# Patient Record
Sex: FEMALE | Race: WHITE | Employment: FULL TIME | URBAN - METROPOLITAN AREA
[De-identification: names, ages, dates, MRNs, and addresses within clinical notes are randomized per-mention and may not be internally consistent; named-entity substitution may affect disease eponyms.]

---

## 2017-01-26 ENCOUNTER — APPOINTMENT (OUTPATIENT)
Dept: CT IMAGING | Facility: CLINIC | Age: 37
End: 2017-01-26
Attending: FAMILY MEDICINE
Payer: COMMERCIAL

## 2017-01-26 ENCOUNTER — HOSPITAL ENCOUNTER (EMERGENCY)
Facility: CLINIC | Age: 37
Discharge: HOME | End: 2017-01-26
Attending: FAMILY MEDICINE | Admitting: FAMILY MEDICINE
Payer: COMMERCIAL

## 2017-01-26 VITALS
WEIGHT: 160 LBS | DIASTOLIC BLOOD PRESSURE: 70 MMHG | BODY MASS INDEX: 29.44 KG/M2 | SYSTOLIC BLOOD PRESSURE: 119 MMHG | HEIGHT: 62 IN | RESPIRATION RATE: 16 BRPM | TEMPERATURE: 99.6 F | OXYGEN SATURATION: 100 %

## 2017-01-26 DIAGNOSIS — J45.30 MILD PERSISTENT ASTHMA, UNCOMPLICATED: Primary | ICD-10-CM

## 2017-01-26 DIAGNOSIS — R10.32 ABDOMINAL PAIN, LEFT LOWER QUADRANT: ICD-10-CM

## 2017-01-26 LAB
ALBUMIN SERPL-MCNC: 4.2 G/DL (ref 3.4–5)
ALBUMIN UR-MCNC: 100 MG/DL
ALP SERPL-CCNC: 58 U/L (ref 40–150)
ALT SERPL W P-5'-P-CCNC: 16 U/L (ref 0–50)
ANION GAP SERPL CALCULATED.3IONS-SCNC: 9 MMOL/L (ref 3–14)
APPEARANCE UR: ABNORMAL
AST SERPL W P-5'-P-CCNC: 13 U/L (ref 0–45)
BACTERIA #/AREA URNS HPF: ABNORMAL /HPF
BASOPHILS # BLD AUTO: 0 10E9/L (ref 0–0.2)
BASOPHILS NFR BLD AUTO: 0.3 %
BILIRUB SERPL-MCNC: 1 MG/DL (ref 0.2–1.3)
BILIRUB UR QL STRIP: NEGATIVE
BUN SERPL-MCNC: 14 MG/DL (ref 7–30)
CALCIUM SERPL-MCNC: 9.4 MG/DL (ref 8.5–10.1)
CHLORIDE SERPL-SCNC: 103 MMOL/L (ref 94–109)
CO2 SERPL-SCNC: 26 MMOL/L (ref 20–32)
COLOR UR AUTO: ABNORMAL
CREAT SERPL-MCNC: 0.73 MG/DL (ref 0.52–1.04)
DIFFERENTIAL METHOD BLD: ABNORMAL
EOSINOPHIL # BLD AUTO: 0 10E9/L (ref 0–0.7)
EOSINOPHIL NFR BLD AUTO: 0.3 %
ERYTHROCYTE [DISTWIDTH] IN BLOOD BY AUTOMATED COUNT: 12.8 % (ref 10–15)
GFR SERPL CREATININE-BSD FRML MDRD: 89 ML/MIN/1.7M2
GLUCOSE SERPL-MCNC: 121 MG/DL (ref 70–99)
GLUCOSE UR STRIP-MCNC: NEGATIVE MG/DL
HCT VFR BLD AUTO: 45.1 % (ref 35–47)
HGB BLD-MCNC: 15.9 G/DL (ref 11.7–15.7)
HGB UR QL STRIP: ABNORMAL
IMM GRANULOCYTES # BLD: 0 10E9/L (ref 0–0.4)
IMM GRANULOCYTES NFR BLD: 0.2 %
KETONES UR STRIP-MCNC: 10 MG/DL
LACTATE BLD-SCNC: 0.8 MMOL/L (ref 0.7–2.1)
LEUKOCYTE ESTERASE UR QL STRIP: NEGATIVE
LIPASE SERPL-CCNC: 139 U/L (ref 73–393)
LYMPHOCYTES # BLD AUTO: 2 10E9/L (ref 0.8–5.3)
LYMPHOCYTES NFR BLD AUTO: 17.4 %
MCH RBC QN AUTO: 34.2 PG (ref 26.5–33)
MCHC RBC AUTO-ENTMCNC: 35.3 G/DL (ref 31.5–36.5)
MCV RBC AUTO: 97 FL (ref 78–100)
MONOCYTES # BLD AUTO: 0.6 10E9/L (ref 0–1.3)
MONOCYTES NFR BLD AUTO: 5.7 %
NEUTROPHILS # BLD AUTO: 8.6 10E9/L (ref 1.6–8.3)
NEUTROPHILS NFR BLD AUTO: 76.1 %
NITRATE UR QL: NEGATIVE
PH UR STRIP: 6 PH (ref 5–7)
PLATELET # BLD AUTO: 277 10E9/L (ref 150–450)
POTASSIUM SERPL-SCNC: 3.7 MMOL/L (ref 3.4–5.3)
PROT SERPL-MCNC: 9.1 G/DL (ref 6.8–8.8)
RBC # BLD AUTO: 4.65 10E12/L (ref 3.8–5.2)
RBC #/AREA URNS AUTO: 1 /HPF (ref 0–2)
SODIUM SERPL-SCNC: 138 MMOL/L (ref 133–144)
SP GR UR STRIP: 1.03 (ref 1–1.03)
SQUAMOUS #/AREA URNS AUTO: 10 /HPF (ref 0–1)
URN SPEC COLLECT METH UR: ABNORMAL
UROBILINOGEN UR STRIP-MCNC: NORMAL MG/DL (ref 0–2)
WBC # BLD AUTO: 11.2 10E9/L (ref 4–11)
WBC #/AREA URNS AUTO: 1 /HPF (ref 0–2)

## 2017-01-26 PROCEDURE — 80053 COMPREHEN METABOLIC PANEL: CPT | Performed by: FAMILY MEDICINE

## 2017-01-26 PROCEDURE — 96361 HYDRATE IV INFUSION ADD-ON: CPT

## 2017-01-26 PROCEDURE — 25000128 H RX IP 250 OP 636: Performed by: FAMILY MEDICINE

## 2017-01-26 PROCEDURE — 81003 URINALYSIS AUTO W/O SCOPE: CPT | Performed by: FAMILY MEDICINE

## 2017-01-26 PROCEDURE — 99285 EMERGENCY DEPT VISIT HI MDM: CPT | Mod: 25

## 2017-01-26 PROCEDURE — 99285 EMERGENCY DEPT VISIT HI MDM: CPT | Performed by: FAMILY MEDICINE

## 2017-01-26 PROCEDURE — 96375 TX/PRO/DX INJ NEW DRUG ADDON: CPT

## 2017-01-26 PROCEDURE — 74177 CT ABD & PELVIS W/CONTRAST: CPT

## 2017-01-26 PROCEDURE — 96374 THER/PROPH/DIAG INJ IV PUSH: CPT

## 2017-01-26 PROCEDURE — 85025 COMPLETE CBC W/AUTO DIFF WBC: CPT | Performed by: FAMILY MEDICINE

## 2017-01-26 PROCEDURE — 25500064 ZZH RX 255 OP 636: Performed by: RADIOLOGY

## 2017-01-26 PROCEDURE — 25000125 ZZHC RX 250: Performed by: RADIOLOGY

## 2017-01-26 PROCEDURE — 25000125 ZZHC RX 250: Performed by: FAMILY MEDICINE

## 2017-01-26 PROCEDURE — 83605 ASSAY OF LACTIC ACID: CPT | Performed by: FAMILY MEDICINE

## 2017-01-26 PROCEDURE — 83690 ASSAY OF LIPASE: CPT | Performed by: FAMILY MEDICINE

## 2017-01-26 RX ORDER — SULINDAC 200 MG/1
200 TABLET ORAL 2 TIMES DAILY WITH MEALS
Qty: 20 TABLET | Refills: 0 | Status: SHIPPED | OUTPATIENT
Start: 2017-01-26 | End: 2018-03-07

## 2017-01-26 RX ORDER — IOPAMIDOL 755 MG/ML
78 INJECTION, SOLUTION INTRAVASCULAR ONCE
Status: COMPLETED | OUTPATIENT
Start: 2017-01-26 | End: 2017-01-26

## 2017-01-26 RX ORDER — HYDROMORPHONE HYDROCHLORIDE 1 MG/ML
0.5 INJECTION, SOLUTION INTRAMUSCULAR; INTRAVENOUS; SUBCUTANEOUS
Status: DISCONTINUED | OUTPATIENT
Start: 2017-01-26 | End: 2017-01-26 | Stop reason: HOSPADM

## 2017-01-26 RX ORDER — MONTELUKAST SODIUM 10 MG/1
10 TABLET ORAL DAILY
Qty: 30 TABLET | Refills: 0 | Status: SHIPPED | OUTPATIENT
Start: 2017-01-26 | End: 2017-03-06

## 2017-01-26 RX ORDER — SODIUM CHLORIDE 9 MG/ML
1000 INJECTION, SOLUTION INTRAVENOUS CONTINUOUS
Status: DISCONTINUED | OUTPATIENT
Start: 2017-01-26 | End: 2017-01-26 | Stop reason: HOSPADM

## 2017-01-26 RX ORDER — ONDANSETRON 2 MG/ML
4 INJECTION INTRAMUSCULAR; INTRAVENOUS ONCE
Status: COMPLETED | OUTPATIENT
Start: 2017-01-26 | End: 2017-01-26

## 2017-01-26 RX ADMIN — IOPAMIDOL 78 ML: 755 INJECTION, SOLUTION INTRAVENOUS at 08:33

## 2017-01-26 RX ADMIN — HYDROMORPHONE HYDROCHLORIDE 0.5 MG: 1 INJECTION, SOLUTION INTRAMUSCULAR; INTRAVENOUS; SUBCUTANEOUS at 08:10

## 2017-01-26 RX ADMIN — SODIUM CHLORIDE 1000 ML: 9 INJECTION, SOLUTION INTRAVENOUS at 08:03

## 2017-01-26 RX ADMIN — ONDANSETRON 4 MG: 2 INJECTION INTRAMUSCULAR; INTRAVENOUS at 08:08

## 2017-01-26 RX ADMIN — SODIUM CHLORIDE 59 ML: 9 INJECTION, SOLUTION INTRAVENOUS at 08:33

## 2017-01-26 NOTE — ED AVS SNAPSHOT
[facility]    [address] [address]    Phone:  [phone]    Fax:  [phone]                                       Ana Felix   MRN: [id number]    Department:  [facility]   Date of Visit:  1/26/2017           After Visit Summary Signature Page     I have received my discharge instructions, and my questions have been answered. I have discussed any challenges I see with this plan with the nurse or doctor.    ..........................................................................................................................................  Patient/Patient Representative Signature      ..........................................................................................................................................  Patient Representative Print Name and Relationship to Patient    ..................................................               ................................................  Date                                            Time    ..........................................................................................................................................  Reviewed by Signature/Title    ...................................................              ..............................................  Date                                                            Time

## 2017-01-26 NOTE — ED NOTES
traveling to [place] on Sunday, felt off. Monday woke with nausea and abd pain and fever. Continues to have abd pain diarrhea and fever. Small frequent mucous stools. Nauseated ,no vomiting. No urinary sx. No URI sx. Hx of Chron's. Had gallbladder, uterus,right tubes and ovary,  appendix and part of colon removed  About 14 years ago. No flares since then . Managed on meds. Other family members had similar sx for 24 hours or less.

## 2017-01-26 NOTE — TELEPHONE ENCOUNTER
Pending Prescriptions:                       Disp   Refills    montelukast (SINGULAIR) 10 MG tablet      60 tab*1            Sig: Take 1 tablet (10 mg) by mouth daily      Aura Mesa MA

## 2017-01-26 NOTE — LETTER
[facility] EMERGENCY DEPARTMENT  [address] [address]  [phone]    2017    Ana Felix  [address]  [place] [address]  [phone] (home) [phone] (work)    : 1980      To Whom it may concern:    Ana Felix was seen in our Emergency Department today, 2017.  I expect her condition to improve over the next 2-3 days.  She may return to work/school when improved.    Sincerely,        Justus Rich

## 2017-01-26 NOTE — ED PROVIDER NOTES
History     Chief Complaint   Patient presents with     Abdominal Pain     HPI  Ana Felix is a 36 year old female who presents with a history of Crohn's disease on Imuran and in remission for the last 14 years with a prior history of distal  ilial resection with abdominal pain left lower quadrant associated with fever and small amounts of mucousy diarrhea multiple stools per day for the last 4 days.  Onset of symptoms while she was in [place] visiting with her family.  Some family members had similar symptoms but their symptoms resolved after less than 24 hours.  She has had persistent nausea and a fever to T-max of 101.  She denies recent antibiotics.  She has no known bad food intakes.  She drank primarily bottled water while she was traveling.  She notes the pain is been moderate in the left lower quadrant and does not radiate.  There are no modifying factors although she gets brief relief with Percocet that she uses for her back chronically.  The pain is a constant ache sensation with periodic sharp paroxysms.  She has been able to tolerate some liquids but hasn't had not much in the way of food for the last 4 days.  Continues to urinate.    She has multiple prior abdominal surgeries including hysterectomy, right oophorectomy due to ovarian torsion.  Prior tubal ligation.  Appendectomy.  Cholecystectomy.  And as noted above prior partial ileectomy.    Past Surgical History   Procedure Laterality Date     Appendectomy       Cholecystectomy, laporoscopic  2005     Cholecystectomy, Laparoscopic     Colonoscopy       Orthopedic surgery       bluged disk     Leep tx, cervical       LEEP TX Cervical     C/section, low transverse       , Low Transverse     Salpingo oophorectomy,r/l/luciano       Right ovary     Hc hysteroscopy, surgical; w/ endometrial ablation, any method  2010     Tubal ligation       Hysterectomy, supracervical laparoscopic       Esophagoscopy, gastroscopy, duodenoscopy (egd),  combined  3/6/2014     Procedure: COMBINED ESOPHAGOSCOPY, GASTROSCOPY, DUODENOSCOPY (EGD), BIOPSY SINGLE OR MULTIPLE;  COLONOSCOPY/ UPPER GI ENDOSCOPY/ COLON/ EGD/ Abdominal pain, epigastric/ [facility];  Surgeon: West Lomas MD;  Location: [facility] OR     Partial small bowel resection  2002     Ileo-colonic resection. Crohn's disease surgery for bowel obstruction. this was a section of small bowel and large bowel and the cecum., all removed and a reanastamosis done successfully       Current Outpatient Prescriptions   Medication Sig Dispense Refill     oxyCODONE-acetaminophen (PERCOCET) 7.5-325 MG per tablet Take 1 tablet by mouth 3 times daily       cyanocobalamin (VITAMIN B12) 1000 MCG/ML injection Inject 1 mL into the muscle every 30 days       montelukast (SINGULAIR) 10 MG tablet Take 1 tablet (10 mg) by mouth daily (Patient taking differently: Take 10 mg by mouth At Bedtime ) 60 tablet 1     fentaNYL (DURAGESIC) 12 mcg/hr patch 72 hr Place 1 patch onto the skin every 72 hours       albuterol (ALBUTEROL) 108 (90 BASE) MCG/ACT inhaler Inhale 2 puffs into the lungs every 4 hours as needed for shortness of breath / dyspnea 1 Inhaler 1     fentaNYL (DURAGESIC) 25 mcg/hr patch 72 hr Place 1 patch onto the skin every 72 hours       IMURAN 50 MG OR TABS 2 TABLETS by mouth DAILY         Patient Active Problem List   Diagnosis     Crohns disease (H)     Female pelvic pain     CARDIOVASCULAR SCREENING; LDL GOAL LESS THAN 160     Migraine headache     Back pain     Obesity     Disorder of sacrum     Displacement of lumbar intervertebral disc without myelopathy     Tobacco abuse     S/P oophorectomy     History of cholecystectomy     History of resection of small bowel     Abdominal pain     Discogenic pain     Chronic low back pain     Mild persistent asthma     Nausea with vomiting     Abdominal pain, right upper quadrant     Irritable bowel syndrome (IBS)     Lactose intolerance     Hearing difficulty     S/P LEEP of  "cervix         I have reviewed the Medications, Allergies, Past Medical and Surgical History, and Social History in the Epic system.    Review of Systems   No fever chills or sweats  No cough, sore throat or ear pain  No chest pain, palpitations or shortness of breath  No  vomiting,  constipation or blood in the stool or black tarry stools  No dysuria, urgency,  frequency or hematuria  No new rashes  No headaches or vision change  No enlarged lymph nodes  Review of systems otherwise negative       Physical Exam   BP: 122/81 mmHg  Heart Rate: 101  Temp: 99.6  F (37.6  C)  Resp: 16  Height: 157.5 cm (5' 2\")  Weight: 72.576 kg (160 lb)  SpO2: 99 %  Physical Exam   Constitutional: She appears distressed.   HENT:   Mouth/Throat: Oropharynx is clear and moist.   Eyes: Conjunctivae are normal.   Neck: Neck supple.   Cardiovascular: Normal rate.  Exam reveals no gallop and no friction rub.    No murmur heard.  Pulmonary/Chest: Effort normal and breath sounds normal. No respiratory distress. She has no wheezes. She has no rales.   Abdominal: She exhibits no distension. There is tenderness in the suprapubic area and left lower quadrant. There is no rebound and no guarding.   Musculoskeletal: She exhibits no edema.   Neurological: She is alert.   Skin: No rash noted. She is not diaphoretic.       ED Course   Procedures             Critical Care time:  none               Results for orders placed or performed during the hospital encounter of 01/26/17   CT Abdomen Pelvis w Contrast    Narrative    CT ABDOMEN AND PELVIS WITH CONTRAST January 26, 2017 8:42 AM    HISTORY: Left lower quadrant abdominal pain. Crohn's disease.    COMPARISON: A CT on 3/10/2014.    TECHNIQUE: Routine transverse CT imaging of the abdomen and pelvis was  performed following the uneventful administration of 78 mL Isovue 370  intravenous contrast. Radiation dose for this scan was reduced using  automated exposure control, adjustment of the mA and/or kV " according  to patient size, or iterative reconstruction technique.    FINDINGS: The visualized lung bases are clear. Again seen are surgical  changes of a cholecystectomy. There is mild diffuse decreased density  of the liver suggesting mild fatty infiltration. No focal hepatic  abnormality is seen. The spleen, pancreas, adrenal glands, kidneys,  and bladder remain normal. Again seen is a surgical clip adjacent to  the periphery of the left ovary. There has been increase in size of a  previously seen left ovarian cyst, now measuring 2.1 x 1.4 cm compared  to the previous 1.6 x 0.9 cm. The right ovary is not identified. No  free fluid is seen. No free intraperitoneal gas is identified. Again  seen are surgical changes of a hysterectomy and resection of the  proximal ascending colon. The appendix is also absent. No other  gastrointestinal tract abnormality is seen. No vascular abnormality is  seen. The osseous structures are unremarkable. No abdominal or pelvic  wall pathology is demonstrated.       Impression    IMPRESSION:   1. Increase in size of what is now a 2.1 cm cyst of the left ovary.  2. No other change is demonstrated. This includes surgical changes  including a cholecystectomy, hysterectomy, and partial right colon  resection as well as mild fatty infiltration of the liver. There is no  additional evidence of active Crohn's disease.     LETICIA TEE MD   CBC with platelets, differential   Result Value Ref Range    WBC 11.2 (H) 4.0 - 11.0 10e9/L    RBC Count 4.65 3.8 - 5.2 10e12/L    Hemoglobin 15.9 (H) 11.7 - 15.7 g/dL    Hematocrit 45.1 35.0 - 47.0 %    MCV 97 78 - 100 fl    MCH 34.2 (H) 26.5 - 33.0 pg    MCHC 35.3 31.5 - 36.5 g/dL    RDW 12.8 10.0 - 15.0 %    Platelet Count 277 150 - 450 10e9/L    Diff Method Automated Method     % Neutrophils 76.1 %    % Lymphocytes 17.4 %    % Monocytes 5.7 %    % Eosinophils 0.3 %    % Basophils 0.3 %    % Immature Granulocytes 0.2 %    Absolute Neutrophil 8.6  (H) 1.6 - 8.3 10e9/L    Absolute Lymphocytes 2.0 0.8 - 5.3 10e9/L    Absolute Monocytes 0.6 0.0 - 1.3 10e9/L    Absolute Eosinophils 0.0 0.0 - 0.7 10e9/L    Absolute Basophils 0.0 0.0 - 0.2 10e9/L    Abs Immature Granulocytes 0.0 0 - 0.4 10e9/L   Comprehensive metabolic panel   Result Value Ref Range    Sodium 138 133 - 144 mmol/L    Potassium 3.7 3.4 - 5.3 mmol/L    Chloride 103 94 - 109 mmol/L    Carbon Dioxide 26 20 - 32 mmol/L    Anion Gap 9 3 - 14 mmol/L    Glucose 121 (H) 70 - 99 mg/dL    Urea Nitrogen 14 7 - 30 mg/dL    Creatinine 0.73 0.52 - 1.04 mg/dL    GFR Estimate 89 >60 mL/min/1.7m2    GFR Estimate If Black >90   GFR Calc   >60 mL/min/1.7m2    Calcium 9.4 8.5 - 10.1 mg/dL    Bilirubin Total 1.0 0.2 - 1.3 mg/dL    Albumin 4.2 3.4 - 5.0 g/dL    Protein Total 9.1 (H) 6.8 - 8.8 g/dL    Alkaline Phosphatase 58 40 - 150 U/L    ALT 16 0 - 50 U/L    AST 13 0 - 45 U/L   Lipase   Result Value Ref Range    Lipase 139 73 - 393 U/L   Lactic acid whole blood   Result Value Ref Range    Lactic Acid 0.8 0.7 - 2.1 mmol/L   UA reflex to Microscopic   Result Value Ref Range    Color Urine Dark Yellow     Appearance Urine Slightly Cloudy     Glucose Urine Negative NEG mg/dL    Bilirubin Urine Negative NEG    Ketones Urine 10 (A) NEG mg/dL    Specific Gravity Urine 1.030 1.003 - 1.035    Blood Urine Trace (A) NEG    pH Urine 6.0 5.0 - 7.0 pH    Protein Albumin Urine 100 (A) NEG mg/dL    Urobilinogen mg/dL Normal 0.0 - 2.0 mg/dL    Nitrite Urine Negative NEG    Leukocyte Esterase Urine Negative NEG    Source Midstream Urine     WBC Urine 1 0 - 2 /HPF    RBC Urine 1 0 - 2 /HPF    Bacteria Urine Few (A) NEG /HPF    Squamous Epithelial /HPF Urine 10 0 - 1 /HPF         Assessments & Plan (with Medical Decision Making)       MDM: Ana Felix is a 36 year old female who presented with abdominal pain left lower quadrant and a history of Crohn's disease and on chronic Imuran.  She did have generalized tenderness on  exam the reassuring lab findings and CT abdomen. We discussed pain management as below, also to follow up with primary provider or her G.I. specialist.. precautions are given for return.  I have reviewed the nursing notes.    I have reviewed the findings, diagnosis, plan and need for follow up with the patient.    New Prescriptions    No medications on file       Final diagnoses:   Abdominal pain, left lower quadrant - Unclear cause - could be the left ovarian cyst.  No serioous findings on CT or lab work. Continue home pain medications.  recheck in clinic friday or monday.  Return for worsening. consider early crohn's flare.  May use sulndac twice daily with food or milk. percocet for breakthrough pain.  contact your GI doctor to let them know.       1/26/2017   [facility] EMERGENCY DEPARTMENT      Justus Rich MD  01/26/17 [id number]

## 2017-01-26 NOTE — Clinical Note
[facility]                                             [address] [place]  [address]    January 26, 2017    Ana Felix  [address] [place] [address]    Dear Ana,       We recently received a refill request for montelukast (SINGULAIR) 10 MG tablet.  We have refilled this for a one time 30 day supply only because you are due for a:    Asthma/allergies office visit      Please call at your earliest convenience so that there will not be a delay with your future refills.          Thank you,   Your [facility] Team/  [phone]

## 2017-01-26 NOTE — LETTER
[facility]  [address] [place] [address]  [address]  [phone]      January 26, 2017      Ana Felix  [address] [address]              Dear Ms. Felix,          Sincerely,

## 2017-01-26 NOTE — ED AVS SNAPSHOT
[facility] Emergency Department    [address] [facility] [address]    Phone:  [phone]    Fax:  [phone]                                       Ana Felix   MRN: [id number]    Department:  [facility] Emergency Department   Date of Visit:  1/26/2017           Patient Information     Date Of Birth          1980        Your diagnoses for this visit were:     Abdominal pain, left lower quadrant Unclear cause - could be the left ovarian cyst.  No serioous findings on CT or lab work. Continue home pain medications.  recheck in clinic friday or monday.  Return for worsening. consider early crohn's flare.  May use sulndac twice daily with food or milk. percocet for breakthrough pain.  contact your GI doctor to let them know.       You were seen by Justus Rich MD.      Follow-up Information     Follow up with [facility] Emergency Department.    Specialty:  EMERGENCY MEDICINE    Why:  As needed, If symptoms worsen    Contact information:    [address] [place] [id number] [phone]    Additional information:    The medical center is located at   [address] [place] [address]. (between [address] and   [facility] in [place], [place] miles north   of [place]).        Follow up with Kendrick Lance MD In 1 week.    Specialties:  Family Practice, Obstetrics    Contact information:    [facility]  [id number] [place] [address]  [phone]          Discharge Instructions         ICD-10-CM    1. Abdominal pain, left lower quadrant R10.32     Unclear cause - could be the left ovarian cyst.  No serioous findings on CT or lab work. Continue home pain medications.  recheck in clinic friday or monday.  Return for worsening. consider early crohn's flare.  May use sulndac twice daily with food or milk. percocet for breakthrough pain.  contact your GI doctor to let them know.         *Abdominal Pain, Unknown Cause (Female)    The exact cause of your abdominal (stomach)  pain is not certain. This does not mean that this is something to worry about, or the right tests were not done. Everyone likes to know the exact cause of the problem, but sometimes with abdominal pain, there is no clear-cut cause, and this could be a good thing. The good news is that your symptoms can be treated, and you will feel better.   Your condition does not seem serious now; however, sometimes the signs of a serious problem may take more time to appear. For this reason, it is important for you to watch for any new symptoms, problems, or worsening of your condition.  Over the next few days, the abdominal pain may come and go, or be continuous. Other common symptoms can include nausea and vomiting. Sometimes it can be difficult to tell if you feel nauseous, you may just feel bad and not associate that feeling with nausea. Constipation, diarrhea, and a fever may go along with the pain.  The pain may continue even if treated correctly over the following days. Depending on how things go, sometimes the cause can become clear and may require further or different treatment. Additional evaluations, medications, or tests may be needed.  Home care  Your health care provider may prescribe medications for pain, symptoms, or an infection.  Follow the health care provider's instructions for taking these medications.  General care    Rest until your next exam. No strenuous activities.    Try to find positions that ease discomfort. A small pillow placed on the abdomen may help relieve pain.    Something warm on your abdomen (such as a heating pad) may help, but be careful not to burn yourself.  Diet    Do not force yourself to eat, especially if having cramps, vomiting, or diarrhea.    Water is important so you do not get dehydrated. Soup may also be good. Sports drinks may also help, especially if they are not too acidic. Make sure you don't drink sugary drinks as this can make things worse. Take liquids in small amounts.  Do not guzzle them.    Caffeine sometimes makes the pain and cramping worse.    Avoid dairy products if you have vomiting or diarrhea.    Don't eat large amounts at a time. Wait a few minutes between bites.    Eat a diet low in fiber (called a low-residue diet). Foods allowed include refined breads, white rice, fruit and vegetable juices without pulp, tender meats. These foods will pass more easily through the intestine.    Avoid fried or fatty foods, dairy, alcohol and spicy foods until your symptoms go away.  Follow-up care  Follow up with your health care provider as instructed, or if your pain does not begin to improve in the next 24 hours.  When to seek medical care  Seek prompt medical care if any of the following occur:    Pain gets worse or moves to the right lower abdomen    New or worsening vomiting or diarrhea    Swelling of the abdomen    Unable to pass stool for more than three days    New fever over 101  F (38.3 C), or rising fever    Blood in vomit or bowel movements (dark red or black color)    Jaundice (yellow color of eyes and skin)    Weakness, dizziness    Chest, arm, back, neck or jaw pain    Unexpected vaginal bleeding or missed period  Call 911  Call emergency services if any of the following occur:    Trouble breathing    Confusion    Fainting or loss of consciousness    Rapid heart rate    Seizure    [id number] [address], [place] [address]. All rights reserved. This information is not intended as a substitute for professional medical care. Always follow your healthcare professional's instructions.          24 Hour Appointment Hotline       To make an appointment at any [facility], call [phone] ([phone]). If you don't have a family doctor or clinic, we will help you find one. [facility] are conveniently located to serve the needs of you and your family.             Review of your medicines      START taking        Dose / Directions Last  dose taken    sulindac 200 MG tablet   Commonly known as:  CLINORIL   Dose:  200 mg   Quantity:  20 tablet        Take 1 tablet (200 mg) by mouth 2 times daily (with meals)   Refills:  0          Our records show that you are taking the medicines listed below. If these are incorrect, please call your family doctor or clinic.        Dose / Directions Last dose taken    albuterol 108 (90 BASE) MCG/ACT Inhaler   Commonly known as:  albuterol   Dose:  2 puff   Quantity:  1 Inhaler        Inhale 2 puffs into the lungs every 4 hours as needed for shortness of breath / dyspnea   Refills:  1        cyanocobalamin 1000 MCG/ML injection   Commonly known as:  VITAMIN B12   Dose:  1 mL        Inject 1 mL into the muscle every 30 days   Refills:  0        * fentaNYL 25 mcg/hr 72 hr patch   Commonly known as:  DURAGESIC   Dose:  1 patch        Place 1 patch onto the skin every 72 hours   Refills:  0        * fentaNYL 12 mcg/hr 72 hr patch   Commonly known as:  DURAGESIC   Dose:  1 patch        Place 1 patch onto the skin every 72 hours   Refills:  0        IMURAN 50 MG tablet   Generic drug:  azaTHIOprine        2 TABLETS by mouth DAILY   Refills:  0        montelukast 10 MG tablet   Commonly known as:  SINGULAIR   Dose:  10 mg   Quantity:  60 tablet        Take 1 tablet (10 mg) by mouth daily   Refills:  1        PERCOCET 7.5-325 MG per tablet   Dose:  1 tablet   Generic drug:  oxyCODONE-acetaminophen        Take 1 tablet by mouth 3 times daily   Refills:  0        * Notice:  This list has 2 medication(s) that are the same as other medications prescribed for you. Read the directions carefully, and ask your doctor or other care provider to review them with you.            Prescriptions were sent or printed at these locations (1 Prescription)                   [facility] [address] - [facility] [address] [facility] AT [facility] OF [address] [place] [address]   [address] [facility] [address]    Telephone:  [phone]    Fax:  [phone]   Hours:                  E-Prescribed (1 of 1)         sulindac (CLINORIL) 200 MG tablet                Procedures and tests performed during your visit     CBC with platelets, differential    CT Abdomen Pelvis w Contrast    Comprehensive metabolic panel    Lactic acid whole blood    Lipase    UA reflex to Microscopic      Orders Needing Specimen Collection     None      Pending Results     No orders found from 1/25/2017 to 1/27/2017.            Pending Culture Results     No orders found from 1/25/2017 to 1/27/2017.       Test Results from your hospital stay           1/26/2017  8:16 AM - Interface, Flexilab Results      Component Results     Component Value Ref Range & Units Status    WBC 11.2 (H) 4.0 - 11.0 10e9/L Final    RBC Count 4.65 3.8 - 5.2 10e12/L Final    Hemoglobin 15.9 (H) 11.7 - 15.7 g/dL Final    Hematocrit 45.1 35.0 - 47.0 % Final    MCV 97 78 - 100 fl Final    MCH 34.2 (H) 26.5 - 33.0 pg Final    MCHC 35.3 31.5 - 36.5 g/dL Final    RDW 12.8 10.0 - 15.0 % Final    Platelet Count 277 150 - 450 10e9/L Final    Diff Method Automated Method  Final    % Neutrophils 76.1 % Final    % Lymphocytes 17.4 % Final    % Monocytes 5.7 % Final    % Eosinophils 0.3 % Final    % Basophils 0.3 % Final    % Immature Granulocytes 0.2 % Final    Absolute Neutrophil 8.6 (H) 1.6 - 8.3 10e9/L Final    Absolute Lymphocytes 2.0 0.8 - 5.3 10e9/L Final    Absolute Monocytes 0.6 0.0 - 1.3 10e9/L Final    Absolute Eosinophils 0.0 0.0 - 0.7 10e9/L Final    Absolute Basophils 0.0 0.0 - 0.2 10e9/L Final    Abs Immature Granulocytes 0.0 0 - 0.4 10e9/L Final         1/26/2017  8:35 AM - Interface, Flexilab Results      Component Results     Component Value Ref Range & Units Status    Sodium 138 133 - 144 mmol/L Final    Potassium 3.7 3.4 - 5.3 mmol/L Final    Chloride 103 94 - 109 mmol/L Final    Carbon Dioxide 26 20 - 32 mmol/L Final    Anion Gap 9 3 - 14 mmol/L Final    Glucose 121 (H) 70 - 99 mg/dL Final     Urea Nitrogen 14 7 - 30 mg/dL Final    Creatinine 0.73 0.52 - 1.04 mg/dL Final    GFR Estimate 89 >60 mL/min/1.7m2 Final    Non  GFR Calc    GFR Estimate If Black >90   GFR Calc   >60 mL/min/1.7m2 Final    Calcium 9.4 8.5 - 10.1 mg/dL Final    Bilirubin Total 1.0 0.2 - 1.3 mg/dL Final    Albumin 4.2 3.4 - 5.0 g/dL Final    Protein Total 9.1 (H) 6.8 - 8.8 g/dL Final    Alkaline Phosphatase 58 40 - 150 U/L Final    ALT 16 0 - 50 U/L Final    AST 13 0 - 45 U/L Final         1/26/2017  8:34 AM - Interface, Flexilab Results      Component Results     Component Value Ref Range & Units Status    Lipase 139 73 - 393 U/L Final         1/26/2017  8:15 AM - Interface, Flexilab Results      Component Results     Component Value Ref Range & Units Status    Lactic Acid 0.8 0.7 - 2.1 mmol/L Final         1/26/2017  9:10 AM - Interface, Radiant Ib      Narrative     CT ABDOMEN AND PELVIS WITH CONTRAST January 26, 2017 8:42 AM    HISTORY: Left lower quadrant abdominal pain. Crohn's disease.    COMPARISON: A CT on 3/10/2014.    TECHNIQUE: Routine transverse CT imaging of the abdomen and pelvis was  performed following the uneventful administration of 78 mL Isovue 370  intravenous contrast. Radiation dose for this scan was reduced using  automated exposure control, adjustment of the mA and/or kV according  to patient size, or iterative reconstruction technique.    FINDINGS: The visualized lung bases are clear. Again seen are surgical  changes of a cholecystectomy. There is mild diffuse decreased density  of the liver suggesting mild fatty infiltration. No focal hepatic  abnormality is seen. The spleen, pancreas, adrenal glands, kidneys,  and bladder remain normal. Again seen is a surgical clip adjacent to  the periphery of the left ovary. There has been increase in size of a  previously seen left ovarian cyst, now measuring 2.1 x 1.4 cm compared  to the previous 1.6 x 0.9 cm. The right ovary is not  identified. No  free fluid is seen. No free intraperitoneal gas is identified. Again  seen are surgical changes of a hysterectomy and resection of the  proximal ascending colon. The appendix is also absent. No other  gastrointestinal tract abnormality is seen. No vascular abnormality is  seen. The osseous structures are unremarkable. No abdominal or pelvic  wall pathology is demonstrated.         Impression     IMPRESSION:   1. Increase in size of what is now a 2.1 cm cyst of the left ovary.  2. No other change is demonstrated. This includes surgical changes  including a cholecystectomy, hysterectomy, and partial right colon  resection as well as mild fatty infiltration of the liver. There is no  additional evidence of active Crohn's disease.     LETICIA TEE MD         1/26/2017  8:42 AM - Interface, Flexilab Results      Component Results     Component Value Ref Range & Units Status    Color Urine Dark Yellow  Final    Appearance Urine Slightly Cloudy  Final    Glucose Urine Negative NEG mg/dL Final    Bilirubin Urine Negative NEG Final    Ketones Urine 10 (A) NEG mg/dL Final    Specific Gravity Urine 1.030 1.003 - 1.035 Final    Blood Urine Trace (A) NEG Final    pH Urine 6.0 5.0 - 7.0 pH Final    Protein Albumin Urine 100 (A) NEG mg/dL Final    Urobilinogen mg/dL Normal 0.0 - 2.0 mg/dL Final    Nitrite Urine Negative NEG Final    Leukocyte Esterase Urine Negative NEG Final    Source Midstream Urine  Final    WBC Urine 1 0 - 2 /HPF Final    RBC Urine 1 0 - 2 /HPF Final    Bacteria Urine Few (A) NEG /HPF Final    Squamous Epithelial /HPF Urine 10 0 - 1 /HPF Final                Thank you for choosing [facility]       Thank you for choosing [facility] for your care. Our goal is always to provide you with excellent care. Hearing back from our patients is one way we can continue to improve our services. Please take a few minutes to complete the written survey that you may receive in the mail after you visit with  us. Thank you!        [facility] Information     [facility] gives you secure access to your electronic health record. If you see a primary care provider, you can also send messages to your care team and make appointments. If you have questions, please call your primary care clinic.  If you do not have a primary care provider, please call [phone] and they will assist you.        Care EveryWhere ID     This is your Care EveryWhere ID. This could be used by other organizations to access your [facility] medical records  [id number]        After Visit Summary       This is your record. Keep this with you and show to your community pharmacist(s) and doctor(s) at your next visit.

## 2017-01-26 NOTE — DISCHARGE INSTRUCTIONS
ICD-10-CM    1. Abdominal pain, left lower quadrant R10.32     Unclear cause - could be the left ovarian cyst.  No serioous findings on CT or lab work. Continue home pain medications.  recheck in clinic friday or monday.  Return for worsening. consider early crohn's flare.  May use sulndac twice daily with food or milk. percocet for breakthrough pain.  contact your GI doctor to let them know.         *Abdominal Pain, Unknown Cause (Female)    The exact cause of your abdominal (stomach) pain is not certain. This does not mean that this is something to worry about, or the right tests were not done. Everyone likes to know the exact cause of the problem, but sometimes with abdominal pain, there is no clear-cut cause, and this could be a good thing. The good news is that your symptoms can be treated, and you will feel better.   Your condition does not seem serious now; however, sometimes the signs of a serious problem may take more time to appear. For this reason, it is important for you to watch for any new symptoms, problems, or worsening of your condition.  Over the next few days, the abdominal pain may come and go, or be continuous. Other common symptoms can include nausea and vomiting. Sometimes it can be difficult to tell if you feel nauseous, you may just feel bad and not associate that feeling with nausea. Constipation, diarrhea, and a fever may go along with the pain.  The pain may continue even if treated correctly over the following days. Depending on how things go, sometimes the cause can become clear and may require further or different treatment. Additional evaluations, medications, or tests may be needed.  Home care  Your health care provider may prescribe medications for pain, symptoms, or an infection.  Follow the health care provider's instructions for taking these medications.  General care    Rest until your next exam. No strenuous activities.    Try to find positions that ease discomfort. A small  pillow placed on the abdomen may help relieve pain.    Something warm on your abdomen (such as a heating pad) may help, but be careful not to burn yourself.  Diet    Do not force yourself to eat, especially if having cramps, vomiting, or diarrhea.    Water is important so you do not get dehydrated. Soup may also be good. Sports drinks may also help, especially if they are not too acidic. Make sure you don't drink sugary drinks as this can make things worse. Take liquids in small amounts. Do not guzzle them.    Caffeine sometimes makes the pain and cramping worse.    Avoid dairy products if you have vomiting or diarrhea.    Don't eat large amounts at a time. Wait a few minutes between bites.    Eat a diet low in fiber (called a low-residue diet). Foods allowed include refined breads, white rice, fruit and vegetable juices without pulp, tender meats. These foods will pass more easily through the intestine.    Avoid fried or fatty foods, dairy, alcohol and spicy foods until your symptoms go away.  Follow-up care  Follow up with your health care provider as instructed, or if your pain does not begin to improve in the next 24 hours.  When to seek medical care  Seek prompt medical care if any of the following occur:    Pain gets worse or moves to the right lower abdomen    New or worsening vomiting or diarrhea    Swelling of the abdomen    Unable to pass stool for more than three days    New fever over 101  F (38.3 C), or rising fever    Blood in vomit or bowel movements (dark red or black color)    Jaundice (yellow color of eyes and skin)    Weakness, dizziness    Chest, arm, back, neck or jaw pain    Unexpected vaginal bleeding or missed period  Call 911  Call emergency services if any of the following occur:    Trouble breathing    Confusion    Fainting or loss of consciousness    Rapid heart rate    Seizure    [id number] Dimitry Navarro, [address] [facility], [place] [address]. All rights reserved. This information  is not intended as a substitute for professional medical care. Always follow your healthcare professional's instructions.

## 2017-01-27 ENCOUNTER — OFFICE VISIT (OUTPATIENT)
Dept: FAMILY MEDICINE | Facility: CLINIC | Age: 37
End: 2017-01-27
Payer: COMMERCIAL

## 2017-01-27 VITALS
BODY MASS INDEX: 27.98 KG/M2 | SYSTOLIC BLOOD PRESSURE: 128 MMHG | WEIGHT: 153 LBS | DIASTOLIC BLOOD PRESSURE: 84 MMHG | TEMPERATURE: 99.2 F | OXYGEN SATURATION: 98 % | HEART RATE: 90 BPM

## 2017-01-27 DIAGNOSIS — R10.84 ABDOMINAL PAIN, GENERALIZED: Primary | ICD-10-CM

## 2017-01-27 PROCEDURE — 99214 OFFICE O/P EST MOD 30 MIN: CPT | Performed by: PHYSICIAN ASSISTANT

## 2017-01-27 RX ORDER — CIPROFLOXACIN 500 MG/1
500 TABLET, FILM COATED ORAL 2 TIMES DAILY
Qty: 10 TABLET | Refills: 0 | Status: SHIPPED | OUTPATIENT
Start: 2017-01-27 | End: 2017-02-01

## 2017-01-27 RX ORDER — DICYCLOMINE HCL 20 MG
20 TABLET ORAL 4 TIMES DAILY PRN
Qty: 40 TABLET | Refills: 1 | Status: SHIPPED | OUTPATIENT
Start: 2017-01-27 | End: 2018-07-31

## 2017-01-27 NOTE — PROGRESS NOTES
"  SUBJECTIVE:                                                    Ana Felix is a 36 year old female who presents to clinic today for the following health issues:        ED/UC Followup:    Facility:  [place]  Date of visit: 01/26/2017  Reason for visit: abdomen pain- nausea - diarrhea -fever off and on  Current Status: same          Copied from emergency room note:  \"HPI  Ana Felix is a 36 year old female who presents with a history of Crohn's disease on Imuran and in remission for the last 14 years with a prior history of distal  ilial resection with abdominal pain left lower quadrant associated with fever and small amounts of mucousy diarrhea multiple stools per day for the last 4 days.  Onset of symptoms while she was in [place] visiting with her family.  Some family members had similar symptoms but their symptoms resolved after less than 24 hours.  She has had persistent nausea and a fever to T-max of 101.  She denies recent antibiotics.  She has no known bad food intakes.  She drank primarily bottled water while she was traveling.  She notes the pain is been moderate in the left lower quadrant and does not radiate.  There are no modifying factors although she gets brief relief with Percocet that she uses for her back chronically.  The pain is a constant ache sensation with periodic sharp paroxysms.  She has been able to tolerate some liquids but hasn't had not much in the way of food for the last 4 days.  Continues to urinate.    She has multiple prior abdominal surgeries including hysterectomy, right oophorectomy due to ovarian torsion.  Prior tubal ligation.  Appendectomy.  Cholecystectomy.  And as noted above prior partial ileectomy.\"---copied from emergency room MD note.       Liver, kidney, lipase labs are normal.     CT scan in emergency room showed 2.1 cm cyst left ovary, slightly enlarged from previous. Given suldinac for pain but can't take as she is not really eating much. On takes percocet (90 " filled on the ) and fentyl patches (10 filled on 1-130) for back pain.  Contract with pain clinic[facility].   Follows with Dr Truong [facility]. Next appt with them not until July. Goes once yearly with labs Q 3 months.   WBC was just slightly elevated with increased neutrophils.   Diarrhea- initially was multiple times a day,  Not liquid.  No blood in it.  Had mucous in it.   Ate toast yesterday, before that had not eating since . Drinking fluids/water.    No vomiting.  Some nausea not too bad.   Entire left side of abdomen is painful.   Daily smoker.   Fevers ranging .    had diarrhea for 24 hours recently.     No cough or cold symptoms.   No GERD.   Steady discomfort in her stomach.  Off and on sharp pains.     Pain is stable. Not better or worse.   Percocet only helps for about an hour.   No constipation.           Problem list and histories reviewed & adjusted, as indicated.  Additional history: as documented    Patient Active Problem List   Diagnosis     Crohns disease (H)     Female pelvic pain     CARDIOVASCULAR SCREENING; LDL GOAL LESS THAN 160     Migraine headache     Back pain     Obesity     Disorder of sacrum     Displacement of lumbar intervertebral disc without myelopathy     Tobacco abuse     S/P oophorectomy     History of cholecystectomy     History of resection of small bowel     Abdominal pain     Discogenic pain     Chronic low back pain     Mild persistent asthma     Nausea with vomiting     Abdominal pain, right upper quadrant     Irritable bowel syndrome (IBS)     Lactose intolerance     Hearing difficulty     S/P LEEP of cervix     Past Surgical History   Procedure Laterality Date     Appendectomy       Cholecystectomy, laporoscopic  2005     Cholecystectomy, Laparoscopic     Colonoscopy       Orthopedic surgery       bluged disk     Leep tx, cervical       LEEP TX Cervical     C/section, low transverse       , Low Transverse      Salpingo oophorectomy,r/l/luciano       Right ovary     Hc hysteroscopy, surgical; w/ endometrial ablation, any method  7/2010     Tubal ligation       Hysterectomy, supracervical laparoscopic  2010     Esophagoscopy, gastroscopy, duodenoscopy (egd), combined  3/6/2014     Procedure: COMBINED ESOPHAGOSCOPY, GASTROSCOPY, DUODENOSCOPY (EGD), BIOPSY SINGLE OR MULTIPLE;  COLONOSCOPY/ UPPER GI ENDOSCOPY/ COLON/ EGD/ Abdominal pain, epigastric/ PJH;  Surgeon: West Lomas MD;  Location: [facility] OR     Partial small bowel resection  2002     Ileo-colonic resection. Crohn's disease surgery for bowel obstruction. this was a section of small bowel and large bowel and the cecum., all removed and a reanastamosis done successfully       Social History   Substance Use Topics     Smoking status: Current Every Day Smoker -- 0.50 packs/day     Types: Cigarettes     Smokeless tobacco: Never Used     Alcohol Use: 0.0 oz/week     0 Standard drinks or equivalent per week      Comment: rare     Family History   Problem Relation Age of Onset     CANCER Mother      cervical     Lipids Mother      Eye Disorder Father      Lipids Father      Alcohol/Drug Maternal Grandmother      DIABETES Paternal Grandmother      Cancer - colorectal Paternal Grandmother      Eye Disorder Paternal Grandmother      DIABETES Paternal Grandfather      Eye Disorder Paternal Grandfather      Inflammatory Bowel Disease Paternal Aunt      and two paternal uncles         Current Outpatient Prescriptions   Medication Sig Dispense Refill     ciprofloxacin (CIPRO) 500 MG tablet Take 1 tablet (500 mg) by mouth 2 times daily for 5 days 10 tablet 0     dicyclomine (BENTYL) 20 MG tablet Take 1 tablet (20 mg) by mouth 4 times daily as needed 40 tablet 1     montelukast (SINGULAIR) 10 MG tablet Take 1 tablet (10 mg) by mouth daily 30 tablet 0     sulindac (CLINORIL) 200 MG tablet Take 1 tablet (200 mg) by mouth 2 times daily (with meals) 20 tablet 0      oxyCODONE-acetaminophen (PERCOCET) 7.5-325 MG per tablet Take 1 tablet by mouth 3 times daily       cyanocobalamin (VITAMIN B12) 1000 MCG/ML injection Inject 1 mL into the muscle every 30 days       fentaNYL (DURAGESIC) 12 mcg/hr patch 72 hr Place 1 patch onto the skin every 72 hours       albuterol (ALBUTEROL) 108 (90 BASE) MCG/ACT inhaler Inhale 2 puffs into the lungs every 4 hours as needed for shortness of breath / dyspnea 1 Inhaler 1     fentaNYL (DURAGESIC) 25 mcg/hr patch 72 hr Place 1 patch onto the skin every 72 hours       IMURAN 50 MG OR TABS 2 TABLETS by mouth DAILY       Allergies   Allergen Reactions     Infliximab Hives     Sulfa Drugs [Sulfa Drugs] Nausea     Augmentin Other (See Comments)     Crohn's     Bupropion Hives and Nausea     Droperidol Other (See Comments)     Dystonic reaction     Ibuprofen Other (See Comments)     Crohn's       Lyrica [Pregabalin] Nausea and Vomiting     Grogginess, severe back pain     Vicodin [Hydrocodone-Acetaminophen] Nausea and Vomiting       ROS:  Constitutional, HEENT, cardiovascular, pulmonary, gi and gu systems are negative, except as otherwise noted.    OBJECTIVE:                                                    /84 mmHg  Pulse 90  Temp(Src) 99.2  F (37.3  C) (Oral)  Wt 153 lb (69.4 kg)  SpO2 98%  LMP 07/23/2010  Body mass index is 27.98 kg/(m^2).  GENERAL: healthy, alert and no distress  NECK: no adenopathy, no asymmetry, masses, or scars and thyroid normal to palpation  RESP: lungs clear to auscultation - no rales, rhonchi or wheezes  CV: regular rate and rhythm, normal S1 S2, no S3 or S4, no murmur, click or rub, no peripheral edema and peripheral pulses strong  ABDOMEN: soft, tender left upper, middle and lower quadrants, no rigidity or guarding,  no hepatosplenomegaly, no masses and bowel sounds normal  MS: no gross musculoskeletal defects noted, no edema  SKIN: no suspicious lesions or rashes  NEURO: Normal strength and tone, mentation intact  and speech normal  PSYCH: mentation appears normal, affect normal/bright    Diagnostic Test Results:  none      ASSESSMENT/PLAN:                                                    ASSESSMENT / PLAN:  (R10.84) Abdominal pain, generalized  (primary encounter diagnosis)  Comment:  Most suspicious for viral or bacterial gastroenteritis ( was ill also) , possibly early recurrence of chron's, early diverticulitis (although not seen on ct), or other infection.  Pain not likely from cyst as it is all over the left side and with other symptoms more consistent with gastroenterology issue. Consider pelvic US in future if ongoing symptoms  Plan: ciprofloxacin (CIPRO) 500 MG tablet,         dicyclomine (BENTYL) 20 MG tablet          Patient declines stool testing at this time  Schedule with gastroenterology if symptoms not improving by Monday  To emergency room with worsening symptoms  Consider repeating labs and CT if not improving some by Monday  Gatorade/pediatlye, bland diet until improving      Kaitlin Regalado PA-C  [facility]

## 2017-01-27 NOTE — NURSING NOTE
"Chief Complaint   Patient presents with     RECHECK     abdominal pain - left ovarian cyst seen        Initial /84 mmHg  Pulse 90  Temp(Src) 99.2  F (37.3  C) (Oral)  Wt 153 lb (69.4 kg)  SpO2 98%  LMP 07/23/2010 Estimated body mass index is 27.98 kg/(m^2) as calculated from the following:    Height as of 1/26/17: 5' 2\" (1.575 m).    Weight as of this encounter: 153 lb (69.4 kg)..  BP completed using cuff size: aviva Padilla M.A.      "

## 2017-01-27 NOTE — MR AVS SNAPSHOT
After Visit Summary   1/27/2017    Ana Felix    MRN: [id number]           Patient Information     Date Of Birth          1980        Visit Information        Provider Department      1/27/2017 8:40 AM Kaitlin Regalado PA-C [facility]        Today's Diagnoses     Abdominal pain, generalized    -  1        Follow-ups after your visit        Who to contact     If you have questions or need follow up information about today's clinic visit or your schedule please contact [facility] directly at [phone].  Normal or non-critical lab and imaging results will be communicated to you by [employer], letter or phone within 4 business days after the clinic has received the results. If you do not hear from us within 7 days, please contact the clinic through [employer] or phone. If you have a critical or abnormal lab result, we will notify you by phone as soon as possible.  Submit refill requests through [employer] or call your pharmacy and they will forward the refill request to us. Please allow 3 business days for your refill to be completed.          Additional Information About Your Visit        MyChart Information     [employer] gives you secure access to your electronic health record. If you see a primary care provider, you can also send messages to your care team and make appointments. If you have questions, please call your primary care clinic.  If you do not have a primary care provider, please call [phone] and they will assist you.        Care EveryWhere ID     This is your Care EveryWhere ID. This could be used by other organizations to access your [facility] medical records  [id number]        Your Vitals Were     Pulse Temperature Pulse Oximetry Last Period          90 99.2  F (37.3  C) (Oral) 98% 07/23/2010         Blood Pressure from Last 3 Encounters:   01/27/17 128/84   01/26/17 119/70   12/13/16 154/102    Weight from Last 3 Encounters:   01/27/17 153 lb (69.4  kg)   01/26/17 160 lb (72.576 kg)   01/28/16 189 lb (85.73 kg)              Today, you had the following     No orders found for display         Today's Medication Changes          These changes are accurate as of: 1/27/17  9:16 AM.  If you have any questions, ask your nurse or doctor.               Start taking these medicines.        Dose/Directions    ciprofloxacin 500 MG tablet   Commonly known as:  CIPRO   Used for:  Abdominal pain, generalized        Dose:  500 mg   Take 1 tablet (500 mg) by mouth 2 times daily for 5 days   Quantity:  10 tablet   Refills:  0       dicyclomine 20 MG tablet   Commonly known as:  BENTYL   Used for:  Abdominal pain, generalized        Dose:  20 mg   Take 1 tablet (20 mg) by mouth 4 times daily as needed   Quantity:  40 tablet   Refills:  1            Where to get your medicines      These medications were sent to [facility] [id number] - [address] AT [facility] OF [address] [place] [address]  [address] [facility] [address]     Phone:  [phone]    - ciprofloxacin 500 MG tablet  - dicyclomine 20 MG tablet             Primary Care Provider Office Phone # Fax #    [facility] Demar Lance [phone]       [facility] [address] [facility] [address]        Thank you!     Thank you for choosing [facility]  for your care. Our goal is always to provide you with excellent care. Hearing back from our patients is one way we can continue to improve our services. Please take a few minutes to complete the written survey that you may receive in the mail after your visit with us. Thank you!             Your Updated Medication List - Protect others around you: Learn how to safely use, store and throw away your medicines at www.disposemymeds.org.          This list is accurate as of: 1/27/17  9:16 AM.  Always use your most recent med list.                   Brand Name Dispense Instructions for use    albuterol 108 (90  BASE) MCG/ACT Inhaler    albuterol    1 Inhaler    Inhale 2 puffs into the lungs every 4 hours as needed for shortness of breath / dyspnea       ciprofloxacin 500 MG tablet    CIPRO    10 tablet    Take 1 tablet (500 mg) by mouth 2 times daily for 5 days       cyanocobalamin 1000 MCG/ML injection    VITAMIN B12     Inject 1 mL into the muscle every 30 days       dicyclomine 20 MG tablet    BENTYL    40 tablet    Take 1 tablet (20 mg) by mouth 4 times daily as needed       * fentaNYL 25 mcg/hr 72 hr patch    DURAGESIC     Place 1 patch onto the skin every 72 hours       * fentaNYL 12 mcg/hr 72 hr patch    DURAGESIC     Place 1 patch onto the skin every 72 hours       IMURAN 50 MG tablet   Generic drug:  azaTHIOprine      2 TABLETS by mouth DAILY       montelukast 10 MG tablet    SINGULAIR    30 tablet    Take 1 tablet (10 mg) by mouth daily       PERCOCET 7.5-325 MG per tablet   Generic drug:  oxyCODONE-acetaminophen      Take 1 tablet by mouth 3 times daily       sulindac 200 MG tablet    CLINORIL    20 tablet    Take 1 tablet (200 mg) by mouth 2 times daily (with meals)       * Notice:  This list has 2 medication(s) that are the same as other medications prescribed for you. Read the directions carefully, and ask your doctor or other care provider to review them with you.

## 2017-02-15 DIAGNOSIS — K50.80 CROHN'S DISEASE, SMALL AND LARGE INTESTINE (H): ICD-10-CM

## 2017-02-15 LAB
ALBUMIN SERPL-MCNC: 4 G/DL (ref 3.4–5)
ALP SERPL-CCNC: 58 U/L (ref 40–150)
ALT SERPL W P-5'-P-CCNC: 13 U/L (ref 0–50)
AST SERPL W P-5'-P-CCNC: 6 U/L (ref 0–45)
BASOPHILS # BLD AUTO: 0 10E9/L (ref 0–0.2)
BASOPHILS NFR BLD AUTO: 0.3 %
BILIRUB DIRECT SERPL-MCNC: <0.1 MG/DL (ref 0–0.2)
BILIRUB SERPL-MCNC: 0.4 MG/DL (ref 0.2–1.3)
DIFFERENTIAL METHOD BLD: ABNORMAL
EOSINOPHIL # BLD AUTO: 0.1 10E9/L (ref 0–0.7)
EOSINOPHIL NFR BLD AUTO: 0.9 %
ERYTHROCYTE [DISTWIDTH] IN BLOOD BY AUTOMATED COUNT: 12.8 % (ref 10–15)
HCT VFR BLD AUTO: 44.4 % (ref 35–47)
HGB BLD-MCNC: 15.2 G/DL (ref 11.7–15.7)
LYMPHOCYTES # BLD AUTO: 2.1 10E9/L (ref 0.8–5.3)
LYMPHOCYTES NFR BLD AUTO: 20.6 %
MCH RBC QN AUTO: 34.2 PG (ref 26.5–33)
MCHC RBC AUTO-ENTMCNC: 34.2 G/DL (ref 31.5–36.5)
MCV RBC AUTO: 100 FL (ref 78–100)
MONOCYTES # BLD AUTO: 0.6 10E9/L (ref 0–1.3)
MONOCYTES NFR BLD AUTO: 6.2 %
NEUTROPHILS # BLD AUTO: 7.2 10E9/L (ref 1.6–8.3)
NEUTROPHILS NFR BLD AUTO: 72 %
PLATELET # BLD AUTO: 231 10E9/L (ref 150–450)
PROT SERPL-MCNC: 8.5 G/DL (ref 6.8–8.8)
RBC # BLD AUTO: 4.45 10E12/L (ref 3.8–5.2)
WBC # BLD AUTO: 10 10E9/L (ref 4–11)

## 2017-02-15 PROCEDURE — 80076 HEPATIC FUNCTION PANEL: CPT | Performed by: FAMILY MEDICINE

## 2017-02-15 PROCEDURE — 36415 COLL VENOUS BLD VENIPUNCTURE: CPT | Performed by: FAMILY MEDICINE

## 2017-02-15 PROCEDURE — 85025 COMPLETE CBC W/AUTO DIFF WBC: CPT | Performed by: FAMILY MEDICINE

## 2017-03-06 DIAGNOSIS — J45.30 MILD PERSISTENT ASTHMA, UNCOMPLICATED: ICD-10-CM

## 2017-03-07 RX ORDER — MONTELUKAST SODIUM 10 MG/1
10 TABLET ORAL DAILY
Qty: 90 TABLET | Refills: 2 | Status: SHIPPED | OUTPATIENT
Start: 2017-03-07 | End: 2017-11-05

## 2017-05-04 DIAGNOSIS — K50.80 CROHN'S DISEASE, SMALL AND LARGE INTESTINE (H): ICD-10-CM

## 2017-05-04 LAB
ALBUMIN SERPL-MCNC: 3.8 G/DL (ref 3.4–5)
ALP SERPL-CCNC: 52 U/L (ref 40–150)
ALT SERPL W P-5'-P-CCNC: 10 U/L (ref 0–50)
AST SERPL W P-5'-P-CCNC: 8 U/L (ref 0–45)
BASOPHILS # BLD AUTO: 0.1 10E9/L (ref 0–0.2)
BASOPHILS NFR BLD AUTO: 0.8 %
BILIRUB DIRECT SERPL-MCNC: 0.1 MG/DL (ref 0–0.2)
BILIRUB SERPL-MCNC: 0.6 MG/DL (ref 0.2–1.3)
DIFFERENTIAL METHOD BLD: ABNORMAL
EOSINOPHIL # BLD AUTO: 0.1 10E9/L (ref 0–0.7)
EOSINOPHIL NFR BLD AUTO: 1.2 %
ERYTHROCYTE [DISTWIDTH] IN BLOOD BY AUTOMATED COUNT: 12.4 % (ref 10–15)
HCT VFR BLD AUTO: 39.7 % (ref 35–47)
HGB BLD-MCNC: 14 G/DL (ref 11.7–15.7)
LYMPHOCYTES # BLD AUTO: 2.6 10E9/L (ref 0.8–5.3)
LYMPHOCYTES NFR BLD AUTO: 25.8 %
MCH RBC QN AUTO: 36 PG (ref 26.5–33)
MCHC RBC AUTO-ENTMCNC: 35.3 G/DL (ref 31.5–36.5)
MCV RBC AUTO: 102 FL (ref 78–100)
MONOCYTES # BLD AUTO: 0.7 10E9/L (ref 0–1.3)
MONOCYTES NFR BLD AUTO: 6.8 %
NEUTROPHILS # BLD AUTO: 6.6 10E9/L (ref 1.6–8.3)
NEUTROPHILS NFR BLD AUTO: 65.4 %
PLATELET # BLD AUTO: 230 10E9/L (ref 150–450)
PROT SERPL-MCNC: 7.4 G/DL (ref 6.8–8.8)
RBC # BLD AUTO: 3.89 10E12/L (ref 3.8–5.2)
WBC # BLD AUTO: 10 10E9/L (ref 4–11)

## 2017-05-04 PROCEDURE — 80076 HEPATIC FUNCTION PANEL: CPT | Performed by: FAMILY MEDICINE

## 2017-05-04 PROCEDURE — 85025 COMPLETE CBC W/AUTO DIFF WBC: CPT | Performed by: FAMILY MEDICINE

## 2017-05-04 PROCEDURE — 36415 COLL VENOUS BLD VENIPUNCTURE: CPT | Performed by: FAMILY MEDICINE

## 2017-07-19 ENCOUNTER — TRANSFERRED RECORDS (OUTPATIENT)
Dept: HEALTH INFORMATION MANAGEMENT | Facility: CLINIC | Age: 37
End: 2017-07-19

## 2017-08-03 ENCOUNTER — TRANSFERRED RECORDS (OUTPATIENT)
Dept: HEALTH INFORMATION MANAGEMENT | Facility: CLINIC | Age: 37
End: 2017-08-03

## 2017-08-17 ENCOUNTER — RADIANT APPOINTMENT (OUTPATIENT)
Dept: GENERAL RADIOLOGY | Facility: CLINIC | Age: 37
End: 2017-08-17
Attending: FAMILY MEDICINE
Payer: COMMERCIAL

## 2017-08-17 ENCOUNTER — OFFICE VISIT (OUTPATIENT)
Dept: FAMILY MEDICINE | Facility: CLINIC | Age: 37
End: 2017-08-17
Payer: COMMERCIAL

## 2017-08-17 VITALS
BODY MASS INDEX: 25.41 KG/M2 | WEIGHT: 143.4 LBS | SYSTOLIC BLOOD PRESSURE: 119 MMHG | HEART RATE: 83 BPM | TEMPERATURE: 99.6 F | DIASTOLIC BLOOD PRESSURE: 78 MMHG | HEIGHT: 63 IN

## 2017-08-17 DIAGNOSIS — K50.80 CROHN'S DISEASE OF BOTH SMALL AND LARGE INTESTINE WITHOUT COMPLICATION (H): ICD-10-CM

## 2017-08-17 DIAGNOSIS — R63.4 LOSS OF WEIGHT: Primary | ICD-10-CM

## 2017-08-17 DIAGNOSIS — Z72.0 TOBACCO ABUSE: ICD-10-CM

## 2017-08-17 DIAGNOSIS — K58.0 IRRITABLE BOWEL SYNDROME WITH DIARRHEA: ICD-10-CM

## 2017-08-17 DIAGNOSIS — R63.4 LOSS OF WEIGHT: ICD-10-CM

## 2017-08-17 LAB
ALBUMIN SERPL-MCNC: 4.1 G/DL (ref 3.4–5)
ALP SERPL-CCNC: 53 U/L (ref 40–150)
ALT SERPL W P-5'-P-CCNC: 11 U/L (ref 0–50)
ANION GAP SERPL CALCULATED.3IONS-SCNC: 6 MMOL/L (ref 3–14)
AST SERPL W P-5'-P-CCNC: 8 U/L (ref 0–45)
BASOPHILS # BLD AUTO: 0 10E9/L (ref 0–0.2)
BASOPHILS NFR BLD AUTO: 0.4 %
BILIRUB SERPL-MCNC: 0.4 MG/DL (ref 0.2–1.3)
BUN SERPL-MCNC: 13 MG/DL (ref 7–30)
CALCIUM SERPL-MCNC: 9.3 MG/DL (ref 8.5–10.1)
CHLORIDE SERPL-SCNC: 108 MMOL/L (ref 94–109)
CO2 SERPL-SCNC: 26 MMOL/L (ref 20–32)
CREAT SERPL-MCNC: 0.76 MG/DL (ref 0.52–1.04)
DIFFERENTIAL METHOD BLD: ABNORMAL
EOSINOPHIL # BLD AUTO: 0.1 10E9/L (ref 0–0.7)
EOSINOPHIL NFR BLD AUTO: 0.7 %
ERYTHROCYTE [DISTWIDTH] IN BLOOD BY AUTOMATED COUNT: 12.3 % (ref 10–15)
GFR SERPL CREATININE-BSD FRML MDRD: 84 ML/MIN/1.7M2
GLUCOSE SERPL-MCNC: 89 MG/DL (ref 70–99)
HCT VFR BLD AUTO: 36.3 % (ref 35–47)
HGB BLD-MCNC: 13.1 G/DL (ref 11.7–15.7)
LYMPHOCYTES # BLD AUTO: 2.7 10E9/L (ref 0.8–5.3)
LYMPHOCYTES NFR BLD AUTO: 26.4 %
MCH RBC QN AUTO: 36.2 PG (ref 26.5–33)
MCHC RBC AUTO-ENTMCNC: 36.1 G/DL (ref 31.5–36.5)
MCV RBC AUTO: 100 FL (ref 78–100)
MONOCYTES # BLD AUTO: 0.7 10E9/L (ref 0–1.3)
MONOCYTES NFR BLD AUTO: 6.5 %
NEUTROPHILS # BLD AUTO: 6.8 10E9/L (ref 1.6–8.3)
NEUTROPHILS NFR BLD AUTO: 66 %
PLATELET # BLD AUTO: 225 10E9/L (ref 150–450)
POTASSIUM SERPL-SCNC: 4 MMOL/L (ref 3.4–5.3)
PROT SERPL-MCNC: 7.7 G/DL (ref 6.8–8.8)
RBC # BLD AUTO: 3.62 10E12/L (ref 3.8–5.2)
SODIUM SERPL-SCNC: 140 MMOL/L (ref 133–144)
T4 FREE SERPL-MCNC: 1 NG/DL (ref 0.76–1.46)
TSH SERPL DL<=0.005 MIU/L-ACNC: 2.55 MU/L (ref 0.4–4)
WBC # BLD AUTO: 10.2 10E9/L (ref 4–11)

## 2017-08-17 PROCEDURE — 36415 COLL VENOUS BLD VENIPUNCTURE: CPT | Performed by: FAMILY MEDICINE

## 2017-08-17 PROCEDURE — 80050 GENERAL HEALTH PANEL: CPT | Performed by: FAMILY MEDICINE

## 2017-08-17 PROCEDURE — 84439 ASSAY OF FREE THYROXINE: CPT | Performed by: FAMILY MEDICINE

## 2017-08-17 PROCEDURE — 99214 OFFICE O/P EST MOD 30 MIN: CPT | Performed by: FAMILY MEDICINE

## 2017-08-17 PROCEDURE — 71020 XR CHEST 2 VW: CPT

## 2017-08-17 ASSESSMENT — ANXIETY QUESTIONNAIRES
5. BEING SO RESTLESS THAT IT IS HARD TO SIT STILL: NOT AT ALL
1. FEELING NERVOUS, ANXIOUS, OR ON EDGE: NOT AT ALL
2. NOT BEING ABLE TO STOP OR CONTROL WORRYING: NOT AT ALL
GAD7 TOTAL SCORE: 0
6. BECOMING EASILY ANNOYED OR IRRITABLE: NOT AT ALL
7. FEELING AFRAID AS IF SOMETHING AWFUL MIGHT HAPPEN: NOT AT ALL
IF YOU CHECKED OFF ANY PROBLEMS ON THIS QUESTIONNAIRE, HOW DIFFICULT HAVE THESE PROBLEMS MADE IT FOR YOU TO DO YOUR WORK, TAKE CARE OF THINGS AT HOME, OR GET ALONG WITH OTHER PEOPLE: NOT DIFFICULT AT ALL
3. WORRYING TOO MUCH ABOUT DIFFERENT THINGS: NOT AT ALL

## 2017-08-17 ASSESSMENT — PATIENT HEALTH QUESTIONNAIRE - PHQ9
SUM OF ALL RESPONSES TO PHQ QUESTIONS 1-9: 4
5. POOR APPETITE OR OVEREATING: NOT AT ALL

## 2017-08-17 NOTE — PROGRESS NOTES
SUBJECTIVE:                                                    Ana Felix is 36 year old female   Chief Complaint   Patient presents with     Weight Loss     Unexplained Weight Loss in last 6 months  Wt Readings from Last 4 Encounters:   17 143 lb 6.4 oz (65 kg)   17 153 lb (69.4 kg)   17 160 lb (72.6 kg)   16 189 lb (85.7 kg)       Has not been doing anything to try to lose weight, has not increased activity.  No loss of appetite.   Increased fatigue.   Feels chilled all of the time  Does not feel sick or under the weather  In creased ingrown hairs in groin area  Thought it was liked to chrons disease. Followed up with specialist, labs came back normal.  No thyroid issues in the past    Problem list and histories reviewed & adjusted, as indicated.  Additional history: has crohn's disease and has ilio-colonic resection  has frequent soft stool most of the time.  Lost weight like this when having miscarriages several years ago.    Patient Active Problem List   Diagnosis     Crohns disease (H)     Female pelvic pain     CARDIOVASCULAR SCREENING; LDL GOAL LESS THAN 160     Migraine headache     Back pain     Obesity     Disorder of sacrum     Displacement of lumbar intervertebral disc without myelopathy     Tobacco abuse     S/P oophorectomy     History of cholecystectomy     History of resection of small bowel     Abdominal pain     Discogenic pain     Chronic low back pain     Mild persistent asthma     Nausea with vomiting     Abdominal pain, right upper quadrant     Irritable bowel syndrome (IBS)     Lactose intolerance     Hearing difficulty     S/P LEEP of cervix     Past Surgical History:   Procedure Laterality Date     APPENDECTOMY       C/SECTION, LOW TRANSVERSE      , Low Transverse     CHOLECYSTECTOMY, LAPOROSCOPIC  2005    Cholecystectomy, Laparoscopic     COLONOSCOPY       ESOPHAGOSCOPY, GASTROSCOPY, DUODENOSCOPY (EGD), COMBINED  3/6/2014    Procedure: COMBINED  ESOPHAGOSCOPY, GASTROSCOPY, DUODENOSCOPY (EGD), BIOPSY SINGLE OR MULTIPLE;  COLONOSCOPY/ UPPER GI ENDOSCOPY/ COLON/ EGD/ Abdominal pain, epigastric/ PJH;  Surgeon: West Lomas MD;  Location: [facility] OR     HC HYSTEROSCOPY, SURGICAL; W/ ENDOMETRIAL ABLATION, ANY METHOD  7/2010     HYSTERECTOMY, SUPRACERVICAL LAPAROSCOPIC  2010     LEEP TX, CERVICAL      LEEP TX Cervical     ORTHOPEDIC SURGERY      bluged disk     partial small bowel resection  2002    Ileo-colonic resection. Crohn's disease surgery for bowel obstruction. this was a section of small bowel and large bowel and the cecum., all removed and a reanastamosis done successfully     SALPINGO OOPHORECTOMY,R/L/TORI      Right ovary     TUBAL LIGATION         Social History   Substance Use Topics     Smoking status: Current Every Day Smoker     Packs/day: 0.50     Types: Cigarettes     Smokeless tobacco: Never Used     Alcohol use 0.0 oz/week     0 Standard drinks or equivalent per week      Comment: rare     Family History   Problem Relation Age of Onset     CANCER Mother      cervical     Lipids Mother      Eye Disorder Father      Lipids Father      Alcohol/Drug Maternal Grandmother      DIABETES Paternal Grandmother      Cancer - colorectal Paternal Grandmother      Eye Disorder Paternal Grandmother      DIABETES Paternal Grandfather      Eye Disorder Paternal Grandfather      Inflammatory Bowel Disease Paternal Aunt      and two paternal uncles         Current Outpatient Prescriptions   Medication Sig Dispense Refill     montelukast (SINGULAIR) 10 MG tablet Take 1 tablet (10 mg) by mouth daily 90 tablet 2     dicyclomine (BENTYL) 20 MG tablet Take 1 tablet (20 mg) by mouth 4 times daily as needed 40 tablet 1     oxyCODONE-acetaminophen (PERCOCET) 7.5-325 MG per tablet Take 1 tablet by mouth 3 times daily       cyanocobalamin (VITAMIN B12) 1000 MCG/ML injection Inject 1 mL into the muscle every 30 days       fentaNYL (DURAGESIC) 12 mcg/hr patch 72 hr  Place 1 patch onto the skin every 72 hours       albuterol (ALBUTEROL) 108 (90 BASE) MCG/ACT inhaler Inhale 2 puffs into the lungs every 4 hours as needed for shortness of breath / dyspnea 1 Inhaler 1     fentaNYL (DURAGESIC) 25 mcg/hr patch 72 hr Place 1 patch onto the skin every 72 hours       IMURAN 50 MG OR TABS 2 TABLETS by mouth DAILY       sulindac (CLINORIL) 200 MG tablet Take 1 tablet (200 mg) by mouth 2 times daily (with meals) (Patient not taking: Reported on 8/17/2017) 20 tablet 0     Allergies   Allergen Reactions     Infliximab Hives     Sulfa Drugs [Sulfa Drugs] Nausea     Augmentin Other (See Comments)     Crohn's     Bupropion Hives and Nausea     Droperidol Other (See Comments)     Dystonic reaction     Ibuprofen Other (See Comments)     Crohn's       Lyrica [Pregabalin] Nausea and Vomiting     Grogginess, severe back pain     Vicodin [Hydrocodone-Acetaminophen] Nausea and Vomiting     Recent Labs   Lab Test  08/17/17   1455  05/04/17   1631  02/15/17   0708  01/26/17   0755   12/21/15   0856   10/26/12   0812   A1C   --    --    --    --    --    --    --   5.1   LDL   --    --    --    --    --   50   --   115   HDL   --    --    --    --    --   30*   --   38*   TRIG   --    --    --    --    --   336*   --   107   ALT  11  10  13  16   < >   --    < >   --    CR  0.76   --    --   0.73   --    --    < >  0.65   GFRESTIMATED  84   --    --   89   --    --    < >  >90   GFRESTBLACK  >90   --    --   >90   GFR Calc     --    --    < >  >90   POTASSIUM  4.0   --    --   3.7   --    --    < >  4.6   TSH  2.55   --    --    --    --    --    --    --     < > = values in this interval not displayed.      BP Readings from Last 3 Encounters:   08/17/17 119/78   01/27/17 128/84   01/26/17 119/70    Wt Readings from Last 3 Encounters:   08/17/17 143 lb 6.4 oz (65 kg)   01/27/17 153 lb (69.4 kg)   01/26/17 160 lb (72.6 kg)         ROS:  Constitutional, HEENT, cardiovascular, pulmonary, gi  "and gu systems are negative, except as otherwise noted.    OBJECTIVE:                                                    /78  Pulse 83  Temp 99.6  F (37.6  C) (Tympanic)  Ht 5' 3\" (1.6 m)  Wt 143 lb 6.4 oz (65 kg)  LMP 07/23/2010  BMI 25.4 kg/m2  GENERAL APPEARANCE ADULT: Alert, no acute distress  HENT: Ears and TMs normal, oral mucosa and posterior oropharynx normal  RESP: lungs clear to auscultation   CV: normal rate, regular rhythm, no murmur or gallop  ABDOMEN: soft, no organomegaly, masses or tenderness  PSYCH: mentation appears normal., affect and mood normal  Diagnostic Test Results:  Results for orders placed or performed in visit on 08/17/17   TSH   Result Value Ref Range    TSH 2.55 0.40 - 4.00 mU/L   T4 FREE   Result Value Ref Range    T4 Free 1.00 0.76 - 1.46 ng/dL   Comprehensive metabolic panel   Result Value Ref Range    Sodium 140 133 - 144 mmol/L    Potassium 4.0 3.4 - 5.3 mmol/L    Chloride 108 94 - 109 mmol/L    Carbon Dioxide 26 20 - 32 mmol/L    Anion Gap 6 3 - 14 mmol/L    Glucose 89 70 - 99 mg/dL    Urea Nitrogen 13 7 - 30 mg/dL    Creatinine 0.76 0.52 - 1.04 mg/dL    GFR Estimate 84 >60 mL/min/1.7m2    GFR Estimate If Black >90 >60 mL/min/1.7m2    Calcium 9.3 8.5 - 10.1 mg/dL    Bilirubin Total 0.4 0.2 - 1.3 mg/dL    Albumin 4.1 3.4 - 5.0 g/dL    Protein Total 7.7 6.8 - 8.8 g/dL    Alkaline Phosphatase 53 40 - 150 U/L    ALT 11 0 - 50 U/L    AST 8 0 - 45 U/L   CBC with platelets differential   Result Value Ref Range    WBC 10.2 4.0 - 11.0 10e9/L    RBC Count 3.62 (L) 3.8 - 5.2 10e12/L    Hemoglobin 13.1 11.7 - 15.7 g/dL    Hematocrit 36.3 35.0 - 47.0 %     78 - 100 fl    MCH 36.2 (H) 26.5 - 33.0 pg    MCHC 36.1 31.5 - 36.5 g/dL    RDW 12.3 10.0 - 15.0 %    Platelet Count 225 150 - 450 10e9/L    Diff Method Automated Method     % Neutrophils 66.0 %    % Lymphocytes 26.4 %    % Monocytes 6.5 %    % Eosinophils 0.7 %    % Basophils 0.4 %    Absolute Neutrophil 6.8 1.6 - 8.3 " 10e9/L    Absolute Lymphocytes 2.7 0.8 - 5.3 10e9/L    Absolute Monocytes 0.7 0.0 - 1.3 10e9/L    Absolute Eosinophils 0.1 0.0 - 0.7 10e9/L    Absolute Basophils 0.0 0.0 - 0.2 10e9/L     Chest xray normal, surgical clips in RUQ, viewed with  Ana       ASSESSMENT/PLAN:                                                    1. Loss of weight  2. Crohn's disease of both small and large intestine without complication (H)  4. Irritable bowel syndrome with diarrhea, history  Labs are normal, suspect related to GI issues but need to consider malignancy.  Had colonoscopy scheduled with gastro in a month.  - XR Chest 2 Views; Future  - TSH  - T4 FREE  - Comprehensive metabolic panel  - CBC with platelets differential    3. Tobacco abuse  No respiratory complaints.  Need to consider factor in weight loss, consider CT chest if weight continues to fall and GI problems cleared.          Ashly Cadena MD  [facility]

## 2017-08-17 NOTE — MR AVS SNAPSHOT
"              After Visit Summary   8/17/2017    Ana Felix    MRN: [id number]           Patient Information     Date Of Birth          1980        Visit Information        Provider Department      8/17/2017 2:00 PM Ashly Cadena MD [facility]        Today's Diagnoses     Loss of weight    -  1    Crohn's disease of both small and large intestine without complication (H)        Tobacco abuse        Irritable bowel syndrome with diarrhea           Follow-ups after your visit        Who to contact     If you have questions or need follow up information about today's clinic visit or your schedule please contact [facility] directly at [phone].  Normal or non-critical lab and imaging results will be communicated to you by MyChart, letter or phone within 4 business days after the clinic has received the results. If you do not hear from us within 7 days, please contact the clinic through [employer] or phone. If you have a critical or abnormal lab result, we will notify you by phone as soon as possible.  Submit refill requests through [employer] or call your pharmacy and they will forward the refill request to us. Please allow 3 business days for your refill to be completed.          Additional Information About Your Visit        MyChart Information     [employer] gives you secure access to your electronic health record. If you see a primary care provider, you can also send messages to your care team and make appointments. If you have questions, please call your primary care clinic.  If you do not have a primary care provider, please call [phone] and they will assist you.        Care EveryWhere ID     This is your Care EveryWhere ID. This could be used by other organizations to access your [facility] medical records  [id number]        Your Vitals Were     Pulse Temperature Height Last Period BMI (Body Mass Index)       83 99.6  F (37.6  C) (Tympanic) 5' 3\" (1.6 m) 07/23/2010 25.4 " kg/m2        Blood Pressure from Last 3 Encounters:   08/17/17 119/78   01/27/17 128/84   01/26/17 119/70    Weight from Last 3 Encounters:   08/17/17 143 lb 6.4 oz (65 kg)   01/27/17 153 lb (69.4 kg)   01/26/17 160 lb (72.6 kg)              We Performed the Following     CBC with platelets differential     Comprehensive metabolic panel     T4 FREE     TSH        Primary Care Provider Office Phone # Fax #    Kendrick Demar Lance [phone] [address] [facility] [address]        Equal Access to Services     [facility]: Hadii jelani summers Sorosalina, waaxda brennen, qaybta kaalmarosey rankin, fernanda duckworth . So [facility] [place] [facility] [phone].    ATENCIÓN: Si habla español, tiene a zapata disposición servicios gratuitos de asistencia lingüística. [facility] [phone].    We comply with applicable federal civil rights laws and [place] laws. We do not discriminate on the basis of race, color, national origin, age, disability sex, sexual orientation or gender identity.            Thank you!     Thank you for choosing [facility]  for your care. Our goal is always to provide you with excellent care. Hearing back from our patients is one way we can continue to improve our services. Please take a few minutes to complete the written survey that you may receive in the mail after your visit with us. Thank you!             Your Updated Medication List - Protect others around you: Learn how to safely use, store and throw away your medicines at www.disposemymeds.org.          This list is accurate as of: 8/17/17  2:48 PM.  Always use your most recent med list.                   Brand Name Dispense Instructions for use Diagnosis    albuterol 108 (90 BASE) MCG/ACT Inhaler    albuterol    1 Inhaler    Inhale 2 puffs into the lungs every 4 hours as needed for shortness of breath / dyspnea    Mild persistent asthma, uncomplicated       cyanocobalamin 1000 MCG/ML injection     VITAMIN B12     Inject 1 mL into the muscle every 30 days        dicyclomine 20 MG tablet    BENTYL    40 tablet    Take 1 tablet (20 mg) by mouth 4 times daily as needed    Abdominal pain, generalized       * fentaNYL 25 mcg/hr 72 hr patch    DURAGESIC     Place 1 patch onto the skin every 72 hours        * fentaNYL 12 mcg/hr 72 hr patch    DURAGESIC     Place 1 patch onto the skin every 72 hours        IMURAN 50 MG tablet   Generic drug:  azaTHIOprine      2 TABLETS by mouth DAILY        montelukast 10 MG tablet    SINGULAIR    90 tablet    Take 1 tablet (10 mg) by mouth daily    Mild persistent asthma, uncomplicated       PERCOCET 7.5-325 MG per tablet   Generic drug:  oxyCODONE-acetaminophen      Take 1 tablet by mouth 3 times daily        sulindac 200 MG tablet    CLINORIL    20 tablet    Take 1 tablet (200 mg) by mouth 2 times daily (with meals)        * Notice:  This list has 2 medication(s) that are the same as other medications prescribed for you. Read the directions carefully, and ask your doctor or other care provider to review them with you.

## 2017-08-17 NOTE — NURSING NOTE
"Initial /78  Pulse 83  Temp 99.6  F (37.6  C) (Tympanic)  Ht 5' 3\" (1.6 m)  Wt 143 lb 6.4 oz (65 kg)  LMP 07/23/2010  BMI 25.4 kg/m2 Estimated body mass index is 25.4 kg/(m^2) as calculated from the following:    Height as of this encounter: 5' 3\" (1.6 m).    Weight as of this encounter: 143 lb 6.4 oz (65 kg). .      "

## 2017-08-18 PROBLEM — M24.159 LABRAL TEAR OF HIP, DEGENERATIVE: Status: ACTIVE | Noted: 2017-03-07

## 2017-08-18 PROBLEM — M25.551 PAIN OF RIGHT HIP JOINT: Status: ACTIVE | Noted: 2017-03-07

## 2017-08-18 PROBLEM — Z79.899 CONTROLLED SUBSTANCE AGREEMENT SIGNED: Status: ACTIVE | Noted: 2017-07-19

## 2017-08-18 PROBLEM — Z02.89 PAIN MEDICATION AGREEMENT: Status: ACTIVE | Noted: 2017-08-18

## 2017-08-18 PROBLEM — M51.369 DEGENERATIVE LUMBAR DISC: Status: ACTIVE | Noted: 2017-03-07

## 2017-08-18 RX ORDER — FENTANYL 12.5 UG/1
1 PATCH TRANSDERMAL
COMMUNITY
Start: 2017-07-25 | End: 2018-03-07

## 2017-08-18 RX ORDER — MONTELUKAST SODIUM 10 MG/1
10 TABLET ORAL
COMMUNITY
Start: 2016-09-27 | End: 2018-03-07

## 2017-08-18 RX ORDER — FENTANYL 25 UG/1
PATCH TRANSDERMAL
COMMUNITY
Start: 2017-08-14 | End: 2018-03-07

## 2017-08-18 RX ORDER — AZATHIOPRINE 50 MG/1
50 TABLET ORAL DAILY
COMMUNITY

## 2017-08-18 RX ORDER — MIDAZOLAM HYDROCHLORIDE 1 MG/ML
.5-6 INJECTION INTRAMUSCULAR; INTRAVENOUS
COMMUNITY
Start: 2015-09-30 | End: 2018-03-07

## 2017-08-18 RX ORDER — ALBUTEROL SULFATE 90 UG/1
2 AEROSOL, METERED RESPIRATORY (INHALATION)
COMMUNITY
Start: 2014-07-18 | End: 2018-03-07

## 2017-08-18 RX ORDER — FENTANYL CITRATE 0.05 MG/ML
25-400 INJECTION, SOLUTION INTRAMUSCULAR; INTRAVENOUS
COMMUNITY
Start: 2015-09-30 | End: 2018-03-07

## 2017-08-18 RX ORDER — MIDAZOLAM HYDROCHLORIDE 1 MG/ML
.5-6 INJECTION INTRAMUSCULAR; INTRAVENOUS
COMMUNITY
Start: 2015-09-16 | End: 2018-03-07

## 2017-08-18 RX ORDER — OXYCODONE AND ACETAMINOPHEN 7.5; 325 MG/1; MG/1
TABLET ORAL
COMMUNITY
Start: 2017-07-19 | End: 2022-11-29

## 2017-08-18 RX ORDER — CYANOCOBALAMIN 1000 UG/ML
1 INJECTION, SOLUTION INTRAMUSCULAR; SUBCUTANEOUS
COMMUNITY
End: 2018-03-07

## 2017-08-18 ASSESSMENT — ASTHMA QUESTIONNAIRES: ACT_TOTALSCORE: 25

## 2017-08-18 ASSESSMENT — ANXIETY QUESTIONNAIRES: GAD7 TOTAL SCORE: 0

## 2017-08-19 ENCOUNTER — HEALTH MAINTENANCE LETTER (OUTPATIENT)
Age: 37
End: 2017-08-19

## 2017-09-25 ENCOUNTER — HOSPITAL ENCOUNTER (EMERGENCY)
Facility: CLINIC | Age: 37
Discharge: HOME | End: 2017-09-25
Attending: NURSE PRACTITIONER | Admitting: NURSE PRACTITIONER
Payer: COMMERCIAL

## 2017-09-25 VITALS
HEART RATE: 70 BPM | DIASTOLIC BLOOD PRESSURE: 86 MMHG | SYSTOLIC BLOOD PRESSURE: 136 MMHG | OXYGEN SATURATION: 98 % | TEMPERATURE: 99 F

## 2017-09-25 DIAGNOSIS — J01.90 ACUTE SINUSITIS WITH SYMPTOMS > 10 DAYS: Primary | ICD-10-CM

## 2017-09-25 PROCEDURE — 99212 OFFICE O/P EST SF 10 MIN: CPT

## 2017-09-25 PROCEDURE — 99214 OFFICE O/P EST MOD 30 MIN: CPT | Performed by: NURSE PRACTITIONER

## 2017-09-25 RX ORDER — FLUTICASONE PROPIONATE 50 MCG
1-2 SPRAY, SUSPENSION (ML) NASAL DAILY
Qty: 1 BOTTLE | Refills: 11 | Status: SHIPPED | OUTPATIENT
Start: 2017-09-25 | End: 2018-03-07

## 2017-09-25 RX ORDER — CEFDINIR 300 MG/1
300 CAPSULE ORAL 2 TIMES DAILY
Qty: 20 CAPSULE | Refills: 0 | Status: SHIPPED | OUTPATIENT
Start: 2017-09-25 | End: 2018-03-07

## 2017-09-25 NOTE — LETTER
[facility] EMERGENCY DEPARTMENT  [address] [address]  Phone: [phone]  Fax: [phone]    September 25, 2017        Ana Felix  [address]  [place] [address]          To whom it may concern:    RE: Ana Felix    Patient was seen and treated today at our clinic and missed work.  Patient may return to work 09/27/2017 with the following:  No restrictions    Please contact me for questions or concerns.      Sincerely,        Radha DIAL, CNM, FNP, DNP

## 2017-09-25 NOTE — ED AVS SNAPSHOT
[facility] Emergency Department    [address]    [facility] [address]    Phone:  [phone]    Fax:  [phone]                                       Ana Fleix   MRN: [id number]    Department:  [facility] Emergency Department   Date of Visit:  9/25/2017           Patient Information     Date Of Birth          1980        Your diagnoses for this visit were:     Acute sinusitis with symptoms > 10 days        You were seen by Radha Covarrubias APRN CNP.      Follow-up Information     Follow up with Kendrick Lance MD In 1 week.    Specialties:  Family Practice, Obstetrics    Why:  If symptoms worsen, As needed    Contact information:    [address] [facility] [address] [phone]          Discharge Instructions         Acute Bacterial Rhinosinusitis (ABRS)    Acute bacterial rhinosinusitis (ABRS) is an infection of your nasal cavity and sinuses. It s caused by bacteria. Acute means that you ve had symptoms for less than 12 weeks.  Understanding your sinuses  The nasal cavity is the large air-filled space behind your nose. The sinuses are a group of spaces formed by the bones of your face. They connect with your nasal cavity. ABRS causes the tissue lining these spaces to become inflamed. Mucus may not drain normally. This leads to facial pain and other symptoms.  What causes ABRS?  ABRS most often follows an upper respiratory infection caused by a virus. Bacteria then infect the lining of your nasal cavity and sinuses. But you can also get ABRS if you have:    Nasal allergies    Long-term nasal swelling and congestion not caused by allergies    Blockage in the nose  Symptoms of ABRS  The symptoms of ABRS may be different for each person, and can include:    Nasal congestion    Runny nose    Fluid draining from the nose down the throat (postnasal drip)    Headache    Cough    Pain in the sinuses    Thick, colored fluid from the nose (mucus)    Fever  Diagnosing ABRS  ABRS may  be diagnosed if you ve had an upper respiratory infection like a cold and cough for longer than 10 to 14 days. Your health care provider will ask about your symptoms and your medical history. The provider will check your vital signs, including your temperature. You ll have a physical exam. The health care provider will check your ears, nose, and throat. You likely won t need any tests. If ABRS comes back, you may have a culture or other tests.  Treatment for ABRS  Treatment may include:    Antibiotic medicine. This is for symptoms that last for at least 10 to 14 days.    Nasal corticosteroid medicine. Drops or spray used in the nose can lessen swelling and congestion.    Over-the-counter pain medicine. This is to lessen sinus pain and pressure.    Nasal decongestant medicine. Spray or drops may help to lessen congestion. Do not use them for more than a few days.    Salt wash (saline irrigation). This can help to loosen mucus.  Possible complications of ABRS  ABRS may come back or become long-term (chronic).  In rare cases, ABRS may cause complications such as:     Inflamed tissue around the brain and spinal cord (meningitis)    Inflamed tissue around the eyes (orbital cellulitis)    Inflamed bones around the sinuses (osteitis)  These problems may need to be treated in a hospital with intravenous (IV) antibiotic medicine or surgery.  When to call the health care provider  Call your health care provider if you have any of the following:    Symptoms that don t get better, or get worse    Symptoms that don t get better after 3 to 5 days on antibiotics    Trouble seeing    Swelling around your eyes    Confusion or trouble staying awake   Date Last Reviewed: 3/3/2015    0570-6587 [employer]. [address] [place] [address], [place] [address]. All rights reserved. This information is not intended as a substitute for professional medical care. Always follow your healthcare professional's instructions.          When to  Use Antibiotics   Antibiotics are medicines used to treat infections caused by bacteria. They don t work for illnesses caused by viruses or an allergic reaction. In fact, taking antibiotics for reasons other than a bacterial infection can cause problems. For example, you may have side effects from the medicine. And if you really need an antibiotic, it may not work well.                                                                                                                                              When antibiotics won t help  Your healthcare provider won t usually prescribe antibiotics for the following conditions. You can help by not asking for them if you have:     A cold. This type of illness is caused by a virus. It can cause a runny nose, stuffed-up nose, sneezing, coughing, headache, mild body aches, and low fever. A cold gets better on its own in a few days to a week.    The flu (influenza). This is a respiratory illness caused by a virus. The flu usually goes away on its own in a week or so. It can cause fever, body aches, sore throat, and fatigue.    Bronchitis. This is an infection in the lungs most often caused by a virus. You may have coughing, phlegm, body aches, and a low fever. A common type of bronchitis is known as a chest cold (acute bronchitis). This often happens after you have a respiratory infection like a common cold. Bronchitis can take weeks to go away, but antibiotics usually don t help.    Most sore throats. Sore throats are most often caused by viruses. Your throat may feel scratchy or achy, and it may hurt to swallow. You may also have a low fever and body aches. A sore throat usually gets better in a few days.    Most ear infections. An ear infection may be caused by a virus or bacteria. It causes pain in the ear. Antibiotics usually don t help, and the infection goes away on its own.    Most sinus infections (sinusitis). This kind of infection causes sinus pain and swelling,  and a runny nose. In most cases, sinusitis goes away on its own, and antibiotics don t make recovery quicker.    Allergic rhinitis. This is a set of symptoms caused by an allergic reaction. You may have sneezing, a runny nose, itchy or watery eyes, or a sore throat. Allergies are not treated with antibiotics.    Low fever. A mild fever that s less than 100.4 F (38 C) most likely doesn t need treatment with antibiotics.   When antibiotics can help   Antibiotics can be used to treat:                                                       Strep throat. This is a throat infectioncaused by a certain type of bacteria. Symptoms of strep throat include a sore throat, white patches on the tonsils, red spots on the roof of the mouth, fever, body aches, and nausea and vomiting.    Urinary tract infection (UTI). This is a bacterial infection of the bladder and the tube that takes urine out of the body. It can cause burning pain and urine that s cloudy or tinted with blood. UTIs are very common. Antibiotics usually help treat these infections.    Some ear infections. In some cases, a healthcare provider may prescribe antibiotics for an ear infection. You may need a test to show what s causing the ear infection.    Some sinus infections. In some cases, yourhealthcare provider may give you antibiotics. He or she may first need to make sure your symptoms aren t caused by a virus, fungus, allergies, or air pollutants such as smoke.   Your doctor may also recommend antibiotics if you have a condition that can affect your immune system, such as diabetes or cancer.   Self-care at home   If your infection can t be treated with antibiotics, you can take other steps to feel better. Try the remedies below. In general:     Rest and sleep as much as needed.    Drink water and other clear fluids.    Don t smoke, and avoid smoke from other people.    Use over-the-counter medicine such as acetaminophen to ease pain or fever, as directed by your  healthcare provider.   To treat sinus pain or nasal congestion:     Put a warm, moist washcloth on your face where you feel sinus pain or pressure.    Use a nasal spray with medicine or saline, as directed by your healthcare provider.    Breathe in steam from a hot shower.    Use a humidifier or cool mist vaporizer.   To quiet a cough:     Use a humidifier or cool mist vaporizer.    Breathe in steam from a hot shower.    Use cough lozenges.   To sooth a sore throat:     Suck on ice chips, popsicles, or lozenges.    Use a sore throat spray.    Use a humidifier or cool mist vaporizer.    Gargle with saltwater.    Drink warm liquids.   To ease ear pain:     Hold a warm, moist washcloth on the ear for 10 minutes at a time.  Date Last Reviewed: 9/1/2016 2000-2017 [employer]. [address] [place] [address]. All rights reserved. This information is not intended as a substitute for professional medical care. Always follow your healthcare professional's instructions.          Discharge References/Attachments     FLUTICASONE NASAL SPRAY (ENGLISH)      24 Hour Appointment Hotline       To make an appointment at any [facility], call [phone] ([phone]). If you don't have a family doctor or clinic, we will help you find one. [place] clinics are conveniently located to serve the needs of you and your family.             Review of your medicines      START taking        Dose / Directions Last dose taken    cefdinir 300 MG capsule   Commonly known as:  OMNICEF   Dose:  300 mg   Quantity:  20 capsule        Take 1 capsule (300 mg) by mouth 2 times daily   Refills:  0        fluticasone 50 MCG/ACT spray   Commonly known as:  FLONASE   Dose:  1-2 spray   Quantity:  1 Bottle        Spray 1-2 sprays into both nostrils daily   Refills:  11          Our records show that you are taking the medicines listed below. If these are incorrect, please call your family doctor or clinic.        Dose /  Directions Last dose taken    * albuterol 108 (90 BASE) MCG/ACT Inhaler   Commonly known as:  PROAIR HFA   Dose:  2 puff   Quantity:  1 Inhaler        Inhale 2 puffs into the lungs every 4 hours as needed for shortness of breath / dyspnea   Refills:  1        * albuterol 108 (90 BASE) MCG/ACT Inhaler   Commonly known as:  PROAIR HFA/PROVENTIL HFA/VENTOLIN HFA   Dose:  2 puff        Inhale 2 puffs into the lungs   Refills:  0        * cyanocobalamin 1000 MCG/ML injection   Commonly known as:  VITAMIN B12   Dose:  1 mL        Inject 1 mL into the muscle every 30 days   Refills:  0        * cyanocobalamin 1000 MCG/ML injection   Commonly known as:  VITAMIN B12   Dose:  1 mL        Inject 1 mL into the muscle   Refills:  0        dicyclomine 20 MG tablet   Commonly known as:  BENTYL   Dose:  20 mg   Quantity:  40 tablet        Take 1 tablet (20 mg) by mouth 4 times daily as needed   Refills:  1        * fentaNYL 25 mcg/hr 72 hr patch   Commonly known as:  DURAGESIC   Dose:  1 patch        Place 1 patch onto the skin every 72 hours   Refills:  0        * fentaNYL 12 mcg/hr 72 hr patch   Commonly known as:  DURAGESIC   Dose:  1 patch        Place 1 patch onto the skin every 72 hours   Refills:  0        * fentaNYL 12 mcg/hr 72 hr patch   Commonly known as:  DURAGESIC   Dose:  1 patch        1 patch   Refills:  0        * fentaNYL 25 mcg/hr 72 hr patch   Commonly known as:  DURAGESIC        Earliest Fill Date: 8/14/17 One to CW q 72 hours. MYLAN brand USE DATES: 08/16/17-09/14/17 may fill 8/14/17   Refills:  0        FentaNYL Citrate (PF) 2500 MCG/50ML injection   Commonly known as:  SUBLIMAZE   Dose:   mcg        Inject  mcg into the vein   Refills:  0        * azaTHIOprine 50 MG tablet   Commonly known as:  IMURAN        Refills:  0        * IMURAN 50 MG tablet   Generic drug:  azaTHIOprine        2 TABLETS by mouth DAILY   Refills:  0        * midazolam 5 MG/5ML injection   Commonly known as:  VERSED   Dose:   0.5-6 mg        Inject 0.5-6 mg into the vein   Refills:  0        * midazolam 5 MG/5ML injection   Commonly known as:  VERSED   Dose:  0.5-6 mg        Inject 0.5-6 mg into the vein   Refills:  0        * montelukast 10 MG tablet   Commonly known as:  SINGULAIR   Dose:  10 mg        Take 10 mg by mouth   Refills:  0        * montelukast 10 MG tablet   Commonly known as:  SINGULAIR   Dose:  10 mg   Quantity:  90 tablet        Take 1 tablet (10 mg) by mouth daily   Refills:  2        * PERCOCET 7.5-325 MG per tablet   Dose:  1 tablet   Generic drug:  oxyCODONE-acetaminophen        Take 1 tablet by mouth 3 times daily   Refills:  0        * oxyCODONE-acetaminophen 7.5-325 MG per tablet   Commonly known as:  PERCOCET        One po t.i.d./ P.R.N  USE DATES: 08/05/17-09/03/17 may fill on 8/2/17   Refills:  0        sulindac 200 MG tablet   Commonly known as:  CLINORIL   Dose:  200 mg   Quantity:  20 tablet        Take 1 tablet (200 mg) by mouth 2 times daily (with meals)   Refills:  0        * Notice:  This list has 16 medication(s) that are the same as other medications prescribed for you. Read the directions carefully, and ask your doctor or other care provider to review them with you.            Prescriptions were sent or printed at these locations (2 Prescriptions)                   [facility] [address] [place] [address] AT [address]   [address] [facility] [address]    Telephone:  [phone]   Fax:  [phone]   Hours:                  E-Prescribed (2 of 2)         fluticasone (FLONASE) 50 MCG/ACT spray               cefdinir (OMNICEF) 300 MG capsule                Orders Needing Specimen Collection     None      Pending Results     No orders found from 9/23/2017 to 9/26/2017.            Pending Culture Results     No orders found from 9/23/2017 to 9/26/2017.            Pending Results Instructions     If you had any lab results that were not finalized at  the time of your Discharge, you can call the ED Lab Result RN at [phone]. You will be contacted by this team for any positive Lab results or changes in treatment. The nurses are available 7 days a week from 10A to 6:30P.  You can leave a message 24 hours per day and they will return your call.        Test Results From Your Hospital Stay               Thank you for choosing [facility]       Thank you for choosing [facility] for your care. Our goal is always to provide you with excellent care. Hearing back from our patients is one way we can continue to improve our services. Please take a few minutes to complete the written survey that you may receive in the mail after you visit with us. Thank you!        [employer] Information     [employer] gives you secure access to your electronic health record. If you see a primary care provider, you can also send messages to your care team and make appointments. If you have questions, please call your primary care clinic.  If you do not have a primary care provider, please call [phone] and they will assist you.        Care EveryWhere ID     This is your Care EveryWhere ID. This could be used by other organizations to access your [facility] medical records  [id number]        Equal Access to Services     HANNAH LAUREANO : Sal Stapleton, kalin hutton, fernanda de oliveira. So [facility] [phone].    ATENCIÓN: Si habla español, tiene a zapata disposición servicios gratuitos de asistencia lingüística. Llame al [phone].    We comply with applicable federal civil rights laws and [place] laws. We do not discriminate on the basis of race, color, national origin, age, disability sex, sexual orientation or gender identity.            After Visit Summary       This is your record. Keep this with you and show to your community pharmacist(s) and doctor(s) at your next visit.

## 2017-09-25 NOTE — ED PROVIDER NOTES
History     Chief Complaint   Patient presents with     Cold Symptoms     HPI  Ana Felix is a 36 year old female who   Chief Complaint:   Chief Complaint   Patient presents with     Cold Symptoms         HPI:   Ana Felix is a 36 year old female who presents to the ED with a 21 day history of sore throat, congestion, ear pressure, ear pain, sinus pressure and non productive cough.  She has tried Nyquil, mucinex with improvement of symptoms for a few hours.  She denies decreased appetite, decreased urine output, dental pain, diarrhea, drooling, dysphagia, dysphonia, fever, nausea, shortness of breath, vomiting and wheezing.  She has been exposed to ill contacts at home. Predisposing factors include tobacco use, HX of asthma and History of Crohn's.      Meds:   Current Outpatient Prescriptions   Medication Sig Dispense Refill     fluticasone (FLONASE) 50 MCG/ACT spray Spray 1-2 sprays into both nostrils daily 1 Bottle 11     cefdinir (OMNICEF) 300 MG capsule Take 1 capsule (300 mg) by mouth 2 times daily 20 capsule 0     FentaNYL Citrate, PF, (SUBLIMAZE) 2500 MCG/50ML injection Inject  mcg into the vein       midazolam (VERSED) 5 MG/5ML injection Inject 0.5-6 mg into the vein       midazolam (VERSED) 5 MG/5ML injection Inject 0.5-6 mg into the vein       albuterol (PROAIR HFA/PROVENTIL HFA/VENTOLIN HFA) 108 (90 BASE) MCG/ACT Inhaler Inhale 2 puffs into the lungs       azaTHIOprine (IMURAN) 50 MG tablet        cyanocobalamin (VITAMIN B12) 1000 MCG/ML injection Inject 1 mL into the muscle       fentaNYL (DURAGESIC) 12 mcg/hr 72 hr patch 1 patch       fentaNYL (DURAGESIC) 25 mcg/hr 72 hr patch Earliest Fill Date: 8/14/17 One to CW q 72 hours. MYLAN brand USE DATES: 08/16/17-09/14/17 may fill 8/14/17       montelukast (SINGULAIR) 10 MG tablet Take 10 mg by mouth       oxyCODONE-acetaminophen (PERCOCET) 7.5-325 MG per tablet One po t.i.d./ P.R.N  USE DATES: 08/05/17-09/03/17 may fill on 8/2/17       montelukast  "(SINGULAIR) 10 MG tablet Take 1 tablet (10 mg) by mouth daily 90 tablet 2     dicyclomine (BENTYL) 20 MG tablet Take 1 tablet (20 mg) by mouth 4 times daily as needed 40 tablet 1     sulindac (CLINORIL) 200 MG tablet Take 1 tablet (200 mg) by mouth 2 times daily (with meals) (Patient not taking: Reported on 8/17/2017) 20 tablet 0     oxyCODONE-acetaminophen (PERCOCET) 7.5-325 MG per tablet Take 1 tablet by mouth 3 times daily       cyanocobalamin (VITAMIN B12) 1000 MCG/ML injection Inject 1 mL into the muscle every 30 days       fentaNYL (DURAGESIC) 12 mcg/hr patch 72 hr Place 1 patch onto the skin every 72 hours       albuterol (ALBUTEROL) 108 (90 BASE) MCG/ACT inhaler Inhale 2 puffs into the lungs every 4 hours as needed for shortness of breath / dyspnea 1 Inhaler 1     fentaNYL (DURAGESIC) 25 mcg/hr patch 72 hr Place 1 patch onto the skin every 72 hours       IMURAN 50 MG OR TABS 2 TABLETS by mouth DAILY         Allergies:   Allergies   Allergen Reactions     Infliximab Hives     Sulfa Drugs [Sulfa Drugs] Nausea     Pt states \"it doesn't work for me\"     Sulfacetamide Nausea     Amoxicillin-Pot Clavulanate Nausea and Vomiting     Augmentin Other (See Comments)     Crohn's     Bupropion Hives and Nausea     Droperidol Other (See Comments)     Dystonic reaction     Ibuprofen Other (See Comments)     Crohn's       Lyrica [Pregabalin] Nausea and Vomiting     Grogginess, severe back pain  Grogginess, severe back pain     Vicodin [Hydrocodone-Acetaminophen] Nausea and Vomiting       Medications updated and reviewed.  Past, family and surgical history is updated and reviewed in the record.     Review of Systems  10 point ROS of systems including Constitutional, Eyes, Respiratory, Cardiovascular, Gastroenterology, Genitourinary, Integumentary, Muscularskeletal, Psychiatric were all negative except for pertinent positives noted in my HPI.    Physical Exam      Physical Exam  General: alert and no acute distress  Eyes: " negative, conjunctivae not injected /corneas clear. PERRL, EOM's intact.  Ears: negative, External ears normal. Canals clear. TM's reveals bubbles on RT TM and bilateral scarring on both TM  Nose: mucoid rhinorrhea and moderate mucosal edema  Mouth/Throat: NORMAL - no erythema, no adenopathy, no exudates.  Neck: Neck supple. No adenopathy. Thyroid symmetric, normal size,, Carotids without bruits.  Chest/Pulmonary: clear to auscultation  Cardiovascular: RRR normal S1S2 without  murmurs, rubs or gallops. PMI normal  Skin:  Skin color, texture, turgor normal. No rashes or lesions.  ED Course     ED Course     Procedures    Labs Ordered and Resulted from Time of ED Arrival Up to the Time of Departure from the ED - No data to display    Assessments & Plan (with Medical Decision Making)     I have reviewed the nursing notes.    I have reviewed the findings, diagnosis, plan and need for follow up with the patient.  Medical Decision Making:  Upper respiratory infection symptoms with Normal vitals.  CXR is not indicated.  Rapid Strep test is not indicated.     Assessment:  Sinusitis and Tobacco dependency    Plan:   RX sinusitis  cefdinir bid for 10 days and allergic to augmentin and reports able to take cephalasporins; also rx for fluticasone nasal spray and explained usage    Reassurance given regarding lack of signs of serious infection.  Recommend follow up in primary care as needed, or sooner if symptoms persist. Return to the ED with fever, trouble swallowing or breathing, or any other concerns.     Condition on disposition: Stable    Discharge Medication List as of 9/25/2017  2:40 PM      START taking these medications    Details   fluticasone (FLONASE) 50 MCG/ACT spray Spray 1-2 sprays into both nostrils daily, Disp-1 Bottle, R-11, E-Prescribe      cefdinir (OMNICEF) 300 MG capsule Take 1 capsule (300 mg) by mouth 2 times daily, Disp-20 capsule, R-0, E-Prescribe             Final diagnoses:   Acute sinusitis with  symptoms > 10 days       9/25/2017   [facility] EMERGENCY DEPARTMENT     Radha Covarrubias APRN CNP  09/25/17 1445       Radha Covarrubias APRN CNP  09/25/17 [id number]

## 2017-09-25 NOTE — ED AVS SNAPSHOT
[facility] Emergency Department    [address] [address]    Phone:  [phone]    Fax:  [phone]                                       Ana Felix   MRN: [id number]    Department:  [facility] Emergency Department   Date of Visit:  9/25/2017           After Visit Summary Signature Page     I have received my discharge instructions, and my questions have been answered. I have discussed any challenges I see with this plan with the nurse or doctor.    ..........................................................................................................................................  Patient/Patient Representative Signature      ..........................................................................................................................................  Patient Representative Print Name and Relationship to Patient    ..................................................               ................................................  Date                                            Time    ..........................................................................................................................................  Reviewed by Signature/Title    ...................................................              ..............................................  Date                                                            Time

## 2017-09-25 NOTE — DISCHARGE INSTRUCTIONS
Acute Bacterial Rhinosinusitis (ABRS)    Acute bacterial rhinosinusitis (ABRS) is an infection of your nasal cavity and sinuses. It s caused by bacteria. Acute means that you ve had symptoms for less than 12 weeks.  Understanding your sinuses  The nasal cavity is the large air-filled space behind your nose. The sinuses are a group of spaces formed by the bones of your face. They connect with your nasal cavity. ABRS causes the tissue lining these spaces to become inflamed. Mucus may not drain normally. This leads to facial pain and other symptoms.  What causes ABRS?  ABRS most often follows an upper respiratory infection caused by a virus. Bacteria then infect the lining of your nasal cavity and sinuses. But you can also get ABRS if you have:    Nasal allergies    Long-term nasal swelling and congestion not caused by allergies    Blockage in the nose  Symptoms of ABRS  The symptoms of ABRS may be different for each person, and can include:    Nasal congestion    Runny nose    Fluid draining from the nose down the throat (postnasal drip)    Headache    Cough    Pain in the sinuses    Thick, colored fluid from the nose (mucus)    Fever  Diagnosing ABRS  ABRS may be diagnosed if you ve had an upper respiratory infection like a cold and cough for longer than 10 to 14 days. Your health care provider will ask about your symptoms and your medical history. The provider will check your vital signs, including your temperature. You ll have a physical exam. The health care provider will check your ears, nose, and throat. You likely won t need any tests. If ABRS comes back, you may have a culture or other tests.  Treatment for ABRS  Treatment may include:    Antibiotic medicine. This is for symptoms that last for at least 10 to 14 days.    Nasal corticosteroid medicine. Drops or spray used in the nose can lessen swelling and congestion.    Over-the-counter pain medicine. This is to lessen sinus pain and pressure.    Nasal  decongestant medicine. Spray or drops may help to lessen congestion. Do not use them for more than a few days.    Salt wash (saline irrigation). This can help to loosen mucus.  Possible complications of ABRS  ABRS may come back or become long-term (chronic).  In rare cases, ABRS may cause complications such as:     Inflamed tissue around the brain and spinal cord (meningitis)    Inflamed tissue around the eyes (orbital cellulitis)    Inflamed bones around the sinuses (osteitis)  These problems may need to be treated in a hospital with intravenous (IV) antibiotic medicine or surgery.  When to call the health care provider  Call your health care provider if you have any of the following:    Symptoms that don t get better, or get worse    Symptoms that don t get better after 3 to 5 days on antibiotics    Trouble seeing    Swelling around your eyes    Confusion or trouble staying awake   Date Last Reviewed: 3/3/2015    4479-6219 [employer]. [address] [place] [address]. All rights reserved. This information is not intended as a substitute for professional medical care. Always follow your healthcare professional's instructions.          When to Use Antibiotics   Antibiotics are medicines used to treat infections caused by bacteria. They don t work for illnesses caused by viruses or an allergic reaction. In fact, taking antibiotics for reasons other than a bacterial infection can cause problems. For example, you may have side effects from the medicine. And if you really need an antibiotic, it may not work well.                                                                                                                                              When antibiotics won t help  Your healthcare provider won t usually prescribe antibiotics for the following conditions. You can help by not asking for them if you have:     A cold. This type of illness is caused by a virus. It can cause a runny  nose, stuffed-up nose, sneezing, coughing, headache, mild body aches, and low fever. A cold gets better on its own in a few days to a week.    The flu (influenza). This is a respiratory illness caused by a virus. The flu usually goes away on its own in a week or so. It can cause fever, body aches, sore throat, and fatigue.    Bronchitis. This is an infection in the lungs most often caused by a virus. You may have coughing, phlegm, body aches, and a low fever. A common type of bronchitis is known as a chest cold (acute bronchitis). This often happens after you have a respiratory infection like a common cold. Bronchitis can take weeks to go away, but antibiotics usually don t help.    Most sore throats. Sore throats are most often caused by viruses. Your throat may feel scratchy or achy, and it may hurt to swallow. You may also have a low fever and body aches. A sore throat usually gets better in a few days.    Most ear infections. An ear infection may be caused by a virus or bacteria. It causes pain in the ear. Antibiotics usually don t help, and the infection goes away on its own.    Most sinus infections (sinusitis). This kind of infection causes sinus pain and swelling, and a runny nose. In most cases, sinusitis goes away on its own, and antibiotics don t make recovery quicker.    Allergic rhinitis. This is a set of symptoms caused by an allergic reaction. You may have sneezing, a runny nose, itchy or watery eyes, or a sore throat. Allergies are not treated with antibiotics.    Low fever. A mild fever that s less than 100.4 F (38 C) most likely doesn t need treatment with antibiotics.   When antibiotics can help   Antibiotics can be used to treat:                                                       Strep throat. This is a throat infectioncaused by a certain type of bacteria. Symptoms of strep throat include a sore throat, white patches on the tonsils, red spots on the roof of the mouth, fever, body aches, and  nausea and vomiting.    Urinary tract infection (UTI). This is a bacterial infection of the bladder and the tube that takes urine out of the body. It can cause burning pain and urine that s cloudy or tinted with blood. UTIs are very common. Antibiotics usually help treat these infections.    Some ear infections. In some cases, a healthcare provider may prescribe antibiotics for an ear infection. You may need a test to show what s causing the ear infection.    Some sinus infections. In some cases, yourhealthcare provider may give you antibiotics. He or she may first need to make sure your symptoms aren t caused by a virus, fungus, allergies, or air pollutants such as smoke.   Your doctor may also recommend antibiotics if you have a condition that can affect your immune system, such as diabetes or cancer.   Self-care at home   If your infection can t be treated with antibiotics, you can take other steps to feel better. Try the remedies below. In general:     Rest and sleep as much as needed.    Drink water and other clear fluids.    Don t smoke, and avoid smoke from other people.    Use over-the-counter medicine such as acetaminophen to ease pain or fever, as directed by your healthcare provider.   To treat sinus pain or nasal congestion:     Put a warm, moist washcloth on your face where you feel sinus pain or pressure.    Use a nasal spray with medicine or saline, as directed by your healthcare provider.    Breathe in steam from a hot shower.    Use a humidifier or cool mist vaporizer.   To quiet a cough:     Use a humidifier or cool mist vaporizer.    Breathe in steam from a hot shower.    Use cough lozenges.   To sooth a sore throat:     Suck on ice chips, popsicles, or lozenges.    Use a sore throat spray.    Use a humidifier or cool mist vaporizer.    Gargle with saltwater.    Drink warm liquids.   To ease ear pain:     Hold a warm, moist washcloth on the ear for 10 minutes at a time.  Date Last Reviewed:  9/1/2016 2000-2017 [employer]. [address] [place] [address], [place] [address]. All rights reserved. This information is not intended as a substitute for professional medical care. Always follow your healthcare professional's instructions.

## 2017-09-28 ENCOUNTER — TRANSFERRED RECORDS (OUTPATIENT)
Dept: HEALTH INFORMATION MANAGEMENT | Facility: CLINIC | Age: 37
End: 2017-09-28

## 2017-10-04 ENCOUNTER — TELEPHONE (OUTPATIENT)
Dept: FAMILY MEDICINE | Facility: CLINIC | Age: 37
End: 2017-10-04

## 2017-10-04 DIAGNOSIS — B37.31 YEAST INFECTION OF THE VAGINA: Primary | ICD-10-CM

## 2017-10-04 RX ORDER — FLUCONAZOLE 150 MG/1
150 TABLET ORAL ONCE
Qty: 1 TABLET | Refills: 0 | Status: SHIPPED | OUTPATIENT
Start: 2017-10-04 | End: 2017-10-04

## 2017-10-04 NOTE — TELEPHONE ENCOUNTER
S-(situation): Patient requesting diflucan for yeast infection    B-(background): Patient was seen in  on 9/25/17 and treated for sinusitis.  Patient now having yeast infection symptoms after taking the antibiotic.  Patient reports that she can not use the yeast infection creams due to irritation.    A-(assessment): Patient requesting a prescription for diflucan.    R-(recommendations): Order pended for diflucan.  Prefer for patient to come in for appointment?    Liz Huff RN

## 2017-10-04 NOTE — TELEPHONE ENCOUNTER
Reason for Call:  Other call back   Detailed comments: patient is calling stating she was seen in Urgent Care recently and is now finished with her Antibiotic, and she has a yeast infection. She forgot to tell the provider in  that. She is wondering if she can get an Rx of oral Diflucan, as the creams irritate her. She uses [facility] in [place]. Wondering if she needs to be seen or can just get a script.    Phone Number Patient can be reached at: Home number on file [phone] (home)    Best Time: any    Can we leave a detailed message on this number? YES   Cammy Godoy  [facility]      Call taken on 10/4/2017 at 12:15 PM by Cammy Godoy

## 2017-11-05 DIAGNOSIS — J45.30 MILD PERSISTENT ASTHMA, UNCOMPLICATED: ICD-10-CM

## 2017-11-07 RX ORDER — MONTELUKAST SODIUM 10 MG/1
TABLET ORAL
Qty: 90 TABLET | Refills: 1 | Status: SHIPPED | OUTPATIENT
Start: 2017-11-07 | End: 2018-06-03

## 2017-11-24 ENCOUNTER — TRANSFERRED RECORDS (OUTPATIENT)
Dept: HEALTH INFORMATION MANAGEMENT | Facility: CLINIC | Age: 37
End: 2017-11-24

## 2017-12-06 ENCOUNTER — NURSE TRIAGE (OUTPATIENT)
Dept: NURSING | Facility: CLINIC | Age: 37
End: 2017-12-06

## 2017-12-06 ENCOUNTER — HOSPITAL ENCOUNTER (EMERGENCY)
Facility: CLINIC | Age: 37
Discharge: HOME | End: 2017-12-06
Attending: PHYSICIAN ASSISTANT | Admitting: PHYSICIAN ASSISTANT
Payer: COMMERCIAL

## 2017-12-06 VITALS
HEIGHT: 62 IN | DIASTOLIC BLOOD PRESSURE: 86 MMHG | SYSTOLIC BLOOD PRESSURE: 129 MMHG | HEART RATE: 90 BPM | TEMPERATURE: 98 F | BODY MASS INDEX: 26.68 KG/M2 | WEIGHT: 145 LBS | OXYGEN SATURATION: 100 % | RESPIRATION RATE: 18 BRPM

## 2017-12-06 DIAGNOSIS — L08.9 LOCALIZED BACTERIAL SKIN INFECTION: Primary | ICD-10-CM

## 2017-12-06 DIAGNOSIS — B96.89 LOCALIZED BACTERIAL SKIN INFECTION: Primary | ICD-10-CM

## 2017-12-06 PROCEDURE — 99213 OFFICE O/P EST LOW 20 MIN: CPT

## 2017-12-06 PROCEDURE — 99213 OFFICE O/P EST LOW 20 MIN: CPT | Performed by: PHYSICIAN ASSISTANT

## 2017-12-06 RX ORDER — FLUCONAZOLE 150 MG/1
TABLET ORAL
Qty: 2 TABLET | Refills: 0 | Status: SHIPPED | OUTPATIENT
Start: 2017-12-06 | End: 2017-12-09

## 2017-12-06 RX ORDER — CEPHALEXIN 500 MG/1
500 CAPSULE ORAL 4 TIMES DAILY
Qty: 28 CAPSULE | Refills: 0 | Status: SHIPPED | OUTPATIENT
Start: 2017-12-06 | End: 2018-03-07

## 2017-12-06 ASSESSMENT — ENCOUNTER SYMPTOMS
MUSCULOSKELETAL NEGATIVE: 1
FEVER: 0
NEUROLOGICAL NEGATIVE: 1
GASTROINTESTINAL NEGATIVE: 1
CONSTITUTIONAL NEGATIVE: 1
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
## 2017-12-06 NOTE — ED PROVIDER NOTES
"  History     Chief Complaint   Patient presents with     Mass     lump in groin area.  thought it was ingrown hair but she's not sure.  can't see OBGYN for another mo     HPI  Ana Felix is a 37 year old female who presents with complaints of lump in her groin area over the past month.  Patient reports that she has a painful lump that has been fluctuating in size over this time.  She has been applying warm compresses to the area without resolution.  She states she is susceptible to skin infections as she is on immunocompromising medications for her Crohn's disease and has had similar lumps in the past that usually \"come to a head\" or resolve/drain on their own.  She states this one has not come to a head nor started draining.  She states the size of the lump today is actually smaller than it has been.  Pt denies fevers or chills.  No reported urinary symptoms.    Problem List:    Patient Active Problem List    Diagnosis Date Noted     Pain medication agreement 08/18/2017     Priority: Medium     Overview:   [facility]       Controlled substance agreement signed 07/19/2017     Priority: Medium     Degenerative lumbar disc 03/07/2017     Priority: Medium     Labral tear of hip, degenerative 03/07/2017     Priority: Medium     Pain of right hip joint 03/07/2017     Priority: Medium     S/P LEEP of cervix 12/15/2015     Priority: Medium     S/p LEEP of cervix - date unknown  12/9/15: Pap - NIL, Neg HPV. Plan cotest in 1 year. If JAYME 2/3 on biopsy - PAP/HPV co-testing at 12, 24 months. If two negative results repeat co-testing in 3 years, if negative then routine screening.             Hearing difficulty 10/09/2014     Priority: Medium     Irritable bowel syndrome (IBS) 04/15/2014     Priority: Medium     Tiffany raw vegetables       Lactose intolerance 04/15/2014     Priority: Medium     Abdominal pain, right upper quadrant 03/06/2014     Priority: Medium     Nausea with vomiting 01/31/2014     Priority: Medium "     Chronic low back pain 2012     Priority: Medium     Follows with pain clinic.       Mild persistent asthma 2012     Priority: Medium     Discogenic pain 10/08/2012     Priority: Medium     Abdominal pain 2012     Priority: Medium     S/P oophorectomy 2012     Priority: Medium     History of cholecystectomy 2012     Priority: Medium     History of resection of small bowel 2012     Priority: Medium     Tobacco abuse 2012     Priority: Medium     Displacement of lumbar intervertebral disc without myelopathy 2012     Priority: Medium     Disorder of sacrum 2011     Priority: Medium     Problem list name updated by automated process. Provider to review       Back pain 2011     Priority: Medium     Obesity 2011     Priority: Medium     Migraine headache 2011     Priority: Medium     Patient given Migraine Education folder and Migraine Action Plan on 2011. Cammy Anderson RN    (Problem list name updated by automated process. Provider to review and confirm.)       CARDIOVASCULAR SCREENING; LDL GOAL LESS THAN 160 10/31/2010     Priority: Medium     Dysmenorrhea 10/05/2010     Priority: Medium     Female pelvic pain 2010     Priority: Medium     (Problem list name updated by automated process. Provider to review and confirm.)       Crohns disease (H) 2009     Priority: Medium        Past Medical History:    Past Medical History:   Diagnosis Date     Crohn's disease (H)      Exercise-induced asthma      Gestational diabetes      Infertility      Smoker      Status post cholecystectomy 2005       Past Surgical History:    Past Surgical History:   Procedure Laterality Date     APPENDECTOMY       C/SECTION, LOW TRANSVERSE      , Low Transverse     CHOLECYSTECTOMY, LAPOROSCOPIC  2005    Cholecystectomy, Laparoscopic     COLONOSCOPY       ESOPHAGOSCOPY, GASTROSCOPY, DUODENOSCOPY (EGD), COMBINED  3/6/2014     Procedure: COMBINED ESOPHAGOSCOPY, GASTROSCOPY, DUODENOSCOPY (EGD), BIOPSY SINGLE OR MULTIPLE;  COLONOSCOPY/ UPPER GI ENDOSCOPY/ COLON/ EGD/ Abdominal pain, epigastric/ [facility];  Surgeon: West Lomas MD;  Location:  OR      HYSTEROSCOPY, SURGICAL; W/ ENDOMETRIAL ABLATION, ANY METHOD  7/2010     HYSTERECTOMY, SUPRACERVICAL LAPAROSCOPIC  2010     LEEP TX, CERVICAL      LEEP TX Cervical     ORTHOPEDIC SURGERY      bluged disk     partial small bowel resection  2002    Ileo-colonic resection. Crohn's disease surgery for bowel obstruction. this was a section of small bowel and large bowel and the cecum., all removed and a reanastamosis done successfully     SALPINGO OOPHORECTOMY,R/L/TORI      Right ovary     TUBAL LIGATION         Family History:    Family History   Problem Relation Age of Onset     CANCER Mother      cervical     Lipids Mother      Eye Disorder Father      Lipids Father      Alcohol/Drug Maternal Grandmother      DIABETES Paternal Grandmother      Cancer - colorectal Paternal Grandmother      Eye Disorder Paternal Grandmother      DIABETES Paternal Grandfather      Eye Disorder Paternal Grandfather      Inflammatory Bowel Disease Paternal Aunt      and two paternal uncles       Social History:  Marital Status:   [2]  Social History   Substance Use Topics     Smoking status: Current Every Day Smoker     Packs/day: 0.50     Types: Cigarettes     Smokeless tobacco: Never Used     Alcohol use 0.0 oz/week     0 Standard drinks or equivalent per week      Comment: rare        Medications:      cephALEXin (KEFLEX) 500 MG capsule   fluconazole (DIFLUCAN) 150 MG tablet   montelukast (SINGULAIR) 10 MG tablet   fluticasone (FLONASE) 50 MCG/ACT spray   cefdinir (OMNICEF) 300 MG capsule   FentaNYL Citrate, PF, (SUBLIMAZE) 2500 MCG/50ML injection   midazolam (VERSED) 5 MG/5ML injection   midazolam (VERSED) 5 MG/5ML injection   albuterol (PROAIR HFA/PROVENTIL HFA/VENTOLIN HFA) 108 (90 BASE) MCG/ACT  "Inhaler   azaTHIOprine (IMURAN) 50 MG tablet   cyanocobalamin (VITAMIN B12) 1000 MCG/ML injection   fentaNYL (DURAGESIC) 12 mcg/hr 72 hr patch   fentaNYL (DURAGESIC) 25 mcg/hr 72 hr patch   montelukast (SINGULAIR) 10 MG tablet   oxyCODONE-acetaminophen (PERCOCET) 7.5-325 MG per tablet   dicyclomine (BENTYL) 20 MG tablet   sulindac (CLINORIL) 200 MG tablet   oxyCODONE-acetaminophen (PERCOCET) 7.5-325 MG per tablet   cyanocobalamin (VITAMIN B12) 1000 MCG/ML injection   fentaNYL (DURAGESIC) 12 mcg/hr patch 72 hr   albuterol (ALBUTEROL) 108 (90 BASE) MCG/ACT inhaler   fentaNYL (DURAGESIC) 25 mcg/hr patch 72 hr   IMURAN 50 MG OR TABS         Review of Systems   Constitutional: Negative.  Negative for fever.   Gastrointestinal: Negative.    Genitourinary: Negative.    Musculoskeletal: Negative.    Skin:        Lump in groin region   Neurological: Negative.    All other systems reviewed and are negative.      Physical Exam   BP: 129/86  Pulse: 90  Temp: 98  F (36.7  C)  Resp: 18  Height: 157.5 cm (5' 2\")  Weight: 65.8 kg (145 lb)  SpO2: 100 %      Physical Exam   Constitutional: She appears well-developed and well-nourished. No distress.   HENT:   Head: Normocephalic and atraumatic.   Genitourinary: There is no lesion on the right labia. There is no lesion on the left labia.   Genitourinary Comments: There is erythema, swelling, induration, and warmth to right mons pubis region.  No palpable fluctuance.  No drainage.   Neurological: She is alert.   Skin: Skin is warm and dry.       ED Course     ED Course     Procedures      Assessments & Plan (with Medical Decision Making)     Pt is a 37 year old female who presents with complaints of lump in her groin area over the past month.  Patient reports that she has a painful lump that has been fluctuating in size over this time.  She has been applying warm compresses to the area without resolution.  She states she is susceptible to skin infections as she is on immunocompromising " "medications for her Crohn's disease and has had similar lumps in the past that usually \"come to a head\" or resolve/drain on their own.  She states this one has not come to a head nor started draining.  She states the size of the lump today is actually smaller than it has been.  Pt denies fevers or chills.  No reported urinary symptoms.  Pt is afebrile on arrival.  Exam as above.  Pt appears to have a localized skin infection/cellulitis.  No apparent abscess to I&D at this time as there is no localized fluctuance.  Encouraged continued warm compresses.  Return precautions were reviewed.  Hand-outs provided.    Patient was sent with Keflex and was instructed to follow-up with PCP if no improvement in 5-7 days for continued care and management or sooner if new or worsening symptoms.  She is to return to the ED for persistent and/or worsening symptoms.  Patient expressed understanding of the diagnosis and plan and was discharged home in good condition.    I have reviewed the nursing notes.    I have reviewed the findings, diagnosis, plan and need for follow up with the patient.    Discharge Medication List as of 12/6/2017  4:32 PM      START taking these medications    Details   cephALEXin (KEFLEX) 500 MG capsule Take 1 capsule (500 mg) by mouth 4 times daily for 7 days, Disp-28 capsule, R-0, E-Prescribe             Final diagnoses:   Localized bacterial skin infection       12/6/2017   [facility] EMERGENCY DEPARTMENT     Autumn Su PA-C  12/06/17 1836       Autumn uS PA-C  12/06/17 1837    "

## 2017-12-06 NOTE — ED AVS SNAPSHOT
[facility]    [address] [address]    Phone:  [phone]    Fax:  [phone]                                       Ana Felix   MRN: [id number]    Department:  [facility]   Date of Visit:  12/6/2017           After Visit Summary Signature Page     I have received my discharge instructions, and my questions have been answered. I have discussed any challenges I see with this plan with the nurse or doctor.    ..........................................................................................................................................  Patient/Patient Representative Signature      ..........................................................................................................................................  Patient Representative Print Name and Relationship to Patient    ..................................................               ................................................  Date                                            Time    ..........................................................................................................................................  Reviewed by Signature/Title    ...................................................              ..............................................  Date                                                            Time

## 2017-12-06 NOTE — TELEPHONE ENCOUNTER
"Patient calling reporting a \"bigger then pea size lump\" in groin area started 2 weeks ago. Area is tender. Non draining.  Afebrile.    Luba Stafford RN  [facility]      "

## 2017-12-06 NOTE — ED AVS SNAPSHOT
[facility]    [address] [address]    Phone:  [phone]    Fax:  [phone]                                       Ana Felix   MRN: [id number]    Department:  [facility]   Date of Visit:  12/6/2017           Patient Information     Date Of Birth          1980        Your diagnoses for this visit were:     Localized bacterial skin infection        You were seen by Autumn Su PA-C.      Follow-up Information     Call Kendrick Lance MD.    Specialties:  Family Practice, Obstetrics    Why:  As needed, For persistent symptoms    Contact information:    [address] [facility] [facility] [address]  [phone]          Follow up with [facility].    Specialty:  EMERGENCY MEDICINE    Why:  As needed, If symptoms worsen    Contact information:    [address] [place] [address] [phone]    Additional information:    The medical center is located at   [address] (between [facility] and   [address] in [place], [place] miles north   of [place]).      Discharge References/Attachments     ABSCESS, ANTIOBIOTIC TREATMENT ONLY (ENGLISH)      Future Appointments        Provider Department Dept Phone Center    12/28/2017 1:00 PM Reuben Mckinney MD [facility] [phone] [facility]      24 Hour Appointment Hotline       To make an appointment at any [facility], call [phone]). If you don't have a family doctor or clinic, we will help you find one. [facility] are conveniently located to serve the needs of you and your family.             Review of your medicines      START taking        Dose / Directions Last dose taken    cephALEXin 500 MG capsule   Commonly known as:  KEFLEX   Dose:  500 mg   Quantity:  28 capsule        Take 1 capsule (500 mg) by mouth 4 times daily for 7 days   Refills:  0          Our records show that you are taking the  medicines listed below. If these are incorrect, please call your family doctor or clinic.        Dose / Directions Last dose taken    * albuterol 108 (90 BASE) MCG/ACT Inhaler   Commonly known as:  PROAIR HFA   Dose:  2 puff   Quantity:  1 Inhaler        Inhale 2 puffs into the lungs every 4 hours as needed for shortness of breath / dyspnea   Refills:  1        * albuterol 108 (90 BASE) MCG/ACT Inhaler   Commonly known as:  PROAIR HFA/PROVENTIL HFA/VENTOLIN HFA   Dose:  2 puff        Inhale 2 puffs into the lungs   Refills:  0        cefdinir 300 MG capsule   Commonly known as:  OMNICEF   Dose:  300 mg   Quantity:  20 capsule        Take 1 capsule (300 mg) by mouth 2 times daily   Refills:  0        * cyanocobalamin 1000 MCG/ML injection   Commonly known as:  VITAMIN B12   Dose:  1 mL        Inject 1 mL into the muscle every 30 days   Refills:  0        * cyanocobalamin 1000 MCG/ML injection   Commonly known as:  VITAMIN B12   Dose:  1 mL        Inject 1 mL into the muscle   Refills:  0        dicyclomine 20 MG tablet   Commonly known as:  BENTYL   Dose:  20 mg   Quantity:  40 tablet        Take 1 tablet (20 mg) by mouth 4 times daily as needed   Refills:  1        * fentaNYL 25 mcg/hr 72 hr patch   Commonly known as:  DURAGESIC   Dose:  1 patch        Place 1 patch onto the skin every 72 hours   Refills:  0        * fentaNYL 12 mcg/hr 72 hr patch   Commonly known as:  DURAGESIC   Dose:  1 patch        Place 1 patch onto the skin every 72 hours   Refills:  0        * fentaNYL 12 mcg/hr 72 hr patch   Commonly known as:  DURAGESIC   Dose:  1 patch        1 patch   Refills:  0        * fentaNYL 25 mcg/hr 72 hr patch   Commonly known as:  DURAGESIC        Earliest Fill Date: 8/14/17 One to CW q 72 hours. MYLAN brand USE DATES: 08/16/17-09/14/17 may fill 8/14/17   Refills:  0        FentaNYL Citrate (PF) 2500 MCG/50ML injection   Commonly known as:  SUBLIMAZE   Dose:   mcg        Inject  mcg into the vein    Refills:  0        fluticasone 50 MCG/ACT spray   Commonly known as:  FLONASE   Dose:  1-2 spray   Quantity:  1 Bottle        Spray 1-2 sprays into both nostrils daily   Refills:  11        * azaTHIOprine 50 MG tablet   Commonly known as:  IMURAN        Refills:  0        * IMURAN 50 MG tablet   Generic drug:  azaTHIOprine        2 TABLETS by mouth DAILY   Refills:  0        * midazolam 5 MG/5ML injection   Commonly known as:  VERSED   Dose:  0.5-6 mg        Inject 0.5-6 mg into the vein   Refills:  0        * midazolam 5 MG/5ML injection   Commonly known as:  VERSED   Dose:  0.5-6 mg        Inject 0.5-6 mg into the vein   Refills:  0        * montelukast 10 MG tablet   Commonly known as:  SINGULAIR   Dose:  10 mg        Take 10 mg by mouth   Refills:  0        * montelukast 10 MG tablet   Commonly known as:  SINGULAIR   Quantity:  90 tablet        TAKE 1 TABLET(10 MG) BY MOUTH DAILY   Refills:  1        * PERCOCET 7.5-325 MG per tablet   Dose:  1 tablet   Generic drug:  oxyCODONE-acetaminophen        Take 1 tablet by mouth 3 times daily   Refills:  0        * oxyCODONE-acetaminophen 7.5-325 MG per tablet   Commonly known as:  PERCOCET        One po t.i.d./ P.R.N  USE DATES: 08/05/17-09/03/17 may fill on 8/2/17   Refills:  0        sulindac 200 MG tablet   Commonly known as:  CLINORIL   Dose:  200 mg   Quantity:  20 tablet        Take 1 tablet (200 mg) by mouth 2 times daily (with meals)   Refills:  0        * Notice:  This list has 16 medication(s) that are the same as other medications prescribed for you. Read the directions carefully, and ask your doctor or other care provider to review them with you.            Prescriptions were sent or printed at these locations (1 Prescription)                   [facility] [address] [place], [place] [address] [facility] AT [address]   [address] [facility] [address]    Telephone:  [phone]   Fax:  [phone]   Hours:                   E-Prescribed (1 of 1)         cephALEXin (KEFLEX) 500 MG capsule                Orders Needing Specimen Collection     None      Pending Results     No orders found from 12/4/2017 to 12/7/2017.            Pending Culture Results     No orders found from 12/4/2017 to 12/7/2017.            Pending Results Instructions     If you had any lab results that were not finalized at the time of your Discharge, you can call the ED Lab Result RN at [phone]. You will be contacted by this team for any positive Lab results or changes in treatment. The nurses are available 7 days a week from 10A to 6:30P.  You can leave a message 24 hours per day and they will return your call.        Test Results From Your Hospital Stay               Thank you for choosing [facility]       Thank you for choosing [facility] for your care. Our goal is always to provide you with excellent care. Hearing back from our patients is one way we can continue to improve our services. Please take a few minutes to complete the written survey that you may receive in the mail after you visit with us. Thank you!        [employer] [employer] Information     [facility] gives you secure access to your electronic health record. If you see a primary care provider, you can also send messages to your care team and make appointments. If you have questions, please call your primary care clinic.  If you do not have a primary care provider, please call [phone] and they will assist you.        Care EveryWhere ID     This is your Care EveryWhere ID. This could be used by other organizations to access your [facility] medical records  [id number]        Equal Access to Services     [facility] GEORGI AH: Hadii jelani Stapleton, waaxda lusuadadaha, qaybta kaalmada chucho, fernanda waldron. So [facility] [phone].    ATENCIÓN: Si habla español, tiene a zapata disposición servicios gratuitos de asistencia lingüística. Llame al [phone].    We comply with applicable  federal civil rights laws and [place] laws. We do not discriminate on the basis of race, color, national origin, age, disability, sex, sexual orientation, or gender identity.            After Visit Summary       This is your record. Keep this with you and show to your community pharmacist(s) and doctor(s) at your next visit.

## 2018-02-21 DIAGNOSIS — K50.90 CROHN'S DISEASE (H): Primary | ICD-10-CM

## 2018-03-02 NOTE — PROGRESS NOTES
SUBJECTIVE:   Ana Felix is a 37 year old female who presents to clinic today for the following health issues:    Weight loss, hair loss, fatigue, and lump in groin area    Ana presents today with multiple concerns. First, has been experiencing cysts on the right groin area intermittently for months. Always on the right side. Can be one at a time or multiple. Seen in the ED in December for this and as it was not an abscess, was treated with Keflex and it resolved. Usually will try heat to the area as soon as she notices symptoms. Currently, has one on the upper right groin near the crease. It has not changed in size, no drainage from it. Mildly tender with palpation only. Previous ones have made it hard to walk or sit comfortably. She is on immunocompromising medications due to her Crohn's. Denies current fever, nausea, pelvic pain.  Next concern is related to a myriad of symptoms that concern her for early menopause or possibly a thyroid disorder. Over the last 6 months, constant feelings of being cold. Wears multiple layers of clothing and is still cold. Progressively worsening fatigue, feels she has had significant weight loss-went from a size 16 to 8. Per chart, weight is actually up from last January. Has not made any diet or exercise changes. She saw a FP provider last summer and work up was normal. She was instructed to see her GI provider, which she did. Had a full work up there including colonoscopy and all testing normal. Has not had Crohn's issues for years and provider told her the Crohn's was under excellent control. Denies bowel changes, palpitations, appetite changes, hot flashes. Patient has had a hysterectomy and unilateral SO. Per patient,  has mentioned she is more turner as well. No family history of thyroid disorders, early menopause.     Problem list and histories reviewed & adjusted, as indicated.  Additional history: as documented    Patient Active Problem List   Diagnosis      Crohns disease (H)     Female pelvic pain     CARDIOVASCULAR SCREENING; LDL GOAL LESS THAN 160     Migraine headache     Back pain     Obesity     Disorder of sacrum     Displacement of lumbar intervertebral disc without myelopathy     Tobacco abuse     S/P oophorectomy     History of cholecystectomy     History of resection of small bowel     Abdominal pain     Discogenic pain     Chronic low back pain     Mild persistent asthma     Nausea with vomiting     Abdominal pain, right upper quadrant     Irritable bowel syndrome (IBS)     Lactose intolerance     Hearing difficulty     S/P LEEP of cervix     Controlled substance agreement signed     Degenerative lumbar disc     Dysmenorrhea     Labral tear of hip, degenerative     Pain medication agreement     Pain of right hip joint     Past Surgical History:   Procedure Laterality Date     APPENDECTOMY       C/SECTION, LOW TRANSVERSE      , Low Transverse     CHOLECYSTECTOMY, LAPOROSCOPIC  2005    Cholecystectomy, Laparoscopic     COLONOSCOPY       ESOPHAGOSCOPY, GASTROSCOPY, DUODENOSCOPY (EGD), COMBINED  3/6/2014    Procedure: COMBINED ESOPHAGOSCOPY, GASTROSCOPY, DUODENOSCOPY (EGD), BIOPSY SINGLE OR MULTIPLE;  COLONOSCOPY/ UPPER GI ENDOSCOPY/ COLON/ EGD/ Abdominal pain, epigastric/ [facility];  Surgeon: West Lomas MD;  Location: [facility] OR     HC HYSTEROSCOPY, SURGICAL; W/ ENDOMETRIAL ABLATION, ANY METHOD  2010     HYSTERECTOMY, SUPRACERVICAL LAPAROSCOPIC       LEEP TX, CERVICAL      LEEP TX Cervical     ORTHOPEDIC SURGERY      bluged disk     partial small bowel resection      Ileo-colonic resection. Crohn's disease surgery for bowel obstruction. this was a section of small bowel and large bowel and the cecum., all removed and a reanastamosis done successfully     SALPINGO OOPHORECTOMY,R/L/TORI      Right ovary     TUBAL LIGATION         Social History   Substance Use Topics     Smoking status: Current Every Day Smoker     Packs/day: 0.50      "Types: Cigarettes     Smokeless tobacco: Never Used     Alcohol use 0.0 oz/week     0 Standard drinks or equivalent per week      Comment: rare     Family History   Problem Relation Age of Onset     CANCER Mother      cervical     Lipids Mother      Eye Disorder Father      Lipids Father      Alcohol/Drug Maternal Grandmother      DIABETES Paternal Grandmother      Cancer - colorectal Paternal Grandmother      Eye Disorder Paternal Grandmother      DIABETES Paternal Grandfather      Eye Disorder Paternal Grandfather      Inflammatory Bowel Disease Paternal Aunt      and two paternal uncles           Reviewed and updated as needed this visit by clinical staff       Reviewed and updated as needed this visit by Provider         ROS:  Constitutional, HEENT, cardiovascular, pulmonary, gi and gu systems are negative, except as otherwise noted.    OBJECTIVE:     /75  Pulse 80  Temp 98.1  F (36.7  C) (Oral)  Ht 5' 2\" (1.575 m)  Wt 157 lb (71.2 kg)  LMP 07/23/2010  SpO2 100%  BMI 28.72 kg/m2  Body mass index is 28.72 kg/(m^2).  GENERAL: healthy, alert and no distress  NECK: no adenopathy, no asymmetry, masses, or scars and thyroid normal to palpation  RESP: lungs clear to auscultation - no rales, rhonchi or wheezes  CV: regular rate and rhythm, normal S1 S2, no S3 or S4, no murmur, click or rub, no peripheral edema and peripheral pulses strong  ABDOMEN: soft, nontender, no hepatosplenomegaly, no masses and bowel sounds normal  Vulva: Normal hair distribution. There is an erythematous area on the right mons pubis. Mild swelling, induration. No warmth to touch, no fluctuance, no drainage  BUS:  Normal, no masses noted  Vagina: Moist, pink, no abnormal discharge, well rugated, no lesions  Cervix: Pink, parous, midline.   MS: no gross musculoskeletal defects noted, no edema  SKIN: no suspicious lesions or rashes  NEURO: Normal strength and tone, mentation intact and speech normal    ASSESSMENT/PLAN:   1. Screening " for malignant neoplasm of cervix  - Pap imaged thin layer screen with HPV - recommended age 30 - 65 years (select HPV order below)  - HPV High Risk Types DNA Cervical    2. Weight loss  We discussed her current symptoms at length. Discussed possible etiologies and questions all answered. Plan below labs. Discussed that if labs are normal, would consider Endocrine referral for further discussion of her concerns and she is amenable to this plan. Patient is given an opportunity to ask questions and have them answered.  - Follicle stimulating hormone  - TSH with free T4 reflex  - Lutropin  - Estradiol  - Comprehensive metabolic panel (BMP + Alb, Alk Phos, ALT, AST, Total. Bili, TP)    3. Fatigue, unspecified type  See above  - Follicle stimulating hormone  - TSH with free T4 reflex  - Lutropin  - Estradiol  - CBC with platelets  - Vitamin D Deficiency  - Comprehensive metabolic panel (BMP + Alb, Alk Phos, ALT, AST, Total. Bili, TP)    4. Carbuncle of groin  Discussed findings and treatment. No sign of abscess and therefore I&D not recommended at this time. Continue heat. Will send prescription for Keflex as that has worked well in the past. Aware of symptoms of abscess formation and when I&D may be indicated.  - cephALEXin (KEFLEX) 500 MG capsule; Take 1 capsule (500 mg) by mouth 4 times daily  Dispense: 28 capsule; Refill: 3    5. Preventive measure  Per request-prescription sent to use if needed after Keflex.  - fluconazole (DIFLUCAN) 150 MG tablet; Take 1 tablet (150 mg) by mouth once for 1 dose  Dispense: 1 tablet; Refill: 3  Approximately 30 minutes face to face time was spent with the patient today with greater than 50% spent in education and counseling regarding weight loss, fatigue and cold concerns.    [facility]

## 2018-03-07 ENCOUNTER — OFFICE VISIT (OUTPATIENT)
Dept: OBGYN | Facility: CLINIC | Age: 38
End: 2018-03-07
Payer: COMMERCIAL

## 2018-03-07 VITALS
WEIGHT: 157 LBS | HEIGHT: 62 IN | TEMPERATURE: 98.1 F | OXYGEN SATURATION: 100 % | BODY MASS INDEX: 28.89 KG/M2 | DIASTOLIC BLOOD PRESSURE: 75 MMHG | SYSTOLIC BLOOD PRESSURE: 128 MMHG | HEART RATE: 80 BPM

## 2018-03-07 DIAGNOSIS — Z29.9 PREVENTIVE MEASURE: ICD-10-CM

## 2018-03-07 DIAGNOSIS — Z12.4 SCREENING FOR MALIGNANT NEOPLASM OF CERVIX: ICD-10-CM

## 2018-03-07 DIAGNOSIS — R53.83 FATIGUE, UNSPECIFIED TYPE: ICD-10-CM

## 2018-03-07 DIAGNOSIS — R63.4 WEIGHT LOSS: Primary | ICD-10-CM

## 2018-03-07 DIAGNOSIS — L02.234 CARBUNCLE OF GROIN: ICD-10-CM

## 2018-03-07 LAB
ALBUMIN SERPL-MCNC: 3.7 G/DL (ref 3.4–5)
ALP SERPL-CCNC: 43 U/L (ref 40–150)
ALT SERPL W P-5'-P-CCNC: 17 U/L (ref 0–50)
ANION GAP SERPL CALCULATED.3IONS-SCNC: 10 MMOL/L (ref 3–14)
AST SERPL W P-5'-P-CCNC: 12 U/L (ref 0–45)
BILIRUB SERPL-MCNC: 0.6 MG/DL (ref 0.2–1.3)
BUN SERPL-MCNC: 9 MG/DL (ref 7–30)
CALCIUM SERPL-MCNC: 8.8 MG/DL (ref 8.5–10.1)
CHLORIDE SERPL-SCNC: 105 MMOL/L (ref 94–109)
CO2 SERPL-SCNC: 23 MMOL/L (ref 20–32)
CREAT SERPL-MCNC: 0.59 MG/DL (ref 0.52–1.04)
ERYTHROCYTE [DISTWIDTH] IN BLOOD BY AUTOMATED COUNT: 12.1 % (ref 10–15)
ESTRADIOL SERPL-MCNC: 161 PG/ML
FSH SERPL-ACNC: 6.8 IU/L
GFR SERPL CREATININE-BSD FRML MDRD: >90 ML/MIN/1.7M2
GLUCOSE SERPL-MCNC: 108 MG/DL (ref 70–99)
HCT VFR BLD AUTO: 36.8 % (ref 35–47)
HGB BLD-MCNC: 12.8 G/DL (ref 11.7–15.7)
LH SERPL-ACNC: 5.2 IU/L
MCH RBC QN AUTO: 35.4 PG (ref 26.5–33)
MCHC RBC AUTO-ENTMCNC: 34.8 G/DL (ref 31.5–36.5)
MCV RBC AUTO: 102 FL (ref 78–100)
PLATELET # BLD AUTO: 234 10E9/L (ref 150–450)
POTASSIUM SERPL-SCNC: 3.6 MMOL/L (ref 3.4–5.3)
PROT SERPL-MCNC: 7.5 G/DL (ref 6.8–8.8)
RBC # BLD AUTO: 3.62 10E12/L (ref 3.8–5.2)
SODIUM SERPL-SCNC: 138 MMOL/L (ref 133–144)
TSH SERPL DL<=0.005 MIU/L-ACNC: 2.64 MU/L (ref 0.4–4)
WBC # BLD AUTO: 7.3 10E9/L (ref 4–11)

## 2018-03-07 PROCEDURE — 83002 ASSAY OF GONADOTROPIN (LH): CPT | Performed by: NURSE PRACTITIONER

## 2018-03-07 PROCEDURE — 80053 COMPREHEN METABOLIC PANEL: CPT | Performed by: NURSE PRACTITIONER

## 2018-03-07 PROCEDURE — 82306 VITAMIN D 25 HYDROXY: CPT | Performed by: NURSE PRACTITIONER

## 2018-03-07 PROCEDURE — 83001 ASSAY OF GONADOTROPIN (FSH): CPT | Performed by: NURSE PRACTITIONER

## 2018-03-07 PROCEDURE — 82670 ASSAY OF TOTAL ESTRADIOL: CPT | Performed by: NURSE PRACTITIONER

## 2018-03-07 PROCEDURE — G0145 SCR C/V CYTO,THINLAYER,RESCR: HCPCS | Performed by: NURSE PRACTITIONER

## 2018-03-07 PROCEDURE — 84443 ASSAY THYROID STIM HORMONE: CPT | Performed by: NURSE PRACTITIONER

## 2018-03-07 PROCEDURE — 36415 COLL VENOUS BLD VENIPUNCTURE: CPT | Performed by: NURSE PRACTITIONER

## 2018-03-07 PROCEDURE — 85027 COMPLETE CBC AUTOMATED: CPT | Performed by: NURSE PRACTITIONER

## 2018-03-07 PROCEDURE — 87624 HPV HI-RISK TYP POOLED RSLT: CPT | Performed by: NURSE PRACTITIONER

## 2018-03-07 PROCEDURE — 99214 OFFICE O/P EST MOD 30 MIN: CPT | Performed by: NURSE PRACTITIONER

## 2018-03-07 RX ORDER — CEPHALEXIN 500 MG/1
500 CAPSULE ORAL 4 TIMES DAILY
Qty: 28 CAPSULE | Refills: 3 | Status: SHIPPED | OUTPATIENT
Start: 2018-03-07 | End: 2018-07-31

## 2018-03-07 RX ORDER — FLUCONAZOLE 150 MG/1
150 TABLET ORAL ONCE
Qty: 1 TABLET | Refills: 3 | Status: SHIPPED | OUTPATIENT
Start: 2018-03-07 | End: 2018-03-07

## 2018-03-07 ASSESSMENT — PAIN SCALES - GENERAL: PAINLEVEL: SEVERE PAIN (6)

## 2018-03-07 NOTE — NURSING NOTE
"Chief Complaint   Patient presents with     Weight Problem     Hair Loss     Fatigue     Mass     In groin area       Initial /75  Pulse 80  Temp 98.1  F (36.7  C) (Oral)  Ht 5' 2\" (1.575 m)  Wt 157 lb (71.2 kg)  LMP 07/23/2010  SpO2 100%  BMI 28.72 kg/m2 Estimated body mass index is 28.72 kg/(m^2) as calculated from the following:    Height as of this encounter: 5' 2\" (1.575 m).    Weight as of this encounter: 157 lb (71.2 kg)..  BP completed using cuff size: aviva Fregoso CMA    "

## 2018-03-07 NOTE — MR AVS SNAPSHOT
"              After Visit Summary   3/7/2018    Ana Felix    MRN: [id number]           Patient Information     Date Of Birth          1980        Visit Information        Provider Department      3/7/2018 3:50 PM Rebecca Reveles APRN CNP [facility]        Today's Diagnoses     Weight loss    -  1    Screening for malignant neoplasm of cervix        Fatigue, unspecified type        Carbuncle of groin        Preventive measure           Follow-ups after your visit        Who to contact     If you have questions or need follow up information about today's clinic visit or your schedule please contact [facility] [place] directly at [phone].  Normal or non-critical lab and imaging results will be communicated to you by Sponsifyhart, letter or phone within 4 business days after the clinic has received the results. If you do not hear from us within 7 days, please contact the clinic through Sponsifyhart or phone. If you have a critical or abnormal lab result, we will notify you by phone as soon as possible.  Submit refill requests through LogRhythm or call your pharmacy and they will forward the refill request to us. Please allow 3 business days for your refill to be completed.          Additional Information About Your Visit        MyChart Information     LogR[employer] gives you secure access to your electronic health record. If you see a primary care provider, you can also send messages to your care team and make appointments. If you have questions, please call your primary care clinic.  If you do not have a primary care provider, please call [phone] and they will assist you.        Care EveryWhere ID     This is your Care EveryWhere ID. This could be used by other organizations to access your [facility] medical records  [id number]        Your Vitals Were     Pulse Temperature Height Last Period Pulse Oximetry BMI (Body Mass Index)    80 98.1  F (36.7  C) (Oral) 5' 2\" (1.575 m) 07/23/2010 100% " 28.72 kg/m2       Blood Pressure from Last 3 Encounters:   03/07/18 128/75   12/06/17 129/86   09/25/17 136/86    Weight from Last 3 Encounters:   03/07/18 157 lb (71.2 kg)   12/06/17 145 lb (65.8 kg)   08/17/17 143 lb 6.4 oz (65 kg)              We Performed the Following     CBC with platelets     Comprehensive metabolic panel (BMP + Alb, Alk Phos, ALT, AST, Total. Bili, TP)     Estradiol     Follicle stimulating hormone     HPV High Risk Types DNA Cervical     Lutropin     Pap imaged thin layer screen with HPV - recommended age 30 - 65 years (select HPV order below)     TSH with free T4 reflex     Vitamin D Deficiency          Today's Medication Changes          These changes are accurate as of 3/7/18 11:59 PM.  If you have any questions, ask your nurse or doctor.               Start taking these medicines.        Dose/Directions    cephALEXin 500 MG capsule   Commonly known as:  KEFLEX   Used for:  Carbuncle of groin   Started by:  Rebecca Reveles APRN CNP        Dose:  500 mg   Take 1 capsule (500 mg) by mouth 4 times daily   Quantity:  28 capsule   Refills:  3       fluconazole 150 MG tablet   Commonly known as:  DIFLUCAN   Used for:  Preventive measure   Started by:  Rebecca Reveles APRN CNP        Dose:  150 mg   Take 1 tablet (150 mg) by mouth once for 1 dose   Quantity:  1 tablet   Refills:  3         These medicines have changed or have updated prescriptions.        Dose/Directions    albuterol 108 (90 BASE) MCG/ACT Inhaler   Commonly known as:  PROAIR HFA   This may have changed:  Another medication with the same name was removed. Continue taking this medication, and follow the directions you see here.   Used for:  Mild persistent asthma, uncomplicated   Changed by:  Rebecca Reveles APRN CNP        Dose:  2 puff   Inhale 2 puffs into the lungs every 4 hours as needed for shortness of breath / dyspnea   Quantity:  1 Inhaler   Refills:  1       azaTHIOprine 50 MG tablet   Commonly  known as:  IMURAN   This may have changed:  Another medication with the same name was removed. Continue taking this medication, and follow the directions you see here.   Changed by:  Rebecca Reveles APRN CNP        Refills:  0       cyanocobalamin 1000 MCG/ML injection   Commonly known as:  VITAMIN B12   This may have changed:  Another medication with the same name was removed. Continue taking this medication, and follow the directions you see here.   Changed by:  Rebecca Reveles APRN CNP        Dose:  1 mL   Inject 1 mL into the muscle every 30 days   Refills:  0       * fentaNYL 25 mcg/hr 72 hr patch   Commonly known as:  DURAGESIC   This may have changed:  Another medication with the same name was removed. Continue taking this medication, and follow the directions you see here.   Changed by:  Rebecca Reveles APRN CNP        Dose:  1 patch   Place 1 patch onto the skin every 72 hours   Refills:  0       * fentaNYL 12 mcg/hr 72 hr patch   Commonly known as:  DURAGESIC   This may have changed:  Another medication with the same name was removed. Continue taking this medication, and follow the directions you see here.   Changed by:  Rebecca Reveles APRN CNP        Dose:  1 patch   Place 1 patch onto the skin every 72 hours   Refills:  0       montelukast 10 MG tablet   Commonly known as:  SINGULAIR   This may have changed:  Another medication with the same name was removed. Continue taking this medication, and follow the directions you see here.   Used for:  Mild persistent asthma, uncomplicated   Changed by:  Rebecca Reveles APRN CNP        TAKE 1 TABLET(10 MG) BY MOUTH DAILY   Quantity:  90 tablet   Refills:  1       oxyCODONE-acetaminophen 7.5-325 MG per tablet   Commonly known as:  PERCOCET   This may have changed:  Another medication with the same name was removed. Continue taking this medication, and follow the directions you see here.   Changed by:  Rebecca Reveles APRN  CNP        One po t.i.d./ P.R.N  USE DATES: 08/05/17-09/03/17 may fill on 8/2/17   Refills:  0       * Notice:  This list has 2 medication(s) that are the same as other medications prescribed for you. Read the directions carefully, and ask your doctor or other care provider to review them with you.      Stop taking these medicines if you haven't already. Please contact your care team if you have questions.     cefdinir 300 MG capsule   Commonly known as:  OMNICEF   Stopped by:  Rebecca Reveles APRN CNP           FentaNYL Citrate (PF) 2500 MCG/50ML injection   Commonly known as:  SUBLIMAZE   Stopped by:  Rebecca Reveles APRN CNP           fluticasone 50 MCG/ACT spray   Commonly known as:  FLONASE   Stopped by:  Rebecca Reveles APRN CNP           midazolam 5 MG/5ML injection   Commonly known as:  VERSED   Stopped by:  Rebecca Reveles APRN CNP           sulindac 200 MG tablet   Commonly known as:  CLINORIL   Stopped by:  Rebecca Reveles APRN CNP                Where to get your medicines      These medications were sent to [email or web address][note 3] - [address] AT [facility] OF [address] [place] [address]  [address] [facility] [address]     Phone:  [phone]     cephALEXin 500 MG capsule    fluconazole 150 MG tablet                Primary Care Provider Office Phone # Fax #    Kendrick Demar Lance [phone] [address] [facility] [address]        Equal Access to Services     [facility] : Hadii jelani horta hadasho Soomaali, waaxda luqadaha, qaybta kaalmada adefranko, fernanda waldron. So [facility] [phone].    ATENCIÓN: Si habla español, tiene a zapata disposición servicios gratuitos de asistencia lingüística. Llame al [phone].    We comply with applicable federal civil rights laws and [place] laws. We do not discriminate on the basis of race, color, national origin, age, disability, sex, sexual orientation,  or gender identity.            Thank you!     Thank you for choosing [facility]  for your care. Our goal is always to provide you with excellent care. Hearing back from our patients is one way we can continue to improve our services. Please take a few minutes to complete the written survey that you may receive in the mail after your visit with us. Thank you!             Your Updated Medication List - Protect others around you: Learn how to safely use, store and throw away your medicines at www.disposemymeds.org.          This list is accurate as of 3/7/18 11:59 PM.  Always use your most recent med list.                   Brand Name Dispense Instructions for use Diagnosis    albuterol 108 (90 BASE) MCG/ACT Inhaler    PROAIR HFA    1 Inhaler    Inhale 2 puffs into the lungs every 4 hours as needed for shortness of breath / dyspnea    Mild persistent asthma, uncomplicated       azaTHIOprine 50 MG tablet    IMURAN          cephALEXin 500 MG capsule    KEFLEX    28 capsule    Take 1 capsule (500 mg) by mouth 4 times daily    Carbuncle of groin       cyanocobalamin 1000 MCG/ML injection    VITAMIN B12     Inject 1 mL into the muscle every 30 days        dicyclomine 20 MG tablet    BENTYL    40 tablet    Take 1 tablet (20 mg) by mouth 4 times daily as needed    Abdominal pain, generalized       * fentaNYL 25 mcg/hr 72 hr patch    DURAGESIC     Place 1 patch onto the skin every 72 hours        * fentaNYL 12 mcg/hr 72 hr patch    DURAGESIC     Place 1 patch onto the skin every 72 hours        fluconazole 150 MG tablet    DIFLUCAN    1 tablet    Take 1 tablet (150 mg) by mouth once for 1 dose    Preventive measure       montelukast 10 MG tablet    SINGULAIR    90 tablet    TAKE 1 TABLET(10 MG) BY MOUTH DAILY    Mild persistent asthma, uncomplicated       oxyCODONE-acetaminophen 7.5-325 MG per tablet    PERCOCET     One po t.i.d./ P.R.N  USE DATES: 08/05/17-09/03/17 may fill on 8/2/17        * Notice:  This list  has 2 medication(s) that are the same as other medications prescribed for you. Read the directions carefully, and ask your doctor or other care provider to review them with you.

## 2018-03-08 LAB — DEPRECATED CALCIDIOL+CALCIFEROL SERPL-MC: 28 UG/L (ref 20–75)

## 2018-03-09 ENCOUNTER — TELEPHONE (OUTPATIENT)
Dept: OBGYN | Facility: CLINIC | Age: 38
End: 2018-03-09

## 2018-03-09 DIAGNOSIS — R53.83 FATIGUE, UNSPECIFIED TYPE: ICD-10-CM

## 2018-03-09 DIAGNOSIS — R68.89 COLD INTOLERANCE: ICD-10-CM

## 2018-03-09 DIAGNOSIS — R63.4 WEIGHT LOSS: Primary | ICD-10-CM

## 2018-03-09 LAB
COPATH REPORT: NORMAL
PAP: NORMAL

## 2018-03-09 NOTE — TELEPHONE ENCOUNTER
Telephone call to patient and discussed results, management. Will enter Endocrine referral per request. Questions answered. Rebecca ALCALA CNP

## 2018-03-09 NOTE — TELEPHONE ENCOUNTER
Attempted to call patient to discuss results, voicemail pickup but was unable to leave message. Will send GapJumpershart message for patient to call to discuss results. Rebecca ALCALA CNP

## 2018-03-12 LAB
FINAL DIAGNOSIS: NORMAL
HPV HR 12 DNA CVX QL NAA+PROBE: NEGATIVE
HPV16 DNA SPEC QL NAA+PROBE: NEGATIVE
HPV18 DNA SPEC QL NAA+PROBE: NEGATIVE
SPECIMEN DESCRIPTION: NORMAL
SPECIMEN SOURCE CVX/VAG CYTO: NORMAL

## 2018-03-13 ENCOUNTER — TRANSFERRED RECORDS (OUTPATIENT)
Dept: HEALTH INFORMATION MANAGEMENT | Facility: CLINIC | Age: 38
End: 2018-03-13

## 2018-05-23 ENCOUNTER — HOSPITAL ENCOUNTER (EMERGENCY)
Facility: CLINIC | Age: 38
Discharge: HOME | End: 2018-05-23
Attending: PHYSICIAN ASSISTANT | Admitting: PHYSICIAN ASSISTANT
Payer: COMMERCIAL

## 2018-05-23 ENCOUNTER — APPOINTMENT (OUTPATIENT)
Dept: GENERAL RADIOLOGY | Facility: CLINIC | Age: 38
End: 2018-05-23
Attending: PHYSICIAN ASSISTANT
Payer: COMMERCIAL

## 2018-05-23 VITALS
HEIGHT: 62 IN | OXYGEN SATURATION: 100 % | BODY MASS INDEX: 27.6 KG/M2 | RESPIRATION RATE: 18 BRPM | SYSTOLIC BLOOD PRESSURE: 137 MMHG | TEMPERATURE: 100.6 F | HEART RATE: 96 BPM | WEIGHT: 150 LBS | DIASTOLIC BLOOD PRESSURE: 88 MMHG

## 2018-05-23 DIAGNOSIS — J18.9 COMMUNITY ACQUIRED PNEUMONIA OF LEFT LOWER LOBE OF LUNG: ICD-10-CM

## 2018-05-23 DIAGNOSIS — J02.0 STREP THROAT: ICD-10-CM

## 2018-05-23 LAB
INTERNAL QC OK POCT: YES
S PYO AG THROAT QL IA.RAPID: POSITIVE

## 2018-05-23 PROCEDURE — 71046 X-RAY EXAM CHEST 2 VIEWS: CPT

## 2018-05-23 PROCEDURE — 87880 STREP A ASSAY W/OPTIC: CPT | Performed by: PHYSICIAN ASSISTANT

## 2018-05-23 PROCEDURE — G0463 HOSPITAL OUTPT CLINIC VISIT: HCPCS | Performed by: PHYSICIAN ASSISTANT

## 2018-05-23 PROCEDURE — 99213 OFFICE O/P EST LOW 20 MIN: CPT | Mod: Z6 | Performed by: PHYSICIAN ASSISTANT

## 2018-05-23 RX ORDER — FLUCONAZOLE 150 MG/1
TABLET ORAL
Qty: 2 TABLET | Refills: 0 | Status: SHIPPED | OUTPATIENT
Start: 2018-05-23 | End: 2018-05-26

## 2018-05-23 RX ORDER — AMOXICILLIN 500 MG/1
1000 CAPSULE ORAL 3 TIMES DAILY
Qty: 60 CAPSULE | Refills: 0 | Status: SHIPPED | OUTPATIENT
Start: 2018-05-23 | End: 2018-06-02

## 2018-05-23 NOTE — ED AVS SNAPSHOT
[facility] Emergency Department    [address] [facility] [address]    Phone:  [phone]    Fax:  [phone]                                       Ana Felix   MRN: [id number]    Department:  [facility] Emergency Department   Date of Visit:  5/23/2018           After Visit Summary Signature Page     I have received my discharge instructions, and my questions have been answered. I have discussed any challenges I see with this plan with the nurse or doctor.    ..........................................................................................................................................  Patient/Patient Representative Signature      ..........................................................................................................................................  Patient Representative Print Name and Relationship to Patient    ..................................................               ................................................  Date                                            Time    ..........................................................................................................................................  Reviewed by Signature/Title    ...................................................              ..............................................  Date                                                            Time

## 2018-05-23 NOTE — ED AVS SNAPSHOT
[facility] Emergency Department    [address]    [facility] [address]    Phone:  [phone]    Fax:  [phone]                                       Ana Felix   MRN: [id number]    Department:  [facility] Emergency Department   Date of Visit:  5/23/2018           Patient Information     Date Of Birth          1980        Your diagnoses for this visit were:     Strep throat     Community acquired pneumonia of left lower lobe of lung (H)        You were seen by Denise Juarez PA-C.      Follow-up Information     Follow up with Kendrick Lance MD In 3 days.    Specialties:  Family Practice, Obstetrics    Why:  if no resolution of fever or sooner if new or worsening symptoms     Contact information:    [address] [facility] [address]  [phone]          Discharge Instructions         Pneumonia (Adult)  Pneumonia is an infection deep within the lungs. It is in the small air sacs (alveoli). Pneumonia may be caused by a virus or bacteria. Pneumonia caused by bacteria is usually treated with an antibiotic. Severe cases may need to be treated in the hospital. Milder cases can be treated at home. Symptoms usually start to get better during the first 2 days of treatment.    Home care  Follow these guidelines when caring for yourself at home:    Rest at home for the first 2 to 3 days, or until you feel stronger. Don t let yourself get overly tired when you go back to your activities.    Stay away from cigarette smoke - yours or other people s.    You may use acetaminophen or ibuprofen to control fever or pain, unless another medicine was prescribed. If you have chronic liver or kidney disease, talk with your healthcare provider before using these medicines. Also talk with your provider if you ve had a stomach ulcer or gastrointestinal bleeding. Don t give aspirin to anyone younger than 18 years of age who is ill with a fever. It may cause severe liver damage.    Your appetite may be  poor, so a light diet is fine.    Drink 6 to 8 glasses of fluids every day to make sure you are getting enough fluids. Beverages can include water, sport drinks, sodas without caffeine, juices, tea, or soup. Fluids will help loosen secretions in the lung. This will make it easier for you to cough up the phlegm (sputum). If you also have heart or kidney disease, check with your healthcare provider before you drink extra fluids.    Take antibiotic medicine prescribed until it is all gone, even if you are feeling better after a few days.  Follow-up care  Follow up with your healthcare provider in the next 2 to 3 days, or as advised. This is to be sure the medicine is helping you get better.  If you are 65 or older, you should get a pneumococcal vaccine and a yearly flu (influenza) shot. You should also get these vaccines if you have chronic lung disease like asthma, emphysema, or COPD. Recently, a second type of pneumonia vaccine has become available for everyone over 65 years old. This is in addition to the previous vaccine. Ask your provider about this.  When to seek medical advice  Call your healthcare provider right away if any of these occur:    You don t get better within the first 48 hours of treatment    Shortness of breath gets worse    Rapid breathing (more than 25 breaths per minute)    Coughing up blood    Chest pain gets worse with breathing    Fever of 100.4 F (38 C) or higher that doesn t get better with fever medicine    Weakness, dizziness, or fainting that gets worse    Thirst or dry mouth that gets worse    Sinus pain, headache, or a stiff neck    Chest pain not caused by coughing  Date Last Reviewed: 1/1/2017 2000-2017 [employer]. [address] [place] [address], [place] [address]. All rights reserved. This information is not intended as a substitute for professional medical care. Always follow your healthcare professional's instructions.          Your next 10 appointments already scheduled      Jun 06, 2018 11:00 AM CDT   New Visit with Myron Barboza MD   [facility] [place] ([facility])    [address]  [place] [address] [phone]              24 Hour Appointment Hotline       To make an appointment at any [facility], call [phone] ([phone]). If you don't have a family doctor or clinic, we will help you find one. [facility] are conveniently located to serve the needs of you and your family.             Review of your medicines      START taking        Dose / Directions Last dose taken    amoxicillin 500 MG capsule   Commonly known as:  AMOXIL   Dose:  1000 mg   Quantity:  60 capsule        Take 2 capsules (1,000 mg) by mouth 3 times daily for 10 days   Refills:  0        fluconazole 150 MG tablet   Commonly known as:  DIFLUCAN   Quantity:  2 tablet        Take one tablet now, and one tablet in three days   Refills:  0          Our records show that you are taking the medicines listed below. If these are incorrect, please call your family doctor or clinic.        Dose / Directions Last dose taken    albuterol 108 (90 Base) MCG/ACT Inhaler   Commonly known as:  PROAIR HFA   Dose:  2 puff   Quantity:  1 Inhaler        Inhale 2 puffs into the lungs every 4 hours as needed for shortness of breath / dyspnea   Refills:  1        azaTHIOprine 50 MG tablet   Commonly known as:  IMURAN        Refills:  0        cephALEXin 500 MG capsule   Commonly known as:  KEFLEX   Dose:  500 mg   Quantity:  28 capsule        Take 1 capsule (500 mg) by mouth 4 times daily   Refills:  3        cyanocobalamin 1000 MCG/ML injection   Commonly known as:  VITAMIN B12   Dose:  1 mL        Inject 1 mL into the muscle every 30 days   Refills:  0        dicyclomine 20 MG tablet   Commonly known as:  BENTYL   Dose:  20 mg   Quantity:  40 tablet        Take 1 tablet (20 mg) by mouth 4 times daily as needed   Refills:  1        * fentaNYL 25 mcg/hr 72 hr patch   Commonly  known as:  DURAGESIC   Dose:  1 patch        Place 1 patch onto the skin every 72 hours   Refills:  0        * fentaNYL 12 mcg/hr 72 hr patch   Commonly known as:  DURAGESIC   Dose:  1 patch        Place 1 patch onto the skin every 72 hours   Refills:  0        montelukast 10 MG tablet   Commonly known as:  SINGULAIR   Quantity:  90 tablet        TAKE 1 TABLET(10 MG) BY MOUTH DAILY   Refills:  1        oxyCODONE-acetaminophen 7.5-325 MG per tablet   Commonly known as:  PERCOCET        One po t.i.d./ P.R.N  USE DATES: 08/05/17-09/03/17 may fill on 8/2/17   Refills:  0        * Notice:  This list has 2 medication(s) that are the same as other medications prescribed for you. Read the directions carefully, and ask your doctor or other care provider to review them with you.            Prescriptions were sent or printed at these locations (2 Prescriptions)                   [facility] [id number] [place] [address] AT [facility] OF [address] [place], [place] [address] [facility] [address]    Telephone:  [phone]   Fax:  [phone]   Hours:                  E-Prescribed (2 of 2)         amoxicillin (AMOXIL) 500 MG capsule               fluconazole (DIFLUCAN) 150 MG tablet                Procedures and tests performed during your visit     Chest XR,  PA & LAT      Orders Needing Specimen Collection     None      Pending Results     Date and Time Order Name Status Description    5/23/2018 1822 Chest XR,  PA & LAT Preliminary             Pending Culture Results     No orders found from 5/21/2018 to 5/24/2018.            Pending Results Instructions     If you had any lab results that were not finalized at the time of your Discharge, you can call the ED Lab Result RN at [phone]. You will be contacted by this team for any positive Lab results or changes in treatment. The nurses are available 7 days a week from 10A to 6:30P.  You can leave a message 24 hours per day and they will return your  call.        Test Results From Your Hospital Stay        5/23/2018  6:36 PM      Narrative     XR CHEST 2 VW   5/23/2018 6:33 PM     HISTORY: cough, fever;     COMPARISON: Films going back to 2013    FINDINGS: The heart is negative.  There is a patchy opacity in the  left retrocardiac region.. The pulmonary vasculature is normal.  The  bones and soft tissues are unremarkable.        Impression     IMPRESSION: Retrocardiac opacity, suspect small area of pneumonitis.                     Thank you for choosing [facility]       Thank you for choosing [facility] for your care. Our goal is always to provide you with excellent care. Hearing back from our patients is one way we can continue to improve our services. Please take a few minutes to complete the written survey that you may receive in the mail after you visit with us. Thank you!        [facility] Information     [facility] gives you secure access to your electronic health record. If you see a primary care provider, you can also send messages to your care team and make appointments. If you have questions, please call your primary care clinic.  If you do not have a primary care provider, please call [phone] and they will assist you.        Care EveryWhere ID     This is your Care EveryWhere ID. This could be used by other organizations to access your [facility] medical records  [id number]        Equal Access to Services     HANNAH LAUREANO : Sal Stapleton, kalin hutton, gordon rankin, fernanda waldron. So [facility] [phone].    ATENCIÓN: Si habla español, tiene a zapata disposición servicios gratuitos de asistencia lingüística. Llame al [phone].    We comply with applicable federal civil rights laws and [place] laws. We do not discriminate on the basis of race, color, national origin, age, disability, sex, sexual orientation, or gender identity.            After Visit Summary       This is your record. Keep this with  you and show to your community pharmacist(s) and doctor(s) at your next visit.

## 2018-05-23 NOTE — ED PROVIDER NOTES
History     Chief Complaint   Patient presents with     Cough     upper respiratory symptoms      Nasal Congestion     HPI  Ana Felix is a 37 year old female who is in urgent care with concern over cough and chest congestion which been ongoing for the last 4 weeks.  She initially complains of mild nasal congestion, postnasal drainage, sore throat and swollen glands.  She is unaware of any fever, chills, myalgias, shortness of breath, dyspnea, wheezing, nausea, vomiting, new onset abdominal pain.  She has attempted to treat with [place] without significant improvement.  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  Past medical history significant for asthma.  She has not increased use of albuterol inhaler since onset of symptoms    Problem List:    Patient Active Problem List    Diagnosis Date Noted     Pain medication agreement 08/18/2017     Priority: Medium     Overview:   [facility]       Controlled substance agreement signed 07/19/2017     Priority: Medium     Degenerative lumbar disc 03/07/2017     Priority: Medium     Labral tear of hip, degenerative 03/07/2017     Priority: Medium     Pain of right hip joint 03/07/2017     Priority: Medium     S/P LEEP of cervix 12/15/2015     Priority: Medium     S/p LEEP of cervix - date unknown  12/9/15: Pap - NIL, Neg HPV. Plan cotest in 1 year. If JAYME 2/3 on biopsy - PAP/HPV co-testing at 12, 24 months. If two negative results repeat co-testing in 3 years, if negative then routine screening.  3/7/18 Pap: NIL/neg HPV.             Hearing difficulty 10/09/2014     Priority: Medium     Irritable bowel syndrome (IBS) 04/15/2014     Priority: Medium     Tiffany raw vegetables       Lactose intolerance 04/15/2014     Priority: Medium     Abdominal pain, right upper quadrant 03/06/2014     Priority: Medium     Nausea with vomiting 01/31/2014     Priority: Medium     Chronic low back pain 11/05/2012      Priority: Medium     Follows with pain clinic.       Mild persistent asthma 2012     Priority: Medium     Discogenic pain 10/08/2012     Priority: Medium     Abdominal pain 2012     Priority: Medium     S/P oophorectomy 2012     Priority: Medium     History of cholecystectomy 2012     Priority: Medium     History of resection of small bowel 2012     Priority: Medium     Tobacco abuse 2012     Priority: Medium     Displacement of lumbar intervertebral disc without myelopathy 2012     Priority: Medium     Disorder of sacrum 2011     Priority: Medium     Problem list name updated by automated process. Provider to review       Back pain 2011     Priority: Medium     Obesity 2011     Priority: Medium     Migraine headache 2011     Priority: Medium     Patient given Migraine Education folder and Migraine Action Plan on 2011. Cammy Anderson RN    (Problem list name updated by automated process. Provider to review and confirm.)       CARDIOVASCULAR SCREENING; LDL GOAL LESS THAN 160 10/31/2010     Priority: Medium     Dysmenorrhea 10/05/2010     Priority: Medium     Female pelvic pain 2010     Priority: Medium     (Problem list name updated by automated process. Provider to review and confirm.)       Crohns disease (H) 2009     Priority: Medium        Past Medical History:    Past Medical History:   Diagnosis Date     Crohn's disease (H)      Exercise-induced asthma      Gestational diabetes      Infertility      Smoker      Status post cholecystectomy 2005     Past Surgical History:    Past Surgical History:   Procedure Laterality Date     APPENDECTOMY       C/SECTION, LOW TRANSVERSE      , Low Transverse     CHOLECYSTECTOMY, LAPOROSCOPIC  2005    Cholecystectomy, Laparoscopic     COLONOSCOPY       ESOPHAGOSCOPY, GASTROSCOPY, DUODENOSCOPY (EGD), COMBINED  3/6/2014    Procedure: COMBINED ESOPHAGOSCOPY, GASTROSCOPY,  DUODENOSCOPY (EGD), BIOPSY SINGLE OR MULTIPLE;  COLONOSCOPY/ UPPER GI ENDOSCOPY/ COLON/ EGD/ Abdominal pain, epigastric/ PJH;  Surgeon: West Lomas MD;  Location: [facility] OR     HC HYSTEROSCOPY, SURGICAL; W/ ENDOMETRIAL ABLATION, ANY METHOD  7/2010     HYSTERECTOMY, SUPRACERVICAL LAPAROSCOPIC  2010     LEEP TX, CERVICAL      LEEP TX Cervical     ORTHOPEDIC SURGERY      bluged disk     partial small bowel resection  2002    Ileo-colonic resection. Crohn's disease surgery for bowel obstruction. this was a section of small bowel and large bowel and the cecum., all removed and a reanastamosis done successfully     SALPINGO OOPHORECTOMY,R/L/TORI      Right ovary     TUBAL LIGATION         Family History:    Family History   Problem Relation Age of Onset     CANCER Mother      cervical     Lipids Mother      Eye Disorder Father      Lipids Father      Alcohol/Drug Maternal Grandmother      DIABETES Paternal Grandmother      Cancer - colorectal Paternal Grandmother      Eye Disorder Paternal Grandmother      DIABETES Paternal Grandfather      Eye Disorder Paternal Grandfather      Inflammatory Bowel Disease Paternal Aunt      and two paternal uncles       Social History:  Marital Status:   [2]  Social History   Substance Use Topics     Smoking status: Current Every Day Smoker     Packs/day: 0.50     Types: Cigarettes     Smokeless tobacco: Never Used     Alcohol use 0.0 oz/week     0 Standard drinks or equivalent per week      Comment: rare        Medications:      albuterol (ALBUTEROL) 108 (90 BASE) MCG/ACT inhaler   azaTHIOprine (IMURAN) 50 MG tablet   cephALEXin (KEFLEX) 500 MG capsule   cyanocobalamin (VITAMIN B12) 1000 MCG/ML injection   dicyclomine (BENTYL) 20 MG tablet   fentaNYL (DURAGESIC) 12 mcg/hr patch 72 hr   fentaNYL (DURAGESIC) 25 mcg/hr patch 72 hr   montelukast (SINGULAIR) 10 MG tablet   oxyCODONE-acetaminophen (PERCOCET) 7.5-325 MG per tablet     Review of Systems  CONSTITUTIONAL:NEGATIVE for  "fever, chills, change in weight  INTEGUMENTARY/SKIN: NEGATIVE for worrisome rashes, moles or lesions  EYES: NEGATIVE for vision changes or irritation  ENT/MOUTH: POSITIVE for sore throat, post-nasal drainage and NEGATIVE for nasal congestion, cough   RESP:POSITIVE for cough and NEGATIVE for SOB/dyspnea and wheezing  GI: NEGATIVE for new onset vomiting, diarrhea, abdominal pain   Physical Exam   BP: 137/88  Pulse: 96  Temp: 100.6  F (38.1  C)  Resp: 18  Height: 157.5 cm (5' 2\")  Weight: 68 kg (150 lb)  SpO2: 100 %    Physical Exam  GENERAL APPEARANCE: healthy, alert and no distress  EYES: EOMI,  PERRL, conjunctiva clear  HENT: ear canals and TM's normal.  Nasal mucosa moist.  Posterior pharynx is nonerythematous, no exudate present  NECK: supple, nontender, no lymphadenopathy  RESP: Patient has some crackles present in the left lower lung.  Infrequent expiratory wheezing  CV: regular rates and rhythm, normal S1 S2, no murmur noted  SKIN: no suspicious lesions or rashes  ED Course     ED Course     Procedures        Critical Care time:  none        Repeat temp at time of examination, 101.1 orally    Results for orders placed or performed during the hospital encounter of 05/23/18 (from the past 24 hour(s))   Rapid strep group A screen POCT   Result Value Ref Range    Rapid Strep A Screen POSITIVE neg    Internal QC OK Yes    Chest XR,  PA & LAT    Narrative    XR CHEST 2 VW   5/23/2018 6:33 PM     HISTORY: cough, fever;     COMPARISON: Films going back to 2013    FINDINGS: The heart is negative.  There is a patchy opacity in the  left retrocardiac region.. The pulmonary vasculature is normal.  The  bones and soft tissues are unremarkable.      Impression    IMPRESSION: Retrocardiac opacity, suspect small area of pneumonitis.          Medications - No data to display    Assessments & Plan (with Medical Decision Making)     I have reviewed the nursing notes.    I have reviewed the findings, diagnosis, plan and need for " follow up with the patient.       Discharge Medication List as of 5/23/2018  6:54 PM      START taking these medications    Details   amoxicillin (AMOXIL) 500 MG capsule Take 2 capsules (1,000 mg) by mouth 3 times daily for 10 days, Disp-60 capsule, R-0, E-Prescribe      fluconazole (DIFLUCAN) 150 MG tablet Take one tablet now, and one tablet in three days, Disp-2 tablet, R-0, E-Prescribe           Final diagnoses:   Strep throat   Community acquired pneumonia of left lower lobe of lung (H)     37-year-old female presents to the urgent care with concern over 4 week history of cough with new onset sore throat, swollen glands.  She was noted to be febrile upon arrival, remainder vital signs within normal limits.  Physical exam findings as described above.  As part of evaluation patient did have rapid strep test which was positive.  Given past medical history and current symptoms, I did also obtain a chest x-ray which did show a patchy opacity in the left retrocardiac region.  Given symptoms we will treat patient for suspected community-acquired pneumonia with high-dose amoxicillin as she cannot tolerate Augmentin.  This will cover for strep as well.  She was instructed to monitor her temp at home.  Follow-up if failure to improve within the next 48-72 hours.  Worrisome reasons to return to the ER/UC sooner discussed.    Disclaimer: This note consists of symbols derived from keyboarding, dictation, and/or voice recognition software. As a result, there may be errors in the script that have gone undetected.  Please consider this when interpreting information found in the chart.    5/23/2018   [facility] EMERGENCY DEPARTMENT     Denise Juarez PA-C  05/23/18 1916

## 2018-05-23 NOTE — DISCHARGE INSTRUCTIONS

## 2018-06-03 DIAGNOSIS — J45.30 MILD PERSISTENT ASTHMA, UNCOMPLICATED: ICD-10-CM

## 2018-06-03 NOTE — LETTER
June 6, 2018    Ana Felix  [address]  [place] [address]    Dear Ana,       We recently received a refill request for montelukast (SINGULAIR) 10 MG tablet.  We have refilled this for a one time 30 day supply only because you are due for a:    Asthma office visit      Please call at your earliest convenience so that there will not be a delay with your future refills.          Thank you,   Your [facility] Team/  [phone]

## 2018-06-06 RX ORDER — MONTELUKAST SODIUM 10 MG/1
TABLET ORAL
Qty: 30 TABLET | Refills: 0 | Status: SHIPPED | OUTPATIENT
Start: 2018-06-06 | End: 2018-07-12

## 2018-07-06 DIAGNOSIS — J45.30 MILD PERSISTENT ASTHMA, UNCOMPLICATED: ICD-10-CM

## 2018-07-06 RX ORDER — MONTELUKAST SODIUM 10 MG/1
TABLET ORAL
Qty: 30 TABLET | Refills: 0 | OUTPATIENT
Start: 2018-07-06

## 2018-07-11 DIAGNOSIS — K50.90 CROHN'S DISEASE (H): ICD-10-CM

## 2018-07-11 LAB
ALBUMIN SERPL-MCNC: 3.9 G/DL (ref 3.4–5)
ALP SERPL-CCNC: 49 U/L (ref 40–150)
ALT SERPL W P-5'-P-CCNC: 14 U/L (ref 0–50)
AST SERPL W P-5'-P-CCNC: 17 U/L (ref 0–45)
BASOPHILS # BLD AUTO: 0 10E9/L (ref 0–0.2)
BASOPHILS NFR BLD AUTO: 0.2 %
BILIRUB DIRECT SERPL-MCNC: 0.1 MG/DL (ref 0–0.2)
BILIRUB SERPL-MCNC: 0.6 MG/DL (ref 0.2–1.3)
DIFFERENTIAL METHOD BLD: ABNORMAL
EOSINOPHIL # BLD AUTO: 0.1 10E9/L (ref 0–0.7)
EOSINOPHIL NFR BLD AUTO: 1.3 %
ERYTHROCYTE [DISTWIDTH] IN BLOOD BY AUTOMATED COUNT: 12.6 % (ref 10–15)
HCT VFR BLD AUTO: 37.1 % (ref 35–47)
HGB BLD-MCNC: 13.3 G/DL (ref 11.7–15.7)
LYMPHOCYTES # BLD AUTO: 3 10E9/L (ref 0.8–5.3)
LYMPHOCYTES NFR BLD AUTO: 33.7 %
MCH RBC QN AUTO: 36.5 PG (ref 26.5–33)
MCHC RBC AUTO-ENTMCNC: 35.8 G/DL (ref 31.5–36.5)
MCV RBC AUTO: 102 FL (ref 78–100)
MONOCYTES # BLD AUTO: 0.6 10E9/L (ref 0–1.3)
MONOCYTES NFR BLD AUTO: 6.9 %
NEUTROPHILS # BLD AUTO: 5.1 10E9/L (ref 1.6–8.3)
NEUTROPHILS NFR BLD AUTO: 57.9 %
PLATELET # BLD AUTO: 232 10E9/L (ref 150–450)
PROT SERPL-MCNC: 8 G/DL (ref 6.8–8.8)
RBC # BLD AUTO: 3.64 10E12/L (ref 3.8–5.2)
WBC # BLD AUTO: 8.8 10E9/L (ref 4–11)

## 2018-07-11 PROCEDURE — 80076 HEPATIC FUNCTION PANEL: CPT | Performed by: INTERNAL MEDICINE

## 2018-07-11 PROCEDURE — 36415 COLL VENOUS BLD VENIPUNCTURE: CPT | Performed by: INTERNAL MEDICINE

## 2018-07-11 PROCEDURE — 85025 COMPLETE CBC W/AUTO DIFF WBC: CPT | Performed by: INTERNAL MEDICINE

## 2018-07-12 DIAGNOSIS — J45.30 MILD PERSISTENT ASTHMA, UNCOMPLICATED: ICD-10-CM

## 2018-07-12 RX ORDER — MONTELUKAST SODIUM 10 MG/1
TABLET ORAL
Qty: 30 TABLET | Refills: 0 | Status: SHIPPED | OUTPATIENT
Start: 2018-07-12 | End: 2018-07-31

## 2018-07-18 ENCOUNTER — MYC MEDICAL ADVICE (OUTPATIENT)
Dept: OBGYN | Facility: CLINIC | Age: 38
End: 2018-07-18

## 2018-07-31 ENCOUNTER — OFFICE VISIT (OUTPATIENT)
Dept: FAMILY MEDICINE | Facility: CLINIC | Age: 38
End: 2018-07-31
Payer: COMMERCIAL

## 2018-07-31 VITALS
SYSTOLIC BLOOD PRESSURE: 122 MMHG | OXYGEN SATURATION: 100 % | WEIGHT: 154 LBS | HEART RATE: 81 BPM | HEIGHT: 62 IN | RESPIRATION RATE: 20 BRPM | BODY MASS INDEX: 28.34 KG/M2 | TEMPERATURE: 97.7 F | DIASTOLIC BLOOD PRESSURE: 78 MMHG

## 2018-07-31 DIAGNOSIS — J45.30 MILD PERSISTENT ASTHMA, UNCOMPLICATED: Primary | ICD-10-CM

## 2018-07-31 DIAGNOSIS — K50.80 CROHN'S DISEASE OF BOTH SMALL AND LARGE INTESTINE WITHOUT COMPLICATION (H): ICD-10-CM

## 2018-07-31 PROCEDURE — 99214 OFFICE O/P EST MOD 30 MIN: CPT | Performed by: FAMILY MEDICINE

## 2018-07-31 RX ORDER — ALBUTEROL SULFATE 90 UG/1
2 AEROSOL, METERED RESPIRATORY (INHALATION) EVERY 4 HOURS PRN
Qty: 2 INHALER | Refills: 1 | Status: SHIPPED | OUTPATIENT
Start: 2018-07-31 | End: 2019-11-13

## 2018-07-31 RX ORDER — MONTELUKAST SODIUM 10 MG/1
TABLET ORAL
Qty: 90 TABLET | Refills: 3 | Status: SHIPPED | OUTPATIENT
Start: 2018-07-31 | End: 2019-07-30

## 2018-07-31 NOTE — LETTER
My Asthma Action Plan  Name: Ana Felix   YOB: 1980  Date: 7/26/2018   My doctor: Kendrick Lance MD   My clinic: [facility]        My Control Medicine: None  My Rescue Medicine: Albuterol (Proair/Ventolin/Proventil) inhaler 1   My Asthma Severity: intermittent  Avoid your asthma triggers: upper respiratory infections               GREEN ZONE   Good Control    I feel good    No cough or wheeze    Can work, sleep and play without asthma symptoms       Take your asthma control medicine every day.     1. If exercise triggers your asthma, take your rescue medication    15 minutes before exercise or sports, and    During exercise if you have asthma symptoms  2. Spacer to use with inhaler: If you have a spacer, make sure to use it with your inhaler             YELLOW ZONE Getting Worse  I have ANY of these:    I do not feel good    Cough or wheeze    Chest feels tight    Wake up at night   1. Keep taking your Green Zone medications  2. Start taking your rescue medicine:    every 20 minutes for up to 1 hour. Then every 4 hours for 24-48 hours.  3. If you stay in the Yellow Zone for more than 12-24 hours, contact your doctor.  4. If you do not return to the Green Zone in 12-24 hours or you get worse, start taking your oral steroid medicine if prescribed by your provider.           RED ZONE Medical Alert - Get Help  I have ANY of these:    I feel awful    Medicine is not helping    Breathing getting harder    Trouble walking or talking    Nose opens wide to breathe       1. Take your rescue medicine NOW  2. If your provider has prescribed an oral steroid medicine, start taking it NOW  3. Call your doctor NOW  4. If you are still in the Red Zone after 20 minutes and you have not reached your doctor:    Take your rescue medicine again and    Call 911 or go to the emergency room right away    See your regular doctor within 2 weeks of an Emergency Room or Urgent Care visit for follow-up  treatment.          Annual Reminders:  Meet with Asthma Educator,  Flu Shot in the Fall, consider Pneumonia Vaccination for patients with asthma (aged 19 and older).    Pharmacy: [facility] [address] [place] [address] AVE AT [address]                      Asthma Triggers  How To Control Things That Make Your Asthma Worse    Triggers are things that make your asthma worse.  Look at the list below to help you find your triggers and what you can do about them.  You can help prevent asthma flare-ups by staying away from your triggers.      Trigger                                                          What you can do   Cigarette Smoke  Tobacco smoke can make asthma worse. Do not allow smoking in your home, car or around you.  Be sure no one smokes at a child s day care or school.  If you smoke, ask your health care provider for ways to help you quit.  Ask family members to quit too.  Ask your health care provider for a referral to Quit Plan to help you quit smoking, or call 9-924-324-PLAN.     Colds, Flu, Bronchitis  These are common triggers of asthma. Wash your hands often.  Don t touch your eyes, nose or mouth.  Get a flu shot every year.     Dust Mites  These are tiny bugs that live in cloth or carpet. They are too small to see. Wash sheets and blankets in hot water every week.   Encase pillows and mattress in dust mite proof covers.  Avoid having carpet if you can. If you have carpet, vacuum weekly.   Use a dust mask and HEPA vacuum.   Pollen and Outdoor Mold  Some people are allergic to trees, grass, or weed pollen, or molds. Try to keep your windows closed.  Limit time out doors when pollen count is high.   Ask you health care provider about taking medicine during allergy season.     Animal Dander  Some people are allergic to skin flakes, urine or saliva from pets with fur or feathers. Keep pets with fur or feathers out of your home.    If you can t keep the pet outdoors,  then keep the pet out of your bedroom.  Keep the bedroom door closed.  Keep pets off cloth furniture and away from stuffed toys.     Mice, Rats, and Cockroaches  Some people are allergic to the waste from these pests.   Cover food and garbage.  Clean up spills and food crumbs.  Store grease in the refrigerator.   Keep food out of the bedroom.   Indoor Mold  This can be a trigger if your home has high moisture. Fix leaking faucets, pipes, or other sources of water.   Clean moldy surfaces.  Dehumidify basement if it is damp and smelly.   Smoke, Strong Odors, and Sprays  These can reduce air quality. Stay away from strong odors and sprays, such as perfume, powder, hair spray, paints, smoke incense, paint, cleaning products, candles and new carpet.   Exercise or Sports  Some people with asthma have this trigger. Be active!  Ask your doctor about taking medicine before sports or exercise to prevent symptoms.    Warm up for 5-10 minutes before and after sports or exercise.     Other Triggers of Asthma  Cold air:  Cover your nose and mouth with a scarf.  Sometimes laughing or crying can be a trigger.  Some medicines and food can trigger asthma.

## 2018-07-31 NOTE — NURSING NOTE
"Chief Complaint   Patient presents with     Asthma     Health Maintenance     act/jeana/phq       Initial /78  Pulse 81  Temp 97.7  F (36.5  C) (Oral)  Resp 20  Ht 5' 2\" (1.575 m)  Wt 154 lb (69.9 kg)  LMP 07/23/2010  SpO2 100%  BMI 28.17 kg/m2 Estimated body mass index is 28.17 kg/(m^2) as calculated from the following:    Height as of this encounter: 5' 2\" (1.575 m).    Weight as of this encounter: 154 lb (69.9 kg).    Rachell Oliver CMA    "

## 2018-07-31 NOTE — MR AVS SNAPSHOT
After Visit Summary   7/31/2018    Ana Felix    MRN: [id number]           Patient Information     Date Of Birth          1980        Visit Information        Provider Department      7/31/2018 10:10 AM Kendrick Lance MD [facility]        Today's Diagnoses     Mild persistent asthma, uncomplicated    -  1    Crohn's disease of both small and large intestine without complication (H)           Follow-ups after your visit        Your next 10 appointments already scheduled     Aug 15, 2018  8:30 AM CDT   [facility]ron Bar[facility]za [facility]   [facility]    [address] [facility] [address] [phone]              Who to contact     If you have questions or need follow up information about today's clinic visit or your schedule please contact [facility] directly at [phone].  Normal or non-critical lab and imaging results will be communicated to you by MyChart, letter or phone within 4 business days after the clinic has received the results. If you do not hear from us within 7 days, please contact the clinic through MyChart or phone. If you have a critical or abnormal lab result, we will notify you by phone as soon as possible.  Submit refill requests through [employer] or call your pharmacy and they will forward the refill request to us. Please allow 3 business days for your refill to be completed.          Additional Information About Your Visit        MyChart Information     [employer] gives you secure access to your electronic health record. If you see a primary care provider, you can also send messages to your care team and make appointments. If you have questions, please call your primary care clinic.  If you do not have a primary care provider, please call [phone] and they will assist you.        Care EveryWhere ID     This is your Care EveryWhere ID. This could be used by other organizations to access  "your [facility] medical records  [id number]        Your Vitals Were     Pulse Temperature Respirations Height Last Period Pulse Oximetry    81 97.7  F (36.5  C) (Oral) 20 5' 2\" (1.575 m) 07/23/2010 100%    BMI (Body Mass Index)                   28.17 kg/m2            Blood Pressure from Last 3 Encounters:   07/31/18 122/78   05/23/18 137/88   03/07/18 128/75    Weight from Last 3 Encounters:   07/31/18 154 lb (69.9 kg)   05/23/18 150 lb (68 kg)   03/07/18 157 lb (71.2 kg)              Today, you had the following     No orders found for display         Where to get your medicines      These medications were sent to [email or web address] - [address] AT [address]  [address] [facility] [address]     Phone:  [phone]     albuterol 108 (90 Base) MCG/ACT Inhaler    montelukast 10 MG tablet          Primary Care Provider Office Phone # Fax #    Kendrick Demar Lance [phone] [address] [facility] [address]        Equal Access to Services     HANNAH LAUREANO AH: Hadii jelani horta hadasho Soomaali, waaxda luqadaha, qaybta kaalmada adeegyada, fernanda waldron. So [facility] [phone].    ATENCIÓN: Si habla español, tiene a zapata disposición servicios gratuitos de asistencia lingüística. Llame al [phone].    We comply with applicable federal civil rights laws and [place] laws. We do not discriminate on the basis of race, color, national origin, age, disability, sex, sexual orientation, or gender identity.            Thank you!     Thank you for choosing [facility]  for your care. Our goal is always to provide you with excellent care. Hearing back from our patients is one way we can continue to improve our services. Please take a few minutes to complete the written survey that you may receive in the mail after your visit with us. Thank you!             Your Updated Medication List - Protect others around " you: Learn how to safely use, store and throw away your medicines at www.disposemymeds.org.          This list is accurate as of 7/31/18 10:43 AM.  Always use your most recent med list.                   Brand Name Dispense Instructions for use Diagnosis    albuterol 108 (90 Base) MCG/ACT Inhaler    PROAIR HFA    2 Inhaler    Inhale 2 puffs into the lungs every 4 hours as needed for shortness of breath / dyspnea    Mild persistent asthma, uncomplicated       azaTHIOprine 50 MG tablet    IMURAN          cyanocobalamin 1000 MCG/ML injection    VITAMIN B12     Inject 1 mL into the muscle every 30 days        * fentaNYL 25 mcg/hr 72 hr patch    DURAGESIC     Place 1 patch onto the skin every 72 hours        * fentaNYL 12 mcg/hr 72 hr patch    DURAGESIC     Place 1 patch onto the skin every 72 hours        montelukast 10 MG tablet    SINGULAIR    90 tablet    TAKE 1 TABLET(10 MG) BY MOUTH DAILY    Mild persistent asthma, uncomplicated       oxyCODONE-acetaminophen 7.5-325 MG per tablet    PERCOCET     One po t.i.d./ P.R.N  USE DATES: 08/05/17-09/03/17 may fill on 8/2/17        * Notice:  This list has 2 medication(s) that are the same as other medications prescribed for you. Read the directions carefully, and ask your doctor or other care provider to review them with you.

## 2018-07-31 NOTE — LETTER
My Depression Action Plan  Name: Ana Felix   Date of Birth 1980  Date: 7/26/2018    My doctor: Kendrick Lance   My clinic: [address] [address] [phone]          GREEN    ZONE   Good Control    What it looks like:     Things are going generally well. You have normal up s and down s. You may even feel depressed from time to time, but bad moods usually last less than a day.   What you need to do:  1. Continue to care for yourself (see self care plan)  2. Check your depression survival kit and update it as needed  3. Follow your physician s recommendations including any medication.  4. Do not stop taking medication unless you consult with your physician first.           YELLOW         ZONE Getting Worse    What it looks like:     Depression is starting to interfere with your life.     It may be hard to get out of bed; you may be starting to isolate yourself from others.    Symptoms of depression are starting to last most all day and this has happened for several days.     You may have suicidal thoughts but they are not constant.   What you need to do:     1. Call your care team, your response to treatment will improve if you keep your care team informed of your progress. Yellow periods are signs an adjustment may need to be made.     2. Continue your self-care, even if you have to fake it!    3. Talk to someone in your support network    4. Open up your depression survival kit           RED    ZONE Medical Alert - Get Help    What it looks like:     Depression is seriously interfering with your life.     You may experience these or other symptoms: You can t get out of bed most days, can t work or engage in other necessary activities, you have trouble taking care of basic hygiene, or basic responsibilities, thoughts of suicide or death that will not go away, self-injurious behavior.     What you need to do:  1. Call your care team and request a  same-day appointment. If they are not available (weekends or after hours) call your local crisis line, emergency room or 911.            Depression Self Care Plan / Survival Kit    Self-Care for Depression  Here s the deal. Your body and mind are really not as separate as most people think.  What you do and think affects how you feel and how you feel influences what you do and think. This means if you do things that people who feel good do, it will help you feel better.  Sometimes this is all it takes.  There is also a place for medication and therapy depending on how severe your depression is, so be sure to consult with your medical provider and/ or Behavioral Health Consultant if your symptoms are worsening or not improving.     In order to better manage my stress, I will:    Exercise  Get some form of exercise, every day. This will help reduce pain and release endorphins, the  feel good  chemicals in your brain. This is almost as good as taking antidepressants!  This is not the same as joining a gym and then never going! (they count on that by the way ) It can be as simple as just going for a walk or doing some gardening, anything that will get you moving.      Hygiene   Maintain good hygiene (Get out of bed in the morning, Make your bed, Brush your teeth, Take a shower, and Get dressed like you were going to work, even if you are unemployed).  If your clothes don't fit try to get ones that do.    Diet  I will strive to eat foods that are good for me, drink plenty of water, and avoid excessive sugar, caffeine, alcohol, and other mood-altering substances.  Some foods that are helpful in depression are: complex carbohydrates, B vitamins, flaxseed, fish or fish oil, fresh fruits and vegetables.    Psychotherapy  I agree to participate in Individual Therapy (if recommended).    Medication  If prescribed medications, I agree to take them.  Missing doses can result in serious side effects.  I understand that drinking  alcohol, or other illicit drug use, may cause potential side effects.  I will not stop my medication abruptly without first discussing it with my provider.    Staying Connected With Others  I will stay in touch with my friends, family members, and my primary care provider/team.    Use your imagination  Be creative.  We all have a creative side; it doesn t matter if it s oil painting, sand castles, or mud pies! This will also kick up the endorphins.    Witness Beauty  (AKA stop and smell the roses) Take a look outside, even in mid-winter. Notice colors, textures. Watch the squirrels and birds.     Service to others  Be of service to others.  There is always someone else in need.  By helping others we can  get out of ourselves  and remember the really important things.  This also provides opportunities for practicing all the other parts of the program.    Humor  Laugh and be silly!  Adjust your TV habits for less news and crime-drama and more comedy.    Control your stress  Try breathing deep, massage therapy, biofeedback, and meditation. Find time to relax each day.     My support system    Clinic Contact:  Phone number:    Contact 1:  Phone number:    Contact 2:  Phone number:    [facility]/:  Phone number:    Therapist:  Phone number:    Local crisis center:    Phone number:    Other community support:  Phone number:

## 2018-07-31 NOTE — PROGRESS NOTES
SUBJECTIVE:  Ana Felix, a 37 year old female scheduled an appointment to discuss the following issues:  Follow-up asthma/ ALLERGIC RHINITIS worse with mowing.   No prednisone in many years. Smoking 1/2-1ppd. Tried chantix/zyban - working on quitting with .  No cough currently. No fevers or chills. No chest pain. Pneumonia resolved.   No interested in repeat xray. crohns stable.   Emotionally doing ok.     Past Medical History:   Diagnosis Date     Crohn's disease (H)      Exercise-induced asthma      Gestational diabetes      Infertility      Smoker      Status post cholecystectomy 2005       Past Surgical History:   Procedure Laterality Date     APPENDECTOMY       C/SECTION, LOW TRANSVERSE      , Low Transverse     CHOLECYSTECTOMY, LAPOROSCOPIC  2005    Cholecystectomy, Laparoscopic     COLONOSCOPY       ESOPHAGOSCOPY, GASTROSCOPY, DUODENOSCOPY (EGD), COMBINED  3/6/2014    Procedure: COMBINED ESOPHAGOSCOPY, GASTROSCOPY, DUODENOSCOPY (EGD), BIOPSY SINGLE OR MULTIPLE;  COLONOSCOPY/ UPPER GI ENDOSCOPY/ COLON/ EGD/ Abdominal pain, epigastric/ PJH;  Surgeon: West Lomas MD;  Location:  OR      HYSTEROSCOPY, SURGICAL; W/ ENDOMETRIAL ABLATION, ANY METHOD  2010     HYSTERECTOMY, SUPRACERVICAL LAPAROSCOPIC       LEEP TX, CERVICAL      LEEP TX Cervical     ORTHOPEDIC SURGERY      bluged disk     partial small bowel resection      Ileo-colonic resection. Crohn's disease surgery for bowel obstruction. this was a section of small bowel and large bowel and the cecum., all removed and a reanastamosis done successfully     SALPINGO OOPHORECTOMY,R/L/TORI      Right ovary     TUBAL LIGATION         Family History   Problem Relation Age of Onset     Cancer Mother      cervical     Lipids Mother      Eye Disorder Father      Lipids Father      Alcohol/Drug Maternal Grandmother      Diabetes Paternal Grandmother      Cancer - colorectal Paternal Grandmother      Eye Disorder  "Paternal Grandmother      Diabetes Paternal Grandfather      Eye Disorder Paternal Grandfather      Inflammatory Bowel Disease Paternal Aunt      and two paternal uncles       Social History   Substance Use Topics     Smoking status: Current Every Day Smoker     Packs/day: 0.50     Types: Cigarettes     Smokeless tobacco: Never Used     Alcohol use 0.0 oz/week     0 Standard drinks or equivalent per week      Comment: rare       ROS:  All other ROS negative.  OBJECTIVE:  /78  Pulse 81  Temp 97.7  F (36.5  C) (Oral)  Resp 20  Ht 5' 2\" (1.575 m)  Wt 154 lb (69.9 kg)  LMP 07/23/2010  SpO2 100%  BMI 28.17 kg/m2  EXAM:  GENERAL APPEARANCE: healthy, alert and no distress  EYES: EOMI,  PERRL  HENT: ear canals and TM's normal and nose and mouth without ulcers or lesions  NECK: no adenopathy, no asymmetry, masses, or scars and thyroid normal to palpation  RESP: lungs clear to auscultation - no rales, rhonchi or wheezes  CV: regular rates and rhythm, normal S1 S2, no S3 or S4 and no murmur, click or rub -  ABDOMEN:  soft, nontender, no HSM or masses and bowel sounds normal  MS: extremities normal- no gross deformities noted, no evidence of inflammation in joints, FROM in all extremities.  PSYCH: mentation appears normal and affect normal/bright    ASSESSMENT / PLAN:  (J45.30) Mild persistent asthma, uncomplicated  Comment: stable  Plan: montelukast (SINGULAIR) 10 MG tablet, albuterol        (PROAIR HFA) 108 (90 Base) MCG/ACT Inhaler        Advised smoking cessation. Patient NOT interested in repeat xray from pneumonia - symptoms resolved. Return to clinic if more coughing/ fevers or chills/etc. Expected course and warning signs reviewed. Call/email with questions/concerns.   Allergist if needed.     (K50.80) Crohn's disease of both small and large intestine without complication (H)  Comment: stable  Plan: per GI. Continue jaxson Lance    "

## 2018-08-01 ASSESSMENT — PATIENT HEALTH QUESTIONNAIRE - PHQ9: SUM OF ALL RESPONSES TO PHQ QUESTIONS 1-9: 2

## 2018-08-01 ASSESSMENT — ASTHMA QUESTIONNAIRES: ACT_TOTALSCORE: 24

## 2018-08-08 ENCOUNTER — TRANSFERRED RECORDS (OUTPATIENT)
Dept: HEALTH INFORMATION MANAGEMENT | Facility: CLINIC | Age: 38
End: 2018-08-08

## 2018-08-15 ENCOUNTER — OFFICE VISIT (OUTPATIENT)
Dept: ENDOCRINOLOGY | Facility: CLINIC | Age: 38
End: 2018-08-15
Payer: COMMERCIAL

## 2018-08-15 VITALS
BODY MASS INDEX: 28.34 KG/M2 | DIASTOLIC BLOOD PRESSURE: 79 MMHG | WEIGHT: 154 LBS | SYSTOLIC BLOOD PRESSURE: 124 MMHG | HEART RATE: 83 BPM | HEIGHT: 62 IN

## 2018-08-15 DIAGNOSIS — R53.83 FATIGUE, UNSPECIFIED TYPE: Primary | ICD-10-CM

## 2018-08-15 DIAGNOSIS — N91.2 AMENORRHEA: ICD-10-CM

## 2018-08-15 DIAGNOSIS — R68.89 COLD INTOLERANCE: ICD-10-CM

## 2018-08-15 DIAGNOSIS — R63.4 WEIGHT LOSS: ICD-10-CM

## 2018-08-15 DIAGNOSIS — Z86.32 HISTORY OF GESTATIONAL DIABETES MELLITUS (GDM): ICD-10-CM

## 2018-08-15 LAB
ANION GAP SERPL CALCULATED.3IONS-SCNC: 6 MMOL/L (ref 3–14)
BUN SERPL-MCNC: 12 MG/DL (ref 7–30)
CALCIUM SERPL-MCNC: 8.6 MG/DL (ref 8.5–10.1)
CHLORIDE SERPL-SCNC: 107 MMOL/L (ref 94–109)
CO2 SERPL-SCNC: 25 MMOL/L (ref 20–32)
CORTIS SERPL-MCNC: 5.8 UG/DL (ref 4–22)
CREAT SERPL-MCNC: 0.79 MG/DL (ref 0.52–1.04)
ESTRADIOL SERPL-MCNC: 105 PG/ML
FSH SERPL-ACNC: 5 IU/L
GFR SERPL CREATININE-BSD FRML MDRD: 81 ML/MIN/1.7M2
GLUCOSE SERPL-MCNC: 89 MG/DL (ref 70–99)
POTASSIUM SERPL-SCNC: 4.2 MMOL/L (ref 3.4–5.3)
PROLACTIN SERPL-MCNC: 11 UG/L (ref 3–27)
SODIUM SERPL-SCNC: 138 MMOL/L (ref 133–144)
T4 FREE SERPL-MCNC: 1.02 NG/DL (ref 0.76–1.46)
TSH SERPL DL<=0.005 MIU/L-ACNC: 2.87 MU/L (ref 0.4–4)

## 2018-08-15 PROCEDURE — 82024 ASSAY OF ACTH: CPT | Performed by: INTERNAL MEDICINE

## 2018-08-15 PROCEDURE — 80048 BASIC METABOLIC PNL TOTAL CA: CPT | Performed by: INTERNAL MEDICINE

## 2018-08-15 PROCEDURE — 99204 OFFICE O/P NEW MOD 45 MIN: CPT | Performed by: INTERNAL MEDICINE

## 2018-08-15 PROCEDURE — 83001 ASSAY OF GONADOTROPIN (FSH): CPT | Performed by: INTERNAL MEDICINE

## 2018-08-15 PROCEDURE — 84146 ASSAY OF PROLACTIN: CPT | Performed by: INTERNAL MEDICINE

## 2018-08-15 PROCEDURE — 84443 ASSAY THYROID STIM HORMONE: CPT | Performed by: INTERNAL MEDICINE

## 2018-08-15 PROCEDURE — 82670 ASSAY OF TOTAL ESTRADIOL: CPT | Performed by: INTERNAL MEDICINE

## 2018-08-15 PROCEDURE — 82533 TOTAL CORTISOL: CPT | Performed by: INTERNAL MEDICINE

## 2018-08-15 PROCEDURE — 84439 ASSAY OF FREE THYROXINE: CPT | Performed by: INTERNAL MEDICINE

## 2018-08-15 PROCEDURE — 36415 COLL VENOUS BLD VENIPUNCTURE: CPT | Performed by: INTERNAL MEDICINE

## 2018-08-15 NOTE — MR AVS SNAPSHOT
"              After Visit Summary   8/15/2018    Ana Felix    MRN: [id number]           Patient Information     Date Of Birth          1980        Visit Information        Provider Department      8/15/2018 8:30 AM Myron Barboza MD [facility]        Today's Diagnoses     Fatigue, unspecified type    -  1    Weight loss        History of gestational diabetes mellitus (GDM)        Amenorrhea        Cold intolerance           Follow-ups after your visit        Who to contact     If you have questions or need follow up information about today's clinic visit or your schedule please contact [facility] directly at [phone].  Normal or non-critical lab and imaging results will be communicated to you by MyChart, letter or phone within 4 business days after the clinic has received the results. If you do not hear from us within 7 days, please contact the clinic through [employer] or phone. If you have a critical or abnormal lab result, we will notify you by phone as soon as possible.  Submit refill requests through [employer] or call your pharmacy and they will forward the refill request to us. Please allow 3 business days for your refill to be completed.          Additional Information About Your Visit        MyChart Information     [employer] gives you secure access to your electronic health record. If you see a primary care provider, you can also send messages to your care team and make appointments. If you have questions, please call your primary care clinic.  If you do not have a primary care provider, please call [phone] and they will assist you.        Care EveryWhere ID     This is your Care EveryWhere ID. This could be used by other organizations to access your [facility] medical records  [id number]        Your Vitals Were     Pulse Height Last Period BMI (Body Mass Index)          83 1.575 m (5' 2\") 07/23/2010 28.17 kg/m2         Blood Pressure from Last 3 Encounters: "   08/15/18 124/79   07/31/18 122/78   05/23/18 137/88    Weight from Last 3 Encounters:   08/15/18 69.9 kg (154 lb)   07/31/18 69.9 kg (154 lb)   05/23/18 68 kg (150 lb)              We Performed the Following     Adrenal corticotropin     Basic metabolic panel     Cortisol     Estradiol     Follicle stimulating hormone     Prolactin     T4 free     TSH        Primary Care Provider Office Phone # Fax #    Kendrick Lance [phone] [id number] [facility] [id number]        Equal Access to Services     [facility]: Hadii jelani horta hadasho Sorosalina, waaxda luqadaha, qaybta kaalmada chucho, fernanda duckworth . So [facility] [phone].    ATENCIÓN: Si habla español, tiene a zapata disposición servicios gratuitos de asistencia lingüística. [facility] [phone].    We comply with applicable federal civil rights laws and [place] laws. We do not discriminate on the basis of race, color, national origin, age, disability, sex, sexual orientation, or gender identity.            Thank you!     Thank you for choosing [facility]  for your care. Our goal is always to provide you with excellent care. Hearing back from our patients is one way we can continue to improve our services. Please take a few minutes to complete the written survey that you may receive in the mail after your visit with us. Thank you!             Your Updated Medication List - Protect others around you: Learn how to safely use, store and throw away your medicines at www.disposemymeds.org.          This list is accurate as of 8/15/18  9:31 AM.  Always use your most recent med list.                   Brand Name Dispense Instructions for use Diagnosis    albuterol 108 (90 Base) MCG/ACT inhaler    PROAIR HFA    2 Inhaler    Inhale 2 puffs into the lungs every 4 hours as needed for shortness of breath / dyspnea    Mild persistent asthma, uncomplicated       azaTHIOprine 50 MG tablet    IMURAN           cyanocobalamin 1000 MCG/ML injection    VITAMIN B12     Inject 1 mL into the muscle every 30 days        * fentaNYL 25 mcg/hr 72 hr patch    DURAGESIC     Place 1 patch onto the skin every 72 hours        * fentaNYL 12 mcg/hr 72 hr patch    DURAGESIC     Place 1 patch onto the skin every 72 hours        montelukast 10 MG tablet    SINGULAIR    90 tablet    TAKE 1 TABLET(10 MG) BY MOUTH DAILY    Mild persistent asthma, uncomplicated       oxyCODONE-acetaminophen 7.5-325 MG per tablet    PERCOCET     One po t.i.d./ P.R.N  USE DATES: 08/05/17-09/03/17 may fill on 8/2/17        * Notice:  This list has 2 medication(s) that are the same as other medications prescribed for you. Read the directions carefully, and ask your doctor or other care provider to review them with you.

## 2018-08-15 NOTE — PROGRESS NOTES
"Name: Ana Felix is a 37 year old woman, seen at the request of Rebecca Reveles CNP for evaluation of possible endocrine disorder      Chief Complaint   Patient presents with     Consult     Fatigue, unspecified type,Cold intolerance,Weight loss.     HPI:  Recent issues:  Multiple symptoms of concern, noted during 3/2018 evaluation with Ms Reveles.  Had recent respiratory (asthma) evaluation with PCP, though no focus on these symptoms          3/7/18 Medical evaluation with Rebecca ALCALA, CNP, [facility]  Patient concerns for weight loss, hair loss, fatigue, and groin area lump  She had noted tender right groin area \"cysts\" intermittently for several months, previous Abx treatment  Other symptoms with cold intolerance, fatigue, mood lability, weight loss 50-60#/1.5 yrs with clothing size change from 16 to an 8.  Endocrinology consultation requested.    Recent [facility] labs include:  Lab Results   Component Value Date    FSH 6.8 2018    LH 5.2 2018    ESTROGEN 161 2018    TSH 2.64 2018    T4 1.00 2017    SG 1.030 2017      Previous menstrual cycling history:   Previous ovarian cysts, then right oophorectomy surgery while in high school   Menstrual cycles usually frequent with prolonged flow   Took OCP medication from approx ages 10 to 22, then depo Provera injections x 2 yrs   Switched to Prometrium medication use     Pregnancy age 26, then successful C/S delivery of daughter 3/8/2006    GDM during pregnancy, took insulin medication during pregnancy (BID?)   Had 2 unsuccessful pregnancies    Placental abruption at 8 mo gestation    Stillbirth at 5 mo gestation, Umbilical cord wrapped around fetus neck  Subsequent uterine ablation procedure  ~. Hysterectomy surgery    History of thyroid disease or thyroid medication use:  none  Previous steroid med use   Crohns' disease diagnosis age 19   Took Prednisone almost daily for approx 4 yrs   Age 23, surgery for " partial intestinal resection of part of ileum and prox colon   No significant use of Prednisone or other steroid medication since that time, no Crohn's flareups on Azathioprine medication  Fam Hx:   Diabetes:  PGM and PGF   Thyroid:  None   Adrenal:  None  Recent symptoms:  Still has groin cystic lesions R>L, also coldness, fatigue.  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 Kendrick Lance/[facility] [place] for general medicine evaluations.    PMH/PSH:  Past Medical History:   Diagnosis Date     Back pain      Crohn's disease (H)      Exercise-induced asthma      Gestational diabetes      Herniation of intervertebral disc of lumbar region      Infertility      Ovarian cyst      Smoker      Status post cholecystectomy 2005     Past Surgical History:   Procedure Laterality Date     APPENDECTOMY       C/SECTION, LOW TRANSVERSE      , Low Transverse     CHOLECYSTECTOMY, LAPOROSCOPIC  2005    Cholecystectomy, Laparoscopic     COLONOSCOPY       ESOPHAGOSCOPY, GASTROSCOPY, DUODENOSCOPY (EGD), COMBINED  3/6/2014    Procedure: COMBINED ESOPHAGOSCOPY, GASTROSCOPY, DUODENOSCOPY (EGD), BIOPSY SINGLE OR MULTIPLE;  COLONOSCOPY/ UPPER GI ENDOSCOPY/ COLON/ EGD/ Abdominal pain, epigastric/ [facility];  Surgeon: West Lomas MD;  Location:  OR      HYSTEROSCOPY, SURGICAL; W/ ENDOMETRIAL ABLATION, ANY METHOD  2010     HYSTERECTOMY, SUPRACERVICAL LAPAROSCOPIC       LEEP TX, CERVICAL      LEEP TX Cervical     ORTHOPEDIC SURGERY      bluged disk     partial small bowel resection      Ileo-colonic resection. Crohn's disease surgery for bowel obstruction. this was a section of small bowel and large bowel and the cecum., all removed and a reanastamosis done successfully     SALPINGO OOPHORECTOMY,R/L/TORI      Right ovary     TUBAL LIGATION         Family Hx:  Family  History   Problem Relation Age of Onset     Cancer Mother      cervical     Lipids Mother      Eye Disorder Father      Lipids Father      Alcohol/Drug Maternal Grandmother      Diabetes Paternal Grandmother      Cancer - colorectal Paternal Grandmother      Eye Disorder Paternal Grandmother      Diabetes Paternal Grandfather      Eye Disorder Paternal Grandfather      Inflammatory Bowel Disease Paternal Aunt      and two paternal uncles         Social Hx:  Social History     Social History     Marital status:      Spouse name: N/A     Number of children: N/A     Years of education: N/A     Occupational History     Not on file.     Social History Main Topics     Smoking status: Current Every Day Smoker     Packs/day: 0.50     Types: Cigarettes     Smokeless tobacco: Never Used     Alcohol use 0.0 oz/week     0 Standard drinks or equivalent per week      Comment: rare     Drug use: No     Sexual activity: Yes     Partners: Male     Birth control/ protection: Female Surgical      Comment: Hyst     Other Topics Concern     Not on file     Social History Narrative          MEDICATIONS:  has a current medication list which includes the following prescription(s): albuterol, azathioprine, cyanocobalamin, fentanyl, fentanyl, montelukast, and oxycodone-acetaminophen.    ROS:     ROS: 10 point ROS neg other than the symptoms noted above in the HPI.    GENERAL:  fatigue, significant wt loss ~50#/1.5 yrs; denies fevers, chills, night sweats.    HEENT: no dysphagia, odonophagia, diplopia, neck pain  THYROID:  no apparent hyper or hypothyroid symptoms  CV: no chest pain, pressure, palpitations  LUNGS: no SOB, JOHNSON, cough, wheezing   ABDOMEN: no diarrhea, constipation, abdominal pain  EXTREMITIES: no rashes, ulcers, edema  NEUROLOGY: no headaches, denies changes in vision, tingling, extremitiy numbness   MSK: low back pain; no muscle aches or pains, weakness  SKIN: groin sores, scalp hair thinning; no rashes or lesions  :  "no menses since hysterectomy, no apparent hot flashes  PSYCH:  stable mood, no significant anxiety or depression  ENDOCRINE: cold intolerance    Physical Exam   VS: /79 (BP Location: Right arm, Cuff Size: Adult Regular)  Pulse 83  Ht 1.575 m (5' 2\")  Wt 69.9 kg (154 lb)  LMP 07/23/2010  BMI 28.17 kg/m2  GENERAL: AXOX3, NAD, well dressed, answering questions appropriately, appears stated age.  THYROID:  normal gland, no apparent nodules or goiter  HEENT: neck non-tender, no exopthalmous, no proptosis, EOMI  CV: RRR, no rubs, gallops, no murmurs  LUNGS: CTAB, no wheezes, rales, or ronchi  ABDOMEN: normal size, soft, nontender, nondistended  EXTREMITIES: no edema, +pedal pulses, no lesions  NEUROLOGY: CN grossly intact, no tremors  MSK: grossly intact  SKIN: darkened skin complexion with tan lines, abdomen with midline vertical and low horiz scars; no rashes, groin not examined    LABS:    All pertinent notes, labs, and images personally reviewed by me.     A/P:  Encounter Diagnoses   Name Primary?     Weight loss      Fatigue, unspecified type Yes     History of gestational diabetes mellitus (GDM)      Amenorrhea      Cold intolerance      Comments:  Reviewed complicated health history and general endocrinology issues.  Many symptoms of concern, but no obvious endocrine cause evident.  Previous normal TSH, estrogen and FSH levels make thyroid disease, hypoestrogenemia very unlikely now    Plan:  We discussed the patient's recent symptoms and possible endocrine illnesses that may correlate with symptoms.  Reviewed endocrine organs including the thyroid, adrenal, pituitary, pancreas, and ovaries.   Discussed lab tests used to screen for possible underlying hormone diseases that may relate to her condition.    Recommend:  I am not certain of the endocrine disorder for concern by Ms Reveles.  Will investigate possible causes.  Previous unintentional weight loss of concern, yet weight stable recent months   In " addition to the endocrinology evaluation and lab testing, needs focused visit with PCP   Consider CT scan of chest/abdomen to screen for malignancy, will defer to PCP   Reminded patient of the importance to quit cigarette smoking due to potential health risks, offered assistance with smoking cessation counseling and/or medication treatment.  Check several lab tests today   Labs to expend upon testing done 3/2018   Screen for hyperthyroidism, adrenal insufficiency, menopause, hyperprolactinemia, diabetes mellitus  She has persistent issues with inflamed groin skin lesions.  Encouraged her to request/schedule dermatology evaluation  Continue vit D and vit B12 supplement use  Keep regular f/u GI, GYN, and PCP medical evaluations    Addressed patient questions today    Labs ordered today:   Orders Placed This Encounter   Procedures     TSH     T4 free     Adrenal corticotropin     Cortisol     Prolactin     Basic metabolic panel     Follicle stimulating hormone     Estradiol     Radiology/Consults ordered today: None    More than 50% of the time spent with Ms. Felix on counseling / coordinating her care.  Total appointment time was 60 minutes.    Follow-up:  None, unless confirmed endocrine disease    Myron Barboza MD  Endocrinology  [place] [facility]  CC: Rebecca ALCALA, ASHISH and Kendrick Lance

## 2018-08-16 LAB — ACTH PLAS-MCNC: 20 PG/ML

## 2018-09-14 ENCOUNTER — TRANSFERRED RECORDS (OUTPATIENT)
Dept: HEALTH INFORMATION MANAGEMENT | Facility: CLINIC | Age: 38
End: 2018-09-14

## 2018-11-17 ENCOUNTER — HOSPITAL ENCOUNTER (EMERGENCY)
Facility: CLINIC | Age: 38
Discharge: HOME | End: 2018-11-17
Attending: NURSE PRACTITIONER | Admitting: NURSE PRACTITIONER
Payer: COMMERCIAL

## 2018-11-17 VITALS
DIASTOLIC BLOOD PRESSURE: 80 MMHG | OXYGEN SATURATION: 100 % | RESPIRATION RATE: 18 BRPM | SYSTOLIC BLOOD PRESSURE: 131 MMHG | TEMPERATURE: 97.9 F

## 2018-11-17 DIAGNOSIS — L01.00 IMPETIGO: ICD-10-CM

## 2018-11-17 PROCEDURE — 99214 OFFICE O/P EST MOD 30 MIN: CPT | Mod: Z6 | Performed by: NURSE PRACTITIONER

## 2018-11-17 PROCEDURE — G0463 HOSPITAL OUTPT CLINIC VISIT: HCPCS | Performed by: NURSE PRACTITIONER

## 2018-11-17 RX ORDER — MUPIROCIN 20 MG/G
OINTMENT TOPICAL 3 TIMES DAILY
Qty: 15 G | Refills: 0 | Status: SHIPPED | OUTPATIENT
Start: 2018-11-17 | End: 2018-11-27

## 2018-11-17 ASSESSMENT — ENCOUNTER SYMPTOMS
CHILLS: 0
FATIGUE: 0
COUGH: 0
FEVER: 0

## 2018-11-17 NOTE — ED AVS SNAPSHOT
[facility]    [address] [facility] [address]    Phone:  [phone]    Fax:  [phone]                                       Ana Felix   MRN: [id number]    Department:  [facility]   Date of Visit:  11/17/2018           After Visit Summary Signature Page     I have received my discharge instructions, and my questions have been answered. I have discussed any challenges I see with this plan with the nurse or doctor.    ..........................................................................................................................................  Patient/Patient Representative Signature      ..........................................................................................................................................  Patient Representative Print Name and Relationship to Patient    ..................................................               ................................................  Date                                   Time    ..........................................................................................................................................  Reviewed by Signature/Title    ...................................................              ..............................................  Date                                               Time          22EPIC Rev 08/18

## 2018-11-17 NOTE — DISCHARGE INSTRUCTIONS
Bactroban ointment to each nostril 3 time a day.        Understanding Impetigo  Impetigo is a common bacterial infection of the skin. It most often affects the face, arms, and legs. But it can appear on any part of the body. Anyone can have it, regardless of age. But it is most common in children. Impetigo is very contagious. This means it spreads easily to other people.  How to say it  [id number]   What causes impetigo?  Many types of bacteria live on normal, healthy skin. The bacteria usually don t cause problems. Impetigo happens when bacteria enter the skin through a scratch, break, sore, bite, or irritated spot. They then begin to grow out of control, leading to infection. There are two types of staphylococcus bacteria that cause impetigo. In certain cases, impetigo appears on skin that has no visible break. It may be more likely to occur on skin that has another skin problem, such as eczema. It may also be more common after a cold or other virus.  Symptoms of impetigo  Symptoms of this problem include:    Small, fluid-filled blisters on the skin that may itch, ooze, or crust    A yellow, honey-colored crust on the infected skin    Skin sores that spread with scratching    An itchy rash that spreads with scratching    Swollen lymph nodes  Treatment for impetigo  The goal is to treat the infection and prevent it from spreading to others.    You will likely be given an antibiotic to treat the infection. This may be a cream or ointment called muporicin to put on your skin. If the infection is severe or spreading, you may be given antibiotic medicine to take by mouth. Be sure to use this medicine as directed. Do not stop using it until you are told to stop, even if your skin gets better. If you stop too soon, the infection may come back and be harder to treat.    Avoid scratching or picking at your sores. It may help to cover affected areas with a bandage.    To prevent spreading the infection, wash your hands  often. Avoid sharing personal items, towels, clothes, pillows, and sheets with others. After each use, wash these items in hot water.    Clean the affected skin several times a day. Don t scrub. Instead, soak the area in warm, soapy water. This will help remove the crust that forms. For places that you can't soak, such as the face, place a clean, warm (not hot) washcloth on the affected area. Use a new washcloth and towel each time.  When to call your healthcare provider  Call your healthcare provider right away if you have any of these:    Fever of 100.4 F (38 C) or higher, or as directed    Increasing number of sores or spreading areas of redness after 2 days of treatment with antibiotics    Increasing swelling or pain    Increased amounts of fluid or pus coming from the sores    Unusual drowsiness, weakness, or change in behavior    Loss of appetite or vomiting   Date Last Reviewed: 5/1/2016 2000-2018 [employer]. [address] [place] [address], [place] [address]. All rights reserved. This information is not intended as a substitute for professional medical care. Always follow your healthcare professional's instructions.

## 2018-11-17 NOTE — ED AVS SNAPSHOT
[facility] Emergency Department    [address]    [facility] [place] [address]    Phone:  [phone]    Fax:  [phone]                                       Ana Felix   MRN: [id number]    Department:  [facility] Emergency Department   Date of Visit:  11/17/2018           Patient Information     Date Of Birth          1980        Your diagnoses for this visit were:     Impetigo        You were seen by Crissy Grier APRN CNP.      Follow-up Information     Follow up with Kendrick Lance MD.    Specialties:  Family Practice, Obstetrics    Why:  As needed    Contact information:    [address] [facility] [address]  [phone]          Discharge Instructions       Bactroban ointment to each nostril 3 time a day.        Understanding Impetigo  Impetigo is a common bacterial infection of the skin. It most often affects the face, arms, and legs. But it can appear on any part of the body. Anyone can have it, regardless of age. But it is most common in children. Impetigo is very contagious. This means it spreads easily to other people.  How to say it  [id number]   What causes impetigo?  Many types of bacteria live on normal, healthy skin. The bacteria usually don t cause problems. Impetigo happens when bacteria enter the skin through a scratch, break, sore, bite, or irritated spot. They then begin to grow out of control, leading to infection. There are two types of staphylococcus bacteria that cause impetigo. In certain cases, impetigo appears on skin that has no visible break. It may be more likely to occur on skin that has another skin problem, such as eczema. It may also be more common after a cold or other virus.  Symptoms of impetigo  Symptoms of this problem include:    Small, fluid-filled blisters on the skin that may itch, ooze, or crust    A yellow, honey-colored crust on the infected skin    Skin sores that spread with scratching    An itchy rash that spreads with  scratching    Swollen lymph nodes  Treatment for impetigo  The goal is to treat the infection and prevent it from spreading to others.    You will likely be given an antibiotic to treat the infection. This may be a cream or ointment called muporicin to put on your skin. If the infection is severe or spreading, you may be given antibiotic medicine to take by mouth. Be sure to use this medicine as directed. Do not stop using it until you are told to stop, even if your skin gets better. If you stop too soon, the infection may come back and be harder to treat.    Avoid scratching or picking at your sores. It may help to cover affected areas with a bandage.    To prevent spreading the infection, wash your hands often. Avoid sharing personal items, towels, clothes, pillows, and sheets with others. After each use, wash these items in hot water.    Clean the affected skin several times a day. Don t scrub. Instead, soak the area in warm, soapy water. This will help remove the crust that forms. For places that you can't soak, such as the face, place a clean, warm (not hot) washcloth on the affected area. Use a new washcloth and towel each time.  When to call your healthcare provider  Call your healthcare provider right away if you have any of these:    Fever of 100.4 F (38 C) or higher, or as directed    Increasing number of sores or spreading areas of redness after 2 days of treatment with antibiotics    Increasing swelling or pain    Increased amounts of fluid or pus coming from the sores    Unusual drowsiness, weakness, or change in behavior    Loss of appetite or vomiting   Date Last Reviewed: 5/1/2016 2000-2018 [facility]. [address] [place] [address]. All rights reserved. This information is not intended as a substitute for professional medical care. Always follow your healthcare professional's instructions.          24 Hour Appointment Hotline       To make an appointment at any [facility]  clinic, call [phone] ([phone]). If you don't have a family doctor or clinic, we will help you find one. [facility] clinics are conveniently located to serve the needs of you and your family.             Review of your medicines      START taking        Dose / Directions Last dose taken    mupirocin 2 % ointment   Commonly known as:  BACTROBAN   Quantity:  15 g        Apply topically 3 times daily for 14 days   Refills:  0          Our records show that you are taking the medicines listed below. If these are incorrect, please call your family doctor or clinic.        Dose / Directions Last dose taken    albuterol 108 (90 Base) MCG/ACT inhaler   Commonly known as:  PROAIR HFA   Dose:  2 puff   Quantity:  2 Inhaler        Inhale 2 puffs into the lungs every 4 hours as needed for shortness of breath / dyspnea   Refills:  1        azaTHIOprine 50 MG tablet   Commonly known as:  IMURAN        Refills:  0        cyanocobalamin 1000 MCG/ML injection   Commonly known as:  VITAMIN B12   Dose:  1 mL        Inject 1 mL into the muscle every 30 days   Refills:  0        * fentaNYL 25 mcg/hr 72 hr patch   Commonly known as:  DURAGESIC   Dose:  1 patch        Place 1 patch onto the skin every 72 hours   Refills:  0        * fentaNYL 12 mcg/hr 72 hr patch   Commonly known as:  DURAGESIC   Dose:  1 patch        Place 1 patch onto the skin every 72 hours   Refills:  0        montelukast 10 MG tablet   Commonly known as:  SINGULAIR   Quantity:  90 tablet        TAKE 1 TABLET(10 MG) BY MOUTH DAILY   Refills:  3        oxyCODONE-acetaminophen 7.5-325 MG per tablet   Commonly known as:  PERCOCET        One po t.i.d./ P.R.N  USE DATES: 08/05/17-09/03/17 may fill on 8/2/17   Refills:  0        * Notice:  This list has 2 medication(s) that are the same as other medications prescribed for you. Read the directions carefully, and ask your doctor or other care provider to review them with you.            Prescriptions were sent or  printed at these locations (1 Prescription)                   [facility] - [place], [place] - [address]   [address] [facility] [address]    Telephone:  [phone]   Fax:  [phone]   Hours:                  E-Prescribed (1 of 1)         mupirocin (BACTROBAN) 2 % ointment                Orders Needing Specimen Collection     None      Pending Results     No orders found from 11/15/2018 to 11/18/2018.            Pending Culture Results     No orders found from 11/15/2018 to 11/18/2018.            Pending Results Instructions     If you had any lab results that were not finalized at the time of your Discharge, you can call the ED Lab Result RN at [phone]. You will be contacted by this team for any positive Lab results or changes in treatment. The nurses are available 7 days a week from 10A to 6:30P.  You can leave a message 24 hours per day and they will return your call.        Test Results From Your Hospital Stay               Thank you for choosing [facility]       Thank you for choosing [facility] for your care. Our goal is always to provide you with excellent care. Hearing back from our patients is one way we can continue to improve our services. Please take a few minutes to complete the written survey that you may receive in the mail after you visit with us. Thank you!        MyChart Information     backstitch gives you secure access to your electronic health record. If you see a primary care provider, you can also send messages to your care team and make appointments. If you have questions, please call your primary care clinic.  If you do not have a primary care provider, please call [phone] and they will assist you.        Care EveryWhere ID     This is your Care EveryWhere ID. This could be used by other organizations to access your [facility] medical records  [id number]        Equal Access to Services     HANNAH LAUREANO AH: Sal horta  melina Stapleton, kalin hutton, gordon rankin, fernanda waldron. So [facility] [phone].    ATENCIÓN: Si habla español, tiene a zapata disposición servicios gratuitos de asistencia lingüística. Llame al [phone].    We comply with applicable federal civil rights laws and [place] laws. We do not discriminate on the basis of race, color, national origin, age, disability, sex, sexual orientation, or gender identity.            After Visit Summary       This is your record. Keep this with you and show to your community pharmacist(s) and doctor(s) at your next visit.

## 2018-11-17 NOTE — ED PROVIDER NOTES
History     Chief Complaint   Patient presents with     Sore     inside of left nare, has had some pus drainage and scabbing     HPI  Ana Felix is a 37 year old female who presents for evaluation of sore in her left nostril.  Started after she had cold symptoms 2 weeks ago. Feels sore and crusts over with yellow crusting. Increasingly more painful and noted some purulent drainage today.    Problem List:    Patient Active Problem List    Diagnosis Date Noted     History of gestational diabetes mellitus (GDM) 08/15/2018     Priority: Medium     Amenorrhea 08/15/2018     Priority: Medium     Crohn's disease of both small and large intestine without complication (H) 07/31/2018     Priority: Medium     Pain medication agreement 08/18/2017     Priority: Medium     Overview:   [facility]       Controlled substance agreement signed 07/19/2017     Priority: Medium     Degenerative lumbar disc 03/07/2017     Priority: Medium     Labral tear of hip, degenerative 03/07/2017     Priority: Medium     Pain of right hip joint 03/07/2017     Priority: Medium     S/P LEEP of cervix 12/15/2015     Priority: Medium     S/p LEEP of cervix - date unknown  12/9/15: Pap - NIL, Neg HPV. Plan cotest in 1 year. If JAYME 2/3 on biopsy - PAP/HPV co-testing at 12, 24 months. If two negative results repeat co-testing in 3 years, if negative then routine screening.  3/7/18 Pap: NIL/neg HPV.             Hearing difficulty 10/09/2014     Priority: Medium     Irritable bowel syndrome (IBS) 04/15/2014     Priority: Medium     Tiffany raw vegetables       Lactose intolerance 04/15/2014     Priority: Medium     Abdominal pain, right upper quadrant 03/06/2014     Priority: Medium     Nausea with vomiting 01/31/2014     Priority: Medium     Chronic low back pain 11/05/2012     Priority: Medium     Follows with pain clinic.       Mild persistent asthma 11/05/2012     Priority: Medium     Discogenic pain 10/08/2012     Priority: Medium     Abdominal  pain 2012     Priority: Medium     S/P oophorectomy 2012     Priority: Medium     History of cholecystectomy 2012     Priority: Medium     History of resection of small bowel 2012     Priority: Medium     Tobacco abuse 2012     Priority: Medium     Displacement of lumbar intervertebral disc without myelopathy 2012     Priority: Medium     Disorder of sacrum 2011     Priority: Medium     Problem list name updated by automated process. Provider to review       Back pain 2011     Priority: Medium     Obesity 2011     Priority: Medium     Migraine headache 2011     Priority: Medium     Patient given Migraine Education folder and Migraine Action Plan on 2011. Cammy Anderson RN    (Problem list name updated by automated process. Provider to review and confirm.)       CARDIOVASCULAR SCREENING; LDL GOAL LESS THAN 160 10/31/2010     Priority: Medium     Dysmenorrhea 10/05/2010     Priority: Medium     Female pelvic pain 2010     Priority: Medium     (Problem list name updated by automated process. Provider to review and confirm.)       Crohns disease (H) 2009     Priority: Medium        Past Medical History:    Past Medical History:   Diagnosis Date     Back pain      Crohn's disease (H)      Exercise-induced asthma      Gestational diabetes      Herniation of intervertebral disc of lumbar region      Infertility      Ovarian cyst      Smoker      Status post cholecystectomy 2005       Past Surgical History:    Past Surgical History:   Procedure Laterality Date     APPENDECTOMY       C/SECTION, LOW TRANSVERSE      , Low Transverse     CHOLECYSTECTOMY, LAPOROSCOPIC  2005    Cholecystectomy, Laparoscopic     COLONOSCOPY       ESOPHAGOSCOPY, GASTROSCOPY, DUODENOSCOPY (EGD), COMBINED  3/6/2014    Procedure: COMBINED ESOPHAGOSCOPY, GASTROSCOPY, DUODENOSCOPY (EGD), BIOPSY SINGLE OR MULTIPLE;  COLONOSCOPY/ UPPER GI ENDOSCOPY/  COLON/ EGD/ Abdominal pain, epigastric/ PJ[facility];  Surgeon: West Lomas MD;  Location: [facility] OR     HC HYSTEROSCOPY, SURGICAL; W/ ENDOMETRIAL ABLATION, ANY METHOD  7/2010     HYSTERECTOMY, SUPRACERVICAL LAPAROSCOPIC  2010     LEEP TX, CERVICAL      LEEP TX Cervical     ORTHOPEDIC SURGERY      bluged disk     partial small bowel resection  2002    Ileo-colonic resection. Crohn's disease surgery for bowel obstruction. this was a section of small bowel and large bowel and the cecum., all removed and a reanastamosis done successfully     SALPINGO OOPHORECTOMY,R/L/TORI      Right ovary     TUBAL LIGATION         Family History:    Family History   Problem Relation Age of Onset     Cancer Mother      cervical     Lipids Mother      Eye Disorder Father      Lipids Father      Alcohol/Drug Maternal Grandmother      Diabetes Paternal Grandmother      Cancer - colorectal Paternal Grandmother      Eye Disorder Paternal Grandmother      Diabetes Paternal Grandfather      Eye Disorder Paternal Grandfather      Inflammatory Bowel Disease Paternal Aunt      and two paternal uncles       Social History:  Marital Status:   [2]  Social History   Substance Use Topics     Smoking status: Current Every Day Smoker     Packs/day: 0.50     Types: Cigarettes     Smokeless tobacco: Never Used     Alcohol use 0.0 oz/week     0 Standard drinks or equivalent per week      Comment: rare        Medications:      mupirocin (BACTROBAN) 2 % ointment   albuterol (PROAIR HFA) 108 (90 Base) MCG/ACT Inhaler   azaTHIOprine (IMURAN) 50 MG tablet   cyanocobalamin (VITAMIN B12) 1000 MCG/ML injection   fentaNYL (DURAGESIC) 12 mcg/hr patch 72 hr   fentaNYL (DURAGESIC) 25 mcg/hr patch 72 hr   montelukast (SINGULAIR) 10 MG tablet   oxyCODONE-acetaminophen (PERCOCET) 7.5-325 MG per tablet         Review of Systems   Constitutional: Negative for chills, fatigue and fever.   HENT: Negative for congestion.         Sore left nostril   Respiratory: Negative  for cough.        Physical Exam   BP: 131/80  Heart Rate: 78  Temp: 97.9  F (36.6  C)  Resp: 18  SpO2: 100 %      Physical Exam    GENERAL APPEARANCE: healthy, alert and no distress  EYES: EOMI, conjunctiva clear  HENT: bilateral ear canals clear, intact, and without inflammation. Right TM normal. Left TM normal. Right nostril normal. Left nostril lesion on the medial aspect, yellow crusted over..  Oropharynx without ulcers, erythema or lesions  NECK: supple, nontender, no lymphadenopathy  RESP: lungs clear to auscultation - no rales, rhonchi or wheezes  CV: regular rates and rhythm, normal S1 S2, no murmur noted      ED Course     ED Course     Procedures             No results found for this or any previous visit (from the past 24 hour(s)).    Medications - No data to display    Assessments & Plan (with Medical Decision Making)   I did consider a cold sore, but the wound has been persistent. Will cover for an impetigo infection with Bactroban.  I have reviewed the nursing notes.    I have reviewed the findings, diagnosis, plan and need for follow up with the patient.      Discharge Medication List as of 11/17/2018 12:58 PM      START taking these medications    Details   mupirocin (BACTROBAN) 2 % ointment Apply topically 3 times daily for 14 daysDisp-15 g, [id number]             Final diagnoses:   Impetigo       11/17/2018   [facility] EMERGENCY DEPARTMENT     Crissy Grier APRN CNP  11/17/18 [id number]

## 2018-11-27 ENCOUNTER — OFFICE VISIT (OUTPATIENT)
Dept: FAMILY MEDICINE | Facility: CLINIC | Age: 38
End: 2018-11-27
Payer: COMMERCIAL

## 2018-11-27 VITALS
HEART RATE: 78 BPM | BODY MASS INDEX: 27.98 KG/M2 | RESPIRATION RATE: 16 BRPM | OXYGEN SATURATION: 100 % | TEMPERATURE: 98.2 F | DIASTOLIC BLOOD PRESSURE: 80 MMHG | WEIGHT: 153 LBS | SYSTOLIC BLOOD PRESSURE: 120 MMHG

## 2018-11-27 DIAGNOSIS — K42.9 UMBILICAL HERNIA WITHOUT OBSTRUCTION AND WITHOUT GANGRENE: ICD-10-CM

## 2018-11-27 DIAGNOSIS — Z23 NEED FOR PROPHYLACTIC VACCINATION AND INOCULATION AGAINST INFLUENZA: Primary | ICD-10-CM

## 2018-11-27 PROCEDURE — 90686 IIV4 VACC NO PRSV 0.5 ML IM: CPT | Performed by: NURSE PRACTITIONER

## 2018-11-27 PROCEDURE — 90471 IMMUNIZATION ADMIN: CPT | Performed by: NURSE PRACTITIONER

## 2018-11-27 PROCEDURE — 99213 OFFICE O/P EST LOW 20 MIN: CPT | Mod: 25 | Performed by: NURSE PRACTITIONER

## 2018-11-27 NOTE — MR AVS SNAPSHOT
After Visit Summary   11/27/2018    Ana Felix    MRN: [id number]           Patient Information     Date Of Birth          1980        Visit Information        Provider Department      11/27/2018 7:00 AM Radha Brown APRN [facility]        Today's Diagnoses     Need for prophylactic vaccination and inoculation against influenza    -  1    Umbilical hernia without obstruction and without gangrene          Care Instructions          Thank you for choosing [facility].  You may be receiving a survey in the mail from Giovanna Connell regarding your visit today.  Please take a few minutes to complete and return the survey to let us know how we are doing.      If you have questions or concerns, please contact us via Traxer or you can contact your care team at [phone].    Our Clinic hours are:  Monday 6:40 am  to 7:00 pm  Tuesday -Friday 6:40 am to 5:00 pm    The [place] outpatient lab hours are:  Monday - Friday 6:10 am to 4:45 pm  Saturdays 7:00 am to 11:00 am  Appointments are required, call [phone]    If you have clinical questions after hours or would like to schedule an appointment,  call the clinic at [phone].          Follow-ups after your visit        Additional Services     GENERAL SURG ADULT REFERRAL       Your provider has referred you to: [facility]: [facility] [phone]   [email or web address]    Please be aware that coverage of these services is subject to the terms and limitations of your health insurance plan.  Call member services at your health plan with any benefit or coverage questions.      Please bring the following with you to your appointment:    (1) Any X-Rays, CTs or MRIs which have been performed.  Contact the facility where they were done to arrange for  prior to your scheduled appointment.   (2) List of current medications   (3) This referral request   (4) Any documents/labs given  to you for this referral                  Your next 10 appointments already scheduled     Jan 03, 2019  3:45 PM CST   New Visit with Marry Boykin PA-C   [facility]    [address] [address] [phone]              Who to contact     If you have questions or need follow up information about today's clinic visit or your schedule please contact [facility] directly at [phone].  Normal or non-critical lab and imaging results will be communicated to you by Droplethart, letter or phone within 4 business days after the clinic has received the results. If you do not hear from us within 7 days, please contact the clinic through RiffRafft or phone. If you have a critical or abnormal lab result, we will notify you by phone as soon as possible.  Submit refill requests through Twigmore or call your pharmacy and they will forward the refill request to us. Please allow 3 business days for your refill to be completed.          Additional Information About Your Visit        DropletharScriptick Information     Twigmore gives you secure access to your electronic health record. If you see a primary care provider, you can also send messages to your care team and make appointments. If you have questions, please call your primary care clinic.  If you do not have a primary care provider, please call [phone] and they will assist you.        Care EveryWhere ID     This is your Care EveryWhere ID. This could be used by other organizations to access your [facility] medical records  [id number]        Your Vitals Were     Pulse Temperature Respirations Last Period Pulse Oximetry BMI (Body Mass Index)    78 98.2  F (36.8  C) (Tympanic) 16 07/23/2010 100% 27.98 kg/m2       Blood Pressure from Last 3 Encounters:   11/27/18 120/80   11/17/18 131/80   08/15/18 124/79    Weight from Last 3 Encounters:   11/27/18 153 lb (69.4 kg)   08/15/18 154 lb (69.9 kg)   07/31/18 154 lb  (69.9 kg)              We Performed the Following     FLU VACCINE, SPLIT VIRUS, IM (QUADRIVALENT) [98780]- >3 YRS     GENERAL SURG ADULT REFERRAL     Vaccine Administration, Initial [17308]          Today's Medication Changes          These changes are accurate as of 11/27/18  7:20 AM.  If you have any questions, ask your nurse or doctor.               Stop taking these medicines if you haven't already. Please contact your care team if you have questions.     mupirocin 2 % ointment   Commonly known as:  BACTROBAN   Stopped by:  Radha Brown APRN CNP                    Primary Care Provider Office Phone # Fax #    Kendrick Demar Lance [phone] [address] [facility] [address]        Equal Access to Services     [facility] GEORGI : Hadii jelani horta hadasho Soomaali, waaxda luqadaha, qaybta kaalmada adeegyada, waxmaria antonia duckworth . So [facility] [phone].    ATENCIÓN: Si habla español, tiene a zapata disposición servicios gratuitos de asistencia lingüística. [facility] [phone].    We comply with applicable federal civil rights laws and [place] laws. We do not discriminate on the basis of race, color, national origin, age, disability, sex, sexual orientation, or gender identity.            Thank you!     Thank you for choosing [facility]  for your care. Our goal is always to provide you with excellent care. Hearing back from our patients is one way we can continue to improve our services. Please take a few minutes to complete the written survey that you may receive in the mail after your visit with us. Thank you!             Your Updated Medication List - Protect others around you: Learn how to safely use, store and throw away your medicines at www.disposemymeds.org.          This list is accurate as of 11/27/18  7:20 AM.  Always use your most recent med list.                   Brand Name Dispense Instructions for use Diagnosis    albuterol 108 (90 Base)  MCG/ACT inhaler    PROAIR HFA    2 Inhaler    Inhale 2 puffs into the lungs every 4 hours as needed for shortness of breath / dyspnea    Mild persistent asthma, uncomplicated       azaTHIOprine 50 MG tablet    IMURAN          cyanocobalamin 1000 MCG/ML injection    VITAMIN B12     Inject 1 mL into the muscle every 30 days        * fentaNYL 25 mcg/hr 72 hr patch    DURAGESIC     Place 1 patch onto the skin every 72 hours        * fentaNYL 12 mcg/hr 72 hr patch    DURAGESIC     Place 1 patch onto the skin every 72 hours        montelukast 10 MG tablet    SINGULAIR    90 tablet    TAKE 1 TABLET(10 MG) BY MOUTH DAILY    Mild persistent asthma, uncomplicated       oxyCODONE-acetaminophen 7.5-325 MG per tablet    PERCOCET     One po t.i.d./ P.R.N  USE DATES: 08/05/17-09/03/17 may fill on 8/2/17        * Notice:  This list has 2 medication(s) that are the same as other medications prescribed for you. Read the directions carefully, and ask your doctor or other care provider to review them with you.

## 2018-11-27 NOTE — PATIENT INSTRUCTIONS
Thank you for choosing [facility].  You may be receiving a survey in the mail from Giovanna Connell regarding your visit today.  Please take a few minutes to complete and return the survey to let us know how we are doing.      If you have questions or concerns, please contact us via Specific Media or you can contact your care team at [phone].    Our Clinic hours are:  Monday 6:40 am  to 7:00 pm  Tuesday -Friday 6:40 am to 5:00 pm    The [place] outpatient lab hours are:  Monday - Friday 6:10 am to 4:45 pm  Saturdays 7:00 am to 11:00 am  Appointments are required, call [phone]    If you have clinical questions after hours or would like to schedule an appointment,  call the clinic at [phone].

## 2018-11-27 NOTE — PROGRESS NOTES
SUBJECTIVE:   Ana Felix is a 37 year old female who presents to clinic today for the following health issues:    History of chrons disease- had several abdominal surgeries - see surgical history list. Was told by GI doctor that she has an umbilical hernia now it is starting to cause pain with physical activity as well as protrude.     Chief Complaint   Patient presents with     Hernia     belly button area, raised area, painful when it's popped out, has been pushing it back into place, becoming more bothersome over the last 2 weeks       -------------------------------------    Problem list and histories reviewed & adjusted, as indicated.  Additional history: as documented    Patient Active Problem List   Diagnosis     Crohns disease (H)     Female pelvic pain     CARDIOVASCULAR SCREENING; LDL GOAL LESS THAN 160     Migraine headache     Back pain     Obesity     Disorder of sacrum     Displacement of lumbar intervertebral disc without myelopathy     Tobacco abuse     S/P oophorectomy     History of cholecystectomy     History of resection of small bowel     Abdominal pain     Discogenic pain     Chronic low back pain     Mild persistent asthma     Nausea with vomiting     Abdominal pain, right upper quadrant     Irritable bowel syndrome (IBS)     Lactose intolerance     Hearing difficulty     S/P LEEP of cervix     Controlled substance agreement signed     Degenerative lumbar disc     Dysmenorrhea     Labral tear of hip, degenerative     Pain medication agreement     Pain of right hip joint     Crohn's disease of both small and large intestine without complication (H)     History of gestational diabetes mellitus (GDM)     Amenorrhea     Past Surgical History:   Procedure Laterality Date     APPENDECTOMY       C/SECTION, LOW TRANSVERSE      , Low Transverse     CHOLECYSTECTOMY, LAPOROSCOPIC  2005    Cholecystectomy, Laparoscopic     COLONOSCOPY       ESOPHAGOSCOPY, GASTROSCOPY, DUODENOSCOPY (EGD),  COMBINED  3/6/2014    Procedure: COMBINED ESOPHAGOSCOPY, GASTROSCOPY, DUODENOSCOPY (EGD), BIOPSY SINGLE OR MULTIPLE;  COLONOSCOPY/ UPPER GI ENDOSCOPY/ COLON/ EGD/ Abdominal pain, epigastric/ PJH;  Surgeon: West Lomas MD;  Location: [facility] OR     HC HYSTEROSCOPY, SURGICAL; W/ ENDOMETRIAL ABLATION, ANY METHOD  7/2010     HYSTERECTOMY, SUPRACERVICAL LAPAROSCOPIC  2010     LEEP TX, CERVICAL      LEEP TX Cervical     ORTHOPEDIC SURGERY      bluged disk     partial small bowel resection  2002    Ileo-colonic resection. Crohn's disease surgery for bowel obstruction. this was a section of small bowel and large bowel and the cecum., all removed and a reanastamosis done successfully     SALPINGO OOPHORECTOMY,R/L/TORI      Right ovary     TUBAL LIGATION         Social History   Substance Use Topics     Smoking status: Current Every Day Smoker     Packs/day: 0.50     Types: Cigarettes     Smokeless tobacco: Never Used     Alcohol use 0.0 oz/week     0 Standard drinks or equivalent per week      Comment: rare     Family History   Problem Relation Age of Onset     Cancer Mother      cervical     Lipids Mother      Eye Disorder Father      Lipids Father      Alcohol/Drug Maternal Grandmother      Diabetes Paternal Grandmother      Cancer - colorectal Paternal Grandmother      Eye Disorder Paternal Grandmother      Diabetes Paternal Grandfather      Eye Disorder Paternal Grandfather      Inflammatory Bowel Disease Paternal Aunt      and two paternal uncles           Reviewed and updated as needed this visit by clinical staff  Tobacco  Allergies  Meds  Med Hx  Surg Hx  Fam Hx  Soc Hx      Reviewed and updated as needed this visit by Provider         ROS:  Constitutional, HEENT, cardiovascular, pulmonary, GI, , musculoskeletal, neuro, skin, endocrine and psych systems are negative, except as otherwise noted.    OBJECTIVE:     /80 (BP Location: Left arm, Patient Position: Sitting, Cuff Size: Adult Regular)   Pulse 78  Temp 98.2  F (36.8  C) (Tympanic)  Resp 16  Wt 153 lb (69.4 kg)  LMP 07/23/2010  SpO2 100%  BMI 27.98 kg/m2  Body mass index is 27.98 kg/(m^2).  GENERAL: healthy, alert and no distress  RESP: lungs clear to auscultation - no rales, rhonchi or wheezes  CV: regular rate and rhythm, normal S1 S2, no S3 or S4, no murmur, click or rub, no peripheral edema and peripheral pulses strong  ABDOMEN: soft, nontender, no hepatosplenomegaly, no masses and bowel sounds normal  MS: no gross musculoskeletal defects noted, no edema    Diagnostic Test Results:  none     ASSESSMENT/PLAN:       1. Umbilical hernia without obstruction and without gangrene    - GENERAL SURG ADULT REFERRAL  - avoid heavy lifting/strenuous activity     2. Need for prophylactic vaccination and inoculation against influenza    - FLU VACCINE, SPLIT VIRUS, IM (QUADRIVALENT) [56903]- >3 YRS  - Vaccine Administration, Initial [59454]        CARI Rodrigues [facility]    Injectable Influenza Immunization Documentation    1.  Is the person to be vaccinated sick today?   No    2. Does the person to be vaccinated have an allergy to a component   of the vaccine?   No  Egg Allergy Algorithm Link    3. Has the person to be vaccinated ever had a serious reaction   to influenza vaccine in the past?   No    4. Has the person to be vaccinated ever had Guillain-Barré syndrome?   No    Form completed by Hilaria Hill CMA..........11/27/2018 7:11 AM

## 2018-11-28 ENCOUNTER — OFFICE VISIT (OUTPATIENT)
Dept: SURGERY | Facility: CLINIC | Age: 38
End: 2018-11-28
Payer: COMMERCIAL

## 2018-11-28 VITALS
TEMPERATURE: 98.6 F | SYSTOLIC BLOOD PRESSURE: 132 MMHG | DIASTOLIC BLOOD PRESSURE: 75 MMHG | HEIGHT: 62 IN | BODY MASS INDEX: 28.16 KG/M2 | HEART RATE: 78 BPM | WEIGHT: 153 LBS

## 2018-11-28 DIAGNOSIS — K43.9 VENTRAL HERNIA WITHOUT OBSTRUCTION OR GANGRENE: Primary | ICD-10-CM

## 2018-11-28 PROCEDURE — 99204 OFFICE O/P NEW MOD 45 MIN: CPT | Performed by: SURGERY

## 2018-11-28 NOTE — LETTER
11/28/2018         RE: Ana Felix  [address]  [place] [address]        Dear Colleague,    Thank you for referring your patient, Ana Felix, to the [facility]. Please see a copy of my visit note below.    37-year-old female complaining of periumbilical mass for the past 4-5 months.  Patient has had multiple abdominal operations including bowel resections for Crohn's disease.  Patient reports the onset of this palpable mass near her umbilicus.  It is reducible but causes localized discomfort 2 out of 10 without radiation.  Pain is described as dull and achy.  Patient has had changes in her bowel habits and is concerned that this hernia is causing those.    Patient Active Problem List   Diagnosis     Crohns disease (H)     Female pelvic pain     CARDIOVASCULAR SCREENING; LDL GOAL LESS THAN 160     Migraine headache     Back pain     Obesity     Disorder of sacrum     Displacement of lumbar intervertebral disc without myelopathy     Tobacco abuse     S/P oophorectomy     History of cholecystectomy     History of resection of small bowel     Abdominal pain     Discogenic pain     Chronic low back pain     Mild persistent asthma     Nausea with vomiting     Abdominal pain, right upper quadrant     Irritable bowel syndrome (IBS)     Lactose intolerance     Hearing difficulty     S/P LEEP of cervix     Controlled substance agreement signed     Degenerative lumbar disc     Dysmenorrhea     Labral tear of hip, degenerative     Pain medication agreement     Pain of right hip joint     Crohn's disease of both small and large intestine without complication (H)     History of gestational diabetes mellitus (GDM)     Amenorrhea       Past Medical History:   Diagnosis Date     Back pain      Crohn's disease (H)      Exercise-induced asthma      Gestational diabetes      Herniation of intervertebral disc of lumbar region      Infertility      Ovarian cyst      Smoker      Status post cholecystectomy  2005       Past Surgical History:   Procedure Laterality Date     APPENDECTOMY       C/SECTION, LOW TRANSVERSE      , Low Transverse     CHOLECYSTECTOMY, LAPOROSCOPIC  2005    Cholecystectomy, Laparoscopic     COLONOSCOPY       ESOPHAGOSCOPY, GASTROSCOPY, DUODENOSCOPY (EGD), COMBINED  3/6/2014    Procedure: COMBINED ESOPHAGOSCOPY, GASTROSCOPY, DUODENOSCOPY (EGD), BIOPSY SINGLE OR MULTIPLE;  COLONOSCOPY/ UPPER GI ENDOSCOPY/ COLON/ EGD/ Abdominal pain, epigastric/ PJH;  Surgeon: West Lomas MD;  Location: [facility] OR     HC HYSTEROSCOPY, SURGICAL; W/ ENDOMETRIAL ABLATION, ANY METHOD  2010     HYSTERECTOMY, SUPRACERVICAL LAPAROSCOPIC       LEEP TX, CERVICAL      LEEP TX Cervical     ORTHOPEDIC SURGERY      bluged disk     partial small bowel resection      Ileo-colonic resection. Crohn's disease surgery for bowel obstruction. this was a section of small bowel and large bowel and the cecum., all removed and a reanastamosis done successfully     SALPINGO OOPHORECTOMY,R/L/TORI      Right ovary     TUBAL LIGATION         Family History   Problem Relation Age of Onset     Cancer Mother      cervical     Lipids Mother      Eye Disorder Father      Lipids Father      Alcohol/Drug Maternal Grandmother      Diabetes Paternal Grandmother      Cancer - colorectal Paternal Grandmother      Eye Disorder Paternal Grandmother      Diabetes Paternal Grandfather      Eye Disorder Paternal Grandfather      Inflammatory Bowel Disease Paternal Aunt      and two paternal uncles       Social History   Substance Use Topics     Smoking status: Current Every Day Smoker     Packs/day: 0.50     Types: Cigarettes     Smokeless tobacco: Never Used     Alcohol use 0.0 oz/week     0 Standard drinks or equivalent per week      Comment: rare        History   Drug Use No       Current Outpatient Prescriptions   Medication Sig Dispense Refill     albuterol (PROAIR HFA) 108 (90 Base) MCG/ACT Inhaler Inhale 2 puffs into  "the lungs every 4 hours as needed for shortness of breath / dyspnea 2 Inhaler 1     azaTHIOprine (IMURAN) 50 MG tablet        cyanocobalamin (VITAMIN B12) 1000 MCG/ML injection Inject 1 mL into the muscle every 30 days       fentaNYL (DURAGESIC) 12 mcg/hr patch 72 hr Place 1 patch onto the skin every 72 hours       fentaNYL (DURAGESIC) 25 mcg/hr patch 72 hr Place 1 patch onto the skin every 72 hours       montelukast (SINGULAIR) 10 MG tablet TAKE 1 TABLET(10 MG) BY MOUTH DAILY 90 tablet 3     oxyCODONE-acetaminophen (PERCOCET) 7.5-325 MG per tablet One po t.i.d./ P.R.N  USE DATES: 08/05/17-09/03/17 may fill on 8/2/17         Allergies   Allergen Reactions     Infliximab Hives     Sulfa Drugs [Sulfa Drugs] Nausea     Pt states \"it doesn't work for me\"     Sulfacetamide Nausea     Amoxicillin-Pot Clavulanate Nausea and Vomiting     Augmentin Other (See Comments)     Crohn's     Bupropion Hives and Nausea     Droperidol Other (See Comments)     Dystonic reaction     Ibuprofen Other (See Comments)     Crohn's       Lyrica [Pregabalin] Nausea and Vomiting     Grogginess, severe back pain  Grogginess, severe back pain     Vicodin [Hydrocodone-Acetaminophen] Nausea and Vomiting      CBC  Recent Labs   Lab Test  07/11/18   1613   WBC  8.8   RBC  3.64*   HGB  13.3   HCT  37.1   MCV  102*   MCH  36.5*   MCHC  35.8   RDW  12.6   PLT  232       BMP  Recent Labs   Lab Test  08/15/18   0909   NA  138   POTASSIUM  4.2   MAYCOL  8.6   CHLORIDE  107   CO2  25   BUN  12   CR  0.79   GLC  89       LFTs  Recent Labs   Lab Test  07/11/18   1613  10/19/11   PROTTOTAL  8.0   < >  6.9   ALBUMIN  3.9   < >  4.1   BILITOTAL  0.6   < >  0.5   ALKPHOS  49   < >  55   AST  17   < >  23   ALT  14   < >  23   BILIDIRECT   --    --   0.17    < > = values in this interval not displayed.     ROS  Constitutional - Denies fevers, weight loss, malaise, lethargy  Neuro - Denies tremors or seizures  Pulmon - Denies SOB, dyspnea, hemoptysis, chronic cough or " "use of an inhaler  CV - Denies CP, SOB, lower extremity edema, difficulty w/ stairs, has never used NTG  GI - Denies hematemesis, BRBPR, melena, chronic diarrhea or epigastric pain   - Denies hematuria, difficulty voiding, h/o STDs  Hematology - Denies blood clotting disorders, chronic anemias  Dermatology - No melanomas or skin cancers  Rheumatology - No h/o RA  Pysch - Denies depression, bipolar d/o or schizophrenia    Exam:/75 (BP Location: Right arm, Patient Position: Sitting, Cuff Size: Adult Regular)  Pulse 78  Temp 98.6  F (37  C) (Oral)  Ht 1.575 m (5' 2\")  Wt 69.4 kg (153 lb)  LMP 07/23/2010  BMI 27.98 kg/m2    General - Alert and Oriented X4, NAD, well nourished  HEENT - Normocephalic, atraumatic  Neck - supple, no LAD, Thyroid normal, Carotids without bruits  Lungs - Clear to auscultation bilaterally with good inspiratory effort, no tactile fremitus  CV - Heart RRR, no lift's, thrills, murmurs, rubs, or gallops. Carotid, radial, and femoral pulses 2+ bilaterally  Abdomen - Soft, non-tender, +BS, no hepatosplenomegaly, midline scar noted, palpable reducible mass near umbilicus, fascial defect less than 1 cm  Neuro - Full ROM, Strength 5/5 and major muscle groups, sensation intact  Extremities - No cyanosis, clubbing or edema    Assessment and plan: 37-year-old female with reducible ventral hernia.  Given the small nature of this hernia recommend open primary repair.  Risks benefits alternatives and complications were discussed with the patient including the possibility of infection bleeding or hernia recurrence.  Patient understood and wished to proceed. PATIENT IS CLEARED FOR SURGERY.    Alonso Bills MD     Again, thank you for allowing me to participate in the care of your patient.        Sincerely,        Alonso Bills MD    "

## 2018-11-28 NOTE — MR AVS SNAPSHOT
After Visit Summary   11/28/2018    Ana Felix    MRN: [id number]           Patient Information     Date Of Birth          1980        Visit Information        Provider Department      11/28/2018 8:30 AM Alonso Bills MD [facility]        Today's Diagnoses     Ventral hernia without obstruction or gangrene    -  1       Follow-ups after your visit        Your next 10 appointments already scheduled     Jan 03, 2019  3:45 PM CST   [facility] Visit with Marry Boykin PA-C   [facility]    [address] [address]   [phone]              Who to contact     If you have questions or need follow up information about today's clinic visit or your schedule please contact [facility] directly at [phone].  Normal or non-critical lab and imaging results will be communicated to you by MyChart, letter or phone within 4 business days after the clinic has received the results. If you do not hear from us within 7 days, please contact the clinic through MyChart or phone. If you have a critical or abnormal lab result, we will notify you by phone as soon as possible.  Submit refill requests through [employer] or call your pharmacy and they will forward the refill request to us. Please allow 3 business days for your refill to be completed.          Additional Information About Your Visit        MyChart Information     [employer] gives you secure access to your electronic health record. If you see a primary care provider, you can also send messages to your care team and make appointments. If you have questions, please call your primary care clinic.  If you do not have a primary care provider, please call [phone] and they will assist you.        Care EveryWhere ID     This is your Care EveryWhere ID. This could be used by other organizations to access your [facility] medical records  [id number]        Your Vitals Were      "Pulse Temperature Height Last Period BMI (Body Mass Index)       78 98.6  F (37  C) (Oral) 1.575 m (5' 2\") 07/23/2010 27.98 kg/m2        Blood Pressure from Last 3 Encounters:   11/28/18 132/75   11/27/18 120/80   11/17/18 131/80    Weight from Last 3 Encounters:   11/28/18 69.4 kg (153 lb)   11/27/18 69.4 kg (153 lb)   08/15/18 69.9 kg (154 lb)              We Performed the Following     Surgical Case Request General Surgery: HERNIORRHAPHY VENTRAL        Primary Care Provider Office Phone # Fax #    Kendrick Lance [phone] [address] [address]        Equal Access to Services     HANNAH LAUREANO : Hadii aad ku hadasho Sorosalina, waaxda luqadaha, qaybta kaalmada adeegyada, fernanda duckworth . So [facility] [phone].    ATENCIÓN: Si habla español, tiene a zapata disposición servicios gratuitos de asistencia lingüística. Llame al [phone].    We comply with applicable federal civil rights laws and [place] laws. We do not discriminate on the basis of race, color, national origin, age, disability, sex, sexual orientation, or gender identity.            Thank you!     Thank you for choosing [facility]  for your care. Our goal is always to provide you with excellent care. Hearing back from our patients is one way we can continue to improve our services. Please take a few minutes to complete the written survey that you may receive in the mail after your visit with us. Thank you!             Your Updated Medication List - Protect others around you: Learn how to safely use, store and throw away your medicines at www.disposemymeds.org.          This list is accurate as of 11/28/18  8:34 AM.  Always use your most recent med list.                   Brand Name Dispense Instructions for use Diagnosis    albuterol 108 (90 Base) MCG/ACT inhaler    PROAIR HFA    2 Inhaler    Inhale 2 puffs into the lungs every 4 hours as needed for shortness of breath / " dyspnea    Mild persistent asthma, uncomplicated       azaTHIOprine 50 MG tablet    IMURAN          * fentaNYL 25 mcg/hr 72 hr patch    DURAGESIC     Place 1 patch onto the skin every 72 hours        * fentaNYL 12 mcg/hr 72 hr patch    DURAGESIC     Place 1 patch onto the skin every 72 hours        montelukast 10 MG tablet    SINGULAIR    90 tablet    TAKE 1 TABLET(10 MG) BY MOUTH DAILY    Mild persistent asthma, uncomplicated       oxyCODONE-acetaminophen 7.5-325 MG per tablet    PERCOCET     One po t.i.d./ P.R.N  USE DATES: 08/05/17-09/03/17 may fill on 8/2/17        vitamin B-12 1000 MCG/ML injection    CYANOCOBALAMIN     Inject 1 mL into the muscle every 30 days        * Notice:  This list has 2 medication(s) that are the same as other medications prescribed for you. Read the directions carefully, and ask your doctor or other care provider to review them with you.

## 2018-11-28 NOTE — PROGRESS NOTES
37-year-old female complaining of periumbilical mass for the past 4-5 months.  Patient has had multiple abdominal operations including bowel resections for Crohn's disease.  Patient reports the onset of this palpable mass near her umbilicus.  It is reducible but causes localized discomfort 2 out of 10 without radiation.  Pain is described as dull and achy.  Patient has had changes in her bowel habits and is concerned that this hernia is causing those.    Patient Active Problem List   Diagnosis     Crohns disease (H)     Female pelvic pain     CARDIOVASCULAR SCREENING; LDL GOAL LESS THAN 160     Migraine headache     Back pain     Obesity     Disorder of sacrum     Displacement of lumbar intervertebral disc without myelopathy     Tobacco abuse     S/P oophorectomy     History of cholecystectomy     History of resection of small bowel     Abdominal pain     Discogenic pain     Chronic low back pain     Mild persistent asthma     Nausea with vomiting     Abdominal pain, right upper quadrant     Irritable bowel syndrome (IBS)     Lactose intolerance     Hearing difficulty     S/P LEEP of cervix     Controlled substance agreement signed     Degenerative lumbar disc     Dysmenorrhea     Labral tear of hip, degenerative     Pain medication agreement     Pain of right hip joint     Crohn's disease of both small and large intestine without complication (H)     History of gestational diabetes mellitus (GDM)     Amenorrhea       Past Medical History:   Diagnosis Date     Back pain      Crohn's disease (H)      Exercise-induced asthma      Gestational diabetes      Herniation of intervertebral disc of lumbar region      Infertility      Ovarian cyst      Smoker      Status post cholecystectomy 2005       Past Surgical History:   Procedure Laterality Date     APPENDECTOMY       C/SECTION, LOW TRANSVERSE      , Low Transverse     CHOLECYSTECTOMY, LAPOROSCOPIC  2005    Cholecystectomy, Laparoscopic      COLONOSCOPY       ESOPHAGOSCOPY, GASTROSCOPY, DUODENOSCOPY (EGD), COMBINED  3/6/2014    Procedure: COMBINED ESOPHAGOSCOPY, GASTROSCOPY, DUODENOSCOPY (EGD), BIOPSY SINGLE OR MULTIPLE;  COLONOSCOPY/ UPPER GI ENDOSCOPY/ COLON/ EGD/ Abdominal pain, epigastric/ [facility];  Surgeon: West Lomas MD;  Location: [facility] OR     HC HYSTEROSCOPY, SURGICAL; W/ ENDOMETRIAL ABLATION, ANY METHOD  7/2010     HYSTERECTOMY, SUPRACERVICAL LAPAROSCOPIC  2010     LEEP TX, CERVICAL      LEEP TX Cervical     ORTHOPEDIC SURGERY      bluged disk     partial small bowel resection  2002    Ileo-colonic resection. Crohn's disease surgery for bowel obstruction. this was a section of small bowel and large bowel and the cecum., all removed and a reanastamosis done successfully     SALPINGO OOPHORECTOMY,R/L/TORI      Right ovary     TUBAL LIGATION         Family History   Problem Relation Age of Onset     Cancer Mother      cervical     Lipids Mother      Eye Disorder Father      Lipids Father      Alcohol/Drug Maternal Grandmother      Diabetes Paternal Grandmother      Cancer - colorectal Paternal Grandmother      Eye Disorder Paternal Grandmother      Diabetes Paternal Grandfather      Eye Disorder Paternal Grandfather      Inflammatory Bowel Disease Paternal Aunt      and two paternal uncles       Social History   Substance Use Topics     Smoking status: Current Every Day Smoker     Packs/day: 0.50     Types: Cigarettes     Smokeless tobacco: Never Used     Alcohol use 0.0 oz/week     0 Standard drinks or equivalent per week      Comment: rare        History   Drug Use No       Current Outpatient Prescriptions   Medication Sig Dispense Refill     albuterol (PROAIR HFA) 108 (90 Base) MCG/ACT Inhaler Inhale 2 puffs into the lungs every 4 hours as needed for shortness of breath / dyspnea 2 Inhaler 1     azaTHIOprine (IMURAN) 50 MG tablet        cyanocobalamin (VITAMIN B12) 1000 MCG/ML injection Inject 1 mL into the muscle every 30 days        "fentaNYL (DURAGESIC) 12 mcg/hr patch 72 hr Place 1 patch onto the skin every 72 hours       fentaNYL (DURAGESIC) 25 mcg/hr patch 72 hr Place 1 patch onto the skin every 72 hours       montelukast (SINGULAIR) 10 MG tablet TAKE 1 TABLET(10 MG) BY MOUTH DAILY 90 tablet 3     oxyCODONE-acetaminophen (PERCOCET) 7.5-325 MG per tablet One po t.i.d./ P.R.N  USE DATES: 08/05/17-09/03/17 may fill on 8/2/17         Allergies   Allergen Reactions     Infliximab Hives     Sulfa Drugs [Sulfa Drugs] Nausea     Pt states \"it doesn't work for me\"     Sulfacetamide Nausea     Amoxicillin-Pot Clavulanate Nausea and Vomiting     Augmentin Other (See Comments)     Crohn's     Bupropion Hives and Nausea     Droperidol Other (See Comments)     Dystonic reaction     Ibuprofen Other (See Comments)     Crohn's       Lyrica [Pregabalin] Nausea and Vomiting     Grogginess, severe back pain  Grogginess, severe back pain     Vicodin [Hydrocodone-Acetaminophen] Nausea and Vomiting      CBC  Recent Labs   Lab Test  07/11/18   1613   WBC  8.8   RBC  3.64*   HGB  13.3   HCT  37.1   MCV  102*   MCH  36.5*   MCHC  35.8   RDW  12.6   PLT  232       BMP  Recent Labs   Lab Test  08/15/18   0909   NA  138   POTASSIUM  4.2   MAYCOL  8.6   CHLORIDE  107   CO2  25   BUN  12   CR  0.79   GLC  89       LFTs  Recent Labs   Lab Test  07/11/18   1613  10/19/11   PROTTOTAL  8.0   < >  6.9   ALBUMIN  3.9   < >  4.1   BILITOTAL  0.6   < >  0.5   ALKPHOS  49   < >  55   AST  17   < >  23   ALT  14   < >  23   BILIDIRECT   --    --   0.17    < > = values in this interval not displayed.     ROS  Constitutional - Denies fevers, weight loss, malaise, lethargy  Neuro - Denies tremors or seizures  Pulmon - Denies SOB, dyspnea, hemoptysis, chronic cough or use of an inhaler  CV - Denies CP, SOB, lower extremity edema, difficulty w/ stairs, has never used NTG  GI - Denies hematemesis, BRBPR, melena, chronic diarrhea or epigastric pain   - Denies hematuria, difficulty voiding, " "h/o STDs  Hematology - Denies blood clotting disorders, chronic anemias  Dermatology - No melanomas or skin cancers  Rheumatology - No h/o RA  Pysch - Denies depression, bipolar d/o or schizophrenia    Exam:/75 (BP Location: Right arm, Patient Position: Sitting, Cuff Size: Adult Regular)  Pulse 78  Temp 98.6  F (37  C) (Oral)  Ht 1.575 m (5' 2\")  Wt 69.4 kg (153 lb)  LMP 07/23/2010  BMI 27.98 kg/m2    General - Alert and Oriented X4, NAD, well nourished  HEENT - Normocephalic, atraumatic  Neck - supple, no LAD, Thyroid normal, Carotids without bruits  Lungs - Clear to auscultation bilaterally with good inspiratory effort, no tactile fremitus  CV - Heart RRR, no lift's, thrills, murmurs, rubs, or gallops. Carotid, radial, and femoral pulses 2+ bilaterally  Abdomen - Soft, non-tender, +BS, no hepatosplenomegaly, midline scar noted, palpable reducible mass near umbilicus, fascial defect less than 1 cm  Neuro - Full ROM, Strength 5/5 and major muscle groups, sensation intact  Extremities - No cyanosis, clubbing or edema    Assessment and plan: 37-year-old female with reducible ventral hernia.  Given the small nature of this hernia recommend open primary repair.  Risks benefits alternatives and complications were discussed with the patient including the possibility of infection bleeding or hernia recurrence.  Patient understood and wished to proceed. PATIENT IS CLEARED FOR SURGERY.    Alonso Bills MD   "

## 2018-11-28 NOTE — NURSING NOTE
"Initial /75 (BP Location: Right arm, Patient Position: Sitting, Cuff Size: Adult Regular)  Pulse 78  Temp 98.6  F (37  C) (Oral)  Ht 1.575 m (5' 2\")  Wt 69.4 kg (153 lb)  LMP 07/23/2010  BMI 27.98 kg/m2 Estimated body mass index is 27.98 kg/(m^2) as calculated from the following:    Height as of this encounter: 1.575 m (5' 2\").    Weight as of this encounter: 69.4 kg (153 lb). .    Kaitlin Reeys MA    "

## 2018-11-30 ENCOUNTER — TELEPHONE (OUTPATIENT)
Dept: SURGERY | Facility: CLINIC | Age: 38
End: 2018-11-30

## 2018-11-30 NOTE — TELEPHONE ENCOUNTER
Reason for Call:  Form, our goal is to have forms completed with 72 hours, however, some forms may require a visit or additional information.    Type of letter, form or note:  FMLA    Who is the form from?: Patient    Where did the form come from: form was faxed in    What clinic location was the form placed at?: [facility] (ENT, Neurology, Rheumatology, Surgery, Urology, Vascular Surgery)    Where the form was placed: Given to MA/RN    What number is listed as a contact on the form?:        Additional comments: please complete the form and fax back to [phone]    Call taken on 11/30/2018 at 9:08 AM by Harper Fong

## 2018-12-10 ENCOUNTER — ANESTHESIA EVENT (OUTPATIENT)
Dept: SURGERY | Facility: CLINIC | Age: 38
End: 2018-12-10
Payer: COMMERCIAL

## 2018-12-10 ASSESSMENT — LIFESTYLE VARIABLES: TOBACCO_USE: 1

## 2018-12-10 NOTE — ANESTHESIA PREPROCEDURE EVALUATION
Anesthesia Pre-Procedure Evaluation    Patient: Ana Felix   MRN: [id number] : 1980          Preoperative Diagnosis: ventral hernia    Procedure(s):  HERNIORRHAPHY VENTRAL    Past Medical History:   Diagnosis Date     Back pain      Crohn's disease (H)      Exercise-induced asthma      Gestational diabetes      Herniation of intervertebral disc of lumbar region      Infertility      Ovarian cyst      Smoker      Status post cholecystectomy 2005     Past Surgical History:   Procedure Laterality Date     APPENDECTOMY       C/SECTION, LOW TRANSVERSE      , Low Transverse     CHOLECYSTECTOMY, LAPOROSCOPIC  2005    Cholecystectomy, Laparoscopic     COLONOSCOPY       ESOPHAGOSCOPY, GASTROSCOPY, DUODENOSCOPY (EGD), COMBINED  3/6/2014    Procedure: COMBINED ESOPHAGOSCOPY, GASTROSCOPY, DUODENOSCOPY (EGD), BIOPSY SINGLE OR MULTIPLE;  COLONOSCOPY/ UPPER GI ENDOSCOPY/ COLON/ EGD/ Abdominal pain, epigastric/ [facility];  Surgeon: West Lomas MD;  Location:  OR      HYSTEROSCOPY, SURGICAL; W/ ENDOMETRIAL ABLATION, ANY METHOD  2010     HYSTERECTOMY, SUPRACERVICAL LAPAROSCOPIC       LEEP TX, CERVICAL      LEEP TX Cervical     ORTHOPEDIC SURGERY      bluged disk     partial small bowel resection      Ileo-colonic resection. Crohn's disease surgery for bowel obstruction. this was a section of small bowel and large bowel and the cecum., all removed and a reanastamosis done successfully     SALPINGO OOPHORECTOMY,R/L/TORI      Right ovary     TUBAL LIGATION         Anesthesia Evaluation     . Pt has had prior anesthetic. Type: General and MAC    No history of anesthetic complications          ROS/MED HX    ENT/Pulmonary:     (+)tobacco use, Current use Intermittent asthma Treatment: Inhaler prn,  , . .    Neurologic:     (+)migraines, other neuro back pain    Cardiovascular:  - neg cardiovascular ROS       METS/Exercise Tolerance:  >4 METS   Hematologic:  - neg hematologic  ROS        Musculoskeletal:   (+) , , other musculoskeletal- lumbar DDD      GI/Hepatic:     (+) Other GI/Hepatic IBS, crohn's s/p small bowel resection      Renal/Genitourinary:  - ROS Renal section negative       Endo:     (+) Obesity, .      Psychiatric:  - neg psychiatric ROS       Infectious Disease:  - neg infectious disease ROS       Malignancy:      - no malignancy   Other:    (+) No chance of pregnancy C-spine cleared: N/A, H/O Chronic Pain,H/O chronic opiod use , no other significant disability   - neg other ROS                      Physical Exam  Normal systems: cardiovascular, pulmonary and dental    Airway   Mallampati: III  TM distance: <3 FB  Neck ROM: full    Dental     Cardiovascular       Pulmonary             Lab Results   Component Value Date    WBC 8.8 07/11/2018    HGB 13.3 07/11/2018    HCT 37.1 07/11/2018     07/11/2018    CRP 13.2 (H) 01/31/2014    SED 10 01/31/2014     08/15/2018    POTASSIUM 4.2 08/15/2018    CHLORIDE 107 08/15/2018    CO2 25 08/15/2018    BUN 12 08/15/2018    CR 0.79 08/15/2018    GLC 89 08/15/2018    MAYCOL 8.6 08/15/2018    ALBUMIN 3.9 07/11/2018    PROTTOTAL 8.0 07/11/2018    ALT 14 07/11/2018    AST 17 07/11/2018    GGT 59 (H) 03/07/2014    ALKPHOS 49 07/11/2018    BILITOTAL 0.6 07/11/2018    BILIDIRECT 0.17 10/19/2011    LIPASE 139 01/26/2017    AMYLASE 58 03/17/2010    PTT 31 03/17/2010    INR 0.99 03/17/2010    TSH 2.87 08/15/2018    T4 1.02 08/15/2018    HCG Negative 01/29/2014       Preop Vitals  BP Readings from Last 3 Encounters:   11/28/18 132/75   11/27/18 120/80   11/17/18 131/80    Pulse Readings from Last 3 Encounters:   11/28/18 78   11/27/18 78   08/15/18 83      Resp Readings from Last 3 Encounters:   11/27/18 16   11/17/18 18   07/31/18 20    SpO2 Readings from Last 3 Encounters:   11/27/18 100%   11/17/18 100%   07/31/18 100%      Temp Readings from Last 1 Encounters:   11/28/18 37  C (98.6  F) (Oral)    Ht Readings from Last 1 Encounters:   11/28/18  "1.575 m (5' 2\")      Wt Readings from Last 1 Encounters:   11/28/18 69.4 kg (153 lb)    Estimated body mass index is 27.98 kg/m  as calculated from the following:    Height as of 11/28/18: 1.575 m (5' 2\").    Weight as of 11/28/18: 69.4 kg (153 lb).       Anesthesia Plan      History & Physical Review  History and physical reviewed and following examination; no interval change.    ASA Status:  2 .    NPO Status:  > 8 hours    Plan for General and ETT with Intravenous and Propofol induction. Maintenance will be Inhalation and Balanced.    PONV prophylaxis:  Ondansetron (or other 5HT-3) and Dexamethasone or Solumedrol       Postoperative Care  Postoperative pain management:  IV analgesics and Oral pain medications.      Consents  Anesthetic plan, risks, benefits and alternatives discussed with:  Patient..                 CARI Unger CRNA  "

## 2018-12-11 ENCOUNTER — ANESTHESIA (OUTPATIENT)
Dept: SURGERY | Facility: CLINIC | Age: 38
End: 2018-12-11
Payer: COMMERCIAL

## 2018-12-11 ENCOUNTER — HOSPITAL ENCOUNTER (OUTPATIENT)
Facility: CLINIC | Age: 38
Discharge: HOME | End: 2018-12-11
Attending: SURGERY | Admitting: SURGERY
Payer: COMMERCIAL

## 2018-12-11 VITALS
BODY MASS INDEX: 28.16 KG/M2 | HEART RATE: 72 BPM | RESPIRATION RATE: 16 BRPM | DIASTOLIC BLOOD PRESSURE: 65 MMHG | OXYGEN SATURATION: 99 % | WEIGHT: 153 LBS | SYSTOLIC BLOOD PRESSURE: 105 MMHG | TEMPERATURE: 98.6 F | HEIGHT: 62 IN

## 2018-12-11 DIAGNOSIS — G89.18 POST-OP PAIN: Primary | ICD-10-CM

## 2018-12-11 PROCEDURE — 25000128 H RX IP 250 OP 636: Performed by: NURSE ANESTHETIST, CERTIFIED REGISTERED

## 2018-12-11 PROCEDURE — 37000008 ZZH ANESTHESIA TECHNICAL FEE, 1ST 30 MIN: Performed by: SURGERY

## 2018-12-11 PROCEDURE — 25000128 H RX IP 250 OP 636: Performed by: SURGERY

## 2018-12-11 PROCEDURE — 36000056 ZZH SURGERY LEVEL 3 1ST 30 MIN: Performed by: SURGERY

## 2018-12-11 PROCEDURE — 37000009 ZZH ANESTHESIA TECHNICAL FEE, EACH ADDTL 15 MIN: Performed by: SURGERY

## 2018-12-11 PROCEDURE — 40000306 ZZH STATISTIC PRE PROC ASSESS II: Performed by: SURGERY

## 2018-12-11 PROCEDURE — 71000027 ZZH RECOVERY PHASE 2 EACH 15 MINS: Performed by: SURGERY

## 2018-12-11 PROCEDURE — 25000132 ZZH RX MED GY IP 250 OP 250 PS 637: Performed by: NURSE ANESTHETIST, CERTIFIED REGISTERED

## 2018-12-11 PROCEDURE — 25000125 ZZHC RX 250: Performed by: SURGERY

## 2018-12-11 PROCEDURE — 25000125 ZZHC RX 250: Performed by: NURSE ANESTHETIST, CERTIFIED REGISTERED

## 2018-12-11 PROCEDURE — 25000132 ZZH RX MED GY IP 250 OP 250 PS 637: Performed by: SURGERY

## 2018-12-11 PROCEDURE — 36000058 ZZH SURGERY LEVEL 3 EA 15 ADDTL MIN: Performed by: SURGERY

## 2018-12-11 PROCEDURE — 27110028 ZZH OR GENERAL SUPPLY NON-STERILE: Performed by: SURGERY

## 2018-12-11 PROCEDURE — 27210794 ZZH OR GENERAL SUPPLY STERILE: Performed by: SURGERY

## 2018-12-11 PROCEDURE — 49560 ZZHC REPAIR INCISIONAL HERNIA,REDUCIBLE: CPT | Mod: AS | Performed by: PHYSICIAN ASSISTANT

## 2018-12-11 PROCEDURE — 49560 ZZHC REPAIR INCISIONAL HERNIA,REDUCIBLE: CPT | Performed by: SURGERY

## 2018-12-11 RX ORDER — FENTANYL CITRATE 50 UG/ML
INJECTION, SOLUTION INTRAMUSCULAR; INTRAVENOUS PRN
Status: DISCONTINUED | OUTPATIENT
Start: 2018-12-11 | End: 2018-12-11

## 2018-12-11 RX ORDER — PROPOFOL 10 MG/ML
INJECTION, EMULSION INTRAVENOUS CONTINUOUS PRN
Status: DISCONTINUED | OUTPATIENT
Start: 2018-12-11 | End: 2018-12-11

## 2018-12-11 RX ORDER — BUPIVACAINE HYDROCHLORIDE AND EPINEPHRINE 5; 5 MG/ML; UG/ML
INJECTION, SOLUTION PERINEURAL PRN
Status: DISCONTINUED | OUTPATIENT
Start: 2018-12-11 | End: 2018-12-11 | Stop reason: HOSPADM

## 2018-12-11 RX ORDER — HYDROMORPHONE HYDROCHLORIDE 1 MG/ML
.3-.5 INJECTION, SOLUTION INTRAMUSCULAR; INTRAVENOUS; SUBCUTANEOUS EVERY 10 MIN PRN
Status: CANCELLED | OUTPATIENT
Start: 2018-12-11

## 2018-12-11 RX ORDER — MEPERIDINE HYDROCHLORIDE 25 MG/ML
12.5 INJECTION INTRAMUSCULAR; INTRAVENOUS; SUBCUTANEOUS
Status: CANCELLED | OUTPATIENT
Start: 2018-12-11

## 2018-12-11 RX ORDER — CEFAZOLIN SODIUM 1 G/50ML
1 INJECTION, SOLUTION INTRAVENOUS SEE ADMIN INSTRUCTIONS
Status: DISCONTINUED | OUTPATIENT
Start: 2018-12-11 | End: 2018-12-11 | Stop reason: HOSPADM

## 2018-12-11 RX ORDER — GLYCOPYRROLATE 0.2 MG/ML
INJECTION, SOLUTION INTRAMUSCULAR; INTRAVENOUS PRN
Status: DISCONTINUED | OUTPATIENT
Start: 2018-12-11 | End: 2018-12-11

## 2018-12-11 RX ORDER — DIMENHYDRINATE 50 MG/ML
25 INJECTION, SOLUTION INTRAMUSCULAR; INTRAVENOUS
Status: CANCELLED | OUTPATIENT
Start: 2018-12-11

## 2018-12-11 RX ORDER — ALBUTEROL SULFATE 0.83 MG/ML
2.5 SOLUTION RESPIRATORY (INHALATION) EVERY 4 HOURS PRN
Status: CANCELLED | OUTPATIENT
Start: 2018-12-11

## 2018-12-11 RX ORDER — METOCLOPRAMIDE HYDROCHLORIDE 5 MG/ML
10 INJECTION INTRAMUSCULAR; INTRAVENOUS EVERY 6 HOURS PRN
Status: CANCELLED | OUTPATIENT
Start: 2018-12-11

## 2018-12-11 RX ORDER — SODIUM CHLORIDE, SODIUM LACTATE, POTASSIUM CHLORIDE, CALCIUM CHLORIDE 600; 310; 30; 20 MG/100ML; MG/100ML; MG/100ML; MG/100ML
INJECTION, SOLUTION INTRAVENOUS CONTINUOUS
Status: DISCONTINUED | OUTPATIENT
Start: 2018-12-11 | End: 2018-12-11 | Stop reason: HOSPADM

## 2018-12-11 RX ORDER — ONDANSETRON 2 MG/ML
4 INJECTION INTRAMUSCULAR; INTRAVENOUS EVERY 30 MIN PRN
Status: CANCELLED | OUTPATIENT
Start: 2018-12-11

## 2018-12-11 RX ORDER — DEXAMETHASONE SODIUM PHOSPHATE 4 MG/ML
INJECTION, SOLUTION INTRA-ARTICULAR; INTRALESIONAL; INTRAMUSCULAR; INTRAVENOUS; SOFT TISSUE PRN
Status: DISCONTINUED | OUTPATIENT
Start: 2018-12-11 | End: 2018-12-11

## 2018-12-11 RX ORDER — CEFAZOLIN SODIUM 2 G/100ML
2 INJECTION, SOLUTION INTRAVENOUS
Status: COMPLETED | OUTPATIENT
Start: 2018-12-11 | End: 2018-12-11

## 2018-12-11 RX ORDER — SODIUM CHLORIDE, SODIUM LACTATE, POTASSIUM CHLORIDE, CALCIUM CHLORIDE 600; 310; 30; 20 MG/100ML; MG/100ML; MG/100ML; MG/100ML
INJECTION, SOLUTION INTRAVENOUS CONTINUOUS
Status: CANCELLED | OUTPATIENT
Start: 2018-12-11

## 2018-12-11 RX ORDER — LIDOCAINE 40 MG/G
CREAM TOPICAL
Status: DISCONTINUED | OUTPATIENT
Start: 2018-12-11 | End: 2018-12-11 | Stop reason: HOSPADM

## 2018-12-11 RX ORDER — ONDANSETRON 4 MG/1
4 TABLET, ORALLY DISINTEGRATING ORAL EVERY 30 MIN PRN
Status: CANCELLED | OUTPATIENT
Start: 2018-12-11

## 2018-12-11 RX ORDER — METOCLOPRAMIDE 10 MG/1
10 TABLET ORAL EVERY 6 HOURS PRN
Status: CANCELLED | OUTPATIENT
Start: 2018-12-11

## 2018-12-11 RX ORDER — ACETAMINOPHEN 325 MG/1
975 TABLET ORAL ONCE
Status: COMPLETED | OUTPATIENT
Start: 2018-12-11 | End: 2018-12-11

## 2018-12-11 RX ORDER — OXYCODONE AND ACETAMINOPHEN 5; 325 MG/1; MG/1
1 TABLET ORAL EVERY 6 HOURS PRN
Qty: 12 TABLET | Refills: 0 | Status: SHIPPED | OUTPATIENT
Start: 2018-12-11 | End: 2018-12-14

## 2018-12-11 RX ORDER — NALOXONE HYDROCHLORIDE 0.4 MG/ML
.1-.4 INJECTION, SOLUTION INTRAMUSCULAR; INTRAVENOUS; SUBCUTANEOUS
Status: CANCELLED | OUTPATIENT
Start: 2018-12-11 | End: 2018-12-12

## 2018-12-11 RX ORDER — ONDANSETRON 2 MG/ML
INJECTION INTRAMUSCULAR; INTRAVENOUS PRN
Status: DISCONTINUED | OUTPATIENT
Start: 2018-12-11 | End: 2018-12-11

## 2018-12-11 RX ORDER — OXYCODONE AND ACETAMINOPHEN 5; 325 MG/1; MG/1
1 TABLET ORAL EVERY 4 HOURS PRN
Status: DISCONTINUED | OUTPATIENT
Start: 2018-12-11 | End: 2018-12-11 | Stop reason: HOSPADM

## 2018-12-11 RX ORDER — FENTANYL CITRATE 50 UG/ML
25-50 INJECTION, SOLUTION INTRAMUSCULAR; INTRAVENOUS
Status: CANCELLED | OUTPATIENT
Start: 2018-12-11

## 2018-12-11 RX ORDER — NALOXONE HYDROCHLORIDE 0.4 MG/ML
.1-.4 INJECTION, SOLUTION INTRAMUSCULAR; INTRAVENOUS; SUBCUTANEOUS
Status: DISCONTINUED | OUTPATIENT
Start: 2018-12-11 | End: 2018-12-11 | Stop reason: HOSPADM

## 2018-12-11 RX ORDER — LIDOCAINE HYDROCHLORIDE 10 MG/ML
INJECTION, SOLUTION INFILTRATION; PERINEURAL PRN
Status: DISCONTINUED | OUTPATIENT
Start: 2018-12-11 | End: 2018-12-11

## 2018-12-11 RX ORDER — ACETAMINOPHEN 325 MG/1
975 TABLET ORAL ONCE
Status: DISCONTINUED | OUTPATIENT
Start: 2018-12-11 | End: 2018-12-11

## 2018-12-11 RX ADMIN — DEXAMETHASONE SODIUM PHOSPHATE 8 MG: 4 INJECTION, SOLUTION INTRA-ARTICULAR; INTRALESIONAL; INTRAMUSCULAR; INTRAVENOUS; SOFT TISSUE at 11:13

## 2018-12-11 RX ADMIN — PROPOFOL 75 MCG/KG/MIN: 10 INJECTION, EMULSION INTRAVENOUS at 11:10

## 2018-12-11 RX ADMIN — FENTANYL CITRATE 50 MCG: 50 INJECTION, SOLUTION INTRAMUSCULAR; INTRAVENOUS at 11:19

## 2018-12-11 RX ADMIN — LIDOCAINE HYDROCHLORIDE 100 MG: 10 INJECTION, SOLUTION INFILTRATION; PERINEURAL at 11:10

## 2018-12-11 RX ADMIN — OXYCODONE HYDROCHLORIDE AND ACETAMINOPHEN 1 TABLET: 5; 325 TABLET ORAL at 12:23

## 2018-12-11 RX ADMIN — FENTANYL CITRATE 50 MCG: 50 INJECTION, SOLUTION INTRAMUSCULAR; INTRAVENOUS at 11:17

## 2018-12-11 RX ADMIN — ONDANSETRON 4 MG: 2 INJECTION INTRAMUSCULAR; INTRAVENOUS at 11:13

## 2018-12-11 RX ADMIN — FENTANYL CITRATE 100 MCG: 50 INJECTION, SOLUTION INTRAMUSCULAR; INTRAVENOUS at 11:11

## 2018-12-11 RX ADMIN — SODIUM CHLORIDE, POTASSIUM CHLORIDE, SODIUM LACTATE AND CALCIUM CHLORIDE 1000 ML: 600; 310; 30; 20 INJECTION, SOLUTION INTRAVENOUS at 10:17

## 2018-12-11 RX ADMIN — MIDAZOLAM HYDROCHLORIDE 2 MG: 1 INJECTION, SOLUTION INTRAMUSCULAR; INTRAVENOUS at 11:13

## 2018-12-11 RX ADMIN — ACETAMINOPHEN 975 MG: 325 TABLET, FILM COATED ORAL at 10:17

## 2018-12-11 RX ADMIN — CEFAZOLIN SODIUM 2 G: 2 INJECTION, SOLUTION INTRAVENOUS at 11:08

## 2018-12-11 RX ADMIN — MIDAZOLAM HYDROCHLORIDE 1 MG: 1 INJECTION, SOLUTION INTRAMUSCULAR; INTRAVENOUS at 11:10

## 2018-12-11 RX ADMIN — MIDAZOLAM HYDROCHLORIDE 2 MG: 1 INJECTION, SOLUTION INTRAMUSCULAR; INTRAVENOUS at 11:08

## 2018-12-11 RX ADMIN — LIDOCAINE HYDROCHLORIDE 1 ML: 10 INJECTION, SOLUTION EPIDURAL; INFILTRATION; INTRACAUDAL; PERINEURAL at 10:17

## 2018-12-11 RX ADMIN — GLYCOPYRROLATE 0.2 MG: 0.2 INJECTION, SOLUTION INTRAMUSCULAR; INTRAVENOUS at 11:08

## 2018-12-11 ASSESSMENT — MIFFLIN-ST. JEOR: SCORE: 1317.25

## 2018-12-11 NOTE — ANESTHESIA CARE TRANSFER NOTE
Patient: Ana Felix    Procedure(s):  Open ventral hernia repair    Diagnosis: ventral hernia  Diagnosis Additional Information: No value filed.    Anesthesia Type:   General, ETT     Note:  Airway :Room Air  Patient transferred to:Phase II  Handoff Report: Identifed the Patient, Identified the Reponsible Provider, Reviewed the pertinent medical history, Discussed the surgical course, Reviewed Intra-OP anesthesia mangement and issues during anesthesia, Set expectations for post-procedure period and Allowed opportunity for questions and acknowledgement of understanding      Vitals: (Last set prior to Anesthesia Care Transfer)    CRNA VITALS  12/11/2018 1116 - 12/11/2018 1146      12/11/2018             NIBP:  105/60    Pulse:  87    NIBP Mean:  72    SpO2:  99 %    EKG:  NSR                Electronically Signed By: CARI Escalera CRNA  December 11, 2018  11:46 AM

## 2018-12-11 NOTE — DISCHARGE INSTRUCTIONS
DISCHARGE INSTRUCTIONS     1. You may resume your regular diet when you feel you are ready    2. No heavy lifting (>30lbs)  for 1 month.    3. You will have some discomfort at the incision sites. This is expected. This should improve over the next 2-3 days. Ice and pain medication will help with this pain. Use prescribed pain medication as instructed.     4. Some bruising and mild swelling is normal after surgery. The area below and around the incision(s) may be hard and elevated. This is part of the normal healing process. This will resolve slowly over the next several months.     5. Your wounds are covered with glue. The glue is water tight and so you can shower or bathe immediately following surgery.     6. Use the following medications (in addition to your normal meds) as shown: Percocet and Ibuprofen as needed.                        Same Day Surgery Discharge Instructions  Special Precautions After Surgery - Adult    1. It is not unusual to feel lightheaded or faint, up to 24 hours after surgery or while taking pain medication.  If you have these symptoms; sit for a few minutes before standing and have someone assist you when getting up.  2. You should rest and relax for the next 24 hours and must have someone stay with you for at least 24 hours after your discharge.  3. DO NOT DRIVE any vehicle or operate mechanical equipment for 24 hours following the end of your surgery.  DO NOT DRIVE while taking narcotic pain medications that have been prescribed by your physician.  If you had a limb operated on, you must be able to use it fully to drive.  4. DO NOT drink alcoholic beverages for 24 hours following surgery or while taking prescription pain medication.  5. Drink clear liquids (apple juice, ginger ale, broth, 7-Up, etc.).  Progress to your regular diet as you feel able.  6. Any questions call your physician and do not make important decisions for 24 hours.          [facility]:  [phone]      Same Day Surgery [phone], Monday thru Friday 6am-9pm.    Break through Bleeding  As instructed per Surgeon or Nurse.    Post Op Infection  Be alert for signs of infection: redness, swelling, heat, drainage of pus, and/or elevated temperature.  Contact your surgeon if these occur.    Nausea   If post op nausea occurs, at first rest your stomach for a few hours by eating nothing solid and sipping only clear liquids.  Call your Surgeon if nausea does not resolve in 24 hours.                                     Tylenol (acetaminophen) 500 mg every 6 hours as needed for pain.    Do not take more than 1000 mg of Tylenol every 6 hours -OR- 4g in a day    Motrin (ibuprophen) 600 mg every 6 hours as needed for pain. Take with food.     8. Notify Clinic at [phone] if:     Your discomfort is not relieved by your pain medication     You have signs of infection such as temperature above 100.4 degrees orally,  chills, or increasing daily discomfort.     Incision site is becoming more red and/or there is purulent drainage.      9. Follow up with Dr Bills in 1-2 weeks.

## 2018-12-11 NOTE — OP NOTE
PreOp Dx: Incisional hernia    PostOp Dx: Same    Procedure: open Incisional hernia repair    Surgeon: GIUSEPPE Blils    Anesthesia: EVONNE Wills CRNA    Findings: 1cm defect    IVF: 800ml    UOP: n/a    EBL: 1ml      Alonso Bills MD

## 2018-12-11 NOTE — OP NOTE
Procedure Date: 12/11/2018      PREOPERATIVE DIAGNOSIS:  Incisional hernia.      POSTOPERATIVE DIAGNOSIS:  Incisional hernia.      PROCEDURE:  Open incisional hernia repair.      SURGEON:  Alonso Bills MD      ASSISTANT:  SATYA Rendon (needed for expertise in camera operation, retraction, hemostasis and suctioning).      ANESTHESIA:  MAC.      ANESTHESIOLOGIST:  KOBY Wills      FINDINGS:  A 1 cm defect centered around the periumbilical area, successfully repaired primarily.      INDICATIONS:  The patient is a 38-year-old female seen in my clinic complaining of a ventral mass.  She had multiple previous abdominal operations for Crohn's disease and has developed a hernia along that incision line.      PROCEDURE:  The patient was taken to the operating room and placed in the supine position.  A monitored anesthesia care was initiated, and her abdomen was cleaned and draped in a sterile manner.  Marcaine 0.25% with epinephrine was used to anesthetize the skin below the umbilicus, and a small curvilinear incision was made.  The umbilicus was detached slowly as it was scarred in quite densely to the abdominal wall.  Once this was cleared, a defect measuring approximately 1 cm was encountered.  There was a protruding omentum that was reduced into the abdominal cavity.  The surrounding scar tissue was taken down to make room for sutures.  Four interrupted 0 Prolene sutures were used to close the defect finally an 0 Prolene suture was run on top of this.  The entire fascial area was injected with Marcaine.  The umbilicus was reattached using 3-0 Vicryl suture, and the skin closed using a 4-0 Vicryl running subcuticular stitch.  The wound was then dressed with skin glue.  The patient tolerated the procedure well, was transferred back to same day surgery for recovery.      PLAN:  Following a stable recovery, she will be discharged home with a regular diet.      ACTIVITY:  No heavy lifting for 1 month.      DISABILITY:   None.      MEDICATIONS:  Prescription written for Percocet.      INSTRUCTIONS:  The patient was advised to wash her wound daily with soap and water, and to follow up with me in 1-2 weeks.      ESTIMATED BLOOD LOSS:  1 mL.      IV FLUIDS:  800 mL.         DEMARCO WILLIAM             D: 2018   T: 2018   MT: LUIS      Name:     ISABELLE SALAS   MRN:      [id number]        Account:        [id number]   :      1980           Procedure Date: 2018      Document: [id number]

## 2018-12-11 NOTE — ANESTHESIA POSTPROCEDURE EVALUATION
Patient: Ana Felix    Procedure(s):  Open ventral hernia repair    Diagnosis:ventral hernia  Diagnosis Additional Information: No value filed.    Anesthesia Type:  General, ETT    Note:  Anesthesia Post Evaluation    Patient location during evaluation: Phase 2  Patient participation: Able to fully participate in evaluation  Level of consciousness: awake  Pain management: adequate  Airway patency: patent  Cardiovascular status: acceptable and hemodynamically stable  Respiratory status: room air, spontaneous ventilation and acceptable  Hydration status: acceptable  PONV: none     Anesthetic complications: None          Last vitals:  Vitals:    12/11/18 0941   BP: 128/80   Pulse: 72   Temp: 36.8  C (98.2  F)   SpO2: 100%         Electronically Signed By: CARI Escalera CRNA  December 11, 2018  11:47 AM

## 2018-12-19 ENCOUNTER — OFFICE VISIT (OUTPATIENT)
Dept: SURGERY | Facility: CLINIC | Age: 38
End: 2018-12-19
Payer: COMMERCIAL

## 2018-12-19 VITALS
WEIGHT: 153 LBS | TEMPERATURE: 98.5 F | DIASTOLIC BLOOD PRESSURE: 68 MMHG | SYSTOLIC BLOOD PRESSURE: 117 MMHG | HEIGHT: 62 IN | BODY MASS INDEX: 28.16 KG/M2 | HEART RATE: 85 BPM

## 2018-12-19 DIAGNOSIS — Z09 POSTOP CHECK: Primary | ICD-10-CM

## 2018-12-19 PROCEDURE — 99024 POSTOP FOLLOW-UP VISIT: CPT | Performed by: SURGERY

## 2018-12-19 ASSESSMENT — MIFFLIN-ST. JEOR: SCORE: 1317.25

## 2018-12-19 NOTE — PROGRESS NOTES
"No complaints.  Pain controlled with oral pain meds.    /68 (BP Location: Right arm, Patient Position: Sitting, Cuff Size: Adult Regular)   Pulse 85   Temp 98.5  F (36.9  C) (Oral)   Ht 1.575 m (5' 2\")   Wt 69.4 kg (153 lb)   LMP 07/23/2010   BMI 27.98 kg/m      Exam  [id number]  CTAB  RRR  S&NTND+BS, wound - cdi s erythema  No CCE    A/P s/p ventral hernia repair healing well.  No heavy lifting for 3 weeks.  RTC prn.    Alosno Bills MD   "

## 2018-12-19 NOTE — LETTER
"    12/19/2018         RE: Ana Felix  [address] [place] [address]        Dear Colleague,    Thank you for referring your patient, Ana Felix, to the [facility]. Please see a copy of my visit note below.    No complaints.  Pain controlled with oral pain meds.    /68 (BP Location: Right arm, Patient Position: Sitting, Cuff Size: Adult Regular)   Pulse 85   Temp 98.5  F (36.9  C) (Oral)   Ht 1.575 m (5' 2\")   Wt 69.4 kg (153 lb)   LMP 07/23/2010   BMI 27.98 kg/m       Exam  [id number]  CTAB  RRR  S&NTND+BS, wound - cdi s erythema  No CCE    A/P s/p ventral hernia repair healing well.  No heavy lifting for 3 weeks.  RTC prn.    Alonso Bills MD     Again, thank you for allowing me to participate in the care of your patient.        Sincerely,        Alonso Bills MD    "

## 2018-12-19 NOTE — NURSING NOTE
"Initial /68 (BP Location: Right arm, Patient Position: Sitting, Cuff Size: Adult Regular)   Pulse 85   Temp 98.5  F (36.9  C) (Oral)   Ht 1.575 m (5' 2\")   Wt 69.4 kg (153 lb)   LMP 07/23/2010   BMI 27.98 kg/m   Estimated body mass index is 27.98 kg/m  as calculated from the following:    Height as of this encounter: 1.575 m (5' 2\").    Weight as of this encounter: 69.4 kg (153 lb). .    Kaitlin Reyes MA    "

## 2019-01-03 ENCOUNTER — OFFICE VISIT (OUTPATIENT)
Dept: DERMATOLOGY | Facility: CLINIC | Age: 39
End: 2019-01-03
Payer: COMMERCIAL

## 2019-01-03 VITALS — HEART RATE: 100 BPM | OXYGEN SATURATION: 100 % | DIASTOLIC BLOOD PRESSURE: 87 MMHG | SYSTOLIC BLOOD PRESSURE: 132 MMHG

## 2019-01-03 DIAGNOSIS — D48.5 NEOPLASM OF UNCERTAIN BEHAVIOR OF SKIN: ICD-10-CM

## 2019-01-03 DIAGNOSIS — L73.9 FOLLICULITIS: ICD-10-CM

## 2019-01-03 DIAGNOSIS — B37.31 VAGINAL CANDIDIASIS: ICD-10-CM

## 2019-01-03 DIAGNOSIS — L82.1 SEBORRHEIC KERATOSIS: ICD-10-CM

## 2019-01-03 DIAGNOSIS — D22.9 NEVUS: Primary | ICD-10-CM

## 2019-01-03 DIAGNOSIS — D18.00 ANGIOMA: ICD-10-CM

## 2019-01-03 DIAGNOSIS — L81.4 LENTIGO: ICD-10-CM

## 2019-01-03 PROCEDURE — 11102 TANGNTL BX SKIN SINGLE LES: CPT | Performed by: PHYSICIAN ASSISTANT

## 2019-01-03 PROCEDURE — 99214 OFFICE O/P EST MOD 30 MIN: CPT | Mod: 25 | Performed by: PHYSICIAN ASSISTANT

## 2019-01-03 PROCEDURE — 88305 TISSUE EXAM BY PATHOLOGIST: CPT | Mod: TC | Performed by: PHYSICIAN ASSISTANT

## 2019-01-03 RX ORDER — FLUCONAZOLE 150 MG/1
TABLET ORAL
Qty: 2 TABLET | Refills: 5 | Status: SHIPPED | OUTPATIENT
Start: 2019-01-03 | End: 2019-10-28

## 2019-01-03 RX ORDER — DOXYCYCLINE 100 MG/1
CAPSULE ORAL
Qty: 60 CAPSULE | Refills: 0 | Status: SHIPPED | OUTPATIENT
Start: 2019-01-03 | End: 2019-01-21

## 2019-01-03 RX ORDER — CLINDAMYCIN PHOSPHATE 10 MG/G
GEL TOPICAL
Qty: 60 G | Refills: 11 | Status: SHIPPED | OUTPATIENT
Start: 2019-01-03 | End: 2019-10-28

## 2019-01-03 NOTE — LETTER
1/3/2019         RE: Ana Felix  [address] [place] [address]        Dear Colleague,    Thank you for referring your patient, Ana Felix, to the [facility]. Please see a copy of my visit note below.    HPI:   Ana Felix is a 38 year old female who presents for Full skin cancer screening.    Additional concern:  Has painful large, hard bumps in groin, more on the right than the left, that has waxes and wanes for the last 6-7 months.  Patient was seen by OB and given antibiotic for 10-14 days, which helped for a couple weeks.  She has occasional drainage.  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  See patient intake sheet.  ROS of the following were done and are negative: Constitutional, Eyes, Ears, Nose,   Mouth, Throat, Cardiovascular, Respiratory, GI, Genitourinary, Musculoskeletal,   Psychiatric, Endocrine, Allergic/Immunologic.    This document serves as a record of the services and decisions personally performed and made by Marry Boykin PA-C. It was created on her behalf by Lata Navarro, a trained medical scribe. The creation of this document is based the provider's statements to the medical scribe.  Lata Navarro January 3, 2019 3:51 PM    PHYSICAL EXAM:   /87 (BP Location: Left arm)   Pulse 100   LMP 07/23/2010   SpO2 100%   Skin exam performed as follows: Type 2 skin. Mood appropriate  Alert and Oriented X 3.  Well developed, well nourished in no distress.  General appearance: Normal  Head including face: Normal  Eyes: conjunctiva and lids: Normal  Mouth: Lips, teeth, gums: Normal  Neck: Normal  Chest-breast/axillae: Normal  Back: Normal  Spleen and liver: Normal  Cardiovascular: Exam of peripheral vascular system by observation for swelling, varicosities, edema: Normal  Genitalia: groin, buttocks: Normal  Extremities: digits/nails (clubbing): Normal  Eccrine and Apocrine glands: Normal  Right upper extremity: Normal  Left upper extremity: Normal  Right lower extremity: Normal  Left lower extremity: Normal  Skin: Scalp and body hair: See below    Pt deferred exam of breasts, groin, buttocks: No    Other physical findings:  1. Multiple pigmented macules on extremities and trunk  2. Multiple pigmented macules on face, trunk and extremities  3. Multiple vascular papules on trunk, arms and legs  4. Multiple scattered keratotic plaques  5. 5 mm brown black macule on left deltoid     Except as noted above, no other signs of skin cancer or melanoma.     ASSESSMENT/PLAN:   Benign Full skin cancer screening today. . Patient with no personal history of skin cancer.  Mother with localized treatment on skin unknown to patient on type.  Advised on monthly self exams and 1 year  Patient Education: Appropriate brochures given.    Multiple benign appearing nevi on arms, legs and trunk. Discussed ABCDEs of melanoma and sunscreen.   Multiple lentigos on arms, legs and trunk. Advised benign, no treatment needed.  Multiple scattered angiomas. Advised benign, no treatment needed.   Seborrheic keratosis on arms, legs and trunk. Advised benign, no treatment needed.  Atypical nevus on the left deltoid - advised A photo was taken and placed in the chart. Shave bx in typical fashion .  Area cleaned with betadyne and anesthetized with 1% lidocaine with epi .  Dermablade used to remove the lesion and sent to pathology. Bleeding was cauterized. Pt  tolerated procedure well.   Folliculitis; less likely hidradenitis suppurativa in the groin - advised  --Start doxycycline 100 mg BID x 1  months. Advised to take with food. Discussed risk of GI upset, esophagitis and photosensitivity.   --Start Hibiclens every day  --Start clindamycin gel QD      Follow-up: 6 weeks for recheck of groin/pending path/ yearly FSE/PRN sooner    1.) Patient was asked about new and changing moles. YES  2.) Patient received a complete physical skin examination: YES  3.) Patient was counseled to perform a monthly self skin examination: YES  Scribed By: Lata Navarro, Medical Scribe    The information in this document, created by the medical scribe for me, accurately reflects the services I personally performed and the decisions made by me. I have reviewed and approved this document for accuracy prior to leaving the patient care area.  Marry Boykin PA-C January 3, 2019 4:03 PM       Again, thank you for allowing me to participate in the care of your patient.        Sincerely,        Marry Boykin PA-C

## 2019-01-03 NOTE — NURSING NOTE
"Initial /87 (BP Location: Left arm)   Pulse 100   LMP 07/23/2010   SpO2 100%  Estimated body mass index is 27.98 kg/m  as calculated from the following:    Height as of 12/19/18: 1.575 m (5' 2\").    Weight as of 12/19/18: 69.4 kg (153 lb). .      "

## 2019-01-03 NOTE — PATIENT INSTRUCTIONS
Wash with Hibiclens daily in the shower - this is OTC  Apply clindamycin gel to the area once daily  Start doxycycline (antibiotic) twice daily with food and full glass of water for 1 month then stop    Follow up in 6-8 weeks.          Wound Care Instructions     FOR SUPERFICIAL WOUNDS     [facility] [phone]    [facility] [phone]  Left upper arm                       AFTER 24 HOURS YOU SHOULD REMOVE THE BANDAGE AND BEGIN DAILY DRESSING CHANGES AS FOLLOWS:     1) Remove Dressing.     2) Clean and dry the area with tap water using a Q-tip or sterile gauze pad.     3) Apply Vaseline, Aquaphor, Polysporin ointment or Bacitracin ointment over entire wound.  Do NOT use Neosporin ointment.     4) Cover the wound with a band-aid, or a sterile non-stick gauze pad and micropore paper tape      REPEAT THESE INSTRUCTIONS AT LEAST ONCE A DAY UNTIL THE WOUND HAS COMPLETELY HEALED.    It is an old wives tale that a wound heals better when it is exposed to air and allowed to dry out. The wound will heal faster with a better cosmetic result if it is kept moist with ointment and covered with a bandage.    **Do not let the wound dry out.**      Supplies Needed:      *Cotton tipped applicators (Q-tips)    *Polysporin Ointment or Bacitracin Ointment (NOT NEOSPORIN)    *Band-aids or non-stick gauze pads and micropore paper tape.      PATIENT INFORMATION:    During the healing process you will notice a number of changes. All wounds develop a small halo of redness surrounding the wound.  This means healing is occurring. Severe itching with extensive redness usually indicates sensitivity to the ointment or bandage tape used to dress the wound.  You should call our office if this develops.      Swelling  and/or discoloration around your surgical site is common, particularly when performed around the eye.    All wounds normally drain.  The larger the wound the more drainage there will be.  After 7-10 days,  you will notice the wound beginning to shrink and new skin will begin to grow.  The wound is healed when you can see skin has formed over the entire area.  A healed wound has a healthy, shiny look to the surface and is red to dark pink in color to normalize.  Wounds may take approximately 4-6 weeks to heal.  Larger wounds may take 6-8 weeks.  After the wound is healed you may discontinue dressing changes.    You may experience a sensation of tightness as your wound heals. This is normal and will gradually subside.    Your healed wound may be sensitive to temperature changes. This sensitivity improves with time, but if you re having a lot of discomfort, try to avoid temperature extremes.    Patients frequently experience itching after their wound appears to have healed because of the continue healing under the skin.  Plain Vaseline will help relieve the itching.        POSSIBLE COMPLICATIONS    BLEEDIN. Leave the bandage in place.  2. Use tightly rolled up gauze or a cloth to apply direct pressure over the bandage for 30  minutes.  3. Reapply pressure for an additional 30 minutes if necessary  4. Use additional gauze and tape to maintain pressure once the bleeding has stopped.

## 2019-01-03 NOTE — PROGRESS NOTES
HPI:   Ana Felix is a 38 year old female who presents for Full skin cancer screening.    Additional concern:  Has painful large, hard bumps in groin, more on the right than the left, that has waxes and wanes for the last 6-7 months.  Patient was seen by OB and given antibiotic for 10-14 days, which helped for a couple weeks.  She has occasional drainage.  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  See patient intake sheet.  ROS of the following were done and are negative: Constitutional, Eyes, Ears, Nose,   Mouth, Throat, Cardiovascular, Respiratory, GI, Genitourinary, Musculoskeletal,   Psychiatric, Endocrine, Allergic/Immunologic.    This document serves as a record of the services and decisions personally performed and made by Marry Boykin PA-C. It was created on her behalf by Lata Navarro, a trained medical scribe. The creation of this document is based the provider's statements to the medical scribe.  Lata Navarro January 3, 2019 3:51 PM    PHYSICAL EXAM:   /87 (BP Location: Left arm)   Pulse 100   LMP 07/23/2010   SpO2 100%   Skin exam performed as follows: Type 2 skin. Mood appropriate  Alert and Oriented X 3. Well developed, well nourished in no distress.  General appearance: Normal  Head including face: Normal  Eyes: conjunctiva and lids: Normal  Mouth: Lips, teeth, gums: Normal  Neck: Normal  Chest-breast/axillae: Normal  Back:  Normal  Spleen and liver: Normal  Cardiovascular: Exam of peripheral vascular system by observation for swelling, varicosities, edema: Normal  Genitalia: groin, buttocks: Normal  Extremities: digits/nails (clubbing): Normal  Eccrine and Apocrine glands: Normal  Right upper extremity: Normal  Left upper extremity: Normal  Right lower extremity: Normal  Left lower extremity: Normal  Skin: Scalp and body hair: See below    Pt deferred exam of breasts, groin, buttocks: No    Other physical findings:  1. Multiple pigmented macules on extremities and trunk  2. Multiple pigmented macules on face, trunk and extremities  3. Multiple vascular papules on trunk, arms and legs  4. Multiple scattered keratotic plaques  5. 5 mm brown black macule on left deltoid     Except as noted above, no other signs of skin cancer or melanoma.     ASSESSMENT/PLAN:   Benign Full skin cancer screening today. . Patient with no personal history of skin cancer.  Mother with localized treatment on skin unknown to patient on type.  Advised on monthly self exams and 1 year  Patient Education: Appropriate brochures given.    Multiple benign appearing nevi on arms, legs and trunk. Discussed ABCDEs of melanoma and sunscreen.   Multiple lentigos on arms, legs and trunk. Advised benign, no treatment needed.  Multiple scattered angiomas. Advised benign, no treatment needed.   Seborrheic keratosis on arms, legs and trunk. Advised benign, no treatment needed.  Atypical nevus on the left deltoid - advised A photo was taken and placed in the chart. Shave bx in typical fashion .  Area cleaned with betadyne and anesthetized with 1% lidocaine with epi .  Dermablade used to remove the lesion and sent to pathology. Bleeding was cauterized. Pt tolerated procedure well.   Folliculitis; less likely hidradenitis suppurativa in the groin - advised  --Start doxycycline 100 mg BID x 1  months. Advised to take with food. Discussed risk of GI upset, esophagitis and  photosensitivity.   --Start Hibiclens every day  --Start clindamycin gel QD      Follow-up: 6 weeks for recheck of groin/pending path/ yearly FSE/PRN sooner    1.) Patient was asked about new and changing moles. YES  2.) Patient received a complete physical skin examination: YES  3.) Patient was counseled to perform a monthly self skin examination: YES  Scribed By: Lata Navarro, Medical Scribe    The information in this document, created by the medical scribe for me, accurately reflects the services I personally performed and the decisions made by me. I have reviewed and approved this document for accuracy prior to leaving the patient care area.  Marry Boykin PA-C January 3, 2019 4:03 PM

## 2019-01-07 LAB — COPATH REPORT: NORMAL

## 2019-01-21 ENCOUNTER — HOSPITAL ENCOUNTER (EMERGENCY)
Facility: CLINIC | Age: 39
Discharge: HOME | End: 2019-01-21
Attending: FAMILY MEDICINE | Admitting: FAMILY MEDICINE
Payer: COMMERCIAL

## 2019-01-21 VITALS
SYSTOLIC BLOOD PRESSURE: 137 MMHG | OXYGEN SATURATION: 97 % | DIASTOLIC BLOOD PRESSURE: 86 MMHG | BODY MASS INDEX: 27.98 KG/M2 | RESPIRATION RATE: 18 BRPM | TEMPERATURE: 99 F | HEIGHT: 62 IN

## 2019-01-21 DIAGNOSIS — F17.200 TOBACCO USE DISORDER: ICD-10-CM

## 2019-01-21 DIAGNOSIS — J40 BRONCHITIS: ICD-10-CM

## 2019-01-21 DIAGNOSIS — J45.901 MODERATE ASTHMA WITH EXACERBATION, UNSPECIFIED WHETHER PERSISTENT: ICD-10-CM

## 2019-01-21 PROCEDURE — 99214 OFFICE O/P EST MOD 30 MIN: CPT | Mod: Z6 | Performed by: FAMILY MEDICINE

## 2019-01-21 PROCEDURE — G0463 HOSPITAL OUTPT CLINIC VISIT: HCPCS | Performed by: FAMILY MEDICINE

## 2019-01-21 RX ORDER — PREDNISONE 20 MG/1
TABLET ORAL
Qty: 12 TABLET | Refills: 0 | Status: SHIPPED | OUTPATIENT
Start: 2019-01-21 | End: 2019-10-28

## 2019-01-21 RX ORDER — CEFUROXIME AXETIL 500 MG/1
500 TABLET ORAL 2 TIMES DAILY
Qty: 14 TABLET | Refills: 0 | Status: SHIPPED | OUTPATIENT
Start: 2019-01-21 | End: 2019-01-28

## 2019-01-21 NOTE — ED AVS SNAPSHOT
[facility] Emergency Department  [address] [address]  Phone:  [phone]  Fax:  [phone]                                    Ana Felix   MRN: [id number]    Department:  [facility] Emergency Department   Date of Visit:  1/21/2019           After Visit Summary Signature Page    I have received my discharge instructions, and my questions have been answered. I have discussed any challenges I see with this plan with the nurse or doctor.    ..........................................................................................................................................  Patient/Patient Representative Signature      ..........................................................................................................................................  Patient Representative Print Name and Relationship to Patient    ..................................................               ................................................  Date                                   Time    ..........................................................................................................................................  Reviewed by Signature/Title    ...................................................              ..............................................  Date                                               Time          22EPIC Rev 08/18

## 2019-01-21 NOTE — ED PROVIDER NOTES
"  History     Chief Complaint   Patient presents with     Cough     an cold for 2 weeks     HPI  Ana Felix is a 38 year old female who has cough for 1-2 weeks.  She initially had some throat and head congestion.  In the last week she has more cough, wheezing.  It is difficult for her to bring up phlegm.  She has not had fever.    Patient does have a history of asthma.  She is also a smoker although states that she is cut back on her smoking.    Note that patient is on immunosuppressive medication for Crohn's disease.  She states that she does not typically have problems with sinusitis or bronchitis or pneumonia.      Allergies:  Allergies   Allergen Reactions     Infliximab Hives     Sulfa Drugs [Sulfa Drugs] Nausea     Pt states \"it doesn't work for me\"     Sulfacetamide Nausea     Amoxicillin-Pot Clavulanate Nausea and Vomiting     Augmentin Other (See Comments)     Crohn's     Bupropion Hives and Nausea     Droperidol Other (See Comments)     Dystonic reaction     Ibuprofen Other (See Comments)     Crohn's       Lyrica [Pregabalin] Nausea and Vomiting     Grogginess, severe back pain  Grogginess, severe back pain     Vicodin [Hydrocodone-Acetaminophen] Nausea and Vomiting       Problem List:    Patient Active Problem List    Diagnosis Date Noted     History of gestational diabetes mellitus (GDM) 08/15/2018     Priority: Medium     Amenorrhea 08/15/2018     Priority: Medium     Crohn's disease of both small and large intestine without complication (H) 07/31/2018     Priority: Medium     Pain medication agreement 08/18/2017     Priority: Medium     Overview:   [facility]       Controlled substance agreement signed 07/19/2017     Priority: Medium     Degenerative lumbar disc 03/07/2017     Priority: Medium     Labral tear of hip, degenerative 03/07/2017     Priority: Medium     Pain of right hip joint 03/07/2017     Priority: Medium     S/P LEEP of cervix 12/15/2015     Priority: Medium     S/p LEEP of " cervix - date unknown  12/9/15: Pap - NIL, Neg HPV. Plan cotest in 1 year. If JAYME 2/3 on biopsy - PAP/HPV co-testing at 12, 24 months. If two negative results repeat co-testing in 3 years, if negative then routine screening.  3/7/18 Pap: NIL/neg HPV.             Hearing difficulty 10/09/2014     Priority: Medium     Irritable bowel syndrome (IBS) 04/15/2014     Priority: Medium     Tiffany raw vegetables       Lactose intolerance 04/15/2014     Priority: Medium     Abdominal pain, right upper quadrant 03/06/2014     Priority: Medium     Nausea with vomiting 01/31/2014     Priority: Medium     Chronic low back pain 11/05/2012     Priority: Medium     Follows with pain clinic.       Mild persistent asthma 11/05/2012     Priority: Medium     Discogenic pain 10/08/2012     Priority: Medium     Abdominal pain 08/30/2012     Priority: Medium     S/P oophorectomy 07/20/2012     Priority: Medium     History of cholecystectomy 07/20/2012     Priority: Medium     History of resection of small bowel 07/20/2012     Priority: Medium     Tobacco abuse 05/25/2012     Priority: Medium     Displacement of lumbar intervertebral disc without myelopathy 01/06/2012     Priority: Medium     Disorder of sacrum 12/13/2011     Priority: Medium     Problem list name updated by automated process. Provider to review       Back pain 11/09/2011     Priority: Medium     Obesity 11/09/2011     Priority: Medium     Migraine headache 09/14/2011     Priority: Medium     Patient given Migraine Education folder and Migraine Action Plan on September 14, 2011. Cammy Anderson RN    (Problem list name updated by automated process. Provider to review and confirm.)       CARDIOVASCULAR SCREENING; LDL GOAL LESS THAN 160 10/31/2010     Priority: Medium     Dysmenorrhea 10/05/2010     Priority: Medium     Female pelvic pain 09/27/2010     Priority: Medium     (Problem list name updated by automated process. Provider to review and confirm.)       Crohns disease (H)  2009     Priority: Medium        Past Medical History:    Past Medical History:   Diagnosis Date     Back pain      Crohn's disease (H)      Exercise-induced asthma      Gestational diabetes      Herniation of intervertebral disc of lumbar region      Infertility      Ovarian cyst      Smoker      Status post cholecystectomy 2005       Past Surgical History:    Past Surgical History:   Procedure Laterality Date     APPENDECTOMY       C/SECTION, LOW TRANSVERSE      , Low Transverse     CHOLECYSTECTOMY, LAPOROSCOPIC  2005    Cholecystectomy, Laparoscopic     COLONOSCOPY       ESOPHAGOSCOPY, GASTROSCOPY, DUODENOSCOPY (EGD), COMBINED  3/6/2014    Procedure: COMBINED ESOPHAGOSCOPY, GASTROSCOPY, DUODENOSCOPY (EGD), BIOPSY SINGLE OR MULTIPLE;  COLONOSCOPY/ UPPER GI ENDOSCOPY/ COLON/ EGD/ Abdominal pain, epigastric/ [facility];  Surgeon: West Lomas MD;  Location: [facility] OR     HC HYSTEROSCOPY, SURGICAL; W/ ENDOMETRIAL ABLATION, ANY METHOD  2010     HERNIORRHAPHY VENTRAL N/A 2018    Procedure: Open ventral hernia repair;  Surgeon: Alonso Bills MD;  Location: [facility] OR     HYSTERECTOMY, SUPRACERVICAL LAPAROSCOPIC       LEEP TX, CERVICAL      LEEP TX Cervical     ORTHOPEDIC SURGERY      bluged disk     partial small bowel resection      Ileo-colonic resection. Crohn's disease surgery for bowel obstruction. this was a section of small bowel and large bowel and the cecum., all removed and a reanastamosis done successfully     SALPINGO OOPHORECTOMY,R/L/TORI      Right ovary     TUBAL LIGATION         Family History:    Family History   Problem Relation Age of Onset     Cancer Mother         cervical     Lipids Mother      Eye Disorder Father      Lipids Father      Alcohol/Drug Maternal Grandmother      Diabetes Paternal Grandmother      Cancer - colorectal Paternal Grandmother      Eye Disorder Paternal Grandmother      Diabetes Paternal Grandfather      Eye Disorder Paternal Grandfather  "     Inflammatory Bowel Disease Paternal Aunt         and two paternal uncles       Social History:  Marital Status:   [2]  Social History     Tobacco Use     Smoking status: Current Every Day Smoker     Packs/day: 0.50     Types: Cigarettes     Smokeless tobacco: Never Used   Substance Use Topics     Alcohol use: Yes     Alcohol/week: 0.0 oz     Comment: rare     Drug use: No        Medications:      cefuroxime (CEFTIN) 500 MG tablet   predniSONE (DELTASONE) 20 MG tablet   albuterol (PROAIR HFA) 108 (90 Base) MCG/ACT Inhaler   azaTHIOprine (IMURAN) 50 MG tablet   clindamycin (CLINDAMAX) 1 % external gel   cyanocobalamin (VITAMIN B12) 1000 MCG/ML injection   fentaNYL (DURAGESIC) 12 mcg/hr patch 72 hr   fentaNYL (DURAGESIC) 25 mcg/hr patch 72 hr   fluconazole (DIFLUCAN) 150 MG tablet   montelukast (SINGULAIR) 10 MG tablet   oxyCODONE-acetaminophen (PERCOCET) 7.5-325 MG per tablet         Review of Systems    No fever or chills.  No chest pain.  No vomiting or diarrhea.  No swelling.  No abdominal pain.      Physical Exam   BP: 137/86  Heart Rate: 96  Temp: 99  F (37.2  C)  Resp: 18  Height: 157.5 cm (5' 2\")  SpO2: 97 %      Physical Exam    NAD.  Normal O2 saturation.  Pharynx unremarkable.  Neck mildly enlarged anterior cervical nodes.  No thyromegaly.  Chest.  Mild inspiratory and expiratory wheezes bilaterally.  Some rubs are audible in left chest.  No rales or consolidation.  Extremities without edema.      ED Course               Procedures               Critical Care time:  none               No results found for this or any previous visit (from the past 24 hour(s)).    Medications - No data to display    Assessments & Plan (with Medical Decision Making)     Patient has developed respiratory infection and asthma exacerbation which are treated.  Patient was advised.  See instructions.        I have reviewed the nursing notes.    I have reviewed the findings, diagnosis, plan and need for follow up with the " patient.          Medication List      Started    cefuroxime 500 MG tablet  Commonly known as:  CEFTIN  500 mg, Oral, 2 TIMES DAILY     predniSONE 20 MG tablet  Commonly known as:  DELTASONE  Take 2 daily for 4 days then 1 daily for 4 days.            Final diagnoses:   Bronchitis   Moderate asthma with exacerbation, unspecified whether persistent   Tobacco use disorder       1/21/2019   [facility] EMERGENCY DEPARTMENT     Refugio Sheffield MD  01/21/19 [id number]

## 2019-01-24 DIAGNOSIS — K50.90 CROHN'S DISEASE (H): ICD-10-CM

## 2019-01-24 LAB
ALBUMIN SERPL-MCNC: 4.2 G/DL (ref 3.4–5)
ALP SERPL-CCNC: 51 U/L (ref 40–150)
ALT SERPL W P-5'-P-CCNC: 13 U/L (ref 0–50)
AST SERPL W P-5'-P-CCNC: 11 U/L (ref 0–45)
BASOPHILS # BLD AUTO: 0 10E9/L (ref 0–0.2)
BASOPHILS NFR BLD AUTO: 0.1 %
BILIRUB DIRECT SERPL-MCNC: <0.1 MG/DL (ref 0–0.2)
BILIRUB SERPL-MCNC: 0.3 MG/DL (ref 0.2–1.3)
DIFFERENTIAL METHOD BLD: ABNORMAL
EOSINOPHIL # BLD AUTO: 0 10E9/L (ref 0–0.7)
EOSINOPHIL NFR BLD AUTO: 0 %
ERYTHROCYTE [DISTWIDTH] IN BLOOD BY AUTOMATED COUNT: 12.1 % (ref 10–15)
HCT VFR BLD AUTO: 40.1 % (ref 35–47)
HGB BLD-MCNC: 13.5 G/DL (ref 11.7–15.7)
LYMPHOCYTES # BLD AUTO: 1.8 10E9/L (ref 0.8–5.3)
LYMPHOCYTES NFR BLD AUTO: 18.8 %
MCH RBC QN AUTO: 33.9 PG (ref 26.5–33)
MCHC RBC AUTO-ENTMCNC: 33.7 G/DL (ref 31.5–36.5)
MCV RBC AUTO: 101 FL (ref 78–100)
MONOCYTES # BLD AUTO: 0.4 10E9/L (ref 0–1.3)
MONOCYTES NFR BLD AUTO: 4.5 %
NEUTROPHILS # BLD AUTO: 7.3 10E9/L (ref 1.6–8.3)
NEUTROPHILS NFR BLD AUTO: 76.6 %
PLATELET # BLD AUTO: 339 10E9/L (ref 150–450)
PROT SERPL-MCNC: 8.6 G/DL (ref 6.8–8.8)
RBC # BLD AUTO: 3.98 10E12/L (ref 3.8–5.2)
WBC # BLD AUTO: 9.6 10E9/L (ref 4–11)

## 2019-01-24 PROCEDURE — 85025 COMPLETE CBC W/AUTO DIFF WBC: CPT | Performed by: INTERNAL MEDICINE

## 2019-01-24 PROCEDURE — 36415 COLL VENOUS BLD VENIPUNCTURE: CPT | Performed by: INTERNAL MEDICINE

## 2019-01-24 PROCEDURE — 80076 HEPATIC FUNCTION PANEL: CPT | Performed by: INTERNAL MEDICINE

## 2019-02-27 ENCOUNTER — HOSPITAL ENCOUNTER (EMERGENCY)
Facility: CLINIC | Age: 39
Discharge: HOME | End: 2019-02-27
Attending: PHYSICIAN ASSISTANT | Admitting: PHYSICIAN ASSISTANT
Payer: COMMERCIAL

## 2019-02-27 VITALS
RESPIRATION RATE: 16 BRPM | BODY MASS INDEX: 27.44 KG/M2 | OXYGEN SATURATION: 98 % | HEART RATE: 106 BPM | DIASTOLIC BLOOD PRESSURE: 94 MMHG | SYSTOLIC BLOOD PRESSURE: 140 MMHG | WEIGHT: 150 LBS | TEMPERATURE: 99.8 F

## 2019-02-27 DIAGNOSIS — K52.9 GASTROENTERITIS: ICD-10-CM

## 2019-02-27 PROCEDURE — 99214 OFFICE O/P EST MOD 30 MIN: CPT | Mod: Z6 | Performed by: PHYSICIAN ASSISTANT

## 2019-02-27 PROCEDURE — G0463 HOSPITAL OUTPT CLINIC VISIT: HCPCS | Performed by: PHYSICIAN ASSISTANT

## 2019-02-27 NOTE — ED AVS SNAPSHOT
[facility] Emergency Department  [address] [address]  Phone:  [phone]  Fax:  [phone]                                    Ana Felix   MRN: [id number]    Department:  [facility] Emergency Department   Date of Visit:  2/27/2019           After Visit Summary Signature Page    I have received my discharge instructions, and my questions have been answered. I have discussed any challenges I see with this plan with the nurse or doctor.    ..........................................................................................................................................  Patient/Patient Representative Signature      ..........................................................................................................................................  Patient Representative Print Name and Relationship to Patient    ..................................................               ................................................  Date                                   Time    ..........................................................................................................................................  Reviewed by Signature/Title    ...................................................              ..............................................  Date                                               Time          22EPIC Rev 08/18

## 2019-02-27 NOTE — ED PROVIDER NOTES
"  History     Chief Complaint   Patient presents with     Letter for School/Work     NVD for the last 3 days. needs work note     HPI  Ana Felix is a 38 year old female who presents to the urgent care requesting a note stating she can return to work.  Patient claims that three days ago on 12/24/19 she developed nausea, vomiting, diarrhea.  She had 6-10 episodes of emesis and diarrhea daily.  She states that vomiting has resolved today, however she continues to have approximately 10 episodes of loose stool which she states is not entirely unusual for her due to history of crohn's disease.  She denied any fever, chills, myalgias, abdominal pain, melena, hematochezia.  She did have multiple close contacts who shared a meal the day prior to onset of symptoms who also developed similar GI symptoms.      Allergies:  Allergies   Allergen Reactions     Infliximab Hives     Sulfa Drugs [Sulfa Drugs] Nausea     Pt states \"it doesn't work for me\"     Sulfacetamide Nausea     Amoxicillin-Pot Clavulanate Nausea and Vomiting     Augmentin Other (See Comments)     Crohn's     Bupropion Hives and Nausea     Droperidol Other (See Comments)     Dystonic reaction     Ibuprofen Other (See Comments)     Crohn's       Lyrica [Pregabalin] Nausea and Vomiting     Grogginess, severe back pain  Grogginess, severe back pain     Vicodin [Hydrocodone-Acetaminophen] Nausea and Vomiting       Problem List:    Patient Active Problem List    Diagnosis Date Noted     History of gestational diabetes mellitus (GDM) 08/15/2018     Priority: Medium     Amenorrhea 08/15/2018     Priority: Medium     Crohn's disease of both small and large intestine without complication (H) 07/31/2018     Priority: Medium     Pain medication agreement 08/18/2017     Priority: Medium     Overview:   [facility]       Controlled substance agreement signed 07/19/2017     Priority: Medium     Degenerative lumbar disc 03/07/2017     Priority: Medium     Labral tear of " hip, degenerative 03/07/2017     Priority: Medium     Pain of right hip joint 03/07/2017     Priority: Medium     S/P LEEP of cervix 12/15/2015     Priority: Medium     S/p LEEP of cervix - date unknown  12/9/15: Pap - NIL, Neg HPV. Plan cotest in 1 year. If JAYME 2/3 on biopsy - PAP/HPV co-testing at 12, 24 months. If two negative results repeat co-testing in 3 years, if negative then routine screening.  3/7/18 Pap: NIL/neg HPV.             Hearing difficulty 10/09/2014     Priority: Medium     Irritable bowel syndrome (IBS) 04/15/2014     Priority: Medium     Tiffany raw vegetables       Lactose intolerance 04/15/2014     Priority: Medium     Abdominal pain, right upper quadrant 03/06/2014     Priority: Medium     Nausea with vomiting 01/31/2014     Priority: Medium     Chronic low back pain 11/05/2012     Priority: Medium     Follows with pain clinic.       Mild persistent asthma 11/05/2012     Priority: Medium     Discogenic pain 10/08/2012     Priority: Medium     Abdominal pain 08/30/2012     Priority: Medium     S/P oophorectomy 07/20/2012     Priority: Medium     History of cholecystectomy 07/20/2012     Priority: Medium     History of resection of small bowel 07/20/2012     Priority: Medium     Tobacco abuse 05/25/2012     Priority: Medium     Displacement of lumbar intervertebral disc without myelopathy 01/06/2012     Priority: Medium     Disorder of sacrum 12/13/2011     Priority: Medium     Problem list name updated by automated process. Provider to review       Back pain 11/09/2011     Priority: Medium     Obesity 11/09/2011     Priority: Medium     Migraine headache 09/14/2011     Priority: Medium     Patient given Migraine Education folder and Migraine Action Plan on September 14, 2011. Cammy Anderson RN    (Problem list name updated by automated process. Provider to review and confirm.)       CARDIOVASCULAR SCREENING; LDL GOAL LESS THAN 160 10/31/2010     Priority: Medium     Dysmenorrhea 10/05/2010      Priority: Medium     Female pelvic pain 2010     Priority: Medium     (Problem list name updated by automated process. Provider to review and confirm.)       Crohns disease (H) 2009     Priority: Medium        Past Medical History:    Past Medical History:   Diagnosis Date     Back pain      Crohn's disease (H)      Exercise-induced asthma      Gestational diabetes      Herniation of intervertebral disc of lumbar region      Infertility      Ovarian cyst      Smoker      Status post cholecystectomy 2005       Past Surgical History:    Past Surgical History:   Procedure Laterality Date     APPENDECTOMY       C/SECTION, LOW TRANSVERSE      , Low Transverse     CHOLECYSTECTOMY, LAPOROSCOPIC  2005    Cholecystectomy, Laparoscopic     COLONOSCOPY       ESOPHAGOSCOPY, GASTROSCOPY, DUODENOSCOPY (EGD), COMBINED  3/6/2014    Procedure: COMBINED ESOPHAGOSCOPY, GASTROSCOPY, DUODENOSCOPY (EGD), BIOPSY SINGLE OR MULTIPLE;  COLONOSCOPY/ UPPER GI ENDOSCOPY/ COLON/ EGD/ Abdominal pain, epigastric/ [facility];  Surgeon: West Lomas MD;  Location:  OR      HYSTEROSCOPY, SURGICAL; W/ ENDOMETRIAL ABLATION, ANY METHOD  2010     HERNIORRHAPHY VENTRAL N/A 2018    Procedure: Open ventral hernia repair;  Surgeon: Alonso Bills MD;  Location: [facility]     HYSTERECTOMY, SUPRACERVICAL LAPAROSCOPIC       LEEP TX, CERVICAL      LEEP TX Cervical     ORTHOPEDIC SURGERY      bluged disk     partial small bowel resection      Ileo-colonic resection. Crohn's disease surgery for bowel obstruction. this was a section of small bowel and large bowel and the cecum., all removed and a reanastamosis done successfully     SALPINGO OOPHORECTOMY,R/L/TORI      Right ovary     TUBAL LIGATION         Family History:    Family History   Problem Relation Age of Onset     Cancer Mother         cervical     Lipids Mother      Eye Disorder Father      Lipids Father      Alcohol/Drug Maternal Grandmother       Diabetes Paternal Grandmother      Cancer - colorectal Paternal Grandmother      Eye Disorder Paternal Grandmother      Diabetes Paternal Grandfather      Eye Disorder Paternal Grandfather      Inflammatory Bowel Disease Paternal Aunt         and two paternal uncles       Social History:  Marital Status:   [2]  Social History     Tobacco Use     Smoking status: Current Every Day Smoker     Packs/day: 0.50     Types: Cigarettes     Smokeless tobacco: Never Used   Substance Use Topics     Alcohol use: Yes     Alcohol/week: 0.0 oz     Comment: rare     Drug use: No        Medications:      albuterol (PROAIR HFA) 108 (90 Base) MCG/ACT Inhaler   azaTHIOprine (IMURAN) 50 MG tablet   clindamycin (CLINDAMAX) 1 % external gel   cyanocobalamin (VITAMIN B12) 1000 MCG/ML injection   fentaNYL (DURAGESIC) 12 mcg/hr patch 72 hr   fentaNYL (DURAGESIC) 25 mcg/hr patch 72 hr   fluconazole (DIFLUCAN) 150 MG tablet   minocycline (DYNACIN) 100 MG tablet   montelukast (SINGULAIR) 10 MG tablet   oxyCODONE-acetaminophen (PERCOCET) 7.5-325 MG per tablet   predniSONE (DELTASONE) 20 MG tablet     Review of Systems  CONSTITUTIONAL:NEGATIVE for fever, chills, change in weight  INTEGUMENTARY/SKIN: NEGATIVE for worrisome rashes, moles or lesions  EYES: NEGATIVE for vision changes or irritation  ENT/MOUTH: NEGATIVE for ear, mouth and throat problems  RESP:NEGATIVE for significant cough or SOB  GI: POSITIVE for diarrhea, resolved nausea, and vomiting and NEGATIVE for abdominal pain, melena, hematochezia   Physical Exam   BP: (!) 140/94  Pulse: 106  Temp: 99.8  F (37.7  C)  Resp: 16  Weight: 68 kg (150 lb)  SpO2: 98 %  Physical Exam  GENERAL APPEARANCE: healthy, alert and no distress  RESP: lungs clear to auscultation - no rales, rhonchi or wheezes  CV: regular rates and rhythm, normal S1 S2, no murmur noted  ABDOMEN:  soft, nontender, no HSM or masses and bowel sounds normal  SKIN: no suspicious lesions or rashes  ED Course         Procedures        Critical Care time:  none        No results found for this or any previous visit (from the past 24 hour(s)).    Medications - No data to display    Assessments & Plan (with Medical Decision Making)     I have reviewed the nursing notes.  I have reviewed the findings, diagnosis, plan and need for follow up with the patient.          Medication List      ASK your doctor about these medications    cefuroxime 500 MG tablet  Commonly known as:  CEFTIN  500 mg, Oral, 2 TIMES DAILY  Ask about: Should I take this medication?     oxyCODONE-acetaminophen 5-325 MG tablet  Commonly known as:  PERCOCET  1 tablet, Oral, EVERY 6 HOURS PRN  Ask about: Should I take this medication?          Final diagnoses:   Gastroenteritis     38-year-old female presents to urgent care requesting a note to return to work after having GI symptoms for the last 3 days.  She had elevated heart rate upon arrival, remainder of physical exam findings as described above were benign.  History of physical exam is consistent with gastroenteritis, which strongly also consider possibility of food poisoning.  She was discharged home stable with instructions for good hygiene, may return to work tomorrow as long as she continues to improve, note provided stating this.  Follow up as needed if new or worsening symptoms develop.      Disclaimer: This note consists of symbols derived from keyboarding, dictation, and/or voice recognition software. As a result, there may be errors in the script that have gone undetected.  Please consider this when interpreting information found in the chart.    2/27/2019   [facility] EMERGENCY DEPARTMENT     Denise Juarez PA-C  02/27/19 3228

## 2019-04-24 ENCOUNTER — NURSE TRIAGE (OUTPATIENT)
Dept: NURSING | Facility: CLINIC | Age: 39
End: 2019-04-24

## 2019-04-24 NOTE — TELEPHONE ENCOUNTER
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 She found a patch on her neck, front left side.  She had the school RN look at it to confirm it. It's a round raised area that it like a patch of dry skin.  She will obtain the over the counter treatment.  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

## 2019-05-02 DIAGNOSIS — K50.80 CROHN'S DISEASE OF BOTH SMALL AND LARGE INTESTINE (H): ICD-10-CM

## 2019-05-02 DIAGNOSIS — K50.80 CROHN'S DISEASE OF BOTH SMALL AND LARGE INTESTINE (H): Primary | ICD-10-CM

## 2019-05-02 DIAGNOSIS — K50.90 CROHN'S DISEASE (H): Primary | ICD-10-CM

## 2019-05-02 LAB
ALBUMIN SERPL-MCNC: 4 G/DL (ref 3.4–5)
ALP SERPL-CCNC: 48 U/L (ref 40–150)
ALT SERPL W P-5'-P-CCNC: 14 U/L (ref 0–50)
AST SERPL W P-5'-P-CCNC: 9 U/L (ref 0–45)
BASOPHILS # BLD AUTO: 0 10E9/L (ref 0–0.2)
BASOPHILS NFR BLD AUTO: 0.5 %
BILIRUB DIRECT SERPL-MCNC: 0.2 MG/DL (ref 0–0.2)
BILIRUB SERPL-MCNC: 0.6 MG/DL (ref 0.2–1.3)
DIFFERENTIAL METHOD BLD: ABNORMAL
EOSINOPHIL # BLD AUTO: 0.1 10E9/L (ref 0–0.7)
EOSINOPHIL NFR BLD AUTO: 1 %
ERYTHROCYTE [DISTWIDTH] IN BLOOD BY AUTOMATED COUNT: 12.6 % (ref 10–15)
HCT VFR BLD AUTO: 38.5 % (ref 35–47)
HGB BLD-MCNC: 13.5 G/DL (ref 11.7–15.7)
LYMPHOCYTES # BLD AUTO: 3 10E9/L (ref 0.8–5.3)
LYMPHOCYTES NFR BLD AUTO: 36.9 %
MCH RBC QN AUTO: 34.8 PG (ref 26.5–33)
MCHC RBC AUTO-ENTMCNC: 35.1 G/DL (ref 31.5–36.5)
MCV RBC AUTO: 99 FL (ref 78–100)
MONOCYTES # BLD AUTO: 0.5 10E9/L (ref 0–1.3)
MONOCYTES NFR BLD AUTO: 5.5 %
NEUTROPHILS # BLD AUTO: 4.6 10E9/L (ref 1.6–8.3)
NEUTROPHILS NFR BLD AUTO: 56.1 %
PLATELET # BLD AUTO: 250 10E9/L (ref 150–450)
PROT SERPL-MCNC: 7.7 G/DL (ref 6.8–8.8)
RBC # BLD AUTO: 3.88 10E12/L (ref 3.8–5.2)
WBC # BLD AUTO: 8.2 10E9/L (ref 4–11)

## 2019-05-02 PROCEDURE — 36415 COLL VENOUS BLD VENIPUNCTURE: CPT | Performed by: FAMILY MEDICINE

## 2019-05-02 PROCEDURE — 85025 COMPLETE CBC W/AUTO DIFF WBC: CPT | Performed by: FAMILY MEDICINE

## 2019-05-02 PROCEDURE — 80076 HEPATIC FUNCTION PANEL: CPT | Performed by: FAMILY MEDICINE

## 2019-07-12 ENCOUNTER — HOSPITAL ENCOUNTER (EMERGENCY)
Facility: CLINIC | Age: 39
Discharge: HOME | End: 2019-07-12
Attending: PHYSICIAN ASSISTANT | Admitting: PHYSICIAN ASSISTANT
Payer: COMMERCIAL

## 2019-07-12 VITALS
TEMPERATURE: 97.4 F | WEIGHT: 145 LBS | SYSTOLIC BLOOD PRESSURE: 137 MMHG | RESPIRATION RATE: 16 BRPM | HEIGHT: 62 IN | OXYGEN SATURATION: 100 % | BODY MASS INDEX: 26.68 KG/M2 | DIASTOLIC BLOOD PRESSURE: 86 MMHG

## 2019-07-12 DIAGNOSIS — K13.0 ANGULAR CHEILITIS: ICD-10-CM

## 2019-07-12 PROCEDURE — 99214 OFFICE O/P EST MOD 30 MIN: CPT | Mod: Z6 | Performed by: PHYSICIAN ASSISTANT

## 2019-07-12 PROCEDURE — G0463 HOSPITAL OUTPT CLINIC VISIT: HCPCS | Performed by: PHYSICIAN ASSISTANT

## 2019-07-12 RX ORDER — MUPIROCIN 20 MG/G
OINTMENT TOPICAL 3 TIMES DAILY
Qty: 22 G | Refills: 0 | Status: SHIPPED | OUTPATIENT
Start: 2019-07-12 | End: 2019-10-28

## 2019-07-12 ASSESSMENT — MIFFLIN-ST. JEOR: SCORE: 1280.97

## 2019-07-12 NOTE — ED PROVIDER NOTES
"  History     Chief Complaint   Patient presents with     Mouth Problem     HPI  Ana Felix is a 38 year old female who presents to the urgent care with concern over rash present at the corners of her mouths bilaterally.  She reports that she first developed symptoms on the right side approximately two weeks ago.  Since then is also developed lesions on the left side of the mouth as well.  She states that it feels painful, burning to come to contact with food.  The one in the right side has starts to spread gradually toward the edge of her right lower lip.  She denies any other associated symptoms.  She has not had any recent fever, chills ,myalgias, nasal congestion, cough,  Dyspnea, wheezing or new onset abdominal complaints.  She reports she initially thought that she might be getting a cold sore which she has had previously however she has never had it in this location and never lasted this long.  She attempted to treat with over-the-counter moisturizer/Vaseline without relief.      Allergies:  Allergies   Allergen Reactions     Infliximab Hives     Sulfa Drugs [Sulfa Drugs] Nausea     Pt states \"it doesn't work for me\"     Sulfacetamide Nausea     Amoxicillin-Pot Clavulanate Nausea and Vomiting     Augmentin Other (See Comments)     Crohn's     Bupropion Hives and Nausea     Droperidol Other (See Comments)     Dystonic reaction     Ibuprofen Other (See Comments)     Crohn's       Lyrica [Pregabalin] Nausea and Vomiting     Grogginess, severe back pain  Grogginess, severe back pain     Vicodin [Hydrocodone-Acetaminophen] Nausea and Vomiting       Problem List:    Patient Active Problem List    Diagnosis Date Noted     History of gestational diabetes mellitus (GDM) 08/15/2018     Priority: Medium     Amenorrhea 08/15/2018     Priority: Medium     Crohn's disease of both small and large intestine without complication (H) 07/31/2018     Priority: Medium     Pain medication agreement 08/18/2017     Priority: Medium "     Overview:   [facility]       Controlled substance agreement signed 07/19/2017     Priority: Medium     Degenerative lumbar disc 03/07/2017     Priority: Medium     Labral tear of hip, degenerative 03/07/2017     Priority: Medium     Pain of right hip joint 03/07/2017     Priority: Medium     S/P LEEP of cervix 12/15/2015     Priority: Medium     S/p LEEP of cervix - date unknown  12/9/15: Pap - NIL, Neg HPV. Plan cotest in 1 year. If JAYME 2/3 on biopsy - PAP/HPV co-testing at 12, 24 months. If two negative results repeat co-testing in 3 years, if negative then routine screening.  3/7/18 Pap: NIL/neg HPV.             Hearing difficulty 10/09/2014     Priority: Medium     Irritable bowel syndrome (IBS) 04/15/2014     Priority: Medium     Tiffany raw vegetables       Lactose intolerance 04/15/2014     Priority: Medium     Abdominal pain, right upper quadrant 03/06/2014     Priority: Medium     Nausea with vomiting 01/31/2014     Priority: Medium     Chronic low back pain 11/05/2012     Priority: Medium     Follows with pain clinic.       Mild persistent asthma 11/05/2012     Priority: Medium     Discogenic pain 10/08/2012     Priority: Medium     Abdominal pain 08/30/2012     Priority: Medium     S/P oophorectomy 07/20/2012     Priority: Medium     History of cholecystectomy 07/20/2012     Priority: Medium     History of resection of small bowel 07/20/2012     Priority: Medium     Tobacco abuse 05/25/2012     Priority: Medium     Displacement of lumbar intervertebral disc without myelopathy 01/06/2012     Priority: Medium     Disorder of sacrum 12/13/2011     Priority: Medium     Problem list name updated by automated process. Provider to review       Back pain 11/09/2011     Priority: Medium     Obesity 11/09/2011     Priority: Medium     Migraine headache 09/14/2011     Priority: Medium     Patient given Migraine Education folder and Migraine Action Plan on September 14, 2011. Cammy Anderson RN    (Problem list  name updated by automated process. Provider to review and confirm.)       CARDIOVASCULAR SCREENING; LDL GOAL LESS THAN 160 10/31/2010     Priority: Medium     Dysmenorrhea 10/05/2010     Priority: Medium     Female pelvic pain 2010     Priority: Medium     (Problem list name updated by automated process. Provider to review and confirm.)       Crohns disease (H) 2009     Priority: Medium        Past Medical History:    Past Medical History:   Diagnosis Date     Back pain      Crohn's disease (H)      Exercise-induced asthma      Gestational diabetes      Herniation of intervertebral disc of lumbar region      Infertility      Ovarian cyst      Smoker      Status post cholecystectomy 2005       Past Surgical History:    Past Surgical History:   Procedure Laterality Date     APPENDECTOMY       C/SECTION, LOW TRANSVERSE      , Low Transverse     CHOLECYSTECTOMY, LAPOROSCOPIC  2005    Cholecystectomy, Laparoscopic     COLONOSCOPY       ESOPHAGOSCOPY, GASTROSCOPY, DUODENOSCOPY (EGD), COMBINED  3/6/2014    Procedure: COMBINED ESOPHAGOSCOPY, GASTROSCOPY, DUODENOSCOPY (EGD), BIOPSY SINGLE OR MULTIPLE;  COLONOSCOPY/ UPPER GI ENDOSCOPY/ COLON/ EGD/ Abdominal pain, epigastric/ [facility];  Surgeon: West Lomas MD;  Location:  OR      HYSTEROSCOPY, SURGICAL; W/ ENDOMETRIAL ABLATION, ANY METHOD  2010     HERNIORRHAPHY VENTRAL N/A 2018    Procedure: Open ventral hernia repair;  Surgeon: Alonso Bills MD;  Location: [facility] OR     HYSTERECTOMY, SUPRACERVICAL LAPAROSCOPIC       LEEP TX, CERVICAL      LEEP TX Cervical     ORTHOPEDIC SURGERY      bluged disk     partial small bowel resection      Ileo-colonic resection. Crohn's disease surgery for bowel obstruction. this was a section of small bowel and large bowel and the cecum., all removed and a reanastamosis done successfully     SALPINGO OOPHORECTOMY,R/L/TORI      Right ovary     TUBAL LIGATION         Family History:    Family  "History   Problem Relation Age of Onset     Cancer Mother         cervical     Lipids Mother      Eye Disorder Father      Lipids Father      Alcohol/Drug Maternal Grandmother      Diabetes Paternal Grandmother      Cancer - colorectal Paternal Grandmother      Eye Disorder Paternal Grandmother      Diabetes Paternal Grandfather      Eye Disorder Paternal Grandfather      Inflammatory Bowel Disease Paternal Aunt         and two paternal uncles       Social History:  Marital Status:   [2]  Social History     Tobacco Use     Smoking status: Current Every Day Smoker     Packs/day: 0.50     Types: Cigarettes     Smokeless tobacco: Never Used   Substance Use Topics     Alcohol use: Yes     Alcohol/week: 0.0 oz     Comment: rare     Drug use: No        Medications:      mupirocin (BACTROBAN) 2 % external ointment   albuterol (PROAIR HFA) 108 (90 Base) MCG/ACT Inhaler   azaTHIOprine (IMURAN) 50 MG tablet   clindamycin (CLINDAMAX) 1 % external gel   cyanocobalamin (VITAMIN B12) 1000 MCG/ML injection   fentaNYL (DURAGESIC) 12 mcg/hr patch 72 hr   fentaNYL (DURAGESIC) 25 mcg/hr patch 72 hr   fluconazole (DIFLUCAN) 150 MG tablet   montelukast (SINGULAIR) 10 MG tablet   oxyCODONE-acetaminophen (PERCOCET) 7.5-325 MG per tablet   predniSONE (DELTASONE) 20 MG tablet     Review of Systems  CONSTITUTIONAL:NEGATIVE for fever, chills, change in weight  INTEGUMENTARY/SKIN: POSITIVE for rash on face NEGATIVE for erythema, worrisome rashes   EYES: NEGATIVE for vision changes or irritation  ENT/MOUTH: POSITIVE for rash on lips NEGATIVE for sore throat, nasal congestion, ear pain  RESP:NEGATIVE for significant cough or SOB  GI: NEGATIVE for nausea, abdominal pain, heartburn, or change in bowel habits  Physical Exam   BP: 137/86  Heart Rate: 95  Temp: 97.4  F (36.3  C)  Resp: 16  Height: 157.5 cm (5' 2\")  Weight: 65.8 kg (145 lb)  SpO2: 100 %  Physical Exam  GENERAL APPEARANCE: healthy, alert and no distress  EYES: EOMI,  PERRL, " conjunctiva clear  HENT: ear canals and TM's normal.  Nasal was moist.  Posterior pharynx is nonerythematous without exudate.  There is some cracking at the angle of the lips bilaterally with several small papular erythematous lesions on the lateral right lower lip margin  NECK: supple, nontender, no lymphadenopathy  RESP: lungs clear to auscultation - no rales, rhonchi or wheezes  CV: regular rates and rhythm, normal S1 S2, no murmur noted  SKIN: no suspicious lesions or rashes  ED Course        Procedures        Critical Care time:  none           No results found for this or any previous visit (from the past 24 hour(s)).    Medications - No data to display    Assessments & Plan (with Medical Decision Making)     I have reviewed the nursing notes.    I have reviewed the findings, diagnosis, plan and need for follow up with the patient.          Medication List      Started    mupirocin 2 % external ointment  Commonly known as:  BACTROBAN  Topical, 3 TIMES DAILY          Final diagnoses:   Angular cheilitis     38-year-old female presents to the urgent care with concern over rash that developed at the corners of mouth bilaterally approximately 2 weeks ago.  She had stable vital signs upon arrival.  Physical exam findings as stated above appeared most consistent with angular cheilitis.  I do not suspect HSV, perioral dermatitis at this time.  Patient was discharged home stable with prescription for Bactroban ointment.  Follow-up with primary care provider if no improvement within the next 5 to 7 days.  Worrisome reasons to return to the ER/UC sooner discussed.    Disclaimer: This note consists of symbols derived from keyboarding, dictation, and/or voice recognition software. As a result, there may be errors in the script that have gone undetected.  Please consider this when interpreting information found in the chart.      7/12/2019   [facility] EMERGENCY DEPARTMENT     Denise Juarez PA-C  07/17/19 1138

## 2019-07-12 NOTE — ED AVS SNAPSHOT
[facility] Emergency Department  [address] [address]  Phone:  [phone]  Fax:  [phone]                                    Ana Felix   MRN: [id number]    Department:  [facility] Emergency Department   Date of Visit:  7/12/2019           After Visit Summary Signature Page    I have received my discharge instructions, and my questions have been answered. I have discussed any challenges I see with this plan with the nurse or doctor.    ..........................................................................................................................................  Patient/Patient Representative Signature      ..........................................................................................................................................  Patient Representative Print Name and Relationship to Patient    ..................................................               ................................................  Date                                   Time    ..........................................................................................................................................  Reviewed by Signature/Title    ...................................................              ..............................................  Date                                               Time          22EPIC Rev 08/18

## 2019-07-30 DIAGNOSIS — J45.30 MILD PERSISTENT ASTHMA, UNCOMPLICATED: ICD-10-CM

## 2019-07-30 NOTE — LETTER
August 1, 2019    Ana Felix  [address]  [place] [address]    Dear Ana,       We recently received a refill request for montelukast (SINGULAIR) 10 MG tablet.  We have refilled this for a one time 30 day supply only because you are due for a:    Asthma office visit      Please call at your earliest convenience so that there will not be a delay with your future refills.          Thank you,   Your [facility] Team/  [phone]

## 2019-07-31 RX ORDER — MONTELUKAST SODIUM 10 MG/1
TABLET ORAL
Qty: 30 TABLET | Refills: 0 | Status: SHIPPED | OUTPATIENT
Start: 2019-07-31 | End: 2019-11-13

## 2019-07-31 NOTE — TELEPHONE ENCOUNTER
Medication is being filled for 1 time refill only due to:  Patient needs to be seen because due for asthma ov. .   Rachell Lancaster BSN, RN

## 2019-09-03 DIAGNOSIS — J45.30 MILD PERSISTENT ASTHMA, UNCOMPLICATED: ICD-10-CM

## 2019-09-09 RX ORDER — MONTELUKAST SODIUM 10 MG/1
TABLET ORAL
Qty: 30 TABLET | Refills: 0 | OUTPATIENT
Start: 2019-09-09

## 2019-09-09 NOTE — TELEPHONE ENCOUNTER
"Routing refill request to provider for review/approval because:  Jazmín given x1 and patient did not follow up, please advise      Requested Prescriptions   Pending Prescriptions Disp Refills     montelukast (SINGULAIR) 10 MG tablet [Pharmacy Med Name: MONTELUKAST 10MG TABLETS] 30 tablet 0     Sig: TAKE 1 TABLET(10 MG) BY MOUTH DAILY       Leukotriene Inhibitors Protocol Failed - 9/3/2019  3:11 PM        Failed - Asthma control assessment score within normal limits in last 6 months     Please review ACT score.           Failed - Recent (6 mo) or future (30 days) visit within the authorizing provider's specialty     Patient had office visit in the last 6 months or has a visit in the next 30 days with authorizing provider or within the authorizing provider's specialty.  See \"Patient Info\" tab in inbasket, or \"Choose Columns\" in Meds & Orders section of the refill encounter.            Passed - Patient is age 12 or older     If patient is under 16, ok to refill using age based dosing.           Passed - Medication is active on med list        JUSTUS Patton, RN    "

## 2019-09-10 NOTE — TELEPHONE ENCOUNTER
Patient called and left a message on my voicemail after I was gone yesterday. Called and left patient a message that an appointment is needed to refill her medication.Liz Linares MA/TC

## 2019-10-21 ENCOUNTER — HOSPITAL ENCOUNTER (EMERGENCY)
Facility: CLINIC | Age: 39
Discharge: HOME | End: 2019-10-21
Attending: PHYSICIAN ASSISTANT | Admitting: PHYSICIAN ASSISTANT
Payer: OTHER MISCELLANEOUS

## 2019-10-21 ENCOUNTER — APPOINTMENT (OUTPATIENT)
Dept: GENERAL RADIOLOGY | Facility: CLINIC | Age: 39
End: 2019-10-21
Attending: PHYSICIAN ASSISTANT
Payer: OTHER MISCELLANEOUS

## 2019-10-21 VITALS
DIASTOLIC BLOOD PRESSURE: 106 MMHG | TEMPERATURE: 97.9 F | BODY MASS INDEX: 27.23 KG/M2 | HEIGHT: 62 IN | SYSTOLIC BLOOD PRESSURE: 147 MMHG | OXYGEN SATURATION: 99 % | WEIGHT: 148 LBS | HEART RATE: 94 BPM | RESPIRATION RATE: 16 BRPM

## 2019-10-21 DIAGNOSIS — M77.11 RIGHT LATERAL EPICONDYLITIS: ICD-10-CM

## 2019-10-21 PROCEDURE — 73070 X-RAY EXAM OF ELBOW: CPT | Mod: RT

## 2019-10-21 PROCEDURE — G0463 HOSPITAL OUTPT CLINIC VISIT: HCPCS | Performed by: PHYSICIAN ASSISTANT

## 2019-10-21 PROCEDURE — 99213 OFFICE O/P EST LOW 20 MIN: CPT | Mod: Z6 | Performed by: PHYSICIAN ASSISTANT

## 2019-10-21 ASSESSMENT — MIFFLIN-ST. JEOR: SCORE: 1294.57

## 2019-10-21 NOTE — ED AVS SNAPSHOT
[facility] Emergency Department  [address] [address]  Phone:  [phone]  Fax:  [phone]                                    Ana Felix   MRN: [id number]    Department:  [facility] Emergency Department   Date of Visit:  10/21/2019           After Visit Summary Signature Page    I have received my discharge instructions, and my questions have been answered. I have discussed any challenges I see with this plan with the nurse or doctor.    ..........................................................................................................................................  Patient/Patient Representative Signature      ..........................................................................................................................................  Patient Representative Print Name and Relationship to Patient    ..................................................               ................................................  Date                                   Time    ..........................................................................................................................................  Reviewed by Signature/Title    ...................................................              ..............................................  Date                                               Time          22EPIC Rev 08/18

## 2019-10-21 NOTE — ED PROVIDER NOTES
"  History     Chief Complaint   Patient presents with     Wrist Pain     right wrist and elbow pain for last couple weeks from a work injury     HPI  Ana Felix is a 38 year old female who presents with complaints of right elbow pain for the past 1 to 2 weeks.  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  Patient states the pain is localized to her lateral right elbow and radiates into her right forearm and wrist area.  The pain is worse with movements of this arm.  She has tried over-the-counter anti-inflammatories without improvement.  She has tried resting and icing the area as well.  Patient is left-hand dominant.      Allergies:  Allergies   Allergen Reactions     Infliximab Hives     Sulfa Drugs [Sulfa Drugs] Nausea     Pt states \"it doesn't work for me\"     Sulfacetamide Nausea     Amoxicillin-Pot Clavulanate Nausea and Vomiting     Augmentin Other (See Comments)     Crohn's     Bupropion Hives and Nausea     Droperidol Other (See Comments)     Dystonic reaction     Ibuprofen Other (See Comments)     Crohn's       Lyrica [Pregabalin] Nausea and Vomiting     Grogginess, severe back pain  Grogginess, severe back pain     Vicodin [Hydrocodone-Acetaminophen] Nausea and Vomiting       Problem List:    Patient Active Problem List    Diagnosis Date Noted     History of gestational diabetes mellitus (GDM) 08/15/2018     Priority: Medium     Amenorrhea 08/15/2018     Priority: Medium     Crohn's disease of both small and large intestine without complication (H) 07/31/2018     Priority: Medium     Pain medication agreement 08/18/2017     Priority: Medium     Overview:   [facility]       Controlled substance agreement signed 07/19/2017     Priority: Medium     Degenerative lumbar disc " 03/07/2017     Priority: Medium     Labral tear of hip, degenerative 03/07/2017     Priority: Medium     Pain of right hip joint 03/07/2017     Priority: Medium     S/P LEEP of cervix 12/15/2015     Priority: Medium     S/p LEEP of cervix - date unknown  12/9/15: Pap - NIL, Neg HPV. Plan cotest in 1 year. If JAYME 2/3 on biopsy - PAP/HPV co-testing at 12, 24 months. If two negative results repeat co-testing in 3 years, if negative then routine screening.  3/7/18 Pap: NIL/neg HPV.             Hearing difficulty 10/09/2014     Priority: Medium     Irritable bowel syndrome (IBS) 04/15/2014     Priority: Medium     Tiffany raw vegetables       Lactose intolerance 04/15/2014     Priority: Medium     Abdominal pain, right upper quadrant 03/06/2014     Priority: Medium     Nausea with vomiting 01/31/2014     Priority: Medium     Chronic low back pain 11/05/2012     Priority: Medium     Follows with pain clinic.       Mild persistent asthma 11/05/2012     Priority: Medium     Discogenic pain 10/08/2012     Priority: Medium     Abdominal pain 08/30/2012     Priority: Medium     S/P oophorectomy 07/20/2012     Priority: Medium     History of cholecystectomy 07/20/2012     Priority: Medium     History of resection of small bowel 07/20/2012     Priority: Medium     Tobacco abuse 05/25/2012     Priority: Medium     Displacement of lumbar intervertebral disc without myelopathy 01/06/2012     Priority: Medium     Disorder of sacrum 12/13/2011     Priority: Medium     Problem list name updated by automated process. Provider to review       Back pain 11/09/2011     Priority: Medium     Obesity 11/09/2011     Priority: Medium     Migraine headache 09/14/2011     Priority: Medium     Patient given Migraine Education folder and Migraine Action Plan on September 14, 2011. Cammy Anderson RN    (Problem list name updated by automated process. Provider to review and confirm.)       CARDIOVASCULAR SCREENING; LDL GOAL LESS THAN 160 10/31/2010      Priority: Medium     Dysmenorrhea 10/05/2010     Priority: Medium     Female pelvic pain 2010     Priority: Medium     (Problem list name updated by automated process. Provider to review and confirm.)       Crohns disease 2009     Priority: Medium        Past Medical History:    Past Medical History:   Diagnosis Date     Back pain      Crohn's disease (H)      Exercise-induced asthma      Gestational diabetes      Herniation of intervertebral disc of lumbar region      Infertility      Ovarian cyst      Smoker      Status post cholecystectomy 2005       Past Surgical History:    Past Surgical History:   Procedure Laterality Date     APPENDECTOMY       C/SECTION, LOW TRANSVERSE      , Low Transverse     CHOLECYSTECTOMY, LAPOROSCOPIC  2005    Cholecystectomy, Laparoscopic     COLONOSCOPY       ESOPHAGOSCOPY, GASTROSCOPY, DUODENOSCOPY (EGD), COMBINED  3/6/2014    Procedure: COMBINED ESOPHAGOSCOPY, GASTROSCOPY, DUODENOSCOPY (EGD), BIOPSY SINGLE OR MULTIPLE;  COLONOSCOPY/ UPPER GI ENDOSCOPY/ COLON/ EGD/ Abdominal pain, epigastric/ [facility];  Surgeon: West Lomas MD;  Location: [facility] OR      HYSTEROSCOPY, SURGICAL; W/ ENDOMETRIAL ABLATION, ANY METHOD  2010     HERNIORRHAPHY VENTRAL N/A 2018    Procedure: Open ventral hernia repair;  Surgeon: Alonso Bills MD;  Location: [facility] OR     HYSTERECTOMY, SUPRACERVICAL LAPAROSCOPIC       LEEP TX, CERVICAL      LEEP TX Cervical     ORTHOPEDIC SURGERY      bluged disk     partial small bowel resection      Ileo-colonic resection. Crohn's disease surgery for bowel obstruction. this was a section of small bowel and large bowel and the cecum., all removed and a reanastamosis done successfully     SALPINGO OOPHORECTOMY,R/L/TORI      Right ovary     TUBAL LIGATION         Family History:    Family History   Problem Relation Age of Onset     Cancer Mother         cervical     Lipids Mother      Eye Disorder Father      Lipids Father       "Alcohol/Drug Maternal Grandmother      Diabetes Paternal Grandmother      Cancer - colorectal Paternal Grandmother      Eye Disorder Paternal Grandmother      Diabetes Paternal Grandfather      Eye Disorder Paternal Grandfather      Inflammatory Bowel Disease Paternal Aunt         and two paternal uncles       Social History:  Marital Status:   [2]  Social History     Tobacco Use     Smoking status: Current Every Day Smoker     Packs/day: 0.50     Types: Cigarettes     Smokeless tobacco: Never Used   Substance Use Topics     Alcohol use: Yes     Alcohol/week: 0.0 standard drinks     Comment: rare     Drug use: No        Medications:    albuterol (PROAIR HFA) 108 (90 Base) MCG/ACT Inhaler  azaTHIOprine (IMURAN) 50 MG tablet  clindamycin (CLINDAMAX) 1 % external gel  cyanocobalamin (VITAMIN B12) 1000 MCG/ML injection  fentaNYL (DURAGESIC) 12 mcg/hr patch 72 hr  fentaNYL (DURAGESIC) 25 mcg/hr patch 72 hr  fluconazole (DIFLUCAN) 150 MG tablet  montelukast (SINGULAIR) 10 MG tablet  mupirocin (BACTROBAN) 2 % external ointment  oxyCODONE-acetaminophen (PERCOCET) 7.5-325 MG per tablet  predniSONE (DELTASONE) 20 MG tablet          Review of Systems   Constitutional: Negative.    Musculoskeletal:        Right elbow pain   Skin: Negative.    Neurological: Negative.    All other systems reviewed and are negative.      Physical Exam   BP: (!) 147/106  Pulse: 94  Temp: 97.9  F (36.6  C)  Resp: 16  Height: 157.5 cm (5' 2\")  Weight: 67.1 kg (148 lb)(stated)  SpO2: 99 %      Physical Exam  Constitutional:       General: She is not in acute distress.     Appearance: She is well-developed. She is not ill-appearing, toxic-appearing or diaphoretic.   HENT:      Head: Normocephalic and atraumatic.   Eyes:      Conjunctiva/sclera: Conjunctivae normal.   Neck:      Musculoskeletal: Neck supple.   Cardiovascular:      Pulses: Normal pulses.   Pulmonary:      Effort: Pulmonary effort is normal.   Musculoskeletal: Normal range of " motion.      Right elbow: She exhibits normal range of motion, no swelling, no effusion, no deformity and no laceration. Tenderness found. Lateral epicondyle tenderness noted. No radial head, no medial epicondyle and no olecranon process tenderness noted.      Right wrist: Normal. She exhibits normal range of motion, no tenderness, no bony tenderness and no swelling.      Right forearm: Normal. She exhibits no tenderness, no bony tenderness and no swelling.      Right hand: Normal. She exhibits normal range of motion, no tenderness, no bony tenderness, normal capillary refill and no swelling. Normal sensation noted. Normal strength noted.   Skin:     General: Skin is warm and dry.      Capillary Refill: Capillary refill takes less than 2 seconds.      Findings: No rash.   Neurological:      Mental Status: She is alert.      Sensory: Sensation is intact.      Motor: Motor function is intact.         ED Course        Procedures    Results for orders placed or performed during the hospital encounter of 10/21/19 (from the past 24 hour(s))   Elbow XR, 2 views, right    Narrative    XR ELBOW RT 2 VW 10/21/2019 4:50 PM     HISTORY: lateral tenderness    COMPARISON: None.    FINDINGS: There is no significant degenerative change. No posterior  fat-pad is seen. No convincing anterior sail sign is seen. There is no  acute fracture or dislocation. There are no worrisome bony lesions.       Impression    IMPRESSION: No acute osseous abnormality demonstrated.     RICHARD MENON MD       Medications - No data to display    Assessments & Plan (with Medical Decision Making)     Pt is a 38 year old female who presents with complaints of right elbow pain for the past 1 to 2 weeks.  Patient cannot recall any specific preceding injury or trauma but feels that she may have developed pain because of excessive use of this right arm at work doing an increase in restraints and techniques to calm the children down in her higher acuity  "special ed class.  Patient states the pain is localized to her lateral right elbow and radiates into her right forearm and wrist area.  The pain is worse with movements of this arm.  Pt is afebrile on arrival.  Exam as above.  X-rays of right elbow were negative for fracture or acute bony abnormality.  Discussed results with patient.  Encouraged symptomatic treatments at home of suspected lateral epicondylitis.  \"Tennis elbow\" armband was provided.  Work restrictions were initiated.  Return precautions were reviewed.  Hand-outs were provided.    Patient was instructed to follow-up with PCP if no improvement in 5-7 days for continued care and management or sooner if new or worsening symptoms.  She is to return to the ED for persistent and/or worsening symptoms.  Patient expressed understanding of the diagnosis and plan and was discharged home in good condition.    I have reviewed the nursing notes.    I have reviewed the findings, diagnosis, plan and need for follow up with the patient.    Discharge Medication List as of 10/21/2019  6:14 PM          Final diagnoses:   Right lateral epicondylitis       10/21/2019   [facility] EMERGENCY DEPARTMENT      Disclaimer:  This note consists of symbols derived from keyboarding, dictation and/or voice recognition software.  As a result, there may be errors in the script that have gone undetected.  Please consider this when interpreting information found in this chart.     Autumn Su PA-C  10/22/19 [id number]    "

## 2019-10-22 ASSESSMENT — ENCOUNTER SYMPTOMS
NEUROLOGICAL NEGATIVE: 1
CONSTITUTIONAL NEGATIVE: 1

## 2019-10-25 ENCOUNTER — OFFICE VISIT (OUTPATIENT)
Dept: FAMILY MEDICINE | Facility: CLINIC | Age: 39
End: 2019-10-25
Payer: OTHER MISCELLANEOUS

## 2019-10-25 VITALS
WEIGHT: 154.8 LBS | HEIGHT: 62 IN | DIASTOLIC BLOOD PRESSURE: 81 MMHG | TEMPERATURE: 98.5 F | BODY MASS INDEX: 28.49 KG/M2 | OXYGEN SATURATION: 100 % | HEART RATE: 97 BPM | SYSTOLIC BLOOD PRESSURE: 132 MMHG | RESPIRATION RATE: 20 BRPM

## 2019-10-25 DIAGNOSIS — M77.11 LATERAL EPICONDYLITIS OF RIGHT ELBOW: Primary | ICD-10-CM

## 2019-10-25 PROCEDURE — 99213 OFFICE O/P EST LOW 20 MIN: CPT | Performed by: PHYSICIAN ASSISTANT

## 2019-10-25 ASSESSMENT — MIFFLIN-ST. JEOR: SCORE: 1330.55

## 2019-10-25 NOTE — PROGRESS NOTES
Subjective     Ana Felix is a 38 year old female who presents to clinic today for the following health issues:    HPI   ED/UC Followup:    Facility:  [facility] ED  Date of visit: 10/21/2019  Reason for visit: Wrist pain, elbow pain  Current Status: current rest pain 0/10     Has been on light duty   Pain has improved  Wearing the brace during the day which has helped quite a bit    HPI  Ana Felix is a 38 year old female who presents with complaints of right elbow pain for the past 1 to 2 weeks.  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  Patient states the pain is localized to her lateral right elbow and radiates into her right forearm and wrist area.  The pain is worse with movements of this arm.  She has tried over-the-counter anti-inflammatories without improvement.  She has tried resting and icing the area as well.  Patient is left-hand dominant.    Diagnosed with Tennis Elbow  X-rays neg         PROBLEMS TO ADD ON...  Work forms   -------------------------------------    Patient Active Problem List   Diagnosis     Crohns disease     Female pelvic pain     CARDIOVASCULAR SCREENING; LDL GOAL LESS THAN 160     Migraine headache     Back pain     Obesity     Disorder of sacrum     Displacement of lumbar intervertebral disc without myelopathy     Tobacco abuse     S/P oophorectomy     History of cholecystectomy     History of resection of small bowel     Abdominal pain     Discogenic pain     Chronic low back pain     Mild persistent asthma     Nausea with vomiting     Abdominal pain, right upper quadrant     Irritable bowel syndrome (IBS)     Lactose intolerance     Hearing difficulty     S/P LEEP of cervix     Controlled substance agreement signed     Degenerative lumbar disc      Dysmenorrhea     Labral tear of hip, degenerative     Pain medication agreement     Pain of right hip joint     Crohn's disease of both small and large intestine without complication (H)     History of gestational diabetes mellitus (GDM)     Amenorrhea     Past Surgical History:   Procedure Laterality Date     APPENDECTOMY       C/SECTION, LOW TRANSVERSE      , Low Transverse     CHOLECYSTECTOMY, LAPOROSCOPIC  2005    Cholecystectomy, Laparoscopic     COLONOSCOPY       ESOPHAGOSCOPY, GASTROSCOPY, DUODENOSCOPY (EGD), COMBINED  3/6/2014    Procedure: COMBINED ESOPHAGOSCOPY, GASTROSCOPY, DUODENOSCOPY (EGD), BIOPSY SINGLE OR MULTIPLE;  COLONOSCOPY/ UPPER GI ENDOSCOPY/ COLON/ EGD/ Abdominal pain, epigastric/ [facility];  Surgeon: West Lomas MD;  Location: [facility] OR     HC HYSTEROSCOPY, SURGICAL; W/ ENDOMETRIAL ABLATION, ANY METHOD  2010     HERNIORRHAPHY VENTRAL N/A 2018    Procedure: Open ventral hernia repair;  Surgeon: Alonso Bills MD;  Location: [facility] OR     HYSTERECTOMY, SUPRACERVICAL LAPAROSCOPIC       LEEP TX, CERVICAL      LEEP TX Cervical     ORTHOPEDIC SURGERY      bluged disk     partial small bowel resection      Ileo-colonic resection. Crohn's disease surgery for bowel obstruction. this was a section of small bowel and large bowel and the cecum., all removed and a reanastamosis done successfully     SALPINGO OOPHORECTOMY,R/L/TORI      Right ovary     TUBAL LIGATION         Social History     Tobacco Use     Smoking status: Current Every Day Smoker     Packs/day: 0.50     Types: Cigarettes     Smokeless tobacco: Never Used   Substance Use Topics     Alcohol use: Yes     Alcohol/week: 0.0 standard drinks     Comment: rare     Family History   Problem Relation Age of Onset     Cancer Mother         cervical     Lipids Mother      Eye Disorder Father      Lipids Father      Alcohol/Drug Maternal Grandmother      Diabetes Paternal Grandmother      Cancer - colorectal Paternal  "Grandmother      Eye Disorder Paternal Grandmother      Diabetes Paternal Grandfather      Eye Disorder Paternal Grandfather      Inflammatory Bowel Disease Paternal Aunt         and two paternal uncles           Reviewed and updated as needed this visit by Provider         Review of Systems   ROS COMP: Constitutional, musculoskeletal, neuro systems are negative, except as otherwise noted.      Objective    /81   Pulse 97   Temp 98.5  F (36.9  C) (Tympanic)   Resp 20   Ht 1.583 m (5' 2.32\")   Wt 70.2 kg (154 lb 12.8 oz)   LMP 07/23/2010   SpO2 100%   BMI 28.02 kg/m    Body mass index is 28.02 kg/m .  Physical Exam   GENERAL: healthy, alert and no distress  MS: no gross musculoskeletal defects noted, no edema  MS: pain with wrist extension, moderate TTP of the extensor muscle and extensor tendon   SKIN: no suspicious lesions or rashes  NEURO: Normal strength and tone, mentation intact and speech normal  PSYCH: mentation appears normal, affect normal/bright          Assessment & Plan   Assessment  1. Lateral epicondylitis of right elbow         Plan  Will add back lift/carry up to 10 pounds. Otherwise, continue light duty another 2 weeks. I recommended she continue using the elbow brace. Follow up OV in 2 weeks to reassess.       Tobacco Cessation:   reports that she has been smoking cigarettes. She has been smoking about 0.50 packs per day. She has never used smokeless tobacco.    BMI:   Estimated body mass index is 28.02 kg/m  as calculated from the following:    Height as of this encounter: 1.583 m (5' 2.32\").    Weight as of this encounter: 70.2 kg (154 lb 12.8 oz).     Return in about 2 weeks (around 11/8/2019) for elbow follow up ([facility]).    Radha Bermudez PA-C  [facility]          "

## 2019-10-25 NOTE — PROGRESS NOTES
Subjective     Ana Felix is a 38 year old female who presents to clinic today for the following health issues:    HPI   Lumps in groin area    Patient was seen 3/2018 for a few concerns, including carbuncles/folliculitis in her groin area. Treated with Keflex and they resolved, however, have been recurring intermittently since. Saw Dermatology earlier this year, given Doxycycline which worked longer, but was very expensive for her. Also did Hibiclens.  They have continued occurring intermittently in the going, R>L. In the last 2 months, has 2 that have not resolved at all. They enlarge, drain and shrink, but not fully resolving. When they drain, it can be clear to think pus to blood.   Questions solution to this so she does not have to be on antibiotics so often-can cause problems with her Crohn's. Currently, the 2 on her right groin have drained in the last few days and are small. No recent shaving, denies fever, nausea or other trauma to the area.     Patient Active Problem List   Diagnosis     Crohns disease     Female pelvic pain     CARDIOVASCULAR SCREENING; LDL GOAL LESS THAN 160     Migraine headache     Back pain     Obesity     Disorder of sacrum     Displacement of lumbar intervertebral disc without myelopathy     Tobacco abuse     S/P oophorectomy     History of cholecystectomy     History of resection of small bowel     Abdominal pain     Discogenic pain     Chronic low back pain     Mild persistent asthma     Nausea with vomiting     Abdominal pain, right upper quadrant     Irritable bowel syndrome (IBS)     Lactose intolerance     Hearing difficulty     S/P LEEP of cervix     Controlled substance agreement signed     Degenerative lumbar disc     Dysmenorrhea     Labral tear of hip, degenerative     Pain medication agreement     Pain of right hip joint     Crohn's disease of both small and large intestine without complication (H)     History of gestational diabetes mellitus (GDM)     Amenorrhea      Past Surgical History:   Procedure Laterality Date     APPENDECTOMY       C/SECTION, LOW TRANSVERSE      , Low Transverse     CHOLECYSTECTOMY, LAPOROSCOPIC  2005    Cholecystectomy, Laparoscopic     COLONOSCOPY       ESOPHAGOSCOPY, GASTROSCOPY, DUODENOSCOPY (EGD), COMBINED  3/6/2014    Procedure: COMBINED ESOPHAGOSCOPY, GASTROSCOPY, DUODENOSCOPY (EGD), BIOPSY SINGLE OR MULTIPLE;  COLONOSCOPY/ UPPER GI ENDOSCOPY/ COLON/ EGD/ Abdominal pain, epigastric/ PJH;  Surgeon: West Lomas MD;  Location: [facility]     HC HYSTEROSCOPY, SURGICAL; W/ ENDOMETRIAL ABLATION, ANY METHOD  2010     HERNIORRHAPHY VENTRAL N/A 2018    Procedure: Open ventral hernia repair;  Surgeon: Alonso Bills MD;  Location: [facility]     HYSTERECTOMY, SUPRACERVICAL LAPAROSCOPIC       LEEP TX, CERVICAL      LEEP TX Cervical     ORTHOPEDIC SURGERY      bluged disk     partial small bowel resection      Ileo-colonic resection. Crohn's disease surgery for bowel obstruction. this was a section of small bowel and large bowel and the cecum., all removed and a reanastamosis done successfully     SALPINGO OOPHORECTOMY,R/L/TORI      Right ovary     TUBAL LIGATION         Social History     Tobacco Use     Smoking status: Current Every Day Smoker     Packs/day: 0.50     Types: Cigarettes     Smokeless tobacco: Never Used   Substance Use Topics     Alcohol use: Yes     Alcohol/week: 0.0 standard drinks     Comment: rare     Family History   Problem Relation Age of Onset     Cancer Mother         cervical     Lipids Mother      Eye Disorder Father      Lipids Father      Alcohol/Drug Maternal Grandmother      Diabetes Paternal Grandmother      Cancer - colorectal Paternal Grandmother      Eye Disorder Paternal Grandmother      Diabetes Paternal Grandfather      Eye Disorder Paternal Grandfather      Inflammatory Bowel Disease Paternal Aunt         and two paternal uncles         Reviewed and updated as needed this visit by  "Provider         Review of Systems   ROS COMP: Constitutional, HEENT, cardiovascular, pulmonary, gi and gu systems are negative, except as otherwise noted.      Objective    BP (!) 142/99 (BP Location: Right arm, Patient Position: Sitting, Cuff Size: Adult Regular)   Pulse 84   Temp 97.9  F (36.6  C) (Oral)   Ht 1.6 m (5' 3\")   Wt 71.6 kg (157 lb 12.8 oz)   LMP 07/23/2010   SpO2 99%   BMI 27.95 kg/m    Body mass index is 27.95 kg/m .  Physical Exam   GENERAL: healthy, alert and no distress   (female): female external genitalia: on the right side groin area are 2 erythematous cystic areas-without current signs of infection, normal urethral meatus, vaginal mucosa, normal cervix/adnexa/uterus without masses or discharge  MS: no gross musculoskeletal defects noted, no edema  SKIN: See  above, no other suspicious lesions or rashes  PSYCH: mentation appears normal, affect normal/bright    Assessment & Plan     1. Carbuncle of groin  Prescription given to start if needed if symptoms worsen and show signs of infection. Given the recurrent symptoms she has had over the last 1+ years, recommend surgery consult and discussed rationale. Patient is given an opportunity to ask questions and have them answered. She is aware of symptoms for which I&D would be recommended.  - cephALEXin (KEFLEX) 500 MG capsule; Take 1 capsule (500 mg) by mouth 2 times daily  Dispense: 40 capsule; Refill: 1  - GENERAL SURG ADULT REFERRAL    2. Preventive measure  Prescription given preventatively per patient request  - fluconazole (DIFLUCAN) 150 MG tablet; Take 1 tablet (150 mg) by mouth once for 1 dose Repeat after 72 hours  Dispense: 1 tablet; Refill: 1     CARI [facility]      "

## 2019-10-28 ENCOUNTER — OFFICE VISIT (OUTPATIENT)
Dept: OBGYN | Facility: CLINIC | Age: 39
End: 2019-10-28
Payer: COMMERCIAL

## 2019-10-28 VITALS
DIASTOLIC BLOOD PRESSURE: 99 MMHG | SYSTOLIC BLOOD PRESSURE: 142 MMHG | TEMPERATURE: 97.9 F | BODY MASS INDEX: 27.96 KG/M2 | WEIGHT: 157.8 LBS | HEART RATE: 84 BPM | OXYGEN SATURATION: 99 % | HEIGHT: 63 IN

## 2019-10-28 DIAGNOSIS — Z29.9 PREVENTIVE MEASURE: ICD-10-CM

## 2019-10-28 DIAGNOSIS — L02.234 CARBUNCLE OF GROIN: Primary | ICD-10-CM

## 2019-10-28 PROCEDURE — 99213 OFFICE O/P EST LOW 20 MIN: CPT | Performed by: NURSE PRACTITIONER

## 2019-10-28 RX ORDER — FLUCONAZOLE 150 MG/1
150 TABLET ORAL ONCE
Qty: 1 TABLET | Refills: 1 | Status: SHIPPED | OUTPATIENT
Start: 2019-10-28 | End: 2020-02-21

## 2019-10-28 RX ORDER — CEPHALEXIN 500 MG/1
500 CAPSULE ORAL 2 TIMES DAILY
Qty: 40 CAPSULE | Refills: 1 | Status: SHIPPED | OUTPATIENT
Start: 2019-10-28 | End: 2020-02-21

## 2019-10-28 ASSESSMENT — MIFFLIN-ST. JEOR: SCORE: 1354.91

## 2019-10-28 ASSESSMENT — PAIN SCALES - GENERAL: PAINLEVEL: NO PAIN (0)

## 2019-11-07 DIAGNOSIS — K50.10 CROHN'S DISEASE OF LARGE INTESTINE (H): Primary | ICD-10-CM

## 2019-11-07 DIAGNOSIS — K50.10 CROHN'S DISEASE OF LARGE INTESTINE (H): ICD-10-CM

## 2019-11-07 LAB
ALBUMIN SERPL-MCNC: 3.8 G/DL (ref 3.4–5)
ALP SERPL-CCNC: 54 U/L (ref 40–150)
ALT SERPL W P-5'-P-CCNC: 21 U/L (ref 0–50)
AST SERPL W P-5'-P-CCNC: 23 U/L (ref 0–45)
BASOPHILS # BLD AUTO: 0 10E9/L (ref 0–0.2)
BASOPHILS NFR BLD AUTO: 0.5 %
BILIRUB DIRECT SERPL-MCNC: <0.1 MG/DL (ref 0–0.2)
BILIRUB SERPL-MCNC: 0.4 MG/DL (ref 0.2–1.3)
DIFFERENTIAL METHOD BLD: ABNORMAL
EOSINOPHIL # BLD AUTO: 0.1 10E9/L (ref 0–0.7)
EOSINOPHIL NFR BLD AUTO: 1.6 %
ERYTHROCYTE [DISTWIDTH] IN BLOOD BY AUTOMATED COUNT: 12.6 % (ref 10–15)
HCT VFR BLD AUTO: 38 % (ref 35–47)
HGB BLD-MCNC: 13.2 G/DL (ref 11.7–15.7)
LYMPHOCYTES # BLD AUTO: 3.2 10E9/L (ref 0.8–5.3)
LYMPHOCYTES NFR BLD AUTO: 40.7 %
MCH RBC QN AUTO: 36.7 PG (ref 26.5–33)
MCHC RBC AUTO-ENTMCNC: 34.7 G/DL (ref 31.5–36.5)
MCV RBC AUTO: 106 FL (ref 78–100)
MONOCYTES # BLD AUTO: 0.6 10E9/L (ref 0–1.3)
MONOCYTES NFR BLD AUTO: 7.1 %
NEUTROPHILS # BLD AUTO: 3.9 10E9/L (ref 1.6–8.3)
NEUTROPHILS NFR BLD AUTO: 50.1 %
PLATELET # BLD AUTO: 263 10E9/L (ref 150–450)
PROT SERPL-MCNC: 7.8 G/DL (ref 6.8–8.8)
RBC # BLD AUTO: 3.6 10E12/L (ref 3.8–5.2)
WBC # BLD AUTO: 7.7 10E9/L (ref 4–11)

## 2019-11-07 PROCEDURE — 36415 COLL VENOUS BLD VENIPUNCTURE: CPT | Performed by: INTERNAL MEDICINE

## 2019-11-07 PROCEDURE — 80076 HEPATIC FUNCTION PANEL: CPT | Performed by: INTERNAL MEDICINE

## 2019-11-07 PROCEDURE — 85025 COMPLETE CBC W/AUTO DIFF WBC: CPT | Performed by: INTERNAL MEDICINE

## 2019-11-08 ENCOUNTER — OFFICE VISIT (OUTPATIENT)
Dept: FAMILY MEDICINE | Facility: CLINIC | Age: 39
End: 2019-11-08
Payer: OTHER MISCELLANEOUS

## 2019-11-08 VITALS
BODY MASS INDEX: 27.78 KG/M2 | TEMPERATURE: 97.6 F | WEIGHT: 156.8 LBS | RESPIRATION RATE: 18 BRPM | HEART RATE: 93 BPM | OXYGEN SATURATION: 99 % | SYSTOLIC BLOOD PRESSURE: 154 MMHG | DIASTOLIC BLOOD PRESSURE: 95 MMHG

## 2019-11-08 DIAGNOSIS — M77.11 LATERAL EPICONDYLITIS OF RIGHT ELBOW: Primary | ICD-10-CM

## 2019-11-08 PROCEDURE — 99213 OFFICE O/P EST LOW 20 MIN: CPT | Performed by: PHYSICIAN ASSISTANT

## 2019-11-08 NOTE — PROGRESS NOTES
Subjective     Ana Felix is a 38 year old female who presents to clinic today for the following health issues:    HPI   Work Comp follow up    Musculoskeletal problem/pain      Duration: [id number]    Description  Location: right elbow    Intensity:  mild    Accompanying signs and symptoms: none    History  Previous similar problem: YES- 10/25/2019  Previous evaluation:  x-ray    Precipitating or alleviating factors:  Trauma or overuse: YES- overuse  Aggravating factors include: overuse    Therapies tried and outcome: rest/inactivity, heat and ice    80-90% improvement  Minimal pain at times  Ready to go back - would like to try part time initially      Visit from 10/25:  Plan  Will add back lift/carry up to 10 pounds. Otherwise, continue light duty another 2 weeks. I recommended she continue using the elbow brace. Follow up OV in 2 weeks to reassess.        PROBLEMS TO ADD ON...  none  -------------------------------------    Patient Active Problem List   Diagnosis     Crohns disease     Female pelvic pain     CARDIOVASCULAR SCREENING; LDL GOAL LESS THAN 160     Migraine headache     Back pain     Obesity     Disorder of sacrum     Displacement of lumbar intervertebral disc without myelopathy     Tobacco abuse     S/P oophorectomy     History of cholecystectomy     History of resection of small bowel     Abdominal pain     Discogenic pain     Chronic low back pain     Mild persistent asthma     Nausea with vomiting     Abdominal pain, right upper quadrant     Irritable bowel syndrome (IBS)     Lactose intolerance     Hearing difficulty     S/P LEEP of cervix     Controlled substance agreement signed     Degenerative lumbar disc     Dysmenorrhea     Labral tear of hip, degenerative     Pain medication agreement     Pain of right hip joint     Crohn's disease of both small and large intestine without complication (H)     History of gestational diabetes mellitus (GDM)     Amenorrhea     Past Surgical History:    Procedure Laterality Date     APPENDECTOMY       C/SECTION, LOW TRANSVERSE      , Low Transverse     CHOLECYSTECTOMY, LAPOROSCOPIC  2005    Cholecystectomy, Laparoscopic     COLONOSCOPY       ESOPHAGOSCOPY, GASTROSCOPY, DUODENOSCOPY (EGD), COMBINED  3/6/2014    Procedure: COMBINED ESOPHAGOSCOPY, GASTROSCOPY, DUODENOSCOPY (EGD), BIOPSY SINGLE OR MULTIPLE;  COLONOSCOPY/ UPPER GI ENDOSCOPY/ COLON/ EGD/ Abdominal pain, epigastric/ PJH;  Surgeon: West Lomas MD;  Location: [facility]     HC HYSTEROSCOPY, SURGICAL; W/ ENDOMETRIAL ABLATION, ANY METHOD  2010     HERNIORRHAPHY VENTRAL N/A 2018    Procedure: Open ventral hernia repair;  Surgeon: Alonso Bills MD;  Location: [facility]     HYSTERECTOMY, SUPRACERVICAL LAPAROSCOPIC       LEEP TX, CERVICAL      LEEP TX Cervical     ORTHOPEDIC SURGERY      bluged disk     partial small bowel resection      Ileo-colonic resection. Crohn's disease surgery for bowel obstruction. this was a section of small bowel and large bowel and the cecum., all removed and a reanastamosis done successfully     SALPINGO OOPHORECTOMY,R/L/TORI      Right ovary     TUBAL LIGATION         Social History     Tobacco Use     Smoking status: Current Every Day Smoker     Packs/day: 0.50     Types: Cigarettes     Smokeless tobacco: Never Used   Substance Use Topics     Alcohol use: Yes     Alcohol/week: 0.0 standard drinks     Comment: rare     Family History   Problem Relation Age of Onset     Cancer Mother         cervical     Lipids Mother      Eye Disorder Father      Lipids Father      Alcohol/Drug Maternal Grandmother      Diabetes Paternal Grandmother      Cancer - colorectal Paternal Grandmother      Eye Disorder Paternal Grandmother      Diabetes Paternal Grandfather      Eye Disorder Paternal Grandfather      Inflammatory Bowel Disease Paternal Aunt         and two paternal uncles           Reviewed and updated as needed this visit by Provider         Review of  "Systems   ROS COMP: Constitutional, musculoskeletal, neuro, skin systems are negative, except as otherwise noted.      Objective    BP (!) 154/95   Pulse 93   Temp 97.6  F (36.4  C) (Tympanic)   Resp 18   Wt 71.1 kg (156 lb 12.8 oz)   LMP 07/23/2010   SpO2 99%   BMI 27.78 kg/m    Body mass index is 27.78 kg/m .  Physical Exam   GENERAL: healthy, alert and no distress  MS: no gross musculoskeletal defects noted, no edema  MS: mild pain with resisted wrist extension, non tender elbow  SKIN: no suspicious lesions or rashes  NEURO: Normal strength and tone, mentation intact and speech normal  PSYCH: mentation appears normal, affect normal/bright        Assessment & Plan   Assessment  1. Lateral epicondylitis of right elbow         Plan  Workability revised today - patient will go back on a reduced schedule, half days. Will trial this x2 weeks then reassess.     Tobacco Cessation:   reports that she has been smoking cigarettes. She has been smoking about 0.50 packs per day. She has never used smokeless tobacco.    BMI:   Estimated body mass index is 27.78 kg/m  as calculated from the following:    Height as of 10/28/19: 1.6 m (5' 3\").    Weight as of this encounter: 71.1 kg (156 lb 12.8 oz).       Return in about 2 weeks (around 11/22/2019) for WC follow up.    Radha Bermudez PA-C  [facility]          "

## 2019-11-13 ENCOUNTER — OFFICE VISIT (OUTPATIENT)
Dept: FAMILY MEDICINE | Facility: CLINIC | Age: 39
End: 2019-11-13
Payer: COMMERCIAL

## 2019-11-13 VITALS
DIASTOLIC BLOOD PRESSURE: 85 MMHG | OXYGEN SATURATION: 100 % | SYSTOLIC BLOOD PRESSURE: 137 MMHG | WEIGHT: 159.2 LBS | BODY MASS INDEX: 29.3 KG/M2 | HEART RATE: 84 BPM | HEIGHT: 62 IN | RESPIRATION RATE: 18 BRPM | TEMPERATURE: 98.4 F

## 2019-11-13 DIAGNOSIS — Z13.220 LIPID SCREENING: ICD-10-CM

## 2019-11-13 DIAGNOSIS — J45.30 MILD PERSISTENT ASTHMA, UNCOMPLICATED: Primary | ICD-10-CM

## 2019-11-13 DIAGNOSIS — Z13.1 SCREENING FOR DIABETES MELLITUS: ICD-10-CM

## 2019-11-13 PROCEDURE — 90471 IMMUNIZATION ADMIN: CPT | Performed by: PHYSICIAN ASSISTANT

## 2019-11-13 PROCEDURE — 90686 IIV4 VACC NO PRSV 0.5 ML IM: CPT | Performed by: PHYSICIAN ASSISTANT

## 2019-11-13 PROCEDURE — 99213 OFFICE O/P EST LOW 20 MIN: CPT | Mod: 25 | Performed by: PHYSICIAN ASSISTANT

## 2019-11-13 RX ORDER — ALBUTEROL SULFATE 90 UG/1
2 AEROSOL, METERED RESPIRATORY (INHALATION) EVERY 4 HOURS PRN
Qty: 1 INHALER | Refills: 5 | Status: SHIPPED | OUTPATIENT
Start: 2019-11-13 | End: 2021-03-22

## 2019-11-13 RX ORDER — MONTELUKAST SODIUM 10 MG/1
10 TABLET ORAL DAILY
Qty: 90 TABLET | Refills: 3 | Status: SHIPPED | OUTPATIENT
Start: 2019-11-13 | End: 2020-11-03

## 2019-11-13 ASSESSMENT — ASTHMA QUESTIONNAIRES
QUESTION_5 LAST FOUR WEEKS HOW WOULD YOU RATE YOUR ASTHMA CONTROL: SOMEWHAT CONTROLLED
QUESTION_3 LAST FOUR WEEKS HOW OFTEN DID YOUR ASTHMA SYMPTOMS (WHEEZING, COUGHING, SHORTNESS OF BREATH, CHEST TIGHTNESS OR PAIN) WAKE YOU UP AT NIGHT OR EARLIER THAN USUAL IN THE MORNING: TWO OR THREE NIGHTS A WEEK
QUESTION_2 LAST FOUR WEEKS HOW OFTEN HAVE YOU HAD SHORTNESS OF BREATH: ONCE OR TWICE A WEEK
QUESTION_1 LAST FOUR WEEKS HOW MUCH OF THE TIME DID YOUR ASTHMA KEEP YOU FROM GETTING AS MUCH DONE AT WORK, SCHOOL OR AT HOME: NONE OF THE TIME
ACT_TOTALSCORE: 17
QUESTION_4 LAST FOUR WEEKS HOW OFTEN HAVE YOU USED YOUR RESCUE INHALER OR NEBULIZER MEDICATION (SUCH AS ALBUTEROL): TWO OR THREE TIMES PER WEEK
ACUTE_EXACERBATION_TODAY: NO

## 2019-11-13 ASSESSMENT — ANXIETY QUESTIONNAIRES
GAD7 TOTAL SCORE: 0
2. NOT BEING ABLE TO STOP OR CONTROL WORRYING: NOT AT ALL
6. BECOMING EASILY ANNOYED OR IRRITABLE: NOT AT ALL
IF YOU CHECKED OFF ANY PROBLEMS ON THIS QUESTIONNAIRE, HOW DIFFICULT HAVE THESE PROBLEMS MADE IT FOR YOU TO DO YOUR WORK, TAKE CARE OF THINGS AT HOME, OR GET ALONG WITH OTHER PEOPLE: NOT DIFFICULT AT ALL
1. FEELING NERVOUS, ANXIOUS, OR ON EDGE: NOT AT ALL
3. WORRYING TOO MUCH ABOUT DIFFERENT THINGS: NOT AT ALL
7. FEELING AFRAID AS IF SOMETHING AWFUL MIGHT HAPPEN: NOT AT ALL
5. BEING SO RESTLESS THAT IT IS HARD TO SIT STILL: NOT AT ALL

## 2019-11-13 ASSESSMENT — PAIN SCALES - GENERAL: PAINLEVEL: NO PAIN (0)

## 2019-11-13 ASSESSMENT — PATIENT HEALTH QUESTIONNAIRE - PHQ9
SUM OF ALL RESPONSES TO PHQ QUESTIONS 1-9: 1
5. POOR APPETITE OR OVEREATING: NOT AT ALL

## 2019-11-13 ASSESSMENT — MIFFLIN-ST. JEOR: SCORE: 1350.14

## 2019-11-13 NOTE — PROGRESS NOTES
Subjective     Ana Felix is a 38 year old female who presents to clinic today for the following health issues:    HPI   Asthma Follow-Up    Was ACT completed today?    Yes    ACT Total Scores 11/13/2019   ACT TOTAL SCORE -   ASTHMA ER VISITS -   ASTHMA HOSPITALIZATIONS -   ACT TOTAL SCORE (Goal Greater than or Equal to 20) 17   In the past 12 months, how many times did you visit the emergency room for your asthma without being admitted to the hospital? 0   In the past 12 months, how many times were you hospitalized overnight because of your asthma? 0       How many days per week do you miss taking your asthma controller medication?  Patient has been out of singulair since September 2019    Please describe any recent triggers for your asthma: pollens    Have you had any Emergency Room Visits, Urgent Care Visits, or Hospital Admissions since your last office visit?  No      How many servings of fruits and vegetables do you eat daily?  2-3    On average, how many sweetened beverages do you drink each day (soda, juice, sweet tea, etc)?   2  How many days per week do you miss taking your medication? Patient is out of medication at this time    What makes it hard for you to take your medications?  refills        Patient Active Problem List   Diagnosis     Crohns disease     Female pelvic pain     CARDIOVASCULAR SCREENING; LDL GOAL LESS THAN 160     Migraine headache     Back pain     Obesity     Disorder of sacrum     Displacement of lumbar intervertebral disc without myelopathy     Tobacco abuse     S/P oophorectomy     History of cholecystectomy     History of resection of small bowel     Abdominal pain     Discogenic pain     Chronic low back pain     Mild persistent asthma     Nausea with vomiting     Abdominal pain, right upper quadrant     Irritable bowel syndrome (IBS)     Lactose intolerance     Hearing difficulty     S/P LEEP of cervix     Controlled substance agreement signed     Degenerative lumbar disc      Dysmenorrhea     Labral tear of hip, degenerative     Pain medication agreement     Pain of right hip joint     Crohn's disease of both small and large intestine without complication (H)     History of gestational diabetes mellitus (GDM)     Amenorrhea     Past Surgical History:   Procedure Laterality Date     APPENDECTOMY       C/SECTION, LOW TRANSVERSE      , Low Transverse     CHOLECYSTECTOMY, LAPOROSCOPIC  2005    Cholecystectomy, Laparoscopic     COLONOSCOPY       ESOPHAGOSCOPY, GASTROSCOPY, DUODENOSCOPY (EGD), COMBINED  3/6/2014    Procedure: COMBINED ESOPHAGOSCOPY, GASTROSCOPY, DUODENOSCOPY (EGD), BIOPSY SINGLE OR MULTIPLE;  COLONOSCOPY/ UPPER GI ENDOSCOPY/ COLON/ EGD/ Abdominal pain, epigastric/ PJH;  Surgeon: West Lomas MD;  Location: [facility]     HC HYSTEROSCOPY, SURGICAL; W/ ENDOMETRIAL ABLATION, ANY METHOD  2010     HERNIORRHAPHY VENTRAL N/A 2018    Procedure: Open ventral hernia repair;  Surgeon: Alonso Bills MD;  Location: [facility]     HYSTERECTOMY, SUPRACERVICAL LAPAROSCOPIC       LEEP TX, CERVICAL      LEEP TX Cervical     ORTHOPEDIC SURGERY      bluged disk     partial small bowel resection      Ileo-colonic resection. Crohn's disease surgery for bowel obstruction. this was a section of small bowel and large bowel and the cecum., all removed and a reanastamosis done successfully     SALPINGO OOPHORECTOMY,R/L/TORI      Right ovary     TUBAL LIGATION         Social History     Tobacco Use     Smoking status: Current Every Day Smoker     Packs/day: 0.50     Types: Cigarettes     Smokeless tobacco: Never Used   Substance Use Topics     Alcohol use: Yes     Alcohol/week: 0.0 standard drinks     Comment: rare     Family History   Problem Relation Age of Onset     Cancer Mother         cervical     Lipids Mother      Eye Disorder Father      Lipids Father      Alcohol/Drug Maternal Grandmother      Diabetes Paternal Grandmother      Cancer - colorectal Paternal  "Grandmother      Eye Disorder Paternal Grandmother      Diabetes Paternal Grandfather      Eye Disorder Paternal Grandfather      Inflammatory Bowel Disease Paternal Aunt         and two paternal uncles           Reviewed and updated as needed this visit by Provider         Review of Systems   ROS COMP: Constitutional, pulmonary systems are negative, except as otherwise noted.      Objective    /85   Pulse 84   Temp 98.4  F (36.9  C) (Tympanic)   Resp 18   Ht 1.582 m (5' 2.3\")   Wt 72.2 kg (159 lb 3.2 oz)   LMP 07/23/2010   SpO2 100%   BMI 28.84 kg/m    Body mass index is 28.84 kg/m .  Physical Exam   GENERAL: healthy, alert and no distress  MS: no gross musculoskeletal defects noted, no edema  NEURO: Normal strength and tone, mentation intact and speech normal  PSYCH: mentation appears normal, affect normal/bright        Assessment & Plan   Assessment  1. Mild persistent asthma, uncomplicated    2. Lipid screening    3. Screening for diabetes mellitus         Plan  1) Singulair and albuterol renewed, no changes. Repeat ACT in 1 month over the phone. Follow up annually.    2,3) Screening labs in the next month.      Tobacco Cessation:   reports that she has been smoking cigarettes. She has been smoking about 0.50 packs per day. She has never used smokeless tobacco.      BMI:   Estimated body mass index is 28.84 kg/m  as calculated from the following:    Height as of this encounter: 1.582 m (5' 2.3\").    Weight as of this encounter: 72.2 kg (159 lb 3.2 oz).       Return in about 1 month (around 12/13/2019) for REPEAT ACT OVER THE PHONE.    Radha Bermudez PA-C  [facility]            "

## 2019-11-14 ASSESSMENT — ASTHMA QUESTIONNAIRES: ACT_TOTALSCORE: 17

## 2019-11-14 ASSESSMENT — ANXIETY QUESTIONNAIRES: GAD7 TOTAL SCORE: 0

## 2019-11-20 ENCOUNTER — OFFICE VISIT (OUTPATIENT)
Dept: FAMILY MEDICINE | Facility: CLINIC | Age: 39
End: 2019-11-20
Payer: OTHER MISCELLANEOUS

## 2019-11-20 VITALS
RESPIRATION RATE: 16 BRPM | SYSTOLIC BLOOD PRESSURE: 133 MMHG | TEMPERATURE: 98.5 F | DIASTOLIC BLOOD PRESSURE: 85 MMHG | HEIGHT: 62 IN | OXYGEN SATURATION: 100 % | HEART RATE: 78 BPM | BODY MASS INDEX: 29.15 KG/M2 | WEIGHT: 158.4 LBS

## 2019-11-20 DIAGNOSIS — M77.11 LATERAL EPICONDYLITIS OF RIGHT ELBOW: Primary | ICD-10-CM

## 2019-11-20 PROCEDURE — 99213 OFFICE O/P EST LOW 20 MIN: CPT | Performed by: PHYSICIAN ASSISTANT

## 2019-11-20 ASSESSMENT — PAIN SCALES - GENERAL: PAINLEVEL: NO PAIN (0)

## 2019-11-20 ASSESSMENT — MIFFLIN-ST. JEOR: SCORE: 1341.75

## 2019-11-20 NOTE — PROGRESS NOTES
Subjective     Ana Felix is a 38 year old female who presents to clinic today for the following health issues:    HPI   Work Comp    Musculoskeletal problem/pain      Duration: Has been ongoing since , was seen for the first time     Description  Location: right elbow    Intensity:  mild    Accompanying signs and symptoms: none    History  Previous similar problem: no   Previous evaluation:  x-ray    Precipitating or alleviating factors:  Trauma or overuse: YES- with work  Aggravating factors include: with picking stuff up at times    Therapies tried and outcome: heat and ice    Ready to go back to work without restrictions     19 visit:  Plan  Workability revised today - patient will go back on a reduced schedule, half days. Will trial this x2 weeks then reassess.         Patient Active Problem List   Diagnosis     Crohns disease     Female pelvic pain     CARDIOVASCULAR SCREENING; LDL GOAL LESS THAN 160     Migraine headache     Back pain     Obesity     Disorder of sacrum     Displacement of lumbar intervertebral disc without myelopathy     Tobacco abuse     S/P oophorectomy     History of cholecystectomy     History of resection of small bowel     Abdominal pain     Discogenic pain     Chronic low back pain     Mild persistent asthma     Nausea with vomiting     Abdominal pain, right upper quadrant     Irritable bowel syndrome (IBS)     Lactose intolerance     Hearing difficulty     S/P LEEP of cervix     Controlled substance agreement signed     Degenerative lumbar disc     Dysmenorrhea     Labral tear of hip, degenerative     Pain medication agreement     Pain of right hip joint     Crohn's disease of both small and large intestine without complication (H)     History of gestational diabetes mellitus (GDM)     Amenorrhea     Past Surgical History:   Procedure Laterality Date     APPENDECTOMY       C/SECTION, LOW TRANSVERSE      , Low Transverse     CHOLECYSTECTOMY,  LAPOROSCOPIC  04/27/2005    Cholecystectomy, Laparoscopic     COLONOSCOPY       ESOPHAGOSCOPY, GASTROSCOPY, DUODENOSCOPY (EGD), COMBINED  3/6/2014    Procedure: COMBINED ESOPHAGOSCOPY, GASTROSCOPY, DUODENOSCOPY (EGD), BIOPSY SINGLE OR MULTIPLE;  COLONOSCOPY/ UPPER GI ENDOSCOPY/ COLON/ EGD/ Abdominal pain, epigastric/ PJH;  Surgeon: West Lomas MD;  Location: [facility] OR     HC HYSTEROSCOPY, SURGICAL; W/ ENDOMETRIAL ABLATION, ANY METHOD  7/2010     HERNIORRHAPHY VENTRAL N/A 12/11/2018    Procedure: Open ventral hernia repair;  Surgeon: Alonso Bills MD;  Location: [facility]     HYSTERECTOMY, SUPRACERVICAL LAPAROSCOPIC  2010     LEEP TX, CERVICAL      LEEP TX Cervical     ORTHOPEDIC SURGERY      bluged disk     partial small bowel resection  2002    Ileo-colonic resection. Crohn's disease surgery for bowel obstruction. this was a section of small bowel and large bowel and the cecum., all removed and a reanastamosis done successfully     SALPINGO OOPHORECTOMY,R/L/TORI      Right ovary     TUBAL LIGATION         Social History     Tobacco Use     Smoking status: Current Every Day Smoker     Packs/day: 0.50     Types: Cigarettes     Smokeless tobacco: Never Used   Substance Use Topics     Alcohol use: Yes     Alcohol/week: 0.0 standard drinks     Comment: rare     Family History   Problem Relation Age of Onset     Cancer Mother         cervical     Lipids Mother      Eye Disorder Father      Lipids Father      Alcohol/Drug Maternal Grandmother      Diabetes Paternal Grandmother      Cancer - colorectal Paternal Grandmother      Eye Disorder Paternal Grandmother      Diabetes Paternal Grandfather      Eye Disorder Paternal Grandfather      Inflammatory Bowel Disease Paternal Aunt         and two paternal uncles           Reviewed and updated as needed this visit by Provider         Review of Systems   ROS COMP: Constitutional, musculoskeletal, neuro systems are negative, except as otherwise noted.      Objective    BP  "133/85   Pulse 78   Temp 98.5  F (36.9  C) (Tympanic)   Resp 16   Ht 1.575 m (5' 2\")   Wt 71.8 kg (158 lb 6.4 oz)   LMP 07/23/2010   SpO2 100%   BMI 28.97 kg/m    Body mass index is 28.97 kg/m .  Physical Exam   GENERAL: healthy, alert and no distress  MS: no gross musculoskeletal defects noted, no edema  SKIN: no suspicious lesions or rashes  NEURO: Normal strength and tone, mentation intact and speech normal  PSYCH: mentation appears normal, affect normal/bright        Assessment & Plan   Assessment  1. Lateral epicondylitis of right elbow         Plan  Return to work without restrictions. Follow up if symptoms return.       Tobacco Cessation:   reports that she has been smoking cigarettes. She has been smoking about 0.50 packs per day. She has never used smokeless tobacco.    BMI:   Estimated body mass index is 28.97 kg/m  as calculated from the following:    Height as of this encounter: 1.575 m (5' 2\").    Weight as of this encounter: 71.8 kg (158 lb 6.4 oz).     Return for follow up if symptoms return.    Radha Bermudez PA-C  [facility]          "

## 2020-02-08 DIAGNOSIS — K50.10 CROHN'S DISEASE OF LARGE INTESTINE (H): ICD-10-CM

## 2020-02-08 DIAGNOSIS — Z13.1 SCREENING FOR DIABETES MELLITUS: ICD-10-CM

## 2020-02-08 DIAGNOSIS — Z13.220 LIPID SCREENING: ICD-10-CM

## 2020-02-08 LAB
ALBUMIN SERPL-MCNC: 3.8 G/DL (ref 3.4–5)
ALP SERPL-CCNC: 43 U/L (ref 40–150)
ALT SERPL W P-5'-P-CCNC: 22 U/L (ref 0–50)
AST SERPL W P-5'-P-CCNC: 12 U/L (ref 0–45)
BASOPHILS # BLD AUTO: 0 10E9/L (ref 0–0.2)
BASOPHILS NFR BLD AUTO: 0.3 %
BILIRUB DIRECT SERPL-MCNC: <0.1 MG/DL (ref 0–0.2)
BILIRUB SERPL-MCNC: 0.4 MG/DL (ref 0.2–1.3)
CHOLEST SERPL-MCNC: 158 MG/DL
DIFFERENTIAL METHOD BLD: ABNORMAL
EOSINOPHIL # BLD AUTO: 0.1 10E9/L (ref 0–0.7)
EOSINOPHIL NFR BLD AUTO: 1.5 %
ERYTHROCYTE [DISTWIDTH] IN BLOOD BY AUTOMATED COUNT: 13.1 % (ref 10–15)
GLUCOSE SERPL-MCNC: 98 MG/DL (ref 70–99)
HCT VFR BLD AUTO: 40.1 % (ref 35–47)
HDLC SERPL-MCNC: 37 MG/DL
HGB BLD-MCNC: 13.5 G/DL (ref 11.7–15.7)
IMM GRANULOCYTES # BLD: 0 10E9/L (ref 0–0.4)
IMM GRANULOCYTES NFR BLD: 0.3 %
LDLC SERPL CALC-MCNC: 93 MG/DL
LYMPHOCYTES # BLD AUTO: 2.7 10E9/L (ref 0.8–5.3)
LYMPHOCYTES NFR BLD AUTO: 37.7 %
MCH RBC QN AUTO: 35.9 PG (ref 26.5–33)
MCHC RBC AUTO-ENTMCNC: 33.7 G/DL (ref 31.5–36.5)
MCV RBC AUTO: 107 FL (ref 78–100)
MONOCYTES # BLD AUTO: 0.5 10E9/L (ref 0–1.3)
MONOCYTES NFR BLD AUTO: 6.2 %
NEUTROPHILS # BLD AUTO: 3.9 10E9/L (ref 1.6–8.3)
NEUTROPHILS NFR BLD AUTO: 54 %
NONHDLC SERPL-MCNC: 121 MG/DL
NRBC # BLD AUTO: 0 10*3/UL
NRBC BLD AUTO-RTO: 0 /100
PLATELET # BLD AUTO: 260 10E9/L (ref 150–450)
PROT SERPL-MCNC: 7.7 G/DL (ref 6.8–8.8)
RBC # BLD AUTO: 3.76 10E12/L (ref 3.8–5.2)
TRIGL SERPL-MCNC: 139 MG/DL
WBC # BLD AUTO: 7.3 10E9/L (ref 4–11)

## 2020-02-08 PROCEDURE — 80076 HEPATIC FUNCTION PANEL: CPT | Performed by: INTERNAL MEDICINE

## 2020-02-08 PROCEDURE — 82947 ASSAY GLUCOSE BLOOD QUANT: CPT | Performed by: PHYSICIAN ASSISTANT

## 2020-02-08 PROCEDURE — 85025 COMPLETE CBC W/AUTO DIFF WBC: CPT | Performed by: INTERNAL MEDICINE

## 2020-02-08 PROCEDURE — 80061 LIPID PANEL: CPT | Performed by: PHYSICIAN ASSISTANT

## 2020-02-08 PROCEDURE — 36415 COLL VENOUS BLD VENIPUNCTURE: CPT | Performed by: INTERNAL MEDICINE

## 2020-02-16 ENCOUNTER — HEALTH MAINTENANCE LETTER (OUTPATIENT)
Age: 40
End: 2020-02-16

## 2020-02-19 RX ORDER — PHENOL 1.4 %
10 AEROSOL, SPRAY (ML) MUCOUS MEMBRANE
COMMUNITY
End: 2021-09-02

## 2020-02-19 NOTE — PROGRESS NOTES
Subjective     Ana Felix is a 39 year old female who presents to clinic today for the following health issues:    HPI   Insomnia      Duration: x5-6 months    Description  Frequency of insomnia:  nightly  Time to fall asleep: 1 hour  Middle of night awakening:  nightly  Early morning awakening:  none    Accompanying signs and symptoms:  fatigue    History  Similar episodes in past:  no   Previous evaluation/sleep study:  no     Precipitating or alleviating factors:  New stressful situation: no   Caffeine intake after lunchtime: YES  OTC decongestants: YES- occasional with cold symptoms    Any new medications: no     Therapies tried and outcome: Tylenol pm -  not effective and melatonin -  not effective    Work has been more stressful in the last year  Always thinking about something  Keeps a notepad by her bedside for when she thinks of something         Patient Active Problem List   Diagnosis     Crohns disease     Female pelvic pain     CARDIOVASCULAR SCREENING; LDL GOAL LESS THAN 160     Migraine headache     Back pain     Obesity     Disorder of sacrum     Displacement of lumbar intervertebral disc without myelopathy     Tobacco abuse     S/P oophorectomy     History of cholecystectomy     History of resection of small bowel     Abdominal pain     Discogenic pain     Chronic low back pain     Mild persistent asthma     Nausea with vomiting     Abdominal pain, right upper quadrant     Irritable bowel syndrome (IBS)     Lactose intolerance     Hearing difficulty     S/P LEEP of cervix     Controlled substance agreement signed     Degenerative lumbar disc     Dysmenorrhea     Labral tear of hip, degenerative     Pain medication agreement     Pain of right hip joint     Crohn's disease of both small and large intestine without complication (H)     History of gestational diabetes mellitus (GDM)     Amenorrhea     Past Surgical History:   Procedure Laterality Date     APPENDECTOMY       C/SECTION, LOW TRANSVERSE       , Low Transverse     CHOLECYSTECTOMY, LAPOROSCOPIC  2005    Cholecystectomy, Laparoscopic     COLONOSCOPY       ESOPHAGOSCOPY, GASTROSCOPY, DUODENOSCOPY (EGD), COMBINED  3/6/2014    Procedure: COMBINED ESOPHAGOSCOPY, GASTROSCOPY, DUODENOSCOPY (EGD), BIOPSY SINGLE OR MULTIPLE;  COLONOSCOPY/ UPPER GI ENDOSCOPY/ COLON/ EGD/ Abdominal pain, epigastric/ [facility];  Surgeon: West Lomas MD;  Location: [facility] OR     HC HYSTEROSCOPY, SURGICAL; W/ ENDOMETRIAL ABLATION, ANY METHOD  2010     HERNIORRHAPHY VENTRAL N/A 2018    Procedure: Open ventral hernia repair;  Surgeon: Alonso Bills MD;  Location: [facility]     HYSTERECTOMY, SUPRACERVICAL LAPAROSCOPIC       LEEP TX, CERVICAL      LEEP TX Cervical     ORTHOPEDIC SURGERY      bluged disk     partial small bowel resection      Ileo-colonic resection. Crohn's disease surgery for bowel obstruction. this was a section of small bowel and large bowel and the cecum., all removed and a reanastamosis done successfully     SALPINGO OOPHORECTOMY,R/L/TORI      Right ovary     TUBAL LIGATION         Social History     Tobacco Use     Smoking status: Current Every Day Smoker     Packs/day: 0.50     Types: Cigarettes     Smokeless tobacco: Never Used   Substance Use Topics     Alcohol use: Yes     Alcohol/week: 0.0 standard drinks     Comment: rare     Family History   Problem Relation Age of Onset     Cancer Mother         cervical     Lipids Mother      Eye Disorder Father      Lipids Father      Alcohol/Drug Maternal Grandmother      Diabetes Paternal Grandmother      Cancer - colorectal Paternal Grandmother      Eye Disorder Paternal Grandmother      Diabetes Paternal Grandfather      Eye Disorder Paternal Grandfather      Inflammatory Bowel Disease Paternal Aunt         and two paternal uncles           Reviewed and updated as needed this visit by Provider         Review of Systems   ROS COMP: Constitutional, psych systems are negative, except as  "otherwise noted.      Objective    LMP 07/23/2010   There is no height or weight on file to calculate BMI.  Physical Exam   GENERAL: healthy, alert and no distress  MS: no gross musculoskeletal defects noted, no edema  SKIN: no suspicious lesions or rashes  NEURO: Normal strength and tone, mentation intact and speech normal  PSYCH: mentation appears normal, affect normal/bright        Assessment & Plan   Assessment  1. Insomnia, unspecified type         Plan  Will trial hydroxyzine at bedtime x2 weeks. This should help with some anxiety, but may get her sleep cycle back on track as well.       Tobacco Cessation:   reports that she has been smoking cigarettes. She has been smoking about 0.50 packs per day. She has never used smokeless tobacco.    BMI:   Estimated body mass index is 28.89 kg/m  as calculated from the following:    Height as of this encounter: 1.59 m (5' 2.6\").    Weight as of this encounter: 73 kg (161 lb).       Return in about 1 month (around 3/21/2020), or if symptoms worsen or fail to improve.    Radha Bermudez PA-C  [facility]          "

## 2020-02-21 ENCOUNTER — OFFICE VISIT (OUTPATIENT)
Dept: FAMILY MEDICINE | Facility: CLINIC | Age: 40
End: 2020-02-21
Payer: COMMERCIAL

## 2020-02-21 VITALS
WEIGHT: 161 LBS | SYSTOLIC BLOOD PRESSURE: 127 MMHG | TEMPERATURE: 98.5 F | BODY MASS INDEX: 28.53 KG/M2 | OXYGEN SATURATION: 99 % | DIASTOLIC BLOOD PRESSURE: 80 MMHG | RESPIRATION RATE: 16 BRPM | HEART RATE: 86 BPM | HEIGHT: 63 IN

## 2020-02-21 DIAGNOSIS — G47.00 INSOMNIA, UNSPECIFIED TYPE: Primary | ICD-10-CM

## 2020-02-21 PROCEDURE — 99213 OFFICE O/P EST LOW 20 MIN: CPT | Performed by: PHYSICIAN ASSISTANT

## 2020-02-21 RX ORDER — HYDROXYZINE HYDROCHLORIDE 50 MG/1
50-100 TABLET, FILM COATED ORAL AT BEDTIME
Qty: 30 TABLET | Refills: 3 | Status: SHIPPED | OUTPATIENT
Start: 2020-02-21 | End: 2020-04-20

## 2020-02-21 ASSESSMENT — ASTHMA QUESTIONNAIRES
QUESTION_3 LAST FOUR WEEKS HOW OFTEN DID YOUR ASTHMA SYMPTOMS (WHEEZING, COUGHING, SHORTNESS OF BREATH, CHEST TIGHTNESS OR PAIN) WAKE YOU UP AT NIGHT OR EARLIER THAN USUAL IN THE MORNING: NOT AT ALL
ACT_TOTALSCORE: 23
QUESTION_1 LAST FOUR WEEKS HOW MUCH OF THE TIME DID YOUR ASTHMA KEEP YOU FROM GETTING AS MUCH DONE AT WORK, SCHOOL OR AT HOME: NONE OF THE TIME
QUESTION_2 LAST FOUR WEEKS HOW OFTEN HAVE YOU HAD SHORTNESS OF BREATH: ONCE OR TWICE A WEEK
QUESTION_4 LAST FOUR WEEKS HOW OFTEN HAVE YOU USED YOUR RESCUE INHALER OR NEBULIZER MEDICATION (SUCH AS ALBUTEROL): ONCE A WEEK OR LESS
QUESTION_5 LAST FOUR WEEKS HOW WOULD YOU RATE YOUR ASTHMA CONTROL: COMPLETELY CONTROLLED

## 2020-02-21 ASSESSMENT — MIFFLIN-ST. JEOR: SCORE: 1358.03

## 2020-02-21 NOTE — PATIENT INSTRUCTIONS
Melatonin and Benadryl are both helpful OTC sleep aids. They can be used together as well.  Melatonin 5-10mg, take 3 hours before bed  Benadryl 25-100mg, take 30-60 mins before bed    Good Sleep Hygiene Habits:    Stick to a regular sleep schedule---even on weekends.    Get regular exercise---avoid exercise in the late evening.    Go to bed only when you are sleepy.    Do something relaxing and enjoyable before bedtime.    Make your bedroom quiet and comfortable.    Avoid large meals just before bedtime.    If you do not fall asleep within 15 to 20 minutes, get up and do a productive activity such as exercising or paperwork or reading for at least 30 minutes before trying to get back to sleep. Do not go watch TV or surf the web, these activities can contribute to insomnia.     Remove your clock from sight.    Do not nap during the day.     Avoid alcohol, nicotine, and caffeine. No caffeine past noon, you need 8 hours off of caffeine to get best sleep at night.    Spend time outdoors at the same time each day.    Avoid bright lights from the TV, computers, video games, etc. before bed.     Do not bring your TV, computer (smart phone), video games, books, etc. into the bedroom.

## 2020-02-22 ASSESSMENT — ASTHMA QUESTIONNAIRES: ACT_TOTALSCORE: 23

## 2020-03-09 ENCOUNTER — OFFICE VISIT (OUTPATIENT)
Dept: DERMATOLOGY | Facility: CLINIC | Age: 40
End: 2020-03-09
Payer: COMMERCIAL

## 2020-03-09 VITALS — HEART RATE: 71 BPM | SYSTOLIC BLOOD PRESSURE: 118 MMHG | OXYGEN SATURATION: 100 % | DIASTOLIC BLOOD PRESSURE: 71 MMHG

## 2020-03-09 DIAGNOSIS — D23.9 DERMATOFIBROMA: ICD-10-CM

## 2020-03-09 DIAGNOSIS — L82.1 SEBORRHEIC KERATOSIS: ICD-10-CM

## 2020-03-09 DIAGNOSIS — D22.9 NEVUS: ICD-10-CM

## 2020-03-09 DIAGNOSIS — L81.4 LENTIGO: ICD-10-CM

## 2020-03-09 DIAGNOSIS — D18.01 ANGIOMA OF SKIN: ICD-10-CM

## 2020-03-09 DIAGNOSIS — L82.0 INFLAMED SEBORRHEIC KERATOSIS: Primary | ICD-10-CM

## 2020-03-09 PROCEDURE — 17110 DESTRUCTION B9 LES UP TO 14: CPT | Performed by: PHYSICIAN ASSISTANT

## 2020-03-09 PROCEDURE — 99214 OFFICE O/P EST MOD 30 MIN: CPT | Mod: 25 | Performed by: PHYSICIAN ASSISTANT

## 2020-03-09 NOTE — LETTER
3/9/2020         RE: Ana Felix  [address]  [place] [address]        Dear Colleague,    Thank you for referring your patient, Ana Felix, to the [facility]. Please see a copy of my visit note below.    HPI:   CC: Ana Felix is a 38 year old female who presents for Full skin cancer screening to rule out skin cancer.  Last Skin Exam: 1 yr ago      1st Baseline: no  Personal HX of Skin Cancer: no   Personal HX of Malignant Melanoma: no   Family HX of Skin Cancer / Malignant Melanoma: mother history of localized skin treatment; unsure of treatment and of what  Personal HX of Atypical Moles:   no  Risk factors: history of frequent sunburns; blistering burns in the past. Currently on imuran for UC  New / Changing lesions: bump on each eyelid, brown spots. Lump on left ankle.  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 She recently lost 50 lbs due to her stressful job (was ) - switched jobs now.   On review of systems, there are no further skin complaints, patient is feeling otherwise well.  See patient intake sheet.  ROS of the following were done and are negative: Constitutional, Eyes, Ears, Nose,   Mouth, Throat, Cardiovascular, Respiratory, GI, Genitourinary, Musculoskeletal,   Psychiatric, Endocrine, Allergic/Immunologic.    HPI, past medical history, social history, allergies, medications reviewed as of March 9, 2020    This document serves as a record of the services and decisions personally performed and made by Marry Boykin, MS, PA-C. It was created on her behalf by Jade Oh, a trained medical scribe. The creation of this document is based on the provider's statements to the medical scribe.  Jade Oh 12:23 PM March 9, 2020    PHYSICAL EXAM:   /71   Pulse 71   LMP 07/23/2010   SpO2 100%   Skin exam performed as follows: Type 2 skin. Mood appropriate  Alert and Oriented X 3. Well developed, well nourished in no  distress.  General appearance: Normal  Head including face: Normal  Eyes: conjunctiva and lids: Normal  Mouth: Lips, teeth, gums: Normal  Neck: Normal  Chest-breast/axillae: Normal  Back: Normal  Spleen and liver: Normal  Cardiovascular: Exam of peripheral vascular system by observation for swelling, varicosities, edema: Normal  Genitalia: groin, buttocks: Normal  Extremities: digits/nails (clubbing): Normal  Eccrine and Apocrine glands: Normal  Right upper extremity: Normal  Left upper extremity: Normal  Right lower extremity: Normal  Left lower extremity: Normal  Skin: Scalp and body hair: See below    Pt deferred exam of breasts, groin, buttocks: No    Other physical findings:  1. Multiple pigmented macules on extremities and trunk  2. Multiple pigmented macules on face, trunk and extremities  3. Multiple vascular papules on trunk, arms and legs  4. Multiple scattered keratotic plaques  5. Inflamed Keratotic plaque on right lateral cheek x 1, right cheek x 1, right nasal sidewall x 1, left cheek x 5.     Except as noted above, no other signs of skin cancer or melanoma.     ASSESSMENT/PLAN:   Benign Full skin cancer screening today. . Patient with no personal history of skin cancer.  Immunosuppressed on Imuran  Advised on monthly self exams and 1 year  Patient Education: Appropriate brochures given.    Multiple benign appearing nevi on arms, legs and trunk. Discussed ABCDEs of melanoma and sunscreen.   Multiple lentigos on arms, legs and trunk. Advised benign, no treatment needed.  Multiple scattered angiomas. Advised benign, no treatment needed.   Seborrheic keratosis on arms, legs and trunk. Advised benign, no treatment needed.  Inflamed seborrheic keratosis on right lateral cheek x 1, right cheek x 1, right nasal sidewall x 1, left cheek x 5. As physically tender cryosurgery performed. Advised on post op care.   Dermatofibroma on left ankle. Advised benign - no treatment needed.   Milia cyst on eyelids. Advised  benign - no treatment needed.         Follow-up: yearly FSE / PRN sooner     1.) Patient was asked about new and changing moles. YES  2.) Patient received a complete physical skin examination: YES  3.) Patient was counseled to perform a monthly self skin examination: YES  Scribed By: Jade Oh Medical Scribe    The information in this document, created by the medical scribe for me, accurately reflects the services I personally performed and the decisions made by me. I have reviewed and approved this document for accuracy prior to leaving the patient care area.  March 9, 2020 12:35 PM      Marry Boykin MS, PA-C      Again, thank you for allowing me to participate in the care of your patient.        Sincerely,        Marry Boykin PA-C

## 2020-03-09 NOTE — PROGRESS NOTES
HPI:   CC: Ana Felix is a 38 year old female who presents for Full skin cancer screening to rule out skin cancer.  Last Skin Exam: 1 yr ago      1st Baseline: no  Personal HX of Skin Cancer: no   Personal HX of Malignant Melanoma: no   Family HX of Skin Cancer / Malignant Melanoma: mother history of localized skin treatment; unsure of treatment and of what  Personal HX of Atypical Moles:   no  Risk factors: history of frequent sunburns; blistering burns in the past. Currently on imuran for UC  New / Changing lesions: bump on each eyelid, brown spots. Lump on left ankle.  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 She recently lost 50 lbs due to her stressful job (was ) - switched jobs now.   On review of systems, there are no further skin complaints, patient is feeling otherwise well.  See patient intake sheet.  ROS of the following were done and are negative: Constitutional, Eyes, Ears, Nose,   Mouth, Throat, Cardiovascular, Respiratory, GI, Genitourinary, Musculoskeletal,   Psychiatric, Endocrine, Allergic/Immunologic.    HPI, past medical history, social history, allergies, medications reviewed as of March 9, 2020    This document serves as a record of the services and decisions personally performed and made by Marry Boykin, MS, PA-C. It was created on her behalf by Jade Oh, a trained medical scribe. The creation of this document is based on the provider's statements to the medical scribe.  Jade Oh 12:23 PM March 9, 2020    PHYSICAL EXAM:   /71   Pulse 71   LMP 07/23/2010   SpO2 100%   Skin exam performed as follows: Type 2 skin. Mood appropriate  Alert and Oriented X 3. Well developed, well nourished in no distress.  General appearance: Normal  Head including face: Normal  Eyes: conjunctiva and lids: Normal  Mouth: Lips, teeth, gums: Normal  Neck: Normal  Chest-breast/axillae: Normal  Back: Normal  Spleen and liver:  Normal  Cardiovascular: Exam of peripheral vascular system by observation for swelling, varicosities, edema: Normal  Genitalia: groin, buttocks: Normal  Extremities: digits/nails (clubbing): Normal  Eccrine and Apocrine glands: Normal  Right upper extremity: Normal  Left upper extremity: Normal  Right lower extremity: Normal  Left lower extremity: Normal  Skin: Scalp and body hair: See below    Pt deferred exam of breasts, groin, buttocks: No    Other physical findings:  1. Multiple pigmented macules on extremities and trunk  2. Multiple pigmented macules on face, trunk and extremities  3. Multiple vascular papules on trunk, arms and legs  4. Multiple scattered keratotic plaques  5. Inflamed Keratotic plaque on right lateral cheek x 1, right cheek x 1, right nasal sidewall x 1, left cheek x 5.     Except as noted above, no other signs of skin cancer or melanoma.     ASSESSMENT/PLAN:   Benign Full skin cancer screening today. . Patient with no personal history of skin cancer.  Immunosuppressed on Imuran  Advised on monthly self exams and 1 year  Patient Education: Appropriate brochures given.    Multiple benign appearing nevi on arms, legs and trunk. Discussed ABCDEs of melanoma and sunscreen.   Multiple lentigos on arms, legs and trunk. Advised benign, no treatment needed.  Multiple scattered angiomas. Advised benign, no treatment needed.   Seborrheic keratosis on arms, legs and trunk. Advised benign, no treatment needed.  Inflamed seborrheic keratosis on right lateral cheek x 1, right cheek x 1, right nasal sidewall x 1, left cheek x 5. As physically tender cryosurgery performed. Advised on post op care.   Dermatofibroma on left ankle. Advised benign - no treatment needed.   Milia cyst on eyelids. Advised benign - no treatment needed.         Follow-up: yearly FSE / PRN sooner     1.) Patient was asked about new and changing moles. YES  2.) Patient received a complete physical skin examination: YES  3.) Patient was  counseled to perform a monthly self skin examination: YES  Scribed By: Jade Oh Medical Scribe    The information in this document, created by the medical scribe for me, accurately reflects the services I personally performed and the decisions made by me. I have reviewed and approved this document for accuracy prior to leaving the patient care area.  March 9, 2020 12:35 PM      Marry Boykin MS, PA-C

## 2020-03-12 ENCOUNTER — TRANSFERRED RECORDS (OUTPATIENT)
Dept: HEALTH INFORMATION MANAGEMENT | Facility: CLINIC | Age: 40
End: 2020-03-12

## 2020-04-01 ENCOUNTER — E-VISIT (OUTPATIENT)
Dept: FAMILY MEDICINE | Facility: CLINIC | Age: 40
End: 2020-04-01
Payer: COMMERCIAL

## 2020-04-01 DIAGNOSIS — B37.31 CANDIDIASIS OF VAGINA: ICD-10-CM

## 2020-04-01 DIAGNOSIS — N30.01 ACUTE CYSTITIS WITH HEMATURIA: Primary | ICD-10-CM

## 2020-04-01 PROCEDURE — 99421 OL DIG E/M SVC 5-10 MIN: CPT | Performed by: PHYSICIAN ASSISTANT

## 2020-04-01 RX ORDER — CIPROFLOXACIN 500 MG/1
500 TABLET, FILM COATED ORAL 2 TIMES DAILY
Qty: 6 TABLET | Refills: 0 | Status: SHIPPED | OUTPATIENT
Start: 2020-04-01 | End: 2020-04-04

## 2020-04-01 RX ORDER — FLUCONAZOLE 150 MG/1
150 TABLET ORAL ONCE
Qty: 1 TABLET | Refills: 0 | Status: SHIPPED | OUTPATIENT
Start: 2020-04-01 | End: 2020-04-01

## 2020-04-13 ENCOUNTER — E-VISIT (OUTPATIENT)
Dept: FAMILY MEDICINE | Facility: CLINIC | Age: 40
End: 2020-04-13
Payer: COMMERCIAL

## 2020-04-13 DIAGNOSIS — B02.9 HERPES ZOSTER WITHOUT COMPLICATION: Primary | ICD-10-CM

## 2020-04-13 PROCEDURE — 99421 OL DIG E/M SVC 5-10 MIN: CPT | Performed by: PHYSICIAN ASSISTANT

## 2020-04-13 RX ORDER — VALACYCLOVIR HYDROCHLORIDE 1 G/1
1000 TABLET, FILM COATED ORAL 3 TIMES DAILY
Qty: 21 TABLET | Refills: 0 | Status: SHIPPED | OUTPATIENT
Start: 2020-04-13 | End: 2022-03-15

## 2020-04-13 RX ORDER — GABAPENTIN 300 MG/1
CAPSULE ORAL
Qty: 90 CAPSULE | Refills: 0 | Status: SHIPPED | OUTPATIENT
Start: 2020-04-13 | End: 2020-04-20

## 2020-04-13 NOTE — PATIENT INSTRUCTIONS
Patient Education     Shingles  Shingles is a viral infection caused by the same virus that causes chicken pox. Anyone who has had chicken pox may get shingles later in life. The virus stays in the body, but remains asleep (dormant). Shingles often occurs in older persons or persons with lowered immunity. But it can affect anyone at any age.  Shingles starts as a tingling patch of skin on one side of the body. Small, painful blisters may then appear. The rash rarely spreads to other parts of the body.  Exposure to shingles can't cause shingles. However, it can cause chicken pox in anyone who has not had chicken pox or has not been vaccinated. The contagious period ends when all blisters have crusted over, generally 1 to 2 weeks after the illness starts.  After the blisters heal, the affected skin may be sensitive or painful for weeks or months, gradually resolving over time. But, sometimes this can last longer and be permanent (called postherpetic neuralgia.)  Shingles vaccines are available. Vaccination can help prevent shingles or make it less painful. It is generally recommended for adults older than 50, even if you've had singles in the past. Talk with your healthcare provider about when to get vaccinated and which vaccine is best for you.  Home care    Medicines may be prescribed to help relieve pain. Take these medicines as directed. Ask your healthcare provider or pharmacist before using over-the-counter medicines for helping treat pain and itching.    In certain cases, antiviral medicines may be prescribed to reduce pain, shorten the illness, and prevent neuralgia. Take these medicines as directed.    Compresses made from a solution of cool water mixed with cornstarch or baking soda may help relieve pain and itching.     Gently wash skin daily with soap and water to help prevent infection. Be certain to rinse off all of the soap, which can be irritating.    Trim fingernails and try not to scratch.  Scratching the sores may leave scars.    Stay home from work or school until all blisters have formed a crust and you are no longer contagious.  Follow-up care  Follow up with your healthcare provider, or as directed.  When to seek medical advice    Fever of 100.4 F (38 C) or higher, or as directed by your healthcare provider    Affected skin is on the face or neck and any of the following occur:  ? Headache  ? Eye pain  ? Changes in vision  ? Sores near the eye  ? Weakness of facial muscles    Blisters occurring on new areas of the body    Pain, redness, or swelling of a joint    Signs of skin infection: colored drainage from the sores, warmth, increasing redness, fever, or increasing pain  Date Last Reviewed: 4/1/2018 2000-2019 [employer]. [address] [place] [address], [place] [address]. All rights reserved. This information is not intended as a substitute for professional medical care. Always follow your healthcare professional's instructions.

## 2020-04-20 DIAGNOSIS — G47.00 INSOMNIA, UNSPECIFIED TYPE: ICD-10-CM

## 2020-04-20 RX ORDER — HYDROXYZINE HYDROCHLORIDE 50 MG/1
TABLET, FILM COATED ORAL
Qty: 30 TABLET | Refills: 3 | Status: SHIPPED | OUTPATIENT
Start: 2020-04-20 | End: 2020-06-19

## 2020-04-20 NOTE — TELEPHONE ENCOUNTER
"Requested Prescriptions   Pending Prescriptions Disp Refills     hydrOXYzine (ATARAX) 50 MG tablet [Pharmacy Med Name: HYDROXYZINE HCL 50MG TABS (WHITE)] 30 tablet 3     Sig: TAKE 1 TO 2 TABLETS(50  MG) BY MOUTH AT BEDTIME   Last Written Prescription Date:  02/21/20  Last Fill Quantity: 30,  # refills: 3   Last office visit: 2/21/2020 with prescribing provider:  KARAN Bermudez   Future Office Visit:        Antihistamines Protocol Passed - 4/20/2020  9:44 AM        Passed - Recent (12 mo) or future (30 days) visit within the authorizing provider's specialty     Patient has had an office visit with the authorizing provider or a provider within the authorizing providers department within the previous 12 mos or has a future within next 30 days. See \"Patient Info\" tab in inbasket, or \"Choose Columns\" in Meds & Orders section of the refill encounter.              Passed - Patient is age 3 or older     Apply age and/or weight-based dosing for peds patients age 3 and older.    Forward request to provider for patients under the age of 3.          Passed - Medication is active on med list             "

## 2020-04-26 DIAGNOSIS — B02.9 HERPES ZOSTER WITHOUT COMPLICATION: ICD-10-CM

## 2020-04-27 RX ORDER — GABAPENTIN 300 MG/1
CAPSULE ORAL
Qty: 90 CAPSULE | OUTPATIENT
Start: 2020-04-27

## 2020-06-15 DIAGNOSIS — K50.10 CROHN'S DISEASE OF LARGE INTESTINE (H): ICD-10-CM

## 2020-06-15 LAB
ALBUMIN SERPL-MCNC: 3.8 G/DL (ref 3.4–5)
ALP SERPL-CCNC: 49 U/L (ref 40–150)
ALT SERPL W P-5'-P-CCNC: 29 U/L (ref 0–50)
AST SERPL W P-5'-P-CCNC: 28 U/L (ref 0–45)
BASOPHILS # BLD AUTO: 0 10E9/L (ref 0–0.2)
BASOPHILS NFR BLD AUTO: 0.1 %
BILIRUB DIRECT SERPL-MCNC: 0.1 MG/DL (ref 0–0.2)
BILIRUB SERPL-MCNC: 0.6 MG/DL (ref 0.2–1.3)
DIFFERENTIAL METHOD BLD: ABNORMAL
EOSINOPHIL # BLD AUTO: 0.2 10E9/L (ref 0–0.7)
EOSINOPHIL NFR BLD AUTO: 2.1 %
ERYTHROCYTE [DISTWIDTH] IN BLOOD BY AUTOMATED COUNT: 12.3 % (ref 10–15)
HCT VFR BLD AUTO: 40.6 % (ref 35–47)
HGB BLD-MCNC: 14.3 G/DL (ref 11.7–15.7)
LYMPHOCYTES # BLD AUTO: 2.6 10E9/L (ref 0.8–5.3)
LYMPHOCYTES NFR BLD AUTO: 37 %
MCH RBC QN AUTO: 35 PG (ref 26.5–33)
MCHC RBC AUTO-ENTMCNC: 35.2 G/DL (ref 31.5–36.5)
MCV RBC AUTO: 100 FL (ref 78–100)
MONOCYTES # BLD AUTO: 0.5 10E9/L (ref 0–1.3)
MONOCYTES NFR BLD AUTO: 7.1 %
NEUTROPHILS # BLD AUTO: 3.8 10E9/L (ref 1.6–8.3)
NEUTROPHILS NFR BLD AUTO: 53.7 %
PLATELET # BLD AUTO: 267 10E9/L (ref 150–450)
PROT SERPL-MCNC: 8.1 G/DL (ref 6.8–8.8)
RBC # BLD AUTO: 4.08 10E12/L (ref 3.8–5.2)
WBC # BLD AUTO: 7.1 10E9/L (ref 4–11)

## 2020-06-15 PROCEDURE — 36415 COLL VENOUS BLD VENIPUNCTURE: CPT | Performed by: INTERNAL MEDICINE

## 2020-06-15 PROCEDURE — 80076 HEPATIC FUNCTION PANEL: CPT | Performed by: INTERNAL MEDICINE

## 2020-06-15 PROCEDURE — 85025 COMPLETE CBC W/AUTO DIFF WBC: CPT | Performed by: INTERNAL MEDICINE

## 2020-06-18 DIAGNOSIS — G47.00 INSOMNIA, UNSPECIFIED TYPE: ICD-10-CM

## 2020-06-19 RX ORDER — HYDROXYZINE HYDROCHLORIDE 50 MG/1
TABLET, FILM COATED ORAL
Qty: 30 TABLET | Refills: 3 | Status: SHIPPED | OUTPATIENT
Start: 2020-06-19 | End: 2020-08-18

## 2020-06-19 NOTE — TELEPHONE ENCOUNTER
Last Written Prescription Date:  4/20/20  Last Fill Quantity: 30,  # refills: 3   Last office visit: 2/21/2020 with prescribing provider:  Radha CAMARENA on 4/13/20 for acute concern   Future Office Visit: None    Prescription approved per [facility] Refill Protocol.  Hyun Brock, RN, BSN

## 2020-08-17 DIAGNOSIS — G47.00 INSOMNIA, UNSPECIFIED TYPE: ICD-10-CM

## 2020-08-18 RX ORDER — HYDROXYZINE HYDROCHLORIDE 50 MG/1
TABLET, FILM COATED ORAL
Qty: 60 TABLET | Refills: 1 | Status: SHIPPED | OUTPATIENT
Start: 2020-08-18 | End: 2020-10-16

## 2020-08-19 NOTE — TELEPHONE ENCOUNTER
"Prescription approved per [facility] Refill Protocol.          Requested Prescriptions   Pending Prescriptions Disp Refills     hydrOXYzine (ATARAX) 50 MG tablet [Pharmacy Med Name: HYDROXYZINE HCL 50MG TABS (WHITE)] 60 tablet 1     Sig: TAKE 1 TO 2 TABLETS(50  MG) BY MOUTH AT BEDTIME       Antihistamines Protocol Passed - 8/17/2020  8:12 AM        Passed - Recent (12 mo) or future (30 days) visit within the authorizing provider's specialty     Patient has had an office visit with the authorizing provider or a provider within the authorizing providers department within the previous 12 mos or has a future within next 30 days. See \"Patient Info\" tab in inbasket, or \"Choose Columns\" in Meds & Orders section of the refill encounter.              Passed - Patient is age 3 or older     Apply age and/or weight-based dosing for peds patients age 3 and older.    Forward request to provider for patients under the age of 3.          Passed - Medication is active on med list             "

## 2020-08-31 DIAGNOSIS — B02.9 HERPES ZOSTER WITHOUT COMPLICATION: ICD-10-CM

## 2020-08-31 NOTE — TELEPHONE ENCOUNTER
Routing refill request to provider for review/approval because:  Drug not on the [facility] refill protocol     Imani GOODENN, RN

## 2020-09-01 RX ORDER — GABAPENTIN 300 MG/1
CAPSULE ORAL
Qty: 180 CAPSULE | Refills: 1 | Status: SHIPPED | OUTPATIENT
Start: 2020-09-01 | End: 2020-10-27

## 2020-09-16 ENCOUNTER — TRANSFERRED RECORDS (OUTPATIENT)
Dept: HEALTH INFORMATION MANAGEMENT | Facility: CLINIC | Age: 40
End: 2020-09-16

## 2020-10-14 ENCOUNTER — NURSE TRIAGE (OUTPATIENT)
Dept: NURSING | Facility: CLINIC | Age: 40
End: 2020-10-14

## 2020-10-15 ENCOUNTER — VIRTUAL VISIT (OUTPATIENT)
Dept: FAMILY MEDICINE | Facility: OTHER | Age: 40
End: 2020-10-15
Payer: COMMERCIAL

## 2020-10-15 ENCOUNTER — TELEPHONE (OUTPATIENT)
Dept: INTERNAL MEDICINE | Facility: CLINIC | Age: 40
End: 2020-10-15

## 2020-10-15 ENCOUNTER — NURSE TRIAGE (OUTPATIENT)
Dept: NURSING | Facility: CLINIC | Age: 40
End: 2020-10-15

## 2020-10-15 DIAGNOSIS — R05.9 COUGH: Primary | ICD-10-CM

## 2020-10-15 PROCEDURE — 99421 OL DIG E/M SVC 5-10 MIN: CPT | Performed by: PHYSICIAN ASSISTANT

## 2020-10-15 NOTE — TELEPHONE ENCOUNTER
Requesting covid 19 testing/ was triaged last night/ symptoms are unchanged. Levi Howell RN -[phone]

## 2020-10-15 NOTE — TELEPHONE ENCOUNTER
Patient was told about doing [email or web address]. After checking on cost (told $86) and family currently does not have insurance, she was hoping she could get order through PCP.

## 2020-10-15 NOTE — PROGRESS NOTES
"Date: 10/15/2020 13:18:25  Clinician: Mariella Salazar  Clinician NPI: [id number]  Patient: yassine shea  Patient : 1980  Patient Address: [address] [place] [address]  Patient Phone: [phone]  Visit Protocol: URI  Patient Summary:  yassine is a 39 year old ( : 1980 ) female who initiated a [facility] Visit for COVID-19 (Coronavirus) evaluation and screening. When asked the question \"Please sign me up to receive news, health information and promotions from [facility].\", yassine responded \"No\".    yassine states her symptoms started gradually 3-4 days ago.   Her symptoms consist of a headache, wheezing, enlarged lymph nodes, a cough, nasal congestion, rhinitis, facial pain or pressure, malaise, and a sore throat. She is experiencing difficulty breathing due to nasal congestion but she is not short of breath.   Symptom details     Nasal secretions: The color of her mucus is white, clear, and green.    Cough: yassine coughs every 5-10 minutes and her cough is more bothersome at night. Phlegm does not come into her throat when she coughs. She believes her cough is caused by post-nasal drip.     Sore throat: yassine reports having mild throat pain (1-3 on a 10 point pain scale), does not have exudate on her tonsils, and can swallow liquids. The lymph nodes in her neck are enlarged. A rash has not appeared on the skin since the sore throat started.     Wheezing: yassine has been diagnosed with asthma. Additional wheezing details as reported by the patient (free text): Off and on seems to improve after coughing       Facial pain or pressure: The facial pain or pressure feels worse when bending over or leaning forward.     Headache: She states the headache is moderate (4-6 on a 10 point pain scale).      yassine denies having ear pain, fever, anosmia, vomiting, nausea, myalgias, chills, teeth pain, ageusia, and diarrhea. She also denies double sickening (worsening symptoms after initial improvement), having a sinus " infection within the past year, taking antibiotic medication in the past month, and having recent facial or sinus surgery in the past 60 days.   Precipitating events  yassine is not sure if she has been exposed to someone with strep throat. She has not recently been exposed to someone with influenza. yassine has been in close contact with the following high risk individuals: people with asthma, heart disease or diabetes and immunocompromised people.   Pertinent COVID-19 (Coronavirus) information  In the past 14 days, yassine has not worked in a congregate living setting.   She does not work or volunteer as healthcare worker or a  and does not work or volunteer in a healthcare facility.   yassine also has not lived in a congregate living setting in the past 14 days. She does not live with a healthcare worker.   yassine has not had a close contact with a laboratory-confirmed COVID-19 patient within 14 days of symptom onset.   Since December 2019, yassine and has not had upper respiratory infection or influenza-like illness. Has not been diagnosed with lab-confirmed COVID-19 test   Pertinent medical history  yassine typically gets a yeast infection when she takes antibiotics. She has used fluconazole (Diflucan) to treat previous yeast infections. 2 doses of fluconazole (Diflucan) has typically been needed for symptoms to resolve in the past.  yassine needs a return to work/school note.   Weight: 140 lbs   yassine smokes or uses smokeless tobacco.   She denies pregnancy and denies breastfeeding. She does not menstruate.   Additional information as reported by the patient (free text): I have crohn's disease and am on an immune suppressant   Weight: 140 lbs    MEDICATIONS: hydroxyzine HCl oral, fentanyl transdermal, Percocet oral, montelukast oral, Imuran oral, ALLERGIES: Vicodin  Clinician Response:  Dear yassine,   Your symptoms show that you may have coronavirus (COVID-19). This illness can cause fever, cough and trouble breathing.  "Many people get a mild case and get better on their own. Some people can get very sick.  What should I do?  We would like to test you for this virus.   1. Please call [phone] to schedule your visit. Explain that you were referred by [facility] to have a COVID-19 test. Be ready to share your [facility] visit ID number.  The following will serve as your written order for this COVID Test, ordered by me, for the indication of suspected COVID [Z20.828]: The test will be ordered in [employer], our electronic health record, after you are scheduled. It will show as ordered and authorized by Kobe Castle MD.  Order: COVID-19 (Coronavirus) PCR for SYMPTOMATIC testing from [facility].      2. When it's time for your COVID test:  Stay at least 6 feet away from others. (If someone will drive you to your test, stay in the backseat, as far away from the  as you can.)   Cover your mouth and nose with a mask, tissue or washcloth.  Go straight to the testing site. Don't make any stops on the way there or back.      3.Starting now: Stay home and away from others (self-isolate) until:   You've had no fever---and no medicine that reduces fever---for one full day (24 hours). And...   Your other symptoms have gotten better. For example, your cough or breathing has improved. And...   At least 10 days have passed since your symptoms started.       During this time, don't leave the house except for testing or medical care.   Stay in your own room, even for meals. Use your own bathroom if you can.   Stay away from others in your home. No hugging, kissing or shaking hands. No visitors.  Don't go to work, school or anywhere else.    Clean \"high touch\" surfaces often (doorknobs, counters, handles, etc.). Use a household cleaning spray or wipes. You'll find a full list of  on the EPA website: www.epa.gov/pesticide-registration/list-n-disinfectants-use-against-sars-cov-2.   Cover your mouth and nose with a mask, tissue or washcloth to avoid " spreading germs.  Wash your hands and face often. Use soap and water.  Caregivers in these groups are at risk for severe illness due to COVID-19:  o People 65 years and older  o People who live in a nursing home or long-term care facility  o People with chronic disease (lung, heart, cancer, diabetes, kidney, liver, immunologic)  o People who have a weakened immune system, including those who:   Are in cancer treatment  Take medicine that weakens the immune system, such as corticosteroids  Had a bone marrow or organ transplant  Have an immune deficiency  Have poorly controlled HIV or AIDS  Are obese (body mass index of 40 or higher)  Smoke regularly   o Caregivers should wear gloves while washing dishes, handling laundry and cleaning bedrooms and bathrooms.  o Use caution when washing and drying laundry: Don't shake dirty laundry, and use the warmest water setting that you can.  o For more tips, go to www.cdc.gov/coronavirus/2019-ncov/downloads/10Things.pdf.    4.Sign up for [employer]. We know it's scary to hear that you might have COVID-19. We want to track your symptoms to make sure you're okay over the next 2 weeks. Please look for an email from [employer]---this is a free, online program that we'll use to keep in touch. To sign up, follow the link in the email. Learn more at [email or web address]  How can I take care of myself?   Get lots of rest. Drink extra fluids (unless a doctor has told you not to).   Take Tylenol (acetaminophen) for fever or pain. If you have liver or kidney problems, ask your family doctor if it's okay to take Tylenol.   Adults can take either:    650 mg (two 325 mg pills) every 4 to 6 hours, or...   1,000 mg (two 500 mg pills) every 8 hours as needed.    Note: Don't take more than 3,000 mg in one day. Acetaminophen is found in many medicines (both prescribed and over-the-counter medicines). Read all labels to be sure you don't take too much.   For children, check the  Tylenol bottle for the right dose. The dose is based on the child's age or weight.    If you have other health problems (like cancer, heart failure, an organ transplant or severe kidney disease): Call your specialty clinic if you don't feel better in the next 2 days.       Know when to call 911. Emergency warning signs include:    Trouble breathing or shortness of breath Pain or pressure in the chest that doesn't go away Feeling confused like you haven't felt before, or not being able to wake up Bluish-colored lips or face.  Where can I get more information?   [facility] -- About COVID-19: [email or web address]   CDC -- What to Do If You're Sick: www.cdc.gov/coronavirus/2019-ncov/about/steps-when-sick.html   [facility] -- Ending Home Isolation: www.cdc.gov/coronavirus/2019-ncov/hcp/disposition-in-home-patients.html   [facility] -- Caring for Someone: www.cdc.gov/coronavirus/2019-ncov/if-you-are-sick/care-for-someone.html   [facility] -- Interim Guidance for Hospital Discharge to Home: www.[facility].[place]/diseases/coronavirus/hcp/hospdischarge.pdf   [facility] clinical trials (COVID-19 research studies): clinicalaffairs.[facility]-clinical-trials    Below are the COVID-19 hotlines at the [place] Department of Health ([facility]). Interpreters are available.    For health questions: Call 143-580-8748 or 1-639.849.3964 (7 a.m. to 7 p.m.) For questions about schools and childcare: Call 387-588-1067 or 1-727.930.4143 (7 a.m. to 7 p.m.)    COVID-19 (Coronavirus) General Information  Because there is currently no vaccine to prevent infection, the best way to protect yourself is to avoid being exposed to this virus. Common symptoms of COVID-19 include but are not limited to fever, cough, and shortness of breath. These symptoms appear 2-14 days after you are exposed to the virus that causes COVID-19. Click here for more information from the CDC on how to protect yourself.  If you are sick with COVID-19 or suspect  you are infected with the virus that causes COVID-19, follow the steps here from the CDC to help prevent the disease from spreading to people in your home and community.  Click here for general information from the CDC on testing.  If you develop any of these emergency warning signs for COVID-19, get medical attention immediately:     Trouble breathing    Persistent pain or pressure in the chest    New confusion or inability to arouse    Bluish lips or face      Call your doctor or clinic before going in. Call 911 if you have a medical emergency and notify the  you have or think you may have COVID-19.  For more detailed and up to date information on COVID-19 (Coronavirus), please visit the CDC website.   Diagnosis: Contact with and (suspected) exposure to other viral communicable diseases  Diagnosis ICD: Z20.828

## 2020-10-15 NOTE — TELEPHONE ENCOUNTER
Clinic Action Needed: Patient requesting a Covid-19 test  [facility]NA Triage Call  Presenting Problem:  Patient calling in with several Covid-19 symptoms:Sore throat, cough, congestion, runny nose    Denies fever, chest pain, difficulty breathing.     Reports she is on immuno suppressants for Crohns disease and history of asthma.   She did the [facility] health assessment who advised she get tested for corona virus.     [email or web address]    She wants to know if her PCP will order a test for her.       Guideline Used: Coronavirus- Diagnosed or suspected- Adult- After Hours    Routed to: Radha Bermudez PA-C and care team      Sirena Alfred, RN/M [facility]            Reason for Disposition    HIGH RISK patient (e.g., age > 64 years, diabetes, heart or lung disease, weak immune system) (Exception: Has already been evaluated by healthcare provider and has no new or worsening symptoms)    Additional Information    Negative: SEVERE difficulty breathing (e.g., struggling for each breath, speaks in single words)    Negative: Difficult to awaken or acting confused (e.g., disoriented, slurred speech)    Negative: Bluish (or gray) lips or face now    Negative: Shock suspected (e.g., cold/pale/clammy skin, too weak to stand, low BP, rapid pulse)    Negative: Sounds like a life-threatening emergency to the triager    Negative: [1] COVID-19 exposure AND [2] no symptoms    Negative: COVID-19 and Breastfeeding, questions about    Negative: [1] Adult with possible COVID-19 symptoms AND [2] triager concerned about severity of symptoms or other causes    Negative: SEVERE or constant chest pain or pressure (Exception: mild central chest pain, present only when coughing)    Negative: MODERATE difficulty breathing (e.g., speaks in phrases, SOB even at rest, pulse 100-120)    Negative: Patient sounds very sick or weak to the triager    Negative: MILD difficulty breathing (e.g., minimal/no SOB at rest, SOB  with walking, pulse <100)    Negative: Chest pain or pressure    Negative: Fever > 103 F (39.4 C)    Negative: [1] Fever > 101 F (38.3 C) AND [2] age > 60    Negative: [1] Fever > 100.0 F (37.8 C) AND [2] bedridden (e.g., nursing home patient, CVA, chronic illness, recovering from surgery)    Protocols used: CORONAVIRUS (COVID-19) DIAGNOSED OR [id number] 8.4.20

## 2020-10-15 NOTE — TELEPHONE ENCOUNTER
Clinic Action Needed:  Reason for Call:requesting covid 19 testing/ was triaged last night/ symptoms are unchanged.  Levi Howell RN [phone]  Patient Recommendations/Teaching:  Routed to:Sohan

## 2020-10-16 ENCOUNTER — AMBULATORY - HEALTHEAST (OUTPATIENT)
Dept: FAMILY MEDICINE | Facility: CLINIC | Age: 40
End: 2020-10-16

## 2020-10-16 ENCOUNTER — AMBULATORY - HEALTHEAST (OUTPATIENT)
Dept: INTERNAL MEDICINE | Facility: CLINIC | Age: 40
End: 2020-10-16

## 2020-10-16 DIAGNOSIS — Z20.822 SUSPECTED COVID-19 VIRUS INFECTION: ICD-10-CM

## 2020-10-16 DIAGNOSIS — G47.00 INSOMNIA, UNSPECIFIED TYPE: ICD-10-CM

## 2020-10-16 RX ORDER — HYDROXYZINE HYDROCHLORIDE 50 MG/1
TABLET, FILM COATED ORAL
Qty: 60 TABLET | Refills: 5 | Status: SHIPPED | OUTPATIENT
Start: 2020-10-16 | End: 2021-04-09

## 2020-10-17 ENCOUNTER — AMBULATORY - HEALTHEAST (OUTPATIENT)
Dept: FAMILY MEDICINE | Facility: CLINIC | Age: 40
End: 2020-10-17

## 2020-10-17 DIAGNOSIS — Z20.822 SUSPECTED COVID-19 VIRUS INFECTION: ICD-10-CM

## 2020-10-19 ENCOUNTER — COMMUNICATION - HEALTHEAST (OUTPATIENT)
Dept: SCHEDULING | Facility: CLINIC | Age: 40
End: 2020-10-19

## 2020-10-20 ENCOUNTER — HOSPITAL ENCOUNTER (EMERGENCY)
Facility: CLINIC | Age: 40
Discharge: HOME | End: 2020-10-20
Attending: NURSE PRACTITIONER | Admitting: NURSE PRACTITIONER
Payer: COMMERCIAL

## 2020-10-20 ENCOUNTER — APPOINTMENT (OUTPATIENT)
Dept: CT IMAGING | Facility: CLINIC | Age: 40
End: 2020-10-20
Attending: NURSE PRACTITIONER
Payer: COMMERCIAL

## 2020-10-20 VITALS
TEMPERATURE: 99 F | RESPIRATION RATE: 20 BRPM | BODY MASS INDEX: 26.68 KG/M2 | SYSTOLIC BLOOD PRESSURE: 120 MMHG | HEART RATE: 75 BPM | HEIGHT: 62 IN | OXYGEN SATURATION: 95 % | DIASTOLIC BLOOD PRESSURE: 74 MMHG | WEIGHT: 145 LBS

## 2020-10-20 DIAGNOSIS — Z20.822 ENCOUNTER FOR LABORATORY TESTING FOR COVID-19 VIRUS: ICD-10-CM

## 2020-10-20 DIAGNOSIS — J02.0 STREPTOCOCCAL PHARYNGITIS: ICD-10-CM

## 2020-10-20 DIAGNOSIS — J18.9 PNEUMONIA: ICD-10-CM

## 2020-10-20 LAB
ANION GAP SERPL CALCULATED.3IONS-SCNC: 5 MMOL/L (ref 3–14)
BASOPHILS # BLD AUTO: 0 10E9/L (ref 0–0.2)
BASOPHILS NFR BLD AUTO: 0.3 %
BUN SERPL-MCNC: 11 MG/DL (ref 7–30)
CALCIUM SERPL-MCNC: 9.6 MG/DL (ref 8.5–10.1)
CHLORIDE SERPL-SCNC: 110 MMOL/L (ref 94–109)
CO2 SERPL-SCNC: 23 MMOL/L (ref 20–32)
CREAT SERPL-MCNC: 0.84 MG/DL (ref 0.52–1.04)
DEPRECATED S PYO AG THROAT QL EIA: POSITIVE
DIFFERENTIAL METHOD BLD: ABNORMAL
EOSINOPHIL # BLD AUTO: 0.1 10E9/L (ref 0–0.7)
EOSINOPHIL NFR BLD AUTO: 0.4 %
ERYTHROCYTE [DISTWIDTH] IN BLOOD BY AUTOMATED COUNT: 12.8 % (ref 10–15)
FLUAV+FLUBV AG SPEC QL: NEGATIVE
FLUAV+FLUBV AG SPEC QL: NEGATIVE
GFR SERPL CREATININE-BSD FRML MDRD: 85 ML/MIN/{1.73_M2}
GLUCOSE SERPL-MCNC: 124 MG/DL (ref 70–99)
HCT VFR BLD AUTO: 42.2 % (ref 35–47)
HGB BLD-MCNC: 14.7 G/DL (ref 11.7–15.7)
IMM GRANULOCYTES # BLD: 0.1 10E9/L (ref 0–0.4)
IMM GRANULOCYTES NFR BLD: 0.4 %
LYMPHOCYTES # BLD AUTO: 1.6 10E9/L (ref 0.8–5.3)
LYMPHOCYTES NFR BLD AUTO: 11.6 %
MCH RBC QN AUTO: 34.4 PG (ref 26.5–33)
MCHC RBC AUTO-ENTMCNC: 34.8 G/DL (ref 31.5–36.5)
MCV RBC AUTO: 99 FL (ref 78–100)
MONOCYTES # BLD AUTO: 0.5 10E9/L (ref 0–1.3)
MONOCYTES NFR BLD AUTO: 3.6 %
NEUTROPHILS # BLD AUTO: 11.3 10E9/L (ref 1.6–8.3)
NEUTROPHILS NFR BLD AUTO: 83.7 %
NRBC # BLD AUTO: 0 10*3/UL
NRBC BLD AUTO-RTO: 0 /100
PLATELET # BLD AUTO: 265 10E9/L (ref 150–450)
POTASSIUM SERPL-SCNC: 3.9 MMOL/L (ref 3.4–5.3)
RBC # BLD AUTO: 4.27 10E12/L (ref 3.8–5.2)
SARS-COV-2 RNA SPEC QL NAA+PROBE: NOT DETECTED
SODIUM SERPL-SCNC: 138 MMOL/L (ref 133–144)
SPECIMEN SOURCE: ABNORMAL
SPECIMEN SOURCE: NORMAL
SPECIMEN SOURCE: NORMAL
WBC # BLD AUTO: 13.5 10E9/L (ref 4–11)

## 2020-10-20 PROCEDURE — U0003 INFECTIOUS AGENT DETECTION BY NUCLEIC ACID (DNA OR RNA); SEVERE ACUTE RESPIRATORY SYNDROME CORONAVIRUS 2 (SARS-COV-2) (CORONAVIRUS DISEASE [COVID-19]), AMPLIFIED PROBE TECHNIQUE, MAKING USE OF HIGH THROUGHPUT TECHNOLOGIES AS DESCRIBED BY CMS-2020-01-R: HCPCS | Performed by: NURSE PRACTITIONER

## 2020-10-20 PROCEDURE — 85025 COMPLETE CBC W/AUTO DIFF WBC: CPT | Performed by: NURSE PRACTITIONER

## 2020-10-20 PROCEDURE — C9803 HOPD COVID-19 SPEC COLLECT: HCPCS | Performed by: NURSE PRACTITIONER

## 2020-10-20 PROCEDURE — 71260 CT THORAX DX C+: CPT

## 2020-10-20 PROCEDURE — 96361 HYDRATE IV INFUSION ADD-ON: CPT | Performed by: NURSE PRACTITIONER

## 2020-10-20 PROCEDURE — 250N000013 HC RX MED GY IP 250 OP 250 PS 637: Performed by: NURSE PRACTITIONER

## 2020-10-20 PROCEDURE — 250N000011 HC RX IP 250 OP 636: Performed by: NURSE PRACTITIONER

## 2020-10-20 PROCEDURE — 80048 BASIC METABOLIC PNL TOTAL CA: CPT | Performed by: NURSE PRACTITIONER

## 2020-10-20 PROCEDURE — 250N000009 HC RX 250: Performed by: NURSE PRACTITIONER

## 2020-10-20 PROCEDURE — 99285 EMERGENCY DEPT VISIT HI MDM: CPT | Mod: 25 | Performed by: NURSE PRACTITIONER

## 2020-10-20 PROCEDURE — 258N000003 HC RX IP 258 OP 636: Performed by: NURSE PRACTITIONER

## 2020-10-20 PROCEDURE — 87880 STREP A ASSAY W/OPTIC: CPT | Performed by: NURSE PRACTITIONER

## 2020-10-20 PROCEDURE — 99284 EMERGENCY DEPT VISIT MOD MDM: CPT | Performed by: NURSE PRACTITIONER

## 2020-10-20 PROCEDURE — 87804 INFLUENZA ASSAY W/OPTIC: CPT | Performed by: NURSE PRACTITIONER

## 2020-10-20 PROCEDURE — 96360 HYDRATION IV INFUSION INIT: CPT | Mod: 59 | Performed by: NURSE PRACTITIONER

## 2020-10-20 RX ORDER — SODIUM CHLORIDE 9 MG/ML
INJECTION, SOLUTION INTRAVENOUS CONTINUOUS
Status: DISCONTINUED | OUTPATIENT
Start: 2020-10-20 | End: 2020-10-20 | Stop reason: HOSPADM

## 2020-10-20 RX ORDER — LEVALBUTEROL TARTRATE 45 UG/1
4-6 AEROSOL, METERED ORAL EVERY 4 HOURS PRN
Qty: 1 INHALER | Refills: 0 | Status: SHIPPED | OUTPATIENT
Start: 2020-10-20 | End: 2021-03-22

## 2020-10-20 RX ORDER — ALBUTEROL SULFATE 90 UG/1
6 AEROSOL, METERED RESPIRATORY (INHALATION) EVERY 6 HOURS PRN
Status: DISCONTINUED | OUTPATIENT
Start: 2020-10-20 | End: 2020-10-20 | Stop reason: HOSPADM

## 2020-10-20 RX ORDER — IOPAMIDOL 755 MG/ML
80 INJECTION, SOLUTION INTRAVASCULAR ONCE
Status: COMPLETED | OUTPATIENT
Start: 2020-10-20 | End: 2020-10-20

## 2020-10-20 RX ORDER — AZITHROMYCIN 500 MG/1
500 TABLET, FILM COATED ORAL DAILY
Qty: 5 TABLET | Refills: 0 | Status: SHIPPED | OUTPATIENT
Start: 2020-10-20 | End: 2020-10-25

## 2020-10-20 RX ADMIN — SODIUM CHLORIDE 80 ML: 9 INJECTION, SOLUTION INTRAVENOUS at 10:24

## 2020-10-20 RX ADMIN — SODIUM CHLORIDE 1000 ML: 9 INJECTION, SOLUTION INTRAVENOUS at 10:02

## 2020-10-20 RX ADMIN — IOPAMIDOL 80 ML: 755 INJECTION, SOLUTION INTRAVENOUS at 10:23

## 2020-10-20 RX ADMIN — ALBUTEROL SULFATE 6 PUFF: 90 AEROSOL, METERED RESPIRATORY (INHALATION) at 10:01

## 2020-10-20 ASSESSMENT — MIFFLIN-ST. JEOR: SCORE: 1275.97

## 2020-10-20 NOTE — DISCHARGE INSTRUCTIONS
Use the albuterol inhaler 4 to 6 puffs every 4-6 hours as needed for shortness of breath.  Start the azithromycin today take 1 tablet daily for 5 days.  Follow-up with primary care provider in 2 days.  You need to quarantine as you may have Covid.  Note provided for work.  The azithromycin should cover the strep pharyngitis that was noted on testing today as well.  Return if you have sudden worsening of symptoms.   If Covid test is negative in 2 days and you follow-up with your primary care she may decide to start steroids at that time

## 2020-10-20 NOTE — ED AVS SNAPSHOT
[facility]  [address] [address]  Phone: [phone]  Fax: [phone]                                    Ana Felix   MRN: [id number]    Department: [facility]   Date of Visit: 10/20/2020           After Visit Summary Signature Page    I have received my discharge instructions, and my questions have been answered. I have discussed any challenges I see with this plan with the nurse or doctor.    ..........................................................................................................................................  Patient/Patient Representative Signature      ..........................................................................................................................................  Patient Representative Print Name and Relationship to Patient    ..................................................               ................................................  Date                                   Time    ..........................................................................................................................................  Reviewed by Signature/Title    ...................................................              ..............................................  Date                                               Time          22EPIC Rev 08/18

## 2020-10-20 NOTE — ED PROVIDER NOTES
"  History     Chief Complaint   Patient presents with     Shortness of Breath     cough started last wednesday, soa started on saturday     Cough     HPI      SUBJECTIVE: Ana Felix  is here today because of:Cough and shortness of breath  The patient has had symptoms of cough, sore throat, nasal congestion/runny nose, headache, facial pressure, sinus pain and shortness of breath.   Onset of symptoms was 9 days ago. Course of illness is worsening as shortness of breath started this past Saturday or Sunday.  Patient denies exposure to illness at home or work/school.   Patient denies earache, nausea, vomiting, diarrhea, myalgias and abdominal pain, dysuria, thoughts of harming self  Treatment measures tried include Nyquil, Dayquil, albuterol inhaler every 3 hours.  Patient is exposed to tobacco  Patient admits to history of asthma  Seen 10/15/2020 and tested for COVID with negative COVID result on 10/17/2020  Pt denies any recent medication changes    Allergies:  Allergies   Allergen Reactions     Infliximab Hives     Sulfa Drugs [Sulfa Drugs] Nausea     Pt states \"it doesn't work for me\"     Sulfacetamide Nausea     Augmentin Other (See Comments) and Nausea and Vomiting     Crohn's     Bupropion Hives and Nausea     Droperidol Other (See Comments)     Dystonic reaction     Ibuprofen Other (See Comments)     Crohn's       Lyrica [Pregabalin] Nausea and Vomiting     Grogginess, severe back pain  Grogginess, severe back pain     Vicodin [Hydrocodone-Acetaminophen] Nausea and Vomiting       Problem List:    Patient Active Problem List    Diagnosis Date Noted     History of gestational diabetes mellitus (GDM) 08/15/2018     Priority: Medium     Amenorrhea 08/15/2018     Priority: Medium     Crohn's disease of both small and large intestine without complication (H) 07/31/2018     Priority: Medium     Pain medication agreement 08/18/2017     Priority: Medium     Overview:   [facility]       Controlled substance " agreement signed 07/19/2017     Priority: Medium     Degenerative lumbar disc 03/07/2017     Priority: Medium     Labral tear of hip, degenerative 03/07/2017     Priority: Medium     Pain of right hip joint 03/07/2017     Priority: Medium     S/P LEEP of cervix 12/15/2015     Priority: Medium     S/p LEEP of cervix - date unknown  12/9/15: Pap - NIL, Neg HPV. Plan cotest in 1 year. If JAYME 2/3 on biopsy - PAP/HPV co-testing at 12, 24 months. If two negative results repeat co-testing in 3 years, if negative then routine screening.  3/7/18 Pap: NIL/neg HPV.             Hearing difficulty 10/09/2014     Priority: Medium     Irritable bowel syndrome (IBS) 04/15/2014     Priority: Medium     Tiffany raw vegetables       Lactose intolerance 04/15/2014     Priority: Medium     Abdominal pain, right upper quadrant 03/06/2014     Priority: Medium     Nausea with vomiting 01/31/2014     Priority: Medium     Chronic low back pain 11/05/2012     Priority: Medium     Follows with pain clinic.       Mild persistent asthma 11/05/2012     Priority: Medium     Discogenic pain 10/08/2012     Priority: Medium     Abdominal pain 08/30/2012     Priority: Medium     S/P oophorectomy 07/20/2012     Priority: Medium     History of cholecystectomy 07/20/2012     Priority: Medium     History of resection of small bowel 07/20/2012     Priority: Medium     Tobacco abuse 05/25/2012     Priority: Medium     Displacement of lumbar intervertebral disc without myelopathy 01/06/2012     Priority: Medium     Disorder of sacrum 12/13/2011     Priority: Medium     Problem list name updated by automated process. Provider to review       Back pain 11/09/2011     Priority: Medium     Obesity 11/09/2011     Priority: Medium     Migraine headache 09/14/2011     Priority: Medium     Patient given Migraine Education folder and Migraine Action Plan on September 14, 2011. Cammy Anderson RN    (Problem list name updated by automated process. Provider to review and  confirm.)       CARDIOVASCULAR SCREENING; LDL GOAL LESS THAN 160 10/31/2010     Priority: Medium     Dysmenorrhea 10/05/2010     Priority: Medium     Female pelvic pain 2010     Priority: Medium     (Problem list name updated by automated process. Provider to review and confirm.)       Crohns disease 2009     Priority: Medium        Past Medical History:    Past Medical History:   Diagnosis Date     Back pain      Crohn's disease (H)      Exercise-induced asthma      Gestational diabetes      Herniation of intervertebral disc of lumbar region      Infertility      Ovarian cyst      Smoker      Status post cholecystectomy 2005       Past Surgical History:    Past Surgical History:   Procedure Laterality Date     APPENDECTOMY       C/SECTION, LOW TRANSVERSE      , Low Transverse     CHOLECYSTECTOMY, LAPOROSCOPIC  2005    Cholecystectomy, Laparoscopic     COLONOSCOPY       ESOPHAGOSCOPY, GASTROSCOPY, DUODENOSCOPY (EGD), COMBINED  3/6/2014    Procedure: COMBINED ESOPHAGOSCOPY, GASTROSCOPY, DUODENOSCOPY (EGD), BIOPSY SINGLE OR MULTIPLE;  COLONOSCOPY/ UPPER GI ENDOSCOPY/ COLON/ EGD/ Abdominal pain, epigastric/ [facility];  Surgeon: West Lomas MD;  Location: [facility] OR      HYSTEROSCOPY, SURGICAL; W/ ENDOMETRIAL ABLATION, ANY METHOD  2010     HERNIORRHAPHY VENTRAL N/A 2018    Procedure: Open ventral hernia repair;  Surgeon: Alonso Bills MD;  Location: [facility]     HYSTERECTOMY, SUPRACERVICAL LAPAROSCOPIC       LEEP TX, CERVICAL      LEEP TX Cervical     ORTHOPEDIC SURGERY      bluged disk     partial small bowel resection      Ileo-colonic resection. Crohn's disease surgery for bowel obstruction. this was a section of small bowel and large bowel and the cecum., all removed and a reanastamosis done successfully     SALPINGO OOPHORECTOMY,R/L/TORI      Right ovary     TUBAL LIGATION         Family History:    Family History   Problem Relation Age of Onset     Cancer Mother      "    cervical     Lipids Mother      Eye Disorder Father      Lipids Father      Alcohol/Drug Maternal Grandmother      Diabetes Paternal Grandmother      Cancer - colorectal Paternal Grandmother      Eye Disorder Paternal Grandmother      Diabetes Paternal Grandfather      Eye Disorder Paternal Grandfather      Inflammatory Bowel Disease Paternal Aunt         and two paternal uncles       Social History:  Marital Status:   [2]  Social History     Tobacco Use     Smoking status: Current Every Day Smoker     Packs/day: 0.50     Types: Cigarettes     Smokeless tobacco: Never Used   Substance Use Topics     Alcohol use: Yes     Alcohol/week: 0.0 standard drinks     Comment: rare     Drug use: No        Medications:         azithromycin (ZITHROMAX) 500 MG tablet       levalbuterol (XOPENEX HFA) 45 MCG/ACT inhaler       albuterol (PROAIR HFA) 108 (90 Base) MCG/ACT inhaler       azaTHIOprine (IMURAN) 50 MG tablet       cyanocobalamin (VITAMIN B12) 1000 MCG/ML injection       fentaNYL (DURAGESIC) 12 mcg/hr patch 72 hr       fentaNYL (DURAGESIC) 25 mcg/hr patch 72 hr       gabapentin (NEURONTIN) 300 MG capsule       gabapentin (NEURONTIN) 300 MG capsule       hydrOXYzine (ATARAX) 50 MG tablet       Melatonin 10 MG PO TABS tablet       montelukast (SINGULAIR) 10 MG tablet       oxyCODONE-acetaminophen (PERCOCET) 7.5-325 MG per tablet       valACYclovir (VALTREX) 1000 mg tablet          Review of Systems  As mentioned above in the history present illness. All other systems were reviewed and are negative.    Physical Exam   BP: (!) 144/96  Pulse: 81  Temp: 99  F (37.2  C)  Resp: 20  Height: 157.5 cm (5' 2\")  Weight: 65.8 kg (145 lb)  SpO2: 96 %      Physical Exam  GENERAL: alert, in mild distress and no acute distress  EYES:  Right conjunctiva is not injected and without discharge.  Left conjunctiva is not injected and without discharge.  EARS: Right TM is normal: no effusions, no erythema, and normal landmarks.  Left TM " is normal: no effusions, no erythema, and normal landmarks.  NOSE: Nasal mucosa is normal.  Sinus not tender.  THROAT: Erythematous posterior pharynx with uvula midline, no exudates noted,.  NECK: supple with shotty nodes  CARDIAC:NORMAL - regular rate and rhythm without murmur.  RESP: ABNORMAL - scattered rhonchi and bilateral wheezing  SKIN: normal    ED Course        Procedures    Results for orders placed or performed during the hospital encounter of 10/20/20 (from the past 24 hour(s))   Streptococcus A Rapid Scr w Reflx to PCR    Specimen: Throat   Result Value Ref Range    Strep Specimen Description Throat     Streptococcus Group A Rapid Screen Positive (A) NEG^Negative   Influenza A/B antigen   Result Value Ref Range    Influenza A/B Agn Specimen Nasopharyngeal     Influenza A Negative NEG^Negative    Influenza B Negative NEG^Negative   CBC with platelets differential   Result Value Ref Range    WBC 13.5 (H) 4.0 - 11.0 10e9/L    RBC Count 4.27 3.8 - 5.2 10e12/L    Hemoglobin 14.7 11.7 - 15.7 g/dL    Hematocrit 42.2 35.0 - 47.0 %    MCV 99 78 - 100 fl    MCH 34.4 (H) 26.5 - 33.0 pg    MCHC 34.8 31.5 - 36.5 g/dL    RDW 12.8 10.0 - 15.0 %    Platelet Count 265 150 - 450 10e9/L    Diff Method Automated Method     % Neutrophils 83.7 %    % Lymphocytes 11.6 %    % Monocytes 3.6 %    % Eosinophils 0.4 %    % Basophils 0.3 %    % Immature Granulocytes 0.4 %    Nucleated RBCs 0 0 /100    Absolute Neutrophil 11.3 (H) 1.6 - 8.3 10e9/L    Absolute Lymphocytes 1.6 0.8 - 5.3 10e9/L    Absolute Monocytes 0.5 0.0 - 1.3 10e9/L    Absolute Eosinophils 0.1 0.0 - 0.7 10e9/L    Absolute Basophils 0.0 0.0 - 0.2 10e9/L    Abs Immature Granulocytes 0.1 0 - 0.4 10e9/L    Absolute Nucleated RBC 0.0    Basic metabolic panel   Result Value Ref Range    Sodium 138 133 - 144 mmol/L    Potassium 3.9 3.4 - 5.3 mmol/L    Chloride 110 (H) 94 - 109 mmol/L    Carbon Dioxide 23 20 - 32 mmol/L    Anion Gap 5 3 - 14 mmol/L    Glucose 124 (H) 70 -  99 mg/dL    Urea Nitrogen 11 7 - 30 mg/dL    Creatinine 0.84 0.52 - 1.04 mg/dL    GFR Estimate 85 >60 mL/min/[1.73_m2]    GFR Estimate If Black >90 >60 mL/min/[1.73_m2]    Calcium 9.6 8.5 - 10.1 mg/dL   CT Chest w Contrast    Narrative    CT CHEST WITH CONTRAST October 20, 2020 10:39 AM     HISTORY: Acute respiratory illness, > 40 years old. Cough, persistent.  Shortness of breath, ill for 10 days worsening cough, short of breath.  History of asthma. Smoker.    CONTRAST DOSE: 80 mL Isovue 370.    TECHNIQUE: Radiation dose for this scan was reduced using automated  exposure control, adjustment of the mA and/or kV according to patient  size, or iterative reconstruction technique.    FINDINGS: There are scattered ground-glass opacities throughout the  bilateral upper and lower lungs. No pleural effusion. The mediastinum  and rob appear within normal limits. No evidence of axillary  adenopathy. Cholecystectomy surgical clips are incidentally noted.      Impression    IMPRESSION: Bilateral upper and lower pulmonary ground-glass opacities  compatible with nonspecific alveolitis.    CALLUM CASAREZ MD       Medications   0.9% sodium chloride BOLUS (0 mLs Intravenous Stopped 10/20/20 1253)     Followed by   sodium chloride 0.9% infusion (has no administration in time range)   albuterol (PROAIR HFA/PROVENTIL HFA/VENTOLIN HFA) 108 (90 Base) MCG/ACT inhaler 6 puff (6 puffs Inhalation Given 10/20/20 1001)   iopamidol (ISOVUE-370) solution 80 mL (80 mLs Intravenous Given 10/20/20 1023)   sodium chloride 0.9 % bag 500mL for CT scan flush use (80 mLs Intravenous Given 10/20/20 1024)       Assessments & Plan (with Medical Decision Making)     I have reviewed the nursing notes.    I have reviewed the findings, diagnosis, plan and need for follow up with the patient.  Medical Decision Making:  CTA chest is indicated, completed, and positive for bilateral opacities that are groundglass in appearance.  Recent Covid was negative.   Possible Covid related pneumonia.  Rapid strep test is positive       Assessment:  1) strep pharyngitis we will treat with a azithromycin as this will cover for pneumonia and Streptococcus pharyngitis.  2) groundglass opacities in bilateral lungs concerning for Covid type pneumonia; will initiate treatment with azithromycin for this.  500 mg p.o. daily for 5 days ordered.  3) bilateral wheezing with rhonchi noted will increase albuterol inhaler to 4 to 6 puffs every 4-6 hours due to recommendations if Covid to withhold steroid if possible.  Note provided to be off work.  Discussed Covid precautions.  Recommend follow-up in 2 days and if Covid is negative then may consider adding steroid for asthma exacerbation.  Patient verbalized understanding and discharged in stable condition..      Discharge Medication List as of 10/20/2020 12:53 PM      START taking these medications    Details   azithromycin (ZITHROMAX) 500 MG tablet Take 1 tablet (500 mg) by mouth daily for 5 days, Disp-5 tablet, R-0, E-Prescribe      levalbuterol (XOPENEX HFA) 45 MCG/ACT inhaler Inhale 4-6 puffs into the lungs every 4 hours as needed for shortness of breath / dyspnea or wheezing, Disp-1 Inhaler, R-0, E-Prescribe             Final diagnoses:   Encounter for laboratory testing for COVID-19 virus   Pneumonia   Streptococcal pharyngitis       10/20/2020   [facility] EMERGENCY DEPT     Radha Covarrubias, CARI CNP  10/20/20 9734

## 2020-10-20 NOTE — LETTER
October 20, 2020      To Whom It May Concern:      Ana Felix was seen in our Emergency Department today, 10/20/20.  Patient unable to work for a minimum of 2 days or until symptoms are completely resolved.    Sincerely,        CARI Villareal CNP

## 2020-10-23 ENCOUNTER — OFFICE VISIT (OUTPATIENT)
Dept: FAMILY MEDICINE | Facility: CLINIC | Age: 40
End: 2020-10-23
Payer: COMMERCIAL

## 2020-10-23 VITALS
TEMPERATURE: 98.5 F | DIASTOLIC BLOOD PRESSURE: 81 MMHG | HEART RATE: 78 BPM | HEIGHT: 62 IN | BODY MASS INDEX: 30 KG/M2 | SYSTOLIC BLOOD PRESSURE: 121 MMHG | OXYGEN SATURATION: 97 % | WEIGHT: 163 LBS

## 2020-10-23 DIAGNOSIS — R06.2 WHEEZING: ICD-10-CM

## 2020-10-23 DIAGNOSIS — J45.30 MILD PERSISTENT ASTHMA, UNCOMPLICATED: ICD-10-CM

## 2020-10-23 DIAGNOSIS — J18.9 PNEUMONIA DUE TO INFECTIOUS ORGANISM, UNSPECIFIED LATERALITY, UNSPECIFIED PART OF LUNG: Primary | ICD-10-CM

## 2020-10-23 DIAGNOSIS — R06.00 DYSPNEA, UNSPECIFIED TYPE: ICD-10-CM

## 2020-10-23 DIAGNOSIS — B37.31 CANDIDIASIS OF VAGINA: ICD-10-CM

## 2020-10-23 DIAGNOSIS — R05.9 COUGH: ICD-10-CM

## 2020-10-23 DIAGNOSIS — D84.9 IMMUNOCOMPROMISED STATE (H): ICD-10-CM

## 2020-10-23 PROCEDURE — 99214 OFFICE O/P EST MOD 30 MIN: CPT | Performed by: PHYSICIAN ASSISTANT

## 2020-10-23 RX ORDER — CEFPODOXIME PROXETIL 200 MG/1
200 TABLET, FILM COATED ORAL 2 TIMES DAILY
Qty: 10 TABLET | Refills: 0 | Status: SHIPPED | OUTPATIENT
Start: 2020-10-23 | End: 2020-10-28

## 2020-10-23 RX ORDER — METHYLPREDNISOLONE 4 MG
TABLET, DOSE PACK ORAL
Qty: 21 TABLET | Refills: 0 | Status: SHIPPED | OUTPATIENT
Start: 2020-10-23 | End: 2021-09-13

## 2020-10-23 RX ORDER — FLUCONAZOLE 150 MG/1
150 TABLET ORAL ONCE
Qty: 1 TABLET | Refills: 0 | Status: SHIPPED | OUTPATIENT
Start: 2020-10-23 | End: 2020-10-23

## 2020-10-23 ASSESSMENT — ENCOUNTER SYMPTOMS
SORE THROAT: 1
FEVER: 1
NERVOUS/ANXIOUS: 0
CHILLS: 1
FATIGUE: 1
HEADACHES: 1
COUGH: 1
SHORTNESS OF BREATH: 1
ABDOMINAL PAIN: 0
WHEEZING: 1
LIGHT-HEADEDNESS: 0

## 2020-10-23 ASSESSMENT — MIFFLIN-ST. JEOR: SCORE: 1357.61

## 2020-10-23 NOTE — PROGRESS NOTES
Subjective     Ana Felix is a 39 year old female who presents to clinic today for the following health issues:    HPI       Per chart review, patient developed symptoms of cough and shortness of breath starting 10/14/2020.  Additional symptoms included sore throat, nasal congestion, headache, sinus pain.  Negative test for COVID-19 10/17/2020.  Patient was evaluated in emergency department 10/20/2020 with another negative COVID-19 test and chest CT significant for bilateral upper and lower pulmonary groundglass opacities consistent with nonspecific alveolitis.  Rapid strep testing positive and influenza testing negative.  Leukocytosis at 13.5.  Patient treated with azithromycin for pneumonia and pharyngitis.  Results were concerning for Covid type pneumonia.  Symptomatic treatment included DayQuil/NyQuil and albuterol.  Patient has history of asthma.    Today patient notes persistent cough and dyspnea with wheezing.  Sore throat has improved.  Patient has history of Chrons and is on Imuran. Fever has been around 100-101 with last fever being two days ago.      Patient notes frequent episodes of vaginal candidiasis with antibiotic use and would like fluconazole for this.     ED/UC Followup:    Facility:  [place] [facility]  Date of visit: 10/20/20  Reason for visit: SOB/cough  Current Status: Continued cough but some improvement since seen. Taking abx regularly and using inhaler regularly. Afebrile.     Ally DeShong CMA        Review of Systems   Constitutional: Positive for chills, fatigue and fever.   HENT: Positive for congestion and sore throat (improved).    Respiratory: Positive for cough, shortness of breath and wheezing (intermittent ).    Cardiovascular: Negative for chest pain.   Gastrointestinal: Negative for abdominal pain.   Skin: Negative for rash.   Neurological: Positive for headaches (improved). Negative for light-headedness.   Psychiatric/Behavioral: The patient is not nervous/anxious.            "  Objective    /81   Pulse 78   Temp 98.5  F (36.9  C) (Tympanic)   Ht 1.575 m (5' 2\")   Wt 73.9 kg (163 lb)   LMP 07/23/2010   SpO2 97%   BMI 29.81 kg/m    Body mass index is 29.81 kg/m .  Physical Exam  Vitals signs and nursing note reviewed.   Constitutional:       General: She is not in acute distress.     Appearance: Normal appearance. She is not toxic-appearing.   HENT:      Head: Normocephalic and atraumatic.      Nose: Rhinorrhea present. No congestion.      Mouth/Throat:      Mouth: Mucous membranes are moist.      Pharynx: Oropharynx is clear. No oropharyngeal exudate or posterior oropharyngeal erythema.   Eyes:      Extraocular Movements: Extraocular movements intact.      Pupils: Pupils are equal, round, and reactive to light.   Neck:      Musculoskeletal: Normal range of motion.   Cardiovascular:      Rate and Rhythm: Normal rate and regular rhythm.      Heart sounds: Normal heart sounds.   Pulmonary:      Effort: Pulmonary effort is normal. No respiratory distress.      Breath sounds: Wheezing present. No rhonchi or rales.   Musculoskeletal: Normal range of motion.   Skin:     General: Skin is warm and dry.   Neurological:      General: No focal deficit present.      Mental Status: She is alert.   Psychiatric:         Mood and Affect: Mood normal.         Behavior: Behavior normal.            Prior labs and imaging reviewed        Assessment & Plan     Cough  Dyspnea, unspecified type  Wheezing  Immunocompromised state (H)  Mild persistent asthma, uncomplicated  Pneumonia due to infectious organism, unspecified laterality, unspecified part of lung  Patient is a 39-year-old female with past medical history significant for asthma who presents to clinic for follow-up on URI symptoms including cough, dyspnea, wheezing, and possible covid19 pneumonia based on CT results.  Patient was prescribed azithromycin at emergency department visit and has been compliant with this medication.  Symptoms " have slightly improved, but are persistent.  Vital signs normal.  Physical exam significant for wheezing.  Low suspicion for significant respiratory distress as there is no increased work of breathing and oxygen saturation is 97%.    Given persistent symptoms, concerns for pneumonia, underlying asthma, and potential asthma exacerbation in the setting of URI, will treat with oral steroids.  Patient prescribed Medrol Dosepak.  Additionally, patient prescribed Cefpodoxime for broader coverage of possible pneumonia.  Patient to continue using albuterol.  Recommended continuing quarantine per CDC guidelines.  Discussed symptoms that warrant emergent follow-up.    - cefpodoxime (VANTIN) 200 MG tablet; Take 1 tablet (200 mg) by mouth 2 times daily for 5 days  - methylPREDNISolone (MEDROL DOSEPAK) 4 MG tablet therapy pack; Follow Package Directions    Candidiasis of vagina  Patient is currently on antibiotics and often experiences vaginal candidiasis with these medications.  Patient prescribed fluconazole.  -Fluconazole     See Patient Instructions    Return in about 1 week (around 10/30/2020) for Reevaluation.    Aruna Reyes PA-C  [facility]

## 2020-10-23 NOTE — PATIENT INSTRUCTIONS
Giving that you are immunocompromised to have underlying asthma, we will treat with a steroid taper today.  Additionally, I have prescribed an additional antibiotic, cefpodoxime, for additional coverage of pneumonia.  You may continue to use DayQuil/NyQuil as well as your albuterol.  Please continue to quarantine according to CDC guidelines as there are concerns for Covid with your CT results, despite 2 - tests.  I would like you to follow-up with your primary care provider virtually next week for a recheck.  If you have worsening symptoms such as chest pain, worsening wheezing, worsening shortness of breath, or fevers you cannot control, please return to the emergency department.  Patient Education     Pneumonia (Adult)  Pneumonia is an infection deep within the lungs. It is in the small air sacs (alveoli). Pneumonia may be caused by a virus or bacteria. Pneumonia caused by bacteria is usually treated with an antibiotic. Severe cases may need to be treated in the hospital. Milder cases can be treated at home. Symptoms usually start to get better during the first 2 days of treatment.  Home care  Follow these guidelines when caring for yourself at home:    Rest at home for the first 2 to 3 days, or until you feel stronger. Don t let yourself get overly tired when you go back to your activities.    Stay away from cigarette smoke - yours or other people s.    You may use acetaminophen or ibuprofen to control fever or pain, unless another medicine was prescribed. If you have chronic liver or kidney disease, talk with your healthcare provider before using these medicines. Also talk with your provider if you ve had a stomach ulcer or gastrointestinal bleeding. Don t give aspirin to anyone younger than 18 years of age who is ill with a fever. It may cause severe liver damage.    Your appetite may be poor, so a light diet is fine.    Drink 6 to 8 glasses of fluids every day to make sure you are getting enough fluids.  Beverages can include water, sport drinks, sodas without caffeine, juices, tea, or soup. Fluids will help loosen secretions in the lung. This will make it easier for you to cough up the phlegm (sputum). If you also have heart or kidney disease, check with your healthcare provider before you drink extra fluids.    Take antibiotic medicine prescribed until it is all gone, even if you are feeling better after a few days.  Follow-up care  Follow up with your healthcare provider in the next 2 to 3 days, or as advised. This is to be sure the medicine is helping you get better.  If you are 65 or older, you should get a pneumococcal vaccine and a yearly flu (influenza) shot. You should also get these vaccines if you have chronic lung disease like asthma, emphysema, or COPD. Recently, a second type of pneumonia vaccine has become available for everyone over 65 years old. This is in addition to the previous vaccine. Ask your provider about this.  When to seek medical advice  Call your healthcare provider right away if any of these occur:    You don t get better within the first 48 hours of treatment    Shortness of breath gets worse    Rapid breathing (more than 25 breaths per minute)    Coughing up blood    Chest pain gets worse with breathing    Fever of 100.4 F (38 C) or higher that doesn t get better with fever medicine    Weakness, dizziness, or fainting that gets worse    Thirst or dry mouth that gets worse    Sinus pain, headache, or a stiff neck    Chest pain not caused by coughing  Date Last Reviewed: 1/1/2017 2000-2019 [employer]. [address] [place] [address], [place] [address]. All rights reserved. This information is not intended as a substitute for professional medical care. Always follow your healthcare professional's instructions.

## 2020-10-27 DIAGNOSIS — B02.9 HERPES ZOSTER WITHOUT COMPLICATION: ICD-10-CM

## 2020-10-27 RX ORDER — GABAPENTIN 300 MG/1
600 CAPSULE ORAL 3 TIMES DAILY
Qty: 180 CAPSULE | Refills: 1 | Status: SHIPPED | OUTPATIENT
Start: 2020-10-27 | End: 2021-09-13

## 2020-10-28 NOTE — PROGRESS NOTES
"Ana Felix is a 39 year old female who is being evaluated via a billable telephone visit.      The patient has been notified of following:     \"This telephone visit will be conducted via a call between you and your physician/provider. We have found that certain health care needs can be provided without the need for a physical exam.  This service lets us provide the care you need with a short phone conversation.  If a prescription is necessary we can send it directly to your pharmacy.  If lab work is needed we can place an order for that and you can then stop by our lab to have the test done at a later time.    Telephone visits are billed at different rates depending on your insurance coverage. During this emergency period, for some insurers they may be billed the same as an in-person visit.  Please reach out to your insurance provider with any questions.    If during the course of the call the physician/provider feels a telephone visit is not appropriate, you will not be charged for this service.\"    Patient has given verbal consent for Telephone visit?  Yes    What phone number would you like to be contacted at? [phone]    How would you like to obtain your AVS? MyChart    Subjective     Ana Felix is a 39 year old female who presents via phone visit today for the following health issues:    HPI     Concern - needs clearance for work     Description: Pt needs clearance to go back to work 10/31/2020 after being tested for COVID (negative result). Pt was told to quarantine due to 15% false negative. Pt currently has cough on and off but no other symptoms. Pt does have asthma and did recently have pneumonia.    Sxs started 10/14  \"Worlds better\" - 80% improvement since ER visit  Occasional cough, no fevers since last weekend  Finished both antibiotics (azithro and cefpodoxime)       Review of Systems   Constitutional, HEENT, cardiovascular, pulmonary, gi and gu systems are negative, except as otherwise noted.   " "    Objective          Vitals:  No vitals were obtained today due to virtual visit.    healthy, alert and no distress  PSYCH: Alert and oriented times 3; coherent speech, normal   rate and volume, able to articulate logical thoughts, able   to abstract reason, no tangential thoughts, no hallucinations   or delusions  Her affect is normal and pleasant  RESP: No cough, no audible wheezing, able to talk in full sentences  Remainder of exam unable to be completed due to telephone visits          Assessment/Plan:    Assessment & Plan     1. Strep throat    2. Pneumonia due to infectious organism, unspecified laterality, unspecified part of lung    3. Upper respiratory infection, viral    4. Candidiasis of vagina         1-3) Completed her courses of azithro and cefpodoxime. Still has cough, but continues to improve. Possible Covid despite negative PCR; however, she has completed at least 10 days of quarantine. May return to work - letter provided.     4) Diflucan x1 dose.       Tobacco Cessation:   reports that she has been smoking cigarettes. She has been smoking about 0.50 packs per day. She has never used smokeless tobacco.      BMI:   Estimated body mass index is 29.81 kg/m  as calculated from the following:    Height as of 10/23/20: 1.575 m (5' 2\").    Weight as of 10/23/20: 73.9 kg (163 lb).         Return in about 1 week (around 11/5/2020), or if symptoms worsen or fail to improve.    Radha Bermudez PA-C  [facility]    Phone call duration:  8 minutes                "

## 2020-10-29 ENCOUNTER — VIRTUAL VISIT (OUTPATIENT)
Dept: FAMILY MEDICINE | Facility: CLINIC | Age: 40
End: 2020-10-29
Payer: COMMERCIAL

## 2020-10-29 DIAGNOSIS — J02.0 STREP THROAT: Primary | ICD-10-CM

## 2020-10-29 DIAGNOSIS — B37.31 CANDIDIASIS OF VAGINA: ICD-10-CM

## 2020-10-29 DIAGNOSIS — J18.9 PNEUMONIA DUE TO INFECTIOUS ORGANISM, UNSPECIFIED LATERALITY, UNSPECIFIED PART OF LUNG: ICD-10-CM

## 2020-10-29 DIAGNOSIS — J06.9 UPPER RESPIRATORY INFECTION, VIRAL: ICD-10-CM

## 2020-10-29 PROCEDURE — 99214 OFFICE O/P EST MOD 30 MIN: CPT | Mod: TEL | Performed by: PHYSICIAN ASSISTANT

## 2020-10-29 RX ORDER — FLUCONAZOLE 150 MG/1
150 TABLET ORAL ONCE
Qty: 1 TABLET | Refills: 0 | Status: SHIPPED | OUTPATIENT
Start: 2020-10-29 | End: 2020-10-29

## 2020-10-29 NOTE — LETTER
[facility] [place]  [address]  [place] [address]  Phone: [phone]    October 29, 2020        Ana Felix  [address]  [place] [address]          To whom it may concern:    RE: Ana Felix    Patient was evaluated today by our clinic. She may return to work without restrictions as of 10/31/2020.    Please contact me for questions or concerns.      Sincerely,        Radha Bermudez PA-C

## 2020-11-12 ENCOUNTER — TELEPHONE (OUTPATIENT)
Dept: FAMILY MEDICINE | Facility: CLINIC | Age: 40
End: 2020-11-12

## 2020-11-12 NOTE — TELEPHONE ENCOUNTER
FMLA forms received for provider to complete. Patient missed work due to symptoms of COVID. To Radha's in basket. Patient aware Radha out of clinic until 11/16/20.

## 2020-11-17 NOTE — TELEPHONE ENCOUNTER
Filling out her FMLA.   Date of first symptoms: 10/14. A 14 day quarantine for high risk exposure would put her back to work on 10/29. Did she not go back until 10/31?

## 2020-11-29 ENCOUNTER — HEALTH MAINTENANCE LETTER (OUTPATIENT)
Age: 40
End: 2020-11-29

## 2020-12-21 ENCOUNTER — TRANSFERRED RECORDS (OUTPATIENT)
Dept: HEALTH INFORMATION MANAGEMENT | Facility: CLINIC | Age: 40
End: 2020-12-21

## 2021-01-05 ENCOUNTER — MEDICAL CORRESPONDENCE (OUTPATIENT)
Dept: HEALTH INFORMATION MANAGEMENT | Facility: CLINIC | Age: 41
End: 2021-01-05

## 2021-01-08 DIAGNOSIS — K50.90 CROHN'S DISEASE (H): ICD-10-CM

## 2021-01-08 DIAGNOSIS — K50.90 CROHN'S DISEASE (H): Primary | ICD-10-CM

## 2021-01-08 LAB
ALBUMIN SERPL-MCNC: 3.9 G/DL (ref 3.4–5)
ALP SERPL-CCNC: 53 U/L (ref 40–150)
ALT SERPL W P-5'-P-CCNC: 40 U/L (ref 0–50)
AST SERPL W P-5'-P-CCNC: 34 U/L (ref 0–45)
BASOPHILS # BLD AUTO: 0 10E9/L (ref 0–0.2)
BASOPHILS NFR BLD AUTO: 0.2 %
BILIRUB DIRECT SERPL-MCNC: 0.2 MG/DL (ref 0–0.2)
BILIRUB SERPL-MCNC: 0.9 MG/DL (ref 0.2–1.3)
DIFFERENTIAL METHOD BLD: ABNORMAL
EOSINOPHIL # BLD AUTO: 0.1 10E9/L (ref 0–0.7)
EOSINOPHIL NFR BLD AUTO: 1.6 %
ERYTHROCYTE [DISTWIDTH] IN BLOOD BY AUTOMATED COUNT: 13 % (ref 10–15)
HCT VFR BLD AUTO: 41.5 % (ref 35–47)
HGB BLD-MCNC: 14.3 G/DL (ref 11.7–15.7)
LYMPHOCYTES # BLD AUTO: 2.3 10E9/L (ref 0.8–5.3)
LYMPHOCYTES NFR BLD AUTO: 37 %
MCH RBC QN AUTO: 34.3 PG (ref 26.5–33)
MCHC RBC AUTO-ENTMCNC: 34.5 G/DL (ref 31.5–36.5)
MCV RBC AUTO: 100 FL (ref 78–100)
MONOCYTES # BLD AUTO: 0.5 10E9/L (ref 0–1.3)
MONOCYTES NFR BLD AUTO: 7.4 %
NEUTROPHILS # BLD AUTO: 3.3 10E9/L (ref 1.6–8.3)
NEUTROPHILS NFR BLD AUTO: 53.8 %
PLATELET # BLD AUTO: 216 10E9/L (ref 150–450)
PROT SERPL-MCNC: 7.9 G/DL (ref 6.8–8.8)
RBC # BLD AUTO: 4.17 10E12/L (ref 3.8–5.2)
WBC # BLD AUTO: 6.2 10E9/L (ref 4–11)

## 2021-01-08 PROCEDURE — 85025 COMPLETE CBC W/AUTO DIFF WBC: CPT | Performed by: INTERNAL MEDICINE

## 2021-01-08 PROCEDURE — 80076 HEPATIC FUNCTION PANEL: CPT | Performed by: INTERNAL MEDICINE

## 2021-01-08 PROCEDURE — 36415 COLL VENOUS BLD VENIPUNCTURE: CPT | Performed by: INTERNAL MEDICINE

## 2021-01-23 ENCOUNTER — APPOINTMENT (OUTPATIENT)
Dept: GENERAL RADIOLOGY | Facility: CLINIC | Age: 41
End: 2021-01-23
Attending: NURSE PRACTITIONER
Payer: COMMERCIAL

## 2021-01-23 ENCOUNTER — HOSPITAL ENCOUNTER (EMERGENCY)
Facility: CLINIC | Age: 41
Discharge: HOME | End: 2021-01-23
Attending: NURSE PRACTITIONER | Admitting: NURSE PRACTITIONER
Payer: COMMERCIAL

## 2021-01-23 VITALS
TEMPERATURE: 97.9 F | OXYGEN SATURATION: 100 % | DIASTOLIC BLOOD PRESSURE: 83 MMHG | HEART RATE: 88 BPM | RESPIRATION RATE: 20 BRPM | WEIGHT: 150 LBS | BODY MASS INDEX: 27.44 KG/M2 | SYSTOLIC BLOOD PRESSURE: 129 MMHG

## 2021-01-23 DIAGNOSIS — S86.911A KNEE STRAIN, RIGHT, INITIAL ENCOUNTER: ICD-10-CM

## 2021-01-23 DIAGNOSIS — M76.50 PATELLAR TENDONITIS: ICD-10-CM

## 2021-01-23 PROCEDURE — 99284 EMERGENCY DEPT VISIT MOD MDM: CPT | Performed by: NURSE PRACTITIONER

## 2021-01-23 PROCEDURE — 73562 X-RAY EXAM OF KNEE 3: CPT | Mod: RT

## 2021-01-23 PROCEDURE — 99283 EMERGENCY DEPT VISIT LOW MDM: CPT | Performed by: NURSE PRACTITIONER

## 2021-01-23 RX ORDER — DICLOFENAC SODIUM 1 MG/ML
SOLUTION/ DROPS OPHTHALMIC
Qty: 10 ML | Refills: 0 | Status: SHIPPED | OUTPATIENT
Start: 2021-01-23 | End: 2021-01-23

## 2021-01-23 ASSESSMENT — ENCOUNTER SYMPTOMS
RESPIRATORY NEGATIVE: 1
DIZZINESS: 0
ARTHRALGIAS: 1
NECK STIFFNESS: 0
HEADACHES: 0
CONSTITUTIONAL NEGATIVE: 1
WEAKNESS: 0
CARDIOVASCULAR NEGATIVE: 1
BACK PAIN: 1
NECK PAIN: 0
MYALGIAS: 0
JOINT SWELLING: 1
NUMBNESS: 0

## 2021-01-24 NOTE — ED PROVIDER NOTES
"  History     Chief Complaint   Patient presents with     Knee Pain     R knee, no known injury     HPI  Ana Felix is a 40 year old female who presents to the emergency department for evaluation of right knee pain for about one week. She has no known injury. Only incident she can recount is the pain began when kneeling on the cement floor at [employer]. This pain has continued and is a throbbing pain distal to the right patella. Pain significantly work with flexion and squatting down is unbearable. This evening she stood at work for a prolonged time and had difficulty driving due to the pain. She has been using ice, ibuprofen and tylenol. She has chronic back pain and has been using her Fentanyl patches and Oxycodone but the pain persists. No fever, numbness, tingling or erythema.     Allergies:  Allergies   Allergen Reactions     Infliximab Hives     Sulfa Drugs [Sulfa Drugs] Nausea     Pt states \"it doesn't work for me\"     Sulfacetamide Nausea     Augmentin Other (See Comments) and Nausea and Vomiting     Crohn's     Bupropion Hives and Nausea     Droperidol Other (See Comments)     Dystonic reaction     Ibuprofen Other (See Comments)     Crohn's       Lyrica [Pregabalin] Nausea and Vomiting     Grogginess, severe back pain  Grogginess, severe back pain     Vicodin [Hydrocodone-Acetaminophen] Nausea and Vomiting       Problem List:    Patient Active Problem List    Diagnosis Date Noted     History of gestational diabetes mellitus (GDM) 08/15/2018     Priority: Medium     Amenorrhea 08/15/2018     Priority: Medium     Crohn's disease of both small and large intestine without complication (H) 07/31/2018     Priority: Medium     Pain medication agreement 08/18/2017     Priority: Medium     Overview:   [facility]       Controlled substance agreement signed 07/19/2017     Priority: Medium     Degenerative lumbar disc 03/07/2017     Priority: Medium     Labral tear of hip, degenerative 03/07/2017     Priority: " Medium     Pain of right hip joint 03/07/2017     Priority: Medium     S/P LEEP of cervix 12/15/2015     Priority: Medium     S/p LEEP of cervix - date unknown  12/9/15: Pap - NIL, Neg HPV. Plan cotest in 1 year. If JAYME 2/3 on biopsy - PAP/HPV co-testing at 12, 24 months. If two negative results repeat co-testing in 3 years, if negative then routine screening.  3/7/18 Pap: NIL/neg HPV.             Hearing difficulty 10/09/2014     Priority: Medium     Irritable bowel syndrome (IBS) 04/15/2014     Priority: Medium     Tiffany raw vegetables       Lactose intolerance 04/15/2014     Priority: Medium     Abdominal pain, right upper quadrant 03/06/2014     Priority: Medium     Nausea with vomiting 01/31/2014     Priority: Medium     Chronic low back pain 11/05/2012     Priority: Medium     Follows with pain clinic.       Mild persistent asthma 11/05/2012     Priority: Medium     Discogenic pain 10/08/2012     Priority: Medium     Abdominal pain 08/30/2012     Priority: Medium     S/P oophorectomy 07/20/2012     Priority: Medium     History of cholecystectomy 07/20/2012     Priority: Medium     History of resection of small bowel 07/20/2012     Priority: Medium     Tobacco abuse 05/25/2012     Priority: Medium     Displacement of lumbar intervertebral disc without myelopathy 01/06/2012     Priority: Medium     Disorder of sacrum 12/13/2011     Priority: Medium     Problem list name updated by automated process. Provider to review       Back pain 11/09/2011     Priority: Medium     Obesity 11/09/2011     Priority: Medium     Migraine headache 09/14/2011     Priority: Medium     Patient given Migraine Education folder and Migraine Action Plan on September 14, 2011. Cammy Anderson RN    (Problem list name updated by automated process. Provider to review and confirm.)       CARDIOVASCULAR SCREENING; LDL GOAL LESS THAN 160 10/31/2010     Priority: Medium     Dysmenorrhea 10/05/2010     Priority: Medium     Female pelvic pain  2010     Priority: Medium     (Problem list name updated by automated process. Provider to review and confirm.)       Crohns disease 2009     Priority: Medium        Past Medical History:    Past Medical History:   Diagnosis Date     Back pain      Crohn's disease (H)      Exercise-induced asthma      Gestational diabetes      Herniation of intervertebral disc of lumbar region      Infertility      Ovarian cyst      Smoker      Status post cholecystectomy 2005       Past Surgical History:    Past Surgical History:   Procedure Laterality Date     APPENDECTOMY       C/SECTION, LOW TRANSVERSE      , Low Transverse     CHOLECYSTECTOMY, LAPOROSCOPIC  2005    Cholecystectomy, Laparoscopic     COLONOSCOPY       ESOPHAGOSCOPY, GASTROSCOPY, DUODENOSCOPY (EGD), COMBINED  3/6/2014    Procedure: COMBINED ESOPHAGOSCOPY, GASTROSCOPY, DUODENOSCOPY (EGD), BIOPSY SINGLE OR MULTIPLE;  COLONOSCOPY/ UPPER GI ENDOSCOPY/ COLON/ EGD/ Abdominal pain, epigastric/ P[facility];  Surgeon: West Lomas MD;  Location:  OR      HYSTEROSCOPY, SURGICAL; W/ ENDOMETRIAL ABLATION, ANY METHOD  2010     HERNIORRHAPHY VENTRAL N/A 2018    Procedure: Open ventral hernia repair;  Surgeon: Alonso Bills MD;  Location: [facility] OR     HYSTERECTOMY, SUPRACERVICAL LAPAROSCOPIC       LEEP TX, CERVICAL      LEEP TX Cervical     ORTHOPEDIC SURGERY      bluged disk     partial small bowel resection      Ileo-colonic resection. Crohn's disease surgery for bowel obstruction. this was a section of small bowel and large bowel and the cecum., all removed and a reanastamosis done successfully     SALPINGO OOPHORECTOMY,R/L/TORI      Right ovary     TUBAL LIGATION         Family History:    Family History   Problem Relation Age of Onset     Cancer Mother         cervical     Lipids Mother      Eye Disorder Father      Lipids Father      Alcohol/Drug Maternal Grandmother      Diabetes Paternal Grandmother      Cancer -  colorectal Paternal Grandmother      Eye Disorder Paternal Grandmother      Diabetes Paternal Grandfather      Eye Disorder Paternal Grandfather      Inflammatory Bowel Disease Paternal Aunt         and two paternal uncles       Social History:  Marital Status:   [2]  Social History     Tobacco Use     Smoking status: Current Every Day Smoker     Packs/day: 0.50     Types: Cigarettes     Smokeless tobacco: Never Used   Substance Use Topics     Alcohol use: Yes     Alcohol/week: 0.0 standard drinks     Comment: rare     Drug use: No        Medications:         diclofenac (VOLTAREN) 1 % topical gel       albuterol (PROAIR HFA) 108 (90 Base) MCG/ACT inhaler       azaTHIOprine (IMURAN) 50 MG tablet       cyanocobalamin (VITAMIN B12) 1000 MCG/ML injection       fentaNYL (DURAGESIC) 12 mcg/hr patch 72 hr       fentaNYL (DURAGESIC) 25 mcg/hr patch 72 hr       gabapentin (NEURONTIN) 300 MG capsule       gabapentin (NEURONTIN) 300 MG capsule       hydrOXYzine (ATARAX) 50 MG tablet       levalbuterol (XOPENEX HFA) 45 MCG/ACT inhaler       Melatonin 10 MG PO TABS tablet       methylPREDNISolone (MEDROL DOSEPAK) 4 MG tablet therapy pack       montelukast (SINGULAIR) 10 MG tablet       oxyCODONE-acetaminophen (PERCOCET) 7.5-325 MG per tablet       valACYclovir (VALTREX) 1000 mg tablet      Review of Systems   Constitutional: Negative.    Respiratory: Negative.    Cardiovascular: Negative.    Musculoskeletal: Positive for arthralgias, back pain, gait problem and joint swelling. Negative for myalgias, neck pain and neck stiffness.   Skin: Negative.    Neurological: Negative for dizziness, weakness, numbness and headaches.   All other systems reviewed and are negative.    Physical Exam   BP: 129/83  Pulse: 88  Temp: 97.9  F (36.6  C)  Resp: 20  Weight: 68 kg (150 lb)  SpO2: 100 %    Physical Exam  Constitutional:       General: She is not in acute distress.     Appearance: Normal appearance.   Cardiovascular:      Rate and  Rhythm: Normal rate.   Pulmonary:      Effort: Pulmonary effort is normal.   Musculoskeletal:      Right knee: She exhibits decreased range of motion (d/t pain. worse with moderate flexion) and swelling. She exhibits no ecchymosis, no deformity, no erythema, normal alignment, no LCL laxity, normal patellar mobility, no bony tenderness, normal meniscus and no MCL laxity. Tenderness found. Patellar tendon tenderness noted.      Comments: Pulses and perfusion are equal bilaterally. No overlying erythema, abrasions, or ecchymosis.      Skin:     General: Skin is warm.      Capillary Refill: Capillary refill takes less than 2 seconds.   Neurological:      General: No focal deficit present.      Mental Status: She is alert.   Psychiatric:         Mood and Affect: Mood normal.       ED Course        Procedures  Results for orders placed or performed during the hospital encounter of 01/23/21 (from the past 24 hour(s))   XR Knee Right 3 Views    Narrative    EXAM: XR KNEE RT 3 VW  LOCATION: [facility]  DATE/TIME: 1/23/2021 8:53 PM    INDICATION: Right knee pain.  COMPARISON: None.      Impression    IMPRESSION: No fracture. Small joint effusion. No degenerative changes. Small spur arising from the superior pole of the patella.       Medications - No data to display    Assessments & Plan (with Medical Decision Making)   Ana Felix is a 40 year old female who presents to the emergency department for evaluation of right knee pain for about one week. She has no known injury. Only incident she can recount is the pain began when kneeling on the cement floor at [employer]. This pain has continued and is a throbbing pain distal to the right patella. Pain significantly work with flexion and squatting down is unbearable. This evening she stood at work for a prolonged time and had difficulty driving due to the pain. She has been using ice, ibuprofen and tylenol. She has chronic back pain and has been using her Fentanyl  patches and Oxycodone but the pain persists.    Vital signs normal. Exam as above. No fracture, small joint effusion present. Small spur from superior pole of the patella. Discussed knee sprain or patellar tendonitis. Will continue currently pain control regimen. Will add topical diclofenac and ace wrap, orthopedic follow up to be used as needed. Declined need for crutches at this time. Rest, ice, elevation. Return precautions reviewed, all questions answered. Patient is agreeable to plan of care and discharged in no acute distress.     I have reviewed the nursing notes.    I have reviewed the findings, diagnosis, plan and need for follow up with the patient.  Discharge Medication List as of 1/23/2021  9:33 PM      START taking these medications    Details   diclofenac (VOLTAREN) 1 % topical gel Apply 4 g topically 4 times daily, Disp-100 g, R-0, E-Prescribe           Final diagnoses:   Knee strain, right, initial encounter   Patellar tendonitis     1/23/2021   [facility] EMERGENCY DEPT     Sharon Roman, APRN CNP  01/23/21 1319

## 2021-01-24 NOTE — ED TRIAGE NOTES
Pt has R knee pain that started 1 week ago after kneeling on it. Pain has been increasing and swelling started yesterday.

## 2021-01-28 ENCOUNTER — OFFICE VISIT (OUTPATIENT)
Dept: ORTHOPEDICS | Facility: CLINIC | Age: 41
End: 2021-01-28
Attending: NURSE PRACTITIONER
Payer: COMMERCIAL

## 2021-01-28 VITALS
WEIGHT: 151 LBS | BODY MASS INDEX: 27.79 KG/M2 | SYSTOLIC BLOOD PRESSURE: 126 MMHG | DIASTOLIC BLOOD PRESSURE: 76 MMHG | HEIGHT: 62 IN

## 2021-01-28 DIAGNOSIS — R29.898 DIFFICULTY IN WEIGHT BEARING: ICD-10-CM

## 2021-01-28 DIAGNOSIS — M17.11 OSTEOARTHRITIS OF RIGHT PATELLOFEMORAL JOINT: ICD-10-CM

## 2021-01-28 DIAGNOSIS — M25.561 ACUTE PAIN OF RIGHT KNEE: Primary | ICD-10-CM

## 2021-01-28 DIAGNOSIS — M25.461 KNEE EFFUSION, RIGHT: ICD-10-CM

## 2021-01-28 PROCEDURE — 99204 OFFICE O/P NEW MOD 45 MIN: CPT | Mod: 25 | Performed by: FAMILY MEDICINE

## 2021-01-28 PROCEDURE — 20611 DRAIN/INJ JOINT/BURSA W/US: CPT | Mod: RT | Performed by: FAMILY MEDICINE

## 2021-01-28 RX ORDER — TRIAMCINOLONE ACETONIDE 40 MG/ML
40 INJECTION, SUSPENSION INTRA-ARTICULAR; INTRAMUSCULAR
Status: DISCONTINUED | OUTPATIENT
Start: 2021-01-28 | End: 2022-10-13

## 2021-01-28 RX ORDER — ROPIVACAINE HYDROCHLORIDE 5 MG/ML
3 INJECTION, SOLUTION EPIDURAL; INFILTRATION; PERINEURAL
Status: DISCONTINUED | OUTPATIENT
Start: 2021-01-28 | End: 2022-10-13

## 2021-01-28 RX ADMIN — ROPIVACAINE HYDROCHLORIDE 3 ML: 5 INJECTION, SOLUTION EPIDURAL; INFILTRATION; PERINEURAL at 09:00

## 2021-01-28 RX ADMIN — TRIAMCINOLONE ACETONIDE 40 MG: 40 INJECTION, SUSPENSION INTRA-ARTICULAR; INTRAMUSCULAR at 09:00

## 2021-01-28 ASSESSMENT — MIFFLIN-ST. JEOR: SCORE: 1298.18

## 2021-01-28 NOTE — LETTER
2021         RE: Ana Felix  [address]  [place] [address]        Dear Colleague,    Thank you for referring your patient, Ana Felix, to the [facility] [place]. Please see a copy of my visit note below.    Ana Felix  :  1980  DOS: 2021  MRN: [id number]    Sports Medicine Clinic Visit    PCP: Radha Bermudez    Ana Felix is a 40 year old female who is seen as an ER referral presenting with acute right knee pain.    Injury: Insidious onset of right knee pain and swelling over the past ~ 1 - 2 weeks that initially started after kneeling for prolonged period on a hard floor.  Pain located over right deep anterior knee, nonradiating.  Additional Features:  Positive: swelling, grinding and joint stiffness.  Symptoms are better with Ibuprofen, Rest and compression.  Symptoms are worse with: prolonged standing/walking, kneeling, bending knee.  Other evaluation and/or treatments so far consists of: Ice, Ibuprofen, Rest and compression wrap, ER visit.  Recent imaging completed: X-rays completed 21.  Prior History of related problems: history of PFS pain with running in High School.    Social History: works as a SPED assistant &      Review of Systems  Musculoskeletal: as above  Remainder of review of systems is negative including constitutional, CV, pulmonary, GI, Skin and Neurologic except as noted in HPI or medical history.    Past Medical History:   Diagnosis Date     Back pain      Crohn's disease (H)      Exercise-induced asthma      Gestational diabetes      Herniation of intervertebral disc of lumbar region      Infertility      Ovarian cyst      Smoker      Status post cholecystectomy 2005     Past Surgical History:   Procedure Laterality Date     APPENDECTOMY       C/SECTION, LOW TRANSVERSE      , Low Transverse     CHOLECYSTECTOMY, LAPOROSCOPIC  2005    Cholecystectomy, Laparoscopic     COLONOSCOPY        "ESOPHAGOSCOPY, GASTROSCOPY, DUODENOSCOPY (EGD), COMBINED  3/6/2014    Procedure: COMBINED ESOPHAGOSCOPY, GASTROSCOPY, DUODENOSCOPY (EGD), BIOPSY SINGLE OR MULTIPLE;  COLONOSCOPY/ UPPER GI ENDOSCOPY/ COLON/ EGD/ Abdominal pain, epigastric/ [facility];  Surgeon: West Lomas MD;  Location: [facility] OR     HC HYSTEROSCOPY, SURGICAL; W/ ENDOMETRIAL ABLATION, ANY METHOD  7/2010     HERNIORRHAPHY VENTRAL N/A 12/11/2018    Procedure: Open ventral hernia repair;  Surgeon: Alonso Bills MD;  Location: [facility] OR     HYSTERECTOMY, SUPRACERVICAL LAPAROSCOPIC  2010     LEEP TX, CERVICAL      LEEP TX Cervical     ORTHOPEDIC SURGERY      bluged disk     partial small bowel resection  2002    Ileo-colonic resection. Crohn's disease surgery for bowel obstruction. this was a section of small bowel and large bowel and the cecum., all removed and a reanastamosis done successfully     SALPINGO OOPHORECTOMY,R/L/TORI      Right ovary     TUBAL LIGATION       Family History   Problem Relation Age of Onset     Cancer Mother         cervical     Lipids Mother      Eye Disorder Father      Lipids Father      Alcohol/Drug Maternal Grandmother      Diabetes Paternal Grandmother      Cancer - colorectal Paternal Grandmother      Eye Disorder Paternal Grandmother      Diabetes Paternal Grandfather      Eye Disorder Paternal Grandfather      Inflammatory Bowel Disease Paternal Aunt         and two paternal uncles       Objective  /76   Ht 1.575 m (5' 2\")   Wt 68.5 kg (151 lb)   LMP 07/23/2010   BMI 27.62 kg/m        General: healthy, alert and in no distress      HEENT: no scleral icterus or conjunctival erythema     Skin: no suspicious lesions or rash. No jaundice.     CV: regular rhythm by palpation, 2+ distal pulses, no pedal edema      Resp: normal respiratory effort without conversational dyspnea     Psych: normal mood and affect      Gait: antalgic, appropriate coordination and balance     Neuro: normal light touch sensory exam of the " extremities. Motor strength as noted below     Right Knee exam    ROM:        Flexion 90 degrees       Extension -6 degrees       Range of motion limited by pain, swelling    Inspection:       no visible ecchymosis        effusion noted small by palpation    Skin:       no visible deformities       well perfused       capillary refill brisk    Patellar Motion:        Normal patellar tracking noted through range of motion       Crepitus noted in the patellofemoral joint    Tender:        medial patellar border       lateral patellar border       medial joint line    Non Tender:         remainder of knee area    Special Tests:        neg (-) Taniya       neg (-) Lachman       neg (-) varus at 0 deg and 30 deg       neg (-) valgus at 0 deg and 30 deg     Painful patellar compression    Evaluation of ipsilateral kinetic chain       normal strength with hip extension and abduction      Radiology  Results for orders placed or performed during the hospital encounter of 01/23/21   XR Knee Right 3 Views    Narrative    EXAM: XR KNEE RT 3 VW  LOCATION: [facility]  DATE/TIME: 1/23/2021 8:53 PM    INDICATION: Right knee pain.  COMPARISON: None.      Impression    IMPRESSION: No fracture. Small joint effusion. No degenerative changes. Small spur arising from the superior pole of the patella.     Large Joint Injection/Arthocentesis: R knee joint    Date/Time: 1/28/2021 9:00 AM  Performed by: Justus Chapman DO  Authorized by: Justus Chapman DO     Indications:  Pain and joint swelling  Needle Size:  21 G  Guidance: ultrasound    Approach:  Superolateral  Location:  Knee      Medications:  3 mL ropivacaine 5 MG/ML; 40 mg triamcinolone 40 MG/ML  Aspirate amount (mL):  17  Aspirate:  Serous and yellow  Outcome:  Tolerated well, no immediate complications  Procedure discussed: discussed risks, benefits, and alternatives    Consent Given by:  Patient  Timeout: timeout called immediately prior to  procedure    Prep: patient was prepped and draped in usual sterile fashion        Assessment:  1. Acute pain of right knee    2. Osteoarthritis of right patellofemoral joint    3. Knee effusion, right    4. Difficulty in weight bearing        Plan:  Discussed the assessment with the patient.  Follow up: prn based on clinical progress  Signs of ongoing knee effusion, confirmed on bedside US  No clear inflammatory/autoimmune hx, known PF wear-and-tear from prior imaging, small PF osteophytes, quad tendinopathy on prior XR  XR images independently visualized and reviewed with patient today in clinic  Reviewed likely trigger of kneeling on cement  Reviewed activities to avoid and which may be safe  PT options reviewed, order available anytime  Letter provided for work, can amend if needed  One-time US guided aspiration and CSI today with good initial relief  Will contact me if pain relief is incomplete or not improved after 2 weeks, via MyChart  Brace/compression options, RICE, voltaren gel reviewed  Expectations and goals of CSI reviewed  Often 2-3 days for steroid effect, and can take up to two weeks for maximum effect  We discussed modified progressive pain-free activity as tolerated  Do not overuse in first two weeks if feeling better due to concern for vulnerability while steroid is working  Supportive care reviewed  All questions were answered today  Contact us with additional questions or concerns  Signs and sx of concern reviewed      Justus Chapman DO, KILO  Primary Care Sports Medicine  [facility]             Disclaimer: This note consists of symbols derived from keyboarding, dictation and/or voice recognition software. As a result, there may be errors in the script that have gone undetected. Please consider this when interpreting information found in this chart.      Again, thank you for allowing me to participate in the care of your patient.        Sincerely,        Justus Perrin  Repa, DO

## 2021-01-28 NOTE — PROGRESS NOTES
Ana Felix  :  1980  DOS: 2021  MRN: [id number]    Sports Medicine Clinic Visit    PCP: Radha Bermudez    Ana Felix is a 40 year old female who is seen as an ER referral presenting with acute right knee pain.    Injury: Insidious onset of right knee pain and swelling over the past ~ 1 - 2 weeks that initially started after kneeling for prolonged period on a hard floor.  Pain located over right deep anterior knee, nonradiating.  Additional Features:  Positive: swelling, grinding and joint stiffness.  Symptoms are better with Ibuprofen, Rest and compression.  Symptoms are worse with: prolonged standing/walking, kneeling, bending knee.  Other evaluation and/or treatments so far consists of: Ice, Ibuprofen, Rest and compression wrap, ER visit.  Recent imaging completed: X-rays completed 21.  Prior History of related problems: history of PFS pain with running in High School.    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    Past Medical History:   Diagnosis Date     Back pain      Crohn's disease (H)      Exercise-induced asthma      Gestational diabetes      Herniation of intervertebral disc of lumbar region      Infertility      Ovarian cyst      Smoker      Status post cholecystectomy 2005     Past Surgical History:   Procedure Laterality Date     APPENDECTOMY       C/SECTION, LOW TRANSVERSE      , Low Transverse     CHOLECYSTECTOMY, LAPOROSCOPIC  2005    Cholecystectomy, Laparoscopic     COLONOSCOPY       ESOPHAGOSCOPY, GASTROSCOPY, DUODENOSCOPY (EGD), COMBINED  3/6/2014    Procedure: COMBINED ESOPHAGOSCOPY, GASTROSCOPY, DUODENOSCOPY (EGD), BIOPSY SINGLE OR MULTIPLE;  COLONOSCOPY/ UPPER GI ENDOSCOPY/ COLON/ EGD/ Abdominal pain, epigastric/ P[facility];  Surgeon:  "West Lomas MD;  Location: [facility] OR     HC HYSTEROSCOPY, SURGICAL; W/ ENDOMETRIAL ABLATION, ANY METHOD  7/2010     HERNIORRHAPHY VENTRAL N/A 12/11/2018    Procedure: Open ventral hernia repair;  Surgeon: Alonso Bills MD;  Location: [facility]     HYSTERECTOMY, SUPRACERVICAL LAPAROSCOPIC  2010     LEEP TX, CERVICAL      LEEP TX Cervical     ORTHOPEDIC SURGERY      bluged disk     partial small bowel resection  2002    Ileo-colonic resection. Crohn's disease surgery for bowel obstruction. this was a section of small bowel and large bowel and the cecum., all removed and a reanastamosis done successfully     SALPINGO OOPHORECTOMY,R/L/TORI      Right ovary     TUBAL LIGATION       Family History   Problem Relation Age of Onset     Cancer Mother         cervical     Lipids Mother      Eye Disorder Father      Lipids Father      Alcohol/Drug Maternal Grandmother      Diabetes Paternal Grandmother      Cancer - colorectal Paternal Grandmother      Eye Disorder Paternal Grandmother      Diabetes Paternal Grandfather      Eye Disorder Paternal Grandfather      Inflammatory Bowel Disease Paternal Aunt         and two paternal uncles       Objective  /76   Ht 1.575 m (5' 2\")   Wt 68.5 kg (151 lb)   LMP 07/23/2010   BMI 27.62 kg/m        General: healthy, alert and in no distress      HEENT: no scleral icterus or conjunctival erythema     Skin: no suspicious lesions or rash. No jaundice.     CV: regular rhythm by palpation, 2+ distal pulses, no pedal edema      Resp: normal respiratory effort without conversational dyspnea     Psych: normal mood and affect      Gait: antalgic, appropriate coordination and balance     Neuro: normal light touch sensory exam of the extremities. Motor strength as noted below     Right Knee exam    ROM:        Flexion 90 degrees       Extension -6 degrees       Range of motion limited by pain, swelling    Inspection:       no visible ecchymosis        effusion noted small by " palpation    Skin:       no visible deformities       well perfused       capillary refill brisk    Patellar Motion:        Normal patellar tracking noted through range of motion       Crepitus noted in the patellofemoral joint    Tender:        medial patellar border       lateral patellar border       medial joint line    Non Tender:         remainder of knee area    Special Tests:        neg (-) Taniya       neg (-) Lachman       neg (-) varus at 0 deg and 30 deg       neg (-) valgus at 0 deg and 30 deg     Painful patellar compression    Evaluation of ipsilateral kinetic chain       normal strength with hip extension and abduction      Radiology  Results for orders placed or performed during the hospital encounter of 01/23/21   XR Knee Right 3 Views    Narrative    EXAM: XR KNEE RT 3 VW  LOCATION: [facility]  DATE/TIME: 1/23/2021 8:53 PM    INDICATION: Right knee pain.  COMPARISON: None.      Impression    IMPRESSION: No fracture. Small joint effusion. No degenerative changes. Small spur arising from the superior pole of the patella.     Large Joint Injection/Arthocentesis: R knee joint    Date/Time: 1/28/2021 9:00 AM  Performed by: Justus Chapman DO  Authorized by: Justus Chapman DO     Indications:  Pain and joint swelling  Needle Size:  21 G  Guidance: ultrasound    Approach:  Superolateral  Location:  Knee      Medications:  3 mL ropivacaine 5 MG/ML; 40 mg triamcinolone 40 MG/ML  Aspirate amount (mL):  17  Aspirate:  Serous and yellow  Outcome:  Tolerated well, no immediate complications  Procedure discussed: discussed risks, benefits, and alternatives    Consent Given by:  Patient  Timeout: timeout called immediately prior to procedure    Prep: patient was prepped and draped in usual sterile fashion        Assessment:  1. Acute pain of right knee    2. Osteoarthritis of right patellofemoral joint    3. Knee effusion, right    4. Difficulty in weight bearing         Plan:  Discussed the assessment with the patient.  Follow up: prn based on clinical progress  Signs of ongoing knee effusion, confirmed on bedside US  No clear inflammatory/autoimmune hx, known PF wear-and-tear from prior imaging, small PF osteophytes, quad tendinopathy on prior XR  XR images independently visualized and reviewed with patient today in clinic  Reviewed likely trigger of kneeling on cement  Reviewed activities to avoid and which may be safe  PT options reviewed, order available anytime  Letter provided for work, can amend if needed  One-time US guided aspiration and CSI today with good initial relief  Will contact me if pain relief is incomplete or not improved after 2 weeks, via MyChart  Brace/compression options, RICE, voltaren gel reviewed  Expectations and goals of CSI reviewed  Often 2-3 days for steroid effect, and can take up to two weeks for maximum effect  We discussed modified progressive pain-free activity as tolerated  Do not overuse in first two weeks if feeling better due to concern for vulnerability while steroid is working  Supportive care reviewed  All questions were answered today  Contact us with additional questions or concerns  Signs and sx of concern reviewed      Justus Chapman DO, CAQ  Primary Care Sports Medicine  [facility]             Disclaimer: This note consists of symbols derived from keyboarding, dictation and/or voice recognition software. As a result, there may be errors in the script that have gone undetected. Please consider this when interpreting information found in this chart.

## 2021-01-28 NOTE — LETTER
January 28, 2021      Ana was seen in my office today and is under my care, she should be medically excused from work through Friday 2/5/2021.  Updated recommendations will be provided as needed.      Justus Mendez DO, CAQ  Primary Care Sports Medicine  [facility]

## 2021-01-31 DIAGNOSIS — J45.30 MILD PERSISTENT ASTHMA, UNCOMPLICATED: ICD-10-CM

## 2021-02-02 RX ORDER — MONTELUKAST SODIUM 10 MG/1
TABLET ORAL
Qty: 90 TABLET | Refills: 0 | Status: SHIPPED | OUTPATIENT
Start: 2021-02-02 | End: 2021-03-22

## 2021-02-02 NOTE — TELEPHONE ENCOUNTER
Routing refill request to provider for review/approval because:  Needs provider approval, failed protocol.  Beverley Garner RN  [facility]

## 2021-03-22 ENCOUNTER — OFFICE VISIT (OUTPATIENT)
Dept: FAMILY MEDICINE | Facility: CLINIC | Age: 41
End: 2021-03-22
Payer: COMMERCIAL

## 2021-03-22 VITALS
HEART RATE: 76 BPM | SYSTOLIC BLOOD PRESSURE: 118 MMHG | DIASTOLIC BLOOD PRESSURE: 82 MMHG | TEMPERATURE: 98.4 F | RESPIRATION RATE: 16 BRPM | HEIGHT: 63 IN | WEIGHT: 168.8 LBS | OXYGEN SATURATION: 100 % | BODY MASS INDEX: 29.91 KG/M2

## 2021-03-22 DIAGNOSIS — Z23 NEED FOR SHINGLES VACCINE: ICD-10-CM

## 2021-03-22 DIAGNOSIS — J45.30 MILD PERSISTENT ASTHMA, UNCOMPLICATED: ICD-10-CM

## 2021-03-22 DIAGNOSIS — Z12.31 ENCOUNTER FOR SCREENING MAMMOGRAM FOR BREAST CANCER: ICD-10-CM

## 2021-03-22 DIAGNOSIS — Z11.59 NEED FOR HEPATITIS C SCREENING TEST: ICD-10-CM

## 2021-03-22 DIAGNOSIS — K50.80 CROHN'S DISEASE OF BOTH SMALL AND LARGE INTESTINE WITHOUT COMPLICATION (H): ICD-10-CM

## 2021-03-22 DIAGNOSIS — Z13.1 DIABETES MELLITUS SCREENING: ICD-10-CM

## 2021-03-22 DIAGNOSIS — Z00.00 ENCOUNTER FOR ROUTINE ADULT HEALTH EXAMINATION WITHOUT ABNORMAL FINDINGS: Primary | ICD-10-CM

## 2021-03-22 DIAGNOSIS — Z13.220 LIPID SCREENING: ICD-10-CM

## 2021-03-22 DIAGNOSIS — Z11.4 SCREENING FOR HIV (HUMAN IMMUNODEFICIENCY VIRUS): ICD-10-CM

## 2021-03-22 LAB
CHOLEST SERPL-MCNC: 182 MG/DL
GLUCOSE SERPL-MCNC: 118 MG/DL (ref 70–99)
HCV AB SERPL QL IA: NONREACTIVE
HDLC SERPL-MCNC: 36 MG/DL
HIV 1+2 AB+HIV1 P24 AG SERPL QL IA: NONREACTIVE
LDLC SERPL CALC-MCNC: 93 MG/DL
NONHDLC SERPL-MCNC: 146 MG/DL
TRIGL SERPL-MCNC: 267 MG/DL

## 2021-03-22 PROCEDURE — 87389 HIV-1 AG W/HIV-1&-2 AB AG IA: CPT | Performed by: PHYSICIAN ASSISTANT

## 2021-03-22 PROCEDURE — 80061 LIPID PANEL: CPT | Performed by: PHYSICIAN ASSISTANT

## 2021-03-22 PROCEDURE — 86803 HEPATITIS C AB TEST: CPT | Performed by: PHYSICIAN ASSISTANT

## 2021-03-22 PROCEDURE — 82947 ASSAY GLUCOSE BLOOD QUANT: CPT | Performed by: PHYSICIAN ASSISTANT

## 2021-03-22 PROCEDURE — 99396 PREV VISIT EST AGE 40-64: CPT | Performed by: PHYSICIAN ASSISTANT

## 2021-03-22 PROCEDURE — 36415 COLL VENOUS BLD VENIPUNCTURE: CPT | Performed by: PHYSICIAN ASSISTANT

## 2021-03-22 RX ORDER — ZOSTER VACCINE RECOMBINANT, ADJUVANTED 50 MCG/0.5
1 KIT INTRAMUSCULAR ONCE
Qty: 0.5 ML | Refills: 1 | Status: SHIPPED | OUTPATIENT
Start: 2021-03-22 | End: 2021-03-22

## 2021-03-22 RX ORDER — ALBUTEROL SULFATE 90 UG/1
2 AEROSOL, METERED RESPIRATORY (INHALATION) EVERY 4 HOURS PRN
Qty: 18 G | Refills: 3 | Status: SHIPPED | OUTPATIENT
Start: 2021-03-22 | End: 2022-12-08

## 2021-03-22 RX ORDER — MONTELUKAST SODIUM 10 MG/1
10 TABLET ORAL DAILY
Qty: 90 TABLET | Refills: 3 | Status: SHIPPED | OUTPATIENT
Start: 2021-03-22 | End: 2022-02-03

## 2021-03-22 ASSESSMENT — MIFFLIN-ST. JEOR: SCORE: 1392.79
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
## 2021-03-23 DIAGNOSIS — R73.01 ELEVATED FASTING BLOOD SUGAR: Primary | ICD-10-CM

## 2021-04-01 DIAGNOSIS — R73.01 ELEVATED FASTING BLOOD SUGAR: ICD-10-CM

## 2021-04-01 LAB — HBA1C MFR BLD: 5.2 % (ref 0–5.6)

## 2021-04-01 PROCEDURE — 36415 COLL VENOUS BLD VENIPUNCTURE: CPT | Performed by: PHYSICIAN ASSISTANT

## 2021-04-01 PROCEDURE — 83036 HEMOGLOBIN GLYCOSYLATED A1C: CPT | Performed by: PHYSICIAN ASSISTANT

## 2021-04-08 DIAGNOSIS — G47.00 INSOMNIA, UNSPECIFIED TYPE: ICD-10-CM

## 2021-04-09 RX ORDER — HYDROXYZINE HYDROCHLORIDE 50 MG/1
TABLET, FILM COATED ORAL
Qty: 60 TABLET | Refills: 5 | Status: SHIPPED | OUTPATIENT
Start: 2021-04-09 | End: 2021-07-09

## 2021-04-09 NOTE — TELEPHONE ENCOUNTER
"Requested Prescriptions   Pending Prescriptions Disp Refills     hydrOXYzine (ATARAX) 50 MG tablet [Pharmacy Med Name: HYDROXYZINE HCL 50MG TABS (WHITE)] 60 tablet 5     Sig: TAKE 1 TO 2 TABLETS(50  MG) BY MOUTH AT BEDTIME       Antihistamines Protocol Passed - 4/8/2021  3:43 AM        Passed - Recent (12 mo) or future (30 days) visit within the authorizing provider's specialty     Patient has had an office visit with the authorizing provider or a provider within the authorizing providers department within the previous 12 mos or has a future within next 30 days. See \"Patient Info\" tab in inbasket, or \"Choose Columns\" in Meds & Orders section of the refill encounter.              Passed - Patient is age 3 or older     Apply age and/or weight-based dosing for peds patients age 3 and older.    Forward request to provider for patients under the age of 3.          Passed - Medication is active on med list           Routing refill request to provider for review/approval because:  Dx is not on problem list        "

## 2021-04-10 ENCOUNTER — HEALTH MAINTENANCE LETTER (OUTPATIENT)
Age: 41
End: 2021-04-10

## 2021-04-20 ENCOUNTER — APPOINTMENT (OUTPATIENT)
Dept: URGENT CARE | Facility: CLINIC | Age: 41
End: 2021-04-20
Payer: COMMERCIAL

## 2021-05-01 DIAGNOSIS — J45.30 MILD PERSISTENT ASTHMA, UNCOMPLICATED: ICD-10-CM

## 2021-05-02 RX ORDER — MONTELUKAST SODIUM 10 MG/1
TABLET ORAL
Qty: 90 TABLET | Refills: 3 | OUTPATIENT
Start: 2021-05-02

## 2021-06-25 DIAGNOSIS — K50.90 CROHN'S DISEASE (H): Primary | ICD-10-CM

## 2021-07-05 DIAGNOSIS — K50.90 CROHN'S DISEASE (H): ICD-10-CM

## 2021-07-05 LAB
ALBUMIN SERPL-MCNC: 3.6 G/DL (ref 3.4–5)
ALP SERPL-CCNC: 49 U/L (ref 40–150)
ALT SERPL W P-5'-P-CCNC: 20 U/L (ref 0–50)
AST SERPL W P-5'-P-CCNC: 20 U/L (ref 0–45)
BASOPHILS # BLD AUTO: 0 10E9/L (ref 0–0.2)
BASOPHILS NFR BLD AUTO: 0.3 %
BILIRUB DIRECT SERPL-MCNC: <0.1 MG/DL (ref 0–0.2)
BILIRUB SERPL-MCNC: 0.5 MG/DL (ref 0.2–1.3)
DIFFERENTIAL METHOD BLD: ABNORMAL
EOSINOPHIL # BLD AUTO: 0.1 10E9/L (ref 0–0.7)
EOSINOPHIL NFR BLD AUTO: 1 %
ERYTHROCYTE [DISTWIDTH] IN BLOOD BY AUTOMATED COUNT: 12.2 % (ref 10–15)
HCT VFR BLD AUTO: 41.3 % (ref 35–47)
HGB BLD-MCNC: 13.6 G/DL (ref 11.7–15.7)
LYMPHOCYTES # BLD AUTO: 3.1 10E9/L (ref 0.8–5.3)
LYMPHOCYTES NFR BLD AUTO: 33.9 %
MCH RBC QN AUTO: 35.1 PG (ref 26.5–33)
MCHC RBC AUTO-ENTMCNC: 32.9 G/DL (ref 31.5–36.5)
MCV RBC AUTO: 106 FL (ref 78–100)
MONOCYTES # BLD AUTO: 0.8 10E9/L (ref 0–1.3)
MONOCYTES NFR BLD AUTO: 8.2 %
NEUTROPHILS # BLD AUTO: 5.2 10E9/L (ref 1.6–8.3)
NEUTROPHILS NFR BLD AUTO: 56.6 %
PLATELET # BLD AUTO: 197 10E9/L (ref 150–450)
PROT SERPL-MCNC: 7.9 G/DL (ref 6.8–8.8)
RBC # BLD AUTO: 3.88 10E12/L (ref 3.8–5.2)
WBC # BLD AUTO: 9.1 10E9/L (ref 4–11)

## 2021-07-05 PROCEDURE — 80076 HEPATIC FUNCTION PANEL: CPT | Performed by: INTERNAL MEDICINE

## 2021-07-05 PROCEDURE — 85025 COMPLETE CBC W/AUTO DIFF WBC: CPT | Performed by: INTERNAL MEDICINE

## 2021-07-05 PROCEDURE — 36415 COLL VENOUS BLD VENIPUNCTURE: CPT | Performed by: INTERNAL MEDICINE

## 2021-07-09 ENCOUNTER — OFFICE VISIT (OUTPATIENT)
Dept: FAMILY MEDICINE | Facility: CLINIC | Age: 41
End: 2021-07-09
Payer: COMMERCIAL

## 2021-07-09 VITALS
BODY MASS INDEX: 29.24 KG/M2 | OXYGEN SATURATION: 99 % | SYSTOLIC BLOOD PRESSURE: 119 MMHG | WEIGHT: 164.4 LBS | TEMPERATURE: 98.8 F | RESPIRATION RATE: 16 BRPM | HEART RATE: 74 BPM | DIASTOLIC BLOOD PRESSURE: 79 MMHG

## 2021-07-09 DIAGNOSIS — F41.1 GAD (GENERALIZED ANXIETY DISORDER): Primary | ICD-10-CM

## 2021-07-09 DIAGNOSIS — D84.9 IMMUNOCOMPROMISED STATE (H): ICD-10-CM

## 2021-07-09 DIAGNOSIS — G47.00 INSOMNIA, UNSPECIFIED TYPE: ICD-10-CM

## 2021-07-09 PROCEDURE — 99214 OFFICE O/P EST MOD 30 MIN: CPT | Performed by: PHYSICIAN ASSISTANT

## 2021-07-09 RX ORDER — TRAZODONE HYDROCHLORIDE 50 MG/1
50-150 TABLET, FILM COATED ORAL AT BEDTIME
Qty: 30 TABLET | Refills: 1 | Status: SHIPPED | OUTPATIENT
Start: 2021-07-09 | End: 2021-08-06

## 2021-07-09 ASSESSMENT — PATIENT HEALTH QUESTIONNAIRE - PHQ9
5. POOR APPETITE OR OVEREATING: MORE THAN HALF THE DAYS
SUM OF ALL RESPONSES TO PHQ QUESTIONS 1-9: 6

## 2021-07-09 ASSESSMENT — ANXIETY QUESTIONNAIRES
1. FEELING NERVOUS, ANXIOUS, OR ON EDGE: MORE THAN HALF THE DAYS
7. FEELING AFRAID AS IF SOMETHING AWFUL MIGHT HAPPEN: NOT AT ALL
6. BECOMING EASILY ANNOYED OR IRRITABLE: MORE THAN HALF THE DAYS
5. BEING SO RESTLESS THAT IT IS HARD TO SIT STILL: NOT AT ALL
IF YOU CHECKED OFF ANY PROBLEMS ON THIS QUESTIONNAIRE, HOW DIFFICULT HAVE THESE PROBLEMS MADE IT FOR YOU TO DO YOUR WORK, TAKE CARE OF THINGS AT HOME, OR GET ALONG WITH OTHER PEOPLE: NOT DIFFICULT AT ALL
2. NOT BEING ABLE TO STOP OR CONTROL WORRYING: MORE THAN HALF THE DAYS
3. WORRYING TOO MUCH ABOUT DIFFERENT THINGS: MORE THAN HALF THE DAYS
GAD7 TOTAL SCORE: 10

## 2021-07-09 NOTE — PROGRESS NOTES
"    Assessment & Plan     1. DANIELLE (generalized anxiety disorder)    2. Insomnia, unspecified type    3. Immunocompromised state (H)        1) DANIELLE score elevated. Will start Zoloft and titrate to 75mg daily. Follow up e-visit in 1 month.     2) Stop hydroxyzine. Will trial trazodone PRN. Once her anxiety is under better control she shouldn't need this anymore.     3) Hx of Crohn's      Review of the result(s) of each unique test - PHQ, DANIELLE  Prescription drug management         BMI:   Estimated body mass index is 29.24 kg/m  as calculated from the following:    Height as of 3/22/21: 1.597 m (5' 2.87\").    Weight as of this encounter: 74.6 kg (164 lb 6.4 oz).       Return in about 1 month (around 8/9/2021) for anxiety follow up, an e-visit.    Radha Bermudez PA-C  [facility] Perez is a 40 year old who presents for the following health issues  accompanied by her daughter:    HPI   Anxiety Follow-Up    How are you doing with your anxiety since your last visit? Worsened     Are you having other symptoms that might be associated with anxiety? Yes:  Not sleeping, irritable     Have you had a significant life event? No     Are you feeling depressed? No    Do you have any concerns with your use of alcohol or other drugs? No     Staying active  Needs to work on diet      Social History     Tobacco Use     Smoking status: Current Every Day Smoker     Packs/day: 0.50     Types: Cigarettes     Smokeless tobacco: Never Used   Substance Use Topics     Alcohol use: Yes     Alcohol/week: 0.0 standard drinks     Comment: rare     Drug use: No     DANIELLE-7 SCORE 8/17/2017 11/13/2019   Total Score 0 0     PHQ 8/17/2017 7/31/2018 11/13/2019   PHQ-9 Total Score 4 2 1   Q9: Thoughts of better off dead/self-harm past 2 weeks Not at all Not at all Not at all       Review of Systems   Constitutional, and psych systems are negative, except as otherwise noted.      Objective    /79   Pulse 74   Temp " 98.8  F (37.1  C) (Tympanic)   Resp 16   Wt 74.6 kg (164 lb 6.4 oz)   LMP 07/23/2010   SpO2 99%   BMI 29.24 kg/m    Body mass index is 29.24 kg/m .  Physical Exam   GENERAL: healthy, alert and no distress  MS: no gross musculoskeletal defects noted, no edema  SKIN: no suspicious lesions or rashes  NEURO: Normal strength and tone, mentation intact and speech normal  PSYCH: mentation appears normal, affect normal/bright

## 2021-07-10 ASSESSMENT — ASTHMA QUESTIONNAIRES: ACT_TOTALSCORE: 22

## 2021-07-10 ASSESSMENT — ANXIETY QUESTIONNAIRES: GAD7 TOTAL SCORE: 10

## 2021-08-26 ENCOUNTER — VIRTUAL VISIT (OUTPATIENT)
Dept: FAMILY MEDICINE | Facility: CLINIC | Age: 41
End: 2021-08-26
Payer: COMMERCIAL

## 2021-08-26 DIAGNOSIS — G47.00 INSOMNIA, UNSPECIFIED TYPE: ICD-10-CM

## 2021-08-26 DIAGNOSIS — F41.1 GAD (GENERALIZED ANXIETY DISORDER): ICD-10-CM

## 2021-08-26 PROCEDURE — 99213 OFFICE O/P EST LOW 20 MIN: CPT | Mod: GT | Performed by: PHYSICIAN ASSISTANT

## 2021-08-26 RX ORDER — TRAZODONE HYDROCHLORIDE 50 MG/1
50-100 TABLET, FILM COATED ORAL AT BEDTIME
Qty: 100 TABLET | Refills: 1 | Status: SHIPPED | OUTPATIENT
Start: 2021-08-26 | End: 2021-12-20

## 2021-08-26 RX ORDER — SERTRALINE HYDROCHLORIDE 100 MG/1
100 TABLET, FILM COATED ORAL DAILY
Qty: 90 TABLET | Refills: 0 | Status: SHIPPED | OUTPATIENT
Start: 2021-08-26 | End: 2021-12-14

## 2021-08-26 ASSESSMENT — ANXIETY QUESTIONNAIRES
5. BEING SO RESTLESS THAT IT IS HARD TO SIT STILL: SEVERAL DAYS
6. BECOMING EASILY ANNOYED OR IRRITABLE: NOT AT ALL
IF YOU CHECKED OFF ANY PROBLEMS ON THIS QUESTIONNAIRE, HOW DIFFICULT HAVE THESE PROBLEMS MADE IT FOR YOU TO DO YOUR WORK, TAKE CARE OF THINGS AT HOME, OR GET ALONG WITH OTHER PEOPLE: SOMEWHAT DIFFICULT
2. NOT BEING ABLE TO STOP OR CONTROL WORRYING: NOT AT ALL
GAD7 TOTAL SCORE: 5
7. FEELING AFRAID AS IF SOMETHING AWFUL MIGHT HAPPEN: SEVERAL DAYS
1. FEELING NERVOUS, ANXIOUS, OR ON EDGE: SEVERAL DAYS
3. WORRYING TOO MUCH ABOUT DIFFERENT THINGS: SEVERAL DAYS

## 2021-08-26 ASSESSMENT — PATIENT HEALTH QUESTIONNAIRE - PHQ9: 5. POOR APPETITE OR OVEREATING: SEVERAL DAYS

## 2021-08-26 NOTE — PROGRESS NOTES
"Ana is a 40 year old who is being evaluated via a billable video visit.      How would you like to obtain your AVS? MyChart  If the video visit is dropped, the invitation should be resent by: Text to cell phone: [phone]  Will anyone else be joining your video visit? No      Video Start Time: 10:06am    Assessment & Plan     1. DANIELLE (generalized anxiety disorder)    2. Insomnia, unspecified type        1) DANIELLE score improved. Increase Zoloft to 100mg daily. Follow up e-visit in 1 month.     2) Trazodone refilled, this is working well.       Prescription drug management         BMI:   Estimated body mass index is 29.24 kg/m  as calculated from the following:    Height as of 3/22/21: 1.597 m (5' 2.87\").    Weight as of 7/9/21: 74.6 kg (164 lb 6.4 oz).       Return in about 1 month (around 9/26/2021) for anxiety follow up, an e-visit.    Radha Bermudez PA-C  [facility] Perez is a 40 year old who presents for the following health issues     HPI     Anxiety Follow-Up    How are you doing with your anxiety since your last visit? Worsened     Are you having other symptoms that might be associated with anxiety? Yes:  sleeping    Have you had a significant life event? OTHER: 's job Concerns     Are you feeling depressed? No    Do you have any concerns with your use of alcohol or other drugs? 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 Feeling nervous, anxious, or on edge 1   2. Not being able to stop or control worrying 0   3. Worrying too much about different things 1   4. Trouble relaxing 1   5. Being so restless that it is hard to sit still 1   6. Becoming easily annoyed or irritable 0   7. Feeling afraid, as if something awful might happen 1   DANIELLE-7 Total Score 5   If you checked any problems, how difficult have they made it for you to do your work, take care of things at home, or get along with other people? Somewhat difficult         Review of Systems   Constitutional, and psych systems are negative, except as otherwise noted.      Objective           Vitals:  No vitals were obtained today due to virtual visit.    Physical Exam   GENERAL: Healthy, alert and no distress  EYES: Eyes grossly normal to inspection.  No discharge or erythema, or obvious scleral/conjunctival abnormalities.  RESP: No audible wheeze, cough, or visible cyanosis.  No visible retractions or increased work of breathing.    SKIN: Visible skin clear. No significant rash, abnormal pigmentation or lesions.  NEURO: Cranial nerves grossly intact.  Mentation and speech appropriate for age.  PSYCH: Mentation appears normal, affect normal/bright, judgement and insight intact, normal speech and appearance well-groomed.          Video-Visit Details    Type of service:  Video Visit    Video End Time: 10:21am, 15 mins    Originating Location (pt. Location): Home    Distant Location (provider location):  [facility] [employer]     Platform used for Video Visit: Leighann

## 2021-08-27 ASSESSMENT — ANXIETY QUESTIONNAIRES: GAD7 TOTAL SCORE: 5

## 2021-08-29 DIAGNOSIS — F41.1 GAD (GENERALIZED ANXIETY DISORDER): ICD-10-CM

## 2021-08-30 RX ORDER — SERTRALINE HYDROCHLORIDE 100 MG/1
TABLET, FILM COATED ORAL
Qty: 90 TABLET | Refills: 0 | OUTPATIENT
Start: 2021-08-30

## 2021-09-02 ENCOUNTER — OFFICE VISIT (OUTPATIENT)
Dept: OBGYN | Facility: CLINIC | Age: 41
End: 2021-09-02
Payer: COMMERCIAL

## 2021-09-02 VITALS
SYSTOLIC BLOOD PRESSURE: 151 MMHG | BODY MASS INDEX: 29.77 KG/M2 | DIASTOLIC BLOOD PRESSURE: 86 MMHG | OXYGEN SATURATION: 98 % | WEIGHT: 167.4 LBS | HEART RATE: 72 BPM

## 2021-09-02 DIAGNOSIS — L73.2 HIDRADENITIS SUPPURATIVA: ICD-10-CM

## 2021-09-02 PROCEDURE — 99212 OFFICE O/P EST SF 10 MIN: CPT | Performed by: OBSTETRICS & GYNECOLOGY

## 2021-09-02 NOTE — PROGRESS NOTES
Ana is a 40 year old   is here today complaining of recurrent boils in her right groin.  This has been a problem for several years.  She has tried multiple antibiotics and some prolonged courses, without resolution.  The areas always come back.      ROS: Pertinent ROS as above.    Gyn Hx:      Past Medical History:   Diagnosis Date     Back pain      Crohn's disease (H)      Exercise-induced asthma      Gestational diabetes      Herniation of intervertebral disc of lumbar region      Infertility      Ovarian cyst      Smoker      Status post cholecystectomy 2005     Past Surgical History:   Procedure Laterality Date     APPENDECTOMY       C/SECTION, LOW TRANSVERSE      , Low Transverse     CHOLECYSTECTOMY, LAPOROSCOPIC  2005    Cholecystectomy, Laparoscopic     COLONOSCOPY       ESOPHAGOSCOPY, GASTROSCOPY, DUODENOSCOPY (EGD), COMBINED  3/6/2014    Procedure: COMBINED ESOPHAGOSCOPY, GASTROSCOPY, DUODENOSCOPY (EGD), BIOPSY SINGLE OR MULTIPLE;  COLONOSCOPY/ UPPER GI ENDOSCOPY/ COLON/ EGD/ Abdominal pain, epigastric/ PJH;  Surgeon: West Lomas MD;  Location: [facility]      HYSTEROSCOPY, SURGICAL; W/ ENDOMETRIAL ABLATION, ANY METHOD  2010     HERNIORRHAPHY VENTRAL N/A 2018    Procedure: Open ventral hernia repair;  Surgeon: Alonso Bills MD;  Location: [facility]     HYSTERECTOMY, SUPRACERVICAL LAPAROSCOPIC       LEEP TX, CERVICAL      LEEP TX Cervical     ORTHOPEDIC SURGERY      bluged disk     partial small bowel resection      Ileo-colonic resection. Crohn's disease surgery for bowel obstruction. this was a section of small bowel and large bowel and the cecum., all removed and a reanastamosis done successfully     SALPINGO OOPHORECTOMY,R/L/TORI      Right ovary     TUBAL LIGATION       Patient Active Problem List   Diagnosis     Crohns disease     Female pelvic pain     CARDIOVASCULAR SCREENING; LDL GOAL LESS THAN 160     Migraine headache     Back pain     Obesity      Disorder of sacrum     Displacement of lumbar intervertebral disc without myelopathy     Tobacco abuse     S/P oophorectomy     History of cholecystectomy     History of resection of small bowel     Abdominal pain     Discogenic pain     Chronic low back pain     Mild persistent asthma     Nausea with vomiting     Abdominal pain, right upper quadrant     Irritable bowel syndrome (IBS)     Lactose intolerance     Hearing difficulty     S/P LEEP of cervix     Controlled substance agreement signed     Degenerative lumbar disc     Dysmenorrhea     Labral tear of hip, degenerative     Pain medication agreement     Pain of right hip joint     Crohn's disease of both small and large intestine without complication (H)     History of gestational diabetes mellitus (GDM)     Amenorrhea     Immunocompromised state (H)     Hidradenitis suppurativa       ALL/Meds: Her medication and allergy histories were reviewed and are documented in their appropriate chart areas.    SH: Reviewed and documented in the appropriate area of the chart.  FH:  Her family history is reviewed and updated in the chart, today.  PMH: Her past medical, surgical, and obstetric histories were reviewed and updated today in the appropriate chart areas.    PE: BP (!) 151/86 (BP Location: Right arm, Patient Position: Sitting, Cuff Size: Adult Regular)   Pulse 72   Wt 75.9 kg (167 lb 6.4 oz)   LMP 07/23/2010   SpO2 98%   BMI 29.77 kg/m    Body mass index is 29.77 kg/m .    Pertinent Physical exam findings:    General Appearance:  healthy, alert, active, no distress  Abdomen: Benign, Soft, flat, non-tender, No masses, organomegaly, No inguinal nodes and Bowel sounds normoactive.   Pelvic:       - Ext: Vulva and perineum are normal without lesion,.  There is evidence of scarring to the right and lateral to the clitoris on the mons pubis and extending to the groin.  There is no fluctuance at present, she reports it drained a couple days ago.  Nothing is visible  on the left.    Discussed Hidradenitis, how it forms and possible treatments.  But as she has tried all of the conservative options, I recommend she see Surgry for possible excision.      A/P:(L73.2) Hidradenitis suppurativa  Comment:   Plan: Adult General Surg Referral                 -     - No orders of the defined types were placed in this encounter.

## 2021-09-02 NOTE — PATIENT INSTRUCTIONS
Patient Education     Understanding Hidradenitis Suppurativa  Hidradenitis suppurativa is a long-term (chronic) skin disease. It causes painful bumps and sores (abscesses) to form around a hair follicle. Follicles are the tiny holes from which hair grows out of your skin. The disease occurs on parts of the body where skin rubs together. It most often appears in the armpits, the groin area, and under the breasts. It's more common in women.    How to say it  [id number]  What causes hidradenitis suppurativa?  This skin disease happens when hair follicles become clogged with keratin. Keratin is the protein that makes up your hair and nails. The follicles then burst and become inflamed . Pressure or rubbing on the skin can clog the follicles. Or it can further irritate them.   The disease tends to run in families. It s also more likely to occur in people who are obese, have diabetes, or smoke. Hormones or the immune system may also play a part.   Symptoms of hidradenitis suppurativa  This skin disease causes one or more painful red bumps on the skin. These bumps become inflamed and drain pus. They may also itch or burn. In severe cases, sinus tracts may form. These are narrow channels that run under the skin. Blood or a bad-smelling pus may ooze from these bumps or sinus tracts. Bands of scarring often occur.   Treatment for hidradenitis suppurativa  Treatment for this skin disease is most successful when started early. But it may be hard to diagnose. It may be mistaken for other skin conditions. The painful bumps also often return. So stopping new bumps and limiting scarring is important. Treatment options include:     Warm compress. Putting a warm, wet washcloth on the affected skin may help.    Lifestyle changes. Your symptoms may get better if you lose weight or stop smoking, if needed. Also avoid shaving or other irritants, such as deodorant or perfume.    Antibiotics. For mild cases, an  antibiotic for the skin (topical) may help. You may need oral antibiotics if you have a severe case. They can help prevent further infection.    Other oral medicines. Over-the-counter pain medicines can ease pain and inflammation. You may need stronger medicines for a severe case. These medicines include corticosteroids or a retinoid. These may cause side effects.    Injected medicines. A steroid may be injected into the bump to ease pain. A newer biologic medicine may be injected to ease severe symptoms.    Surgery. Surgery can drain and remove the painful bumps. For severe cases, the doctor may cut out the entire area of affected skin or destroy it with a laser.  Possible complications of hidradenitis suppurativa   These include:    Arthritis    Depression    Lymphedema    Scarring of skin    Skin cancer  When to call your healthcare provider  Call your healthcare provider right away if you have any of these:    Fever of 100.4 F (38 C) or higher, or as directed by your healthcare provider    Redness, swelling, or fluid leaking from your rash that gets worse    Pain that gets worse    Symptoms that don t get better, or get worse    New symptoms  Melanie last reviewed this educational content on 6/1/2019 2000-2021 [employer]. All rights reserved. This information is not intended as a substitute for professional medical care. Always follow your healthcare professional's instructions.

## 2021-09-13 ENCOUNTER — OFFICE VISIT (OUTPATIENT)
Dept: SURGERY | Facility: CLINIC | Age: 41
End: 2021-09-13
Payer: COMMERCIAL

## 2021-09-13 VITALS
HEIGHT: 63 IN | HEART RATE: 79 BPM | WEIGHT: 167 LBS | DIASTOLIC BLOOD PRESSURE: 96 MMHG | SYSTOLIC BLOOD PRESSURE: 155 MMHG | BODY MASS INDEX: 29.59 KG/M2 | TEMPERATURE: 97 F

## 2021-09-13 DIAGNOSIS — L73.2 HIDRADENITIS SUPPURATIVA: Primary | ICD-10-CM

## 2021-09-13 PROCEDURE — 99213 OFFICE O/P EST LOW 20 MIN: CPT | Mod: 25 | Performed by: SURGERY

## 2021-09-13 PROCEDURE — 88305 TISSUE EXAM BY PATHOLOGIST: CPT | Performed by: PATHOLOGY

## 2021-09-13 PROCEDURE — 57135 EXCISION VAGINAL CYST/TUMOR: CPT | Performed by: SURGERY

## 2021-09-13 ASSESSMENT — MIFFLIN-ST. JEOR: SCORE: 1384.57

## 2021-09-13 NOTE — NURSING NOTE
"Initial BP (!) 155/96 (BP Location: Right arm, Patient Position: Sitting, Cuff Size: Adult Regular)   Pulse 79   Temp 97  F (36.1  C) (Tympanic)   Ht 1.597 m (5' 2.87\")   Wt 75.8 kg (167 lb)   LMP 07/23/2010   BMI 29.71 kg/m   Estimated body mass index is 29.71 kg/m  as calculated from the following:    Height as of this encounter: 1.597 m (5' 2.87\").    Weight as of this encounter: 75.8 kg (167 lb). .    Pastora De La Paz CMA    "

## 2021-09-13 NOTE — PATIENT INSTRUCTIONS
Per physician instructions      If you have questions or concerns on any instructions given to you by your provider today or if you need to schedule an appointment, you can reach us at [phone].

## 2021-09-13 NOTE — PROGRESS NOTES
PCP:  Radha Bermudez    Chief complaint: Recurrent cystic structure right vulva    History of Present Illness: Ana is a 40-year-old female who carries a diagnosis of Crohn's disease which currently is under good control with daily azathioprine.  She presented to me today for treatment of a cystic lesion in her right mons pubis that has been bothering her for quite some time.  It follows a fairly typical cycle where it will flareup, become large and tender-then spontaneously drained and it would feel better for several days.  The cycle will then repeat.  This is isolated to a single area on the right mons pubis.  She does not have similar lesions elsewhere.  No history of perianal fistula disease.    Histories:  Past Medical History:   Diagnosis Date     Back pain      Crohn's disease (H)      Exercise-induced asthma      Gestational diabetes      Herniation of intervertebral disc of lumbar region      Infertility      Ovarian cyst      Smoker      Status post cholecystectomy 2005       Past Surgical History:   Procedure Laterality Date     APPENDECTOMY       C/SECTION, LOW TRANSVERSE      , Low Transverse     CHOLECYSTECTOMY, LAPOROSCOPIC  2005    Cholecystectomy, Laparoscopic     COLONOSCOPY       ESOPHAGOSCOPY, GASTROSCOPY, DUODENOSCOPY (EGD), COMBINED  3/6/2014    Procedure: COMBINED ESOPHAGOSCOPY, GASTROSCOPY, DUODENOSCOPY (EGD), BIOPSY SINGLE OR MULTIPLE;  COLONOSCOPY/ UPPER GI ENDOSCOPY/ COLON/ EGD/ Abdominal pain, epigastric/ PJH;  Surgeon: West Lomas MD;  Location:  OR      HYSTEROSCOPY, SURGICAL; W/ ENDOMETRIAL ABLATION, ANY METHOD  2010     HERNIORRHAPHY VENTRAL N/A 2018    Procedure: Open ventral hernia repair;  Surgeon: Alonso Bills MD;  Location: [facility]     HYSTERECTOMY, SUPRACERVICAL LAPAROSCOPIC  2010     LEEP TX, CERVICAL      LEEP TX Cervical     ORTHOPEDIC SURGERY      bluged disk     partial small bowel resection      Ileo-colonic  resection. Crohn's disease surgery for bowel obstruction. this was a section of small bowel and large bowel and the cecum., all removed and a reanastamosis done successfully     SALPINGO OOPHORECTOMY,R/L/TORI      Right ovary     TUBAL LIGATION         Family History   Problem Relation Age of Onset     Cancer Mother         cervical     Lipids Mother      Eye Disorder Father      Lipids Father      Alcohol/Drug Maternal Grandmother      Diabetes Paternal Grandmother      Cancer - colorectal Paternal Grandmother 75     Eye Disorder Paternal Grandmother      Diabetes Paternal Grandfather      Eye Disorder Paternal Grandfather      Inflammatory Bowel Disease Paternal Aunt         and two paternal uncles       Social History     Tobacco Use     Smoking status: Current Every Day Smoker     Packs/day: 0.50     Types: Cigarettes     Smokeless tobacco: Never Used   Substance Use Topics     Alcohol use: Yes     Alcohol/week: 0.0 standard drinks     Comment: rare       Current Outpatient Medications   Medication Sig Dispense Refill     albuterol (PROAIR HFA) 108 (90 Base) MCG/ACT inhaler Inhale 2 puffs into the lungs every 4 hours as needed for shortness of breath / dyspnea 18 g 3     azaTHIOprine (IMURAN) 50 MG tablet        cyanocobalamin (VITAMIN B12) 1000 MCG/ML injection Inject 1 mL into the muscle every 30 days       diclofenac (VOLTAREN) 1 % topical gel Apply 4 g topically 4 times daily 100 g 0     fentaNYL (DURAGESIC) 12 mcg/hr patch 72 hr Place 1 patch onto the skin every 72 hours       fentaNYL (DURAGESIC) 25 mcg/hr patch 72 hr Place 1 patch onto the skin every 72 hours       gabapentin (NEURONTIN) 300 MG capsule Take 2 capsules (600 mg) by mouth 3 times daily 180 capsule 0     montelukast (SINGULAIR) 10 MG tablet Take 1 tablet (10 mg) by mouth daily 90 tablet 3     oxyCODONE-acetaminophen (PERCOCET) 7.5-325 MG per tablet One po t.i.d./ P.R.N  USE DATES: 08/05/17-09/03/17 may fill on 8/2/17       sertraline (ZOLOFT)  "100 MG tablet Take 1 tablet (100 mg) by mouth daily 90 tablet 0     traZODone (DESYREL) 50 MG tablet Take 1-2 tablets ( mg) by mouth At Bedtime 100 tablet 1     valACYclovir (VALTREX) 1000 mg tablet Take 1 tablet (1,000 mg) by mouth 3 times daily for 7 days 21 tablet 0       Allergies   Allergen Reactions     Infliximab Hives     Sulfa Drugs [Sulfa Drugs] Nausea     Pt states \"it doesn't work for me\"     Sulfacetamide Nausea     Augmentin Other (See Comments) and Nausea and Vomiting     Crohn's     Bupropion Hives and Nausea     Droperidol Other (See Comments)     Dystonic reaction     Ibuprofen Other (See Comments)     Crohn's       Lyrica [Pregabalin] Nausea and Vomiting     Grogginess, severe back pain  Grogginess, severe back pain     Vicodin [Hydrocodone-Acetaminophen] Nausea and Vomiting       Images:  No results found for this or any previous visit (from the past 744 hour(s)).    Labs:  No results found for any visits on 09/13/21.    ROS:  Problem focused ROS negative except as above.    BP (!) 155/96 (BP Location: Right arm, Patient Position: Sitting, Cuff Size: Adult Regular)   Pulse 79   Temp 97  F (36.1  C) (Tympanic)   Ht 1.597 m (5' 2.87\")   Wt 75.8 kg (167 lb)   LMP 07/23/2010   BMI 29.71 kg/m      Exam:  General - Alert and Oriented X4, NAD, well nourished  HEENT - Normocephalic, atraumatic  Neck - supple  Lungs -respiration unlabored  CV - Heart RRR  Abdomen - Soft, non-tender, well-healed midline incision  Groins - 1.5 cm palpable cyst right mons pubis, remainder of exam normal  Extremities - No cyanosis, clubbing or edema.      Assessment and Plan:  Patient presents with what is likely an infected hair follicle versus hidradenitis.  It seems to only involve the single area so we elected to excise it today.  I do not think it is related to her known diagnosis of Crohn's because she has been asymptomatic for quite some time.  She has no perianal Crohn's disease.    With her permission the " cyst was prepped with alcohol and then in injected with 5 cc of 1% Xylocaine with epinephrine.  A vertical incision was made after sterile prep and drape.  A small ellipse of skin was removed as well as underlying tissue.  The wound was closed with a deeper layer of 3-0 Vicryl and interrupted 3-0 nylon on the skin.  A dressing was applied.    Postoperative instructions were given.  She will return in 2 weeks for suture removal.  Pathology results will be conveyed when available.    Monty Lozoya MD FACS

## 2021-09-13 NOTE — LETTER
2021         RE: Ana Felix  [address]  [place] [address]        Dear Colleague,    Thank you for referring your patient, Ana Felix, to the [facility]. Please see a copy of my visit note below.    PCP:  Radha Bermudez    Chief complaint: Recurrent cystic structure right vulva    History of Present Illness: Ana is a 40-year-old female who carries a diagnosis of Crohn's disease which currently is under good control with daily azathioprine.  She presented to me today for treatment of a cystic lesion in her right mons pubis that has been bothering her for quite some time.  It follows a fairly typical cycle where it will flareup, become large and tender-then spontaneously drained and it would feel better for several days.  The cycle will then repeat.  This is isolated to a single area on the right mons pubis.  She does not have similar lesions elsewhere.  No history of perianal fistula disease.    Histories:  Past Medical History:   Diagnosis Date     Back pain      Crohn's disease (H)      Exercise-induced asthma      Gestational diabetes      Herniation of intervertebral disc of lumbar region      Infertility      Ovarian cyst      Smoker      Status post cholecystectomy 2005       Past Surgical History:   Procedure Laterality Date     APPENDECTOMY       C/SECTION, LOW TRANSVERSE      , Low Transverse     CHOLECYSTECTOMY, LAPOROSCOPIC  2005    Cholecystectomy, Laparoscopic     COLONOSCOPY       ESOPHAGOSCOPY, GASTROSCOPY, DUODENOSCOPY (EGD), COMBINED  3/6/2014    Procedure: COMBINED ESOPHAGOSCOPY, GASTROSCOPY, DUODENOSCOPY (EGD), BIOPSY SINGLE OR MULTIPLE;  COLONOSCOPY/ UPPER GI ENDOSCOPY/ COLON/ EGD/ Abdominal pain, epigastric/ PJ[facility];  Surgeon: West Lomas MD;  Location:  OR      HYSTEROSCOPY, SURGICAL; W/ ENDOMETRIAL ABLATION, ANY METHOD  2010     HERNIORRHAPHY VENTRAL N/A 2018    Procedure: Open ventral hernia repair;  Surgeon:  Alonso Bills MD;  Location: [facility]     HYSTERECTOMY, SUPRACERVICAL LAPAROSCOPIC  2010     LEEP TX, CERVICAL      LEEP TX Cervical     ORTHOPEDIC SURGERY      bluged disk     partial small bowel resection  2002    Ileo-colonic resection. Crohn's disease surgery for bowel obstruction. this was a section of small bowel and large bowel and the cecum., all removed and a reanastamosis done successfully     SALPINGO OOPHORECTOMY,R/L/TORI      Right ovary     TUBAL LIGATION         Family History   Problem Relation Age of Onset     Cancer Mother         cervical     Lipids Mother      Eye Disorder Father      Lipids Father      Alcohol/Drug Maternal Grandmother      Diabetes Paternal Grandmother      Cancer - colorectal Paternal Grandmother 75     Eye Disorder Paternal Grandmother      Diabetes Paternal Grandfather      Eye Disorder Paternal Grandfather      Inflammatory Bowel Disease Paternal Aunt         and two paternal uncles       Social History     Tobacco Use     Smoking status: Current Every Day Smoker     Packs/day: 0.50     Types: Cigarettes     Smokeless tobacco: Never Used   Substance Use Topics     Alcohol use: Yes     Alcohol/week: 0.0 standard drinks     Comment: rare       Current Outpatient Medications   Medication Sig Dispense Refill     albuterol (PROAIR HFA) 108 (90 Base) MCG/ACT inhaler Inhale 2 puffs into the lungs every 4 hours as needed for shortness of breath / dyspnea 18 g 3     azaTHIOprine (IMURAN) 50 MG tablet        cyanocobalamin (VITAMIN B12) 1000 MCG/ML injection Inject 1 mL into the muscle every 30 days       diclofenac (VOLTAREN) 1 % topical gel Apply 4 g topically 4 times daily 100 g 0     fentaNYL (DURAGESIC) 12 mcg/hr patch 72 hr Place 1 patch onto the skin every 72 hours       fentaNYL (DURAGESIC) 25 mcg/hr patch 72 hr Place 1 patch onto the skin every 72 hours       gabapentin (NEURONTIN) 300 MG capsule Take 2 capsules (600 mg) by mouth 3 times daily 180 capsule 0     montelukast  "(SINGULAIR) 10 MG tablet Take 1 tablet (10 mg) by mouth daily 90 tablet 3     oxyCODONE-acetaminophen (PERCOCET) 7.5-325 MG per tablet One po t.i.d./ P.R.N  USE DATES: 08/05/17-09/03/17 may fill on 8/2/17       sertraline (ZOLOFT) 100 MG tablet Take 1 tablet (100 mg) by mouth daily 90 tablet 0     traZODone (DESYREL) 50 MG tablet Take 1-2 tablets ( mg) by mouth At Bedtime 100 tablet 1     valACYclovir (VALTREX) 1000 mg tablet Take 1 tablet (1,000 mg) by mouth 3 times daily for 7 days 21 tablet 0       Allergies   Allergen Reactions     Infliximab Hives     Sulfa Drugs [Sulfa Drugs] Nausea     Pt states \"it doesn't work for me\"     Sulfacetamide Nausea     Augmentin Other (See Comments) and Nausea and Vomiting     Crohn's     Bupropion Hives and Nausea     Droperidol Other (See Comments)     Dystonic reaction     Ibuprofen Other (See Comments)     Crohn's       Lyrica [Pregabalin] Nausea and Vomiting     Grogginess, severe back pain  Grogginess, severe back pain     Vicodin [Hydrocodone-Acetaminophen] Nausea and Vomiting       Images:  No results found for this or any previous visit (from the past 744 hour(s)).    Labs:  No results found for any visits on 09/13/21.    ROS:  Problem focused ROS negative except as above.    BP (!) 155/96 (BP Location: Right arm, Patient Position: Sitting, Cuff Size: Adult Regular)   Pulse 79   Temp 97  F (36.1  C) (Tympanic)   Ht 1.597 m (5' 2.87\")   Wt 75.8 kg (167 lb)   LMP 07/23/2010   BMI 29.71 kg/m      Exam:  General - Alert and Oriented X4, NAD, well nourished  HEENT - Normocephalic, atraumatic  Neck - supple  Lungs -respiration unlabored  CV - Heart RRR  Abdomen - Soft, non-tender, well-healed midline incision  Groins - 1.5 cm palpable cyst right mons pubis, remainder of exam normal  Extremities - No cyanosis, clubbing or edema.      Assessment and Plan:  Patient presents with what is likely an infected hair follicle versus hidradenitis.  It seems to only involve " the single area so we elected to excise it today.  I do not think it is related to her known diagnosis of Crohn's because she has been asymptomatic for quite some time.  She has no perianal Crohn's disease.    With her permission the cyst was prepped with alcohol and then in injected with 5 cc of 1% Xylocaine with epinephrine.  A vertical incision was made after sterile prep and drape.  A small ellipse of skin was removed as well as underlying tissue.  The wound was closed with a deeper layer of 3-0 Vicryl and interrupted 3-0 nylon on the skin.  A dressing was applied.    Postoperative instructions were given.  She will return in 2 weeks for suture removal.  Pathology results will be conveyed when available.    Monty Lozoya MD FACS              Again, thank you for allowing me to participate in the care of your patient.        Sincerely,        Monty Lozoya MD

## 2021-09-15 LAB
PATH REPORT.COMMENTS IMP SPEC: NORMAL
PATH REPORT.COMMENTS IMP SPEC: NORMAL
PATH REPORT.FINAL DX SPEC: NORMAL
PATH REPORT.GROSS SPEC: NORMAL
PATH REPORT.MICROSCOPIC SPEC OTHER STN: NORMAL
PATH REPORT.RELEVANT HX SPEC: NORMAL
PHOTO IMAGE: NORMAL

## 2021-09-19 ENCOUNTER — HEALTH MAINTENANCE LETTER (OUTPATIENT)
Age: 41
End: 2021-09-19

## 2021-09-27 ENCOUNTER — OFFICE VISIT (OUTPATIENT)
Dept: SURGERY | Facility: CLINIC | Age: 41
End: 2021-09-27
Payer: COMMERCIAL

## 2021-09-27 VITALS
SYSTOLIC BLOOD PRESSURE: 141 MMHG | DIASTOLIC BLOOD PRESSURE: 78 MMHG | TEMPERATURE: 97 F | WEIGHT: 167 LBS | HEART RATE: 73 BPM | BODY MASS INDEX: 29.59 KG/M2 | HEIGHT: 63 IN

## 2021-09-27 DIAGNOSIS — Z98.890 POSTOPERATIVE STATE: Primary | ICD-10-CM

## 2021-09-27 PROCEDURE — 99024 POSTOP FOLLOW-UP VISIT: CPT | Performed by: SURGERY

## 2021-09-27 ASSESSMENT — MIFFLIN-ST. JEOR: SCORE: 1384.57

## 2021-09-27 NOTE — NURSING NOTE
"Initial BP (!) 141/78 (BP Location: Right arm, Patient Position: Sitting, Cuff Size: Adult Regular)   Pulse 73   Temp 97  F (36.1  C) (Tympanic)   Ht 1.597 m (5' 2.87\")   Wt 75.8 kg (167 lb)   LMP 07/23/2010   BMI 29.71 kg/m   Estimated body mass index is 29.71 kg/m  as calculated from the following:    Height as of this encounter: 1.597 m (5' 2.87\").    Weight as of this encounter: 75.8 kg (167 lb). .    Pastora De La Paz CMA    "

## 2021-09-27 NOTE — PROGRESS NOTES
Ana is in for suture removal.    Her wound is healing well.  Pathology revealed a cyst.      Her sutures were removed and steri-strips applied.    She is encouraged to call or come in with any questions or problems.    Monty Lozoya MD FACS

## 2021-09-27 NOTE — LETTER
9/27/2021         RE: Ana Felix  [address]  [place] [address]        Dear Colleague,    Thank you for referring your patient, Ana Felix, to the [facility]. Please see a copy of my visit note below.    Ana is in for suture removal.    Her wound is healing well.  Pathology revealed a cyst.      Her sutures were removed and steri-strips applied.    She is encouraged to call or come in with any questions or problems.    Monty Lozoya MD FACS      Again, thank you for allowing me to participate in the care of your patient.        Sincerely,        Monty Lozoya MD

## 2021-09-27 NOTE — PATIENT INSTRUCTIONS
Per physician instructions      If you have questions or concerns on any instructions given to you by your provider today or if you need to schedule an appointment, you can reach us at [phone].

## 2021-09-28 ENCOUNTER — OFFICE VISIT (OUTPATIENT)
Dept: URGENT CARE | Facility: URGENT CARE | Age: 41
End: 2021-09-28
Payer: COMMERCIAL

## 2021-09-28 VITALS
SYSTOLIC BLOOD PRESSURE: 118 MMHG | TEMPERATURE: 100.5 F | HEART RATE: 78 BPM | DIASTOLIC BLOOD PRESSURE: 70 MMHG | OXYGEN SATURATION: 97 % | RESPIRATION RATE: 20 BRPM

## 2021-09-28 DIAGNOSIS — J06.9 URI, ACUTE: ICD-10-CM

## 2021-09-28 DIAGNOSIS — R50.9 FEVER AND CHILLS: ICD-10-CM

## 2021-09-28 DIAGNOSIS — R05.9 COUGH: Primary | ICD-10-CM

## 2021-09-28 DIAGNOSIS — J45.31 MILD PERSISTENT ASTHMA WITH EXACERBATION: ICD-10-CM

## 2021-09-28 PROCEDURE — U0003 INFECTIOUS AGENT DETECTION BY NUCLEIC ACID (DNA OR RNA); SEVERE ACUTE RESPIRATORY SYNDROME CORONAVIRUS 2 (SARS-COV-2) (CORONAVIRUS DISEASE [COVID-19]), AMPLIFIED PROBE TECHNIQUE, MAKING USE OF HIGH THROUGHPUT TECHNOLOGIES AS DESCRIBED BY CMS-2020-01-R: HCPCS | Mod: 90 | Performed by: FAMILY MEDICINE

## 2021-09-28 PROCEDURE — 99000 SPECIMEN HANDLING OFFICE-LAB: CPT | Performed by: FAMILY MEDICINE

## 2021-09-28 PROCEDURE — 99214 OFFICE O/P EST MOD 30 MIN: CPT | Performed by: FAMILY MEDICINE

## 2021-09-28 RX ORDER — PREDNISONE 20 MG/1
40 TABLET ORAL DAILY
Qty: 10 TABLET | Refills: 0 | Status: SHIPPED | OUTPATIENT
Start: 2021-09-28 | End: 2021-10-03

## 2021-09-28 NOTE — PROGRESS NOTES
ICD-10-CM    1. Cough  R05 Symptomatic COVID-19 Virus (Coronavirus) by PCR Nasopharyngeal     predniSONE (DELTASONE) 20 MG tablet   2. Fever and chills  R50.9 Symptomatic COVID-19 Virus (Coronavirus) by PCR Nasopharyngeal   3. Mild persistent asthma with exacerbation  J45.31    4. URI, acute  J06.9    likely viral uri with asthma exacerbation. Could be covid.  discussed options. covid test pending. Prednisone ordered. follow up with her doctor Friday if not improving. Sooner if shortness of breath worsens v ER.   -------------------------------  Ana Felix with presents with 3-4 days symptoms including sore throat, congestion, post nasal drip, productive cough, low grade fevers, shortness of breath/wheezing.    The patient has a history of asthma. No recent prednisone.     Treatment measures tried include Inhaler (name: albuterol).  positive  Exposure to sick kids at work. There also was covid in the building.   no recent travel     Current Outpatient Medications   Medication Sig Dispense Refill     albuterol (PROAIR HFA) 108 (90 Base) MCG/ACT inhaler Inhale 2 puffs into the lungs every 4 hours as needed for shortness of breath / dyspnea 18 g 3     azaTHIOprine (IMURAN) 50 MG tablet        cyanocobalamin (VITAMIN B12) 1000 MCG/ML injection Inject 1 mL into the muscle every 30 days       fentaNYL (DURAGESIC) 12 mcg/hr patch 72 hr Place 1 patch onto the skin every 72 hours       fentaNYL (DURAGESIC) 25 mcg/hr patch 72 hr Place 1 patch onto the skin every 72 hours       gabapentin (NEURONTIN) 300 MG capsule Take 2 capsules (600 mg) by mouth 3 times daily 180 capsule 0     montelukast (SINGULAIR) 10 MG tablet Take 1 tablet (10 mg) by mouth daily 90 tablet 3     oxyCODONE-acetaminophen (PERCOCET) 7.5-325 MG per tablet One po t.i.d./ P.R.N  USE DATES: 08/05/17-09/03/17 may fill on 8/2/17       predniSONE (DELTASONE) 20 MG tablet Take 2 tablets (40 mg) by mouth daily for 5 days 10 tablet 0     sertraline (ZOLOFT) 100 MG  tablet Take 1 tablet (100 mg) by mouth daily 90 tablet 0     traZODone (DESYREL) 50 MG tablet Take 1-2 tablets ( mg) by mouth At Bedtime 100 tablet 1     valACYclovir (VALTREX) 1000 mg tablet Take 1 tablet (1,000 mg) by mouth 3 times daily for 7 days 21 tablet 0       ROS otherwise negative for resp., ID,  HEENT symptoms.    Objective: /70 (BP Location: Right arm, Patient Position: Sitting, Cuff Size: Adult Regular)   Pulse 78   Temp (!) 100.5  F (38.1  C) (Tympanic)   Resp 20   LMP 07/23/2010   SpO2 97%   Exam:  GENERAL APPEARANCE: healthy, alert and no distress  EYES: Eyes grossly normal to inspection  HENT: ear canals and TM's normal, nose and mouth without ulcers or lesions and tonsillar erythema  NECK: no adenopathy, no asymmetry, masses, or scars and thyroid normal to palpation  RESP: lungs with diffuse wheeze bilaterally.   CV: regular rates and rhythm, normal S1 S2, no S3 or S4 and no murmur, click or rub -

## 2021-10-02 LAB — SARS-COV-2 RNA RESP QL NAA+PROBE: NOT DETECTED

## 2021-11-03 ENCOUNTER — OFFICE VISIT (OUTPATIENT)
Dept: SURGERY | Facility: CLINIC | Age: 41
End: 2021-11-03
Payer: COMMERCIAL

## 2021-11-03 VITALS
WEIGHT: 167 LBS | BODY MASS INDEX: 29.71 KG/M2 | SYSTOLIC BLOOD PRESSURE: 121 MMHG | TEMPERATURE: 98.3 F | HEART RATE: 84 BPM | DIASTOLIC BLOOD PRESSURE: 82 MMHG

## 2021-11-03 DIAGNOSIS — L73.2 HIDRADENITIS SUPPURATIVA: Primary | ICD-10-CM

## 2021-11-03 PROCEDURE — 11104 PUNCH BX SKIN SINGLE LESION: CPT | Performed by: SURGERY

## 2021-11-03 PROCEDURE — 11105 PUNCH BX SKIN EA SEP/ADDL: CPT | Performed by: SURGERY

## 2021-11-03 PROCEDURE — 88305 TISSUE EXAM BY PATHOLOGIST: CPT | Performed by: PATHOLOGY

## 2021-11-03 NOTE — PATIENT INSTRUCTIONS
Per physician instructions      If you have questions or concerns on any instructions given to you by your provider today or if you need to schedule an appointment, you can reach us at [phone].

## 2021-11-03 NOTE — LETTER
11/3/2021         RE: Ana Felix  [address]  [place] [address]        Dear Colleague,    Thank you for referring your patient, Ana Felix, to the [facility]. Please see a copy of my visit note below.    Ana returns to the surgical clinic today because she had another episode of pain swelling and drainage from an area on her right mons pubis.  We had excised some tissue there previously, but she had a recurrence.  This happened about 3 weeks ago and the area has healed, but she is interested in having's some more tissue excised even if it does not fix the problem.    On exam: There is a very small skin opening just lateral to the previous suture line.  I told her that I thought we could remove some tissue around this opening and she agreed to proceed.  No guarantees were made.  She is just wants something done to prevent this, if possible.    Procedure note: Excision of sinus tract right mons pubis  Surgeon: Darell  Anesthesia: 1% Xylocaine with epinephrine 8 cc total    With the patient's verbal and written consent the patient was placed on the procedure table in the supine position.  The skin was prepped with alcohol and then anesthetized with 1% Xylocaine with epinephrine.  I used a 5 mm punch biopsy 3 different x2.  Moved tissue over the offending area.  These were all sent in one specimen container.  The wound was closed with 2 interrupted figure-of-eight 3-0 nylon sutures.  A small Tegaderm was applied.    Postoperative instructions were given.  We will contact her with the pathology results when available.  A suture kit was provided to her so she can remove the sutures in about 10 to 14 days.      Monty Lozoya MD FACS      Again, thank you for allowing me to participate in the care of your patient.        Sincerely,        Monty Lozoya MD

## 2021-11-03 NOTE — NURSING NOTE
"Initial /82 (BP Location: Left arm, Patient Position: Sitting, Cuff Size: Adult Regular)   Pulse 84   Temp 98.3  F (36.8  C) (Tympanic)   Wt 75.8 kg (167 lb)   LMP 07/23/2010   BMI 29.71 kg/m   Estimated body mass index is 29.71 kg/m  as calculated from the following:    Height as of 9/27/21: 1.597 m (5' 2.87\").    Weight as of this encounter: 75.8 kg (167 lb). .    Pastora De La Paz CMA    "

## 2021-11-03 NOTE — PROGRESS NOTES
Ana returns to the surgical clinic today because she had another episode of pain swelling and drainage from an area on her right mons pubis.  We had excised some tissue there previously, but she had a recurrence.  This happened about 3 weeks ago and the area has healed, but she is interested in having's some more tissue excised even if it does not fix the problem.    On exam: There is a very small skin opening just lateral to the previous suture line.  I told her that I thought we could remove some tissue around this opening and she agreed to proceed.  No guarantees were made.  She is just wants something done to prevent this, if possible.    Procedure note: Excision of sinus tract right mons pubis  Surgeon: Darell  Anesthesia: 1% Xylocaine with epinephrine 8 cc total    With the patient's verbal and written consent the patient was placed on the procedure table in the supine position.  The skin was prepped with alcohol and then anesthetized with 1% Xylocaine with epinephrine.  I used a 5 mm punch biopsy 3 different x2.  Moved tissue over the offending area.  These were all sent in one specimen container.  The wound was closed with 2 interrupted figure-of-eight 3-0 nylon sutures.  A small Tegaderm was applied.    Postoperative instructions were given.  We will contact her with the pathology results when available.  A suture kit was provided to her so she can remove the sutures in about 10 to 14 days.      Monty Lozoya MD FACS

## 2021-11-20 DIAGNOSIS — G47.00 INSOMNIA, UNSPECIFIED TYPE: ICD-10-CM

## 2021-11-22 RX ORDER — TRAZODONE HYDROCHLORIDE 50 MG/1
TABLET, FILM COATED ORAL
Qty: 100 TABLET | Refills: 1 | OUTPATIENT
Start: 2021-11-22

## 2021-11-24 ENCOUNTER — TELEPHONE (OUTPATIENT)
Dept: FAMILY MEDICINE | Facility: CLINIC | Age: 41
End: 2021-11-24

## 2021-11-24 NOTE — TELEPHONE ENCOUNTER
Outside orders faxed to [facility] from [place] [facility] for C diff Toxin Gene LEA, Calprotecin,fecel, and stool test panel, comprehensive.     Call to patient to schedule lab appt, no answer, left message to return call. Please schedule lab only appt when patient returns call.

## 2021-11-29 ENCOUNTER — TRANSFERRED RECORDS (OUTPATIENT)
Dept: HEALTH INFORMATION MANAGEMENT | Facility: CLINIC | Age: 41
End: 2021-11-29
Payer: COMMERCIAL

## 2021-11-30 ENCOUNTER — TRANSFERRED RECORDS (OUTPATIENT)
Dept: HEALTH INFORMATION MANAGEMENT | Facility: CLINIC | Age: 41
End: 2021-11-30
Payer: COMMERCIAL

## 2021-11-30 LAB
ALT SERPL-CCNC: 9 IU/L (ref 0–32)
AST SERPL-CCNC: 18 IU/L (ref 0–40)

## 2021-12-02 ENCOUNTER — TRANSFERRED RECORDS (OUTPATIENT)
Dept: HEALTH INFORMATION MANAGEMENT | Facility: CLINIC | Age: 41
End: 2021-12-02
Payer: COMMERCIAL

## 2021-12-03 ENCOUNTER — HOSPITAL ENCOUNTER (OUTPATIENT)
Dept: GENERAL RADIOLOGY | Facility: CLINIC | Age: 41
Discharge: HOME | End: 2021-12-03
Attending: PHYSICIAN ASSISTANT | Admitting: PHYSICIAN ASSISTANT
Payer: COMMERCIAL

## 2021-12-03 DIAGNOSIS — K50.00 CROHN'S ILEITIS, WITHOUT COMPLICATIONS (H): ICD-10-CM

## 2021-12-03 PROCEDURE — 74019 RADEX ABDOMEN 2 VIEWS: CPT

## 2021-12-10 ENCOUNTER — HOSPITAL ENCOUNTER (OUTPATIENT)
Dept: CT IMAGING | Facility: CLINIC | Age: 41
Discharge: HOME | End: 2021-12-10
Attending: PHYSICIAN ASSISTANT | Admitting: PHYSICIAN ASSISTANT
Payer: COMMERCIAL

## 2021-12-10 DIAGNOSIS — R93.89 ABNORMAL X-RAY: ICD-10-CM

## 2021-12-10 PROCEDURE — 250N000011 HC RX IP 250 OP 636: Performed by: RADIOLOGY

## 2021-12-10 PROCEDURE — 74177 CT ABD & PELVIS W/CONTRAST: CPT

## 2021-12-10 PROCEDURE — 250N000009 HC RX 250: Performed by: RADIOLOGY

## 2021-12-10 RX ORDER — IOPAMIDOL 755 MG/ML
82 INJECTION, SOLUTION INTRAVASCULAR ONCE
Status: COMPLETED | OUTPATIENT
Start: 2021-12-10 | End: 2021-12-10

## 2021-12-10 RX ADMIN — SODIUM CHLORIDE 100 ML: 9 INJECTION, SOLUTION INTRAVENOUS at 13:59

## 2021-12-10 RX ADMIN — IOPAMIDOL 82 ML: 755 INJECTION, SOLUTION INTRAVENOUS at 13:59

## 2021-12-15 DIAGNOSIS — N83.202 CYST OF LEFT OVARY: Primary | ICD-10-CM

## 2022-01-13 ENCOUNTER — ANCILLARY PROCEDURE (OUTPATIENT)
Dept: GENERAL RADIOLOGY | Facility: CLINIC | Age: 42
End: 2022-01-13
Attending: PHYSICIAN ASSISTANT
Payer: COMMERCIAL

## 2022-01-13 ENCOUNTER — OFFICE VISIT (OUTPATIENT)
Dept: FAMILY MEDICINE | Facility: CLINIC | Age: 42
End: 2022-01-13
Payer: COMMERCIAL

## 2022-01-13 VITALS
SYSTOLIC BLOOD PRESSURE: 130 MMHG | DIASTOLIC BLOOD PRESSURE: 80 MMHG | WEIGHT: 181 LBS | RESPIRATION RATE: 16 BRPM | HEART RATE: 64 BPM | TEMPERATURE: 97.6 F | OXYGEN SATURATION: 98 % | BODY MASS INDEX: 33.31 KG/M2 | HEIGHT: 62 IN

## 2022-01-13 DIAGNOSIS — M79.644 PAIN OF RIGHT THUMB: Primary | ICD-10-CM

## 2022-01-13 DIAGNOSIS — M79.644 PAIN OF RIGHT THUMB: ICD-10-CM

## 2022-01-13 PROCEDURE — 73140 X-RAY EXAM OF FINGER(S): CPT | Mod: RT | Performed by: RADIOLOGY

## 2022-01-13 PROCEDURE — 99214 OFFICE O/P EST MOD 30 MIN: CPT | Performed by: PHYSICIAN ASSISTANT

## 2022-01-13 ASSESSMENT — ENCOUNTER SYMPTOMS
PARESTHESIAS: 0
FEVER: 0
NUMBNESS: 0
ARTHRALGIAS: 1
WEAKNESS: 0
SHORTNESS OF BREATH: 0
NERVOUS/ANXIOUS: 0
COUGH: 0

## 2022-01-13 ASSESSMENT — MIFFLIN-ST. JEOR: SCORE: 1429.26

## 2022-01-13 ASSESSMENT — PAIN SCALES - GENERAL: PAINLEVEL: MODERATE PAIN (5)

## 2022-01-13 NOTE — PATIENT INSTRUCTIONS
Your x-rays do not show any worrisome findings.  Your symptoms are likely due to a sprain/strain of your thumb.  For treatment please use heat/ice and Tylenol/ibuprofen.  You can wear the provided thumb splint at night, but would like you to remove this during the day.  Avoid activities that cause discomfort.  Your symptoms should improve over the next 2-4 weeks.  Return to clinic for new or worsening symptoms.  Please send a [employer] message if your symptoms are not improving  within that timeframe and we can consider physical therapy.    Patient Education     Self-Care for Strains and Sprains  Most minor strains and sprains can be treated with self-care. Recovering from a strain or sprain may take 6 to 8 weeks. Your self-care goal is to reduce pain and immobilize the injury to speed healing.  Support the injured area  Wrapping the injured area provides support for short, necessary activities. Be careful not to wrap the area too tightly. This could cut off the blood supply.    Support a wrist, elbow, or shoulder with a sling.    Wrap an ankle or knee with an elastic bandage.    Tape a finger or toe to the one next to it.  Use cold and heat  Cold reduces swelling. Both cold and heat reduce pain. Heat should not be used in the initial treatment of the injury. When using cold or heat, always place a thin towel between the pack and your skin.    Apply ice or a cold pack 10 to 15 minutes every hour you re awake for the first 2 days.    After the swelling goes down, use cold or heat to control pain. Don t use heat late in the day, since it can cause swelling when you re not active.  Rest and elevate  Rest and elevation help your injury heal faster.    Raise the injured area above your heart level.    Keep the injured area from moving.    Limit the use of the joint or limb.  Use medicine    Aspirin reduces pain and swelling. (Note: Don t give aspirin to a child 18 or younger unless prescribed by the  doctor.)    Non-steroidal anti-inflammatory medicines, such as ibuprofen, may reduce pain and swelling, as well. Ask your healthcare provider for advice.    When to call your healthcare provider  Call your healthcare provider if:    The injured joint won t move, or bones make a grating sound when they move    You can t put weight on the injured area, even after 24 hours    The injured body part is cold, blue, tingling, or numb    The joint or limb appears bent or crooked.    Pain increases or doesn t improve in 4 days    When pressing along the injured area, you notice a spot that is especially painful  Melanie last reviewed this educational content on 5/1/2018 2000-2021 [employer]. All rights reserved. This information is not intended as a substitute for professional medical care. Always follow your healthcare professional's instructions.

## 2022-01-13 NOTE — PROGRESS NOTES
Assessment & Plan     Pain of right thumb  Patient is a 41-year-old female who presents to clinic due to pain of right thumb located to MCP joint with radiation to IP joint ongoing for the last 3 weeks without trauma.  Patient is neurovascularly intact.  Strength intact.  Vital signs normal.  X-rays negative for fracture or OA.  Symptoms are likely due to strain/sprain.  Recommended heat/ice, Tylenol/ibuprofen.  Thumb splint provided for use at night only to prevent further injury.  Discussed symptoms that warrant urgent/emergent follow-up as well as return precautions.  - XR Finger Right G/E 2 Views; Future    See Patient Instructions    Return in about 4 weeks (around 2/10/2022), or if symptoms worsen or fail to improve.    Aruna Reyes PA-C  [facility] VIN Perez is a 41 year old who presents for the following health issues     HPI     Thumb Pain    Onset: Pain began shortly before [place] without injury or trauma.  No erythema, ecchymosis, swelling.  Patient is left-handed.  No numbness, tingling, weakness.    Description:   Location: Right thumb-MCP joint with radiation to IP  Character: Cramping and aching     Intensity: moderate    Progression of Symptoms: same    Accompanying Signs & Symptoms:  Other symptoms: numbness and tingling    History:   Previous similar pain: no       Precipitating factors:   Trauma or overuse: no     Alleviating factors:  Improved by: She notes that pulling thumb outwards she feels a popping and then ain subsides somewhat    Therapies Tried and outcome: Ice, heat, gabapentin, oxycodone, fentanyl oatches          Review of Systems   Constitutional: Negative for fever.   Respiratory: Negative for cough and shortness of breath.    Cardiovascular: Negative for chest pain.   Musculoskeletal: Positive for arthralgias.   Skin: Negative for rash.   Neurological: Negative for weakness, numbness and paresthesias.   Psychiatric/Behavioral: The patient is  "not nervous/anxious.             Objective    /80   Pulse 64   Temp 97.6  F (36.4  C) (Tympanic)   Resp 16   Ht 1.575 m (5' 2\")   Wt 82.1 kg (181 lb)   LMP 07/23/2010 (LMP Unknown)   SpO2 98%   BMI 33.11 kg/m    Body mass index is 33.11 kg/m .  Physical Exam  Vitals and nursing note reviewed.   Constitutional:       General: She is not in acute distress.     Appearance: Normal appearance.   HENT:      Head: Normocephalic and atraumatic.   Eyes:      Extraocular Movements: Extraocular movements intact.      Pupils: Pupils are equal, round, and reactive to light.   Cardiovascular:      Rate and Rhythm: Normal rate and regular rhythm.   Pulmonary:      Effort: Pulmonary effort is normal.   Musculoskeletal:         General: Normal range of motion.      Cervical back: Normal range of motion.      Comments: Right thumb: Sensation intact.  No erythema, ecchymosis, swelling, rash.  Radial pulse 2+.  Normal range of motion of thumb and hand.  No tenderness to palpation over MCP or IP joints.  Strength: Thumb flexion/extension 5/5   Skin:     General: Skin is warm and dry.   Neurological:      General: No focal deficit present.      Mental Status: She is alert.   Psychiatric:         Mood and Affect: Mood normal.         Behavior: Behavior normal.            XR, right thumb, 3 views:  IMPRESSION: Normal joint spaces and alignment. No fracture.              "

## 2022-01-20 ENCOUNTER — TELEPHONE (OUTPATIENT)
Dept: FAMILY MEDICINE | Facility: CLINIC | Age: 42
End: 2022-01-20
Payer: COMMERCIAL

## 2022-01-20 DIAGNOSIS — G47.00 INSOMNIA, UNSPECIFIED TYPE: ICD-10-CM

## 2022-01-24 RX ORDER — TRAZODONE HYDROCHLORIDE 50 MG/1
TABLET, FILM COATED ORAL
Qty: 100 TABLET | Refills: 0 | OUTPATIENT
Start: 2022-01-24

## 2022-01-27 NOTE — PROGRESS NOTES
"  Assessment & Plan     1. Mixed incontinence    2. Thumb pain, right    3. Breast pain        1) Referral to urology for further eval/tx    2) Referral to physical therapy. If no improvements after 2-3 months will refer to ortho    3) Diagnostic mammo for further evaluation      Ordering of each unique test         BMI:   Estimated body mass index is 33.8 kg/m  as calculated from the following:    Height as of 1/13/22: 1.575 m (5' 2\").    Weight as of this encounter: 83.8 kg (184 lb 12.8 oz).       Return in about 2 months (around 3/31/2022) for your annual physical, a med check.    ANMOL Valadez [facility] VIN Perez is a 41 year old who presents for the following health issues     HPI     Genitourinary - Female  Onset/Duration: 6 months   Description:   Painful urination (Dysuria): no           Frequency: YES  Blood in urine (Hematuria): no  Delay in urine (Hesitency): YES  Intensity: mild  Progression of Symptoms:  worsening and intermittent  Accompanying Signs & Symptoms:  Fever/chills: no  Flank pain: YES  Nausea and vomiting: no  Vaginal symptoms: none  Abdominal/Pelvic Pain: YES  History:   History of frequent UTI s: no  History of kidney stones: no  Sexually Active: YES  Possibility of pregnancy: No  Precipitating or alleviating factors: None  Therapies tried and outcome: none         Pain History:  When did you first notice your pain? - 1 to 6 weeks   Have you seen any provider previously for this issue? No  How has your pain affected your ability to work? Yes, issues opening things   Where in your body do you have pain?   Musculoskeletal problem/pain  Onset/Duration: 1 month  Description  Location: thumb - right  Joint Swelling: no  Redness: no  Pain: YES  Warmth: no  Intensity:  7-8/10  Progression of Symptoms:  worsening and intermittent  Accompanying signs and symptoms:   Fevers: no  Numbness/tingling/weakness: YES  History  Trauma to the area: no  Recent " illness:  no  Previous similar problem: no  Previous evaluation:  YES- saw Aruna Reyes on 1/13/22  Precipitating or alleviating factors:  Aggravating factors include: overuse  Therapies tried and outcome: rest/inactivity, ice and immobilization      Breast Tenderness, bilateral x couple months  Breasts have increased in size, no nipple discharge or discoloration, no lumps     Follow up on abd/pelv CT  Needs follow up US order for abn CT - due mid March      Review of Systems   Constitutional, GI, , musculoskeletal, neuro, skin, breast systems are negative, except as otherwise noted.      Objective    /82   Pulse 85   Temp 97.5  F (36.4  C) (Oral)   Resp 20   Wt 83.8 kg (184 lb 12.8 oz)   LMP 07/23/2010 (LMP Unknown)   SpO2 99%   BMI 33.80 kg/m    Body mass index is 33.8 kg/m .  Physical Exam   GENERAL: healthy, alert and no distress  MS: no gross musculoskeletal defects noted, no edema  MS: mild TTP of the 1st MCP on right  SKIN: no suspicious lesions or rashes  NEURO: Normal strength and tone, mentation intact and speech normal      ----- Services Performed by a MEDICAL STUDENT in Presence of ATTENDING Physician-------

## 2022-01-31 ENCOUNTER — OFFICE VISIT (OUTPATIENT)
Dept: FAMILY MEDICINE | Facility: CLINIC | Age: 42
End: 2022-01-31
Payer: COMMERCIAL

## 2022-01-31 VITALS
RESPIRATION RATE: 20 BRPM | WEIGHT: 184.8 LBS | SYSTOLIC BLOOD PRESSURE: 122 MMHG | BODY MASS INDEX: 33.8 KG/M2 | OXYGEN SATURATION: 99 % | TEMPERATURE: 97.5 F | HEART RATE: 85 BPM | DIASTOLIC BLOOD PRESSURE: 82 MMHG

## 2022-01-31 DIAGNOSIS — N64.4 BREAST PAIN: ICD-10-CM

## 2022-01-31 DIAGNOSIS — M79.644 THUMB PAIN, RIGHT: ICD-10-CM

## 2022-01-31 DIAGNOSIS — N39.46 MIXED INCONTINENCE: Primary | ICD-10-CM

## 2022-01-31 PROCEDURE — 99214 OFFICE O/P EST MOD 30 MIN: CPT | Performed by: PHYSICIAN ASSISTANT

## 2022-01-31 ASSESSMENT — PATIENT HEALTH QUESTIONNAIRE - PHQ9
5. POOR APPETITE OR OVEREATING: NOT AT ALL
SUM OF ALL RESPONSES TO PHQ QUESTIONS 1-9: 6

## 2022-01-31 ASSESSMENT — ANXIETY QUESTIONNAIRES
3. WORRYING TOO MUCH ABOUT DIFFERENT THINGS: SEVERAL DAYS
6. BECOMING EASILY ANNOYED OR IRRITABLE: NOT AT ALL
5. BEING SO RESTLESS THAT IT IS HARD TO SIT STILL: SEVERAL DAYS
1. FEELING NERVOUS, ANXIOUS, OR ON EDGE: SEVERAL DAYS
2. NOT BEING ABLE TO STOP OR CONTROL WORRYING: SEVERAL DAYS
GAD7 TOTAL SCORE: 5
7. FEELING AFRAID AS IF SOMETHING AWFUL MIGHT HAPPEN: SEVERAL DAYS

## 2022-01-31 ASSESSMENT — ASTHMA QUESTIONNAIRES: ACT_TOTALSCORE: 24

## 2022-01-31 ASSESSMENT — PAIN SCALES - GENERAL: PAINLEVEL: SEVERE PAIN (6)

## 2022-02-01 DIAGNOSIS — J45.30 MILD PERSISTENT ASTHMA, UNCOMPLICATED: ICD-10-CM

## 2022-02-01 ASSESSMENT — ANXIETY QUESTIONNAIRES: GAD7 TOTAL SCORE: 5

## 2022-02-03 RX ORDER — MONTELUKAST SODIUM 10 MG/1
10 TABLET ORAL DAILY
Qty: 90 TABLET | Refills: 0 | Status: SHIPPED | OUTPATIENT
Start: 2022-02-03 | End: 2022-03-15

## 2022-02-14 DIAGNOSIS — G47.00 INSOMNIA, UNSPECIFIED TYPE: ICD-10-CM

## 2022-02-16 RX ORDER — TRAZODONE HYDROCHLORIDE 50 MG/1
TABLET, FILM COATED ORAL
Qty: 180 TABLET | Refills: 0 | OUTPATIENT
Start: 2022-02-16

## 2022-02-23 ENCOUNTER — HOSPITAL ENCOUNTER (OUTPATIENT)
Dept: ULTRASOUND IMAGING | Facility: CLINIC | Age: 42
End: 2022-02-23
Attending: PHYSICIAN ASSISTANT
Payer: COMMERCIAL

## 2022-02-23 ENCOUNTER — HOSPITAL ENCOUNTER (OUTPATIENT)
Dept: MAMMOGRAPHY | Facility: CLINIC | Age: 42
End: 2022-02-23
Attending: PHYSICIAN ASSISTANT
Payer: COMMERCIAL

## 2022-02-23 DIAGNOSIS — N64.4 BREAST PAIN: ICD-10-CM

## 2022-02-23 DIAGNOSIS — N83.202 CYST OF LEFT OVARY: ICD-10-CM

## 2022-02-23 PROCEDURE — 76856 US EXAM PELVIC COMPLETE: CPT

## 2022-02-23 PROCEDURE — 76642 ULTRASOUND BREAST LIMITED: CPT | Mod: 50

## 2022-02-23 PROCEDURE — 77062 BREAST TOMOSYNTHESIS BI: CPT

## 2022-03-09 ENCOUNTER — OFFICE VISIT (OUTPATIENT)
Dept: UROLOGY | Facility: CLINIC | Age: 42
End: 2022-03-09
Payer: COMMERCIAL

## 2022-03-09 VITALS
SYSTOLIC BLOOD PRESSURE: 131 MMHG | HEART RATE: 79 BPM | DIASTOLIC BLOOD PRESSURE: 86 MMHG | TEMPERATURE: 97.9 F | OXYGEN SATURATION: 100 %

## 2022-03-09 DIAGNOSIS — N39.46 MIXED INCONTINENCE: ICD-10-CM

## 2022-03-09 LAB
ALBUMIN UR-MCNC: NEGATIVE MG/DL
APPEARANCE UR: CLEAR
BACTERIA #/AREA URNS HPF: ABNORMAL /HPF
BILIRUB UR QL STRIP: NEGATIVE
COLOR UR AUTO: YELLOW
GLUCOSE UR STRIP-MCNC: NEGATIVE MG/DL
HGB UR QL STRIP: ABNORMAL
KETONES UR STRIP-MCNC: NEGATIVE MG/DL
LEUKOCYTE ESTERASE UR QL STRIP: NEGATIVE
NITRATE UR QL: NEGATIVE
PH UR STRIP: 6 [PH] (ref 5–7)
RBC #/AREA URNS AUTO: ABNORMAL /HPF
SP GR UR STRIP: 1 (ref 1–1.03)
SQUAMOUS #/AREA URNS AUTO: ABNORMAL /LPF
UROBILINOGEN UR STRIP-ACNC: 0.2 E.U./DL
WBC #/AREA URNS AUTO: ABNORMAL /HPF

## 2022-03-09 PROCEDURE — 81001 URINALYSIS AUTO W/SCOPE: CPT | Performed by: UROLOGY

## 2022-03-09 PROCEDURE — 99205 OFFICE O/P NEW HI 60 MIN: CPT | Mod: 25 | Performed by: UROLOGY

## 2022-03-09 PROCEDURE — 52000 CYSTOURETHROSCOPY: CPT | Performed by: UROLOGY

## 2022-03-09 NOTE — PATIENT INSTRUCTIONS
Patient Education   Resources to Help You Quit Smoking  If you have quit smoking or are thinking about quitting, congratulations! It can be hard to quit smoking, but the benefits are well worth it. To help you quit and stay smoke-free, there are many resources that can help.  Your health plan  If you have health insurance, call them for more details about their phone coaching programs.    [facility] [place] [facility]:  4-354-599-BLUE ([phone])    [employer]:  7-973-031-QUIT ([phone])    [facility]:  0-960-061-BLUE ([phone])    [facility]:  [phone]    [employer]:  [phone]    [facility] members:  [phone]    [facility]:   [phone]    [facility]:  [phone]    [facility]:  [phone]  Other resources  American Cancer Society: 1-376.598.1487  The American Cancer Society can help you find local resources to quit smoking.  [employer]IT[employer]AN: 7-393-872-PLAN (1-174.104.2242)  Offers a telephone helpline, gum, patches and lozenges. These services are free for the uninsured and those without coverage. The online program is free to everyone at www.quitplan.com.  American Lung Association: 7-666-LUNG-USA (1-245.471.8677)  Provides a lung helpline as well as an online program, self-help book and group clinic support for quitting smoking. www.lung.org/stop-smoking  National [employer] [place]:  8-228-302-QUIT (1-294.162.2878)  Offers a telephone hotline, online text chat and a website with tools, information and support for smokers who want to quit. www.smokefree.gov  Medication Therapy Management (MTM):  [phone] (toll free)  mtm@[place].org  This is a clinic program to help you quit smoking. It offers one-on-one sessions with a pharmacist. Call or email to find out if your insurance covers MTM and to schedule an appointment at all locations.  For informational purposes only.  Not to replace the advice of your health care provider. Copyright   2013 [employer]. All rights reserved. Clinically reviewed by Sana Ferris MD, [facility] Lung Cancer Screening Program. [place] [id number] - Rev 06/19.

## 2022-03-09 NOTE — PROGRESS NOTES
Ana Felix is a 41 year old female seen in consultation for incontinence. Consult from Radha Bermudez.    Pt reports one yr hx progressive mixed UI, stress > urge, wears 2-3 liners per day, no pad at nite.    Also concerned about leakage with intimacy and uncertain if she empties to completion.    Voids about q 90 minutes during the day and noc x 3 (variable amounts).    Drinks 4 sugar full-caf Dr Pepper per day, 2 Mentasta.     Smokes 1 PPD, usually when she is in the car.    Rare UTI's. Denies prior  eval, hx bladder surgery, use of bladder meds. Performs Kegel's, not sure if it's helping.    Has Chron's; rare episodes of FI.     Reviewed PMH in detail including Chrons, DM, lumbar disc dz, ovarian cyst, insomnia, anxiety        Past Medical History:   Diagnosis Date     Back pain      Crohn's disease (H)      Exercise-induced asthma      Gestational diabetes      Herniation of intervertebral disc of lumbar region      Infertility      Ovarian cyst      Smoker      Status post cholecystectomy 2005       Past Surgical History:   Procedure Laterality Date     APPENDECTOMY       C/SECTION, LOW TRANSVERSE      , Low Transverse     CHOLECYSTECTOMY, LAPOROSCOPIC  2005    Cholecystectomy, Laparoscopic     COLONOSCOPY       ESOPHAGOSCOPY, GASTROSCOPY, DUODENOSCOPY (EGD), COMBINED  3/6/2014    Procedure: COMBINED ESOPHAGOSCOPY, GASTROSCOPY, DUODENOSCOPY (EGD), BIOPSY SINGLE OR MULTIPLE;  COLONOSCOPY/ UPPER GI ENDOSCOPY/ COLON/ EGD/ Abdominal pain, epigastric/ [facility];  Surgeon: West Lomas MD;  Location:  OR      HYSTEROSCOPY, SURGICAL; W/ ENDOMETRIAL ABLATION, ANY METHOD  2010     HERNIORRHAPHY VENTRAL N/A 2018    Procedure: Open ventral hernia repair;  Surgeon: Alonso Bills MD;  Location: [facility]     HYSTERECTOMY, SUPRACERVICAL LAPAROSCOPIC       LEEP TX, CERVICAL      LEEP TX Cervical     ORTHOPEDIC SURGERY      bluged disk     partial small bowel resection       Ileo-colonic resection. Crohn's disease surgery for bowel obstruction. this was a section of small bowel and large bowel and the cecum., all removed and a reanastamosis done successfully     SALPINGO OOPHORECTOMY,R/L/TORI      Right ovary     TUBAL LIGATION         Social History     Socioeconomic History     Marital status:      Spouse name: Not on file     Number of children: Not on file     Years of education: Not on file     Highest education level: Not on file   Occupational History     Not on file   Tobacco Use     Smoking status: Current Every Day Smoker     Packs/day: 0.50     Types: Cigarettes     Smokeless tobacco: Never Used   Vaping Use     Vaping Use: Never used   Substance and Sexual Activity     Alcohol use: Yes     Alcohol/week: 0.0 standard drinks     Comment: rare     Drug use: No     Sexual activity: Yes     Partners: Male     Birth control/protection: Female Surgical     Comment: Hyst   Other Topics Concern     Parent/sibling w/ CABG, MI or angioplasty before 65F 55M? Not Asked   Social History Narrative     Not on file     Social Determinants of Health     Financial Resource Strain: Not on file   Food Insecurity: Not on file   Transportation Needs: Not on file   Physical Activity: Not on file   Stress: Not on file   Social Connections: Not on file   Intimate Partner Violence: Not on file   Housing Stability: Not on file       Current Outpatient Medications   Medication Sig Dispense Refill     albuterol (PROAIR HFA) 108 (90 Base) MCG/ACT inhaler Inhale 2 puffs into the lungs every 4 hours as needed for shortness of breath / dyspnea 18 g 3     azaTHIOprine (IMURAN) 50 MG tablet Take 50 mg by mouth daily        cyanocobalamin (VITAMIN B12) 1000 MCG/ML injection Inject 1 mL into the muscle every 30 days       fentaNYL (DURAGESIC) 12 mcg/hr patch 72 hr Place 1 patch onto the skin every 72 hours       fentaNYL (DURAGESIC) 25 mcg/hr patch 72 hr Place 1 patch onto the skin every 72 hours        gabapentin (NEURONTIN) 300 MG capsule Take 2 capsules (600 mg) by mouth 3 times daily 180 capsule 0     montelukast (SINGULAIR) 10 MG tablet Take 1 tablet (10 mg) by mouth daily +++NEED APPT+++ 90 tablet 0     oxyCODONE-acetaminophen (PERCOCET) 7.5-325 MG per tablet One po t.i.d./ P.R.N  USE DATES: 08/05/17-09/03/17 may fill on 8/2/17       sertraline (ZOLOFT) 100 MG tablet Take 1.5 tablets (150 mg) by mouth daily - dose increase 2/16/22 135 tablet 0     traZODone (DESYREL) 50 MG tablet TAKE 1 TO 3 TABLETS BY MOUTH AT BEDTIME-- NEED RECHECK. 180 tablet 0     valACYclovir (VALTREX) 1000 mg tablet Take 1 tablet (1,000 mg) by mouth 3 times daily for 7 days 21 tablet 0       Physical Exam:    GENL: NAD.    ABD: Soft, non-tender, no masses.    EG: Well-estrogenized, no masses.    VAGINA: Well-estrogenized, no masses.    BN HYPERMOBILITY: Moderate.    CYSTOCELE: Grade 1.    APICAL PROLAPSE: Minimal.    RECTOCELE: Minimal.    BIMANUAL: No mass or tenderness.    Cysto:    (Informed consent obtained. Pause for cause performed)   Sterile prep.    17 Fr scope inserted through urethra. Systematic examination w 70 degree lens.   PVR: 2 cc   MUCOSA: Normal without lesion   ORIFICES: Normal location and morphology   CAPACITY: 500 cc; no pain with filling   Scope withdrawn without untoward effect.    Valsalva:   Moderate hypermobility, moderate leakage noted, even with an empty bladder.    (Pt tolerated procedure without difficulty).      Results for orders placed or performed in visit on 03/09/22   UA reflex to Microscopic and Culture [[id number]]     Status: Abnormal    Specimen: Urine, Clean Catch   Result Value Ref Range    Color Urine Yellow Colorless, Straw, Light Yellow, Yellow    Appearance Urine Clear Clear    Glucose Urine Negative Negative mg/dL    Bilirubin Urine Negative Negative    Ketones Urine Negative Negative mg/dL    Specific Gravity Urine 1.005 1.003 - 1.035    Blood Urine Trace (A) Negative    pH Urine 6.0 5.0 -  7.0    Protein Albumin Urine Negative Negative mg/dL    Urobilinogen Urine 0.2 0.2, 1.0 E.U./dL    Nitrite Urine Negative Negative    Leukocyte Esterase Urine Negative Negative   Urine Microscopic     Status: Abnormal   Result Value Ref Range    Bacteria Urine Few (A) None Seen /HPF    RBC Urine 0-2 0-2 /HPF /HPF    WBC Urine 0-5 0-5 /HPF /HPF    Squamous Epithelials Urine Moderate (A) None Seen /LPF    Narrative    Urine Culture not indicated         IMP:  1. SKYE with hypermobility, bothersome; refractory to Kegel's  2. OAB wet; large soda/caffeine/Chron's  3. Tobacco  4. Other medical issues      PLAN:  1. Discussed situation with patient in detail.    2. ALTIS SLING DISCUSSION: We discussed the patients situation in detail including her specific anatomy and conditions. Diagrams are drawn.    We discussed the surgical treatment including Altis pubovaginal sling utilizing mesh material. We addressed the technical aspects of the procedure as well as the potential risks and complications incuding, but not limited to bleeding, infection, damage to organs or other injury. We discussed the recovery process and the potential effect on sexual function in detail. She also understands the alternative forms of therapy (including no therapy and treatment without mesh), and the potential need for additional therapy. We discussed the FDA report on the use of surgical mesh. All questions are answered in detail. Informed consent is obtained. Consent form signed. Handout given.    Pt elects to proceed in the near future    3. Consider moderation of soda, caffeine, tobacco    4. Total time spent in reviewing patient records, discussing history, performing exam, discussing diagnosis, outlining treatment plan and documentation: 60 minutes

## 2022-03-14 ENCOUNTER — TELEPHONE (OUTPATIENT)
Dept: UROLOGY | Facility: CLINIC | Age: 42
End: 2022-03-14
Payer: COMMERCIAL

## 2022-03-14 ENCOUNTER — PREP FOR PROCEDURE (OUTPATIENT)
Dept: UROLOGY | Facility: CLINIC | Age: 42
End: 2022-03-14
Payer: COMMERCIAL

## 2022-03-14 DIAGNOSIS — N39.3 SUI (STRESS URINARY INCONTINENCE, FEMALE): Primary | ICD-10-CM

## 2022-03-14 DIAGNOSIS — Z11.59 ENCOUNTER FOR SCREENING FOR OTHER VIRAL DISEASES: Primary | ICD-10-CM

## 2022-03-14 NOTE — PROGRESS NOTES
SUBJECTIVE:   CC: Ana Felix is an 41 year old woman who presents for preventive health visit.       Patient has been advised of split billing requirements and indicates understanding: Yes  Healthy Habits:     Getting at least 3 servings of Calcium per day:  Yes    Bi-annual eye exam:  NO    Dental care twice a year:  Yes    Sleep apnea or symptoms of sleep apnea:  None    Diet:  Other    Frequency of exercise:  2-3 days/week    Duration of exercise:  15-30 minutes    Taking medications regularly:  Yes    Medication side effects:  None    PHQ-2 Total Score: 0    Additional concerns today:  Yes      The 10-year ASCVD risk score (Manisha STEELE Jr., et al., 2013) is: 3.6%    Values used to calculate the score:      Age: 41 years      Sex: Female      Is Non- : No      Diabetic: No      Tobacco smoker: Yes      Systolic Blood Pressure: 114 mmHg      Is BP treated: No      HDL Cholesterol: 36 mg/dL      Total Cholesterol: 182 mg/dL     PROBLEMS TO ADD ON...  -------------------------------------    Dry skin patches, bilateral legs and groin area x 6-8 months, intermittent itching  Some light brown, some flesh colored  Some flat and some raised  2 warts on fingers    Needing refills and/or labs for:  Asthma  Update ACT    Depression/Anxiety  Update PHQ/DANIELLE    Additional medications:  traZODone (DESYREL) 50 MG tablet     Today's PHQ-2 Score:   PHQ-2 ( 1999 Pfizer) 3/15/2022   Q1: Little interest or pleasure in doing things 0   Q2: Feeling down, depressed or hopeless 0   PHQ-2 Score 0   PHQ-2 Total Score (12-17 Years)- Positive if 3 or more points; Administer PHQ-A if positive -   Q1: Little interest or pleasure in doing things Not at all   Q2: Feeling down, depressed or hopeless Not at all   PHQ-2 Score 0       Abuse: Current or Past (Physical, Sexual or Emotional) - No  Do you feel safe in your environment? Yes        Social History     Tobacco Use     Smoking status: Current Every Day Smoker      Packs/day: 0.50     Types: Cigarettes     Smokeless tobacco: Never Used   Substance Use Topics     Alcohol use: Yes     Alcohol/week: 0.0 standard drinks     Comment: rare         Alcohol Use 3/15/2022   Prescreen: >3 drinks/day or >7 drinks/week? No   Prescreen: >3 drinks/day or >7 drinks/week? -       Reviewed orders with patient.  Reviewed health maintenance and updated orders accordingly - Yes  Lab work is in process    Breast Cancer Screening:  Any new diagnosis of family breast, ovarian, or bowel cancer? No    FHS-7:   Breast CA Risk Assessment (FHS-7) 2/23/2022 3/15/2022   Did any of your first-degree relatives have breast or ovarian cancer? No No   Did any of your relatives have bilateral breast cancer? No Unknown   Did any man in your family have breast cancer? No No   Did any woman in your family have breast and ovarian cancer? No Yes   Did any woman in your family have breast cancer before age 50 y? No No   Do you have 2 or more relatives with breast and/or ovarian cancer? No No   Do you have 2 or more relatives with breast and/or bowel cancer? Yes Yes       Mammogram Screening - Offered annual screening and updated Health Maintenance for mutual plan based on risk factor consideration    Pertinent mammograms are reviewed under the imaging tab.    History of abnormal Pap smear: NO - age 30-65 PAP every 5 years with negative HPV co-testing recommended  PAP / HPV Latest Ref Rng & Units 3/7/2018 12/9/2015 10/9/2009   PAP (Historical) - NIL NIL NIL   HPV16 NEG:Negative Negative Negative -   HPV18 NEG:Negative Negative Negative -   HRHPV NEG:Negative Negative Negative -     Reviewed and updated as needed this visit by clinical staff   Tobacco  Allergies  Meds   Med Hx  Surg Hx  Fam Hx  Soc Hx        Reviewed and updated as needed this visit by Provider                 Past Medical History:   Diagnosis Date     Back pain      Crohn's disease (H)      Exercise-induced asthma      Gestational diabetes       "Herniation of intervertebral disc of lumbar region      Infertility      Ovarian cyst      Smoker      Status post cholecystectomy 04/27/2005        Review of Systems   Constitutional: Negative for chills and fever.   HENT: Negative for congestion, ear pain, hearing loss and sore throat.    Eyes: Negative for pain and visual disturbance.   Respiratory: Negative for cough and shortness of breath.    Cardiovascular: Negative for chest pain, palpitations and peripheral edema.   Gastrointestinal: Negative for abdominal pain, constipation, diarrhea, heartburn, hematochezia and nausea.   Breasts:  Positive for tenderness. Negative for breast mass and discharge.   Genitourinary: Positive for frequency and urgency. Negative for dysuria, genital sores, hematuria, pelvic pain, vaginal bleeding and vaginal discharge.   Musculoskeletal: Negative for arthralgias, joint swelling and myalgias.   Skin: Negative for rash.   Neurological: Negative for dizziness, weakness, headaches and paresthesias.   Psychiatric/Behavioral: Negative for mood changes. The patient is not nervous/anxious.         OBJECTIVE:   /74   Pulse 75   Temp 98.1  F (36.7  C) (Tympanic)   Resp 20   Ht 1.604 m (5' 3.15\")   Wt 82.3 kg (181 lb 6.4 oz)   LMP 07/23/2010 (LMP Unknown)   SpO2 98%   Breastfeeding No   BMI 31.98 kg/m    Physical Exam  GENERAL: healthy, alert and no distress  EYES: Eyes grossly normal to inspection, PERRL and conjunctivae and sclerae normal  HENT: ear canals and TM's normal, nose and mouth without ulcers or lesions  NECK: no adenopathy, no asymmetry, masses, or scars and thyroid normal to palpation  RESP: lungs clear to auscultation - no rales, rhonchi or wheezes  BREAST: normal without masses, tenderness or nipple discharge and no palpable axillary masses or adenopathy  CV: regular rates and rhythm, normal S1 S2, no S3 or S4 and no murmur, click or rub  ABDOMEN: soft, nontender, no hepatosplenomegaly, no masses and bowel " "sounds normal  MS: no gross musculoskeletal defects noted, no edema  SKIN: 2 common warts on fingers  NEURO: Normal strength and tone, mentation intact and speech normal  PSYCH: mentation appears normal, affect normal/bright      ASSESSMENT/PLAN:       ICD-10-CM    1. Routine general medical examination at a health care facility  Z00.00    2. Diabetes mellitus screening  Z13.1 Hemoglobin A1c   3. Crohn's disease of both small and large intestine without complication (H)  K50.80 Hepatic panel (Albumin, ALT, AST, Bili, Alk Phos, TP)     CBC with platelets and differential   4. Immunocompromised state (H)  D84.9    5. Mild persistent asthma, uncomplicated  J45.30 montelukast (SINGULAIR) 10 MG tablet   6. DANIELLE (generalized anxiety disorder)  F41.1 sertraline (ZOLOFT) 100 MG tablet   7. Insomnia, unspecified type  G47.00 traZODone (DESYREL) 50 MG tablet   8. Abnormal mammogram  R92.8 MA Diagnostic Digital Bilateral   9. Common wart  B07.8 DESTRUCT BENIGN LESION, UP TO 14       1,2) Screenings discussed    3,4) Labs released for her specialist. Follows GI    5) ACT at goal.    6,7) DANIELLE at goal. Meds renewed, no changes    8) Will have a 6 month follow up mammo in August.     9) Each wart was frozen easily x15-20s, two times with liquid nitrogen.  A total of 2 warts are treated today. Patient tolerated the procedure well and there were no complications. The etiology of common warts were discussed.  Ana will continue to use the over-the-counter medications such as Compound W. The patient is to return every three weeks until all warts have been removed.      COUNSELING:  Reviewed preventive health counseling, as reflected in patient instructions    Estimated body mass index is 31.98 kg/m  as calculated from the following:    Height as of this encounter: 1.604 m (5' 3.15\").    Weight as of this encounter: 82.3 kg (181 lb 6.4 oz).    She reports that she has been smoking cigarettes. She has been smoking about 0.50 packs per " day. She has never used smokeless tobacco.  Tobacco Cessation Action Plan      Counseling Resources:  ATP IV Guidelines  Pooled Cohorts Equation Calculator  Breast Cancer Risk Calculator  BRCA-Related Cancer Risk Assessment: FHS-7 Tool  FRAX Risk Assessment  ICSI Preventive Guidelines  Dietary Guidelines for Americans, 2010  USDA's MyPlate  ASA Prophylaxis  Lung CA Screening    Radha Bermudez PA-C  [facility]    Answers for HPI/ROS submitted by the patient on 3/15/2022  If you checked off any problems, how difficult have these problems made it for you to do your work, take care of things at home, or get along with other people?: Not difficult at all  PHQ9 TOTAL SCORE: 0  DANIELLE 7 TOTAL SCORE: 2

## 2022-03-14 NOTE — TELEPHONE ENCOUNTER
Type of surgery: pubovaginal sling with Altis (n/a)    CPT 45266   Mixed incontinence N39.46    Location of surgery: [facility]  Date and time of surgery: 4-4-22  TBD  Surgeon: Dr Stone  Pre-Op Appt Date: 3-23-22  Post-Op Appt Date: 5-4-22   Packet sent out: Yes  Pre-cert/Authorization completed:    Per [facility]:  [id number]  REPAIR BLADDER DEFECT  Not required for medical services  Date: 03/14/2022    Thank you,   Kaitlin Jeffery   Prior Authorization Department  [phone]

## 2022-03-14 NOTE — PATIENT INSTRUCTIONS
Ask your GI specialist if you need a second Covid booster (4th vaccine).    Wait 2-3 days before starting Compound W to make sure you don't blister.  Apply Compound W and leave it on x2 days. After 2 days wash the Compound W off, soak the wart in warm water, and try to remove the white, dead skin. This can be done with your finger nail, nail file, or a pumice stone.  Reapply Compound W after 24 hours and repeat.   Stop this regimen after 3.5 weeks.    Follow up for another liquid nitrogen treatment in 4 weeks.       Preventive Health Recommendations  Female Ages 40 to 49    Yearly exam:     See your health care provider every year in order to  1. Review health changes.   2. Discuss preventive care.    3. Review your medicines if your doctor prescribed any.      Get a Pap test every three years (unless you have an abnormal result and your provider advises testing more often).      If you get Pap tests with HPV test, you only need to test every 5 years, unless you have an abnormal result. You do not need a Pap test if your uterus was removed (hysterectomy) and you have not had cancer.      You should be tested each year for STDs (sexually transmitted diseases), if you're at risk.     Ask your doctor if you should have a mammogram.      Have a colonoscopy (test for colon cancer) if someone in your family has had colon cancer or polyps before age 50.       Have a cholesterol test every 5 years.       Have a diabetes test (fasting glucose) after age 45. If you are at risk for diabetes, you should have this test every 3 years.    Shots: Get a flu shot each year. Get a tetanus shot every 10 years.     Nutrition:     Eat at least 5 servings of fruits and vegetables each day.    Eat whole-grain bread, whole-wheat pasta and brown rice instead of white grains and rice.    Get adequate Calcium and Vitamin D.      Lifestyle    Exercise at least 150 minutes a week (an average of 30 minutes a day, 5 days a week). This will help  you control your weight and prevent disease.    Limit alcohol to one drink per day.    No smoking.     Wear sunscreen to prevent skin cancer.    See your dentist every six months for an exam and cleaning.

## 2022-03-14 NOTE — TELEPHONE ENCOUNTER
Reason for Call:  Other call back    Detailed comments: Patient is calling to schedule surgery for a sling, orders are needed. Thank you     Phone Number Patient can be reached at: Home number on file [phone] (home)    Best Time: any    Can we leave a detailed message on this number? YES    Call taken on 3/14/2022 at 7:55 AM by April Shearer

## 2022-03-15 ENCOUNTER — OFFICE VISIT (OUTPATIENT)
Dept: FAMILY MEDICINE | Facility: CLINIC | Age: 42
End: 2022-03-15
Payer: COMMERCIAL

## 2022-03-15 VITALS
RESPIRATION RATE: 20 BRPM | TEMPERATURE: 98.1 F | OXYGEN SATURATION: 98 % | HEART RATE: 75 BPM | WEIGHT: 181.4 LBS | HEIGHT: 63 IN | SYSTOLIC BLOOD PRESSURE: 114 MMHG | BODY MASS INDEX: 32.14 KG/M2 | DIASTOLIC BLOOD PRESSURE: 74 MMHG

## 2022-03-15 DIAGNOSIS — K50.80 CROHN'S DISEASE OF BOTH SMALL AND LARGE INTESTINE WITHOUT COMPLICATION (H): ICD-10-CM

## 2022-03-15 DIAGNOSIS — R92.8 ABNORMAL MAMMOGRAM: ICD-10-CM

## 2022-03-15 DIAGNOSIS — Z13.1 DIABETES MELLITUS SCREENING: ICD-10-CM

## 2022-03-15 DIAGNOSIS — G47.00 INSOMNIA, UNSPECIFIED TYPE: ICD-10-CM

## 2022-03-15 DIAGNOSIS — D84.9 IMMUNOCOMPROMISED STATE (H): ICD-10-CM

## 2022-03-15 DIAGNOSIS — B07.8 COMMON WART: ICD-10-CM

## 2022-03-15 DIAGNOSIS — J45.30 MILD PERSISTENT ASTHMA, UNCOMPLICATED: ICD-10-CM

## 2022-03-15 DIAGNOSIS — F41.1 GAD (GENERALIZED ANXIETY DISORDER): ICD-10-CM

## 2022-03-15 DIAGNOSIS — Z00.00 ROUTINE GENERAL MEDICAL EXAMINATION AT A HEALTH CARE FACILITY: Primary | ICD-10-CM

## 2022-03-15 LAB
ALBUMIN SERPL-MCNC: 3.6 G/DL (ref 3.4–5)
ALP SERPL-CCNC: 50 U/L (ref 40–150)
ALT SERPL W P-5'-P-CCNC: 20 U/L (ref 0–50)
AST SERPL W P-5'-P-CCNC: 14 U/L (ref 0–45)
BASOPHILS # BLD AUTO: 0 10E3/UL (ref 0–0.2)
BASOPHILS NFR BLD AUTO: 0 %
BILIRUB DIRECT SERPL-MCNC: 0.2 MG/DL (ref 0–0.2)
BILIRUB SERPL-MCNC: 0.9 MG/DL (ref 0.2–1.3)
EOSINOPHIL # BLD AUTO: 0.1 10E3/UL (ref 0–0.7)
EOSINOPHIL NFR BLD AUTO: 1 %
ERYTHROCYTE [DISTWIDTH] IN BLOOD BY AUTOMATED COUNT: 12.1 % (ref 10–15)
HBA1C MFR BLD: 5.3 % (ref 0–5.6)
HCT VFR BLD AUTO: 40.3 % (ref 35–47)
HGB BLD-MCNC: 13.7 G/DL (ref 11.7–15.7)
LYMPHOCYTES # BLD AUTO: 1.8 10E3/UL (ref 0.8–5.3)
LYMPHOCYTES NFR BLD AUTO: 24 %
MCH RBC QN AUTO: 34.4 PG (ref 26.5–33)
MCHC RBC AUTO-ENTMCNC: 34 G/DL (ref 31.5–36.5)
MCV RBC AUTO: 101 FL (ref 78–100)
MONOCYTES # BLD AUTO: 0.6 10E3/UL (ref 0–1.3)
MONOCYTES NFR BLD AUTO: 8 %
NEUTROPHILS # BLD AUTO: 5 10E3/UL (ref 1.6–8.3)
NEUTROPHILS NFR BLD AUTO: 67 %
PLATELET # BLD AUTO: 206 10E3/UL (ref 150–450)
PROT SERPL-MCNC: 7.8 G/DL (ref 6.8–8.8)
RBC # BLD AUTO: 3.98 10E6/UL (ref 3.8–5.2)
WBC # BLD AUTO: 7.4 10E3/UL (ref 4–11)

## 2022-03-15 PROCEDURE — 96127 BRIEF EMOTIONAL/BEHAV ASSMT: CPT | Mod: 59 | Performed by: PHYSICIAN ASSISTANT

## 2022-03-15 PROCEDURE — 85025 COMPLETE CBC W/AUTO DIFF WBC: CPT | Performed by: PHYSICIAN ASSISTANT

## 2022-03-15 PROCEDURE — 17110 DESTRUCTION B9 LES UP TO 14: CPT | Performed by: PHYSICIAN ASSISTANT

## 2022-03-15 PROCEDURE — 83036 HEMOGLOBIN GLYCOSYLATED A1C: CPT | Performed by: PHYSICIAN ASSISTANT

## 2022-03-15 PROCEDURE — 99214 OFFICE O/P EST MOD 30 MIN: CPT | Mod: 25 | Performed by: PHYSICIAN ASSISTANT

## 2022-03-15 PROCEDURE — 36415 COLL VENOUS BLD VENIPUNCTURE: CPT | Performed by: PHYSICIAN ASSISTANT

## 2022-03-15 PROCEDURE — 99396 PREV VISIT EST AGE 40-64: CPT | Mod: 25 | Performed by: PHYSICIAN ASSISTANT

## 2022-03-15 PROCEDURE — 80076 HEPATIC FUNCTION PANEL: CPT | Performed by: PHYSICIAN ASSISTANT

## 2022-03-15 RX ORDER — TRAZODONE HYDROCHLORIDE 50 MG/1
50-150 TABLET, FILM COATED ORAL AT BEDTIME
Qty: 180 TABLET | Refills: 3 | Status: SHIPPED | OUTPATIENT
Start: 2022-03-15 | End: 2022-10-21

## 2022-03-15 RX ORDER — MONTELUKAST SODIUM 10 MG/1
10 TABLET ORAL DAILY
Qty: 90 TABLET | Refills: 3 | Status: SHIPPED | OUTPATIENT
Start: 2022-03-15 | End: 2023-05-09

## 2022-03-15 RX ORDER — TRIAMCINOLONE ACETONIDE 1 MG/G
CREAM TOPICAL 2 TIMES DAILY
Qty: 45 G | Refills: 0 | Status: CANCELLED | OUTPATIENT
Start: 2022-03-15

## 2022-03-15 RX ORDER — SERTRALINE HYDROCHLORIDE 100 MG/1
150 TABLET, FILM COATED ORAL DAILY
Qty: 135 TABLET | Refills: 3 | Status: SHIPPED | OUTPATIENT
Start: 2022-03-15 | End: 2023-04-27

## 2022-03-15 ASSESSMENT — ANXIETY QUESTIONNAIRES
2. NOT BEING ABLE TO STOP OR CONTROL WORRYING: SEVERAL DAYS
GAD7 TOTAL SCORE: 2
1. FEELING NERVOUS, ANXIOUS, OR ON EDGE: NOT AT ALL
5. BEING SO RESTLESS THAT IT IS HARD TO SIT STILL: NOT AT ALL
7. FEELING AFRAID AS IF SOMETHING AWFUL MIGHT HAPPEN: NOT AT ALL
4. TROUBLE RELAXING: NOT AT ALL
3. WORRYING TOO MUCH ABOUT DIFFERENT THINGS: SEVERAL DAYS
6. BECOMING EASILY ANNOYED OR IRRITABLE: NOT AT ALL
GAD7 TOTAL SCORE: 2
GAD7 TOTAL SCORE: 2
7. FEELING AFRAID AS IF SOMETHING AWFUL MIGHT HAPPEN: NOT AT ALL

## 2022-03-15 ASSESSMENT — ENCOUNTER SYMPTOMS
CHILLS: 0
SORE THROAT: 0
FREQUENCY: 1
WEAKNESS: 0
DYSURIA: 0
PALPITATIONS: 0
BREAST MASS: 0
CONSTIPATION: 0
FEVER: 0
DIZZINESS: 0
MYALGIAS: 0
HEARTBURN: 0
DIARRHEA: 0
HEMATOCHEZIA: 0
HEMATURIA: 0
COUGH: 0
ARTHRALGIAS: 0
JOINT SWELLING: 0
PARESTHESIAS: 0
HEADACHES: 0
SHORTNESS OF BREATH: 0
EYE PAIN: 0
NERVOUS/ANXIOUS: 0
NAUSEA: 0
ABDOMINAL PAIN: 0

## 2022-03-15 ASSESSMENT — PATIENT HEALTH QUESTIONNAIRE - PHQ9
SUM OF ALL RESPONSES TO PHQ QUESTIONS 1-9: 0
10. IF YOU CHECKED OFF ANY PROBLEMS, HOW DIFFICULT HAVE THESE PROBLEMS MADE IT FOR YOU TO DO YOUR WORK, TAKE CARE OF THINGS AT HOME, OR GET ALONG WITH OTHER PEOPLE: NOT DIFFICULT AT ALL
SUM OF ALL RESPONSES TO PHQ QUESTIONS 1-9: 0

## 2022-03-15 ASSESSMENT — PAIN SCALES - GENERAL: PAINLEVEL: NO PAIN (0)

## 2022-03-15 ASSESSMENT — ASTHMA QUESTIONNAIRES: ACT_TOTALSCORE: 25

## 2022-03-15 NOTE — LETTER
My Asthma Action Plan    Name: Ana Felix   YOB: 1980  Date: 3/15/2022   My doctor: Radha Bermudez PA-C   My clinic: [facility]        My Control Medicine: Montelukast (Singulair) -  10 mg daily  My Rescue Medicine: Albuterol (Proair/Ventolin/Proventil HFA) 2-4 puffs EVERY 4 HOURS as needed. Use a spacer if recommended by your provider.   My Asthma Severity:   Mild Persistent  Know your asthma triggers: exercise or sports  pollens            GREEN ZONE   Good Control    I feel good    No cough or wheeze    Can work, sleep and play without asthma symptoms       Take your asthma control medicine every day.     1. If exercise triggers your asthma, take your rescue medication    15 minutes before exercise or sports, and    During exercise if you have asthma symptoms  2. Spacer to use with inhaler: If you have a spacer, make sure to use it with your inhaler             YELLOW ZONE Getting Worse  I have ANY of these:    I do not feel good    Cough or wheeze    Chest feels tight    Wake up at night   1. Keep taking your Green Zone medications  2. Start taking your rescue medicine:    every 20 minutes for up to 1 hour. Then every 4 hours for 24-48 hours.  3. If you stay in the Yellow Zone for more than 12-24 hours, contact your doctor.  4. If you do not return to the Green Zone in 12-24 hours or you get worse, start taking your oral steroid medicine if prescribed by your provider.           RED ZONE Medical Alert - Get Help  I have ANY of these:    I feel awful    Medicine is not helping    Breathing getting harder    Trouble walking or talking    Nose opens wide to breathe       1. Take your rescue medicine NOW  2. If your provider has prescribed an oral steroid medicine, start taking it NOW  3. Call your doctor NOW  4. If you are still in the Red Zone after 20 minutes and you have not reached your doctor:    Take your rescue medicine again and    Call 911 or go to the emergency  room right away    See your regular doctor within 2 weeks of an Emergency Room or Urgent Care visit for follow-up treatment.          Annual Reminders:  Meet with Asthma Educator,  Flu Shot in the Fall, consider Pneumonia Vaccination for patients with asthma (aged 19 and older).    Pharmacy: [facility] #[id number] [place] [id number] AT [address]    Electronically signed by Radha Bermudez PA-C   Date: 03/15/22                      Asthma Triggers  How To Control Things That Make Your Asthma Worse    Triggers are things that make your asthma worse.  Look at the list below to help you find your triggers and what you can do about them.  You can help prevent asthma flare-ups by staying away from your triggers.      Trigger                                                          What you can do   Cigarette Smoke  Tobacco smoke can make asthma worse. Do not allow smoking in your home, car or around you.  Be sure no one smokes at a child s day care or school.  If you smoke, ask your health care provider for ways to help you quit.  Ask family members to quit too.  Ask your health care provider for a referral to Quit Plan to help you quit smoking, or call 3-714-250-PLAN.     Colds, Flu, Bronchitis  These are common triggers of asthma. Wash your hands often.  Don t touch your eyes, nose or mouth.  Get a flu shot every year.     Dust Mites  These are tiny bugs that live in cloth or carpet. They are too small to see. Wash sheets and blankets in hot water every week.   Encase pillows and mattress in dust mite proof covers.  Avoid having carpet if you can. If you have carpet, vacuum weekly.   Use a dust mask and HEPA vacuum.   Pollen and Outdoor Mold  Some people are allergic to trees, grass, or weed pollen, or molds. Try to keep your windows closed.  Limit time out doors when pollen count is high.   Ask you health care provider about taking medicine during allergy season.     Animal  Dander  Some people are allergic to skin flakes, urine or saliva from pets with fur or feathers. Keep pets with fur or feathers out of your home.    If you can t keep the pet outdoors, then keep the pet out of your bedroom.  Keep the bedroom door closed.  Keep pets off cloth furniture and away from stuffed toys.     Mice, Rats, and Cockroaches   Some people are allergic to the waste from these pests.   Cover food and garbage.  Clean up spills and food crumbs.  Store grease in the refrigerator.   Keep food out of the bedroom.   Indoor Mold  This can be a trigger if your home has high moisture. Fix leaking faucets, pipes, or other sources of water.   Clean moldy surfaces.  Dehumidify basement if it is damp and smelly.   Smoke, Strong Odors, and Sprays  These can reduce air quality. Stay away from strong odors and sprays, such as perfume, powder, hair spray, paints, smoke incense, paint, cleaning products, candles and new carpet.   Exercise or Sports  Some people with asthma have this trigger. Be active!  Ask your doctor about taking medicine before sports or exercise to prevent symptoms.    Warm up for 5-10 minutes before and after sports or exercise.     Other Triggers of Asthma  Cold air:  Cover your nose and mouth with a scarf.  Sometimes laughing or crying can be a trigger.  Some medicines and food can trigger asthma.

## 2022-03-16 ASSESSMENT — ANXIETY QUESTIONNAIRES: GAD7 TOTAL SCORE: 2

## 2022-03-17 ENCOUNTER — TRANSFERRED RECORDS (OUTPATIENT)
Dept: HEALTH INFORMATION MANAGEMENT | Facility: CLINIC | Age: 42
End: 2022-03-17
Payer: COMMERCIAL

## 2022-03-23 ENCOUNTER — OFFICE VISIT (OUTPATIENT)
Dept: FAMILY MEDICINE | Facility: CLINIC | Age: 42
End: 2022-03-23
Payer: COMMERCIAL

## 2022-03-23 VITALS
TEMPERATURE: 98.2 F | WEIGHT: 180 LBS | HEART RATE: 86 BPM | RESPIRATION RATE: 14 BRPM | BODY MASS INDEX: 31.73 KG/M2 | OXYGEN SATURATION: 100 % | SYSTOLIC BLOOD PRESSURE: 121 MMHG | DIASTOLIC BLOOD PRESSURE: 80 MMHG

## 2022-03-23 DIAGNOSIS — Z01.818 PREOP GENERAL PHYSICAL EXAM: Primary | ICD-10-CM

## 2022-03-23 DIAGNOSIS — N39.3 FEMALE STRESS INCONTINENCE: ICD-10-CM

## 2022-03-23 PROCEDURE — 99214 OFFICE O/P EST MOD 30 MIN: CPT | Performed by: PHYSICIAN ASSISTANT

## 2022-03-23 ASSESSMENT — PAIN SCALES - GENERAL: PAINLEVEL: MILD PAIN (3)

## 2022-03-23 NOTE — PATIENT INSTRUCTIONS
Darvin Perez,    Thank you for allowing [facility] to manage your care.    If you have any questions or concerns, please feel free to call us at [phone]    Sincerely,    Henry Gonzalez PA-C    Did you know?      You can schedule a video visit for follow-up appointments as well as future appointments for certain conditions.  Please see the below link.     [email or web address]    If you have not already done so,  I encourage you to sign up for [email or web address] ([email or web address]).  This will allow you to review your results, securely communicate with a provider, and schedule virtual visits as well.    Preparing for Your Surgery  Getting started  A nurse will call you to review your health history and instructions. They will give you an arrival time based on your scheduled surgery time. Please be ready to share:    Your doctor's clinic name and phone number    Your medical, surgical and anesthesia history    A list of allergies and sensitivities    A list of medicines, including herbal treatments and over-the-counter drugs    Whether the patient has a legal guardian (ask how to send us the papers in advance)  Please tell us if you're pregnant--or if there's any chance you might be pregnant. Some surgeries may injure a fetus (unborn baby), so they require a pregnancy test. Surgeries that are safe for a fetus don't always need a test, and you can choose whether to have one.   If you have a child who's having surgery, please ask for a copy of Preparing for Your Child's Surgery.    Preparing for surgery    Within 30 days of surgery: Have a pre-op exam (sometimes called an H&P, or History and Physical). This can be done at a clinic or pre-operative center.  ? If you're having a , you may not need this exam. Talk to your care team.    At your pre-op exam, talk to your care team about all medicines you take. If you need to stop any medicines before surgery, ask  when to start taking them again.  ? We do this for your safety. Many medicines can make you bleed too much during surgery. Some change how well surgery (anesthesia) drugs work.    Call your insurance company to let them know you're having surgery. (If you don't have insurance, call [phone].)    Call your clinic if there's any change in your health. This includes signs of a cold or flu (sore throat, runny nose, cough, rash, fever). It also includes a scrape or scratch near the surgery site.    If you have questions on the day of surgery, call your hospital or surgery center.  COVID testing  You may need to be tested for COVID-19 before having surgery. If so, your surgical team will give you instructions for scheduling this test, separate from your preoperative history and physical.  Eating and drinking guidelines  For your safety: Unless your surgeon tells you otherwise, follow the guidelines below.    Eat and drink as usual until 8 hours before surgery. After that, no food or milk.    Drink clear liquids until 2 hours before surgery. These are liquids you can see through, like water, Gatorade and Propel Water. You may also have black coffee and tea (no cream or milk).    Nothing by mouth within 2 hours of surgery. This includes gum, candy and breath mints.    If you drink alcohol: Stop drinking it the night before surgery.    If your care team tells you to take medicine on the morning of surgery, it's okay to take it with a sip of water.  Preventing infection    Shower or bathe the night before and morning of your surgery. Follow the instructions your clinic gave you. (If no instructions, use regular soap.)    Don't shave or clip hair near your surgery site. We'll remove the hair if needed.    Don't smoke or vape the morning of surgery. You may chew nicotine gum up to 2 hours before surgery. A nicotine patch is okay.  ? Note: Some surgeries require you to completely quit smoking and nicotine. Check with your  surgeon.    Your care team will make every effort to keep you safe from infection. We will:  ? Clean our hands often with soap and water (or an alcohol-based hand rub).  ? Clean the skin at your surgery site with a special soap that kills germs.  ? Give you a special gown to keep you warm. (Cold raises the risk of infection.)  ? Wear special hair covers, masks, gowns and gloves during surgery.  ? Give antibiotic medicine, if prescribed. Not all surgeries need antibiotics.  What to bring on the day of surgery    Photo ID and insurance card    Copy of your health care directive, if you have one    Glasses and hearing aides (bring cases)  ? You can't wear contacts during surgery    Inhaler and eye drops, if you use them (tell us about these when you arrive)    CPAP machine or breathing device, if you use them    A few personal items, if spending the night    If you have . . .  ? A pacemaker, ICD (cardiac defibrillator) or other implant: Bring the ID card.  ? An implanted stimulator: Bring the remote control.  ? A legal guardian: Bring a copy of the certified (court-stamped) guardianship papers.  Please remove any jewelry, including body piercings. Leave jewelry and other valuables at home.  If you're going home the day of surgery    You must have a responsible adult drive you home. They should stay with you overnight as well.    If you don't have someone to stay with you, and you aren't safe to go home alone, we may keep you overnight. Insurance often won't pay for this.  After surgery  If it's hard to control your pain or you need more pain medicine, please call your surgeon's office.  Questions?   If you have any questions for your care team, list them here: _________________________________________________________________________________________________________________________________________________________________________ ____________________________________ ____________________________________  ____________________________________  For informational purposes only. Not to replace the advice of your health care provider. Copyright   2003, 2019 [facility]. All rights reserved. Clinically reviewed by Mony Low MD. [place] [id number] - REV 07/21.

## 2022-03-23 NOTE — H&P (VIEW-ONLY)
[facility]  [address]  [place] [address]  Phone: [phone]  Primary Provider: Radha Bermudez  Pre-op Performing Provider: CALLUM TORRES    PREOPERATIVE EVALUATION:  Today's date: 3/23/2022    Ana Felix is a 41 year old female who presents for a preoperative evaluation.    Surgical Information:  Surgery/Procedure: Pubovaginal sling with Altis  Surgery Location: [facility]  Surgeon: MT Stone  Surgery Date: 4/4/22  Time of Surgery: 8 am  Where patient plans to recover: At home with family  Fax number for surgical facility: Note does not need to be faxed, will be available electronically in Epic.    Type of Anesthesia Anticipated: General    Assessment & Plan     The proposed surgical procedure is considered INTERMEDIATE risk.    Problem List Items Addressed This Visit     None      Visit Diagnoses     Preop general physical exam    -  Primary    Female stress incontinence             Risks and Recommendations:  The patient has the following additional risks and recommendations for perioperative complications:  Social and Substance:    - High tolerance to opioid analgesics due to chronic opiate use   - Active nicotine user    Medication Instructions:   - Opioids: Continue without modification.   - pregabalin, gabapentin: Continue without modification.   - SSRIs, SNRIs, TCAs, Antipsychotics: Continue without modification.     RECOMMENDATION:  APPROVAL GIVEN to proceed with proposed procedure, without further diagnostic evaluation.    Subjective     HPI related to upcoming procedure: needs a sling for stress incontinence that she has had for years    Preop Questions 3/23/2022   1. Have you ever had a heart attack or stroke? No   2. Have you ever had surgery on your heart or blood vessels, such as a stent placement, a coronary artery bypass, or surgery on an artery in your head, neck, heart, or legs? No   3. Do you have chest pain with  activity? No   4. Do you have a history of  heart failure? No   5. Do you currently have a cold, bronchitis or symptoms of other infection? No   6. Do you have a cough, shortness of breath, or wheezing? No   7. Do you or anyone in your family have previous history of blood clots? No   8. Do you or does anyone in your family have a serious bleeding problem such as prolonged bleeding following surgeries or cuts? No   9. Have you ever had problems with anemia or been told to take iron pills? YES - 12-13 years ago    10. Have you had any abnormal blood loss such as black, tarry or bloody stools, or abnormal vaginal bleeding? No   11. Have you ever had a blood transfusion? No   12. Are you willing to have a blood transfusion if it is medically needed before, during, or after your surgery? Yes   13. Have you or any of your relatives ever had problems with anesthesia? No   14. Do you have sleep apnea, excessive snoring or daytime drowsiness? No   15. Do you have any artifical heart valves or other implanted medical devices like a pacemaker, defibrillator, or continuous glucose monitor? No   16. Do you have artificial joints? No   17. Are you allergic to latex? No   18. Is there any chance that you may be pregnant? No       Health Care Directive:  Patient does not have a Health Care Directive or Living Will: Discussed advance care planning with patient; however, patient declined at this time.    Preoperative Review of :   reviewed - controlled substances reflected in medication list.    Status of Chronic Conditions:  ANXIETY/DEPRESSION - Patient has a long history of Depression of moderate severity requiring medication for control with recent symptoms being stable. Current symptoms of depression include none.       Review of Systems  CONSTITUTIONAL: NEGATIVE for fever, chills, change in weight  INTEGUMENTARY/SKIN: NEGATIVE for worrisome rashes, moles or lesions  EYES: NEGATIVE for vision changes or  irritation  ENT/MOUTH: NEGATIVE for ear, mouth and throat problems  RESP: NEGATIVE for significant cough or SOB  CV: NEGATIVE for chest pain, palpitations or peripheral edema  GI: NEGATIVE for nausea, abdominal pain, heartburn, or change in bowel habits  : NEGATIVE for frequency, dysuria, or hematuria  MUSCULOSKELETAL: NEGATIVE for significant arthralgias or myalgia  NEURO: NEGATIVE for weakness, dizziness or paresthesias  ENDOCRINE: NEGATIVE for temperature intolerance, skin/hair changes  HEME: NEGATIVE for bleeding problems  PSYCHIATRIC: NEGATIVE for changes in mood or affect    Patient Active Problem List    Diagnosis Date Noted     DANIELLE (generalized anxiety disorder) 03/15/2022     Priority: Medium     Insomnia, unspecified type 03/15/2022     Priority: Medium     SKYE (stress urinary incontinence, female) 03/14/2022     Priority: Medium     Added automatically from request for surgery [id number]       Hidradenitis suppurativa 09/02/2021     Priority: Medium     Immunocompromised state (H) 07/09/2021     Priority: Medium     History of gestational diabetes mellitus (GDM) 08/15/2018     Priority: Medium     Amenorrhea 08/15/2018     Priority: Medium     Crohn's disease of both small and large intestine without complication (H) 07/31/2018     Priority: Medium     Pain medication agreement 08/18/2017     Priority: Medium     Overview:   [facility]       Controlled substance agreement signed 07/19/2017     Priority: Medium     Degenerative lumbar disc 03/07/2017     Priority: Medium     Labral tear of hip, degenerative 03/07/2017     Priority: Medium     Pain of right hip joint 03/07/2017     Priority: Medium     S/P LEEP of cervix 12/15/2015     Priority: Medium     S/p LEEP of cervix - date unknown  12/9/15: Pap - NIL, Neg HPV. Plan cotest in 1 year. If JAYME 2/3 on biopsy - PAP/HPV co-testing at 12, 24 months. If two negative results repeat co-testing in 3 years, if negative then routine screening.  3/7/18  Pap: NIL/neg HPV.             Hearing difficulty 10/09/2014     Priority: Medium     Irritable bowel syndrome (IBS) 04/15/2014     Priority: Medium     Tiffany raw vegetables       Lactose intolerance 04/15/2014     Priority: Medium     Abdominal pain, right upper quadrant 03/06/2014     Priority: Medium     Nausea with vomiting 01/31/2014     Priority: Medium     Chronic low back pain 11/05/2012     Priority: Medium     Follows with pain clinic.       Mild persistent asthma, uncomplicated 11/05/2012     Priority: Medium     Discogenic pain 10/08/2012     Priority: Medium     Abdominal pain 08/30/2012     Priority: Medium     S/P oophorectomy 07/20/2012     Priority: Medium     History of cholecystectomy 07/20/2012     Priority: Medium     History of resection of small bowel 07/20/2012     Priority: Medium     Tobacco abuse 05/25/2012     Priority: Medium     Displacement of lumbar intervertebral disc without myelopathy 01/06/2012     Priority: Medium     Disorder of sacrum 12/13/2011     Priority: Medium     Problem list name updated by automated process. Provider to review       Back pain 11/09/2011     Priority: Medium     Obesity 11/09/2011     Priority: Medium     Migraine headache 09/14/2011     Priority: Medium     Patient given Migraine Education folder and Migraine Action Plan on September 14, 2011. Cammy Anderson RN    (Problem list name updated by automated process. Provider to review and confirm.)       CARDIOVASCULAR SCREENING; LDL GOAL LESS THAN 160 10/31/2010     Priority: Medium     Dysmenorrhea 10/05/2010     Priority: Medium     Female pelvic pain 09/27/2010     Priority: Medium     (Problem list name updated by automated process. Provider to review and confirm.)       Crohns disease 11/20/2009     Priority: Medium      Past Medical History:   Diagnosis Date     Back pain      Crohn's disease (H)      Exercise-induced asthma      Gestational diabetes      Herniation of intervertebral disc of lumbar region       Infertility      Ovarian cyst      Smoker      Status post cholecystectomy 2005     Past Surgical History:   Procedure Laterality Date     APPENDECTOMY       C/SECTION, LOW TRANSVERSE      , Low Transverse     CHOLECYSTECTOMY, LAPOROSCOPIC  2005    Cholecystectomy, Laparoscopic     COLONOSCOPY       ESOPHAGOSCOPY, GASTROSCOPY, DUODENOSCOPY (EGD), COMBINED  3/6/2014    Procedure: COMBINED ESOPHAGOSCOPY, GASTROSCOPY, DUODENOSCOPY (EGD), BIOPSY SINGLE OR MULTIPLE;  COLONOSCOPY/ UPPER GI ENDOSCOPY/ COLON/ EGD/ Abdominal pain, epigastric/ [facility];  Surgeon: West Lomas MD;  Location: [facility] OR     HC HYSTEROSCOPY, SURGICAL; W/ ENDOMETRIAL ABLATION, ANY METHOD  2010     HERNIORRHAPHY VENTRAL N/A 2018    Procedure: Open ventral hernia repair;  Surgeon: Alonso Bills MD;  Location: [facility]     HYSTERECTOMY, SUPRACERVICAL LAPAROSCOPIC       LEEP TX, CERVICAL      LEEP TX Cervical     ORTHOPEDIC SURGERY      bluged disk     partial small bowel resection      Ileo-colonic resection. Crohn's disease surgery for bowel obstruction. this was a section of small bowel and large bowel and the cecum., all removed and a reanastamosis done successfully     SALPINGO OOPHORECTOMY,R/L/TORI      Right ovary     TUBAL LIGATION       Current Outpatient Medications   Medication Sig Dispense Refill     albuterol (PROAIR HFA) 108 (90 Base) MCG/ACT inhaler Inhale 2 puffs into the lungs every 4 hours as needed for shortness of breath / dyspnea 18 g 3     azaTHIOprine (IMURAN) 50 MG tablet Take 50 mg by mouth daily        cyanocobalamin (VITAMIN B12) 1000 MCG/ML injection Inject 1 mL into the muscle every 30 days       fentaNYL (DURAGESIC) 12 mcg/hr patch 72 hr Place 1 patch onto the skin every 72 hours       fentaNYL (DURAGESIC) 25 mcg/hr patch 72 hr Place 1 patch onto the skin every 72 hours       gabapentin (NEURONTIN) 300 MG capsule Take 2 capsules (600 mg) by mouth 3 times daily 180 capsule 0      "montelukast (SINGULAIR) 10 MG tablet Take 1 tablet (10 mg) by mouth daily 90 tablet 3     oxyCODONE-acetaminophen (PERCOCET) 7.5-325 MG per tablet One po t.i.d./ P.R.N  USE DATES: 08/05/17-09/03/17 may fill on 8/2/17       sertraline (ZOLOFT) 100 MG tablet Take 1.5 tablets (150 mg) by mouth daily - dose increase 2/16/22 135 tablet 3     traZODone (DESYREL) 50 MG tablet Take 1-3 tablets ( mg) by mouth At Bedtime 180 tablet 3       Allergies   Allergen Reactions     Infliximab Hives     Sulfa Drugs [Sulfa Drugs] Nausea     Pt states \"it doesn't work for me\"     Sulfacetamide Nausea     Augmentin Other (See Comments) and Nausea and Vomiting     Crohn's     Bupropion Hives and Nausea     Droperidol Other (See Comments)     Dystonic reaction     Ibuprofen Other (See Comments) and Nausea     Crohn's       Lyrica [Pregabalin] Nausea and Vomiting     Grogginess, severe back pain  Grogginess, severe back pain     Vicodin [Hydrocodone-Acetaminophen] Nausea and Vomiting        Social History     Tobacco Use     Smoking status: Current Every Day Smoker     Packs/day: 0.50     Types: Cigarettes     Smokeless tobacco: Never Used   Substance Use Topics     Alcohol use: Yes     Alcohol/week: 0.0 standard drinks     Comment: rare     Family History   Problem Relation Age of Onset     Cancer Mother         cervical     Lipids Mother      Eye Disorder Father      Lipids Father      Alcohol/Drug Maternal Grandmother      Colon Cancer Paternal Grandmother      Diabetes Paternal Grandmother      Eye Disorder Paternal Grandmother      Diabetes Paternal Grandfather      Eye Disorder Paternal Grandfather      Inflammatory Bowel Disease Paternal Aunt         and two paternal uncles     Breast Cancer No family hx of      History   Drug Use No         Objective     /80   Pulse 86   Temp 98.2  F (36.8  C) (Tympanic)   Resp 14   Wt 81.6 kg (180 lb)   LMP 07/23/2010 (LMP Unknown)   SpO2 100%   BMI 31.73 kg/m      Physical " Exam    GENERAL APPEARANCE: healthy, alert and no distress     EYES: EOMI, PERRL     HENT: nose and mouth without ulcers or lesions     NECK: no adenopathy, no asymmetry, masses, or scars and thyroid normal to palpation     RESP: lungs clear to auscultation - no rales, rhonchi or wheezes     CV: regular rates and rhythm, normal S1 S2, no S3 or S4 and no murmur, click or rub     ABDOMEN:  soft, nontender, no HSM or masses and bowel sounds normal     MS: extremities normal- no gross deformities noted, no evidence of inflammation in joints, FROM in all extremities.     SKIN: no suspicious lesions or rashes     NEURO: Normal strength and tone, sensory exam grossly normal, mentation intact and speech normal     PSYCH: mentation appears normal. and affect normal/bright     LYMPHATICS: No cervical adenopathy    Recent Labs   Lab Test 03/15/22  0749 07/05/21  1053 04/01/21  0713 01/08/21  1227 10/20/20  1000   HGB 13.7 13.6  --    < > 14.7    197  --    < > 265   NA  --   --   --   --  138   POTASSIUM  --   --   --   --  3.9   CR  --   --   --   --  0.84   A1C 5.3  --  5.2  --   --     < > = values in this interval not displayed.        Diagnostics:  No labs were ordered during this visit.  Recent Results (from the past 240 hour(s))   Hepatic panel (Albumin, ALT, AST, Bili, Alk Phos, TP)    Collection Time: 03/15/22  7:49 AM   Result Value Ref Range    Bilirubin Total 0.9 0.2 - 1.3 mg/dL    Bilirubin Direct 0.2 0.0 - 0.2 mg/dL    Protein Total 7.8 6.8 - 8.8 g/dL    Albumin 3.6 3.4 - 5.0 g/dL    Alkaline Phosphatase 50 40 - 150 U/L    AST 14 0 - 45 U/L    ALT 20 0 - 50 U/L   Hemoglobin A1c    Collection Time: 03/15/22  7:49 AM   Result Value Ref Range    Hemoglobin A1C 5.3 0.0 - 5.6 %   CBC with platelets and differential    Collection Time: 03/15/22  7:49 AM   Result Value Ref Range    WBC Count 7.4 4.0 - 11.0 10e3/uL    RBC Count 3.98 3.80 - 5.20 10e6/uL    Hemoglobin 13.7 11.7 - 15.7 g/dL    Hematocrit 40.3 35.0 -  47.0 %     (H) 78 - 100 fL    MCH 34.4 (H) 26.5 - 33.0 pg    MCHC 34.0 31.5 - 36.5 g/dL    RDW 12.1 10.0 - 15.0 %    Platelet Count 206 150 - 450 10e3/uL    % Neutrophils 67 %    % Lymphocytes 24 %    % Monocytes 8 %    % Eosinophils 1 %    % Basophils 0 %    Absolute Neutrophils 5.0 1.6 - 8.3 10e3/uL    Absolute Lymphocytes 1.8 0.8 - 5.3 10e3/uL    Absolute Monocytes 0.6 0.0 - 1.3 10e3/uL    Absolute Eosinophils 0.1 0.0 - 0.7 10e3/uL    Absolute Basophils 0.0 0.0 - 0.2 10e3/uL      No EKG required, no history of coronary heart disease, significant arrhythmia, peripheral arterial disease or other structural heart disease.    Revised Cardiac Risk Index (RCRI):  The patient has the following serious cardiovascular risks for perioperative complications:   - No serious cardiac risks = 0 points     RCRI Interpretation: 0 points: Class I (very low risk - 0.4% complication rate)    Signed Electronically by: TORSTEN Quinn  Copy of this evaluation report is provided to requesting physician.

## 2022-03-23 NOTE — PROGRESS NOTES
[facility]  [address] [facility]  [place] [address]  Phone: [phone]  Primary Provider: Radha Bermudez  Pre-op Performing Provider: CALLUM TORRES    PREOPERATIVE EVALUATION:  Today's date: 3/23/2022    Ana Felix is a 41 year old female who presents for a preoperative evaluation.    Surgical Information:  Surgery/Procedure: Pubovaginal sling with Altis  Surgery Location: [facility]  Surgeon: MT Stone  Surgery Date: 4/4/22  Time of Surgery: 8 am  Where patient plans to recover: At home with family  Fax number for surgical facility: Note does not need to be faxed, will be available electronically in Epic.    Type of Anesthesia Anticipated: General    Assessment & Plan     The proposed surgical procedure is considered INTERMEDIATE risk.    Problem List Items Addressed This Visit     None      Visit Diagnoses     Preop general physical exam    -  Primary    Female stress incontinence             Risks and Recommendations:  The patient has the following additional risks and recommendations for perioperative complications:  Social and Substance:    - High tolerance to opioid analgesics due to chronic opiate use   - Active nicotine user    Medication Instructions:   - Opioids: Continue without modification.   - pregabalin, gabapentin: Continue without modification.   - SSRIs, SNRIs, TCAs, Antipsychotics: Continue without modification.     RECOMMENDATION:  APPROVAL GIVEN to proceed with proposed procedure, without further diagnostic evaluation.    Subjective     HPI related to upcoming procedure: needs a sling for stress incontinence that she has had for years    Preop Questions 3/23/2022   1. Have you ever had a heart attack or stroke? No   2. Have you ever had surgery on your heart or blood vessels, such as a stent placement, a coronary artery bypass, or surgery on an artery in your head, neck, heart, or legs? No   3. Do you have chest pain with  activity? No   4. Do you have a history of  heart failure? No   5. Do you currently have a cold, bronchitis or symptoms of other infection? No   6. Do you have a cough, shortness of breath, or wheezing? No   7. Do you or anyone in your family have previous history of blood clots? No   8. Do you or does anyone in your family have a serious bleeding problem such as prolonged bleeding following surgeries or cuts? No   9. Have you ever had problems with anemia or been told to take iron pills? YES - 12-13 years ago    10. Have you had any abnormal blood loss such as black, tarry or bloody stools, or abnormal vaginal bleeding? No   11. Have you ever had a blood transfusion? No   12. Are you willing to have a blood transfusion if it is medically needed before, during, or after your surgery? Yes   13. Have you or any of your relatives ever had problems with anesthesia? No   14. Do you have sleep apnea, excessive snoring or daytime drowsiness? No   15. Do you have any artifical heart valves or other implanted medical devices like a pacemaker, defibrillator, or continuous glucose monitor? No   16. Do you have artificial joints? No   17. Are you allergic to latex? No   18. Is there any chance that you may be pregnant? No       Health Care Directive:  Patient does not have a Health Care Directive or Living Will: Discussed advance care planning with patient; however, patient declined at this time.    Preoperative Review of :   reviewed - controlled substances reflected in medication list.    Status of Chronic Conditions:  ANXIETY/DEPRESSION - Patient has a long history of Depression of moderate severity requiring medication for control with recent symptoms being stable. Current symptoms of depression include none.       Review of Systems  CONSTITUTIONAL: NEGATIVE for fever, chills, change in weight  INTEGUMENTARY/SKIN: NEGATIVE for worrisome rashes, moles or lesions  EYES: NEGATIVE for vision changes or  irritation  ENT/MOUTH: NEGATIVE for ear, mouth and throat problems  RESP: NEGATIVE for significant cough or SOB  CV: NEGATIVE for chest pain, palpitations or peripheral edema  GI: NEGATIVE for nausea, abdominal pain, heartburn, or change in bowel habits  : NEGATIVE for frequency, dysuria, or hematuria  MUSCULOSKELETAL: NEGATIVE for significant arthralgias or myalgia  NEURO: NEGATIVE for weakness, dizziness or paresthesias  ENDOCRINE: NEGATIVE for temperature intolerance, skin/hair changes  HEME: NEGATIVE for bleeding problems  PSYCHIATRIC: NEGATIVE for changes in mood or affect    Patient Active Problem List    Diagnosis Date Noted     DANIELLE (generalized anxiety disorder) 03/15/2022     Priority: Medium     Insomnia, unspecified type 03/15/2022     Priority: Medium     SKYE (stress urinary incontinence, female) 03/14/2022     Priority: Medium     Added automatically from request for surgery [id number]       Hidradenitis suppurativa 09/02/2021     Priority: Medium     Immunocompromised state (H) 07/09/2021     Priority: Medium     History of gestational diabetes mellitus (GDM) 08/15/2018     Priority: Medium     Amenorrhea 08/15/2018     Priority: Medium     Crohn's disease of both small and large intestine without complication (H) 07/31/2018     Priority: Medium     Pain medication agreement 08/18/2017     Priority: Medium     Overview:   [facility]       Controlled substance agreement signed 07/19/2017     Priority: Medium     Degenerative lumbar disc 03/07/2017     Priority: Medium     Labral tear of hip, degenerative 03/07/2017     Priority: Medium     Pain of right hip joint 03/07/2017     Priority: Medium     S/P LEEP of cervix 12/15/2015     Priority: Medium     S/p LEEP of cervix - date unknown  12/9/15: Pap - NIL, Neg HPV. Plan cotest in 1 year. If JAYME 2/3 on biopsy - PAP/HPV co-testing at 12, 24 months. If two negative results repeat co-testing in 3 years, if negative then routine screening.  3/7/18  Pap: NIL/neg HPV.             Hearing difficulty 10/09/2014     Priority: Medium     Irritable bowel syndrome (IBS) 04/15/2014     Priority: Medium     Tiffany raw vegetables       Lactose intolerance 04/15/2014     Priority: Medium     Abdominal pain, right upper quadrant 03/06/2014     Priority: Medium     Nausea with vomiting 01/31/2014     Priority: Medium     Chronic low back pain 11/05/2012     Priority: Medium     Follows with pain clinic.       Mild persistent asthma, uncomplicated 11/05/2012     Priority: Medium     Discogenic pain 10/08/2012     Priority: Medium     Abdominal pain 08/30/2012     Priority: Medium     S/P oophorectomy 07/20/2012     Priority: Medium     History of cholecystectomy 07/20/2012     Priority: Medium     History of resection of small bowel 07/20/2012     Priority: Medium     Tobacco abuse 05/25/2012     Priority: Medium     Displacement of lumbar intervertebral disc without myelopathy 01/06/2012     Priority: Medium     Disorder of sacrum 12/13/2011     Priority: Medium     Problem list name updated by automated process. Provider to review       Back pain 11/09/2011     Priority: Medium     Obesity 11/09/2011     Priority: Medium     Migraine headache 09/14/2011     Priority: Medium     Patient given Migraine Education folder and Migraine Action Plan on September 14, 2011. Cammy Anderson RN    (Problem list name updated by automated process. Provider to review and confirm.)       CARDIOVASCULAR SCREENING; LDL GOAL LESS THAN 160 10/31/2010     Priority: Medium     Dysmenorrhea 10/05/2010     Priority: Medium     Female pelvic pain 09/27/2010     Priority: Medium     (Problem list name updated by automated process. Provider to review and confirm.)       Crohns disease 11/20/2009     Priority: Medium      Past Medical History:   Diagnosis Date     Back pain      Crohn's disease (H)      Exercise-induced asthma      Gestational diabetes      Herniation of intervertebral disc of lumbar region       Infertility      Ovarian cyst      Smoker      Status post cholecystectomy 2005     Past Surgical History:   Procedure Laterality Date     APPENDECTOMY       C/SECTION, LOW TRANSVERSE      , Low Transverse     CHOLECYSTECTOMY, LAPOROSCOPIC  2005    Cholecystectomy, Laparoscopic     COLONOSCOPY       ESOPHAGOSCOPY, GASTROSCOPY, DUODENOSCOPY (EGD), COMBINED  3/6/2014    Procedure: COMBINED ESOPHAGOSCOPY, GASTROSCOPY, DUODENOSCOPY (EGD), BIOPSY SINGLE OR MULTIPLE;  COLONOSCOPY/ UPPER GI ENDOSCOPY/ COLON/ EGD/ Abdominal pain, epigastric/ [facility];  Surgeon: West Lomas MD;  Location: [facility] OR     HC HYSTEROSCOPY, SURGICAL; W/ ENDOMETRIAL ABLATION, ANY METHOD  2010     HERNIORRHAPHY VENTRAL N/A 2018    Procedure: Open ventral hernia repair;  Surgeon: Alonso Bills MD;  Location: [facility]     HYSTERECTOMY, SUPRACERVICAL LAPAROSCOPIC       LEEP TX, CERVICAL      LEEP TX Cervical     ORTHOPEDIC SURGERY      bluged disk     partial small bowel resection      Ileo-colonic resection. Crohn's disease surgery for bowel obstruction. this was a section of small bowel and large bowel and the cecum., all removed and a reanastamosis done successfully     SALPINGO OOPHORECTOMY,R/L/TORI      Right ovary     TUBAL LIGATION       Current Outpatient Medications   Medication Sig Dispense Refill     albuterol (PROAIR HFA) 108 (90 Base) MCG/ACT inhaler Inhale 2 puffs into the lungs every 4 hours as needed for shortness of breath / dyspnea 18 g 3     azaTHIOprine (IMURAN) 50 MG tablet Take 50 mg by mouth daily        cyanocobalamin (VITAMIN B12) 1000 MCG/ML injection Inject 1 mL into the muscle every 30 days       fentaNYL (DURAGESIC) 12 mcg/hr patch 72 hr Place 1 patch onto the skin every 72 hours       fentaNYL (DURAGESIC) 25 mcg/hr patch 72 hr Place 1 patch onto the skin every 72 hours       gabapentin (NEURONTIN) 300 MG capsule Take 2 capsules (600 mg) by mouth 3 times daily 180 capsule 0      "montelukast (SINGULAIR) 10 MG tablet Take 1 tablet (10 mg) by mouth daily 90 tablet 3     oxyCODONE-acetaminophen (PERCOCET) 7.5-325 MG per tablet One po t.i.d./ P.R.N  USE DATES: 08/05/17-09/03/17 may fill on 8/2/17       sertraline (ZOLOFT) 100 MG tablet Take 1.5 tablets (150 mg) by mouth daily - dose increase 2/16/22 135 tablet 3     traZODone (DESYREL) 50 MG tablet Take 1-3 tablets ( mg) by mouth At Bedtime 180 tablet 3       Allergies   Allergen Reactions     Infliximab Hives     Sulfa Drugs [Sulfa Drugs] Nausea     Pt states \"it doesn't work for me\"     Sulfacetamide Nausea     Augmentin Other (See Comments) and Nausea and Vomiting     Crohn's     Bupropion Hives and Nausea     Droperidol Other (See Comments)     Dystonic reaction     Ibuprofen Other (See Comments) and Nausea     Crohn's       Lyrica [Pregabalin] Nausea and Vomiting     Grogginess, severe back pain  Grogginess, severe back pain     Vicodin [Hydrocodone-Acetaminophen] Nausea and Vomiting        Social History     Tobacco Use     Smoking status: Current Every Day Smoker     Packs/day: 0.50     Types: Cigarettes     Smokeless tobacco: Never Used   Substance Use Topics     Alcohol use: Yes     Alcohol/week: 0.0 standard drinks     Comment: rare     Family History   Problem Relation Age of Onset     Cancer Mother         cervical     Lipids Mother      Eye Disorder Father      Lipids Father      Alcohol/Drug Maternal Grandmother      Colon Cancer Paternal Grandmother      Diabetes Paternal Grandmother      Eye Disorder Paternal Grandmother      Diabetes Paternal Grandfather      Eye Disorder Paternal Grandfather      Inflammatory Bowel Disease Paternal Aunt         and two paternal uncles     Breast Cancer No family hx of      History   Drug Use No         Objective     /80   Pulse 86   Temp 98.2  F (36.8  C) (Tympanic)   Resp 14   Wt 81.6 kg (180 lb)   LMP 07/23/2010 (LMP Unknown)   SpO2 100%   BMI 31.73 kg/m      Physical " Exam    GENERAL APPEARANCE: healthy, alert and no distress     EYES: EOMI, PERRL     HENT: nose and mouth without ulcers or lesions     NECK: no adenopathy, no asymmetry, masses, or scars and thyroid normal to palpation     RESP: lungs clear to auscultation - no rales, rhonchi or wheezes     CV: regular rates and rhythm, normal S1 S2, no S3 or S4 and no murmur, click or rub     ABDOMEN:  soft, nontender, no HSM or masses and bowel sounds normal     MS: extremities normal- no gross deformities noted, no evidence of inflammation in joints, FROM in all extremities.     SKIN: no suspicious lesions or rashes     NEURO: Normal strength and tone, sensory exam grossly normal, mentation intact and speech normal     PSYCH: mentation appears normal. and affect normal/bright     LYMPHATICS: No cervical adenopathy    Recent Labs   Lab Test 03/15/22  0749 07/05/21  1053 04/01/21  0713 01/08/21  1227 10/20/20  1000   HGB 13.7 13.6  --    < > 14.7    197  --    < > 265   NA  --   --   --   --  138   POTASSIUM  --   --   --   --  3.9   CR  --   --   --   --  0.84   A1C 5.3  --  5.2  --   --     < > = values in this interval not displayed.        Diagnostics:  No labs were ordered during this visit.  Recent Results (from the past 240 hour(s))   Hepatic panel (Albumin, ALT, AST, Bili, Alk Phos, TP)    Collection Time: 03/15/22  7:49 AM   Result Value Ref Range    Bilirubin Total 0.9 0.2 - 1.3 mg/dL    Bilirubin Direct 0.2 0.0 - 0.2 mg/dL    Protein Total 7.8 6.8 - 8.8 g/dL    Albumin 3.6 3.4 - 5.0 g/dL    Alkaline Phosphatase 50 40 - 150 U/L    AST 14 0 - 45 U/L    ALT 20 0 - 50 U/L   Hemoglobin A1c    Collection Time: 03/15/22  7:49 AM   Result Value Ref Range    Hemoglobin A1C 5.3 0.0 - 5.6 %   CBC with platelets and differential    Collection Time: 03/15/22  7:49 AM   Result Value Ref Range    WBC Count 7.4 4.0 - 11.0 10e3/uL    RBC Count 3.98 3.80 - 5.20 10e6/uL    Hemoglobin 13.7 11.7 - 15.7 g/dL    Hematocrit 40.3 35.0 -  47.0 %     (H) 78 - 100 fL    MCH 34.4 (H) 26.5 - 33.0 pg    MCHC 34.0 31.5 - 36.5 g/dL    RDW 12.1 10.0 - 15.0 %    Platelet Count 206 150 - 450 10e3/uL    % Neutrophils 67 %    % Lymphocytes 24 %    % Monocytes 8 %    % Eosinophils 1 %    % Basophils 0 %    Absolute Neutrophils 5.0 1.6 - 8.3 10e3/uL    Absolute Lymphocytes 1.8 0.8 - 5.3 10e3/uL    Absolute Monocytes 0.6 0.0 - 1.3 10e3/uL    Absolute Eosinophils 0.1 0.0 - 0.7 10e3/uL    Absolute Basophils 0.0 0.0 - 0.2 10e3/uL      No EKG required, no history of coronary heart disease, significant arrhythmia, peripheral arterial disease or other structural heart disease.    Revised Cardiac Risk Index (RCRI):  The patient has the following serious cardiovascular risks for perioperative complications:   - No serious cardiac risks = 0 points     RCRI Interpretation: 0 points: Class I (very low risk - 0.4% complication rate)    Signed Electronically by: TORSTEN Quinn  Copy of this evaluation report is provided to requesting physician.

## 2022-03-31 ENCOUNTER — LAB (OUTPATIENT)
Dept: LAB | Facility: CLINIC | Age: 42
End: 2022-03-31
Payer: COMMERCIAL

## 2022-03-31 DIAGNOSIS — Z11.59 ENCOUNTER FOR SCREENING FOR OTHER VIRAL DISEASES: ICD-10-CM

## 2022-03-31 PROCEDURE — U0005 INFEC AGEN DETEC AMPLI PROBE: HCPCS

## 2022-03-31 PROCEDURE — U0003 INFECTIOUS AGENT DETECTION BY NUCLEIC ACID (DNA OR RNA); SEVERE ACUTE RESPIRATORY SYNDROME CORONAVIRUS 2 (SARS-COV-2) (CORONAVIRUS DISEASE [COVID-19]), AMPLIFIED PROBE TECHNIQUE, MAKING USE OF HIGH THROUGHPUT TECHNOLOGIES AS DESCRIBED BY CMS-2020-01-R: HCPCS

## 2022-04-01 ENCOUNTER — ANESTHESIA EVENT (OUTPATIENT)
Dept: SURGERY | Facility: AMBULATORY SURGERY CENTER | Age: 42
End: 2022-04-01
Payer: COMMERCIAL

## 2022-04-01 LAB — SARS-COV-2 RNA RESP QL NAA+PROBE: NEGATIVE

## 2022-04-04 ENCOUNTER — HOSPITAL ENCOUNTER (OUTPATIENT)
Facility: AMBULATORY SURGERY CENTER | Age: 42
Discharge: HOME | End: 2022-04-04
Attending: UROLOGY | Admitting: UROLOGY
Payer: COMMERCIAL

## 2022-04-04 ENCOUNTER — ANESTHESIA (OUTPATIENT)
Dept: SURGERY | Facility: AMBULATORY SURGERY CENTER | Age: 42
End: 2022-04-04
Payer: COMMERCIAL

## 2022-04-04 VITALS
TEMPERATURE: 97.9 F | RESPIRATION RATE: 20 BRPM | SYSTOLIC BLOOD PRESSURE: 120 MMHG | HEART RATE: 66 BPM | DIASTOLIC BLOOD PRESSURE: 80 MMHG | OXYGEN SATURATION: 99 %

## 2022-04-04 DIAGNOSIS — N39.3 SUI (STRESS URINARY INCONTINENCE, FEMALE): ICD-10-CM

## 2022-04-04 PROCEDURE — 57288 REPAIR BLADDER DEFECT: CPT

## 2022-04-04 PROCEDURE — C1771 REP DEV, URINARY, W/SLING: HCPCS

## 2022-04-04 PROCEDURE — G8907 PT DOC NO EVENTS ON DISCHARG: HCPCS

## 2022-04-04 PROCEDURE — 57288 REPAIR BLADDER DEFECT: CPT | Performed by: UROLOGY

## 2022-04-04 PROCEDURE — G8916 PT W IV AB GIVEN ON TIME: HCPCS

## 2022-04-04 DEVICE — MESH SLING SINGLE INCISION ALTIS 519650: Type: IMPLANTABLE DEVICE | Site: VAGINA | Status: FUNCTIONAL

## 2022-04-04 RX ORDER — LABETALOL HYDROCHLORIDE 5 MG/ML
10 INJECTION, SOLUTION INTRAVENOUS
Status: DISCONTINUED | OUTPATIENT
Start: 2022-04-04 | End: 2022-04-05 | Stop reason: HOSPADM

## 2022-04-04 RX ORDER — CEFAZOLIN SODIUM 2 G/100ML
2 INJECTION, SOLUTION INTRAVENOUS SEE ADMIN INSTRUCTIONS
Status: DISCONTINUED | OUTPATIENT
Start: 2022-04-04 | End: 2022-04-05 | Stop reason: HOSPADM

## 2022-04-04 RX ORDER — MEPERIDINE HYDROCHLORIDE 25 MG/ML
12.5 INJECTION INTRAMUSCULAR; INTRAVENOUS; SUBCUTANEOUS
Status: DISCONTINUED | OUTPATIENT
Start: 2022-04-04 | End: 2022-04-05 | Stop reason: HOSPADM

## 2022-04-04 RX ORDER — ONDANSETRON 2 MG/ML
4 INJECTION INTRAMUSCULAR; INTRAVENOUS EVERY 30 MIN PRN
Status: DISCONTINUED | OUTPATIENT
Start: 2022-04-04 | End: 2022-04-05 | Stop reason: HOSPADM

## 2022-04-04 RX ORDER — LIDOCAINE 40 MG/G
CREAM TOPICAL
Status: DISCONTINUED | OUTPATIENT
Start: 2022-04-04 | End: 2022-04-05 | Stop reason: HOSPADM

## 2022-04-04 RX ORDER — LIDOCAINE HYDROCHLORIDE 20 MG/ML
INJECTION, SOLUTION INFILTRATION; PERINEURAL PRN
Status: DISCONTINUED | OUTPATIENT
Start: 2022-04-04 | End: 2022-04-04

## 2022-04-04 RX ORDER — FENTANYL CITRATE 50 UG/ML
25 INJECTION, SOLUTION INTRAMUSCULAR; INTRAVENOUS
Status: DISCONTINUED | OUTPATIENT
Start: 2022-04-04 | End: 2022-04-05 | Stop reason: HOSPADM

## 2022-04-04 RX ORDER — SODIUM CHLORIDE, SODIUM LACTATE, POTASSIUM CHLORIDE, CALCIUM CHLORIDE 600; 310; 30; 20 MG/100ML; MG/100ML; MG/100ML; MG/100ML
INJECTION, SOLUTION INTRAVENOUS CONTINUOUS
Status: DISCONTINUED | OUTPATIENT
Start: 2022-04-04 | End: 2022-04-05 | Stop reason: HOSPADM

## 2022-04-04 RX ORDER — DEXAMETHASONE SODIUM PHOSPHATE 4 MG/ML
INJECTION, SOLUTION INTRA-ARTICULAR; INTRALESIONAL; INTRAMUSCULAR; INTRAVENOUS; SOFT TISSUE PRN
Status: DISCONTINUED | OUTPATIENT
Start: 2022-04-04 | End: 2022-04-04

## 2022-04-04 RX ORDER — ONDANSETRON 4 MG/1
4 TABLET, ORALLY DISINTEGRATING ORAL EVERY 30 MIN PRN
Status: DISCONTINUED | OUTPATIENT
Start: 2022-04-04 | End: 2022-04-05 | Stop reason: HOSPADM

## 2022-04-04 RX ORDER — DIAZEPAM 10 MG/2ML
2.5 INJECTION, SOLUTION INTRAMUSCULAR; INTRAVENOUS
Status: DISCONTINUED | OUTPATIENT
Start: 2022-04-04 | End: 2022-04-05 | Stop reason: HOSPADM

## 2022-04-04 RX ORDER — ONDANSETRON 2 MG/ML
INJECTION INTRAMUSCULAR; INTRAVENOUS PRN
Status: DISCONTINUED | OUTPATIENT
Start: 2022-04-04 | End: 2022-04-04

## 2022-04-04 RX ORDER — FENTANYL CITRATE 50 UG/ML
INJECTION, SOLUTION INTRAMUSCULAR; INTRAVENOUS PRN
Status: DISCONTINUED | OUTPATIENT
Start: 2022-04-04 | End: 2022-04-04

## 2022-04-04 RX ORDER — OXYCODONE HYDROCHLORIDE 5 MG/1
5-10 TABLET ORAL EVERY 4 HOURS PRN
Qty: 6 TABLET | Refills: 0 | Status: SHIPPED | OUTPATIENT
Start: 2022-04-04 | End: 2022-10-13

## 2022-04-04 RX ORDER — ALBUTEROL SULFATE 0.83 MG/ML
2.5 SOLUTION RESPIRATORY (INHALATION) EVERY 4 HOURS PRN
Status: DISCONTINUED | OUTPATIENT
Start: 2022-04-04 | End: 2022-04-05 | Stop reason: HOSPADM

## 2022-04-04 RX ORDER — ACETAMINOPHEN 325 MG/1
975 TABLET ORAL ONCE
Status: COMPLETED | OUTPATIENT
Start: 2022-04-04 | End: 2022-04-04

## 2022-04-04 RX ORDER — HYDROCODONE BITARTRATE AND ACETAMINOPHEN 5; 325 MG/1; MG/1
1 TABLET ORAL ONCE
Status: DISCONTINUED | OUTPATIENT
Start: 2022-04-04 | End: 2022-04-05 | Stop reason: HOSPADM

## 2022-04-04 RX ORDER — OXYCODONE HYDROCHLORIDE 5 MG/1
5 TABLET ORAL EVERY 4 HOURS PRN
Status: DISCONTINUED | OUTPATIENT
Start: 2022-04-04 | End: 2022-04-05 | Stop reason: HOSPADM

## 2022-04-04 RX ORDER — PROPOFOL 10 MG/ML
INJECTION, EMULSION INTRAVENOUS PRN
Status: DISCONTINUED | OUTPATIENT
Start: 2022-04-04 | End: 2022-04-04

## 2022-04-04 RX ORDER — PROPOFOL 10 MG/ML
INJECTION, EMULSION INTRAVENOUS CONTINUOUS PRN
Status: DISCONTINUED | OUTPATIENT
Start: 2022-04-04 | End: 2022-04-04

## 2022-04-04 RX ORDER — CEFAZOLIN SODIUM 2 G/100ML
2 INJECTION, SOLUTION INTRAVENOUS
Status: COMPLETED | OUTPATIENT
Start: 2022-04-04 | End: 2022-04-04

## 2022-04-04 RX ORDER — FENTANYL CITRATE 50 UG/ML
25 INJECTION, SOLUTION INTRAMUSCULAR; INTRAVENOUS EVERY 5 MIN PRN
Status: DISCONTINUED | OUTPATIENT
Start: 2022-04-04 | End: 2022-04-05 | Stop reason: HOSPADM

## 2022-04-04 RX ADMIN — FENTANYL CITRATE 25 MCG: 50 INJECTION, SOLUTION INTRAMUSCULAR; INTRAVENOUS at 09:34

## 2022-04-04 RX ADMIN — PROPOFOL 50 MG: 10 INJECTION, EMULSION INTRAVENOUS at 08:30

## 2022-04-04 RX ADMIN — FENTANYL CITRATE 50 MCG: 50 INJECTION, SOLUTION INTRAMUSCULAR; INTRAVENOUS at 08:52

## 2022-04-04 RX ADMIN — FENTANYL CITRATE 50 MCG: 50 INJECTION, SOLUTION INTRAMUSCULAR; INTRAVENOUS at 08:19

## 2022-04-04 RX ADMIN — OXYCODONE HYDROCHLORIDE 5 MG: 5 TABLET ORAL at 09:20

## 2022-04-04 RX ADMIN — ACETAMINOPHEN 975 MG: 325 TABLET ORAL at 07:22

## 2022-04-04 RX ADMIN — PROPOFOL 100 MG: 10 INJECTION, EMULSION INTRAVENOUS at 08:52

## 2022-04-04 RX ADMIN — DEXAMETHASONE SODIUM PHOSPHATE 4 MG: 4 INJECTION, SOLUTION INTRA-ARTICULAR; INTRALESIONAL; INTRAMUSCULAR; INTRAVENOUS; SOFT TISSUE at 08:39

## 2022-04-04 RX ADMIN — PROPOFOL 150 MCG/KG/MIN: 10 INJECTION, EMULSION INTRAVENOUS at 08:30

## 2022-04-04 RX ADMIN — PROPOFOL 50 MG: 10 INJECTION, EMULSION INTRAVENOUS at 08:45

## 2022-04-04 RX ADMIN — ONDANSETRON 4 MG: 2 INJECTION INTRAMUSCULAR; INTRAVENOUS at 08:39

## 2022-04-04 RX ADMIN — SODIUM CHLORIDE, SODIUM LACTATE, POTASSIUM CHLORIDE, CALCIUM CHLORIDE: 600; 310; 30; 20 INJECTION, SOLUTION INTRAVENOUS at 08:19

## 2022-04-04 RX ADMIN — FENTANYL CITRATE 25 MCG: 50 INJECTION, SOLUTION INTRAMUSCULAR; INTRAVENOUS at 09:17

## 2022-04-04 RX ADMIN — FENTANYL CITRATE 25 MCG: 50 INJECTION, SOLUTION INTRAMUSCULAR; INTRAVENOUS at 09:50

## 2022-04-04 RX ADMIN — CEFAZOLIN SODIUM 2 G: 2 INJECTION, SOLUTION INTRAVENOUS at 08:19

## 2022-04-04 RX ADMIN — LIDOCAINE HYDROCHLORIDE 60 MG: 20 INJECTION, SOLUTION INFILTRATION; PERINEURAL at 08:30

## 2022-04-04 ASSESSMENT — LIFESTYLE VARIABLES: TOBACCO_USE: 1

## 2022-04-04 NOTE — OP NOTE
Procedure Date: 04/04/2022    SITE OF SURGERY: [facility].    PROCEDURE PERFORMED:  Pubovaginal sling (Altis).    PREOPERATIVE DIAGNOSIS:  Stress urinary incontinence with hypermobility.    POSTOPERATIVE DIAGNOSES:  Stress urinary incontinence with hypermobility.    ANESTHESIA:  MAC.    SURGEON:  Eleuterio Stone MD.    TOURNIQUET TIME:  25 mL.    SPECIMENS:  None.    COMPLICATIONS:  None.    INDICATIONS FOR SURGERY:  This is a 41-year-old white female who has undergone extensive outpatient evaluation for progressive mixed urinary incontinence, stress greater than urge.  Pertinent findings on evaluation include moderate hypermobility of the bladder neck and urethra with grade 1 anterior compartment defect.  Cystoscopic findings revealed normal bladder mucosa, capacity on the order of 500 mL, and moderate hypermobility with moderate leakage, even with an empty bladder on exam.  In light of these findings, Ms. Felix is offered pubovaginal sling using the Altis technique.  Informed consent is obtained and documented in the outpatient record.    SURGERY IN DETAIL: The patient was brought to the operating room and carefully positioned supine. General endotracheal anesthesia was administered without incident. Prophylactic antibiotics were administered. Pneumatic stockings were placed. She was then carefully positioned in low lithotomy, and a sterile prep and drape was performed. Surgical timeout was performed per protocol.    The vagina was prepared for surgery with a 20 German Putnam catheter and Hawley retractor. Laxity of support at the bladder neck and urethra were again appreciated. A marking pen was used to delineate the line of the proposed incision over the mid urethra. The subepithelial tissue was infiltrated with sterile saline. A #15-blade was used to make a longitudinal incision. A combination of sharp and blunt dissection was carried back to the ventral portion of the anterior ramus. This was  directed at approximately 2 and 10 o'clock.    At this point, the Altis sling was carefully loaded onto the right side passing instrument. The static anchor was then passed into the right obturator membrane at approximately the 10 o'clock position in an external rotational manner. The introducer was carefully withdrawn in a reverse helical fashion. Attention was performed to ensure adequate anchoring. The left side instrument was then carefully secured to the Dynamic anchor, which was carefully freed from the suture with gentle motion. This anchor was then carefully secured into the patient's left obturator membrane at approximately 2 o'clock, again using external rotation. At this point, tensioning was performed to ensure that the Altis sling lay appropriately on the urethra with appropriate tension. Once this was ascertained, the redundant portion of the tensioning suture was transected and removed. Copious antibiotic irrigation was used. Meticulous hemostasis was documented. Closure was performed with a running, locked stitch of 2-0 Vicryl. A vaginal packing soaked in antibiotic solution was temporarily placed. The Putnam catheter and retractor were withdrawn.    Cystoscopy was performed under real time video control with the 70-degree optic. There was no evidence of mesh or suture in the lower urinary tract. The bladder was drained and the scope was withdrawn. The patient tolerated the procedure in a satisfactory manner and was brought to the recovery in stable condition. There were no operative complications.    Eleuterio Stone MD        D: 2022   T: 2022   MT: [id number]    Name:     ISABELLE SALAS  MRN:      [id number]        Account:        [id number]   :      1980           Procedure Date: 2022     Document: [id number]

## 2022-04-04 NOTE — DISCHARGE INSTRUCTIONS
[facility]   Adult Discharge Orders & Instructions     For 24 hours after surgery    1. Get plenty of rest.  A responsible adult must stay with you for at least 24 hours after you leave the hospital.   2. Do not drive or use heavy equipment.  If you have weakness or tingling, don't drive or use heavy equipment until this feeling goes away.  3. Do not drink alcohol.  4. Avoid strenuous or risky activities.  Ask for help when climbing stairs.   5. You may feel lightheaded.  IF so, sit for a few minutes before standing.  Have someone help you get up.   6. If you have nausea (feel sick to your stomach): Drink only clear liquids such as apple juice, ginger ale, broth or 7-Up.  Rest may also help.  Be sure to drink enough fluids.  Move to a regular diet as you feel able.  7. You may have a slight fever. Call the doctor if your fever is over 100 F (37.7 C) (taken under the tongue) or lasts longer than 24 hours.  8. You may have a dry mouth, a sore throat, muscle aches or trouble sleeping.  These should go away after 24 hours.  9. Do not make important or legal decisions.     Call your doctor for any of the followin.  Signs of infection (fever, growing tenderness at the surgery site, a large amount of drainage or bleeding, severe pain, foul-smelling drainage, redness, swelling).    2. It has been over 8 to 10 hours since surgery and you are still not able to urinate (pass water).    3.  Headache for over 24 hours.    4.  Numbness, tingling or weakness the day after surgery (if you had spinal anesthesia).                  5. Signs of Covid-19 infection (temperature over 100 degrees, shortness of breath, cough, loss of taste/smell, generalized body aches, persistent headache,                  chills, sore throat, nausea/vomiting/diarrhea).     **Acetaminophen (Tylenol)  975mg taken at 9:20am.     **Oxycodone 5mg (pain pill) taken at 9:20am.         Post Operative Instructions for Pubovaginal  "Sling    Congratulations; you are on your way to improved bladder control! Here are a few things to remember as you recover from your surgery.    What your bladder might feel like:    You will likely have some discomfort at first. Many women address their discomfort with regular Tylenol as early as the first day after surgery. A heating pad, placed on the lower abdomen, can also be helpful for general soreness. You can use the prescribed narcotic if you're experiencing strong discomfort, but we strongly encourage you to take as few as absolutely necessary. Avoid the use of aspirin, Motrin, Advil and other over-the-counter drugs for at least one week after surgery as these can cause bleeding during this time period.     Some women report that their urine stream is slower than usual or sprays in a different direction than before; this is part of the normal healing process and will continue to improve with time.    You may feel the need to empty your bladder \"right now.\" You might also feel pelvic pressure when you exert yourself. These generally improve with time and healing.    Incision Care:    You may shower the day after surgery. Do not use baths, hot tubs, or swimming pools for one week.     The small incision in your vaginal is closed with dissolvable stitches. The stitches will fall out on their own in 4-6 weeks. It is normal to have an increased vaginal discharge as the vaginal tissue is healing. Some women also report a stronger vaginal odor during this time.     You may notice vaginal spotting for up to 6 weeks. It can be on/off or ongoing. If it is more than a small amount, call our clinic.     Activity and Restrictions:    Stay close to home for 24 hours after surgery. You may feel a bit weak and may tire a bit more easily. Listen to your body and rest as needed. You may walk about the house as needed and use stairs on a limited basis. Avoid long walks and exercise. It may be helpful to have someone at " thuy during the day to help with routine chores.    Two days after surgery we encourage return to work and gentle normal activity. Exercise should be avoided for one week.    Exercise:    We encourage return to exercise at the one-week point. Remember to start a bit easy and gradually work back up to your normal exercise program.    Intimacy:    Please avoid placing anyting in your vagina for four weeks. This includes tampons and intercourse. If you have been prescribed premarin cream, you may apply this to the external area with a finger.    Follow-up Appointment:    You should be scheduled for an appointment in 4 weeks. If you do no have this on your calendar, please contact our office. Please arrive with a full bladder as you will be asked to leave a urine sample. During the visit, you frankie be asked how things are going. A gentle pelvic exam will be performed. You will not have any testing done inside your bladder at this appointment.     What if I have questions?    Please contact our office directly if you have any questions.      To contact Dr Stone, please call [phone].

## 2022-04-04 NOTE — ANESTHESIA PREPROCEDURE EVALUATION
"Anesthesia Pre-Procedure Evaluation    Patient: Ana Felix   MRN: [id number] : 1980        Procedure : Procedure(s):  Pubovaginal sling with Altis          Past Medical History:   Diagnosis Date     Back pain      Crohn's disease (H)      Exercise-induced asthma      Gestational diabetes      Herniation of intervertebral disc of lumbar region      Infertility      Ovarian cyst      Smoker      Status post cholecystectomy 2005      Past Surgical History:   Procedure Laterality Date     APPENDECTOMY       C/SECTION, LOW TRANSVERSE      , Low Transverse     CHOLECYSTECTOMY, LAPOROSCOPIC  2005    Cholecystectomy, Laparoscopic     COLONOSCOPY       ESOPHAGOSCOPY, GASTROSCOPY, DUODENOSCOPY (EGD), COMBINED  3/6/2014    Procedure: COMBINED ESOPHAGOSCOPY, GASTROSCOPY, DUODENOSCOPY (EGD), BIOPSY SINGLE OR MULTIPLE;  COLONOSCOPY/ UPPER GI ENDOSCOPY/ COLON/ EGD/ Abdominal pain, epigastric/ [facility];  Surgeon: West Lomas MD;  Location: [facility]      HYSTEROSCOPY, SURGICAL; W/ ENDOMETRIAL ABLATION, ANY METHOD  2010     HERNIORRHAPHY VENTRAL N/A 2018    Procedure: Open ventral hernia repair;  Surgeon: Alonso Bills MD;  Location: [facility]     HYSTERECTOMY, SUPRACERVICAL LAPAROSCOPIC       LEEP TX, CERVICAL      LEEP TX Cervical     ORTHOPEDIC SURGERY      bluged disk     partial small bowel resection      Ileo-colonic resection. Crohn's disease surgery for bowel obstruction. this was a section of small bowel and large bowel and the cecum., all removed and a reanastamosis done successfully     SALPINGO OOPHORECTOMY,R/L/TORI      Right ovary     TUBAL LIGATION        Allergies   Allergen Reactions     Infliximab Hives     Sulfa Drugs [Sulfa Drugs] Nausea     Pt states \"it doesn't work for me\"     Sulfacetamide Nausea     Augmentin Other (See Comments) and Nausea and Vomiting     Crohn's     Bupropion Hives and Nausea     Droperidol Other (See Comments)     Dystonic reaction     " Ibuprofen Other (See Comments) and Nausea     Crohn's       Lyrica [Pregabalin] Nausea and Vomiting     Grogginess, severe back pain  Grogginess, severe back pain     Vicodin [Hydrocodone-Acetaminophen] Nausea and Vomiting      Social History     Tobacco Use     Smoking status: Current Every Day Smoker     Packs/day: 0.50     Types: Cigarettes     Smokeless tobacco: Never Used   Substance Use Topics     Alcohol use: Yes     Alcohol/week: 0.0 standard drinks     Comment: rare      Wt Readings from Last 1 Encounters:   03/23/22 81.6 kg (180 lb)        Anesthesia Evaluation            ROS/MED HX  ENT/Pulmonary:     (+) tobacco use, Current use, asthma     Neurologic:       Cardiovascular:       METS/Exercise Tolerance:     Hematologic:       Musculoskeletal:       GI/Hepatic:     (+) Inflammatory bowel disease,     Renal/Genitourinary:       Endo:       Psychiatric/Substance Use:       Infectious Disease:       Malignancy:       Other:            Physical Exam    Airway  airway exam normal      Mallampati: II   TM distance: > 3 FB   Neck ROM: full   Mouth opening: > 3 cm    Respiratory Devices and Support         Dental           Cardiovascular          Rhythm and rate: regular and normal     Pulmonary   pulmonary exam normal        breath sounds clear to auscultation           OUTSIDE LABS:  CBC:   Lab Results   Component Value Date    WBC 7.4 03/15/2022    WBC 9.1 07/05/2021    HGB 13.7 03/15/2022    HGB 13.6 07/05/2021    HCT 40.3 03/15/2022    HCT 41.3 07/05/2021     03/15/2022     07/05/2021     BMP:   Lab Results   Component Value Date     10/20/2020     08/15/2018    POTASSIUM 3.9 10/20/2020    POTASSIUM 4.2 08/15/2018    CHLORIDE 110 (H) 10/20/2020    CHLORIDE 107 08/15/2018    CO2 23 10/20/2020    CO2 25 08/15/2018    BUN 11 10/20/2020    BUN 12 08/15/2018    CR 0.84 10/20/2020    CR 0.79 08/15/2018     (H) 03/22/2021     (H) 10/20/2020     COAGS:   Lab Results    Component Value Date    PTT 31 03/17/2010    INR 0.99 03/17/2010     POC:   Lab Results   Component Value Date    HCG Negative 01/29/2014     HEPATIC:   Lab Results   Component Value Date    ALBUMIN 3.6 03/15/2022    PROTTOTAL 7.8 03/15/2022    ALT 20 03/15/2022    AST 14 03/15/2022    GGT 59 (H) 03/07/2014    ALKPHOS 50 03/15/2022    BILITOTAL 0.9 03/15/2022    BILIDIRECT 0.17 10/19/2011     OTHER:   Lab Results   Component Value Date    LACT 0.8 01/26/2017    A1C 5.3 03/15/2022    MAYCOL 9.6 10/20/2020    LIPASE 139 01/26/2017    AMYLASE 58 03/17/2010    TSH 2.87 08/15/2018    T4 1.02 08/15/2018    CRP 13.2 (H) 01/31/2014    SED 10 01/31/2014       Anesthesia Plan    ASA Status:  2   NPO Status:  NPO Appropriate    Anesthesia Type: MAC.     - Reason for MAC: immobility needed, straight local not clinically adequate   Induction: Intravenous.   Maintenance: TIVA.        Consents    Anesthesia Plan(s) and associated risks, benefits, and realistic alternatives discussed. Questions answered and patient/representative(s) expressed understanding.    - Discussed:     - Discussed with:  Patient      - Extended Intubation/Ventilatory Support Discussed: No.      - Patient is DNR/DNI Status: No    Use of blood products discussed: No .     Postoperative Care    Pain management: IV analgesics, Oral pain medications, Multi-modal analgesia.   PONV prophylaxis: Ondansetron (or other 5HT-3), Background Propofol Infusion     Comments:                Mino Norris MD

## 2022-04-04 NOTE — OR NURSING
Vaginal packing removed at 1030 in post op. Pt able to void spontaneously in BR 375mL clear urine with pinkish blood tinge. Post void residual with bladder scan was 65mL. Pt met criteria for discharge.

## 2022-04-04 NOTE — ANESTHESIA CARE TRANSFER NOTE
Patient: Ana Felix    Procedure: Procedure(s):  Pubovaginal sling with Altis       Diagnosis: SKYE (stress urinary incontinence, female) [N39.3]  Diagnosis Additional Information: No value filed.    Anesthesia Type:   MAC     Note:    Oropharynx: spontaneously breathing  Level of Consciousness: awake  Oxygen Supplementation: face mask  Level of Supplemental Oxygen (L/min / FiO2): 8 L/M  Independent Airway: airway patency satisfactory and stable  Dentition: dentition unchanged  Vital Signs Stable: post-procedure vital signs reviewed and stable  Report to RN Given: handoff report given  Patient transferred to: Phase II    Handoff Report: Identifed the Patient, Identified the Reponsible Provider, Reviewed the pertinent medical history, Discussed the surgical course, Reviewed Intra-OP anesthesia mangement and issues during anesthesia, Set expectations for post-procedure period and Allowed opportunity for questions and acknowledgement of understanding      Vitals:  Vitals Value Taken Time   /73 04/04/22 0915   Temp     Pulse 77 04/04/22 0915   Resp     SpO2 98 % 04/04/22 0916   Vitals shown include unvalidated device data.    Electronically Signed By: CARI Momin CRNA  April 4, 2022  9:17 AM

## 2022-04-04 NOTE — INTERVAL H&P NOTE
"I have reviewed the surgical (or preoperative) H&P that is linked to this encounter, and examined the patient. There are no significant changes    Clinical Conditions Present on Arrival:  Clinically Significant Risk Factors Present on Admission                    # Obesity: Estimated body mass index is 31.73 kg/m  as calculated from the following:    Height as of 3/15/22: 1.604 m (5' 3.15\").    Weight as of 3/23/22: 81.6 kg (180 lb).       "

## 2022-04-04 NOTE — ANESTHESIA POSTPROCEDURE EVALUATION
Patient: Ana Felix    Procedure: Procedure(s):  Pubovaginal sling with Altis       Anesthesia Type:  MAC    Note:  Disposition: Outpatient   Postop Pain Control: Uneventful            Sign Out: Well controlled pain   PONV: No   Neuro/Psych: Uneventful            Sign Out: Acceptable/Baseline neuro status   Airway/Respiratory: Uneventful            Sign Out: AIRWAY IN SITU/Resp. Support   CV/Hemodynamics: Uneventful            Sign Out: Acceptable CV status   Other NRE: NONE   DID A NON-ROUTINE EVENT OCCUR? No           Last vitals:  Vitals Value Taken Time   /80 04/04/22 1030   Temp 36.6  C (97.9  F) 04/04/22 1030   Pulse 66 04/04/22 1030   Resp 20 04/04/22 1030   SpO2 99 % 04/04/22 1030       Electronically Signed By: Mino Norris MD  April 4, 2022  3:13 PM

## 2022-04-07 ENCOUNTER — MYC MEDICAL ADVICE (OUTPATIENT)
Dept: UROLOGY | Facility: CLINIC | Age: 42
End: 2022-04-07
Payer: COMMERCIAL

## 2022-04-07 DIAGNOSIS — B37.0 THRUSH: Primary | ICD-10-CM

## 2022-04-07 RX ORDER — FLUCONAZOLE 100 MG/1
100 TABLET ORAL DAILY
Qty: 3 TABLET | Refills: 0 | Status: SHIPPED | OUTPATIENT
Start: 2022-04-07 | End: 2022-04-10

## 2022-04-07 NOTE — TELEPHONE ENCOUNTER
Diflucan 100mg x3 days ordered per Dr. Summers. Boxerhart message sent to patient to update her.    Kriss AMOR RN Urology 4/7/2022 1:33 PM

## 2022-05-04 ENCOUNTER — OFFICE VISIT (OUTPATIENT)
Dept: UROLOGY | Facility: CLINIC | Age: 42
End: 2022-05-04
Payer: COMMERCIAL

## 2022-05-04 VITALS
HEART RATE: 84 BPM | DIASTOLIC BLOOD PRESSURE: 81 MMHG | SYSTOLIC BLOOD PRESSURE: 123 MMHG | TEMPERATURE: 98.5 F | OXYGEN SATURATION: 98 %

## 2022-05-04 DIAGNOSIS — N39.3 SUI (STRESS URINARY INCONTINENCE, FEMALE): Primary | ICD-10-CM

## 2022-05-04 LAB
ALBUMIN UR-MCNC: NEGATIVE MG/DL
APPEARANCE UR: CLEAR
BACTERIA #/AREA URNS HPF: ABNORMAL /HPF
BILIRUB UR QL STRIP: NEGATIVE
COLOR UR AUTO: YELLOW
GLUCOSE UR STRIP-MCNC: NEGATIVE MG/DL
HGB UR QL STRIP: ABNORMAL
KETONES UR STRIP-MCNC: NEGATIVE MG/DL
LEUKOCYTE ESTERASE UR QL STRIP: ABNORMAL
NITRATE UR QL: NEGATIVE
PH UR STRIP: 5.5 [PH] (ref 5–7)
RBC #/AREA URNS AUTO: ABNORMAL /HPF
SP GR UR STRIP: 1.02 (ref 1–1.03)
UROBILINOGEN UR STRIP-ACNC: 0.2 E.U./DL
WBC #/AREA URNS AUTO: ABNORMAL /HPF

## 2022-05-04 PROCEDURE — 81001 URINALYSIS AUTO W/SCOPE: CPT | Performed by: UROLOGY

## 2022-05-04 PROCEDURE — 99024 POSTOP FOLLOW-UP VISIT: CPT | Performed by: UROLOGY

## 2022-05-04 NOTE — PATIENT INSTRUCTIONS
Patient Education   Resources to Help You Quit Smoking  If you have quit smoking or are thinking about quitting, congratulations! It can be hard to quit smoking, but the benefits are well worth it. To help you quit and stay smoke-free, there are many resources that can help.  Your health plan  If you have health insurance, call them for more details about their phone coaching programs.  [facility] [place] [facility]:  6-611-136-BLUE (1-740.787.3680)  [facility]:  4-639-518-QUIT ([phone])  [facility]:  4-392-276-BLUE ([phone])  [facility]:  [phone]  [facility]:  [phone]  [facility] members:  [phone]  [facility]:   [phone]  [facility]:  [phone]  [facility]:  [phone]  Other resources  American Cancer Society: 1-717.918.1767  The American Cancer Society can help you find local resources to quit smoking.  [employer]: 4-409-727-PLAN (1-142.452.5436)  Offers a telephone helpline, gum, patches and lozenges. These services are free for the uninsured and those without coverage. The online program is free to everyone at www.quitplan.com.  American Lung Association: 9-623-LUNG-USA (1-570.247.5419)  Provides a lung helpline as well as an online program, self-help book and group clinic support for quitting smoking. www.lung.org/stop-smoking  [employer]:  5-940-948-QUIT (1-697.208.4981)  Offers a telephone hotline, online text chat and a website with tools, information and support for smokers who want to quit. www.smokefree.gov  [facility] (MTM):  [phone] (toll free)  mtm@[place].org  This is a clinic program to help you quit smoking. It offers one-on-one sessions with a pharmacist. Call or email to find out if your insurance covers MTM and to schedule an appointment at all locations.  For informational purposes only. Not to replace the  advice of your health care provider. Copyright   2013 [employer] [facility]. All rights reserved. Clinically reviewed by Sana Ferris MD, [facility] Lung Cancer Screening Program. Kinetic 511190 - Rev 06/19.

## 2022-05-04 NOTE — PROGRESS NOTES
S/p Altis sling 4/4/22    Now completely dry and pad-free.     Also resolution of nocturia.    Denies dysuria, gross hematuria, frequency.    Current Outpatient Medications   Medication     albuterol (PROAIR HFA) 108 (90 Base) MCG/ACT inhaler     azaTHIOprine (IMURAN) 50 MG tablet     cyanocobalamin (VITAMIN B12) 1000 MCG/ML injection     fentaNYL (DURAGESIC) 12 mcg/hr patch 72 hr     fentaNYL (DURAGESIC) 25 mcg/hr patch 72 hr     gabapentin (NEURONTIN) 300 MG capsule     montelukast (SINGULAIR) 10 MG tablet     oxyCODONE (ROXICODONE) 5 MG tablet     oxyCODONE-acetaminophen (PERCOCET) 7.5-325 MG per tablet     sertraline (ZOLOFT) 100 MG tablet     traZODone (DESYREL) 50 MG tablet     Current Facility-Administered Medications   Medication     ropivacaine (NAROPIN) injection 3 mL     triamcinolone (KENALOG-40) injection 40 mg       PE: Excellent support, nontender      Results for orders placed or performed in visit on 05/04/22   UA reflex to Microscopic     Status: Abnormal   Result Value Ref Range    Color Urine Yellow Colorless, Straw, Light Yellow, Yellow    Appearance Urine Clear Clear    Glucose Urine Negative Negative mg/dL    Bilirubin Urine Negative Negative    Ketones Urine Negative Negative mg/dL    Specific Gravity Urine 1.025 1.003 - 1.035    Blood Urine Small (A) Negative    pH Urine 5.5 5.0 - 7.0    Protein Albumin Urine Negative Negative mg/dL    Urobilinogen Urine 0.2 0.2, 1.0 E.U./dL    Nitrite Urine Negative Negative    Leukocyte Esterase Urine Trace (A) Negative   Urine Microscopic Exam     Status: Abnormal   Result Value Ref Range    Bacteria Urine Few (A) None Seen /HPF    RBC Urine 0-2 0-2 /HPF /HPF    WBC Urine 0-5 0-5 /HPF /HPF       IMP:  1. Resolution of SKYE with sling      PLAN:  1. Continue regular activities  2. RTC only PRN

## 2022-07-10 ENCOUNTER — HOSPITAL ENCOUNTER (EMERGENCY)
Facility: CLINIC | Age: 42
Discharge: HOME | End: 2022-07-10
Attending: PHYSICIAN ASSISTANT | Admitting: PHYSICIAN ASSISTANT
Payer: COMMERCIAL

## 2022-07-10 VITALS
OXYGEN SATURATION: 99 % | SYSTOLIC BLOOD PRESSURE: 131 MMHG | WEIGHT: 175 LBS | TEMPERATURE: 98.6 F | DIASTOLIC BLOOD PRESSURE: 83 MMHG | BODY MASS INDEX: 32.2 KG/M2 | RESPIRATION RATE: 16 BRPM | HEIGHT: 62 IN | HEART RATE: 71 BPM
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
## 2022-07-10 PROCEDURE — 86618 LYME DISEASE ANTIBODY: CPT | Performed by: PHYSICIAN ASSISTANT

## 2022-07-10 PROCEDURE — 10120 INC&RMVL FB SUBQ TISS SMPL: CPT | Performed by: PHYSICIAN ASSISTANT

## 2022-07-10 PROCEDURE — 87015 SPECIMEN INFECT AGNT CONCNTJ: CPT | Performed by: PHYSICIAN ASSISTANT

## 2022-07-10 PROCEDURE — 99214 OFFICE O/P EST MOD 30 MIN: CPT | Mod: 25 | Performed by: PHYSICIAN ASSISTANT

## 2022-07-10 PROCEDURE — G0463 HOSPITAL OUTPT CLINIC VISIT: HCPCS | Mod: 25 | Performed by: PHYSICIAN ASSISTANT

## 2022-07-10 PROCEDURE — 250N000013 HC RX MED GY IP 250 OP 250 PS 637: Performed by: PHYSICIAN ASSISTANT

## 2022-07-10 PROCEDURE — 86666 EHRLICHIA ANTIBODY: CPT | Mod: 59 | Performed by: PHYSICIAN ASSISTANT

## 2022-07-10 PROCEDURE — 87798 DETECT AGENT NOS DNA AMP: CPT | Performed by: PHYSICIAN ASSISTANT

## 2022-07-10 PROCEDURE — 36415 COLL VENOUS BLD VENIPUNCTURE: CPT | Performed by: PHYSICIAN ASSISTANT

## 2022-07-10 RX ORDER — DOXYCYCLINE 100 MG/1
100 CAPSULE ORAL 2 TIMES DAILY
Qty: 42 CAPSULE | Refills: 0 | Status: SHIPPED | OUTPATIENT
Start: 2022-07-10 | End: 2022-07-10

## 2022-07-10 RX ORDER — DOXYCYCLINE 100 MG/1
100 CAPSULE ORAL ONCE
Status: COMPLETED | OUTPATIENT
Start: 2022-07-10 | End: 2022-07-10

## 2022-07-10 RX ORDER — DOXYCYCLINE 100 MG/1
100 CAPSULE ORAL 2 TIMES DAILY
Qty: 42 CAPSULE | Refills: 0 | Status: SHIPPED | OUTPATIENT
Start: 2022-07-10 | End: 2022-07-31

## 2022-07-10 RX ADMIN — DOXYCYCLINE HYCLATE 100 MG: 100 CAPSULE ORAL at 19:34

## 2022-07-10 ASSESSMENT — ENCOUNTER SYMPTOMS
CONSTITUTIONAL NEGATIVE: 1
WOUND: 1
MYALGIAS: 1
CARDIOVASCULAR NEGATIVE: 1
RESPIRATORY NEGATIVE: 1
HEADACHES: 1
ARTHRALGIAS: 1

## 2022-07-10 NOTE — ED TRIAGE NOTES
Tick noted on Monday or Tuesday to L flank area. Pt pulled tick off, is unsure if head came with.  Pt is now having leg pain, feet and calf pain..     Site is covered with a tatoo and is hard to visualize any redness.  Tender to palpate, scab at bite site noted.   Pt has not taken and medication for bite, denies fever or chills.  She has been having HA and lack of energy all week.

## 2022-07-11 LAB
ANAPLASMA BLD MOD GIEMSA: NEGATIVE
B BURGDOR IGG+IGM SER QL: 0.18
BABESIA SPEC: NEGATIVE
EHRLICHIA SPEC QL MICRO: NEGATIVE

## 2022-07-11 NOTE — ED PROVIDER NOTES
"  History     Chief Complaint   Patient presents with     Tick Bite     HPI  Ana Felix is a 41 year old female with a past medical history of Crohn's disease, migraine headaches, chronic low back pain and discogenic pain, mild persistent asthma (on pain medication agreement) who presents for evaluation of Lyme disease symptoms following a tick bite about 1 week ago.  The patient describes shopping and discovered a wood tick on her left flank.  She remove the wood tick on her own and is unsure whether there are any retained portions of the tick.  She is unaware of whether the tick was engorged.  Over the past few days she has developed worsening frontal headaches, and lower extremity muscle and joint aches.  She denies having Lyme disease in the past.  Denies fevers, neck pain, nausea or vomiting, or fatigue.  She is unsure of whether there is a rash at the bite location but she has noticed some itching.  No chest pain or shortness of breath, no difficulties with breathing or swallowing.    Allergies:  Allergies   Allergen Reactions     Infliximab Hives     Sulfa Drugs [Sulfa Drugs] Nausea     Pt states \"it doesn't work for me\"     Sulfacetamide Nausea     Augmentin Other (See Comments) and Nausea and Vomiting     Crohn's     Bupropion Hives and Nausea     Droperidol Other (See Comments)     Dystonic reaction     Ibuprofen Other (See Comments) and Nausea     Crohn's       Lyrica [Pregabalin] Nausea and Vomiting     Grogginess, severe back pain  Grogginess, severe back pain     Vicodin [Hydrocodone-Acetaminophen] Nausea and Vomiting       Problem List:    Patient Active Problem List    Diagnosis Date Noted     DANIELLE (generalized anxiety disorder) 03/15/2022     Priority: Medium     Insomnia, unspecified type 03/15/2022     Priority: Medium     SKYE (stress urinary incontinence, female) 03/14/2022     Priority: Medium     Added automatically from request for surgery [id number]       Hidradenitis suppurativa 09/02/2021     " Priority: Medium     Immunocompromised state (H) 07/09/2021     Priority: Medium     History of gestational diabetes mellitus (GDM) 08/15/2018     Priority: Medium     Amenorrhea 08/15/2018     Priority: Medium     Crohn's disease of both small and large intestine without complication (H) 07/31/2018     Priority: Medium     Pain medication agreement 08/18/2017     Priority: Medium     Overview:   [facility]       Controlled substance agreement signed 07/19/2017     Priority: Medium     Degenerative lumbar disc 03/07/2017     Priority: Medium     Labral tear of hip, degenerative 03/07/2017     Priority: Medium     Pain of right hip joint 03/07/2017     Priority: Medium     S/P LEEP of cervix 12/15/2015     Priority: Medium     S/p LEEP of cervix - date unknown  12/9/15: Pap - NIL, Neg HPV. Plan cotest in 1 year. If JAYME 2/3 on biopsy - PAP/HPV co-testing at 12, 24 months. If two negative results repeat co-testing in 3 years, if negative then routine screening.  3/7/18 Pap: NIL/neg HPV.             Hearing difficulty 10/09/2014     Priority: Medium     Irritable bowel syndrome (IBS) 04/15/2014     Priority: Medium     Tiffany raw vegetables       Lactose intolerance 04/15/2014     Priority: Medium     Abdominal pain, right upper quadrant 03/06/2014     Priority: Medium     Nausea with vomiting 01/31/2014     Priority: Medium     Chronic low back pain 11/05/2012     Priority: Medium     Follows with pain clinic.       Mild persistent asthma, uncomplicated 11/05/2012     Priority: Medium     Discogenic pain 10/08/2012     Priority: Medium     Abdominal pain 08/30/2012     Priority: Medium     S/P oophorectomy 07/20/2012     Priority: Medium     History of cholecystectomy 07/20/2012     Priority: Medium     History of resection of small bowel 07/20/2012     Priority: Medium     Tobacco abuse 05/25/2012     Priority: Medium     Displacement of lumbar intervertebral disc without myelopathy 01/06/2012     Priority:  Medium     Disorder of sacrum 2011     Priority: Medium     Problem list name updated by automated process. Provider to review       Back pain 2011     Priority: Medium     Obesity 2011     Priority: Medium     Migraine headache 2011     Priority: Medium     Patient given Migraine Education folder and Migraine Action Plan on 2011. aCmmy Anderson RN    (Problem list name updated by automated process. Provider to review and confirm.)       CARDIOVASCULAR SCREENING; LDL GOAL LESS THAN 160 10/31/2010     Priority: Medium     Dysmenorrhea 10/05/2010     Priority: Medium     Female pelvic pain 2010     Priority: Medium     (Problem list name updated by automated process. Provider to review and confirm.)       Crohns disease 2009     Priority: Medium        Past Medical History:    Past Medical History:   Diagnosis Date     Back pain      Crohn's disease (H)      Exercise-induced asthma      Gestational diabetes      Herniation of intervertebral disc of lumbar region      Infertility      Ovarian cyst      Smoker      Status post cholecystectomy 2005       Past Surgical History:    Past Surgical History:   Procedure Laterality Date     APPENDECTOMY       C/SECTION, LOW TRANSVERSE      , Low Transverse     CHOLECYSTECTOMY, LAPOROSCOPIC  2005    Cholecystectomy, Laparoscopic     COLONOSCOPY       ESOPHAGOSCOPY, GASTROSCOPY, DUODENOSCOPY (EGD), COMBINED  3/6/2014    Procedure: COMBINED ESOPHAGOSCOPY, GASTROSCOPY, DUODENOSCOPY (EGD), BIOPSY SINGLE OR MULTIPLE;  COLONOSCOPY/ UPPER GI ENDOSCOPY/ COLON/ EGD/ Abdominal pain, epigastric/ [facility];  Surgeon: West Lomas MD;  Location:  OR      HYSTEROSCOPY, SURGICAL; W/ ENDOMETRIAL ABLATION, ANY METHOD  2010     HERNIORRHAPHY VENTRAL N/A 2018    Procedure: Open ventral hernia repair;  Surgeon: Alonso Bills MD;  Location: [facility] OR     HYSTERECTOMY, SUPRACERVICAL LAPAROSCOPIC       LEEP TX,  CERVICAL      LEEP TX Cervical     ORTHOPEDIC SURGERY      bluged disk     partial small bowel resection  2002    Ileo-colonic resection. Crohn's disease surgery for bowel obstruction. this was a section of small bowel and large bowel and the cecum., all removed and a reanastamosis done successfully     SALPINGO OOPHORECTOMY,R/L/TORI      Right ovary     SLING TRANSVAGINAL N/A 4/4/2022    Procedure: Pubovaginal sling with Altis;  Surgeon: Eleuterio Stone MD;  Location: [facility] OR     TUBAL LIGATION         Family History:    Family History   Problem Relation Age of Onset     Cancer Mother         cervical     Lipids Mother      Eye Disorder Father      Lipids Father      Alcohol/Drug Maternal Grandmother      Colon Cancer Paternal Grandmother      Diabetes Paternal Grandmother      Eye Disorder Paternal Grandmother      Diabetes Paternal Grandfather      Eye Disorder Paternal Grandfather      Inflammatory Bowel Disease Paternal Aunt         and two paternal uncles     Breast Cancer No family hx of        Social History:  Marital Status:   [2]  Social History     Tobacco Use     Smoking status: Current Every Day Smoker     Packs/day: 0.50     Types: Cigarettes     Smokeless tobacco: Never Used   Vaping Use     Vaping Use: Never used   Substance Use Topics     Alcohol use: Yes     Alcohol/week: 0.0 standard drinks     Comment: rare     Drug use: No        Medications:    doxycycline hyclate (VIBRAMYCIN) 100 MG capsule  albuterol (PROAIR HFA) 108 (90 Base) MCG/ACT inhaler  azaTHIOprine (IMURAN) 50 MG tablet  cyanocobalamin (VITAMIN B12) 1000 MCG/ML injection  fentaNYL (DURAGESIC) 12 mcg/hr patch 72 hr  fentaNYL (DURAGESIC) 25 mcg/hr patch 72 hr  gabapentin (NEURONTIN) 300 MG capsule  montelukast (SINGULAIR) 10 MG tablet  oxyCODONE (ROXICODONE) 5 MG tablet  oxyCODONE-acetaminophen (PERCOCET) 7.5-325 MG per tablet  sertraline (ZOLOFT) 100 MG tablet  traZODone (DESYREL) 50 MG tablet          Review of Systems  "  Constitutional: Negative.    Respiratory: Negative.    Cardiovascular: Negative.    Musculoskeletal: Positive for arthralgias and myalgias.   Skin: Positive for wound.   Neurological: Positive for headaches.       Physical Exam   BP: 131/83  Pulse: 71  Temp: 98.6  F (37  C)  Resp: 16  Height: 157.5 cm (5' 2\")  Weight: 79.4 kg (175 lb)  SpO2: 99 %      Physical Exam  Constitutional:       General: She is not in acute distress.     Appearance: Normal appearance. She is not ill-appearing, toxic-appearing or diaphoretic.   Musculoskeletal:         General: No swelling or tenderness. Normal range of motion.   Skin:     General: Skin is warm and dry.      Capillary Refill: Capillary refill takes less than 2 seconds.      Findings: Erythema and lesion present. No rash.             Comments: Single lesion with a punctate center on the patient's left lower back.  There is a scab at the center of the lesion, no apparent foreign bodies.  Center of the lesion is indurated but nonpainful.  No drainage from the wound.   Neurological:      Mental Status: She is alert and oriented to person, place, and time.      Sensory: No sensory deficit.      Motor: No weakness.      Gait: Gait normal.         ED [facility]    Foreign Body Removal    Date/Time: 7/10/2022 7:21 PM  Performed by: Michael Arechiga PA-C  Authorized by: Michael Arechiga PA-C     Risks, benefits and alternatives discussed.      LOCATION     Location:  Trunk    Trunk location:  Lower back    Depth:  Subcutaneous    Tendon involvement:  None      PRE-PROCEDURE DETAILS     Imaging:  None    Neurovascular status: intact    ANESTHESIA (see MAR for exact dosages)     Anesthesia method:  None  PROCEDURE TYPE     Procedure complexity:  Simple      PROCEDURE DETAILS     Localization method:  Visualized    Dissection of underlying tissues: no      Removal mechanism:  Forceps    Foreign bodies recovered:  1    " Intact foreign body removal: yes      POST-PROCEDURE DETAILS     Neurovascular status: intact      Confirmation:  No additional foreign bodies on visualization    Skin closure:  None    Dressing:  Antibiotic ointment and non-adherent dressing    PROCEDURE  Describe Procedure: Remove a small object from the center of the wound, tick bite location.  Foreign body was most consistent with a scab, no apparent retained wood tick fragments                    No results found for this or any previous visit (from the past 24 hour(s)).    Medications   doxycycline hyclate (VIBRAMYCIN) capsule 100 mg (has no administration in time range)       Assessments & Plan (with Medical Decision Making)     Presentation and physical exam are consistent with early Lyme disease considering the patient's report of dull frontal headaches, body aches and joint aches.  She does have a lesion on the left lower back consistent with a tick bite with a small amount of surrounding erythema, no erythema migrans.    I feel her symptoms are consistent with early Lyme disease.  Starting her on doxycycline today. Doxycycline will cause your skin to be more sensitive to sunlight and increase your risk of sunburn.  Recommend using sunblock rated SPF 50 or greater if spending any length of time outside while taking doxycycline.    May apply bacitracin twice per day to the bite location to prevent infection.  May apply hydrocortisone 1% cream to the bite location for symptomatic relief of itching and inflammation    Testing for tickborne illness is currently pending.  If results are negative, recommend treating with doxycycline for 10 days.  If results are positive, recommend treating with doxycycline for 21 days.    Follow-up with PCP in 1 week if symptoms persist.  Recommend urgent medical evaluation if you develop worsening headache, facial palsy, facial numbness/tingling in the extremities, neck pain, increasing back pain or soreness, nausea/vomiting,  worsening joint or body aches.    Recommend taking a probiotic at the same time you are on antibiotic. Probiotic supports and maintains digestive health while taking antibiotic.    Patient verbalized understanding and agrees with the above plan.  Discharged in stable condition.  I have reviewed the nursing notes.    I have reviewed the findings, diagnosis, plan and need for follow up with the patient.      Current Discharge Medication List      START taking these medications    Details   doxycycline hyclate (VIBRAMYCIN) 100 MG capsule Take 1 capsule (100 mg) by mouth 2 times daily for 21 days  Qty: 42 capsule, Refills: 0             Final diagnoses:   Tick bite       7/10/2022   [facility] EMERGENCY DEPT     Michael Arechiga PA-C  07/10/22 1929

## 2022-07-13 LAB
A PHAGOCYTOPH DNA BLD QL NAA+PROBE: NOT DETECTED
B MICROTI DNA BLD QL NAA+PROBE: NOT DETECTED
BABESIA DNA BLD QL NAA+PROBE: NOT DETECTED
E CHAFFEENSIS DNA BLD QL NAA+PROBE: NOT DETECTED
E EWINGII DNA SPEC QL NAA+PROBE: NOT DETECTED
EHRLICHIA DNA SPEC QL NAA+PROBE: NOT DETECTED

## 2022-07-13 NOTE — RESULT ENCOUNTER NOTE
Final result for Ehrlichia Anaplasma sp by PCR is NEGATIVE for A. phagocytophilium, E. Chaffeenis, E. ewingii/canis and E. muris-like.  No change in treatment per [facility] ED lab result Anaplasmosis/Ehrlichiosis protocol.

## 2022-07-13 NOTE — RESULT ENCOUNTER NOTE
Final result for Babesia Species by PCR is [NEGATIVE] and Babesia Microtic by PCR is [NEGATIVE].  No change in treatment per [facility] Lab Result Babesia protocol.

## 2022-07-14 LAB
A PHAGOCYTOPH IGG TITR SER IF: NORMAL {TITER}
A PHAGOCYTOPH IGM TITR SER IF: NORMAL {TITER}

## 2022-07-14 NOTE — RESULT ENCOUNTER NOTE
A Phagocytophil IgG of <1:80 and IgM of <1:16 is a NEGATIVE result.    No treatment or change in treatment per [facility] ED Lab Result Anaplasmosis/Ehrlichiosis protocol.

## 2022-07-21 ENCOUNTER — E-VISIT (OUTPATIENT)
Dept: FAMILY MEDICINE | Facility: CLINIC | Age: 42
End: 2022-07-21
Payer: COMMERCIAL

## 2022-07-21 DIAGNOSIS — B37.31 CANDIDIASIS OF VAGINA: Primary | ICD-10-CM

## 2022-07-21 PROCEDURE — 99421 OL DIG E/M SVC 5-10 MIN: CPT | Performed by: PHYSICIAN ASSISTANT

## 2022-07-21 RX ORDER — FLUCONAZOLE 150 MG/1
150 TABLET ORAL ONCE
Qty: 1 TABLET | Refills: 0 | Status: SHIPPED | OUTPATIENT
Start: 2022-07-21 | End: 2022-07-21

## 2022-09-13 ENCOUNTER — LAB (OUTPATIENT)
Dept: LAB | Facility: CLINIC | Age: 42
End: 2022-09-13
Payer: COMMERCIAL

## 2022-09-13 DIAGNOSIS — K50.80 CROHN'S DISEASE OF SMALL AND LARGE INTESTINES (H): ICD-10-CM

## 2022-09-13 DIAGNOSIS — K50.80 CROHN'S DISEASE OF SMALL AND LARGE INTESTINES (H): Primary | ICD-10-CM

## 2022-09-13 LAB
ALBUMIN SERPL BCG-MCNC: 4.3 G/DL (ref 3.5–5.2)
ALP SERPL-CCNC: 59 U/L (ref 35–104)
ALT SERPL W P-5'-P-CCNC: 22 U/L (ref 10–35)
AST SERPL W P-5'-P-CCNC: 28 U/L (ref 10–35)
BASOPHILS # BLD AUTO: 0 10E3/UL (ref 0–0.2)
BASOPHILS NFR BLD AUTO: 0 %
BILIRUB DIRECT SERPL-MCNC: <0.2 MG/DL (ref 0–0.3)
BILIRUB SERPL-MCNC: 0.5 MG/DL
EOSINOPHIL # BLD AUTO: 0.1 10E3/UL (ref 0–0.7)
EOSINOPHIL NFR BLD AUTO: 1 %
ERYTHROCYTE [DISTWIDTH] IN BLOOD BY AUTOMATED COUNT: 12.1 % (ref 10–15)
HCT VFR BLD AUTO: 38.3 % (ref 35–47)
HGB BLD-MCNC: 12.7 G/DL (ref 11.7–15.7)
LYMPHOCYTES # BLD AUTO: 2.5 10E3/UL (ref 0.8–5.3)
LYMPHOCYTES NFR BLD AUTO: 34 %
MCH RBC QN AUTO: 33.1 PG (ref 26.5–33)
MCHC RBC AUTO-ENTMCNC: 33.2 G/DL (ref 31.5–36.5)
MCV RBC AUTO: 100 FL (ref 78–100)
MONOCYTES # BLD AUTO: 0.5 10E3/UL (ref 0–1.3)
MONOCYTES NFR BLD AUTO: 7 %
NEUTROPHILS # BLD AUTO: 4.3 10E3/UL (ref 1.6–8.3)
NEUTROPHILS NFR BLD AUTO: 58 %
PLATELET # BLD AUTO: 218 10E3/UL (ref 150–450)
PROT SERPL-MCNC: 7.5 G/DL (ref 6.4–8.3)
RBC # BLD AUTO: 3.84 10E6/UL (ref 3.8–5.2)
WBC # BLD AUTO: 7.3 10E3/UL (ref 4–11)

## 2022-09-13 PROCEDURE — 85025 COMPLETE CBC W/AUTO DIFF WBC: CPT

## 2022-09-13 PROCEDURE — 80076 HEPATIC FUNCTION PANEL: CPT

## 2022-09-13 PROCEDURE — 36415 COLL VENOUS BLD VENIPUNCTURE: CPT

## 2022-09-14 ENCOUNTER — OFFICE VISIT (OUTPATIENT)
Dept: FAMILY MEDICINE | Facility: CLINIC | Age: 42
End: 2022-09-14
Payer: COMMERCIAL

## 2022-09-14 ENCOUNTER — MEDICAL CORRESPONDENCE (OUTPATIENT)
Dept: HEALTH INFORMATION MANAGEMENT | Facility: CLINIC | Age: 42
End: 2022-09-14

## 2022-09-14 VITALS
HEART RATE: 83 BPM | OXYGEN SATURATION: 100 % | SYSTOLIC BLOOD PRESSURE: 122 MMHG | BODY MASS INDEX: 33.84 KG/M2 | WEIGHT: 185 LBS | TEMPERATURE: 97.6 F | DIASTOLIC BLOOD PRESSURE: 86 MMHG | RESPIRATION RATE: 18 BRPM

## 2022-09-14 DIAGNOSIS — B07.8 COMMON WART: ICD-10-CM

## 2022-09-14 DIAGNOSIS — L82.0 INFLAMED SEBORRHEIC KERATOSIS: Primary | ICD-10-CM

## 2022-09-14 PROCEDURE — 17110 DESTRUCTION B9 LES UP TO 14: CPT | Performed by: PHYSICIAN ASSISTANT

## 2022-09-14 ASSESSMENT — ASTHMA QUESTIONNAIRES
QUESTION_4 LAST FOUR WEEKS HOW OFTEN HAVE YOU USED YOUR RESCUE INHALER OR NEBULIZER MEDICATION (SUCH AS ALBUTEROL): NOT AT ALL
QUESTION_1 LAST FOUR WEEKS HOW MUCH OF THE TIME DID YOUR ASTHMA KEEP YOU FROM GETTING AS MUCH DONE AT WORK, SCHOOL OR AT HOME: NONE OF THE TIME
ACT_TOTALSCORE: 25
QUESTION_5 LAST FOUR WEEKS HOW WOULD YOU RATE YOUR ASTHMA CONTROL: COMPLETELY CONTROLLED
QUESTION_2 LAST FOUR WEEKS HOW OFTEN HAVE YOU HAD SHORTNESS OF BREATH: NOT AT ALL
ACT_TOTALSCORE: 25
QUESTION_3 LAST FOUR WEEKS HOW OFTEN DID YOUR ASTHMA SYMPTOMS (WHEEZING, COUGHING, SHORTNESS OF BREATH, CHEST TIGHTNESS OR PAIN) WAKE YOU UP AT NIGHT OR EARLIER THAN USUAL IN THE MORNING: NOT AT ALL

## 2022-09-14 ASSESSMENT — PAIN SCALES - GENERAL: PAINLEVEL: NO PAIN (0)

## 2022-09-14 NOTE — PATIENT INSTRUCTIONS
Wait 2-3 days before starting Compound W to make sure you don't blister.  Apply Compound W and leave it on x2 days. After 2 days wash the Compound W off, soak the wart in warm water, and try to remove the white, dead skin. This can be done with your finger nail, nail file, or a pumice stone.  Reapply Compound W after 24 hours and repeat.   Stop this regimen after 2.5-3.5 weeks.    Follow up for another liquid nitrogen treatment in 3-4 weeks.     Follow up for your physical in March 2023.

## 2022-09-14 NOTE — LETTER
My Asthma Action Plan    Name: Ana Felix   YOB: 1980  Date: 9/14/2022   My doctor: Radha Bermudez PA-C   My clinic: [facility]        My Control Medicine: Montelukast (Singulair) -  10 mg daily  My Rescue Medicine: Albuterol (Proair/Ventolin/Proventil HFA) 2-4 puffs EVERY 4 HOURS as needed. Use a spacer if recommended by your provider.   My Asthma Severity:   Mild Persistent  Know your asthma triggers: upper respiratory infections              GREEN ZONE   Good Control    I feel good    No cough or wheeze    Can work, sleep and play without asthma symptoms       Take your asthma control medicine every day.     1. If exercise triggers your asthma, take your rescue medication    15 minutes before exercise or sports, and    During exercise if you have asthma symptoms  2. Spacer to use with inhaler: If you have a spacer, make sure to use it with your inhaler             YELLOW ZONE Getting Worse  I have ANY of these:    I do not feel good    Cough or wheeze    Chest feels tight    Wake up at night   1. Keep taking your Green Zone medications  2. Start taking your rescue medicine:    every 20 minutes for up to 1 hour. Then every 4 hours for 24-48 hours.  3. If you stay in the Yellow Zone for more than 12-24 hours, contact your doctor.  4. If you do not return to the Green Zone in 12-24 hours or you get worse, start taking your oral steroid medicine if prescribed by your provider.           RED ZONE Medical Alert - Get Help  I have ANY of these:    I feel awful    Medicine is not helping    Breathing getting harder    Trouble walking or talking    Nose opens wide to breathe       1. Take your rescue medicine NOW  2. If your provider has prescribed an oral steroid medicine, start taking it NOW  3. Call your doctor NOW  4. If you are still in the Red Zone after 20 minutes and you have not reached your doctor:    Take your rescue medicine again and    Call 911 or go to the  emergency room right away    See your regular doctor within 2 weeks of an Emergency Room or Urgent Care visit for follow-up treatment.          Annual Reminders:  Meet with Asthma Educator,  Flu Shot in the Fall, consider Pneumonia Vaccination for patients with asthma (aged 19 and older).    Pharmacy: [id number] [place] [id number] [facility] [address]    Electronically signed by Radha Bermuedz PA-C   Date: 09/14/22                      Asthma Triggers  How To Control Things That Make Your Asthma Worse    Triggers are things that make your asthma worse.  Look at the list below to help you find your triggers and what you can do about them.  You can help prevent asthma flare-ups by staying away from your triggers.      Trigger                                                          What you can do   Cigarette Smoke  Tobacco smoke can make asthma worse. Do not allow smoking in your home, car or around you.  Be sure no one smokes at a child s day care or school.  If you smoke, ask your health care provider for ways to help you quit.  Ask family members to quit too.  Ask your health care provider for a referral to Quit Plan to help you quit smoking, or call 7-405-342-PLAN.     Colds, Flu, Bronchitis  These are common triggers of asthma. Wash your hands often.  Don t touch your eyes, nose or mouth.  Get a flu shot every year.     Dust Mites  These are tiny bugs that live in cloth or carpet. They are too small to see. Wash sheets and blankets in hot water every week.   Encase pillows and mattress in dust mite proof covers.  Avoid having carpet if you can. If you have carpet, vacuum weekly.   Use a dust mask and HEPA vacuum.   Pollen and Outdoor Mold  Some people are allergic to trees, grass, or weed pollen, or molds. Try to keep your windows closed.  Limit time out doors when pollen count is high.   Ask you health care provider about taking medicine during allergy season.      Animal Dander  Some people are allergic to skin flakes, urine or saliva from pets with fur or feathers. Keep pets with fur or feathers out of your home.    If you can t keep the pet outdoors, then keep the pet out of your bedroom.  Keep the bedroom door closed.  Keep pets off cloth furniture and away from stuffed toys.     Mice, Rats, and Cockroaches   Some people are allergic to the waste from these pests.   Cover food and garbage.  Clean up spills and food crumbs.  Store grease in the refrigerator.   Keep food out of the bedroom.   Indoor Mold  This can be a trigger if your home has high moisture. Fix leaking faucets, pipes, or other sources of water.   Clean moldy surfaces.  Dehumidify basement if it is damp and smelly.   Smoke, Strong Odors, and Sprays  These can reduce air quality. Stay away from strong odors and sprays, such as perfume, powder, hair spray, paints, smoke incense, paint, cleaning products, candles and new carpet.   Exercise or Sports  Some people with asthma have this trigger. Be active!  Ask your doctor about taking medicine before sports or exercise to prevent symptoms.    Warm up for 5-10 minutes before and after sports or exercise.     Other Triggers of Asthma  Cold air:  Cover your nose and mouth with a scarf.  Sometimes laughing or crying can be a trigger.  Some medicines and food can trigger asthma.

## 2022-09-14 NOTE — PROGRESS NOTES
"  Assessment & Plan     1. Inflamed seborrheic keratosis    2. Common wart        1) Each keratosis was frozen easily two times with liquid nitrogen.  A total of 1 keratoses were treated today.  The etiology of keratoses were discussed.     2) Each wart was frozen easily x20s, three times with liquid nitrogen.  A total of 2 warts are treated today. Patient tolerated the procedure well and there were no complications. The etiology of common warts were discussed.  Ana will continue to use the over-the-counter medications such as Compound W. The patient is to return every three weeks until all warts have been removed.           BMI:   Estimated body mass index is 33.84 kg/m  as calculated from the following:    Height as of 7/10/22: 1.575 m (5' 2\").    Weight as of this encounter: 83.9 kg (185 lb).       Return in about 1 month (around 10/14/2022) for a repeat wart treatment.    Radha Bermudez PA-C  [facility] Perez is a 41 year old, presenting for the following health issues:  Derm Problem      History of Present Illness       Reason for visit:  Mole on back  Symptom onset:  More than a month  Symptoms include:  Pain irritation from shirt and bra strap rubbing and catching  Symptom intensity:  Moderate  Symptom progression:  Worsening  Had these symptoms before:  No    She eats 2-3 servings of fruits and vegetables daily.She consumes 4 sweetened beverage(s) daily.She exercises with enough effort to increase her heart rate 30 to 60 minutes per day.  She exercises with enough effort to increase her heart rate 5 days per week.   She is taking medications regularly.     2 warts on middle fingers      OBJECTIVE:  The patient appears today in no apparent distress.  Vitals as above.  Skin: 2 non-erythematous, raised papule(s) with pinpoint hemmorhages are seen on the fingers.   A non-erythematous, slightly raised, wart-like, light brown lesion(s) is seen on the shoulder. The " "lesion(s) appears \"stuck-on.\"                    "

## 2022-09-15 ENCOUNTER — TRANSFERRED RECORDS (OUTPATIENT)
Dept: HEALTH INFORMATION MANAGEMENT | Facility: CLINIC | Age: 42
End: 2022-09-15

## 2022-09-20 ENCOUNTER — TRANSFERRED RECORDS (OUTPATIENT)
Dept: HEALTH INFORMATION MANAGEMENT | Facility: CLINIC | Age: 42
End: 2022-09-20

## 2022-09-22 ENCOUNTER — TRANSFERRED RECORDS (OUTPATIENT)
Dept: HEALTH INFORMATION MANAGEMENT | Facility: CLINIC | Age: 42
End: 2022-09-22

## 2022-10-06 ENCOUNTER — APPOINTMENT (OUTPATIENT)
Dept: GENERAL RADIOLOGY | Facility: CLINIC | Age: 42
End: 2022-10-06
Attending: EMERGENCY MEDICINE
Payer: OTHER MISCELLANEOUS

## 2022-10-06 ENCOUNTER — HOSPITAL ENCOUNTER (EMERGENCY)
Facility: CLINIC | Age: 42
Discharge: HOME | End: 2022-10-06
Attending: EMERGENCY MEDICINE | Admitting: EMERGENCY MEDICINE
Payer: OTHER MISCELLANEOUS

## 2022-10-06 VITALS
HEIGHT: 62 IN | RESPIRATION RATE: 16 BRPM | DIASTOLIC BLOOD PRESSURE: 88 MMHG | WEIGHT: 175 LBS | OXYGEN SATURATION: 100 % | TEMPERATURE: 98.1 F | HEART RATE: 90 BPM | SYSTOLIC BLOOD PRESSURE: 132 MMHG | BODY MASS INDEX: 32.2 KG/M2

## 2022-10-06 DIAGNOSIS — M25.562 ACUTE PAIN OF LEFT KNEE: ICD-10-CM

## 2022-10-06 DIAGNOSIS — M25.462 EFFUSION OF LEFT KNEE: ICD-10-CM

## 2022-10-06 PROCEDURE — 250N000011 HC RX IP 250 OP 636: Performed by: EMERGENCY MEDICINE

## 2022-10-06 PROCEDURE — 96372 THER/PROPH/DIAG INJ SC/IM: CPT | Performed by: EMERGENCY MEDICINE

## 2022-10-06 PROCEDURE — 99284 EMERGENCY DEPT VISIT MOD MDM: CPT | Performed by: EMERGENCY MEDICINE

## 2022-10-06 PROCEDURE — 73562 X-RAY EXAM OF KNEE 3: CPT | Mod: LT

## 2022-10-06 RX ORDER — KETOROLAC TROMETHAMINE 15 MG/ML
15 INJECTION, SOLUTION INTRAMUSCULAR; INTRAVENOUS ONCE
Status: DISCONTINUED | OUTPATIENT
Start: 2022-10-06 | End: 2022-10-06

## 2022-10-06 RX ORDER — KETOROLAC TROMETHAMINE 15 MG/ML
15 INJECTION, SOLUTION INTRAMUSCULAR; INTRAVENOUS ONCE
Status: COMPLETED | OUTPATIENT
Start: 2022-10-06 | End: 2022-10-06

## 2022-10-06 RX ADMIN — KETOROLAC TROMETHAMINE 15 MG: 15 INJECTION, SOLUTION INTRAMUSCULAR; INTRAVENOUS at 21:01

## 2022-10-06 ASSESSMENT — ACTIVITIES OF DAILY LIVING (ADL): ADLS_ACUITY_SCORE: 35

## 2022-10-06 NOTE — LETTER
October 6, 2022      To Whom It May Concern:      Ana Felix was seen in our Emergency Department today, 10/06/22. Please excuse from work thru 10/7/2022.     Sincerely,        Jason Carranza MD

## 2022-10-06 NOTE — ED TRIAGE NOTES
States this is work comp. Was helping a child at work and somehow fell landing on her left knee

## 2022-10-07 ENCOUNTER — OFFICE VISIT (OUTPATIENT)
Dept: ORTHOPEDICS | Facility: CLINIC | Age: 42
End: 2022-10-07
Payer: OTHER MISCELLANEOUS

## 2022-10-07 VITALS
WEIGHT: 175 LBS | DIASTOLIC BLOOD PRESSURE: 87 MMHG | HEART RATE: 108 BPM | SYSTOLIC BLOOD PRESSURE: 127 MMHG | BODY MASS INDEX: 32.01 KG/M2

## 2022-10-07 DIAGNOSIS — M25.462 EFFUSION, LEFT KNEE: ICD-10-CM

## 2022-10-07 DIAGNOSIS — S89.92XA INJURY OF LEFT KNEE, INITIAL ENCOUNTER: Primary | ICD-10-CM

## 2022-10-07 PROCEDURE — 99214 OFFICE O/P EST MOD 30 MIN: CPT | Mod: 25 | Performed by: FAMILY MEDICINE

## 2022-10-07 PROCEDURE — 20611 DRAIN/INJ JOINT/BURSA W/US: CPT | Mod: LT | Performed by: FAMILY MEDICINE

## 2022-10-07 RX ORDER — OXYCODONE AND ACETAMINOPHEN 7.5; 325 MG/1; MG/1
1 TABLET ORAL EVERY 6 HOURS PRN
Qty: 10 TABLET | Refills: 0 | Status: SHIPPED | OUTPATIENT
Start: 2022-10-07 | End: 2022-10-13

## 2022-10-07 RX ORDER — ROPIVACAINE HYDROCHLORIDE 5 MG/ML
4 INJECTION, SOLUTION EPIDURAL; INFILTRATION; PERINEURAL
Status: DISCONTINUED | OUTPATIENT
Start: 2022-10-07 | End: 2022-10-13

## 2022-10-07 RX ADMIN — ROPIVACAINE HYDROCHLORIDE 4 ML: 5 INJECTION, SOLUTION EPIDURAL; INFILTRATION; PERINEURAL at 12:18

## 2022-10-07 NOTE — LETTER
October 7, 2022      Ana was seen in my clinic today for a left knee injury that occurred at work on 10/6/2022.  She has a knee contusion and sizable knee effusion with difficulty bearing weight and walking/standing.  She should be considered medically excused from work until our next follow up appointment in one week.  Updated recommendations will be provided at that time.      Justus Mendez DO, KILO  Sports Medicine Physician  [facility]

## 2022-10-07 NOTE — PROGRESS NOTES
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  Patient was seen in the ED on 10/6/22 for this injury. She was referred to us, but was unable to wait until scheduled appointment on 10/11/22 due to needing a work note.      Injury: Patient describes injury as tripping over a chair or child at work and fell over. She went down, directly hitting this knee on the ground 1 day(s).  Pain located over left knee; \"all over\", nonradiating.  Additional Features:  Positive: swelling, instability and weakness.  Symptoms are better with Ice, Aleve, Tylenol, Ibuprofen, Rest and Elevation.  Symptoms are worse with: extension, flexion, standing and stairs.  Other evaluation and/or treatments so far consists of: Ice and Other medications: toridol.  Recent imaging completed: X-rays completed 10/6/22.  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    Past Medical History:   Diagnosis Date     Back pain      Crohn's disease (H)      Exercise-induced asthma      Gestational diabetes      Herniation of intervertebral disc of lumbar region      Infertility      Ovarian cyst      Smoker      Status post cholecystectomy 2005     Past Surgical History:   Procedure Laterality Date     APPENDECTOMY       C/SECTION, LOW TRANSVERSE      , Low Transverse     CHOLECYSTECTOMY, LAPOROSCOPIC  2005    Cholecystectomy, Laparoscopic     COLONOSCOPY       ESOPHAGOSCOPY, GASTROSCOPY, DUODENOSCOPY (EGD), COMBINED  3/6/2014    Procedure: COMBINED ESOPHAGOSCOPY, GASTROSCOPY, DUODENOSCOPY (EGD), " BIOPSY SINGLE OR MULTIPLE;  COLONOSCOPY/ UPPER GI ENDOSCOPY/ COLON/ EGD/ Abdominal pain, epigastric/ P[facility];  Surgeon: West Lomas MD;  Location: [facility] OR     HC HYSTEROSCOPY, SURGICAL; W/ ENDOMETRIAL ABLATION, ANY METHOD  7/2010     HERNIORRHAPHY VENTRAL N/A 12/11/2018    Procedure: Open ventral hernia repair;  Surgeon: Alonso Bills MD;  Location: [facility]     HYSTERECTOMY, SUPRACERVICAL LAPAROSCOPIC  2010     LEEP TX, CERVICAL      LEEP TX Cervical     ORTHOPEDIC SURGERY      bluged disk     partial small bowel resection  2002    Ileo-colonic resection. Crohn's disease surgery for bowel obstruction. this was a section of small bowel and large bowel and the cecum., all removed and a reanastamosis done successfully     SALPINGO OOPHORECTOMY,R/L/TORI      Right ovary     SLING TRANSVAGINAL N/A 4/4/2022    Procedure: Pubovaginal sling with Altis;  Surgeon: Eleuterio Stone MD;  Location: [facility] OR     TUBAL LIGATION       Family History   Problem Relation Age of Onset     Cancer Mother         cervical     Lipids Mother      Eye Disorder Father      Lipids Father      Alcohol/Drug Maternal Grandmother      Colon Cancer Paternal Grandmother      Diabetes Paternal Grandmother      Eye Disorder Paternal Grandmother      Diabetes Paternal Grandfather      Eye Disorder Paternal Grandfather      Inflammatory Bowel Disease Paternal Aunt         and two paternal uncles     Breast Cancer No family hx of      Objective  /87   Pulse 108   Wt 79.4 kg (175 lb)   LMP 07/23/2010 (LMP Unknown)   BMI 32.01 kg/m        General: healthy, alert and in no distress      HEENT: no scleral icterus or conjunctival erythema     Skin: no suspicious lesions or rash. No jaundice.     CV: regular rhythm by palpation, 2+ distal pulses, no pedal edema      Resp: normal respiratory effort without conversational dyspnea     Psych: normal mood and affect      Gait: nonantalgic, appropriate coordination and balance     Neuro: normal light  touch sensory exam of the extremities. Motor strength as noted below     Left Knee exam    ROM:        Mildly limited active and passive ROM with flexion and extension    Inspection:       no visible ecchymosis       effusion noted moderate    Skin:       no visible deformities       well perfused       capillary refill brisk    Patellar Motion:        Normal patellar tracking noted through range of motion       Lateral tilt noted in patella       Crepitus noted in the patellofemoral joint    Tender:        medial patellar border       lateral patellar border       medial joint line       lateral joint line    Non Tender:         remainder of knee area    Special Tests:        Mildly painful Taniya       Boggy/equivocal Lachman       neg (-) anterior drawer       neg (-) posterior drawer       neg (-) varus at 0 deg and 30 deg       neg (-) valgus at 0 deg and 30 deg       positive pain with forced extension    Evaluation of ipsilateral kinetic chain       normal strength with hip extension and abduction      Radiology  EXAM: XR KNEE LEFT 3 VIEWS  LOCATION: [facility]  DATE/TIME: 10/6/2022 9:00 PM     INDICATION: left anterior knee pain in setting of ground level fall directly onto knee  COMPARISON: None.                                                                      IMPRESSION: Small knee joint effusion. No fractures are evident. Normal joint spacing. Normal patellar alignment.    Assessment:  1. Injury of left knee, initial encounter    2. Effusion, left knee        Plan:  Discussed the assessment with the patient.  Follow up: 1 week  Acute knee injury after fall at work  Suspect inflammation from compression of PF joint with fall, sizable effusion today  RICE and supportive care reviewed  Use of short 1-2 wk course of NSAIDs reviewed, dosing and precautions reviewed if utilized  Oral Tylenol and topical Voltaren gel reviewed as safe OTC options, reviewed safe dosing  strategies  US guided aspiration today with good initial relief from pressure pain  WBAT  Letter provided for work, off until f/u next week  Consider PT, advanced imaging, other tx options based on clinical progress  We discussed modified progressive pain-free activity as tolerated  Home handouts provided and supportive care reviewed  All questions were answered today  Contact us with additional questions or concerns  Signs and sx of concern reviewed      Justus Chapman DO, CAQ  Sports Medicine Physician  [facility]          Large Joint Injection/Arthocentesis: L knee joint    Date/Time: 10/7/2022 12:18 PM  Performed by: Justus Chapman DO  Authorized by: Justus Chapman DO     Indications:  Joint swelling  Needle Size:  18 G  Guidance: ultrasound    Approach:  Anterolateral  Location:  Knee      Medications:  4 mL ropivacaine 5 MG/ML  Aspirate amount (mL):  40  Aspirate:  Yellow and serous  Outcome:  Tolerated well, no immediate complications  Procedure discussed: discussed risks, benefits, and alternatives    Consent Given by:  Patient  Timeout: timeout called immediately prior to procedure    Prep: patient was prepped and draped in usual sterile fashion            Disclaimer: This note consists of symbols derived from keyboarding, dictation and/or voice recognition software. As a result, there may be errors in the script that have gone undetected. Please consider this when interpreting information found in this chart.

## 2022-10-07 NOTE — ED PROVIDER NOTES
"  History     Chief Complaint   Patient presents with     Knee Injury     HPI  Ana Felix is a 41 year old female with history of chronic back pain, tobacco use, corhn's disease with acute left knee pain in setting of ground level fall. She was at work and walking around a table of children and tipped on a chair and fell directly onto her left knee. Pain is sharp and pulsating. Progressively worsening. Pain located in her anterior knee. Feels \"tight\". Took ibuprofen earlier this afternoon. No other injuries or concerns.  Patient is on opioids for chronic pain issues and has not improved her pain.      The patient's PMHx, Surgical Hx, Allergies, and Medications were all reviewed with the patient.    Allergies:  Allergies   Allergen Reactions     Infliximab Hives     Sulfa Drugs [Sulfa Drugs] Nausea     Pt states \"it doesn't work for me\"     Sulfacetamide Nausea     Augmentin Other (See Comments) and Nausea and Vomiting     Crohn's     Bupropion Hives and Nausea     Droperidol Other (See Comments)     Dystonic reaction     Ibuprofen Other (See Comments) and Nausea     Crohn's       Lyrica [Pregabalin] Nausea and Vomiting     Grogginess, severe back pain  Grogginess, severe back pain     Vicodin [Hydrocodone-Acetaminophen] Nausea and Vomiting       Problem List:    Patient Active Problem List    Diagnosis Date Noted     DANIELLE (generalized anxiety disorder) 03/15/2022     Priority: Medium     Insomnia, unspecified type 03/15/2022     Priority: Medium     SKYE (stress urinary incontinence, female) 03/14/2022     Priority: Medium     Added automatically from request for surgery [id number]       Hidradenitis suppurativa 09/02/2021     Priority: Medium     Immunocompromised state (H) 07/09/2021     Priority: Medium     History of gestational diabetes mellitus (GDM) 08/15/2018     Priority: Medium     Amenorrhea 08/15/2018     Priority: Medium     Crohn's disease of both small and large intestine without complication (H) " 07/31/2018     Priority: Medium     Pain medication agreement 08/18/2017     Priority: Medium     Overview:   [facility]       Controlled substance agreement signed 07/19/2017     Priority: Medium     Degenerative lumbar disc 03/07/2017     Priority: Medium     Labral tear of hip, degenerative 03/07/2017     Priority: Medium     Pain of right hip joint 03/07/2017     Priority: Medium     S/P LEEP of cervix 12/15/2015     Priority: Medium     S/p LEEP of cervix - date unknown  12/9/15: Pap - NIL, Neg HPV. Plan cotest in 1 year. If JAYME 2/3 on biopsy - PAP/HPV co-testing at 12, 24 months. If two negative results repeat co-testing in 3 years, if negative then routine screening.  3/7/18 Pap: NIL/neg HPV.             Hearing difficulty 10/09/2014     Priority: Medium     Irritable bowel syndrome (IBS) 04/15/2014     Priority: Medium     Tiffany raw vegetables       Lactose intolerance 04/15/2014     Priority: Medium     Abdominal pain, right upper quadrant 03/06/2014     Priority: Medium     Nausea with vomiting 01/31/2014     Priority: Medium     Chronic low back pain 11/05/2012     Priority: Medium     Follows with pain clinic.       Mild persistent asthma, uncomplicated 11/05/2012     Priority: Medium     Discogenic pain 10/08/2012     Priority: Medium     Abdominal pain 08/30/2012     Priority: Medium     S/P oophorectomy 07/20/2012     Priority: Medium     History of cholecystectomy 07/20/2012     Priority: Medium     History of resection of small bowel 07/20/2012     Priority: Medium     Tobacco abuse 05/25/2012     Priority: Medium     Displacement of lumbar intervertebral disc without myelopathy 01/06/2012     Priority: Medium     Disorder of sacrum 12/13/2011     Priority: Medium     Problem list name updated by automated process. Provider to review       Back pain 11/09/2011     Priority: Medium     Obesity 11/09/2011     Priority: Medium     Migraine headache 09/14/2011     Priority: Medium     Patient  given Migraine Education folder and Migraine Action Plan on 2011. Cammy Anderson RN    (Problem list name updated by automated process. Provider to review and confirm.)       CARDIOVASCULAR SCREENING; LDL GOAL LESS THAN 160 10/31/2010     Priority: Medium     Dysmenorrhea 10/05/2010     Priority: Medium     Female pelvic pain 2010     Priority: Medium     (Problem list name updated by automated process. Provider to review and confirm.)       Crohns disease 2009     Priority: Medium        Past Medical History:    Past Medical History:   Diagnosis Date     Back pain      Crohn's disease (H)      Exercise-induced asthma      Gestational diabetes      Herniation of intervertebral disc of lumbar region      Infertility      Ovarian cyst      Smoker      Status post cholecystectomy 2005       Past Surgical History:    Past Surgical History:   Procedure Laterality Date     APPENDECTOMY       C/SECTION, LOW TRANSVERSE      , Low Transverse     CHOLECYSTECTOMY, LAPOROSCOPIC  2005    Cholecystectomy, Laparoscopic     COLONOSCOPY       ESOPHAGOSCOPY, GASTROSCOPY, DUODENOSCOPY (EGD), COMBINED  3/6/2014    Procedure: COMBINED ESOPHAGOSCOPY, GASTROSCOPY, DUODENOSCOPY (EGD), BIOPSY SINGLE OR MULTIPLE;  COLONOSCOPY/ UPPER GI ENDOSCOPY/ COLON/ EGD/ Abdominal pain, epigastric/ PJH;  Surgeon: West Lomas MD;  Location:  OR      HYSTEROSCOPY, SURGICAL; W/ ENDOMETRIAL ABLATION, ANY METHOD  2010     HERNIORRHAPHY VENTRAL N/A 2018    Procedure: Open ventral hernia repair;  Surgeon: Alonso Bills MD;  Location: [facility] OR     HYSTERECTOMY, SUPRACERVICAL LAPAROSCOPIC       LEEP TX, CERVICAL      LEEP TX Cervical     ORTHOPEDIC SURGERY      bluged disk     partial small bowel resection      Ileo-colonic resection. Crohn's disease surgery for bowel obstruction. this was a section of small bowel and large bowel and the cecum., all removed and a reanastamosis done  "successfully     SALPINGO OOPHORECTOMY,R/L/TORI      Right ovary     SLING TRANSVAGINAL N/A 4/4/2022    Procedure: Pubovaginal sling with Altis;  Surgeon: Eleuterio Stone MD;  Location: [facility] OR     TUBAL LIGATION         Family History:    Family History   Problem Relation Age of Onset     Cancer Mother         cervical     Lipids Mother      Eye Disorder Father      Lipids Father      Alcohol/Drug Maternal Grandmother      Colon Cancer Paternal Grandmother      Diabetes Paternal Grandmother      Eye Disorder Paternal Grandmother      Diabetes Paternal Grandfather      Eye Disorder Paternal Grandfather      Inflammatory Bowel Disease Paternal Aunt         and two paternal uncles     Breast Cancer No family hx of        Social History:  Marital Status:   [2]  Social History     Tobacco Use     Smoking status: Current Every Day Smoker     Packs/day: 0.50     Types: Cigarettes     Smokeless tobacco: Never Used   Vaping Use     Vaping Use: Never used   Substance Use Topics     Alcohol use: Yes     Comment: very rarely     Drug use: No        Medications:    albuterol (PROAIR HFA) 108 (90 Base) MCG/ACT inhaler  azaTHIOprine (IMURAN) 50 MG tablet  cyanocobalamin (VITAMIN B12) 1000 MCG/ML injection  fentaNYL (DURAGESIC) 12 mcg/hr patch 72 hr  fentaNYL (DURAGESIC) 25 mcg/hr patch 72 hr  gabapentin (NEURONTIN) 300 MG capsule  montelukast (SINGULAIR) 10 MG tablet  oxyCODONE (ROXICODONE) 5 MG tablet  oxyCODONE-acetaminophen (PERCOCET) 7.5-325 MG per tablet  sertraline (ZOLOFT) 100 MG tablet  traZODone (DESYREL) 50 MG tablet          Review of Systems  A complete review of systems performed and is otherwise negative except as noted in the HPI    Physical Exam   BP: 132/88  Pulse: 90  Temp: 98.1  F (36.7  C)  Resp: 16  Height: 157.5 cm (5' 2\")  Weight: 79.4 kg (175 lb)  SpO2: 100 %    Physical Exam  GEN: Awake, alert, and cooperative.  Appears mildly distressed secondary to pain  HENT: MMM. External ears and nose normal " bilaterally.  Atraumatic.  EYES: EOM intact. Conjunctiva clear. No discharge.   NECK: Symmetric, freely mobile.   CV : Extremities warm and well perfused.  PULM: Normal effort. Speaking in full sentences.  NEURO: Normal speech. Following commands. CN II-XII grossly intact. Answering questions and interacting appropriately.   EXT: No gross deformity.  Slight effusion of the left knee.  No ecchymosis or abrasion.  Overlying skin intact.  Tenderness to anterior knee over patella.  Slight discomfort with flexion past 90 degrees and full extension.  No medial lateral joint line tenderness.  No discomfort with valgus or varus stress.  Anterior drawer without laxity.  INT: Warm. No diaphoresis. Normal color.        ED Course        Procedures       Critical Care time:  none               Results for orders placed or performed during the hospital encounter of 10/06/22 (from the past 24 hour(s))   XR Knee Left 3 Views    Narrative    EXAM: XR KNEE LEFT 3 VIEWS  LOCATION: [facility]  DATE/TIME: 10/6/2022 9:00 PM    INDICATION: left anterior knee pain in setting of ground level fall directly onto knee  COMPARISON: None.      Impression    IMPRESSION: Small knee joint effusion. No fractures are evident. Normal joint spacing. Normal patellar alignment.       Medications   ketorolac (TORADOL) injection 15 mg (15 mg Intramuscular Given 10/6/22 2101)       Assessments & Plan (with Medical Decision Making)   41 year old female with past medical history of chronic pain issues on long-term opiates, with acute onset left anterior knee pain in setting of ground-level fall directly onto knee detailed in HPI    Exam above and tenderness over patella.  No joint line tenderness.  Mild effusion.  Anterior drawer with firm endpoint.  No tenderness with varus or valgus stress.  Some discomfort with flexion beyond 100 degrees.  Plain films without any acute bony abnormality and small anterior effusion.  50 mg IM  Toradol for pain control.    Patient would like crutches to offload the weight so she can ambulate easier.  Orthopedic  referral placed.  She will need to limit use of NSAIDs due to her Crohn's disease.  Ice and elevation as possible.  Work note provided.  Follow-up plan discussed.    I have reviewed the nursing notes.         New Prescriptions    No medications on file       Final diagnoses:   Acute pain of left knee   Effusion of left knee     Jason Carranza MD    10/6/2022   [facility] EMERGENCY DEPT    Disclaimer: This note consists of words and symbols derived from keyboarding and dictation using voice recognition software.  As a result, there may be errors that have gone undetected.  Please consider this when interpreting information found in this note.             Jason Carranza MD  10/06/22 0142

## 2022-10-07 NOTE — LETTER
"    10/7/2022         RE: Ana Felix  [address]  [facility] [address]        Dear Colleague,    Thank you for referring your patient, Ana Felix, to the [facility] [place]. Please see a copy of my visit note below.    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  Patient was seen in the ED on 10/6/22 for this injury. She was referred to us, but was unable to wait until scheduled appointment on 10/11/22 due to needing a work note.      Injury: Patient describes injury as tripping over a chair or child at work and fell over. She went down, directly hitting this knee on the ground 1 day(s).  Pain located over left knee; \"all over\", nonradiating.  Additional Features:  Positive: swelling, instability and weakness.  Symptoms are better with Ice, Aleve, Tylenol, Ibuprofen, Rest and Elevation.  Symptoms are worse with: extension, flexion, standing and stairs.  Other evaluation and/or treatments so far consists of: Ice and Other medications: toridol.  Recent imaging completed: X-rays completed 10/6/22.  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    Past Medical History:   Diagnosis Date     Back pain      Crohn's disease (H)      Exercise-induced asthma      Gestational diabetes      Herniation of intervertebral disc of lumbar region      Infertility      Ovarian cyst      Smoker      Status post cholecystectomy 2005     Past Surgical History:   Procedure Laterality Date     APPENDECTOMY       C/SECTION, LOW TRANSVERSE      , Low " Transverse     CHOLECYSTECTOMY, LAPOROSCOPIC  04/27/2005    Cholecystectomy, Laparoscopic     COLONOSCOPY       ESOPHAGOSCOPY, GASTROSCOPY, DUODENOSCOPY (EGD), COMBINED  3/6/2014    Procedure: COMBINED ESOPHAGOSCOPY, GASTROSCOPY, DUODENOSCOPY (EGD), BIOPSY SINGLE OR MULTIPLE;  COLONOSCOPY/ UPPER GI ENDOSCOPY/ COLON/ EGD/ Abdominal pain, epigastric/ PJH;  Surgeon: West Lomas MD;  Location: [facility]     HC HYSTEROSCOPY, SURGICAL; W/ ENDOMETRIAL ABLATION, ANY METHOD  7/2010     HERNIORRHAPHY VENTRAL N/A 12/11/2018    Procedure: Open ventral hernia repair;  Surgeon: Alonso Bills MD;  Location: [facility]     HYSTERECTOMY, SUPRACERVICAL LAPAROSCOPIC  2010     LEEP TX, CERVICAL      LEEP TX Cervical     ORTHOPEDIC SURGERY      bluged disk     partial small bowel resection  2002    Ileo-colonic resection. Crohn's disease surgery for bowel obstruction. this was a section of small bowel and large bowel and the cecum., all removed and a reanastamosis done successfully     SALPINGO OOPHORECTOMY,R/L/TORI      Right ovary     SLING TRANSVAGINAL N/A 4/4/2022    Procedure: Pubovaginal sling with Altis;  Surgeon: Eleuterio Stone MD;  Location: [facility] OR     TUBAL LIGATION       Family History   Problem Relation Age of Onset     Cancer Mother         cervical     Lipids Mother      Eye Disorder Father      Lipids Father      Alcohol/Drug Maternal Grandmother      Colon Cancer Paternal Grandmother      Diabetes Paternal Grandmother      Eye Disorder Paternal Grandmother      Diabetes Paternal Grandfather      Eye Disorder Paternal Grandfather      Inflammatory Bowel Disease Paternal Aunt         and two paternal uncles     Breast Cancer No family hx of      Objective  /87   Pulse 108   Wt 79.4 kg (175 lb)   LMP 07/23/2010 (LMP Unknown)   BMI 32.01 kg/m        General: healthy, alert and in no distress      HEENT: no scleral icterus or conjunctival erythema     Skin: no suspicious lesions or rash. No jaundice.     CV:  regular rhythm by palpation, 2+ distal pulses, no pedal edema      Resp: normal respiratory effort without conversational dyspnea     Psych: normal mood and affect      Gait: nonantalgic, appropriate coordination and balance     Neuro: normal light touch sensory exam of the extremities. Motor strength as noted below     Left Knee exam    ROM:        Mildly limited active and passive ROM with flexion and extension    Inspection:       no visible ecchymosis       effusion noted moderate    Skin:       no visible deformities       well perfused       capillary refill brisk    Patellar Motion:        Normal patellar tracking noted through range of motion       Lateral tilt noted in patella       Crepitus noted in the patellofemoral joint    Tender:        medial patellar border       lateral patellar border       medial joint line       lateral joint line    Non Tender:         remainder of knee area    Special Tests:        Mildly painful Taniya       Boggy/equivocal Lachman       neg (-) anterior drawer       neg (-) posterior drawer       neg (-) varus at 0 deg and 30 deg       neg (-) valgus at 0 deg and 30 deg       positive pain with forced extension    Evaluation of ipsilateral kinetic chain       normal strength with hip extension and abduction      Radiology  EXAM: XR KNEE LEFT 3 VIEWS  LOCATION: [facility]  DATE/TIME: 10/6/2022 9:00 PM     INDICATION: left anterior knee pain in setting of ground level fall directly onto knee  COMPARISON: None.                                                                      IMPRESSION: Small knee joint effusion. No fractures are evident. Normal joint spacing. Normal patellar alignment.    Assessment:  1. Injury of left knee, initial encounter    2. Effusion, left knee        Plan:  Discussed the assessment with the patient.  Follow up: 1 week  Acute knee injury after fall at work  Suspect inflammation from compression of PF joint with fall,  sizable effusion today  RICE and supportive care reviewed  Use of short 1-2 wk course of NSAIDs reviewed, dosing and precautions reviewed if utilized  Oral Tylenol and topical Voltaren gel reviewed as safe OTC options, reviewed safe dosing strategies  US guided aspiration today with good initial relief from pressure pain  WBAT  Letter provided for work, off until f/u next week  Consider PT, advanced imaging, other tx options based on clinical progress  We discussed modified progressive pain-free activity as tolerated  Home handouts provided and supportive care reviewed  All questions were answered today  Contact us with additional questions or concerns  Signs and sx of concern reviewed      Justus Chapman DO, KILO  Sports Medicine Physician  [facility]          Large Joint Injection/Arthocentesis: L knee joint    Date/Time: 10/7/2022 12:18 PM  Performed by: Justus Chapman DO  Authorized by: Justus Chapman DO     Indications:  Joint swelling  Needle Size:  18 G  Guidance: ultrasound    Approach:  Anterolateral  Location:  Knee      Medications:  4 mL ropivacaine 5 MG/ML  Aspirate amount (mL):  40  Aspirate:  Yellow and serous  Outcome:  Tolerated well, no immediate complications  Procedure discussed: discussed risks, benefits, and alternatives    Consent Given by:  Patient  Timeout: timeout called immediately prior to procedure    Prep: patient was prepped and draped in usual sterile fashion            Disclaimer: This note consists of symbols derived from keyboarding, dictation and/or voice recognition software. As a result, there may be errors in the script that have gone undetected. Please consider this when interpreting information found in this chart.      Again, thank you for allowing me to participate in the care of your patient.        Sincerely,        Justus Chapman DO

## 2022-10-07 NOTE — DISCHARGE INSTRUCTIONS
Xrays did not show any broken bones. You do have swelling in your knee. Elevate you leg when able. You may use crutches to take the weight of your left leg. An orthopedic follow-up order has been placed and you should receive a call for appointment scheduling.

## 2022-10-13 ENCOUNTER — OFFICE VISIT (OUTPATIENT)
Dept: ORTHOPEDICS | Facility: CLINIC | Age: 42
End: 2022-10-13
Payer: OTHER MISCELLANEOUS

## 2022-10-13 VITALS — BODY MASS INDEX: 32.2 KG/M2 | WEIGHT: 175 LBS | HEIGHT: 62 IN

## 2022-10-13 DIAGNOSIS — M25.462 EFFUSION, LEFT KNEE: ICD-10-CM

## 2022-10-13 DIAGNOSIS — R29.898 DIFFICULTY IN WEIGHT BEARING: ICD-10-CM

## 2022-10-13 DIAGNOSIS — S89.92XD INJURY OF LEFT KNEE, SUBSEQUENT ENCOUNTER: Primary | ICD-10-CM

## 2022-10-13 DIAGNOSIS — S89.92XA INJURY OF LEFT KNEE, INITIAL ENCOUNTER: ICD-10-CM

## 2022-10-13 PROCEDURE — 99214 OFFICE O/P EST MOD 30 MIN: CPT | Performed by: FAMILY MEDICINE

## 2022-10-13 RX ORDER — OXYCODONE AND ACETAMINOPHEN 7.5; 325 MG/1; MG/1
1 TABLET ORAL EVERY 6 HOURS PRN
Qty: 10 TABLET | Refills: 0 | Status: SHIPPED | OUTPATIENT
Start: 2022-10-13 | End: 2022-11-29

## 2022-10-13 NOTE — LETTER
October 13, 2022      Ana was seen in my clinic again today for her left knee injury that occurred at work on 10/6/2022.  She has ongoing signs of a knee contusion and sizable knee effusion with ongoing difficulty bearing weight and prolonged walking/standing.  She should be considered medically excused from work until our next follow up appointment in 2 weeks.  I have ordered an MRI for further investigation for possible fracture or other joint injury.  Updated recommendations will be provided at her next visit, sooner if needed.      Justus Mendez DO, KILO  Sports Medicine Physician  [facility]

## 2022-10-13 NOTE — LETTER
"    10/13/2022         RE: Ana Felix  [address]  [facility] [address]        Dear Colleague,    Thank you for referring your patient, Ana Felix, to the [facility] [place]. Please see a copy of my visit note below.    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  Patient was seen in the ED on 10/6/22 for this injury. She was referred to us, but was unable to wait until scheduled appointment on 10/11/22 due to needing a work note.      Injury: Patient describes injury as tripping over a chair or child at work and fell over. She went down, directly hitting this knee on the ground 1 day(s).  Pain located over left knee; \"all over\", nonradiating.  Additional Features:  Positive: swelling, instability and weakness.  Symptoms are better with Ice, Aleve, Tylenol, Ibuprofen, Rest and Elevation.  Symptoms are worse with: extension, flexion, standing and stairs.  Other evaluation and/or treatments so far consists of: Ice and Other medications: toridol.  Recent imaging completed: X-rays completed 10/6/22.  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  Left knee aspiration completed on 10/7/22 provided good relief that day of the procedure.  She notes that pain with weight bearing improving, constant ache sensation with rest and bending knee.  No new injury in the interim.      Review of Systems  Musculoskeletal: as above  Remainder of review of systems is negative including constitutional, CV, pulmonary, GI, Skin and Neurologic except as noted in HPI or medical history.    Past Medical History:   Diagnosis Date "     Back pain      Crohn's disease (H)      Exercise-induced asthma      Gestational diabetes      Herniation of intervertebral disc of lumbar region      Infertility      Ovarian cyst      Smoker      Status post cholecystectomy 2005     Past Surgical History:   Procedure Laterality Date     APPENDECTOMY       C/SECTION, LOW TRANSVERSE      , Low Transverse     CHOLECYSTECTOMY, LAPOROSCOPIC  2005    Cholecystectomy, Laparoscopic     COLONOSCOPY       ESOPHAGOSCOPY, GASTROSCOPY, DUODENOSCOPY (EGD), COMBINED  3/6/2014    Procedure: COMBINED ESOPHAGOSCOPY, GASTROSCOPY, DUODENOSCOPY (EGD), BIOPSY SINGLE OR MULTIPLE;  COLONOSCOPY/ UPPER GI ENDOSCOPY/ COLON/ EGD/ Abdominal pain, epigastric/ PJH;  Surgeon: West Lomas MD;  Location: [facility] OR     HC HYSTEROSCOPY, SURGICAL; W/ ENDOMETRIAL ABLATION, ANY METHOD  2010     HERNIORRHAPHY VENTRAL N/A 2018    Procedure: Open ventral hernia repair;  Surgeon: Alonso Bills MD;  Location: [facility]     HYSTERECTOMY, SUPRACERVICAL LAPAROSCOPIC       LEEP TX, CERVICAL      LEEP TX Cervical     ORTHOPEDIC SURGERY      bluged disk     partial small bowel resection      Ileo-colonic resection. Crohn's disease surgery for bowel obstruction. this was a section of small bowel and large bowel and the cecum., all removed and a reanastamosis done successfully     SALPINGO OOPHORECTOMY,R/L/TORI      Right ovary     SLING TRANSVAGINAL N/A 2022    Procedure: Pubovaginal sling with Altis;  Surgeon: Eleuterio Stone MD;  Location: [facility]     TUBAL LIGATION       Family History   Problem Relation Age of Onset     Cancer Mother         cervical     Lipids Mother      Eye Disorder Father      Lipids Father      Alcohol/Drug Maternal Grandmother      Colon Cancer Paternal Grandmother      Diabetes Paternal Grandmother      Eye Disorder Paternal Grandmother      Diabetes Paternal Grandfather      Eye Disorder Paternal Grandfather      Inflammatory Bowel  "Disease Paternal Aunt         and two paternal uncles     Breast Cancer No family hx of      Objective  Ht 1.575 m (5' 2\")   Wt 79.4 kg (175 lb)   LMP 07/23/2010 (LMP Unknown)   BMI 32.01 kg/m        General: healthy, alert and in no distress      HEENT: no scleral icterus or conjunctival erythema     Skin: no suspicious lesions or rash. No jaundice.     CV: regular rhythm by palpation, 2+ distal pulses, no pedal edema      Resp: normal respiratory effort without conversational dyspnea     Psych: normal mood and affect      Gait: nonantalgic, appropriate coordination and balance     Neuro: normal light touch sensory exam of the extremities. Motor strength as noted below     Left Knee exam    ROM:        Ongoing mildly limited active and passive ROM with flexion and extension    Inspection:       no visible ecchymosis       effusion noted moderate    Skin:       no visible deformities       well perfused       capillary refill brisk    Patellar Motion:        Normal patellar tracking noted through range of motion       Lateral tilt noted in patella       Crepitus noted in the patellofemoral joint    Tender:        medial patellar border       lateral patellar border       medial joint line       lateral joint line somewhat improved    Non Tender:         remainder of knee area    Special Tests:        painful Taniya       Boggy/equivocal Lachman       neg (-) varus at 0 deg and 30 deg       neg (-) valgus at 0 deg and 30 deg       positive pain with forced extension    Evaluation of ipsilateral kinetic chain       normal strength with hip extension and abduction      Radiology  EXAM: XR KNEE LEFT 3 VIEWS  LOCATION: [facility]  DATE/TIME: 10/6/2022 9:00 PM     INDICATION: left anterior knee pain in setting of ground level fall directly onto knee  COMPARISON: None.                                                                      IMPRESSION: Small knee joint effusion. No fractures " are evident. Normal joint spacing. Normal patellar alignment.    Assessment:  1. Injury of left knee, subsequent encounter    2. Effusion, left knee    3. Difficulty in weight bearing    4. Injury of left knee, initial encounter        Plan:  Discussed the assessment with the patient.  Follow up: 2 weeks  Acute knee injury after fall at work  Suspect inflammation from compression of PF joint with fall, sizable effusion again today  RICE and supportive care reviewed  Use of short 1-2 wk course of NSAIDs reviewed, dosing and precautions reviewed if utilized  Oral Tylenol and topical Voltaren gel reviewed as safe OTC options, reviewed safe dosing strategies  US guided aspiration today with good initial relief last visit, lasted a few days only  WBAT  Letter provided for work, off until f/u next appt  Consider PT, other tx options based on clinical progress and advanced imaging  Given nature of injury and ongoing effusion and severe pain, MRI ordered today for better diagnostic clarity of possible bone injury or internal derangement  We discussed modified progressive pain-free activity as tolerated  Home handouts provided and supportive care reviewed  All questions were answered today  Contact us with additional questions or concerns  Signs and sx of concern reviewed      Justus Chapman DO, KILO  Sports Medicine Physician  [facility]    Time spent in chart review, one-on-one evaluation, discussion with patient regarding: nature of problem, clinical course, prior treatments, therapeutic options, shared-decision making, potential procedures and referrals, and charting related to the visit: 31 minutes.  If applicable, time does not include time spent performing any procedure.        Disclaimer: This note consists of symbols derived from keyboarding, dictation and/or voice recognition software. As a result, there may be errors in the script that have gone undetected. Please consider this  when interpreting information found in this chart.      Again, thank you for allowing me to participate in the care of your patient.        Sincerely,        Justus Chapman, DO

## 2022-10-13 NOTE — PROGRESS NOTES
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  Patient was seen in the ED on 10/6/22 for this injury. She was referred to us, but was unable to wait until scheduled appointment on 10/11/22 due to needing a work note.      Injury: Patient describes injury as tripping over a chair or child at work and fell over. She went down, directly hitting this knee on the ground 1 day(s).  Pain located over left knee; \"all over\", nonradiating.  Additional Features:  Positive: swelling, instability and weakness.  Symptoms are better with Ice, Aleve, Tylenol, Ibuprofen, Rest and Elevation.  Symptoms are worse with: extension, flexion, standing and stairs.  Other evaluation and/or treatments so far consists of: Ice and Other medications: toridol.  Recent imaging completed: X-rays completed 10/6/22.  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  Left knee aspiration completed on 10/7/22 provided good relief that day of the procedure.  She notes that pain with weight bearing improving, constant ache sensation with rest and bending knee.  No new injury in the interim.      Review of Systems  Musculoskeletal: as above  Remainder of review of systems is negative including constitutional, CV, pulmonary, GI, Skin and Neurologic except as noted in HPI or medical history.    Past Medical History:   Diagnosis Date     Back pain      Crohn's disease (H)      Exercise-induced asthma      Gestational diabetes      Herniation of intervertebral disc of lumbar region      Infertility      Ovarian cyst      Smoker      Status post cholecystectomy 2005     Past " "Surgical History:   Procedure Laterality Date     APPENDECTOMY       C/SECTION, LOW TRANSVERSE      , Low Transverse     CHOLECYSTECTOMY, LAPOROSCOPIC  2005    Cholecystectomy, Laparoscopic     COLONOSCOPY       ESOPHAGOSCOPY, GASTROSCOPY, DUODENOSCOPY (EGD), COMBINED  3/6/2014    Procedure: COMBINED ESOPHAGOSCOPY, GASTROSCOPY, DUODENOSCOPY (EGD), BIOPSY SINGLE OR MULTIPLE;  COLONOSCOPY/ UPPER GI ENDOSCOPY/ COLON/ EGD/ Abdominal pain, epigastric/ PJH;  Surgeon: West Lomas MD;  Location: [facility] OR     HC HYSTEROSCOPY, SURGICAL; W/ ENDOMETRIAL ABLATION, ANY METHOD  2010     HERNIORRHAPHY VENTRAL N/A 2018    Procedure: Open ventral hernia repair;  Surgeon: Alonso Bills MD;  Location: [facility]     HYSTERECTOMY, SUPRACERVICAL LAPAROSCOPIC       LEEP TX, CERVICAL      LEEP TX Cervical     ORTHOPEDIC SURGERY      bluged disk     partial small bowel resection      Ileo-colonic resection. Crohn's disease surgery for bowel obstruction. this was a section of small bowel and large bowel and the cecum., all removed and a reanastamosis done successfully     SALPINGO OOPHORECTOMY,R/L/TORI      Right ovary     SLING TRANSVAGINAL N/A 2022    Procedure: Pubovaginal sling with Altis;  Surgeon: Eleuterio Stone MD;  Location: [facility] OR     TUBAL LIGATION       Family History   Problem Relation Age of Onset     Cancer Mother         cervical     Lipids Mother      Eye Disorder Father      Lipids Father      Alcohol/Drug Maternal Grandmother      Colon Cancer Paternal Grandmother      Diabetes Paternal Grandmother      Eye Disorder Paternal Grandmother      Diabetes Paternal Grandfather      Eye Disorder Paternal Grandfather      Inflammatory Bowel Disease Paternal Aunt         and two paternal uncles     Breast Cancer No family hx of      Objective  Ht 1.575 m (5' 2\")   Wt 79.4 kg (175 lb)   LMP 2010 (LMP Unknown)   BMI 32.01 kg/m        General: healthy, alert and in no distress  "     HEENT: no scleral icterus or conjunctival erythema     Skin: no suspicious lesions or rash. No jaundice.     CV: regular rhythm by palpation, 2+ distal pulses, no pedal edema      Resp: normal respiratory effort without conversational dyspnea     Psych: normal mood and affect      Gait: nonantalgic, appropriate coordination and balance     Neuro: normal light touch sensory exam of the extremities. Motor strength as noted below     Left Knee exam    ROM:        Ongoing mildly limited active and passive ROM with flexion and extension    Inspection:       no visible ecchymosis       effusion noted moderate    Skin:       no visible deformities       well perfused       capillary refill brisk    Patellar Motion:        Normal patellar tracking noted through range of motion       Lateral tilt noted in patella       Crepitus noted in the patellofemoral joint    Tender:        medial patellar border       lateral patellar border       medial joint line       lateral joint line somewhat improved    Non Tender:         remainder of knee area    Special Tests:        painful Taniya       Boggy/equivocal Lachman       neg (-) varus at 0 deg and 30 deg       neg (-) valgus at 0 deg and 30 deg       positive pain with forced extension    Evaluation of ipsilateral kinetic chain       normal strength with hip extension and abduction      Radiology  EXAM: XR KNEE LEFT 3 VIEWS  LOCATION: [facility]  DATE/TIME: 10/6/2022 9:00 PM     INDICATION: left anterior knee pain in setting of ground level fall directly onto knee  COMPARISON: None.                                                                      IMPRESSION: Small knee joint effusion. No fractures are evident. Normal joint spacing. Normal patellar alignment.    Assessment:  1. Injury of left knee, subsequent encounter    2. Effusion, left knee    3. Difficulty in weight bearing    4. Injury of left knee, initial encounter         Plan:  Discussed the assessment with the patient.  Follow up: 2 weeks  Acute knee injury after fall at work  Suspect inflammation from compression of PF joint with fall, sizable effusion again today  RICE and supportive care reviewed  Use of short 1-2 wk course of NSAIDs reviewed, dosing and precautions reviewed if utilized  Oral Tylenol and topical Voltaren gel reviewed as safe OTC options, reviewed safe dosing strategies  US guided aspiration today with good initial relief last visit, lasted a few days only  WBAT  Letter provided for work, off until f/u next appt  Consider PT, other tx options based on clinical progress and advanced imaging  Given nature of injury and ongoing effusion and severe pain, MRI ordered today for better diagnostic clarity of possible bone injury or internal derangement  We discussed modified progressive pain-free activity as tolerated  Home handouts provided and supportive care reviewed  All questions were answered today  Contact us with additional questions or concerns  Signs and sx of concern reviewed      Justus Chapman DO, KILO  Sports Medicine Physician  [facility]    Time spent in chart review, one-on-one evaluation, discussion with patient regarding: nature of problem, clinical course, prior treatments, therapeutic options, shared-decision making, potential procedures and referrals, and charting related to the visit: 31 minutes.  If applicable, time does not include time spent performing any procedure.        Disclaimer: This note consists of symbols derived from keyboarding, dictation and/or voice recognition software. As a result, there may be errors in the script that have gone undetected. Please consider this when interpreting information found in this chart.

## 2022-10-14 DIAGNOSIS — G47.00 INSOMNIA, UNSPECIFIED TYPE: ICD-10-CM

## 2022-10-14 RX ORDER — TRAZODONE HYDROCHLORIDE 50 MG/1
TABLET, FILM COATED ORAL
Qty: 180 TABLET | Refills: 3 | OUTPATIENT
Start: 2022-10-14

## 2022-10-18 ENCOUNTER — TELEPHONE (OUTPATIENT)
Dept: ORTHOPEDICS | Facility: CLINIC | Age: 42
End: 2022-10-18

## 2022-10-18 ENCOUNTER — HOSPITAL ENCOUNTER (OUTPATIENT)
Dept: MRI IMAGING | Facility: CLINIC | Age: 42
Discharge: HOME | End: 2022-10-18
Attending: FAMILY MEDICINE | Admitting: FAMILY MEDICINE
Payer: OTHER MISCELLANEOUS

## 2022-10-18 DIAGNOSIS — M25.462 EFFUSION, LEFT KNEE: ICD-10-CM

## 2022-10-18 DIAGNOSIS — R29.898 DIFFICULTY IN WEIGHT BEARING: ICD-10-CM

## 2022-10-18 DIAGNOSIS — S89.92XD INJURY OF LEFT KNEE, SUBSEQUENT ENCOUNTER: ICD-10-CM

## 2022-10-18 PROCEDURE — 73721 MRI JNT OF LWR EXTRE W/O DYE: CPT | Mod: LT

## 2022-10-18 PROCEDURE — 73721 MRI JNT OF LWR EXTRE W/O DYE: CPT | Mod: 26 | Performed by: RADIOLOGY

## 2022-10-18 NOTE — TELEPHONE ENCOUNTER
Please call Ana with MRI results    Reassuring no fracture, no cruciate ligament injury, no meniscal tear    Key Findings:  Low grade MCL sprain  Grade IV chondromalacia in PF compartment  Small-moderate effusion    Can offer ongoing restrictions for work as needed  Ok to advance WB and activity as tolerated  PT safe and recommended  Consider one-time CSI in the future for pain/swelling that does not improve      Justus Chapman DO, CAQ  Sports Medicine Physician  [facility]

## 2022-10-19 NOTE — TELEPHONE ENCOUNTER
LVM for return call to discuss MRI results and recommendations.  Will have clinical team try reaching out again in 1 - 2 days.    Macho Gomez ATC

## 2022-10-25 NOTE — TELEPHONE ENCOUNTER
Spoke to patient discussed MRI results and recommendations after missing today's OV.  Overall she notes that her left knee pain has continued to slowly improve.  She notes feeling ready return to work with light duty - seat work only.  Patient will send formal workability form via [employer].  She declines physical therapy for now and will continue monitoring after trial of light duty work.    Await workability form once received.    Macho Gomez ATC

## 2022-11-04 ENCOUNTER — HOSPITAL ENCOUNTER (EMERGENCY)
Facility: CLINIC | Age: 42
Discharge: HOME | End: 2022-11-04
Attending: PHYSICIAN ASSISTANT | Admitting: PHYSICIAN ASSISTANT
Payer: COMMERCIAL

## 2022-11-04 VITALS
SYSTOLIC BLOOD PRESSURE: 135 MMHG | HEART RATE: 85 BPM | RESPIRATION RATE: 16 BRPM | OXYGEN SATURATION: 98 % | DIASTOLIC BLOOD PRESSURE: 85 MMHG | TEMPERATURE: 97.5 F

## 2022-11-04 DIAGNOSIS — L30.4 INTERTRIGO: ICD-10-CM

## 2022-11-04 PROCEDURE — 99213 OFFICE O/P EST LOW 20 MIN: CPT | Performed by: PHYSICIAN ASSISTANT

## 2022-11-04 PROCEDURE — G0463 HOSPITAL OUTPT CLINIC VISIT: HCPCS | Performed by: PHYSICIAN ASSISTANT

## 2022-11-04 NOTE — DISCHARGE INSTRUCTIONS
Recommend applying clotrimazole ointment 1% twice per day to the affected areas.  Recommend applying barrier cream such as Desitin as well as bacitracin twice daily.  Any of these creams can be mixed together.  Improvement of the rash will likely take 5 to 7 days.  Return to clinic for further evaluation if the rash worsens or you continue to experience symptoms and 14 days.    Clean the affected area twice per day.  Leave the affected area open to air while at night.

## 2022-11-04 NOTE — ED PROVIDER NOTES
"  History     Chief Complaint   Patient presents with     Rash     HPI  Ana Felix is a 41 year old female with a past medical history of hidradenitis suppurativa, mild persistent asthma, irritable bowel syndrome, Crohn's disease, history of gestational diabetes, migraine headaches who presents for evaluation of a rash on the inner thighs which has been ongoing for the past 2 weeks.  States that she injured her left knee (MCL) 2 weeks ago and has been since spending more time at home, wearing flannel pajamas which she feels is caused the development of a heat rash on the inner thighs.  She has had heat rash in the past, tried the same approach she had done in the past including application of Neosporin, baby powder, and leaving the area open to air with limited relief.  The rash is itchy but not painful.  No concerns for breathing or swallowing, no new medications, no known exposures recently.  No chest pain or shortness of breath.    Allergies:  Allergies   Allergen Reactions     Infliximab Hives     Sulfa Drugs [Sulfa Drugs] Nausea     Pt states \"it doesn't work for me\"     Sulfacetamide Nausea     Augmentin Other (See Comments) and Nausea and Vomiting     Crohn's     Bupropion Hives and Nausea     Droperidol Other (See Comments)     Dystonic reaction     Ibuprofen Other (See Comments) and Nausea     Crohn's       Lyrica [Pregabalin] Nausea and Vomiting     Grogginess, severe back pain  Grogginess, severe back pain     Vicodin [Hydrocodone-Acetaminophen] Nausea and Vomiting       Problem List:    Patient Active Problem List    Diagnosis Date Noted     DANIELLE (generalized anxiety disorder) 03/15/2022     Priority: Medium     Insomnia, unspecified type 03/15/2022     Priority: Medium     SKYE (stress urinary incontinence, female) 03/14/2022     Priority: Medium     Added automatically from request for surgery [id number]       Hidradenitis suppurativa 09/02/2021     Priority: Medium     Immunocompromised state (H) " 07/09/2021     Priority: Medium     History of gestational diabetes mellitus (GDM) 08/15/2018     Priority: Medium     Amenorrhea 08/15/2018     Priority: Medium     Crohn's disease of both small and large intestine without complication (H) 07/31/2018     Priority: Medium     Pain medication agreement 08/18/2017     Priority: Medium     Overview:   [facility]       Controlled substance agreement signed 07/19/2017     Priority: Medium     Degenerative lumbar disc 03/07/2017     Priority: Medium     Labral tear of hip, degenerative 03/07/2017     Priority: Medium     Pain of right hip joint 03/07/2017     Priority: Medium     S/P LEEP of cervix 12/15/2015     Priority: Medium     S/p LEEP of cervix - date unknown  12/9/15: Pap - NIL, Neg HPV. Plan cotest in 1 year. If JAYME 2/3 on biopsy - PAP/HPV co-testing at 12, 24 months. If two negative results repeat co-testing in 3 years, if negative then routine screening.  3/7/18 Pap: NIL/neg HPV.             Hearing difficulty 10/09/2014     Priority: Medium     Irritable bowel syndrome (IBS) 04/15/2014     Priority: Medium     Tiffany raw vegetables       Lactose intolerance 04/15/2014     Priority: Medium     Abdominal pain, right upper quadrant 03/06/2014     Priority: Medium     Nausea with vomiting 01/31/2014     Priority: Medium     Chronic low back pain 11/05/2012     Priority: Medium     Follows with pain clinic.       Mild persistent asthma, uncomplicated 11/05/2012     Priority: Medium     Discogenic pain 10/08/2012     Priority: Medium     Abdominal pain 08/30/2012     Priority: Medium     S/P oophorectomy 07/20/2012     Priority: Medium     History of cholecystectomy 07/20/2012     Priority: Medium     History of resection of small bowel 07/20/2012     Priority: Medium     Tobacco abuse 05/25/2012     Priority: Medium     Displacement of lumbar intervertebral disc without myelopathy 01/06/2012     Priority: Medium     Disorder of sacrum 12/13/2011     Priority:  Medium     Problem list name updated by automated process. Provider to review       Back pain 2011     Priority: Medium     Obesity 2011     Priority: Medium     Migraine headache 2011     Priority: Medium     Patient given Migraine Education folder and Migraine Action Plan on 2011. Cammy Anderson RN    (Problem list name updated by automated process. Provider to review and confirm.)       CARDIOVASCULAR SCREENING; LDL GOAL LESS THAN 160 10/31/2010     Priority: Medium     Dysmenorrhea 10/05/2010     Priority: Medium     Female pelvic pain 2010     Priority: Medium     (Problem list name updated by automated process. Provider to review and confirm.)       Crohns disease 2009     Priority: Medium        Past Medical History:    Past Medical History:   Diagnosis Date     Back pain      Crohn's disease (H)      Exercise-induced asthma      Gestational diabetes      Herniation of intervertebral disc of lumbar region      Infertility      Ovarian cyst      Smoker      Status post cholecystectomy 2005       Past Surgical History:    Past Surgical History:   Procedure Laterality Date     APPENDECTOMY       C/SECTION, LOW TRANSVERSE      , Low Transverse     CHOLECYSTECTOMY, LAPOROSCOPIC  2005    Cholecystectomy, Laparoscopic     COLONOSCOPY       ESOPHAGOSCOPY, GASTROSCOPY, DUODENOSCOPY (EGD), COMBINED  3/6/2014    Procedure: COMBINED ESOPHAGOSCOPY, GASTROSCOPY, DUODENOSCOPY (EGD), BIOPSY SINGLE OR MULTIPLE;  COLONOSCOPY/ UPPER GI ENDOSCOPY/ COLON/ EGD/ Abdominal pain, epigastric/ PJH;  Surgeon: West Lomas MD;  Location:  OR      HYSTEROSCOPY, SURGICAL; W/ ENDOMETRIAL ABLATION, ANY METHOD  2010     HERNIORRHAPHY VENTRAL N/A 2018    Procedure: Open ventral hernia repair;  Surgeon: Alonso Bills MD;  Location: [facility] OR     HYSTERECTOMY, SUPRACERVICAL LAPAROSCOPIC  2010     LEEP TX, CERVICAL      LEEP TX Cervical     ORTHOPEDIC SURGERY       andersond disk     partial small bowel resection  2002    Ileo-colonic resection. Crohn's disease surgery for bowel obstruction. this was a section of small bowel and large bowel and the cecum., all removed and a reanastamosis done successfully     SALPINGO OOPHORECTOMY,R/L/TORI      Right ovary     SLING TRANSVAGINAL N/A 4/4/2022    Procedure: Pubovaginal sling with Altis;  Surgeon: Eleuterio Stone MD;  Location: [facility] OR     TUBAL LIGATION         Family History:    Family History   Problem Relation Age of Onset     Cancer Mother         cervical     Lipids Mother      Eye Disorder Father      Lipids Father      Alcohol/Drug Maternal Grandmother      Colon Cancer Paternal Grandmother      Diabetes Paternal Grandmother      Eye Disorder Paternal Grandmother      Diabetes Paternal Grandfather      Eye Disorder Paternal Grandfather      Inflammatory Bowel Disease Paternal Aunt         and two paternal uncles     Breast Cancer No family hx of        Social History:  Marital Status:   [2]  Social History     Tobacco Use     Smoking status: Every Day     Packs/day: 0.50     Types: Cigarettes     Smokeless tobacco: Never   Vaping Use     Vaping Use: Never used   Substance Use Topics     Alcohol use: Yes     Comment: very rarely     Drug use: No        Medications:    albuterol (PROAIR HFA) 108 (90 Base) MCG/ACT inhaler  azaTHIOprine (IMURAN) 50 MG tablet  cyanocobalamin (VITAMIN B12) 1000 MCG/ML injection  fentaNYL (DURAGESIC) 12 mcg/hr patch 72 hr  fentaNYL (DURAGESIC) 25 mcg/hr patch 72 hr  gabapentin (NEURONTIN) 300 MG capsule  montelukast (SINGULAIR) 10 MG tablet  oxyCODONE-acetaminophen (PERCOCET) 7.5-325 MG per tablet  oxyCODONE-acetaminophen (PERCOCET) 7.5-325 MG per tablet  sertraline (ZOLOFT) 100 MG tablet  traZODone (DESYREL) 50 MG tablet          Review of Systems   Constitutional: Negative.    Respiratory: Negative.    Cardiovascular: Negative.    Gastrointestinal: Negative.    Skin: Positive for rash.        Physical Exam   BP: (!) 184/101  Pulse: 85  Temp: 97.5  F (36.4  C)  Resp: 16  SpO2: 98 %      Physical Exam  Constitutional:       General: She is not in acute distress.     Appearance: Normal appearance. She is not ill-appearing, toxic-appearing or diaphoretic.   Skin:     Findings: Rash present.      Comments: Erythematous patchy rash in the inguinal region bilaterally (skin folds present bilaterally).  Rash is nonpainful, no bleeding or drainage currently.  No fluctuant masses.   Neurological:      Mental Status: She is alert and oriented to person, place, and time.         ED Course                 Procedures                  No results found for this or any previous visit (from the past 24 hour(s)).    Medications - No data to display    Assessments & Plan (with Medical Decision Making)     Presentation and physical exam are consistent with an intertriginous rash, intertrigo.    Recommend applying clotrimazole ointment 1% twice per day to the affected areas.  Recommend applying barrier cream such as Desitin as well as bacitracin twice daily.  Any of these creams can be mixed together.  Improvement of the rash will likely take 5 to 7 days.  Return to clinic for further evaluation if the rash worsens or you continue to experience symptoms and 14 days.    Clean the affected area twice per day.  Leave the affected area open to air while at night.      Return precautions discussed.        Blood pressure was elevated during today's visit but was normal on recheck 135/85.  the patient denies symptoms of chest pain, shortness of breath, palpitations, dizziness, lightheadedness, headache, or neurologic dysfunction.      I have reviewed the nursing notes.    I have reviewed the findings, diagnosis, plan and need for follow up with the patient.      Discharge Medication List as of 11/4/2022  6:59 PM          Final diagnoses:   Intertrigo       11/4/2022   [facility] EMERGENCY DEPT     Michael Arechiga  ANMOL Mccllelan  11/06/22 [id number]

## 2022-11-06 ASSESSMENT — ENCOUNTER SYMPTOMS
RESPIRATORY NEGATIVE: 1
CONSTITUTIONAL NEGATIVE: 1
GASTROINTESTINAL NEGATIVE: 1
CARDIOVASCULAR NEGATIVE: 1

## 2022-11-22 ENCOUNTER — TRANSFERRED RECORDS (OUTPATIENT)
Dept: HEALTH INFORMATION MANAGEMENT | Facility: CLINIC | Age: 42
End: 2022-11-22

## 2022-11-29 ENCOUNTER — OFFICE VISIT (OUTPATIENT)
Dept: ORTHOPEDICS | Facility: CLINIC | Age: 42
End: 2022-11-29
Payer: OTHER MISCELLANEOUS

## 2022-11-29 VITALS
DIASTOLIC BLOOD PRESSURE: 82 MMHG | HEIGHT: 62 IN | SYSTOLIC BLOOD PRESSURE: 115 MMHG | WEIGHT: 189 LBS | BODY MASS INDEX: 34.78 KG/M2

## 2022-11-29 DIAGNOSIS — S89.92XD INJURY OF LEFT KNEE, SUBSEQUENT ENCOUNTER: Primary | ICD-10-CM

## 2022-11-29 DIAGNOSIS — M25.462 EFFUSION, LEFT KNEE: ICD-10-CM

## 2022-11-29 DIAGNOSIS — R29.898 DIFFICULTY IN WEIGHT BEARING: ICD-10-CM

## 2022-11-29 PROCEDURE — 99213 OFFICE O/P EST LOW 20 MIN: CPT | Performed by: FAMILY MEDICINE

## 2022-11-29 NOTE — LETTER
November 29, 2022      Ana was seen in my clinic again today for her left knee injury that occurred at work on 10/6/2022.  She has improving signs of a knee contusion and arthritis flare, with ongoing intermittent difficulty bearing weight and prolonged walking/standing.  She should be considered medically excused from work until our next follow up appointment in 3 weeks. Physical therapy and injection options were reviewed today, and home exercises were provided as well  Updated recommendations will be provided at her next visit, sooner if needed.      Justus Mendez DO, CAJESSICA  Sports Medicine Physician  [facility]

## 2022-11-29 NOTE — PROGRESS NOTES
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  Patient was seen in the ED on 10/6/22 for this injury. She was referred to us, but was unable to wait until scheduled appointment on 10/11/22 due to needing a work note.      Injury: Patient describes injury as tripping over a chair or child at work and fell over. She went down, directly hitting this knee on the ground 1 day(s).  Pain located over left knee; \"all over\", nonradiating.  Additional Features:  Positive: swelling, instability and weakness.  Symptoms are better with Ice, Aleve, Tylenol, Ibuprofen, Rest and Elevation.  Symptoms are worse with: extension, flexion, standing and stairs.  Other evaluation and/or treatments so far consists of: Ice and Other medications: toridol.  Recent imaging completed: X-rays completed 10/6/22.  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  Left knee aspiration completed on 10/7/22 provided good relief that day of the procedure.  She notes that pain with weight bearing improving, constant ache sensation with rest and bending knee.  No new injury in the interim.    Interim History - 2022  Since last visit on 10/13/2022 patient has waxing and waning moderate left knee pain. Patient notes that pain was flared ~ 2 weeks after wearing boots to work and standing for prolonged period ~ two weeks ago, this has since improved.  Aching sensation is improving.  No interim injury.       Review of Systems  Musculoskeletal: as above  Remainder of review of systems is negative including constitutional, CV, pulmonary, GI, Skin and Neurologic except as " noted in HPI or medical history.    Past Medical History:   Diagnosis Date     Back pain      Crohn's disease (H)      Exercise-induced asthma      Gestational diabetes      Herniation of intervertebral disc of lumbar region      Infertility      Ovarian cyst      Smoker      Status post cholecystectomy 2005     Past Surgical History:   Procedure Laterality Date     APPENDECTOMY       C/SECTION, LOW TRANSVERSE      , Low Transverse     CHOLECYSTECTOMY, LAPOROSCOPIC  2005    Cholecystectomy, Laparoscopic     COLONOSCOPY       ESOPHAGOSCOPY, GASTROSCOPY, DUODENOSCOPY (EGD), COMBINED  3/6/2014    Procedure: COMBINED ESOPHAGOSCOPY, GASTROSCOPY, DUODENOSCOPY (EGD), BIOPSY SINGLE OR MULTIPLE;  COLONOSCOPY/ UPPER GI ENDOSCOPY/ COLON/ EGD/ Abdominal pain, epigastric/ PJH;  Surgeon: West Lomas MD;  Location: [facility]      HYSTEROSCOPY, SURGICAL; W/ ENDOMETRIAL ABLATION, ANY METHOD  2010     HERNIORRHAPHY VENTRAL N/A 2018    Procedure: Open ventral hernia repair;  Surgeon: Alonso Bills MD;  Location: [facility]     HYSTERECTOMY, SUPRACERVICAL LAPAROSCOPIC       LEEP TX, CERVICAL      LEEP TX Cervical     ORTHOPEDIC SURGERY      bluged disk     partial small bowel resection      Ileo-colonic resection. Crohn's disease surgery for bowel obstruction. this was a section of small bowel and large bowel and the cecum., all removed and a reanastamosis done successfully     SALPINGO OOPHORECTOMY,R/L/TORI      Right ovary     SLING TRANSVAGINAL N/A 2022    Procedure: Pubovaginal sling with Altis;  Surgeon: Eleuterio Stone MD;  Location: [facility] OR     TUBAL LIGATION       Family History   Problem Relation Age of Onset     Cancer Mother         cervical     Lipids Mother      Eye Disorder Father      Lipids Father      Alcohol/Drug Maternal Grandmother      Colon Cancer Paternal Grandmother      Diabetes Paternal Grandmother      Eye Disorder Paternal Grandmother      Diabetes Paternal  "Grandfather      Eye Disorder Paternal Grandfather      Inflammatory Bowel Disease Paternal Aunt         and two paternal uncles     Breast Cancer No family hx of      Objective  /82   Ht 1.575 m (5' 2\")   Wt 85.7 kg (189 lb)   LMP 07/23/2010 (LMP Unknown)   BMI 34.57 kg/m        General: healthy, alert and in no distress      HEENT: no scleral icterus or conjunctival erythema     Skin: no suspicious lesions or rash. No jaundice.     CV: regular rhythm by palpation, 2+ distal pulses, no pedal edema      Resp: normal respiratory effort without conversational dyspnea     Psych: normal mood and affect      Gait: nonantalgic, appropriate coordination and balance     Neuro: normal light touch sensory exam of the extremities. Motor strength as noted below     Left Knee exam    ROM:        Ongoing mildly limited active and passive ROM with flexion and extension    Inspection:       no visible ecchymosis       effusion noted trace/small    Skin:       no visible deformities       well perfused       capillary refill brisk    Patellar Motion:        Normal patellar tracking noted through range of motion       Lateral tilt noted in patella       Crepitus noted in the patellofemoral joint    Tender:        lateral patellar border       medial joint line       lateral joint line improved    Non Tender:         remainder of knee area    Special Tests:        painful Taniya       Boggy/equivocal Lachman       neg (-) varus at 0 deg and 30 deg       neg (-) valgus at 0 deg and 30 deg       positive pain with forced extension    Evaluation of ipsilateral kinetic chain       normal strength with hip extension and abduction      Radiology  EXAM: XR KNEE LEFT 3 VIEWS  LOCATION: [facility]  DATE/TIME: 10/6/2022 9:00 PM     INDICATION: left anterior knee pain in setting of ground level fall directly onto knee  COMPARISON: None.                                                                 "   IMPRESSION: Small knee joint effusion. No fractures are evident. Normal joint spacing. Normal patellar alignment.    MR left knee without contrast 10/18/2022 3:10 PM     Techniques: Multiplanar multisequence imaging of the left knee was  obtained without administration of intra-articular or intravenous  contrast using routing protocol.     History: eval for possible occult fracture or other bone injury after  injury at work, knee effusion, difficulty with WB, eval as well for  other internal derangement; Injury of left knee, subsequent encounter;  Effusion, left knee; Difficulty in weight bearing     Comparison: 10/6/2022     Findings:     MENISCI:  Medial meniscus: Intact.  Lateral meniscus: Intact.     LIGAMENTS  Cruciate ligaments: Intact.  Medial supporting structures: Mild periligamentous edema around the  tibial collateral ligament and posterior oblique ligament, consistent  with low to moderate grade sprain.  Lateral supporting structures: Focal edema like marrow signal  intensity at the tip of the proximal fibula.      Intact popliteus tendon, fibular collateral ligament, biceps femoris  tendon, conjoined tendon, posterolateral capsule and popliteofibular  ligament. Iliotibial band is also intact.     EXTENSOR MECHANISM  Intact.     FLUID  Small-to-moderate joint effusion. Small fluid in the Baker's cyst with  internal septations.     OSSEOUS and ARTICULAR STRUCTURES  Bones: No fracture, contusion, or osseous lesion is seen. Proximal  tibial hypointensity, presumably bone island.     Patellofemoral compartment: Full-thickness chondral fissuring lateral  patellar facet with subchondral edema crossing or intensity. Also  moderate to high-grade chondral fissure in the patellar median ridge.  Trochlear cartilage is relatively preserved.     Medial compartment: Focal subchondral edema grossly density for  posterior weightbearing surface of the medial femoral condyle,  possibly representing overlying  "full-thickness chondral fissure  present in this area.     Lateral compartment: No high-grade hyaline cartilage disease.     ANCILLARY FINDINGS  Mild subcutaneous edema anteriorly.                                                                      Impression:  1. Low to moderate grade sprain tibial collateral ligament and  posterior oblique ligament.  2. Focal edema like marrow signal intensity at the tip of the proximal  fibula without associated ligamentous injury.  3. Cruciates and menisci are intact.  4. No occult fracture.  5. 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   6. Small-to-moderate knee joint effusion.    Assessment:  1. Injury of left knee, subsequent encounter    2. Effusion, left knee    3. Difficulty in weight bearing        Plan:  Discussed the assessment with the patient.  Follow up: 3 weeks  Acute knee injury after fall at work, slowly improving  Suspect inflammation from compression of PF joint with fall, small effusion today  RICE and supportive care reviewed  Oral Tylenol and topical Voltaren gel reviewed as safe OTC options, reviewed safe dosing strategies  US guided aspiration today with good initial relief but lasted a few days only, could consider one time CSI in the future but hopeful to avoid and continue to defer for now  MRI reviewed today  WBAT  Letter provided for work, off until f/u next appt:  \"Ana was seen in my clinic again today for her left knee injury that occurred at work on 10/6/2022.  She has improving signs of a knee contusion and arthritis flare, with ongoing intermittent difficulty bearing weight and prolonged walking/standing.  She should be considered medically excused from work until our next follow up appointment in 3 weeks. Physical therapy and injection options were reviewed today, and home exercises were provided as well  Updated recommendations will be provided at her next visit, sooner if needed.\"  Consider PT, other tx options based on " clinical progress and advanced imaging  Given nature of injury and ongoing effusion and severe pain, MRI ordered today for better diagnostic clarity of possible bone injury or internal derangement  We discussed modified progressive pain-free activity as tolerated  Home handouts provided and supportive care reviewed  All questions were answered today  Contact us with additional questions or concerns  Signs and sx of concern reviewed      Justus Chapman DO, KILO  Sports Medicine Physician  [facility]            Disclaimer: This note consists of symbols derived from keyboarding, dictation and/or voice recognition software. As a result, there may be errors in the script that have gone undetected. Please consider this when interpreting information found in this chart.

## 2022-11-29 NOTE — LETTER
"    2022         RE: Ana Felix  [address]  [place] [address]        Dear Colleague,    Thank you for referring your patient, Ana Felix, to the [facility] [place]. Please see a copy of my visit note below.    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  Patient was seen in the ED on 10/6/22 for this injury. She was referred to us, but was unable to wait until scheduled appointment on 10/11/22 due to needing a work note.      Injury: Patient describes injury as tripping over a chair or child at work and fell over. She went down, directly hitting this knee on the ground 1 day(s).  Pain located over left knee; \"all over\", nonradiating.  Additional Features:  Positive: swelling, instability and weakness.  Symptoms are better with Ice, Aleve, Tylenol, Ibuprofen, Rest and Elevation.  Symptoms are worse with: extension, flexion, standing and stairs.  Other evaluation and/or treatments so far consists of: Ice and Other medications: toridol.  Recent imaging completed: X-rays completed 10/6/22.  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  Left knee aspiration completed on 10/7/22 provided good relief that day of the procedure.  She notes that pain with weight bearing improving, constant ache sensation with rest and bending knee.  No new injury in the interim.    Interim History - 2022  Since last visit on 10/13/2022 patient has waxing and waning moderate left knee pain. Patient notes that pain was flared ~ 2 weeks after wearing boots to work and standing for prolonged period ~ two weeks " ago, this has since improved.  Aching sensation is improving.  No interim injury.       Review of Systems  Musculoskeletal: as above  Remainder of review of systems is negative including constitutional, CV, pulmonary, GI, Skin and Neurologic except as noted in HPI or medical history.    Past Medical History:   Diagnosis Date     Back pain      Crohn's disease (H)      Exercise-induced asthma      Gestational diabetes      Herniation of intervertebral disc of lumbar region      Infertility      Ovarian cyst      Smoker      Status post cholecystectomy 2005     Past Surgical History:   Procedure Laterality Date     APPENDECTOMY       C/SECTION, LOW TRANSVERSE      , Low Transverse     CHOLECYSTECTOMY, LAPOROSCOPIC  2005    Cholecystectomy, Laparoscopic     COLONOSCOPY       ESOPHAGOSCOPY, GASTROSCOPY, DUODENOSCOPY (EGD), COMBINED  3/6/2014    Procedure: COMBINED ESOPHAGOSCOPY, GASTROSCOPY, DUODENOSCOPY (EGD), BIOPSY SINGLE OR MULTIPLE;  COLONOSCOPY/ UPPER GI ENDOSCOPY/ COLON/ EGD/ Abdominal pain, epigastric/ PJH;  Surgeon: West Lomas MD;  Location:  OR      HYSTEROSCOPY, SURGICAL; W/ ENDOMETRIAL ABLATION, ANY METHOD  2010     HERNIORRHAPHY VENTRAL N/A 2018    Procedure: Open ventral hernia repair;  Surgeon: Alonso Bills MD;  Location: [facility]     HYSTERECTOMY, SUPRACERVICAL LAPAROSCOPIC       LEEP TX, CERVICAL      LEEP TX Cervical     ORTHOPEDIC SURGERY      bluged disk     partial small bowel resection      Ileo-colonic resection. Crohn's disease surgery for bowel obstruction. this was a section of small bowel and large bowel and the cecum., all removed and a reanastamosis done successfully     SALPINGO OOPHORECTOMY,R/L/TORI      Right ovary     SLING TRANSVAGINAL N/A 2022    Procedure: Pubovaginal sling with Altis;  Surgeon: Eleuterio Stone MD;  Location: [facility] OR     TUBAL LIGATION       Family History   Problem Relation Age of Onset     Cancer Mother       "   cervical     Lipids Mother      Eye Disorder Father      Lipids Father      Alcohol/Drug Maternal Grandmother      Colon Cancer Paternal Grandmother      Diabetes Paternal Grandmother      Eye Disorder Paternal Grandmother      Diabetes Paternal Grandfather      Eye Disorder Paternal Grandfather      Inflammatory Bowel Disease Paternal Aunt         and two paternal uncles     Breast Cancer No family hx of      Objective  /82   Ht 1.575 m (5' 2\")   Wt 85.7 kg (189 lb)   LMP 07/23/2010 (LMP Unknown)   BMI 34.57 kg/m        General: healthy, alert and in no distress      HEENT: no scleral icterus or conjunctival erythema     Skin: no suspicious lesions or rash. No jaundice.     CV: regular rhythm by palpation, 2+ distal pulses, no pedal edema      Resp: normal respiratory effort without conversational dyspnea     Psych: normal mood and affect      Gait: nonantalgic, appropriate coordination and balance     Neuro: normal light touch sensory exam of the extremities. Motor strength as noted below     Left Knee exam    ROM:        Ongoing mildly limited active and passive ROM with flexion and extension    Inspection:       no visible ecchymosis       effusion noted trace/small    Skin:       no visible deformities       well perfused       capillary refill brisk    Patellar Motion:        Normal patellar tracking noted through range of motion       Lateral tilt noted in patella       Crepitus noted in the patellofemoral joint    Tender:        lateral patellar border       medial joint line       lateral joint line improved    Non Tender:         remainder of knee area    Special Tests:        painful Taniya       Boggy/equivocal Lachman       neg (-) varus at 0 deg and 30 deg       neg (-) valgus at 0 deg and 30 deg       positive pain with forced extension    Evaluation of ipsilateral kinetic chain       normal strength with hip extension and abduction      Radiology  EXAM: XR KNEE LEFT 3 VIEWS  LOCATION: " [facility]  DATE/TIME: 10/6/2022 9:00 PM     INDICATION: left anterior knee pain in setting of ground level fall directly onto knee  COMPARISON: None.                                                                   IMPRESSION: Small knee joint effusion. No fractures are evident. Normal joint spacing. Normal patellar alignment.    MR left knee without contrast 10/18/2022 3:10 PM     Techniques: Multiplanar multisequence imaging of the left knee was  obtained without administration of intra-articular or intravenous  contrast using routing protocol.     History: eval for possible occult fracture or other bone injury after  injury at work, knee effusion, difficulty with WB, eval as well for  other internal derangement; Injury of left knee, subsequent encounter;  Effusion, left knee; Difficulty in weight bearing     Comparison: 10/6/2022     Findings:     MENISCI:  Medial meniscus: Intact.  Lateral meniscus: Intact.     LIGAMENTS  Cruciate ligaments: Intact.  Medial supporting structures: Mild periligamentous edema around the  tibial collateral ligament and posterior oblique ligament, consistent  with low to moderate grade sprain.  Lateral supporting structures: Focal edema like marrow signal  intensity at the tip of the proximal fibula.      Intact popliteus tendon, fibular collateral ligament, biceps femoris  tendon, conjoined tendon, posterolateral capsule and popliteofibular  ligament. Iliotibial band is also intact.     EXTENSOR MECHANISM  Intact.     FLUID  Small-to-moderate joint effusion. Small fluid in the Baker's cyst with  internal septations.     OSSEOUS and ARTICULAR STRUCTURES  Bones: No fracture, contusion, or osseous lesion is seen. Proximal  tibial hypointensity, presumably bone island.     Patellofemoral compartment: Full-thickness chondral fissuring lateral  patellar facet with subchondral edema crossing or intensity. Also  moderate to high-grade chondral fissure in the  "patellar median ridge.  Trochlear cartilage is relatively preserved.     Medial compartment: Focal subchondral edema grossly density for  posterior weightbearing surface of the medial femoral condyle,  possibly representing overlying full-thickness chondral fissure  present in this area.     Lateral compartment: No high-grade hyaline cartilage disease.     ANCILLARY FINDINGS  Mild subcutaneous edema anteriorly.                                                                      Impression:  1. Low to moderate grade sprain tibial collateral ligament and  posterior oblique ligament.  2. Focal edema like marrow signal intensity at the tip of the proximal  fibula without associated ligamentous injury.  3. Cruciates and menisci are intact.  4. No occult fracture.  5. 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   6. Small-to-moderate knee joint effusion.    Assessment:  1. Injury of left knee, subsequent encounter    2. Effusion, left knee    3. Difficulty in weight bearing        Plan:  Discussed the assessment with the patient.  Follow up: 3 weeks  Acute knee injury after fall at work, slowly improving  Suspect inflammation from compression of PF joint with fall, small effusion today  RICE and supportive care reviewed  Oral Tylenol and topical Voltaren gel reviewed as safe OTC options, reviewed safe dosing strategies  US guided aspiration today with good initial relief but lasted a few days only, could consider one time CSI in the future but hopeful to avoid and continue to defer for now  MRI reviewed today  WBAT  Letter provided for work, off until f/u next appt:  \"Ana was seen in my clinic again today for her left knee injury that occurred at work on 10/6/2022.  She has improving signs of a knee contusion and arthritis flare, with ongoing intermittent difficulty bearing weight and prolonged walking/standing.  She should be considered medically excused from work until our next follow up appointment in " "3 weeks. Physical therapy and injection options were reviewed today, and home exercises were provided as well  Updated recommendations will be provided at her next visit, sooner if needed.\"  Consider PT, other tx options based on clinical progress and advanced imaging  Given nature of injury and ongoing effusion and severe pain, MRI ordered today for better diagnostic clarity of possible bone injury or internal derangement  We discussed modified progressive pain-free activity as tolerated  Home handouts provided and supportive care reviewed  All questions were answered today  Contact us with additional questions or concerns  Signs and sx of concern reviewed      Justus Chapman DO, CAQ  Sports Medicine Physician  [facility]            Disclaimer: This note consists of symbols derived from keyboarding, dictation and/or voice recognition software. As a result, there may be errors in the script that have gone undetected. Please consider this when interpreting information found in this chart.      Again, thank you for allowing me to participate in the care of your patient.        Sincerely,        Justus Chapman DO    "

## 2022-12-07 ENCOUNTER — MYC MEDICAL ADVICE (OUTPATIENT)
Dept: ORTHOPEDICS | Facility: CLINIC | Age: 42
End: 2022-12-07

## 2022-12-07 DIAGNOSIS — J45.30 MILD PERSISTENT ASTHMA, UNCOMPLICATED: ICD-10-CM

## 2022-12-07 NOTE — LETTER
December 7, 2022      Ana Felix  [address]  [place] [address]        To Whom It May Concern:    Please excuse Ana Felixher 12/7/2022 from work and through 12/10/2022 as needed  due to medical condition.        Sincerely,        Justus Chapman, DO

## 2022-12-07 NOTE — LETTER
December 7, 2022      Ana Felix  [address] [address]        To Whom It May Concern:    Please excuse Ana  from work and through 12/10/2022 as needed  due to medical condition.        Sincerely,        Justus Chapman, DO

## 2022-12-07 NOTE — LETTER
December 7, 2022      Ana Felix  [address] [address]        To Whom It May Concern:    Please excuse Ana  from work and through 12/10/2022 as needed  due to medical condition.        Sincerely,        Justus Chapman, DO

## 2022-12-07 NOTE — TELEPHONE ENCOUNTER
Spoke with Dr. Chapman  regarding for medical excuse note from work for patient for today and through the end of the week as needed. Placed note in chart.    KARLA Denis Triage supporting Dr. Chapman

## 2022-12-08 RX ORDER — ALBUTEROL SULFATE 90 UG/1
AEROSOL, METERED RESPIRATORY (INHALATION)
Qty: 18 G | Refills: 1 | Status: SHIPPED | OUTPATIENT
Start: 2022-12-08

## 2022-12-20 ENCOUNTER — OFFICE VISIT (OUTPATIENT)
Dept: ORTHOPEDICS | Facility: CLINIC | Age: 42
End: 2022-12-20
Payer: OTHER MISCELLANEOUS

## 2022-12-20 VITALS
WEIGHT: 190 LBS | SYSTOLIC BLOOD PRESSURE: 130 MMHG | HEIGHT: 62 IN | DIASTOLIC BLOOD PRESSURE: 90 MMHG | BODY MASS INDEX: 34.96 KG/M2

## 2022-12-20 DIAGNOSIS — R29.898 DIFFICULTY IN WEIGHT BEARING: Primary | ICD-10-CM

## 2022-12-20 DIAGNOSIS — M17.12 OSTEOARTHRITIS OF LEFT KNEE, UNSPECIFIED OSTEOARTHRITIS TYPE: ICD-10-CM

## 2022-12-20 DIAGNOSIS — G47.00 INSOMNIA, UNSPECIFIED TYPE: ICD-10-CM

## 2022-12-20 DIAGNOSIS — S89.92XD INJURY OF LEFT KNEE, SUBSEQUENT ENCOUNTER: ICD-10-CM

## 2022-12-20 DIAGNOSIS — M25.462 EFFUSION, LEFT KNEE: ICD-10-CM

## 2022-12-20 PROCEDURE — 99214 OFFICE O/P EST MOD 30 MIN: CPT | Mod: 25 | Performed by: FAMILY MEDICINE

## 2022-12-20 PROCEDURE — 20611 DRAIN/INJ JOINT/BURSA W/US: CPT | Mod: LT | Performed by: FAMILY MEDICINE

## 2022-12-20 RX ORDER — ROPIVACAINE HYDROCHLORIDE 5 MG/ML
3 INJECTION, SOLUTION EPIDURAL; INFILTRATION; PERINEURAL
Status: DISCONTINUED | OUTPATIENT
Start: 2022-12-20 | End: 2023-02-28 | Stop reason: ALTCHOICE

## 2022-12-20 RX ORDER — TRIAMCINOLONE ACETONIDE 40 MG/ML
40 INJECTION, SUSPENSION INTRA-ARTICULAR; INTRAMUSCULAR
Status: DISCONTINUED | OUTPATIENT
Start: 2022-12-20 | End: 2023-02-28 | Stop reason: ALTCHOICE

## 2022-12-20 RX ADMIN — ROPIVACAINE HYDROCHLORIDE 3 ML: 5 INJECTION, SOLUTION EPIDURAL; INFILTRATION; PERINEURAL at 10:00

## 2022-12-20 RX ADMIN — TRIAMCINOLONE ACETONIDE 40 MG: 40 INJECTION, SUSPENSION INTRA-ARTICULAR; INTRAMUSCULAR at 10:00

## 2022-12-20 NOTE — PROGRESS NOTES
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  Patient was seen in the ED on 10/6/22 for this injury. She was referred to us, but was unable to wait until scheduled appointment on 10/11/22 due to needing a work note.      Injury: Patient describes injury as tripping over a chair or child at work and fell over. She went down, directly hitting this knee on the ground 1 day(s).  Pain located over left knee; \"all over\", nonradiating.  Additional Features:  Positive: swelling, instability and weakness.  Symptoms are better with Ice, Aleve, Tylenol, Ibuprofen, Rest and Elevation.  Symptoms are worse with: extension, flexion, standing and stairs.  Other evaluation and/or treatments so far consists of: Ice and Other medications: toridol.  Recent imaging completed: X-rays completed 10/6/22.  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  Left knee aspiration completed on 10/7/22 provided good relief that day of the procedure.  She notes that pain with weight bearing improving, constant ache sensation with rest and bending knee.  No new injury in the interim.    Interim History - 2022  Since last visit on 10/13/2022 patient has waxing and waning moderate left knee pain. Patient notes that pain was flared ~ 2 weeks after wearing boots to work and standing for prolonged period ~ two weeks ago, this has since improved.  Aching sensation is improving.  No interim injury.       Interim History - 2022  Since last visit on 2022 patient has waxing and waning moderate-severe left knee pain over the past 2 weeks.  Notes that " pain is significantly worse over the past ~ 3 - 5 days, with swelling since 22.  Walking with antalgic gait this morning.  No interim injury.       Presents with QRC at today's visit.    Review of Systems  Musculoskeletal: as above  Remainder of review of systems is negative including constitutional, CV, pulmonary, GI, Skin and Neurologic except as noted in HPI or medical history.    Past Medical History:   Diagnosis Date     Back pain      Crohn's disease (H)      Exercise-induced asthma      Gestational diabetes      Herniation of intervertebral disc of lumbar region      Infertility      Ovarian cyst      Smoker      Status post cholecystectomy 2005     Past Surgical History:   Procedure Laterality Date     APPENDECTOMY       C/SECTION, LOW TRANSVERSE      , Low Transverse     CHOLECYSTECTOMY, LAPOROSCOPIC  2005    Cholecystectomy, Laparoscopic     COLONOSCOPY       ESOPHAGOSCOPY, GASTROSCOPY, DUODENOSCOPY (EGD), COMBINED  3/6/2014    Procedure: COMBINED ESOPHAGOSCOPY, GASTROSCOPY, DUODENOSCOPY (EGD), BIOPSY SINGLE OR MULTIPLE;  COLONOSCOPY/ UPPER GI ENDOSCOPY/ COLON/ EGD/ Abdominal pain, epigastric/ [facility];  Surgeon: West Lomas MD;  Location:  OR      HYSTEROSCOPY, SURGICAL; W/ ENDOMETRIAL ABLATION, ANY METHOD  2010     HERNIORRHAPHY VENTRAL N/A 2018    Procedure: Open ventral hernia repair;  Surgeon: Alonso Bills MD;  Location: [facility]     HYSTERECTOMY, SUPRACERVICAL LAPAROSCOPIC       LEEP TX, CERVICAL      LEEP TX Cervical     ORTHOPEDIC SURGERY      bluged disk     partial small bowel resection      Ileo-colonic resection. Crohn's disease surgery for bowel obstruction. this was a section of small bowel and large bowel and the cecum., all removed and a reanastamosis done successfully     SALPINGO OOPHORECTOMY,R/L/TORI      Right ovary     SLING TRANSVAGINAL N/A 2022    Procedure: Pubovaginal sling with Altis;  Surgeon: Eleuterio Stone MD;   "Location: [facility] OR     TUBAL LIGATION       Family History   Problem Relation Age of Onset     Cancer Mother         cervical     Lipids Mother      Eye Disorder Father      Lipids Father      Alcohol/Drug Maternal Grandmother      Colon Cancer Paternal Grandmother      Diabetes Paternal Grandmother      Eye Disorder Paternal Grandmother      Diabetes Paternal Grandfather      Eye Disorder Paternal Grandfather      Inflammatory Bowel Disease Paternal Aunt         and two paternal uncles     Breast Cancer No family hx of      Objective  BP (!) 130/90   Ht 1.575 m (5' 2\")   Wt 86.2 kg (190 lb)   LMP 07/23/2010 (LMP Unknown)   BMI 34.75 kg/m        General: healthy, alert and in no distress      HEENT: no scleral icterus or conjunctival erythema     Skin: no suspicious lesions or rash. No jaundice.     CV: regular rhythm by palpation, 2+ distal pulses, no pedal edema      Resp: normal respiratory effort without conversational dyspnea     Psych: normal mood and affect      Gait: nonantalgic, appropriate coordination and balance     Neuro: normal light touch sensory exam of the extremities. Motor strength as noted below     Left Knee exam    ROM:        Moderately limited active and passive ROM with flexion and extension due to pain, stiffness    Inspection:       no visible ecchymosis       effusion noted trace    Skin:       no visible deformities       well perfused       capillary refill brisk    Patellar Motion:        Normal patellar tracking noted through range of motion       Lateral tilt noted in patella       Crepitus noted in the patellofemoral joint    Tender:        lateral patellar border moderate       medial joint line focal       lateral joint line moderate    Non Tender:         remainder of knee area    Special Tests:        Painful Taniya       neg (-) varus at 0 deg and 30 deg for laxity       neg (-) valgus at 0 deg and 30 deg for laxity       pain with forced extension    Evaluation of " ipsilateral kinetic chain       normal strength with hip extension and abduction      Radiology  EXAM: XR KNEE LEFT 3 VIEWS  LOCATION: [facility]  DATE/TIME: 10/6/2022 9:00 PM     INDICATION: left anterior knee pain in setting of ground level fall directly onto knee  COMPARISON: None.                                                                   IMPRESSION: Small knee joint effusion. No fractures are evident. Normal joint spacing. Normal patellar alignment.    MR left knee without contrast 10/18/2022 3:10 PM     Techniques: Multiplanar multisequence imaging of the left knee was  obtained without administration of intra-articular or intravenous  contrast using routing protocol.     History: eval for possible occult fracture or other bone injury after  injury at work, knee effusion, difficulty with WB, eval as well for  other internal derangement; Injury of left knee, subsequent encounter;  Effusion, left knee; Difficulty in weight bearing     Comparison: 10/6/2022     Findings:     MENISCI:  Medial meniscus: Intact.  Lateral meniscus: Intact.     LIGAMENTS  Cruciate ligaments: Intact.  Medial supporting structures: Mild periligamentous edema around the  tibial collateral ligament and posterior oblique ligament, consistent  with low to moderate grade sprain.  Lateral supporting structures: Focal edema like marrow signal  intensity at the tip of the proximal fibula.      Intact popliteus tendon, fibular collateral ligament, biceps femoris  tendon, conjoined tendon, posterolateral capsule and popliteofibular  ligament. Iliotibial band is also intact.     EXTENSOR MECHANISM  Intact.     FLUID  Small-to-moderate joint effusion. Small fluid in the Baker's cyst with  internal septations.     OSSEOUS and ARTICULAR STRUCTURES  Bones: No fracture, contusion, or osseous lesion is seen. Proximal  tibial hypointensity, presumably bone island.     Patellofemoral compartment: Full-thickness chondral  fissuring lateral  patellar facet with subchondral edema crossing or intensity. Also  moderate to high-grade chondral fissure in the patellar median ridge.  Trochlear cartilage is relatively preserved.     Medial compartment: Focal subchondral edema grossly density for  posterior weightbearing surface of the medial femoral condyle,  possibly representing overlying full-thickness chondral fissure  present in this area.     Lateral compartment: No high-grade hyaline cartilage disease.     ANCILLARY FINDINGS  Mild subcutaneous edema anteriorly.                                                                      Impression:  1. Low to moderate grade sprain tibial collateral ligament and  posterior oblique ligament.  2. Focal edema like marrow signal intensity at the tip of the proximal  fibula without associated ligamentous injury.  3. Cruciates and menisci are intact.  4. No occult fracture.  5. 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   6. Small-to-moderate knee joint effusion.    Large Joint Injection/Arthocentesis: L knee joint    Date/Time: 12/20/2022 10:00 AM  Performed by: Justus Chapman DO  Authorized by: Justus Chapman DO     Indications:  Pain  Needle Size:  21 G  Guidance: ultrasound    Approach:  Superolateral  Location:  Knee      Medications:  3 mL ropivacaine 5 MG/ML; 40 mg triamcinolone 40 MG/ML  Outcome:  Tolerated well, no immediate complications  Procedure discussed: discussed risks, benefits, and alternatives    Consent Given by:  Patient  Timeout: timeout called immediately prior to procedure    Prep: patient was prepped and draped in usual sterile fashion     Ultrasound images of procedure were permanently stored.         Assessment:  1. Difficulty in weight bearing    2. Effusion, left knee    3. Injury of left knee, subsequent encounter    4. Osteoarthritis of left knee, unspecified osteoarthritis type        Plan:  Discussed the assessment with the  "patient.  Follow up: 3 weeks  Acute knee injury after fall at work, was improving but has now plateaued/worsened again  Suspect ongoing inflammation from compression of PF joint with fall, resolving ligamentous sprains  RICE and supportive care reviewed  Oral Tylenol and topical Voltaren gel reviewed as safe OTC options, reviewed safe dosing strategies  US guided aspiration with good initial relief but lasted a few days only  US guided CSI performed today for better pain control  PT order placed as well  MRI reviewed today  Letter updated and workability updated:  \"Ana was seen in my clinic again today for her left knee injury that occurred at work on 10/6/2022.  She has improving signs of a knee effusion from injury.  She had MRI evidence of a ligament sprain and arthritis flare, and she has ongoing intermittent difficulty bearing weight and prolonged walking/standing.  She should be considered medically excused from work through this week.  She can return to work after her winter break, January 3 based on the light duty restrictions as outlined on her workability form.  Essentially she can return to seated work continuously as tolerated and limited standing work.  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  Physical therapy and injection options were reviewed again today.  An ultrasound-guided corticosteroid injection was performed today, and physical therapy was ordered as well and can be started as soon as next week if approved and can be scheduled.  Updated recommendations will be provided at her next visit, sooner if needed.\"  Expectations and goals of CSI reviewed  Often 2-3 days for steroid effect, and can take up to two weeks for maximum effect  We discussed modified progressive pain-free activity as tolerated  Do not overuse in first two weeks if feeling better due to concern for vulnerability while steroid is working  Supportive care reviewed  All " questions were answered today  Contact us with additional questions or concerns  Signs and sx of concern reviewed      Justus Chapman DO, CAQ  Sports Medicine Physician  [facility]    Time spent in chart review, one-on-one evaluation, discussion with patient regarding: nature of problem, clinical course, prior treatments, therapeutic options, shared-decision making, potential procedures and referrals, and charting related to the visit: 32 minutes.  If applicable, time does not include time spent performing any procedure.          Disclaimer: This note consists of symbols derived from keyboarding, dictation and/or voice recognition software. As a result, there may be errors in the script that have gone undetected. Please consider this when interpreting information found in this chart.

## 2022-12-20 NOTE — LETTER
"    2022         RE: Ana Felix  [address]  [facility] [address]        Dear Colleague,    Thank you for referring your patient, Ana Felix, to the [facility] [place]. Please see a copy of my visit note below.    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  Patient was seen in the ED on 10/6/22 for this injury. She was referred to us, but was unable to wait until scheduled appointment on 10/11/22 due to needing a work note.      Injury: Patient describes injury as tripping over a chair or child at work and fell over. She went down, directly hitting this knee on the ground 1 day(s).  Pain located over left knee; \"all over\", nonradiating.  Additional Features:  Positive: swelling, instability and weakness.  Symptoms are better with Ice, Aleve, Tylenol, Ibuprofen, Rest and Elevation.  Symptoms are worse with: extension, flexion, standing and stairs.  Other evaluation and/or treatments so far consists of: Ice and Other medications: toridol.  Recent imaging completed: X-rays completed 10/6/22.  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  Left knee aspiration completed on 10/7/22 provided good relief that day of the procedure.  She notes that pain with weight bearing improving, constant ache sensation with rest and bending knee.  No new injury in the interim.    Interim History - 2022  Since last visit on 10/13/2022 patient has waxing and waning moderate left knee pain. Patient notes that pain was flared ~ 2 weeks after wearing boots to work and standing for prolonged period ~ two weeks " ago, this has since improved.  Aching sensation is improving.  No interim injury.       Interim History - 2022  Since last visit on 2022 patient has waxing and waning moderate-severe left knee pain over the past 2 weeks.  Notes that pain is significantly worse over the past ~ 3 - 5 days, with swelling since 22.  Walking with antalgic gait this morning.  No interim injury.       Presents with C at today's visit.    Review of Systems  Musculoskeletal: as above  Remainder of review of systems is negative including constitutional, CV, pulmonary, GI, Skin and Neurologic except as noted in HPI or medical history.    Past Medical History:   Diagnosis Date     Back pain      Crohn's disease (H)      Exercise-induced asthma      Gestational diabetes      Herniation of intervertebral disc of lumbar region      Infertility      Ovarian cyst      Smoker      Status post cholecystectomy 2005     Past Surgical History:   Procedure Laterality Date     APPENDECTOMY       C/SECTION, LOW TRANSVERSE      , Low Transverse     CHOLECYSTECTOMY, LAPOROSCOPIC  2005    Cholecystectomy, Laparoscopic     COLONOSCOPY       ESOPHAGOSCOPY, GASTROSCOPY, DUODENOSCOPY (EGD), COMBINED  3/6/2014    Procedure: COMBINED ESOPHAGOSCOPY, GASTROSCOPY, DUODENOSCOPY (EGD), BIOPSY SINGLE OR MULTIPLE;  COLONOSCOPY/ UPPER GI ENDOSCOPY/ COLON/ EGD/ Abdominal pain, epigastric/ PJH;  Surgeon: West Lomas MD;  Location:  OR      HYSTEROSCOPY, SURGICAL; W/ ENDOMETRIAL ABLATION, ANY METHOD  2010     HERNIORRHAPHY VENTRAL N/A 2018    Procedure: Open ventral hernia repair;  Surgeon: Alonso Bills MD;  Location: [facility] OR     HYSTERECTOMY, SUPRACERVICAL LAPAROSCOPIC       LEEP TX, CERVICAL      LEEP TX Cervical     ORTHOPEDIC SURGERY      bluged disk     partial small bowel resection      Ileo-colonic resection. Crohn's disease surgery for bowel obstruction. this was a section of small bowel  "and large bowel and the cecum., all removed and a reanastamosis done successfully     SALPINGO OOPHORECTOMY,R/L/TORI      Right ovary     SLING TRANSVAGINAL N/A 4/4/2022    Procedure: Pubovaginal sling with Altis;  Surgeon: Eleuterio Stone MD;  Location: [facility] OR     TUBAL LIGATION       Family History   Problem Relation Age of Onset     Cancer Mother         cervical     Lipids Mother      Eye Disorder Father      Lipids Father      Alcohol/Drug Maternal Grandmother      Colon Cancer Paternal Grandmother      Diabetes Paternal Grandmother      Eye Disorder Paternal Grandmother      Diabetes Paternal Grandfather      Eye Disorder Paternal Grandfather      Inflammatory Bowel Disease Paternal Aunt         and two paternal uncles     Breast Cancer No family hx of      Objective  BP (!) 130/90   Ht 1.575 m (5' 2\")   Wt 86.2 kg (190 lb)   LMP 07/23/2010 (LMP Unknown)   BMI 34.75 kg/m        General: healthy, alert and in no distress      HEENT: no scleral icterus or conjunctival erythema     Skin: no suspicious lesions or rash. No jaundice.     CV: regular rhythm by palpation, 2+ distal pulses, no pedal edema      Resp: normal respiratory effort without conversational dyspnea     Psych: normal mood and affect      Gait: nonantalgic, appropriate coordination and balance     Neuro: normal light touch sensory exam of the extremities. Motor strength as noted below     Left Knee exam    ROM:        Moderately limited active and passive ROM with flexion and extension due to pain, stiffness    Inspection:       no visible ecchymosis       effusion noted trace    Skin:       no visible deformities       well perfused       capillary refill brisk    Patellar Motion:        Normal patellar tracking noted through range of motion       Lateral tilt noted in patella       Crepitus noted in the patellofemoral joint    Tender:        lateral patellar border moderate       medial joint line focal       lateral joint line " moderate    Non Tender:         remainder of knee area    Special Tests:        Painful Taniya       neg (-) varus at 0 deg and 30 deg for laxity       neg (-) valgus at 0 deg and 30 deg for laxity       pain with forced extension    Evaluation of ipsilateral kinetic chain       normal strength with hip extension and abduction      Radiology  EXAM: XR KNEE LEFT 3 VIEWS  LOCATION: [facility]  DATE/TIME: 10/6/2022 9:00 PM     INDICATION: left anterior knee pain in setting of ground level fall directly onto knee  COMPARISON: None.                                                                   IMPRESSION: Small knee joint effusion. No fractures are evident. Normal joint spacing. Normal patellar alignment.    MR left knee without contrast 10/18/2022 3:10 PM     Techniques: Multiplanar multisequence imaging of the left knee was  obtained without administration of intra-articular or intravenous  contrast using routing protocol.     History: eval for possible occult fracture or other bone injury after  injury at work, knee effusion, difficulty with WB, eval as well for  other internal derangement; Injury of left knee, subsequent encounter;  Effusion, left knee; Difficulty in weight bearing     Comparison: 10/6/2022     Findings:     MENISCI:  Medial meniscus: Intact.  Lateral meniscus: Intact.     LIGAMENTS  Cruciate ligaments: Intact.  Medial supporting structures: Mild periligamentous edema around the  tibial collateral ligament and posterior oblique ligament, consistent  with low to moderate grade sprain.  Lateral supporting structures: Focal edema like marrow signal  intensity at the tip of the proximal fibula.      Intact popliteus tendon, fibular collateral ligament, biceps femoris  tendon, conjoined tendon, posterolateral capsule and popliteofibular  ligament. Iliotibial band is also intact.     EXTENSOR MECHANISM  Intact.     FLUID  Small-to-moderate joint effusion. Small fluid in  the Baker's cyst with  internal septations.     OSSEOUS and ARTICULAR STRUCTURES  Bones: No fracture, contusion, or osseous lesion is seen. Proximal  tibial hypointensity, presumably bone island.     Patellofemoral compartment: Full-thickness chondral fissuring lateral  patellar facet with subchondral edema crossing or intensity. Also  moderate to high-grade chondral fissure in the patellar median ridge.  Trochlear cartilage is relatively preserved.     Medial compartment: Focal subchondral edema grossly density for  posterior weightbearing surface of the medial femoral condyle,  possibly representing overlying full-thickness chondral fissure  present in this area.     Lateral compartment: No high-grade hyaline cartilage disease.     ANCILLARY FINDINGS  Mild subcutaneous edema anteriorly.                                                                      Impression:  1. Low to moderate grade sprain tibial collateral ligament and  posterior oblique ligament.  2. Focal edema like marrow signal intensity at the tip of the proximal  fibula without associated ligamentous injury.  3. Cruciates and menisci are intact.  4. No occult fracture.  5. 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   6. Small-to-moderate knee joint effusion.    Large Joint Injection/Arthocentesis: L knee joint    Date/Time: 12/20/2022 10:00 AM  Performed by: Justus Chapman DO  Authorized by: Justus Chapman DO     Indications:  Pain  Needle Size:  21 G  Guidance: ultrasound    Approach:  Superolateral  Location:  Knee      Medications:  3 mL ropivacaine 5 MG/ML; 40 mg triamcinolone 40 MG/ML  Outcome:  Tolerated well, no immediate complications  Procedure discussed: discussed risks, benefits, and alternatives    Consent Given by:  Patient  Timeout: timeout called immediately prior to procedure    Prep: patient was prepped and draped in usual sterile fashion     Ultrasound images of procedure were permanently  "stored.         Assessment:  1. Difficulty in weight bearing    2. Effusion, left knee    3. Injury of left knee, subsequent encounter    4. Osteoarthritis of left knee, unspecified osteoarthritis type        Plan:  Discussed the assessment with the patient.  Follow up: 3 weeks  Acute knee injury after fall at work, was improving but has now plateaued/worsened again  Suspect ongoing inflammation from compression of PF joint with fall, resolving ligamentous sprains  RICE and supportive care reviewed  Oral Tylenol and topical Voltaren gel reviewed as safe OTC options, reviewed safe dosing strategies  US guided aspiration with good initial relief but lasted a few days only  US guided CSI performed today for better pain control  PT order placed as well  MRI reviewed today  Letter updated and workability updated:  \"Ana was seen in my clinic again today for her left knee injury that occurred at work on 10/6/2022.  She has improving signs of a knee effusion from injury.  She had MRI evidence of a ligament sprain and arthritis flare, and she has ongoing intermittent difficulty bearing weight and prolonged walking/standing.  She should be considered medically excused from work through this week.  She can return to work after her winter break, January 3 based on the light duty restrictions as outlined on her workability form.  Essentially she can return to seated work continuously as tolerated and limited standing work.  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  Physical therapy and injection options were reviewed again today.  An ultrasound-guided corticosteroid injection was performed today, and physical therapy was ordered as well and can be started as soon as next week if approved and can be scheduled.  Updated recommendations will be provided at her next visit, sooner if needed.\"  Expectations and goals of CSI reviewed  Often 2-3 days for steroid effect, and can " take up to two weeks for maximum effect  We discussed modified progressive pain-free activity as tolerated  Do not overuse in first two weeks if feeling better due to concern for vulnerability while steroid is working  Supportive care reviewed  All questions were answered today  Contact us with additional questions or concerns  Signs and sx of concern reviewed      Justus Chapman DO, KILO  Sports Medicine Physician  [facility]    Time spent in chart review, one-on-one evaluation, discussion with patient regarding: nature of problem, clinical course, prior treatments, therapeutic options, shared-decision making, potential procedures and referrals, and charting related to the visit: 32 minutes.  If applicable, time does not include time spent performing any procedure.          Disclaimer: This note consists of symbols derived from keyboarding, dictation and/or voice recognition software. As a result, there may be errors in the script that have gone undetected. Please consider this when interpreting information found in this chart.      Again, thank you for allowing me to participate in the care of your patient.        Sincerely,        Justus Chapman DO

## 2022-12-20 NOTE — LETTER
December 20, 2022      Ana was seen in my clinic again today for her left knee injury that occurred at work on 10/6/2022.  She has improving signs of a knee effusion from injury.  She had MRI evidence of a ligament sprain and arthritis flare, and she has ongoing intermittent difficulty bearing weight and prolonged walking/standing.  She should be considered medically excused from work through this week.  She can return to work after her winter break, January 3 based on the light duty restrictions as outlined on her workability form.  Essentially she can return to seated work continuously as tolerated and limited standing work.  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  Physical therapy and injection options were reviewed again today.  An ultrasound-guided corticosteroid injection was performed today, and physical therapy was ordered as well and can be started as soon as next week if approved and can be scheduled.  Updated recommendations will be provided at her next visit, sooner if needed.      Justus Mendez DO, CAQ  Sports Medicine Physician  [facility]

## 2022-12-21 RX ORDER — TRAZODONE HYDROCHLORIDE 50 MG/1
TABLET, FILM COATED ORAL
Qty: 180 TABLET | Refills: 1 | OUTPATIENT
Start: 2022-12-21

## 2022-12-21 NOTE — TELEPHONE ENCOUNTER
Refills remain on file. Refused.     Carmella Ambrose, RN, BSN, PHN  [facility]: [place]

## 2023-01-03 ENCOUNTER — HOSPITAL ENCOUNTER (OUTPATIENT)
Dept: PHYSICAL THERAPY | Facility: CLINIC | Age: 43
Setting detail: THERAPIES SERIES
Discharge: HOME | End: 2023-01-03
Attending: FAMILY MEDICINE
Payer: OTHER MISCELLANEOUS

## 2023-01-03 DIAGNOSIS — M17.12 OSTEOARTHRITIS OF LEFT KNEE, UNSPECIFIED OSTEOARTHRITIS TYPE: ICD-10-CM

## 2023-01-03 DIAGNOSIS — S89.92XD INJURY OF LEFT KNEE, SUBSEQUENT ENCOUNTER: ICD-10-CM

## 2023-01-03 DIAGNOSIS — M25.462 EFFUSION, LEFT KNEE: ICD-10-CM

## 2023-01-03 DIAGNOSIS — R29.898 DIFFICULTY IN WEIGHT BEARING: ICD-10-CM

## 2023-01-03 PROCEDURE — 97110 THERAPEUTIC EXERCISES: CPT | Mod: GP | Performed by: PHYSICAL THERAPIST

## 2023-01-03 PROCEDURE — 97161 PT EVAL LOW COMPLEX 20 MIN: CPT | Mod: GP | Performed by: PHYSICAL THERAPIST

## 2023-01-10 ENCOUNTER — HOSPITAL ENCOUNTER (OUTPATIENT)
Dept: PHYSICAL THERAPY | Facility: CLINIC | Age: 43
Setting detail: THERAPIES SERIES
Discharge: HOME | End: 2023-01-10
Attending: FAMILY MEDICINE
Payer: OTHER MISCELLANEOUS

## 2023-01-10 PROCEDURE — 97110 THERAPEUTIC EXERCISES: CPT | Mod: GP | Performed by: PHYSICAL THERAPIST

## 2023-01-11 ENCOUNTER — OFFICE VISIT (OUTPATIENT)
Dept: ORTHOPEDICS | Facility: CLINIC | Age: 43
End: 2023-01-11
Payer: OTHER MISCELLANEOUS

## 2023-01-11 VITALS — HEIGHT: 62 IN | BODY MASS INDEX: 34.96 KG/M2 | WEIGHT: 190 LBS

## 2023-01-11 DIAGNOSIS — M17.12 OSTEOARTHRITIS OF LEFT KNEE, UNSPECIFIED OSTEOARTHRITIS TYPE: ICD-10-CM

## 2023-01-11 DIAGNOSIS — S89.92XD INJURY OF LEFT KNEE, SUBSEQUENT ENCOUNTER: Primary | ICD-10-CM

## 2023-01-11 PROCEDURE — 99213 OFFICE O/P EST LOW 20 MIN: CPT | Performed by: FAMILY MEDICINE

## 2023-01-11 NOTE — PROGRESS NOTES
Ana Felix  :  1980  DOS: 22  MRN: [id number]    Sports Medicine Clinic Visit    PCP: Radha Bermudez    Ana Felix is a 41 year old female who is seen in follow up for her work related left knee injury.      Interim History - 2023  Since last visit on 22 patient has mild-moderate left knee pain.  Patient describes generalized aching sensation after walking/standing for daily work.  Left knee steroid injection completed on 22 is providing relief at this time.  No new injury in the interim.    Review of Systems  Musculoskeletal: as above  Remainder of review of systems is negative including constitutional, CV, pulmonary, GI, Skin and Neurologic except as noted in HPI or medical history.    Past Medical History:   Diagnosis Date     Back pain      Crohn's disease (H)      Exercise-induced asthma      Gestational diabetes      Herniation of intervertebral disc of lumbar region      Infertility      Ovarian cyst      Smoker      Status post cholecystectomy 2005     Past Surgical History:   Procedure Laterality Date     APPENDECTOMY       C/SECTION, LOW TRANSVERSE      , Low Transverse     CHOLECYSTECTOMY, LAPOROSCOPIC  2005    Cholecystectomy, Laparoscopic     COLONOSCOPY       ESOPHAGOSCOPY, GASTROSCOPY, DUODENOSCOPY (EGD), COMBINED  3/6/2014    Procedure: COMBINED ESOPHAGOSCOPY, GASTROSCOPY, DUODENOSCOPY (EGD), BIOPSY SINGLE OR MULTIPLE;  COLONOSCOPY/ UPPER GI ENDOSCOPY/ COLON/ EGD/ Abdominal pain, epigastric/ PJH;  Surgeon: West Lomas MD;  Location:  OR      HYSTEROSCOPY, SURGICAL; W/ ENDOMETRIAL ABLATION, ANY METHOD  2010     HERNIORRHAPHY VENTRAL N/A 2018    Procedure: Open ventral hernia repair;  Surgeon: Alonso Bills MD;  Location: [facility] OR     HYSTERECTOMY, SUPRACERVICAL LAPAROSCOPIC       LEEP TX, CERVICAL      LEEP TX Cervical     ORTHOPEDIC SURGERY      bluged disk     partial small bowel resection       "Ileo-colonic resection. Crohn's disease surgery for bowel obstruction. this was a section of small bowel and large bowel and the cecum., all removed and a reanastamosis done successfully     SALPINGO OOPHORECTOMY,R/L/TORI      Right ovary     SLING TRANSVAGINAL N/A 4/4/2022    Procedure: Pubovaginal sling with Altis;  Surgeon: Eleuterio Stone MD;  Location: [facility] OR     TUBAL LIGATION       Family History   Problem Relation Age of Onset     Cancer Mother         cervical     Lipids Mother      Eye Disorder Father      Lipids Father      Alcohol/Drug Maternal Grandmother      Colon Cancer Paternal Grandmother      Diabetes Paternal Grandmother      Eye Disorder Paternal Grandmother      Diabetes Paternal Grandfather      Eye Disorder Paternal Grandfather      Inflammatory Bowel Disease Paternal Aunt         and two paternal uncles     Breast Cancer No family hx of      Objective  Ht 1.575 m (5' 2\")   Wt 86.2 kg (190 lb)   LMP 07/23/2010 (LMP Unknown)   BMI 34.75 kg/m        General: healthy, alert and in no distress      HEENT: no scleral icterus or conjunctival erythema     Skin: no suspicious lesions or rash. No jaundice.     CV: regular rhythm by palpation, 2+ distal pulses, no pedal edema      Resp: normal respiratory effort without conversational dyspnea     Psych: normal mood and affect      Gait: nonantalgic, appropriate coordination and balance     Neuro: normal light touch sensory exam of the extremities. Motor strength as noted below     Left Knee exam    ROM:        Near full active and passive ROM with flexion and extension, improved    Inspection:       no visible ecchymosis       effusion noted trace    Skin:       no visible deformities       well perfused       capillary refill brisk    Patellar Motion:        Normal patellar tracking noted through range of motion       Lateral tilt noted in patella       Crepitus noted in the patellofemoral joint    Tender:        lateral patellar border very " mild       medial joint line very mild       lateral joint line minimal       All improved    Non Tender:         remainder of knee area    Special Tests:        neg (-) varus at 0 deg and 30 deg for laxity       neg (-) valgus at 0 deg and 30 deg for laxity       Less pain with forced extension    Evaluation of ipsilateral kinetic chain       normal strength with hip extension and abduction      Radiology  EXAM: XR KNEE LEFT 3 VIEWS  LOCATION: [facility]  DATE/TIME: 10/6/2022 9:00 PM     INDICATION: left anterior knee pain in setting of ground level fall directly onto knee  COMPARISON: None.                                                                   IMPRESSION: Small knee joint effusion. No fractures are evident. Normal joint spacing. Normal patellar alignment.    MR left knee without contrast 10/18/2022 3:10 PM     Techniques: Multiplanar multisequence imaging of the left knee was  obtained without administration of intra-articular or intravenous  contrast using routing protocol.     History: eval for possible occult fracture or other bone injury after  injury at work, knee effusion, difficulty with WB, eval as well for  other internal derangement; Injury of left knee, subsequent encounter;  Effusion, left knee; Difficulty in weight bearing     Comparison: 10/6/2022     Findings:     MENISCI:  Medial meniscus: Intact.  Lateral meniscus: Intact.     LIGAMENTS  Cruciate ligaments: Intact.  Medial supporting structures: Mild periligamentous edema around the  tibial collateral ligament and posterior oblique ligament, consistent  with low to moderate grade sprain.  Lateral supporting structures: Focal edema like marrow signal  intensity at the tip of the proximal fibula.      Intact popliteus tendon, fibular collateral ligament, biceps femoris  tendon, conjoined tendon, posterolateral capsule and popliteofibular  ligament. Iliotibial band is also intact.     EXTENSOR  MECHANISM  Intact.     FLUID  Small-to-moderate joint effusion. Small fluid in the Baker's cyst with  internal septations.     OSSEOUS and ARTICULAR STRUCTURES  Bones: No fracture, contusion, or osseous lesion is seen. Proximal  tibial hypointensity, presumably bone island.     Patellofemoral compartment: Full-thickness chondral fissuring lateral  patellar facet with subchondral edema crossing or intensity. Also  moderate to high-grade chondral fissure in the patellar median ridge.  Trochlear cartilage is relatively preserved.     Medial compartment: Focal subchondral edema grossly density for  posterior weightbearing surface of the medial femoral condyle,  possibly representing overlying full-thickness chondral fissure  present in this area.     Lateral compartment: No high-grade hyaline cartilage disease.     ANCILLARY FINDINGS  Mild subcutaneous edema anteriorly.                                                                      Impression:  1. Low to moderate grade sprain tibial collateral ligament and  posterior oblique ligament.  2. Focal edema like marrow signal intensity at the tip of the proximal  fibula without associated ligamentous injury.  3. Cruciates and menisci are intact.  4. No occult fracture.  5. 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   6. Small-to-moderate knee joint effusion.      Assessment:  1. Injury of left knee, subsequent encounter    2. Osteoarthritis of left knee, unspecified osteoarthritis type        Plan:  Discussed the assessment with the patient.  Follow up: 4 weeks  Acute knee injury after fall at work, improved s/p CSI last visit, pain much more manageable now  Ongoing but improved inflammation from compression of PF joint with fall, resolvefd ligamentous sprains based on exam  RICE and supportive care reviewed  Oral Tylenol and topical Voltaren gel reviewed as safe OTC options, reviewed safe dosing strategies  US guided CSI performed last visit helpful,  "could consider viscosupplementation trial in the future based on progress, defer for now, will consider asking for authorization if pain starts to worsen again  Continue PT  Letter updated and workability updated:  \"Ana was seen in my clinic again today for her left knee injury that occurred at work on 10/6/2022.  She has improving signs of a knee effusion from injury, with significant improvement since the steroid injection performed last visit.  She can continue to work with light duty restrictions outlined on her workability form.  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  Physical therapy will be continued until her next appointment, as ordered. Updated recommendations will be provided at her next visit, sooner if needed.\"  Supportive care reviewed  All questions were answered today  Contact us with additional questions or concerns  Signs and sx of concern reviewed      Justus Chapman DO, KILO  Sports Medicine Physician  [facility]              Disclaimer: This note consists of symbols derived from keyboarding, dictation and/or voice recognition software. As a result, there may be errors in the script that have gone undetected. Please consider this when interpreting information found in this chart.  "

## 2023-01-11 NOTE — LETTER
January 11, 2023      Ana was seen in my clinic again today for her left knee injury that occurred at work on 10/6/2022.  She has improving signs of a knee effusion from injury, with significant improvement since the steroid injection performed last visit.  She can continue to work with light duty restrictions outlined on her workability form.  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  Physical therapy will be continued until her next appointment, as ordered. Updated recommendations will be provided at her next visit, sooner if needed.      Justus Mendez DO, CAJESSICA  Sports Medicine Physician  [facility]

## 2023-01-11 NOTE — LETTER
2023         RE: Ana Felix  [address]  [place] [address]        Dear Colleague,    Thank you for referring your patient, Ana Felix, to the [facility]. Please see a copy of my visit note below.    Ana Felix  :  1980  DOS: 22  MRN: [id number]    Sports Medicine Clinic Visit    PCP: Radha Bermudez    Ana Felix is a 41 year old female who is seen in follow up for her work related left knee injury.      Interim History - 2023  Since last visit on 22 patient has mild-moderate left knee pain.  Patient describes generalized aching sensation after walking/standing for daily work.  Left knee steroid injection completed on 22 is providing relief at this time.  No new injury in the interim.    Review of Systems  Musculoskeletal: as above  Remainder of review of systems is negative including constitutional, CV, pulmonary, GI, Skin and Neurologic except as noted in HPI or medical history.    Past Medical History:   Diagnosis Date     Back pain      Crohn's disease (H)      Exercise-induced asthma      Gestational diabetes      Herniation of intervertebral disc of lumbar region      Infertility      Ovarian cyst      Smoker      Status post cholecystectomy 2005     Past Surgical History:   Procedure Laterality Date     APPENDECTOMY       C/SECTION, LOW TRANSVERSE      , Low Transverse     CHOLECYSTECTOMY, LAPOROSCOPIC  2005    Cholecystectomy, Laparoscopic     COLONOSCOPY       ESOPHAGOSCOPY, GASTROSCOPY, DUODENOSCOPY (EGD), COMBINED  3/6/2014    Procedure: COMBINED ESOPHAGOSCOPY, GASTROSCOPY, DUODENOSCOPY (EGD), BIOPSY SINGLE OR MULTIPLE;  COLONOSCOPY/ UPPER GI ENDOSCOPY/ COLON/ EGD/ Abdominal pain, epigastric/ PJH;  Surgeon: West Lomas MD;  Location:  OR      HYSTEROSCOPY, SURGICAL; W/ ENDOMETRIAL ABLATION, ANY METHOD  2010     HERNIORRHAPHY VENTRAL N/A 2018    Procedure: Open  "ventral hernia repair;  Surgeon: Alonso Bills MD;  Location: [facility]     HYSTERECTOMY, SUPRACERVICAL LAPAROSCOPIC  2010     LEEP TX, CERVICAL      LEEP TX Cervical     ORTHOPEDIC SURGERY      bluged disk     partial small bowel resection  2002    Ileo-colonic resection. Crohn's disease surgery for bowel obstruction. this was a section of small bowel and large bowel and the cecum., all removed and a reanastamosis done successfully     SALPINGO OOPHORECTOMY,R/L/TORI      Right ovary     SLING TRANSVAGINAL N/A 4/4/2022    Procedure: Pubovaginal sling with Altis;  Surgeon: Eleuterio Stone MD;  Location: [facility] OR     TUBAL LIGATION       Family History   Problem Relation Age of Onset     Cancer Mother         cervical     Lipids Mother      Eye Disorder Father      Lipids Father      Alcohol/Drug Maternal Grandmother      Colon Cancer Paternal Grandmother      Diabetes Paternal Grandmother      Eye Disorder Paternal Grandmother      Diabetes Paternal Grandfather      Eye Disorder Paternal Grandfather      Inflammatory Bowel Disease Paternal Aunt         and two paternal uncles     Breast Cancer No family hx of      Objective  Ht 1.575 m (5' 2\")   Wt 86.2 kg (190 lb)   LMP 07/23/2010 (LMP Unknown)   BMI 34.75 kg/m        General: healthy, alert and in no distress      HEENT: no scleral icterus or conjunctival erythema     Skin: no suspicious lesions or rash. No jaundice.     CV: regular rhythm by palpation, 2+ distal pulses, no pedal edema      Resp: normal respiratory effort without conversational dyspnea     Psych: normal mood and affect      Gait: nonantalgic, appropriate coordination and balance     Neuro: normal light touch sensory exam of the extremities. Motor strength as noted below     Left Knee exam    ROM:        Near full active and passive ROM with flexion and extension, improved    Inspection:       no visible ecchymosis       effusion noted trace    Skin:       no visible deformities       well " perfused       capillary refill brisk    Patellar Motion:        Normal patellar tracking noted through range of motion       Lateral tilt noted in patella       Crepitus noted in the patellofemoral joint    Tender:        lateral patellar border very mild       medial joint line very mild       lateral joint line minimal       All improved    Non Tender:         remainder of knee area    Special Tests:        neg (-) varus at 0 deg and 30 deg for laxity       neg (-) valgus at 0 deg and 30 deg for laxity       Less pain with forced extension    Evaluation of ipsilateral kinetic chain       normal strength with hip extension and abduction      Radiology  EXAM: XR KNEE LEFT 3 VIEWS  LOCATION: [facility]  DATE/TIME: 10/6/2022 9:00 PM     INDICATION: left anterior knee pain in setting of ground level fall directly onto knee  COMPARISON: None.                                                                   IMPRESSION: Small knee joint effusion. No fractures are evident. Normal joint spacing. Normal patellar alignment.    MR left knee without contrast 10/18/2022 3:10 PM     Techniques: Multiplanar multisequence imaging of the left knee was  obtained without administration of intra-articular or intravenous  contrast using routing protocol.     History: eval for possible occult fracture or other bone injury after  injury at work, knee effusion, difficulty with WB, eval as well for  other internal derangement; Injury of left knee, subsequent encounter;  Effusion, left knee; Difficulty in weight bearing     Comparison: 10/6/2022     Findings:     MENISCI:  Medial meniscus: Intact.  Lateral meniscus: Intact.     LIGAMENTS  Cruciate ligaments: Intact.  Medial supporting structures: Mild periligamentous edema around the  tibial collateral ligament and posterior oblique ligament, consistent  with low to moderate grade sprain.  Lateral supporting structures: Focal edema like marrow signal  intensity  at the tip of the proximal fibula.      Intact popliteus tendon, fibular collateral ligament, biceps femoris  tendon, conjoined tendon, posterolateral capsule and popliteofibular  ligament. Iliotibial band is also intact.     EXTENSOR MECHANISM  Intact.     FLUID  Small-to-moderate joint effusion. Small fluid in the Baker's cyst with  internal septations.     OSSEOUS and ARTICULAR STRUCTURES  Bones: No fracture, contusion, or osseous lesion is seen. Proximal  tibial hypointensity, presumably bone island.     Patellofemoral compartment: Full-thickness chondral fissuring lateral  patellar facet with subchondral edema crossing or intensity. Also  moderate to high-grade chondral fissure in the patellar median ridge.  Trochlear cartilage is relatively preserved.     Medial compartment: Focal subchondral edema grossly density for  posterior weightbearing surface of the medial femoral condyle,  possibly representing overlying full-thickness chondral fissure  present in this area.     Lateral compartment: No high-grade hyaline cartilage disease.     ANCILLARY FINDINGS  Mild subcutaneous edema anteriorly.                                                                      Impression:  1. Low to moderate grade sprain tibial collateral ligament and  posterior oblique ligament.  2. Focal edema like marrow signal intensity at the tip of the proximal  fibula without associated ligamentous injury.  3. Cruciates and menisci are intact.  4. No occult fracture.  5. 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   6. Small-to-moderate knee joint effusion.      Assessment:  1. Injury of left knee, subsequent encounter    2. Osteoarthritis of left knee, unspecified osteoarthritis type        Plan:  Discussed the assessment with the patient.  Follow up: 4 weeks  Acute knee injury after fall at work, improved s/p CSI last visit, pain much more manageable now  Ongoing but improved inflammation from compression of PF joint with  "fall, resolvefd ligamentous sprains based on exam  RICE and supportive care reviewed  Oral Tylenol and topical Voltaren gel reviewed as safe OTC options, reviewed safe dosing strategies  US guided CSI performed last visit helpful, could consider viscosupplementation trial in the future based on progress, defer for now, will consider asking for authorization if pain starts to worsen again  Continue PT  Letter updated and workability updated:  \"Ana was seen in my clinic again today for her left knee injury that occurred at work on 10/6/2022.  She has improving signs of a knee effusion from injury, with significant improvement since the steroid injection performed last visit.  She can continue to work with light duty restrictions outlined on her workability form.  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  Physical therapy will be continued until her next appointment, as ordered. Updated recommendations will be provided at her next visit, sooner if needed.\"  Supportive care reviewed  All questions were answered today  Contact us with additional questions or concerns  Signs and sx of concern reviewed      Justus Chapman DO, CAQ  Sports Medicine Physician  [facility]              Disclaimer: This note consists of symbols derived from keyboarding, dictation and/or voice recognition software. As a result, there may be errors in the script that have gone undetected. Please consider this when interpreting information found in this chart.      Again, thank you for allowing me to participate in the care of your patient.        Sincerely,        Justus Chapman DO    "

## 2023-01-18 ENCOUNTER — HOSPITAL ENCOUNTER (OUTPATIENT)
Dept: PHYSICAL THERAPY | Facility: CLINIC | Age: 43
Setting detail: THERAPIES SERIES
Discharge: HOME | End: 2023-01-18
Attending: FAMILY MEDICINE
Payer: OTHER MISCELLANEOUS

## 2023-01-18 PROCEDURE — 97110 THERAPEUTIC EXERCISES: CPT | Mod: GP | Performed by: PHYSICAL THERAPIST

## 2023-02-07 ENCOUNTER — MYC MEDICAL ADVICE (OUTPATIENT)
Dept: FAMILY MEDICINE | Facility: CLINIC | Age: 43
End: 2023-02-07
Payer: COMMERCIAL

## 2023-02-07 DIAGNOSIS — G47.00 INSOMNIA, UNSPECIFIED TYPE: ICD-10-CM

## 2023-02-08 RX ORDER — TRAZODONE HYDROCHLORIDE 50 MG/1
50-150 TABLET, FILM COATED ORAL AT BEDTIME
Qty: 180 TABLET | Refills: 1 | Status: SHIPPED | OUTPATIENT
Start: 2023-02-08 | End: 2023-06-12

## 2023-02-13 ENCOUNTER — OFFICE VISIT (OUTPATIENT)
Dept: ORTHOPEDICS | Facility: CLINIC | Age: 43
End: 2023-02-13
Attending: FAMILY MEDICINE
Payer: OTHER MISCELLANEOUS

## 2023-02-13 VITALS
RESPIRATION RATE: 18 BRPM | SYSTOLIC BLOOD PRESSURE: 125 MMHG | HEIGHT: 62 IN | HEART RATE: 76 BPM | WEIGHT: 190 LBS | BODY MASS INDEX: 34.96 KG/M2 | DIASTOLIC BLOOD PRESSURE: 84 MMHG

## 2023-02-13 DIAGNOSIS — S83.412A SPRAIN OF MEDIAL COLLATERAL LIGAMENT OF LEFT KNEE, INITIAL ENCOUNTER: Primary | ICD-10-CM

## 2023-02-13 DIAGNOSIS — M17.12 PRIMARY OSTEOARTHRITIS OF LEFT KNEE: ICD-10-CM

## 2023-02-13 DIAGNOSIS — S89.92XD INJURY OF LEFT KNEE, SUBSEQUENT ENCOUNTER: ICD-10-CM

## 2023-02-13 PROCEDURE — 99244 OFF/OP CNSLTJ NEW/EST MOD 40: CPT | Performed by: ORTHOPAEDIC SURGERY

## 2023-02-13 RX ORDER — CELECOXIB 200 MG/1
200 CAPSULE ORAL DAILY
Qty: 30 CAPSULE | Refills: 3 | Status: SHIPPED | OUTPATIENT
Start: 2023-02-13 | End: 2023-12-29

## 2023-02-13 NOTE — LETTER
2023         RE: Ana Felix  [address]  [place] [address]        Dear Colleague,    Thank you for referring your patient, Ana Felix, to the [facility]. Please see a copy of my visit note below.    Ana Felix is a 42 year old female who is seen in consultation at the request of Dr. Justus Chapman  for left knee injury at work.  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  She has had aching pain in the knee with shooting pain at times.  She has primarily left knee pain and rates her pain at 7 out of 10.  She has had physical therapy.  She is also used over-the-counter naproxen about 2/day.  She is supplementing this with ibuprofen.  She also has a history of Crohn's disease, so should be very limited on these anti-inflammatories.  She has had steroid injection just before Elise which did provide significant relief.  Despite this she has not made much progress with the therapy and in fact has had some increased pain with this.  She has used some of the pain medications that she takes for her chronic back pain.    She has had MRI scan of the knee on 10/18/2022 showing no meniscus tears.  There was edema of the MCL consistent with a low to moderate grade sprain.  There is also some full-thickness chondral fissuring along the patellofemoral joint and medial joint.    Past Medical History:   Diagnosis Date     Back pain      Crohn's disease (H)      Exercise-induced asthma      Gestational diabetes      Herniation of intervertebral disc of lumbar region      Infertility      Ovarian cyst      Smoker      Status post cholecystectomy 2005       Past Surgical History:   Procedure Laterality Date     APPENDECTOMY       C/SECTION, LOW TRANSVERSE      , Low Transverse     CHOLECYSTECTOMY, LAPOROSCOPIC  2005    Cholecystectomy, Laparoscopic     COLONOSCOPY       ESOPHAGOSCOPY, GASTROSCOPY, DUODENOSCOPY (EGD),  COMBINED  3/6/2014    Procedure: COMBINED ESOPHAGOSCOPY, GASTROSCOPY, DUODENOSCOPY (EGD), BIOPSY SINGLE OR MULTIPLE;  COLONOSCOPY/ UPPER GI ENDOSCOPY/ COLON/ EGD/ Abdominal pain, epigastric/ PJH;  Surgeon: West Lomas MD;  Location: [facility] OR     HC HYSTEROSCOPY, SURGICAL; W/ ENDOMETRIAL ABLATION, ANY METHOD  7/2010     HERNIORRHAPHY VENTRAL N/A 12/11/2018    Procedure: Open ventral hernia repair;  Surgeon: Alonso Bills MD;  Location: [facility]     HYSTERECTOMY, SUPRACERVICAL LAPAROSCOPIC  2010     LEEP TX, CERVICAL      LEEP TX Cervical     ORTHOPEDIC SURGERY      bluged disk     partial small bowel resection  2002    Ileo-colonic resection. Crohn's disease surgery for bowel obstruction. this was a section of small bowel and large bowel and the cecum., all removed and a reanastamosis done successfully     SALPINGO OOPHORECTOMY,R/L/TORI      Right ovary     SLING TRANSVAGINAL N/A 4/4/2022    Procedure: Pubovaginal sling with Altis;  Surgeon: Eleuterio Stone MD;  Location: [facility] OR     TUBAL LIGATION         Family History   Problem Relation Age of Onset     Cancer Mother         cervical     Lipids Mother      Eye Disorder Father      Lipids Father      Alcohol/Drug Maternal Grandmother      Colon Cancer Paternal Grandmother      Diabetes Paternal Grandmother      Eye Disorder Paternal Grandmother      Diabetes Paternal Grandfather      Eye Disorder Paternal Grandfather      Inflammatory Bowel Disease Paternal Aunt         and two paternal uncles     Breast Cancer No family hx of        Social History     Socioeconomic History     Marital status:      Spouse name: Not on file     Number of children: Not on file     Years of education: Not on file     Highest education level: Not on file   Occupational History     Not on file   Tobacco Use     Smoking status: Every Day     Packs/day: 0.50     Types: Cigarettes     Smokeless tobacco: Never   Vaping Use     Vaping Use: Never used   Substance and Sexual  "Activity     Alcohol use: Yes     Comment: very rarely     Drug use: No     Sexual activity: Yes     Partners: Male     Birth control/protection: Female Surgical     Comment: Hyst   Other Topics Concern     Parent/sibling w/ CABG, MI or angioplasty before 65F 55M? Not Asked   Social History Narrative     Not on file     Social Determinants of Health     Financial Resource Strain: Not on file   Food Insecurity: Not on file   Transportation Needs: Not on file   Physical Activity: Not on file   Stress: Not on file   Social Connections: Not on file   Intimate Partner Violence: Not on file   Housing Stability: Not on file       Current Outpatient Medications   Medication Sig Dispense Refill     celecoxib (CELEBREX) 200 MG capsule Take 1 capsule (200 mg) by mouth daily 30 capsule 3     azaTHIOprine (IMURAN) 50 MG tablet Take 50 mg by mouth daily        cyanocobalamin (VITAMIN B12) 1000 MCG/ML injection Inject 1 mL into the muscle every 30 days       fentaNYL (DURAGESIC) 12 mcg/hr patch 72 hr Place 1 patch onto the skin every 72 hours       fentaNYL (DURAGESIC) 25 mcg/hr patch 72 hr Place 1 patch onto the skin every 72 hours       gabapentin (NEURONTIN) 300 MG capsule Take 2 capsules (600 mg) by mouth 3 times daily 180 capsule 0     montelukast (SINGULAIR) 10 MG tablet Take 1 tablet (10 mg) by mouth daily 90 tablet 3     sertraline (ZOLOFT) 100 MG tablet Take 1.5 tablets (150 mg) by mouth daily - dose increase 2/16/22 135 tablet 3     traZODone (DESYREL) 50 MG tablet Take 1-3 tablets ( mg) by mouth At Bedtime 180 tablet 1     VENTOLIN  (90 Base) MCG/ACT inhaler INHALE 2 PUFFS INTO THE LUNGS EVERY 4 HOURS AS NEEDED FOR SHORTNESS OF BREATH OR DIFFICULT BREATHING 18 g 1       Allergies   Allergen Reactions     Infliximab Hives     Sulfa Drugs [Sulfa Drugs] Nausea     Pt states \"it doesn't work for me\"     Sulfacetamide Nausea     Augmentin Other (See Comments) and Nausea and Vomiting     Crohn's     Bupropion " "Hives and Nausea     Droperidol Other (See Comments)     Dystonic reaction     Ibuprofen Other (See Comments) and Nausea     Crohn's       Lyrica [Pregabalin] Nausea and Vomiting     Grogginess, severe back pain  Grogginess, severe back pain     Vicodin [Hydrocodone-Acetaminophen] Nausea and Vomiting       REVIEW OF SYSTEMS:  CONSTITUTIONAL:  NEGATIVE for fever, chills, change in weight, not feeling tired  SKIN:  NEGATIVE for worrisome rashes, no skin lumps, no skin ulcers and no non-healing wounds  EYES:  NEGATIVE for vision changes or irritation.  ENT/MOUTH:  NEGATIVE.  No hearing loss, no hoarseness, no difficulty swallowing.  RESP:  NEGATIVE. No cough or shortness of breath.  CV:  NEGATIVE for chest pain, palpitations or peripheral edema  GI:  NEGATIVE for nausea, abdominal pain, heartburn, or change in bowel habits  :  Negative. No dysuria, no hematuria  MUSCULOSKELETAL:  See HPI above  NEURO:  NEGATIVE . No headaches, no dizziness,  no numbness  ENDOCRINE:  NEGATIVE for temperature intolerance, skin/hair changes  HEME/ALLERGY/IMMUNE:  NEGATIVE for bleeding problems  PSYCHIATRIC:  NEGATIVE. no anxiety, no depression.      Exam:  Vitals: /84   Pulse 76   Resp 18   Ht 1.575 m (5' 2\")   Wt 86.2 kg (190 lb)   LMP 07/23/2010 (LMP Unknown)   BMI 34.75 kg/m    BMI= Body mass index is 34.75 kg/m .  Constitutional:  healthy, alert and no distress  Neuro: Alert and Oriented x 3, Sensation grossly WNL.  HEENT:  Atraumatic, EOMI  Neck:  Neck supple with no tenderness.  Psych: Affect normal   Respiratory: Breathing not labored.  Cardiovascular: normal peripheral pulses  Lymph: no adenopathy  Skin: No rashes,worrisome lesions or skin problems  Spine: straight, no straight leg raising pain.  Hips show full range of motion.  There is no tenderness over the sacro-iliac joints, sciatic notch, or greater trochanters.   On the left knee she has diffuse tenderness.  She has pain under the patella especially with range " of motion of the knee.  She has no ligamentous laxity currently of MCL, LCL, or cruciates.  She has no pain medially with valgus stress.  She did report some lateral pain with valgus stress and some medial pain with varus stress.  She does have some bilateral patellofemoral crepitation with range of motion.  She has good range of motion from 0 to 125 degrees.  She has no focal weakness of quads or hamstrings, but has some pain with quad use on the left.    Assessment:  1.  Sprain of MCL seen on MRI of October 18, 2022.  This is indicative of the work-related injury due to her fall.  2.  She has chondromalacia of the patellofemoral joint and medial joint which would be pre-existing before the fall.  Especially the patellofemoral chondromalacia could have been aggravated by the fall.  Current pain is consistent with that.    Plan: Treatment of the chondromalacia is appropriate and we will try Celebrex 200 mg daily to avoid aggravating her Crohn's disease.  She has had steroid injection 6 weeks ago, and I would not repeat that for at least another 6 weeks.  Her MCL sprain has healed without residual.  This was her primary work injury.  Once the acute flare of the chondromalacia has had a chance to diminish, further treatment of the chondromalacia would not be due to the work injury.  I would recommend she avoid kneeling and crawling for at least 2 months.  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  Return to clinic with Dr. Chapman in 2 months.    She was seen with her [facility] Sudha Gallegos today        Again, thank you for allowing me to participate in the care of your patient.        Sincerely,        Monty Pinedo MD

## 2023-02-13 NOTE — LETTER
February 13, 2023      Ana Felix  [address]  [place] [address]        To Whom It May Concern:    Ana Felix  was seen for a work comp. left knee injury on 10/6/22.  Please provide patient with a low stool. Restrictions-no kneeling or crawling for 2 months. Return as needed.      Sincerely,        Monty Pinedo MD  (electronically signed)

## 2023-02-14 NOTE — PROGRESS NOTES
Ana Felix is a 42 year old female who is seen in consultation at the request of Dr. Justus Chapman  for left knee injury at work.  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  She has had aching pain in the knee with shooting pain at times.  She has primarily left knee pain and rates her pain at 7 out of 10.  She has had physical therapy.  She is also used over-the-counter naproxen about 2/day.  She is supplementing this with ibuprofen.  She also has a history of Crohn's disease, so should be very limited on these anti-inflammatories.  She has had steroid injection just before  which did provide significant relief.  Despite this she has not made much progress with the therapy and in fact has had some increased pain with this.  She has used some of the pain medications that she takes for her chronic back pain.    She has had MRI scan of the knee on 10/18/2022 showing no meniscus tears.  There was edema of the MCL consistent with a low to moderate grade sprain.  There is also some full-thickness chondral fissuring along the patellofemoral joint and medial joint.    Past Medical History:   Diagnosis Date     Back pain      Crohn's disease (H)      Exercise-induced asthma      Gestational diabetes      Herniation of intervertebral disc of lumbar region      Infertility      Ovarian cyst      Smoker      Status post cholecystectomy 2005       Past Surgical History:   Procedure Laterality Date     APPENDECTOMY       C/SECTION, LOW TRANSVERSE      , Low Transverse     CHOLECYSTECTOMY, LAPOROSCOPIC  2005    Cholecystectomy, Laparoscopic     COLONOSCOPY       ESOPHAGOSCOPY, GASTROSCOPY, DUODENOSCOPY (EGD), COMBINED  3/6/2014    Procedure: COMBINED ESOPHAGOSCOPY, GASTROSCOPY, DUODENOSCOPY (EGD), BIOPSY SINGLE OR MULTIPLE;  COLONOSCOPY/ UPPER GI ENDOSCOPY/ COLON/ EGD/ Abdominal pain, epigastric/ PJH;  Surgeon: West Lomas MD;   Location: [facility]     HC HYSTEROSCOPY, SURGICAL; W/ ENDOMETRIAL ABLATION, ANY METHOD  7/2010     HERNIORRHAPHY VENTRAL N/A 12/11/2018    Procedure: Open ventral hernia repair;  Surgeon: Alonso Bills MD;  Location: [facility]     HYSTERECTOMY, SUPRACERVICAL LAPAROSCOPIC  2010     LEEP TX, CERVICAL      LEEP TX Cervical     ORTHOPEDIC SURGERY      bluged disk     partial small bowel resection  2002    Ileo-colonic resection. Crohn's disease surgery for bowel obstruction. this was a section of small bowel and large bowel and the cecum., all removed and a reanastamosis done successfully     SALPINGO OOPHORECTOMY,R/L/TORI      Right ovary     SLING TRANSVAGINAL N/A 4/4/2022    Procedure: Pubovaginal sling with Altis;  Surgeon: Eleuterio Stone MD;  Location: [facility]     TUBAL LIGATION         Family History   Problem Relation Age of Onset     Cancer Mother         cervical     Lipids Mother      Eye Disorder Father      Lipids Father      Alcohol/Drug Maternal Grandmother      Colon Cancer Paternal Grandmother      Diabetes Paternal Grandmother      Eye Disorder Paternal Grandmother      Diabetes Paternal Grandfather      Eye Disorder Paternal Grandfather      Inflammatory Bowel Disease Paternal Aunt         and two paternal uncles     Breast Cancer No family hx of        Social History     Socioeconomic History     Marital status:      Spouse name: Not on file     Number of children: Not on file     Years of education: Not on file     Highest education level: Not on file   Occupational History     Not on file   Tobacco Use     Smoking status: Every Day     Packs/day: 0.50     Types: Cigarettes     Smokeless tobacco: Never   Vaping Use     Vaping Use: Never used   Substance and Sexual Activity     Alcohol use: Yes     Comment: very rarely     Drug use: No     Sexual activity: Yes     Partners: Male     Birth control/protection: Female Surgical     Comment: Hyst   Other Topics Concern     Parent/sibling w/ CABG, MI or  "angioplasty before 65F 55M? Not Asked   Social History Narrative     Not on file     Social Determinants of Health     Financial Resource Strain: Not on file   Food Insecurity: Not on file   Transportation Needs: Not on file   Physical Activity: Not on file   Stress: Not on file   Social Connections: Not on file   Intimate Partner Violence: Not on file   Housing Stability: Not on file       Current Outpatient Medications   Medication Sig Dispense Refill     celecoxib (CELEBREX) 200 MG capsule Take 1 capsule (200 mg) by mouth daily 30 capsule 3     azaTHIOprine (IMURAN) 50 MG tablet Take 50 mg by mouth daily        cyanocobalamin (VITAMIN B12) 1000 MCG/ML injection Inject 1 mL into the muscle every 30 days       fentaNYL (DURAGESIC) 12 mcg/hr patch 72 hr Place 1 patch onto the skin every 72 hours       fentaNYL (DURAGESIC) 25 mcg/hr patch 72 hr Place 1 patch onto the skin every 72 hours       gabapentin (NEURONTIN) 300 MG capsule Take 2 capsules (600 mg) by mouth 3 times daily 180 capsule 0     montelukast (SINGULAIR) 10 MG tablet Take 1 tablet (10 mg) by mouth daily 90 tablet 3     sertraline (ZOLOFT) 100 MG tablet Take 1.5 tablets (150 mg) by mouth daily - dose increase 2/16/22 135 tablet 3     traZODone (DESYREL) 50 MG tablet Take 1-3 tablets ( mg) by mouth At Bedtime 180 tablet 1     VENTOLIN  (90 Base) MCG/ACT inhaler INHALE 2 PUFFS INTO THE LUNGS EVERY 4 HOURS AS NEEDED FOR SHORTNESS OF BREATH OR DIFFICULT BREATHING 18 g 1       Allergies   Allergen Reactions     Infliximab Hives     Sulfa Drugs [Sulfa Drugs] Nausea     Pt states \"it doesn't work for me\"     Sulfacetamide Nausea     Augmentin Other (See Comments) and Nausea and Vomiting     Crohn's     Bupropion Hives and Nausea     Droperidol Other (See Comments)     Dystonic reaction     Ibuprofen Other (See Comments) and Nausea     Crohn's       Lyrica [Pregabalin] Nausea and Vomiting     Grogginess, severe back pain  Grogginess, severe back " "pain     Vicodin [Hydrocodone-Acetaminophen] Nausea and Vomiting       REVIEW OF SYSTEMS:  CONSTITUTIONAL:  NEGATIVE for fever, chills, change in weight, not feeling tired  SKIN:  NEGATIVE for worrisome rashes, no skin lumps, no skin ulcers and no non-healing wounds  EYES:  NEGATIVE for vision changes or irritation.  ENT/MOUTH:  NEGATIVE.  No hearing loss, no hoarseness, no difficulty swallowing.  RESP:  NEGATIVE. No cough or shortness of breath.  CV:  NEGATIVE for chest pain, palpitations or peripheral edema  GI:  NEGATIVE for nausea, abdominal pain, heartburn, or change in bowel habits  :  Negative. No dysuria, no hematuria  MUSCULOSKELETAL:  See HPI above  NEURO:  NEGATIVE . No headaches, no dizziness,  no numbness  ENDOCRINE:  NEGATIVE for temperature intolerance, skin/hair changes  HEME/ALLERGY/IMMUNE:  NEGATIVE for bleeding problems  PSYCHIATRIC:  NEGATIVE. no anxiety, no depression.      Exam:  Vitals: /84   Pulse 76   Resp 18   Ht 1.575 m (5' 2\")   Wt 86.2 kg (190 lb)   LMP 07/23/2010 (LMP Unknown)   BMI 34.75 kg/m    BMI= Body mass index is 34.75 kg/m .  Constitutional:  healthy, alert and no distress  Neuro: Alert and Oriented x 3, Sensation grossly WNL.  HEENT:  Atraumatic, EOMI  Neck:  Neck supple with no tenderness.  Psych: Affect normal   Respiratory: Breathing not labored.  Cardiovascular: normal peripheral pulses  Lymph: no adenopathy  Skin: No rashes,worrisome lesions or skin problems  Spine: straight, no straight leg raising pain.  Hips show full range of motion.  There is no tenderness over the sacro-iliac joints, sciatic notch, or greater trochanters.   On the left knee she has diffuse tenderness.  She has pain under the patella especially with range of motion of the knee.  She has no ligamentous laxity currently of MCL, LCL, or cruciates.  She has no pain medially with valgus stress.  She did report some lateral pain with valgus stress and some medial pain with varus stress.  She " does have some bilateral patellofemoral crepitation with range of motion.  She has good range of motion from 0 to 125 degrees.  She has no focal weakness of quads or hamstrings, but has some pain with quad use on the left.    Assessment:  1.  Sprain of MCL seen on MRI of October 18, 2022.  This is indicative of the work-related injury due to her fall.  2.  She has chondromalacia of the patellofemoral joint and medial joint which would be pre-existing before the fall.  Especially the patellofemoral chondromalacia could have been aggravated by the fall.  Current pain is consistent with that.    Plan: Treatment of the chondromalacia is appropriate and we will try Celebrex 200 mg daily to avoid aggravating her Crohn's disease.  She has had steroid injection 6 weeks ago, and I would not repeat that for at least another 6 weeks.  Her MCL sprain has healed without residual.  This was her primary work injury.  Once the acute flare of the chondromalacia has had a chance to diminish, further treatment of the chondromalacia would not be due to the work injury.  I would recommend she avoid kneeling and crawling for at least 2 months.  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  Return to clinic with Dr. Chapman in 2 months.    She was seen with her [facility] Sudha Gallegos today

## 2023-02-15 ENCOUNTER — TELEPHONE (OUTPATIENT)
Dept: ORTHOPEDICS | Facility: CLINIC | Age: 43
End: 2023-02-15
Payer: COMMERCIAL

## 2023-02-15 NOTE — TELEPHONE ENCOUNTER
[facility] Call Center    Phone Message    May a detailed message be left on voicemail: no     Reason for Call: Other: Patient calling to f/up on [employer] msg she had sent. She needs a response ASAP

## 2023-02-28 ENCOUNTER — OFFICE VISIT (OUTPATIENT)
Dept: ORTHOPEDICS | Facility: CLINIC | Age: 43
End: 2023-02-28
Payer: OTHER MISCELLANEOUS

## 2023-02-28 VITALS
SYSTOLIC BLOOD PRESSURE: 124 MMHG | BODY MASS INDEX: 35.88 KG/M2 | DIASTOLIC BLOOD PRESSURE: 86 MMHG | HEIGHT: 62 IN | WEIGHT: 195 LBS

## 2023-02-28 DIAGNOSIS — S89.92XD INJURY OF LEFT KNEE, SUBSEQUENT ENCOUNTER: Primary | ICD-10-CM

## 2023-02-28 DIAGNOSIS — M17.12 OSTEOARTHRITIS OF LEFT KNEE, UNSPECIFIED OSTEOARTHRITIS TYPE: ICD-10-CM

## 2023-02-28 PROCEDURE — 99213 OFFICE O/P EST LOW 20 MIN: CPT | Performed by: FAMILY MEDICINE

## 2023-02-28 NOTE — LETTER
February 28, 2023      Ana continues to be under my care for her left knee injury that occurred on 10/6/2022.  She has made good progress since our last visit which is encouraging.  Orthopedic surgery consultation notes were reviewed.  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  She can then increase time in the classroom by 1-2 hours per day each week until she can tolerate a full day.  We will plan to ramp her time in the classroom over the next 3-4 weeks.  Our next follow up appointment will be in 4 weeks.  Physical therapy will be continued in the form of home exercises which she can continue to work on daily.  A return to formal physical therapy will be considered based on her progress.  Updated recommendations will be provided at her next visit, sooner if needed.      Justus Mendez DO, CAJESSICA  Sports Medicine Physician  [facility]

## 2023-02-28 NOTE — PROGRESS NOTES
Ana Felix  :  1980  DOS: 23  MRN: [id number]    Sports Medicine Clinic Visit    PCP: Radha Bermudez    Ana Felix is a 41 year old female who is seen in follow up for her work related left knee injury.      Interim History - 2023  Since last visit on 22 patient has mild-moderate left knee pain.  Patient describes generalized aching sensation after walking/standing for daily work.  Left knee steroid injection completed on 22 is providing relief at this time.  No new injury in the interim.    Interim History - 2023  Since last visit on 2023 patient has waxing and waning left anterior knee pain.  Consulted with Ortho Surgery - Dr Pinedo on 23, recommended trial of Celebrex, patient has not started at this time.  She was provided work restrictions on 23, however has not returned to work, pending outcome of today's OV.  No interim injury.         Review of Systems  Musculoskeletal: as above  Remainder of review of systems is negative including constitutional, CV, pulmonary, GI, Skin and Neurologic except as noted in HPI or medical history.    Past Medical History:   Diagnosis Date     Back pain      Crohn's disease (H)      Exercise-induced asthma      Gestational diabetes      Herniation of intervertebral disc of lumbar region      Infertility      Ovarian cyst      Smoker      Status post cholecystectomy 2005     Past Surgical History:   Procedure Laterality Date     APPENDECTOMY       C/SECTION, LOW TRANSVERSE      , Low Transverse     CHOLECYSTECTOMY, LAPOROSCOPIC  2005    Cholecystectomy, Laparoscopic     COLONOSCOPY       ESOPHAGOSCOPY, GASTROSCOPY, DUODENOSCOPY (EGD), COMBINED  3/6/2014    Procedure: COMBINED ESOPHAGOSCOPY, GASTROSCOPY, DUODENOSCOPY (EGD), BIOPSY SINGLE OR MULTIPLE;  COLONOSCOPY/ UPPER GI ENDOSCOPY/ COLON/ EGD/ Abdominal pain, epigastric/ PJH;  Surgeon: West Lomas MD;  Location:  OR      "HC HYSTEROSCOPY, SURGICAL; W/ ENDOMETRIAL ABLATION, ANY METHOD  7/2010     HERNIORRHAPHY VENTRAL N/A 12/11/2018    Procedure: Open ventral hernia repair;  Surgeon: Alonso Bills MD;  Location: [facility]     HYSTERECTOMY, SUPRACERVICAL LAPAROSCOPIC  2010     LEEP TX, CERVICAL      LEEP TX Cervical     ORTHOPEDIC SURGERY      bluged disk     partial small bowel resection  2002    Ileo-colonic resection. Crohn's disease surgery for bowel obstruction. this was a section of small bowel and large bowel and the cecum., all removed and a reanastamosis done successfully     SALPINGO OOPHORECTOMY,R/L/TORI      Right ovary     SLING TRANSVAGINAL N/A 4/4/2022    Procedure: Pubovaginal sling with Altis;  Surgeon: Eleuterio Stone MD;  Location: [facility] OR     TUBAL LIGATION       Family History   Problem Relation Age of Onset     Cancer Mother         cervical     Lipids Mother      Eye Disorder Father      Lipids Father      Alcohol/Drug Maternal Grandmother      Colon Cancer Paternal Grandmother      Diabetes Paternal Grandmother      Eye Disorder Paternal Grandmother      Diabetes Paternal Grandfather      Eye Disorder Paternal Grandfather      Inflammatory Bowel Disease Paternal Aunt         and two paternal uncles     Breast Cancer No family hx of      Objective  /86   Ht 1.575 m (5' 2\")   Wt 88.5 kg (195 lb)   LMP 07/23/2010 (LMP Unknown)   BMI 35.67 kg/m        General: healthy, alert and in no distress      HEENT: no scleral icterus or conjunctival erythema     Skin: no suspicious lesions or rash. No jaundice.     CV: regular rhythm by palpation, 2+ distal pulses, no pedal edema      Resp: normal respiratory effort without conversational dyspnea     Psych: normal mood and affect      Gait: nonantalgic, appropriate coordination and balance     Neuro: normal light touch sensory exam of the extremities. Motor strength as noted below     Left Knee exam    ROM:        full active and passive ROM with flexion and " extension, improved    Inspection:       no visible ecchymosis       effusion noted trace    Skin:       no visible deformities       well perfused       capillary refill brisk    Patellar Motion:        Normal patellar tracking noted through range of motion       Lateral tilt noted in patella       Crepitus noted in the patellofemoral joint    Tender:        lateral patellar border minimal       medial joint line very mild       lateral joint line resolved       All improved compared to previous    Non Tender:         remainder of knee area    Special Tests:        neg (-) varus at 0 deg and 30 deg for laxity       neg (-) valgus at 0 deg and 30 deg for laxity       Less pain with forced extension    Evaluation of ipsilateral kinetic chain       normal strength with hip extension and abduction      Radiology  EXAM: XR KNEE LEFT 3 VIEWS  LOCATION: [facility]  DATE/TIME: 10/6/2022 9:00 PM     INDICATION: left anterior knee pain in setting of ground level fall directly onto knee  COMPARISON: None.                                                                   IMPRESSION: Small knee joint effusion. No fractures are evident. Normal joint spacing. Normal patellar alignment.    MR left knee without contrast 10/18/2022 3:10 PM     Techniques: Multiplanar multisequence imaging of the left knee was  obtained without administration of intra-articular or intravenous  contrast using routing protocol.     History: eval for possible occult fracture or other bone injury after  injury at work, knee effusion, difficulty with WB, eval as well for  other internal derangement; Injury of left knee, subsequent encounter;  Effusion, left knee; Difficulty in weight bearing     Comparison: 10/6/2022     Findings:     MENISCI:  Medial meniscus: Intact.  Lateral meniscus: Intact.     LIGAMENTS  Cruciate ligaments: Intact.  Medial supporting structures: Mild periligamentous edema around the  tibial collateral  ligament and posterior oblique ligament, consistent  with low to moderate grade sprain.  Lateral supporting structures: Focal edema like marrow signal  intensity at the tip of the proximal fibula.      Intact popliteus tendon, fibular collateral ligament, biceps femoris  tendon, conjoined tendon, posterolateral capsule and popliteofibular  ligament. Iliotibial band is also intact.     EXTENSOR MECHANISM  Intact.     FLUID  Small-to-moderate joint effusion. Small fluid in the Baker's cyst with  internal septations.     OSSEOUS and ARTICULAR STRUCTURES  Bones: No fracture, contusion, or osseous lesion is seen. Proximal  tibial hypointensity, presumably bone island.     Patellofemoral compartment: Full-thickness chondral fissuring lateral  patellar facet with subchondral edema crossing or intensity. Also  moderate to high-grade chondral fissure in the patellar median ridge.  Trochlear cartilage is relatively preserved.     Medial compartment: Focal subchondral edema grossly density for  posterior weightbearing surface of the medial femoral condyle,  possibly representing overlying full-thickness chondral fissure  present in this area.     Lateral compartment: No high-grade hyaline cartilage disease.     ANCILLARY FINDINGS  Mild subcutaneous edema anteriorly.                                                                      Impression:  1. Low to moderate grade sprain tibial collateral ligament and  posterior oblique ligament.  2. Focal edema like marrow signal intensity at the tip of the proximal  fibula without associated ligamentous injury.  3. Cruciates and menisci are intact.  4. No occult fracture.  5. 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   6. Small-to-moderate knee joint effusion.      Assessment:  1. Injury of left knee, subsequent encounter    2. Osteoarthritis of left knee, unspecified osteoarthritis type        Plan:  Discussed the assessment with the patient.  Follow up: 4  "weeks  Acute knee injury after fall at work, improved s/p CSI,  pain continues to improve, nearly resolved  RICE and supportive care reviewed  Oral Tylenol and topical Voltaren gel reviewed as safe OTC options, reviewed safe dosing strategies  Could consider viscosupplementation trial in the future based on progress, defer for now, will consider asking for authorization if pain starts to worsen again  Continue HEP, has completed formal PT  Letter updated and workability updated:  \"Ana continues to be under my care for her left knee injury that occurred on 10/6/2022.  She has made good progress since our last visit which is encouraging.  Orthopedic surgery consultation notes were reviewed.  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  She can then increase time in the classroom by 1-2 hours per day each week until she can tolerate a full day.  We will plan to ramp her time in the classroom over the next 3-4 weeks.  Our next follow up appointment will be in 4 weeks.  Physical therapy will be continued in the form of home exercises which she can continue to work on daily.  A return to formal physical therapy will be considered based on her progress.  Updated recommendations will be provided at her next visit, sooner if needed.\"  Supportive care reviewed  All questions were answered today  Contact us with additional questions or concerns  Signs and sx of concern reviewed      Justus Chapman DO, CAQ  Sports Medicine Physician  [facility]              Disclaimer: This note consists of symbols derived from keyboarding, dictation and/or voice recognition software. As a result, there may be errors in the script that have gone " undetected. Please consider this when interpreting information found in this chart.

## 2023-02-28 NOTE — LETTER
2023         RE: Ana Felix  [address]  [place] [address]        Dear Colleague,    Thank you for referring your patient, Ana Felix, to the [facility] [place]. Please see a copy of my visit note below.    Ana Felix  :  1980  DOS: 23  MRN: [id number]    Sports Medicine Clinic Visit    PCP: Radha Bermudez    Ana Felix is a 41 year old female who is seen in follow up for her work related left knee injury.      Interim History - 2023  Since last visit on 22 patient has mild-moderate left knee pain.  Patient describes generalized aching sensation after walking/standing for daily work.  Left knee steroid injection completed on 22 is providing relief at this time.  No new injury in the interim.    Interim History - 2023  Since last visit on 2023 patient has waxing and waning left anterior knee pain.  Consulted with Ortho Surgery - Dr Pinedo on 23, recommended trial of Celebrex, patient has not started at this time.  She was provided work restrictions on 23, however has not returned to work, pending outcome of today's OV.  No interim injury.         Review of Systems  Musculoskeletal: as above  Remainder of review of systems is negative including constitutional, CV, pulmonary, GI, Skin and Neurologic except as noted in HPI or medical history.    Past Medical History:   Diagnosis Date     Back pain      Crohn's disease (H)      Exercise-induced asthma      Gestational diabetes      Herniation of intervertebral disc of lumbar region      Infertility      Ovarian cyst      Smoker      Status post cholecystectomy 2005     Past Surgical History:   Procedure Laterality Date     APPENDECTOMY       C/SECTION, LOW TRANSVERSE      , Low Transverse     CHOLECYSTECTOMY, LAPOROSCOPIC  2005    Cholecystectomy, Laparoscopic     COLONOSCOPY       ESOPHAGOSCOPY, GASTROSCOPY, DUODENOSCOPY (EGD),  "COMBINED  3/6/2014    Procedure: COMBINED ESOPHAGOSCOPY, GASTROSCOPY, DUODENOSCOPY (EGD), BIOPSY SINGLE OR MULTIPLE;  COLONOSCOPY/ UPPER GI ENDOSCOPY/ COLON/ EGD/ Abdominal pain, epigastric/ [facility];  Surgeon: West Lomas MD;  Location: [facility] OR     HC HYSTEROSCOPY, SURGICAL; W/ ENDOMETRIAL ABLATION, ANY METHOD  7/2010     HERNIORRHAPHY VENTRAL N/A 12/11/2018    Procedure: Open ventral hernia repair;  Surgeon: Alonso Bills MD;  Location: [facility]     HYSTERECTOMY, SUPRACERVICAL LAPAROSCOPIC  2010     LEEP TX, CERVICAL      LEEP TX Cervical     ORTHOPEDIC SURGERY      bluged disk     partial small bowel resection  2002    Ileo-colonic resection. Crohn's disease surgery for bowel obstruction. this was a section of small bowel and large bowel and the cecum., all removed and a reanastamosis done successfully     SALPINGO OOPHORECTOMY,R/L/TORI      Right ovary     SLING TRANSVAGINAL N/A 4/4/2022    Procedure: Pubovaginal sling with Altis;  Surgeon: Eleuterio Stone MD;  Location: [facility] OR     TUBAL LIGATION       Family History   Problem Relation Age of Onset     Cancer Mother         cervical     Lipids Mother      Eye Disorder Father      Lipids Father      Alcohol/Drug Maternal Grandmother      Colon Cancer Paternal Grandmother      Diabetes Paternal Grandmother      Eye Disorder Paternal Grandmother      Diabetes Paternal Grandfather      Eye Disorder Paternal Grandfather      Inflammatory Bowel Disease Paternal Aunt         and two paternal uncles     Breast Cancer No family hx of      Objective  /86   Ht 1.575 m (5' 2\")   Wt 88.5 kg (195 lb)   LMP 07/23/2010 (LMP Unknown)   BMI 35.67 kg/m        General: healthy, alert and in no distress      HEENT: no scleral icterus or conjunctival erythema     Skin: no suspicious lesions or rash. No jaundice.     CV: regular rhythm by palpation, 2+ distal pulses, no pedal edema      Resp: normal respiratory effort without conversational dyspnea     Psych: normal " mood and affect      Gait: nonantalgic, appropriate coordination and balance     Neuro: normal light touch sensory exam of the extremities. Motor strength as noted below     Left Knee exam    ROM:        full active and passive ROM with flexion and extension, improved    Inspection:       no visible ecchymosis       effusion noted trace    Skin:       no visible deformities       well perfused       capillary refill brisk    Patellar Motion:        Normal patellar tracking noted through range of motion       Lateral tilt noted in patella       Crepitus noted in the patellofemoral joint    Tender:        lateral patellar border minimal       medial joint line very mild       lateral joint line resolved       All improved compared to previous    Non Tender:         remainder of knee area    Special Tests:        neg (-) varus at 0 deg and 30 deg for laxity       neg (-) valgus at 0 deg and 30 deg for laxity       Less pain with forced extension    Evaluation of ipsilateral kinetic chain       normal strength with hip extension and abduction      Radiology  EXAM: XR KNEE LEFT 3 VIEWS  LOCATION: [facility]  DATE/TIME: 10/6/2022 9:00 PM     INDICATION: left anterior knee pain in setting of ground level fall directly onto knee  COMPARISON: None.                                                                   IMPRESSION: Small knee joint effusion. No fractures are evident. Normal joint spacing. Normal patellar alignment.    MR left knee without contrast 10/18/2022 3:10 PM     Techniques: Multiplanar multisequence imaging of the left knee was  obtained without administration of intra-articular or intravenous  contrast using routing protocol.     History: eval for possible occult fracture or other bone injury after  injury at work, knee effusion, difficulty with WB, eval as well for  other internal derangement; Injury of left knee, subsequent encounter;  Effusion, left knee; Difficulty in weight  bearing     Comparison: 10/6/2022     Findings:     MENISCI:  Medial meniscus: Intact.  Lateral meniscus: Intact.     LIGAMENTS  Cruciate ligaments: Intact.  Medial supporting structures: Mild periligamentous edema around the  tibial collateral ligament and posterior oblique ligament, consistent  with low to moderate grade sprain.  Lateral supporting structures: Focal edema like marrow signal  intensity at the tip of the proximal fibula.      Intact popliteus tendon, fibular collateral ligament, biceps femoris  tendon, conjoined tendon, posterolateral capsule and popliteofibular  ligament. Iliotibial band is also intact.     EXTENSOR MECHANISM  Intact.     FLUID  Small-to-moderate joint effusion. Small fluid in the Baker's cyst with  internal septations.     OSSEOUS and ARTICULAR STRUCTURES  Bones: No fracture, contusion, or osseous lesion is seen. Proximal  tibial hypointensity, presumably bone island.     Patellofemoral compartment: Full-thickness chondral fissuring lateral  patellar facet with subchondral edema crossing or intensity. Also  moderate to high-grade chondral fissure in the patellar median ridge.  Trochlear cartilage is relatively preserved.     Medial compartment: Focal subchondral edema grossly density for  posterior weightbearing surface of the medial femoral condyle,  possibly representing overlying full-thickness chondral fissure  present in this area.     Lateral compartment: No high-grade hyaline cartilage disease.     ANCILLARY FINDINGS  Mild subcutaneous edema anteriorly.                                                                      Impression:  1. Low to moderate grade sprain tibial collateral ligament and  posterior oblique ligament.  2. Focal edema like marrow signal intensity at the tip of the proximal  fibula without associated ligamentous injury.  3. Cruciates and menisci are intact.  4. No occult fracture.  5. 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   6.  "Small-to-moderate knee joint effusion.      Assessment:  1. Injury of left knee, subsequent encounter    2. Osteoarthritis of left knee, unspecified osteoarthritis type        Plan:  Discussed the assessment with the patient.  Follow up: 4 weeks  Acute knee injury after fall at work, improved s/p CSI,  pain continues to improve, nearly resolved  RICE and supportive care reviewed  Oral Tylenol and topical Voltaren gel reviewed as safe OTC options, reviewed safe dosing strategies  Could consider viscosupplementation trial in the future based on progress, defer for now, will consider asking for authorization if pain starts to worsen again  Continue HEP, has completed formal PT  Letter updated and workability updated:  \"Ana continues to be under my care for her left knee injury that occurred on 10/6/2022.  She has made good progress since our last visit which is encouraging.  Orthopedic surgery consultation notes were reviewed.  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  She can then increase time in the classroom by 1-2 hours per day each week until she can tolerate a full day.  We will plan to ramp her time in the classroom over the next 3-4 weeks.  Our next follow up appointment will be in 4 weeks.  Physical therapy will be continued in the form of home exercises which she can continue to work on daily.  A return to formal physical therapy will be considered based on her progress.  Updated recommendations will be provided at her next visit, sooner if needed.\"  Supportive care reviewed  All questions were answered today  Contact us with additional questions or concerns  Signs and sx of concern reviewed      Justus Chapman DO, CAJESSICA  Sports Medicine " Physician  [facility]              Disclaimer: This note consists of symbols derived from keyboarding, dictation and/or voice recognition software. As a result, there may be errors in the script that have gone undetected. Please consider this when interpreting information found in this chart.      Again, thank you for allowing me to participate in the care of your patient.        Sincerely,        Justus Chapman, DO

## 2023-03-08 ENCOUNTER — MYC MEDICAL ADVICE (OUTPATIENT)
Dept: PHARMACY | Facility: CLINIC | Age: 43
End: 2023-03-08

## 2023-03-15 ENCOUNTER — APPOINTMENT (OUTPATIENT)
Dept: CT IMAGING | Facility: CLINIC | Age: 43
End: 2023-03-15
Attending: EMERGENCY MEDICINE
Payer: COMMERCIAL

## 2023-03-15 ENCOUNTER — APPOINTMENT (OUTPATIENT)
Dept: ULTRASOUND IMAGING | Facility: CLINIC | Age: 43
End: 2023-03-15
Attending: EMERGENCY MEDICINE
Payer: COMMERCIAL

## 2023-03-15 ENCOUNTER — HOSPITAL ENCOUNTER (EMERGENCY)
Facility: CLINIC | Age: 43
Discharge: HOME | End: 2023-03-16
Attending: EMERGENCY MEDICINE | Admitting: EMERGENCY MEDICINE
Payer: COMMERCIAL

## 2023-03-15 VITALS
TEMPERATURE: 97.9 F | RESPIRATION RATE: 18 BRPM | HEIGHT: 62 IN | HEART RATE: 76 BPM | OXYGEN SATURATION: 96 % | BODY MASS INDEX: 35.88 KG/M2 | SYSTOLIC BLOOD PRESSURE: 174 MMHG | DIASTOLIC BLOOD PRESSURE: 98 MMHG | WEIGHT: 195 LBS

## 2023-03-15 DIAGNOSIS — R10.32 ABDOMINAL PAIN, LEFT LOWER QUADRANT: ICD-10-CM

## 2023-03-15 DIAGNOSIS — R14.3 FLATULENCE, ERUCTATION AND GAS PAIN: ICD-10-CM

## 2023-03-15 DIAGNOSIS — R14.1 FLATULENCE, ERUCTATION AND GAS PAIN: ICD-10-CM

## 2023-03-15 DIAGNOSIS — R14.2 FLATULENCE, ERUCTATION AND GAS PAIN: ICD-10-CM

## 2023-03-15 LAB
ALBUMIN SERPL BCG-MCNC: 4.5 G/DL (ref 3.5–5.2)
ALBUMIN UR-MCNC: 100 MG/DL
ALP SERPL-CCNC: 68 U/L (ref 35–104)
ALT SERPL W P-5'-P-CCNC: 25 U/L (ref 10–35)
ANION GAP SERPL CALCULATED.3IONS-SCNC: 10 MMOL/L (ref 7–15)
APPEARANCE UR: CLEAR
AST SERPL W P-5'-P-CCNC: 40 U/L (ref 10–35)
BACTERIA #/AREA URNS HPF: ABNORMAL /HPF
BASOPHILS # BLD AUTO: 0.1 10E3/UL (ref 0–0.2)
BASOPHILS NFR BLD AUTO: 1 %
BILIRUB SERPL-MCNC: 0.5 MG/DL
BILIRUB UR QL STRIP: NEGATIVE
BUN SERPL-MCNC: 7 MG/DL (ref 6–20)
CALCIUM SERPL-MCNC: 9 MG/DL (ref 8.6–10)
CHLORIDE SERPL-SCNC: 103 MMOL/L (ref 98–107)
COLOR UR AUTO: YELLOW
CREAT SERPL-MCNC: 0.78 MG/DL (ref 0.51–0.95)
DEPRECATED HCO3 PLAS-SCNC: 24 MMOL/L (ref 22–29)
EOSINOPHIL # BLD AUTO: 0.1 10E3/UL (ref 0–0.7)
EOSINOPHIL NFR BLD AUTO: 1 %
ERYTHROCYTE [DISTWIDTH] IN BLOOD BY AUTOMATED COUNT: 13.1 % (ref 10–15)
GFR SERPL CREATININE-BSD FRML MDRD: >90 ML/MIN/1.73M2
GLUCOSE SERPL-MCNC: 142 MG/DL (ref 70–99)
GLUCOSE UR STRIP-MCNC: NEGATIVE MG/DL
HCT VFR BLD AUTO: 42.4 % (ref 35–47)
HGB BLD-MCNC: 14.7 G/DL (ref 11.7–15.7)
HGB UR QL STRIP: NEGATIVE
HYALINE CASTS: 3 /LPF
IMM GRANULOCYTES # BLD: 0 10E3/UL
IMM GRANULOCYTES NFR BLD: 0 %
KETONES UR STRIP-MCNC: 5 MG/DL
LEUKOCYTE ESTERASE UR QL STRIP: NEGATIVE
LYMPHOCYTES # BLD AUTO: 3.5 10E3/UL (ref 0.8–5.3)
LYMPHOCYTES NFR BLD AUTO: 35 %
MCH RBC QN AUTO: 34 PG (ref 26.5–33)
MCHC RBC AUTO-ENTMCNC: 34.7 G/DL (ref 31.5–36.5)
MCV RBC AUTO: 98 FL (ref 78–100)
MONOCYTES # BLD AUTO: 0.8 10E3/UL (ref 0–1.3)
MONOCYTES NFR BLD AUTO: 8 %
MUCOUS THREADS #/AREA URNS LPF: PRESENT /LPF
NEUTROPHILS # BLD AUTO: 5.6 10E3/UL (ref 1.6–8.3)
NEUTROPHILS NFR BLD AUTO: 55 %
NITRATE UR QL: NEGATIVE
NRBC # BLD AUTO: 0 10E3/UL
NRBC BLD AUTO-RTO: 0 /100
PH UR STRIP: 5 [PH] (ref 5–7)
PLATELET # BLD AUTO: 243 10E3/UL (ref 150–450)
POTASSIUM SERPL-SCNC: 3.5 MMOL/L (ref 3.4–5.3)
PROT SERPL-MCNC: 8.4 G/DL (ref 6.4–8.3)
RBC # BLD AUTO: 4.32 10E6/UL (ref 3.8–5.2)
RBC URINE: 1 /HPF
SODIUM SERPL-SCNC: 137 MMOL/L (ref 136–145)
SP GR UR STRIP: 1.03 (ref 1–1.03)
SQUAMOUS EPITHELIAL: 2 /HPF
UROBILINOGEN UR STRIP-MCNC: NORMAL MG/DL
WBC # BLD AUTO: 10.1 10E3/UL (ref 4–11)
WBC URINE: 2 /HPF

## 2023-03-15 PROCEDURE — 36415 COLL VENOUS BLD VENIPUNCTURE: CPT | Performed by: EMERGENCY MEDICINE

## 2023-03-15 PROCEDURE — 250N000011 HC RX IP 250 OP 636: Performed by: EMERGENCY MEDICINE

## 2023-03-15 PROCEDURE — 99284 EMERGENCY DEPT VISIT MOD MDM: CPT | Performed by: EMERGENCY MEDICINE

## 2023-03-15 PROCEDURE — 85025 COMPLETE CBC W/AUTO DIFF WBC: CPT | Performed by: EMERGENCY MEDICINE

## 2023-03-15 PROCEDURE — 81001 URINALYSIS AUTO W/SCOPE: CPT | Performed by: EMERGENCY MEDICINE

## 2023-03-15 PROCEDURE — 76856 US EXAM PELVIC COMPLETE: CPT

## 2023-03-15 PROCEDURE — 96375 TX/PRO/DX INJ NEW DRUG ADDON: CPT | Performed by: EMERGENCY MEDICINE

## 2023-03-15 PROCEDURE — 250N000009 HC RX 250: Performed by: EMERGENCY MEDICINE

## 2023-03-15 PROCEDURE — 80053 COMPREHEN METABOLIC PANEL: CPT | Performed by: EMERGENCY MEDICINE

## 2023-03-15 PROCEDURE — 96374 THER/PROPH/DIAG INJ IV PUSH: CPT | Mod: 59 | Performed by: EMERGENCY MEDICINE

## 2023-03-15 PROCEDURE — 99285 EMERGENCY DEPT VISIT HI MDM: CPT | Mod: 25 | Performed by: EMERGENCY MEDICINE

## 2023-03-15 PROCEDURE — 74177 CT ABD & PELVIS W/CONTRAST: CPT

## 2023-03-15 PROCEDURE — 96376 TX/PRO/DX INJ SAME DRUG ADON: CPT | Performed by: EMERGENCY MEDICINE

## 2023-03-15 RX ORDER — HYDROMORPHONE HYDROCHLORIDE 1 MG/ML
0.5 INJECTION, SOLUTION INTRAMUSCULAR; INTRAVENOUS; SUBCUTANEOUS
Status: DISCONTINUED | OUTPATIENT
Start: 2023-03-15 | End: 2023-03-16 | Stop reason: HOSPADM

## 2023-03-15 RX ORDER — IOPAMIDOL 755 MG/ML
95 INJECTION, SOLUTION INTRAVASCULAR ONCE
Status: COMPLETED | OUTPATIENT
Start: 2023-03-15 | End: 2023-03-15

## 2023-03-15 RX ADMIN — HYDROMORPHONE HYDROCHLORIDE 0.5 MG: 1 INJECTION, SOLUTION INTRAMUSCULAR; INTRAVENOUS; SUBCUTANEOUS at 23:14

## 2023-03-15 RX ADMIN — SODIUM CHLORIDE 64 ML: 9 INJECTION, SOLUTION INTRAVENOUS at 23:22

## 2023-03-15 RX ADMIN — IOPAMIDOL 95 ML: 755 INJECTION, SOLUTION INTRAVENOUS at 23:22

## 2023-03-15 RX ADMIN — HYDROMORPHONE HYDROCHLORIDE 0.5 MG: 1 INJECTION, SOLUTION INTRAMUSCULAR; INTRAVENOUS; SUBCUTANEOUS at 22:13

## 2023-03-15 ASSESSMENT — ENCOUNTER SYMPTOMS
DIARRHEA: 0
DYSURIA: 0
BLOOD IN STOOL: 0
WOUND: 0
ABDOMINAL PAIN: 1
VOMITING: 0
CHEST TIGHTNESS: 0
CHILLS: 0
HEADACHES: 0
APPETITE CHANGE: 0
LIGHT-HEADEDNESS: 0
COUGH: 0
FLANK PAIN: 0
FATIGUE: 0
FEVER: 0
NAUSEA: 1
SHORTNESS OF BREATH: 0

## 2023-03-15 ASSESSMENT — ACTIVITIES OF DAILY LIVING (ADL): ADLS_ACUITY_SCORE: 35

## 2023-03-16 RX ORDER — SIMETHICONE 80 MG
160 TABLET,CHEWABLE ORAL ONCE
Status: COMPLETED | OUTPATIENT
Start: 2023-03-16 | End: 2023-03-16

## 2023-03-16 NOTE — ED TRIAGE NOTES
2 hours of upper/lower left sided abdomen pain. No nausea or emesis. HX of Chron's and has had her appendix and gall bladder removed in the distant past.

## 2023-03-16 NOTE — ED PROVIDER NOTES
History     Chief Complaint   Patient presents with     Abdominal Pain     HPI  Ana Felix is a 42 year old female with history of chronic back pain as well as a history of Crohn's disease that has been well controlled for years presenting for evaluation of relatively abrupt onset of left lower pelvic pain.  Has a history of ovarian cysts with a history of torsion on the right ovary requiring resection decades ago.  Has been having more increased cyst formation recently with a large cyst on the left  ovary last evaluated by ultrasound February 2022.  Patient ports symptoms began this evening with pain in her left lower pelvic area.  Was feeling well throughout the day.  Has been eating and drinking normally.  No vomiting or diarrhea.  Pain has steadily progressed with waxing waning intensity throughout the evening prompting evaluation tonight.  Patient reports some mild nausea when symptoms become severe but has not vomited.  Denies any urinary symptoms including no dysuria, urgency, or frequency.  No history of kidney stones.  Pain does not radiate or migrate around to the back.  Pain is consistently focused in the left lower pelvic region.  Patient reports symptoms remind her of pain she has had with her ovarian cyst in the past.      ==================================================================    CHART REVIEW:      ULTRASOUND PELVIC TRANSABDOMINAL AND TRANSVAGINAL IMAGING  2/23/2022  11:26 AM     CLINICAL HISTORY: Follow up cysts on left ovary. Cyst of left ovary.     TECHNIQUE: Transabdominal scans were performed. Endovaginal ultrasound  was performed to better visualize the adnexa.     COMPARISON: CT dated 12/10/2021     FINDINGS:     UTERUS: Surgically absent.     RIGHT OVARY: Surgically absent.     LEFT OVARY: 3.0 x 2.5 x 2.1 cm. There is a prominent follicle on the  left ovary measuring 19 x 15 x 15 mm. This could represent a  collapsing cyst.     No significant free fluid.                            "                                           IMPRESSION:  1.  Surgically absent uterus and right ovary.  2.  Prominent follicle versus collapsing cyst on the left ovary.    END CHART REVIEW  ==================================================================      Allergies:  Allergies   Allergen Reactions     Infliximab Hives     Sulfa Drugs [Sulfa Drugs] Nausea     Pt states \"it doesn't work for me\"     Sulfacetamide Nausea     Augmentin Other (See Comments) and Nausea and Vomiting     Crohn's     Bupropion Hives and Nausea     Droperidol Other (See Comments)     Dystonic reaction     Ibuprofen Other (See Comments) and Nausea     Crohn's       Lyrica [Pregabalin] Nausea and Vomiting     Grogginess, severe back pain  Grogginess, severe back pain     Vicodin [Hydrocodone-Acetaminophen] Nausea and Vomiting       Problem List:    Patient Active Problem List    Diagnosis Date Noted     Primary osteoarthritis of left knee 02/13/2023     Priority: Medium     DANIELLE (generalized anxiety disorder) 03/15/2022     Priority: Medium     Insomnia, unspecified type 03/15/2022     Priority: Medium     SKYE (stress urinary incontinence, female) 03/14/2022     Priority: Medium     Added automatically from request for surgery [id number]       Hidradenitis suppurativa 09/02/2021     Priority: Medium     Immunocompromised state (H) 07/09/2021     Priority: Medium     History of gestational diabetes mellitus (GDM) 08/15/2018     Priority: Medium     Amenorrhea 08/15/2018     Priority: Medium     Crohn's disease of both small and large intestine without complication (H) 07/31/2018     Priority: Medium     Pain medication agreement 08/18/2017     Priority: Medium     Overview:   [facility]       Controlled substance agreement signed 07/19/2017     Priority: Medium     Degenerative lumbar disc 03/07/2017     Priority: Medium     Labral tear of hip, degenerative 03/07/2017     Priority: Medium     Pain of right hip joint 03/07/2017     " Priority: Medium     S/P LEEP of cervix 12/15/2015     Priority: Medium     S/p LEEP of cervix - date unknown  12/9/15: Pap - NIL, Neg HPV. Plan cotest in 1 year. If JAYME 2/3 on biopsy - PAP/HPV co-testing at 12, 24 months. If two negative results repeat co-testing in 3 years, if negative then routine screening.  3/7/18 Pap: NIL/neg HPV.             Hearing difficulty 10/09/2014     Priority: Medium     Irritable bowel syndrome (IBS) 04/15/2014     Priority: Medium     Tiffany raw vegetables       Lactose intolerance 04/15/2014     Priority: Medium     Abdominal pain, right upper quadrant 03/06/2014     Priority: Medium     Nausea with vomiting 01/31/2014     Priority: Medium     Chronic low back pain 11/05/2012     Priority: Medium     Follows with pain clinic.       Mild persistent asthma, uncomplicated 11/05/2012     Priority: Medium     Discogenic pain 10/08/2012     Priority: Medium     Abdominal pain 08/30/2012     Priority: Medium     S/P oophorectomy 07/20/2012     Priority: Medium     History of cholecystectomy 07/20/2012     Priority: Medium     History of resection of small bowel 07/20/2012     Priority: Medium     Tobacco abuse 05/25/2012     Priority: Medium     Displacement of lumbar intervertebral disc without myelopathy 01/06/2012     Priority: Medium     Disorder of sacrum 12/13/2011     Priority: Medium     Problem list name updated by automated process. Provider to review       Back pain 11/09/2011     Priority: Medium     Obesity 11/09/2011     Priority: Medium     Migraine headache 09/14/2011     Priority: Medium     Patient given Migraine Education folder and Migraine Action Plan on September 14, 2011. Cammy Anderson RN    (Problem list name updated by automated process. Provider to review and confirm.)       CARDIOVASCULAR SCREENING; LDL GOAL LESS THAN 160 10/31/2010     Priority: Medium     Dysmenorrhea 10/05/2010     Priority: Medium     Female pelvic pain 09/27/2010     Priority: Medium     (Problem  list name updated by automated process. Provider to review and confirm.)       Crohns disease 2009     Priority: Medium        Past Medical History:    Past Medical History:   Diagnosis Date     Back pain      Crohn's disease (H)      Exercise-induced asthma      Gestational diabetes      Herniation of intervertebral disc of lumbar region      Infertility      Ovarian cyst      Smoker      Status post cholecystectomy 2005       Past Surgical History:    Past Surgical History:   Procedure Laterality Date     APPENDECTOMY       C/SECTION, LOW TRANSVERSE      , Low Transverse     CHOLECYSTECTOMY, LAPOROSCOPIC  2005    Cholecystectomy, Laparoscopic     COLONOSCOPY       ESOPHAGOSCOPY, GASTROSCOPY, DUODENOSCOPY (EGD), COMBINED  3/6/2014    Procedure: COMBINED ESOPHAGOSCOPY, GASTROSCOPY, DUODENOSCOPY (EGD), BIOPSY SINGLE OR MULTIPLE;  COLONOSCOPY/ UPPER GI ENDOSCOPY/ COLON/ EGD/ Abdominal pain, epigastric/ [facility];  Surgeon: West Lomas MD;  Location:  OR      HYSTEROSCOPY, SURGICAL; W/ ENDOMETRIAL ABLATION, ANY METHOD  2010     HERNIORRHAPHY VENTRAL N/A 2018    Procedure: Open ventral hernia repair;  Surgeon: Alonso Bills MD;  Location: [facility]     HYSTERECTOMY, SUPRACERVICAL LAPAROSCOPIC  2010     LEEP TX, CERVICAL      LEEP TX Cervical     ORTHOPEDIC SURGERY      bluged disk     partial small bowel resection      Ileo-colonic resection. Crohn's disease surgery for bowel obstruction. this was a section of small bowel and large bowel and the cecum., all removed and a reanastamosis done successfully     SALPINGO OOPHORECTOMY,R/L/TORI      Right ovary     SLING TRANSVAGINAL N/A 2022    Procedure: Pubovaginal sling with Altis;  Surgeon: Eleuterio Stone MD;  Location: [facility] OR     TUBAL LIGATION         Family History:    Family History   Problem Relation Age of Onset     Cancer Mother         cervical     Lipids Mother      Eye Disorder Father      Lipids Father       "Alcohol/Drug Maternal Grandmother      Colon Cancer Paternal Grandmother      Diabetes Paternal Grandmother      Eye Disorder Paternal Grandmother      Diabetes Paternal Grandfather      Eye Disorder Paternal Grandfather      Inflammatory Bowel Disease Paternal Aunt         and two paternal uncles     Breast Cancer No family hx of        Social History:  Marital Status:   [2]  Social History     Tobacco Use     Smoking status: Every Day     Packs/day: 0.50     Types: Cigarettes     Smokeless tobacco: Never   Vaping Use     Vaping Use: Never used   Substance Use Topics     Alcohol use: Yes     Comment: very rarely     Drug use: No        Medications:    azaTHIOprine (IMURAN) 50 MG tablet  celecoxib (CELEBREX) 200 MG capsule  cyanocobalamin (VITAMIN B12) 1000 MCG/ML injection  fentaNYL (DURAGESIC) 12 mcg/hr patch 72 hr  fentaNYL (DURAGESIC) 25 mcg/hr patch 72 hr  gabapentin (NEURONTIN) 300 MG capsule  montelukast (SINGULAIR) 10 MG tablet  sertraline (ZOLOFT) 100 MG tablet  traZODone (DESYREL) 50 MG tablet  VENTOLIN  (90 Base) MCG/ACT inhaler          Review of Systems   Constitutional: Negative for appetite change, chills, fatigue and fever.   Respiratory: Negative for cough, chest tightness and shortness of breath.    Cardiovascular: Negative for chest pain.   Gastrointestinal: Positive for abdominal pain and nausea (When pain is severe). Negative for blood in stool, diarrhea and vomiting.   Genitourinary: Positive for pelvic pain (Left lower). Negative for decreased urine volume, dysuria, flank pain, urgency, vaginal bleeding and vaginal discharge.   Skin: Negative for wound.   Neurological: Negative for light-headedness and headaches.   All other systems reviewed and are negative.      Physical Exam   BP: (!) 174/98  Pulse: 76  Temp: 97.9  F (36.6  C)  Resp: 18  Height: 157.5 cm (5' 2\")  Weight: 88.5 kg (195 lb)  SpO2: 96 %      Physical Exam  Vitals and nursing note reviewed.   Constitutional:       " Comments: Awake and alert, laying on her left side, able provide a full history but does appear uncomfortable   HENT:      Head: Atraumatic.   Cardiovascular:      Rate and Rhythm: Normal rate.   Pulmonary:      Effort: Pulmonary effort is normal.   Abdominal:      General: Abdomen is protuberant. Bowel sounds are normal.      Palpations: Abdomen is soft.      Tenderness: There is abdominal tenderness in the left lower quadrant. There is no guarding.   Skin:     General: Skin is warm and dry.      Capillary Refill: Capillary refill takes less than 2 seconds.   Neurological:      Mental Status: She is alert and oriented to person, place, and time.   Psychiatric:         Mood and Affect: Mood normal.         ED Course                 Procedures                  Results for orders placed or performed during the hospital encounter of 03/15/23 (from the past 24 hour(s))   CBC with platelets, differential    Narrative    The following orders were created for panel order CBC with platelets, differential.  Procedure                               Abnormality         Status                     ---------                               -----------         ------                     CBC with platelets and d...[[id number]]  Abnormal            Final result                 Please view results for these tests on the individual orders.   Comprehensive metabolic panel   Result Value Ref Range    Sodium 137 136 - 145 mmol/L    Potassium 3.5 3.4 - 5.3 mmol/L    Chloride 103 98 - 107 mmol/L    Carbon Dioxide (CO2) 24 22 - 29 mmol/L    Anion Gap 10 7 - 15 mmol/L    Urea Nitrogen 7.0 6.0 - 20.0 mg/dL    Creatinine 0.78 0.51 - 0.95 mg/dL    Calcium 9.0 8.6 - 10.0 mg/dL    Glucose 142 (H) 70 - 99 mg/dL    Alkaline Phosphatase 68 35 - 104 U/L    AST 40 (H) 10 - 35 U/L    ALT 25 10 - 35 U/L    Protein Total 8.4 (H) 6.4 - 8.3 g/dL    Albumin 4.5 3.5 - 5.2 g/dL    Bilirubin Total 0.5 <=1.2 mg/dL    GFR Estimate >90 >60 mL/min/1.73m2   CBC with  platelets and differential   Result Value Ref Range    WBC Count 10.1 4.0 - 11.0 10e3/uL    RBC Count 4.32 3.80 - 5.20 10e6/uL    Hemoglobin 14.7 11.7 - 15.7 g/dL    Hematocrit 42.4 35.0 - 47.0 %    MCV 98 78 - 100 fL    MCH 34.0 (H) 26.5 - 33.0 pg    MCHC 34.7 31.5 - 36.5 g/dL    RDW 13.1 10.0 - 15.0 %    Platelet Count 243 150 - 450 10e3/uL    % Neutrophils 55 %    % Lymphocytes 35 %    % Monocytes 8 %    % Eosinophils 1 %    % Basophils 1 %    % Immature Granulocytes 0 %    NRBCs per 100 WBC 0 <1 /100    Absolute Neutrophils 5.6 1.6 - 8.3 10e3/uL    Absolute Lymphocytes 3.5 0.8 - 5.3 10e3/uL    Absolute Monocytes 0.8 0.0 - 1.3 10e3/uL    Absolute Eosinophils 0.1 0.0 - 0.7 10e3/uL    Absolute Basophils 0.1 0.0 - 0.2 10e3/uL    Absolute Immature Granulocytes 0.0 <=0.4 10e3/uL    Absolute NRBCs 0.0 10e3/uL   UA with Microscopic reflex to Culture    Specimen: Urine, Clean Catch   Result Value Ref Range    Color Urine Yellow Colorless, Straw, Light Yellow, Yellow    Appearance Urine Clear Clear    Glucose Urine Negative Negative mg/dL    Bilirubin Urine Negative Negative    Ketones Urine 5 (A) Negative mg/dL    Specific Gravity Urine 1.032 1.003 - 1.035    Blood Urine Negative Negative    pH Urine 5.0 5.0 - 7.0    Protein Albumin Urine 100 (A) Negative mg/dL    Urobilinogen Urine Normal Normal, 2.0 mg/dL    Nitrite Urine Negative Negative    Leukocyte Esterase Urine Negative Negative    Bacteria Urine Few (A) None Seen /HPF    Mucus Urine Present (A) None Seen /LPF    RBC Urine 1 <=2 /HPF    WBC Urine 2 <=5 /HPF    Squamous Epithelials Urine 2 (H) <=1 /HPF    Hyaline Casts Urine 3 (H) <=2 /LPF    Narrative    Urine Culture not indicated   US Pelvis Cmplt w Transvag & Doppler LmtPel Duplex Limited    Narrative    EXAM: US PELVIS COMPLETE W TRANSVAGINAL AND DOPPLER LIMITED  LOCATION: [facility]  DATE/TIME: 3/15/2023 10:45 PM    INDICATION: Left-sided pelvic pain. History of hysterectomy and  right oophorectomy.  COMPARISON: 02/23/2022  TECHNIQUE: Transabdominal scans were performed. Endovaginal ultrasound was performed to better visualize the adnexa. Color flow with spectral Doppler and waveform analysis performed.    FINDINGS:    UTERUS: Not visualized, compatible with history of hysterectomy.    RIGHT OVARY: Not visualized, compatible with history of right oophorectomy. No right adnexal mass identified.    LEFT OVARY: 3.8 x 3.7 x 2.7 cm. Left ovarian simple cysts measuring 1.4 cm and 1.2 cm. Few additional prominent follicles. Arterial and venous duplex flow identified.    No significant free fluid.      Impression    IMPRESSION:    1.  Left ovarian simple cysts measuring up to 1.4 cm. No evidence of left ovarian torsion.    2.  Status post hysterectomy and right oophorectomy.         CT Abdomen Pelvis w Contrast    Narrative    EXAM: CT ABDOMEN PELVIS W CONTRAST  LOCATION: [facility]  DATE/TIME: 3/15/2023 11:30 PM    INDICATION: left lower abd pelvic  COMPARISON: Pelvic ultrasound from 03/15/2023. CT from 12/10/2021.  TECHNIQUE: CT scan of the abdomen and pelvis was performed following injection of IV contrast. Multiplanar reformats were obtained. Dose reduction techniques were used.  CONTRAST: 95 mL Isovue 370    FINDINGS:   LOWER CHEST: Normal.    HEPATOBILIARY: Diffuse, mild hepatic steatosis. Absent gallbladder.    PANCREAS: Normal.    SPLEEN: Normal.    ADRENAL GLANDS: Normal.    KIDNEYS/BLADDER: Normal.    BOWEL: Diffusely gas filled large bowel with liquid and semisolid content. Relative decompression of the proximal sigmoid colon may reflect peristalsis. No adjacent inflammatory change. No free air. Appendix not visualized. No evidence of appendicitis.   Normal caliber small bowel.    LYMPH NODES: Normal.    VASCULATURE: Unremarkable.    PELVIC ORGANS: Absent uterus. Lobulated appearance of the left ovary with multiple cysts or follicles.    MUSCULOSKELETAL: Lumbar  spine facet arthropathy.      Impression    IMPRESSION:   1.  Mild hepatic steatosis.    2.  Diffusely gas filled colon with fluid and semisolid content.       Medications   HYDROmorphone (PF) (DILAUDID) injection 0.5 mg (0.5 mg Intravenous $Given 3/15/23 7350)   simethicone (MYLICON) chewable tablet 160 mg (has no administration in time range)   iopamidol (ISOVUE-370) solution 95 mL (95 mLs Intravenous $Given 3/15/23 7652)   sodium chloride 0.9 % bag 500mL for CT scan flush use (64 mLs Intravenous $Given 3/15/23 2322)     11:06 PM Patient re-assessed: Patient resting company.  Reports pain did improve with medication but is again worsening.  Reviewed ultrasound results as well as urine and blood work.  Exact cause of her symptoms not clear right now.  She does report history of right inguinal hernia with repair in the past and is wondering if she might have a hernia.  Patient is unaware of any specific injury or overuse although she did strain her left knee last week so may have strained her hip with this.  Given her ongoing pain however, will obtain CT    12:04 AM Patient re-assessed: Patient resting comfortably.  Pain improved.  Reviewed CT results with patient.  We will trial simethicone      Assessments & Plan (with Medical Decision Making)  42 female with history of chronic back pain and chronic disease presenting for evaluation of left lower pelvic pain.  Has had history of ovarian cyst in the past and reports severe cramping pain tonight.  Ultrasound showed only a very small cyst with no evidence of torsion.  Blood work and urinalysis also are unremarkable.  Given her ongoing pain and tenderness, obtained a CT which thankfully showed no concerning findings.  Did show moderate amount of gas distended colon which could be contributing to her pain.  Recommended symptomatic treatment with expected improvement and provided patient with return precautions if new or concerning symptoms develop.     I have reviewed  the nursing notes.    I have reviewed the findings, diagnosis, plan and need for follow up with the patient.           New Prescriptions    No medications on file       Final diagnoses:   Abdominal pain, left lower quadrant   Flatulence, eructation and gas pain       3/15/2023   [facility] EMERGENCY DEPT     Pugh, Aydin Gupta MD  03/16/23 0008

## 2023-03-28 ENCOUNTER — E-VISIT (OUTPATIENT)
Dept: FAMILY MEDICINE | Facility: CLINIC | Age: 43
End: 2023-03-28
Payer: COMMERCIAL

## 2023-03-28 DIAGNOSIS — H10.13 ALLERGIC CONJUNCTIVITIS, BILATERAL: Primary | ICD-10-CM

## 2023-03-28 PROCEDURE — 99421 OL DIG E/M SVC 5-10 MIN: CPT | Performed by: PHYSICIAN ASSISTANT

## 2023-03-28 NOTE — LETTER
[facility] [place]  [address]  [place] [address]  Phone: [phone]    March 29, 2023        Ana Felix  [address]  [place] [address]          To whom it may concern:    RE: Ana Felix    Patient reports missing work due to allergy symptoms.    Please contact me for questions or concerns.      Sincerely,        Radha Bermudez PA-C

## 2023-03-29 ENCOUNTER — OFFICE VISIT (OUTPATIENT)
Dept: ORTHOPEDICS | Facility: CLINIC | Age: 43
End: 2023-03-29
Payer: OTHER MISCELLANEOUS

## 2023-03-29 VITALS
DIASTOLIC BLOOD PRESSURE: 112 MMHG | WEIGHT: 195 LBS | HEART RATE: 97 BPM | BODY MASS INDEX: 35.67 KG/M2 | SYSTOLIC BLOOD PRESSURE: 154 MMHG

## 2023-03-29 DIAGNOSIS — S89.92XD INJURY OF LEFT KNEE, SUBSEQUENT ENCOUNTER: Primary | ICD-10-CM

## 2023-03-29 DIAGNOSIS — M17.12 OSTEOARTHRITIS OF LEFT KNEE, UNSPECIFIED OSTEOARTHRITIS TYPE: ICD-10-CM

## 2023-03-29 PROCEDURE — 99213 OFFICE O/P EST LOW 20 MIN: CPT | Performed by: FAMILY MEDICINE

## 2023-03-29 NOTE — LETTER
3/29/2023         RE: Ana Felix  [address]  [place] [address]        Dear Colleague,    Thank you for referring your patient, Ana Felix, to the [facility]. Please see a copy of my visit note below.    Ana Felix  :  1980  DOS:   MRN: [id number]    Sports Medicine Clinic Visit    PCP: Radha Bermudez    Ana Felix is a 41 year old female who is seen in follow up for her work related left knee injury. Ana reports that she is at 6 hours into her transition schedule back into work, reporting that next Monday (4/3/23) she will be full-time again.    Interim History - 2023  Since last visit on 2023 patient has felt a lot better, reporting no achiness after working and being on the feat all day at work.  No new injury in the interim.        Interim History - 2023  Since last visit on 22 patient has mild-moderate left knee pain.  Patient describes generalized aching sensation after walking/standing for daily work.  Left knee steroid injection completed on 22 is providing relief at this time.  No new injury in the interim.    Interim History - 2023  Since last visit on 2023 patient has waxing and waning left anterior knee pain.  Consulted with Ortho Surgery - Dr Pinedo on 23, recommended trial of Celebrex, patient has not started at this time.  She was provided work restrictions on 23, however has not returned to work, pending outcome of today's OV.  No interim injury.       Review of Systems  Musculoskeletal: as above  Remainder of review of systems is negative including constitutional, CV, pulmonary, GI, Skin and Neurologic except as noted in HPI or medical history.    Past Medical History:   Diagnosis Date     Back pain      Crohn's disease (H)      Exercise-induced asthma      Gestational diabetes      Herniation of intervertebral disc of lumbar region      Infertility       Ovarian cyst      Smoker      Status post cholecystectomy 2005     Past Surgical History:   Procedure Laterality Date     APPENDECTOMY       C/SECTION, LOW TRANSVERSE      , Low Transverse     CHOLECYSTECTOMY, LAPOROSCOPIC  2005    Cholecystectomy, Laparoscopic     COLONOSCOPY       ESOPHAGOSCOPY, GASTROSCOPY, DUODENOSCOPY (EGD), COMBINED  3/6/2014    Procedure: COMBINED ESOPHAGOSCOPY, GASTROSCOPY, DUODENOSCOPY (EGD), BIOPSY SINGLE OR MULTIPLE;  COLONOSCOPY/ UPPER GI ENDOSCOPY/ COLON/ EGD/ Abdominal pain, epigastric/ PJH;  Surgeon: West Lomas MD;  Location: [facility]      HYSTEROSCOPY, SURGICAL; W/ ENDOMETRIAL ABLATION, ANY METHOD  2010     HERNIORRHAPHY VENTRAL N/A 2018    Procedure: Open ventral hernia repair;  Surgeon: Alonso Bills MD;  Location: [facility]     HYSTERECTOMY, SUPRACERVICAL LAPAROSCOPIC       LEEP TX, CERVICAL      LEEP TX Cervical     ORTHOPEDIC SURGERY      bluged disk     partial small bowel resection      Ileo-colonic resection. Crohn's disease surgery for bowel obstruction. this was a section of small bowel and large bowel and the cecum., all removed and a reanastamosis done successfully     SALPINGO OOPHORECTOMY,R/L/TORI      Right ovary     SLING TRANSVAGINAL N/A 2022    Procedure: Pubovaginal sling with Altis;  Surgeon: Eleuterio Stone MD;  Location: [facility] OR     TUBAL LIGATION       Family History   Problem Relation Age of Onset     Cancer Mother         cervical     Lipids Mother      Eye Disorder Father      Lipids Father      Alcohol/Drug Maternal Grandmother      Colon Cancer Paternal Grandmother      Diabetes Paternal Grandmother      Eye Disorder Paternal Grandmother      Diabetes Paternal Grandfather      Eye Disorder Paternal Grandfather      Inflammatory Bowel Disease Paternal Aunt         and two paternal uncles     Breast Cancer No family hx of      Objective  BP (!) 154/112   Pulse 97   Wt 88.5 kg (195 lb)   LMP 2010  (LMP Unknown)   BMI 35.67 kg/m        General: healthy, alert and in no distress      HEENT: no scleral icterus or conjunctival erythema     Skin: no suspicious lesions or rash. No jaundice.     CV: regular rhythm by palpation, 2+ distal pulses, no pedal edema      Resp: normal respiratory effort without conversational dyspnea     Psych: normal mood and affect      Gait: nonantalgic, appropriate coordination and balance     Neuro: normal light touch sensory exam of the extremities. Motor strength as noted below     Left Knee exam    ROM:        full active and passive ROM with flexion and extension, improved    Inspection:       no visible ecchymosis       No effusion    Skin:       no visible deformities       well perfused       capillary refill brisk    Patellar Motion:        Normal patellar tracking noted through range of motion       Crepitus noted in the patellofemoral joint    Tender:        lateral patellar border resolved       medial joint line resolved       lateral joint line resolved    Non Tender:         remainder of knee area    Special Tests:        neg (-) varus at 0 deg and 30 deg for laxity       neg (-) valgus at 0 deg and 30 deg for laxity       No pain with forced extension      Radiology  EXAM: XR KNEE LEFT 3 VIEWS  LOCATION: [facility]  DATE/TIME: 10/6/2022 9:00 PM     INDICATION: left anterior knee pain in setting of ground level fall directly onto knee  COMPARISON: None.                                                                   IMPRESSION: Small knee joint effusion. No fractures are evident. Normal joint spacing. Normal patellar alignment.    MR left knee without contrast 10/18/2022 3:10 PM     Techniques: Multiplanar multisequence imaging of the left knee was  obtained without administration of intra-articular or intravenous  contrast using routing protocol.     History: eval for possible occult fracture or other bone injury after  injury at work,  knee effusion, difficulty with WB, eval as well for  other internal derangement; Injury of left knee, subsequent encounter;  Effusion, left knee; Difficulty in weight bearing     Comparison: 10/6/2022     Findings:     MENISCI:  Medial meniscus: Intact.  Lateral meniscus: Intact.     LIGAMENTS  Cruciate ligaments: Intact.  Medial supporting structures: Mild periligamentous edema around the  tibial collateral ligament and posterior oblique ligament, consistent  with low to moderate grade sprain.  Lateral supporting structures: Focal edema like marrow signal  intensity at the tip of the proximal fibula.      Intact popliteus tendon, fibular collateral ligament, biceps femoris  tendon, conjoined tendon, posterolateral capsule and popliteofibular  ligament. Iliotibial band is also intact.     EXTENSOR MECHANISM  Intact.     FLUID  Small-to-moderate joint effusion. Small fluid in the Baker's cyst with  internal septations.     OSSEOUS and ARTICULAR STRUCTURES  Bones: No fracture, contusion, or osseous lesion is seen. Proximal  tibial hypointensity, presumably bone island.     Patellofemoral compartment: Full-thickness chondral fissuring lateral  patellar facet with subchondral edema crossing or intensity. Also  moderate to high-grade chondral fissure in the patellar median ridge.  Trochlear cartilage is relatively preserved.     Medial compartment: Focal subchondral edema grossly density for  posterior weightbearing surface of the medial femoral condyle,  possibly representing overlying full-thickness chondral fissure  present in this area.     Lateral compartment: No high-grade hyaline cartilage disease.     ANCILLARY FINDINGS  Mild subcutaneous edema anteriorly.                                                                      Impression:  1. Low to moderate grade sprain tibial collateral ligament and  posterior oblique ligament.  2. Focal edema like marrow signal intensity at the tip of the proximal  fibula without  associated ligamentous injury.  3. Cruciates and menisci are intact.  4. No occult fracture.  5. 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   6. Small-to-moderate knee joint effusion.      Assessment:  1. Injury of left knee, subsequent encounter    2. Osteoarthritis of left knee, unspecified osteoarthritis type        Plan:  Discussed the assessment with the patient.  Follow up: As needed only  Acute knee injury after fall at work, improved s/p CSI,  pain now resolved  Return to work progression is gone well, will be full-time starting next week  New letter provided  RICE and supportive care reviewed  Oral Tylenol and topical Voltaren gel reviewed as safe OTC options, reviewed safe dosing strategies  Supportive care reviewed  All questions were answered today  Contact us with additional questions or concerns  Signs and sx of concern reviewed      Justus Chapman DO, CAQ  Sports Medicine Physician  [facility]              Disclaimer: This note consists of symbols derived from keyboarding, dictation and/or voice recognition software. As a result, there may be errors in the script that have gone undetected. Please consider this when interpreting information found in this chart.      Again, thank you for allowing me to participate in the care of your patient.        Sincerely,        Justus Chapman DO

## 2023-03-29 NOTE — LETTER
March 29, 2023      Ana continues to be under my care for her left knee injury that occurred on 10/6/2022.  She has made excellent ongoing progress since our last visit.  She can continue to work this week under her current restrictions and will return to full-duty without restriction on 4/3/2023 as planned.  Physical therapy will be continued in the form of home exercises which she can continue to work on daily.  If there is no follow up within 30 days she can be placed at [facility] without partial or permanent disability.  Follow up with me for this issue will be as needed only.      Justus Mendez DO, CAQ  Sports Medicine Physician  [facility]

## 2023-03-29 NOTE — PROGRESS NOTES
Ana Felix  :  1980  DOS:   MRN: [id number]    Sports Medicine Clinic Visit    PCP: Radha Bermudez    Ana Felix is a 41 year old female who is seen in follow up for her work related left knee injury. Ana reports that she is at 6 hours into her transition schedule back into work, reporting that next Monday (4/3/23) she will be full-time again.    Interim History - 2023  Since last visit on 2023 patient has felt a lot better, reporting no achiness after working and being on the feat all day at work.  No new injury in the interim.        Interim History - 2023  Since last visit on 22 patient has mild-moderate left knee pain.  Patient describes generalized aching sensation after walking/standing for daily work.  Left knee steroid injection completed on 22 is providing relief at this time.  No new injury in the interim.    Interim History - 2023  Since last visit on 2023 patient has waxing and waning left anterior knee pain.  Consulted with Ortho Surgery - Dr Pinedo on 23, recommended trial of Celebrex, patient has not started at this time.  She was provided work restrictions on 23, however has not returned to work, pending outcome of today's OV.  No interim injury.       Review of Systems  Musculoskeletal: as above  Remainder of review of systems is negative including constitutional, CV, pulmonary, GI, Skin and Neurologic except as noted in HPI or medical history.    Past Medical History:   Diagnosis Date     Back pain      Crohn's disease (H)      Exercise-induced asthma      Gestational diabetes      Herniation of intervertebral disc of lumbar region      Infertility      Ovarian cyst      Smoker      Status post cholecystectomy 2005     Past Surgical History:   Procedure Laterality Date     APPENDECTOMY       C/SECTION, LOW TRANSVERSE      , Low Transverse     CHOLECYSTECTOMY, LAPOROSCOPIC  2005     Cholecystectomy, Laparoscopic     COLONOSCOPY       ESOPHAGOSCOPY, GASTROSCOPY, DUODENOSCOPY (EGD), COMBINED  3/6/2014    Procedure: COMBINED ESOPHAGOSCOPY, GASTROSCOPY, DUODENOSCOPY (EGD), BIOPSY SINGLE OR MULTIPLE;  COLONOSCOPY/ UPPER GI ENDOSCOPY/ COLON/ EGD/ Abdominal pain, epigastric/ PJH;  Surgeon: West Lomas MD;  Location: [facility] OR     HC HYSTEROSCOPY, SURGICAL; W/ ENDOMETRIAL ABLATION, ANY METHOD  7/2010     HERNIORRHAPHY VENTRAL N/A 12/11/2018    Procedure: Open ventral hernia repair;  Surgeon: Alonso Bills MD;  Location: [facility]     HYSTERECTOMY, SUPRACERVICAL LAPAROSCOPIC  2010     LEEP TX, CERVICAL      LEEP TX Cervical     ORTHOPEDIC SURGERY      bluged disk     partial small bowel resection  2002    Ileo-colonic resection. Crohn's disease surgery for bowel obstruction. this was a section of small bowel and large bowel and the cecum., all removed and a reanastamosis done successfully     SALPINGO OOPHORECTOMY,R/L/TORI      Right ovary     SLING TRANSVAGINAL N/A 4/4/2022    Procedure: Pubovaginal sling with Altis;  Surgeon: Eleuterio Stone MD;  Location: [facility] OR     TUBAL LIGATION       Family History   Problem Relation Age of Onset     Cancer Mother         cervical     Lipids Mother      Eye Disorder Father      Lipids Father      Alcohol/Drug Maternal Grandmother      Colon Cancer Paternal Grandmother      Diabetes Paternal Grandmother      Eye Disorder Paternal Grandmother      Diabetes Paternal Grandfather      Eye Disorder Paternal Grandfather      Inflammatory Bowel Disease Paternal Aunt         and two paternal uncles     Breast Cancer No family hx of      Objective  BP (!) 154/112   Pulse 97   Wt 88.5 kg (195 lb)   LMP 07/23/2010 (LMP Unknown)   BMI 35.67 kg/m        General: healthy, alert and in no distress      HEENT: no scleral icterus or conjunctival erythema     Skin: no suspicious lesions or rash. No jaundice.     CV: regular rhythm by palpation, 2+ distal pulses, no pedal  edema      Resp: normal respiratory effort without conversational dyspnea     Psych: normal mood and affect      Gait: nonantalgic, appropriate coordination and balance     Neuro: normal light touch sensory exam of the extremities. Motor strength as noted below     Left Knee exam    ROM:        full active and passive ROM with flexion and extension, improved    Inspection:       no visible ecchymosis       No effusion    Skin:       no visible deformities       well perfused       capillary refill brisk    Patellar Motion:        Normal patellar tracking noted through range of motion       Crepitus noted in the patellofemoral joint    Tender:        lateral patellar border resolved       medial joint line resolved       lateral joint line resolved    Non Tender:         remainder of knee area    Special Tests:        neg (-) varus at 0 deg and 30 deg for laxity       neg (-) valgus at 0 deg and 30 deg for laxity       No pain with forced extension      Radiology  EXAM: XR KNEE LEFT 3 VIEWS  LOCATION: [facility]  DATE/TIME: 10/6/2022 9:00 PM     INDICATION: left anterior knee pain in setting of ground level fall directly onto knee  COMPARISON: None.                                                                   IMPRESSION: Small knee joint effusion. No fractures are evident. Normal joint spacing. Normal patellar alignment.    MR left knee without contrast 10/18/2022 3:10 PM     Techniques: Multiplanar multisequence imaging of the left knee was  obtained without administration of intra-articular or intravenous  contrast using routing protocol.     History: eval for possible occult fracture or other bone injury after  injury at work, knee effusion, difficulty with WB, eval as well for  other internal derangement; Injury of left knee, subsequent encounter;  Effusion, left knee; Difficulty in weight bearing     Comparison: 10/6/2022     Findings:     MENISCI:  Medial meniscus:  Intact.  Lateral meniscus: Intact.     LIGAMENTS  Cruciate ligaments: Intact.  Medial supporting structures: Mild periligamentous edema around the  tibial collateral ligament and posterior oblique ligament, consistent  with low to moderate grade sprain.  Lateral supporting structures: Focal edema like marrow signal  intensity at the tip of the proximal fibula.      Intact popliteus tendon, fibular collateral ligament, biceps femoris  tendon, conjoined tendon, posterolateral capsule and popliteofibular  ligament. Iliotibial band is also intact.     EXTENSOR MECHANISM  Intact.     FLUID  Small-to-moderate joint effusion. Small fluid in the Baker's cyst with  internal septations.     OSSEOUS and ARTICULAR STRUCTURES  Bones: No fracture, contusion, or osseous lesion is seen. Proximal  tibial hypointensity, presumably bone island.     Patellofemoral compartment: Full-thickness chondral fissuring lateral  patellar facet with subchondral edema crossing or intensity. Also  moderate to high-grade chondral fissure in the patellar median ridge.  Trochlear cartilage is relatively preserved.     Medial compartment: Focal subchondral edema grossly density for  posterior weightbearing surface of the medial femoral condyle,  possibly representing overlying full-thickness chondral fissure  present in this area.     Lateral compartment: No high-grade hyaline cartilage disease.     ANCILLARY FINDINGS  Mild subcutaneous edema anteriorly.                                                                      Impression:  1. Low to moderate grade sprain tibial collateral ligament and  posterior oblique ligament.  2. Focal edema like marrow signal intensity at the tip of the proximal  fibula without associated ligamentous injury.  3. Cruciates and menisci are intact.  4. No occult fracture.  5. 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   6. Small-to-moderate knee joint effusion.      Assessment:  1. Injury of left knee,  subsequent encounter    2. Osteoarthritis of left knee, unspecified osteoarthritis type        Plan:  Discussed the assessment with the patient.  Follow up: As needed only  Acute knee injury after fall at work, improved s/p CSI,  pain now resolved  Return to work progression is gone well, will be full-time starting next week  New letter provided  RICE and supportive care reviewed  Oral Tylenol and topical Voltaren gel reviewed as safe OTC options, reviewed safe dosing strategies  Supportive care reviewed  All questions were answered today  Contact us with additional questions or concerns  Signs and sx of concern reviewed      Justus Chapman DO, KILO  Sports Medicine Physician  [facility]              Disclaimer: This note consists of symbols derived from keyboarding, dictation and/or voice recognition software. As a result, there may be errors in the script that have gone undetected. Please consider this when interpreting information found in this chart.

## 2023-04-16 ENCOUNTER — HEALTH MAINTENANCE LETTER (OUTPATIENT)
Age: 43
End: 2023-04-16

## 2023-04-22 ENCOUNTER — MYC MEDICAL ADVICE (OUTPATIENT)
Dept: FAMILY MEDICINE | Facility: CLINIC | Age: 43
End: 2023-04-22
Payer: COMMERCIAL

## 2023-04-26 ASSESSMENT — ANXIETY QUESTIONNAIRES
3. WORRYING TOO MUCH ABOUT DIFFERENT THINGS: NEARLY EVERY DAY
7. FEELING AFRAID AS IF SOMETHING AWFUL MIGHT HAPPEN: MORE THAN HALF THE DAYS
2. NOT BEING ABLE TO STOP OR CONTROL WORRYING: NEARLY EVERY DAY
GAD7 TOTAL SCORE: 16
4. TROUBLE RELAXING: NEARLY EVERY DAY
GAD7 TOTAL SCORE: 16
8. IF YOU CHECKED OFF ANY PROBLEMS, HOW DIFFICULT HAVE THESE MADE IT FOR YOU TO DO YOUR WORK, TAKE CARE OF THINGS AT HOME, OR GET ALONG WITH OTHER PEOPLE?: VERY DIFFICULT
6. BECOMING EASILY ANNOYED OR IRRITABLE: SEVERAL DAYS
1. FEELING NERVOUS, ANXIOUS, OR ON EDGE: NEARLY EVERY DAY
7. FEELING AFRAID AS IF SOMETHING AWFUL MIGHT HAPPEN: MORE THAN HALF THE DAYS
IF YOU CHECKED OFF ANY PROBLEMS ON THIS QUESTIONNAIRE, HOW DIFFICULT HAVE THESE PROBLEMS MADE IT FOR YOU TO DO YOUR WORK, TAKE CARE OF THINGS AT HOME, OR GET ALONG WITH OTHER PEOPLE: VERY DIFFICULT
5. BEING SO RESTLESS THAT IT IS HARD TO SIT STILL: SEVERAL DAYS
GAD7 TOTAL SCORE: 16

## 2023-04-26 ASSESSMENT — ASTHMA QUESTIONNAIRES
QUESTION_2 LAST FOUR WEEKS HOW OFTEN HAVE YOU HAD SHORTNESS OF BREATH: NOT AT ALL
QUESTION_5 LAST FOUR WEEKS HOW WOULD YOU RATE YOUR ASTHMA CONTROL: COMPLETELY CONTROLLED
ACT_TOTALSCORE: 25
QUESTION_4 LAST FOUR WEEKS HOW OFTEN HAVE YOU USED YOUR RESCUE INHALER OR NEBULIZER MEDICATION (SUCH AS ALBUTEROL): NOT AT ALL
ACT_TOTALSCORE: 25
QUESTION_3 LAST FOUR WEEKS HOW OFTEN DID YOUR ASTHMA SYMPTOMS (WHEEZING, COUGHING, SHORTNESS OF BREATH, CHEST TIGHTNESS OR PAIN) WAKE YOU UP AT NIGHT OR EARLIER THAN USUAL IN THE MORNING: NOT AT ALL
QUESTION_1 LAST FOUR WEEKS HOW MUCH OF THE TIME DID YOUR ASTHMA KEEP YOU FROM GETTING AS MUCH DONE AT WORK, SCHOOL OR AT HOME: NONE OF THE TIME

## 2023-04-26 ASSESSMENT — PATIENT HEALTH QUESTIONNAIRE - PHQ9
SUM OF ALL RESPONSES TO PHQ QUESTIONS 1-9: 10
10. IF YOU CHECKED OFF ANY PROBLEMS, HOW DIFFICULT HAVE THESE PROBLEMS MADE IT FOR YOU TO DO YOUR WORK, TAKE CARE OF THINGS AT HOME, OR GET ALONG WITH OTHER PEOPLE: VERY DIFFICULT
SUM OF ALL RESPONSES TO PHQ QUESTIONS 1-9: 10

## 2023-04-26 NOTE — PROGRESS NOTES
"Ana is a 42 year old who is being evaluated via a billable video visit.      How would you like to obtain your AVS? MyChart  If the video visit is dropped, the invitation should be resent by: Text to cell phone: [phone]  Will anyone else be joining your video visit? No          Assessment & Plan       ICD-10-CM    1. Insomnia, unspecified type  G47.00 hydrOXYzine (ATARAX) 25 MG tablet      2. DANIELLE (generalized anxiety disorder)  F41.1 sertraline (ZOLOFT) 100 MG tablet     hydrOXYzine (ATARAX) 25 MG tablet          1) Will trial hydroxyzine and melatonin. Will take a drug holiday from trazodone.     2) PHQ and DANIELLE scores elevated. Increase Zoloft to 200mg daily. Will utilize hydroxyzine for PRN anxiety as well. Also recommended the Calm jeana. Follow up in 6 weeks.      Prescription drug management         Nicotine/Tobacco Cessation:  She reports that she has been smoking cigarettes. She has been smoking an average of .5 packs per day. She has never used smokeless tobacco.    BMI:   Estimated body mass index is 35.67 kg/m  as calculated from the following:    Height as of 3/15/23: 1.575 m (5' 2\").    Weight as of 3/29/23: 88.5 kg (195 lb).     Depression Screening Follow Up        4/26/2023     4:14 PM   PHQ   PHQ-9 Total Score 10   Q9: Thoughts of better off dead/self-harm past 2 weeks Not at all     Return in about 7 weeks (around 6/12/2023) for your annual physical, depression/anxiety follow up, with Radha, in person.     Radha Bermudez PA-C  [facility] Perez is a 42 year old, presenting for the following health issues:  Recheck Medication        4/26/2023     4:22 PM   Additional Questions   Roomed by Marjan Montgomery 4/26/2023   Any forms needing to be completed Yes     History of Present Illness       Mental Health Follow-up:  Patient presents to follow-up on Depression & Anxiety.Patient's depression since last visit has been:  Worse  The patient is having other " symptoms associated with depression.  Patient's anxiety since last visit has been:  Worse  The patient is having other symptoms associated with anxiety.  Any significant life events: financial concerns, housing concerns and health concerns  Patient is feeling anxious or having panic attacks.  Patient has no concerns about alcohol or drug use.     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      Objective           Vitals:  No vitals were obtained today due to virtual visit.    Physical Exam   GENERAL: Healthy, alert and no distress  EYES: Eyes grossly normal to inspection.  No discharge or erythema, or obvious scleral/conjunctival abnormalities.  RESP: No audible wheeze, cough, or visible cyanosis.  No visible retractions or increased work of breathing.    SKIN: Visible skin clear. No significant rash, abnormal pigmentation or lesions.  NEURO: Cranial nerves grossly intact.  Mentation and speech appropriate for age.  PSYCH: Mentation appears normal, affect normal/bright, judgement and insight intact, normal speech and appearance well-groomed.          Video-Visit Details    Type of service:  Video Visit      Originating Location (pt. Location): Home    Distant Location (provider location):  Off-site  Platform used for Video Visit: Eve

## 2023-04-27 ENCOUNTER — VIRTUAL VISIT (OUTPATIENT)
Dept: FAMILY MEDICINE | Facility: CLINIC | Age: 43
End: 2023-04-27
Payer: COMMERCIAL

## 2023-04-27 DIAGNOSIS — G47.00 INSOMNIA, UNSPECIFIED TYPE: Primary | ICD-10-CM

## 2023-04-27 DIAGNOSIS — F41.1 GAD (GENERALIZED ANXIETY DISORDER): ICD-10-CM

## 2023-04-27 PROCEDURE — 99214 OFFICE O/P EST MOD 30 MIN: CPT | Mod: VID | Performed by: PHYSICIAN ASSISTANT

## 2023-04-27 RX ORDER — SERTRALINE HYDROCHLORIDE 100 MG/1
200 TABLET, FILM COATED ORAL DAILY
Qty: 180 TABLET | Refills: 0 | Status: SHIPPED | OUTPATIENT
Start: 2023-04-27 | End: 2023-06-12

## 2023-04-27 RX ORDER — HYDROXYZINE HYDROCHLORIDE 25 MG/1
12.5-5 TABLET, FILM COATED ORAL 3 TIMES DAILY PRN
Qty: 40 TABLET | Refills: 1 | Status: SHIPPED | OUTPATIENT
Start: 2023-04-27 | End: 2023-05-31

## 2023-04-27 NOTE — PATIENT INSTRUCTIONS
Melatonin and Benadryl are both helpful OTC sleep aids. They can be used together as well.  Melatonin 5-10mg, take 2-3 hours before bed  Benadryl 50-100mg, take 30-60 mins before bed    Good Sleep Hygiene Habits:   Stick to a regular sleep schedule---even on weekends.   Get regular exercise---avoid exercise in the late evening.   Go to bed only when you are sleepy.   Do something relaxing and enjoyable before bedtime.   Make your bedroom quiet and comfortable.   Avoid large meals just before bedtime.   If you do not fall asleep within 15 to 20 minutes, get up and do a productive activity such as exercising or paperwork or reading for at least 30 minutes before trying to get back to sleep. Do not go watch TV or surf the web, these activities can contribute to insomnia.    Remove your clock from sight.   Do not nap during the day.    Avoid alcohol, nicotine, and caffeine. No caffeine past noon, you need 8 hours off of caffeine to get best sleep at night.   Spend time outdoors at the same time each day.   Avoid bright lights from the TV, computers, video games, etc. before bed.    Do not bring your TV, computer (smart phone), video games, books, etc. into the bedroom.

## 2023-05-08 DIAGNOSIS — J45.30 MILD PERSISTENT ASTHMA, UNCOMPLICATED: ICD-10-CM

## 2023-05-09 ENCOUNTER — ANCILLARY ORDERS (OUTPATIENT)
Dept: MRI IMAGING | Facility: CLINIC | Age: 43
End: 2023-05-09

## 2023-05-09 DIAGNOSIS — R29.2 HYPERREFLEXIA: ICD-10-CM

## 2023-05-09 RX ORDER — MONTELUKAST SODIUM 10 MG/1
10 TABLET ORAL DAILY
Qty: 90 TABLET | Refills: 0 | Status: SHIPPED | OUTPATIENT
Start: 2023-05-09 | End: 2023-08-23

## 2023-05-10 ENCOUNTER — HOSPITAL ENCOUNTER (EMERGENCY)
Facility: CLINIC | Age: 43
Discharge: HOME | End: 2023-05-10
Payer: COMMERCIAL

## 2023-05-10 ENCOUNTER — APPOINTMENT (OUTPATIENT)
Dept: GENERAL RADIOLOGY | Facility: CLINIC | Age: 43
End: 2023-05-10
Payer: COMMERCIAL

## 2023-05-10 VITALS
SYSTOLIC BLOOD PRESSURE: 152 MMHG | OXYGEN SATURATION: 100 % | DIASTOLIC BLOOD PRESSURE: 82 MMHG | HEART RATE: 81 BPM | TEMPERATURE: 98.2 F | RESPIRATION RATE: 24 BRPM

## 2023-05-10 DIAGNOSIS — S02.2XXA CLOSED FRACTURE OF NASAL BONE, INITIAL ENCOUNTER: ICD-10-CM

## 2023-05-10 PROCEDURE — 70160 X-RAY EXAM OF NASAL BONES: CPT

## 2023-05-10 PROCEDURE — 99213 OFFICE O/P EST LOW 20 MIN: CPT

## 2023-05-10 PROCEDURE — G0463 HOSPITAL OUTPT CLINIC VISIT: HCPCS

## 2023-05-10 ASSESSMENT — ENCOUNTER SYMPTOMS
FACIAL SWELLING: 1
EYES NEGATIVE: 1
RESPIRATORY NEGATIVE: 1

## 2023-05-10 ASSESSMENT — ACTIVITIES OF DAILY LIVING (ADL): ADLS_ACUITY_SCORE: 35

## 2023-05-10 NOTE — LETTER
May 10, 2023      To Whom It May Concern:      Ana Felix was seen in our Urgent care today, 05/10/23.  She may return to work on 05/15/2023.    Sincerely,        CARI Childress CNP

## 2023-05-11 NOTE — DISCHARGE INSTRUCTIONS
Use your pain meds as already prescribed for nasal pain.  Can add ibuprofen as well which may help with swelling as well as pain.  Apply ice to the area.  Follow-up with ENT for continued concerns.  Return in the meantime for any new concerns.

## 2023-05-11 NOTE — ED PROVIDER NOTES
"  History     Chief Complaint   Patient presents with     Facial Pain     Patient presents today with . Symptoms started after getting hit in the face by a softball. Arrived to urgent care ambulatory .       COURTNEY Felix is a 42 year old female who presents urgent care for concern of facial injury.  Patient states that she was at softball practice today and was warming up.  States that she was going to catch a ball and missed it causing it to strike her in the nose.  Patient reports prior nasal fracture several years ago and is concerned for this again.  Denies any significant headache.  States mild nosebleed following the injury.  Denies any neck pain, loss of consciousness, amnesia or other concerns at this time.    Allergies:  Allergies   Allergen Reactions     Infliximab Hives     Sulfa Drugs [Sulfa Antibiotics] Nausea     Pt states \"it doesn't work for me\"     Sulfacetamide Nausea     Amoxicillin Nausea and Vomiting     Amoxicillin-Pot Clavulanate Other (See Comments) and Nausea and Vomiting     Crohn's     Aspirin Nausea     Bupropion Hives and Nausea     Clavulanic Acid Nausea and Vomiting     Droperidol Other (See Comments)     Dystonic reaction     Ibuprofen Other (See Comments) and Nausea     Crohn's       Lyrica [Pregabalin] Nausea and Vomiting     Grogginess, severe back pain  Grogginess, severe back pain     Vicodin [Hydrocodone-Acetaminophen] Nausea and Vomiting     Adhesive Tape Rash       Problem List:    Patient Active Problem List    Diagnosis Date Noted     Primary osteoarthritis of left knee 02/13/2023     Priority: Medium     DANIELLE (generalized anxiety disorder) 03/15/2022     Priority: Medium     Insomnia, unspecified type 03/15/2022     Priority: Medium     SKYE (stress urinary incontinence, female) 03/14/2022     Priority: Medium     Added automatically from request for surgery [id number]       Hidradenitis suppurativa 09/02/2021     Priority: Medium     Immunocompromised state (H) 07/09/2021 "     Priority: Medium     History of gestational diabetes mellitus (GDM) 08/15/2018     Priority: Medium     Amenorrhea 08/15/2018     Priority: Medium     Crohn's disease of both small and large intestine without complication (H) 07/31/2018     Priority: Medium     Pain medication agreement 08/18/2017     Priority: Medium     Overview:   [facility]       Controlled substance agreement signed 07/19/2017     Priority: Medium     Degenerative lumbar disc 03/07/2017     Priority: Medium     Labral tear of hip, degenerative 03/07/2017     Priority: Medium     Pain of right hip joint 03/07/2017     Priority: Medium     S/P LEEP of cervix 12/15/2015     Priority: Medium     S/p LEEP of cervix - date unknown  12/9/15: Pap - NIL, Neg HPV. Plan cotest in 1 year. If JAYME 2/3 on biopsy - PAP/HPV co-testing at 12, 24 months. If two negative results repeat co-testing in 3 years, if negative then routine screening.  3/7/18 Pap: NIL/neg HPV.             Hearing difficulty 10/09/2014     Priority: Medium     Irritable bowel syndrome (IBS) 04/15/2014     Priority: Medium     Tiffany raw vegetables       Lactose intolerance 04/15/2014     Priority: Medium     Abdominal pain, right upper quadrant 03/06/2014     Priority: Medium     Nausea with vomiting 01/31/2014     Priority: Medium     Chronic low back pain 11/05/2012     Priority: Medium     Follows with pain clinic.       Mild persistent asthma, uncomplicated 11/05/2012     Priority: Medium     Discogenic pain 10/08/2012     Priority: Medium     Abdominal pain 08/30/2012     Priority: Medium     S/P oophorectomy 07/20/2012     Priority: Medium     History of cholecystectomy 07/20/2012     Priority: Medium     History of resection of small bowel 07/20/2012     Priority: Medium     Tobacco abuse 05/25/2012     Priority: Medium     Displacement of lumbar intervertebral disc without myelopathy 01/06/2012     Priority: Medium     Disorder of sacrum 12/13/2011     Priority: Medium      Problem list name updated by automated process. Provider to review       Back pain 2011     Priority: Medium     Obesity 2011     Priority: Medium     Migraine headache 2011     Priority: Medium     Patient given Migraine Education folder and Migraine Action Plan on 2011. Cammy Anderson RN    (Problem list name updated by automated process. Provider to review and confirm.)       CARDIOVASCULAR SCREENING; LDL GOAL LESS THAN 160 10/31/2010     Priority: Medium     Dysmenorrhea 10/05/2010     Priority: Medium     Female pelvic pain 2010     Priority: Medium     (Problem list name updated by automated process. Provider to review and confirm.)       Crohns disease 2009     Priority: Medium        Past Medical History:    Past Medical History:   Diagnosis Date     Back pain      Crohn's disease (H)      Exercise-induced asthma      Gestational diabetes      Herniation of intervertebral disc of lumbar region      Infertility      Ovarian cyst      Smoker      Status post cholecystectomy 2005       Past Surgical History:    Past Surgical History:   Procedure Laterality Date     APPENDECTOMY       C/SECTION, LOW TRANSVERSE      , Low Transverse     CHOLECYSTECTOMY, LAPOROSCOPIC  2005    Cholecystectomy, Laparoscopic     COLONOSCOPY       ESOPHAGOSCOPY, GASTROSCOPY, DUODENOSCOPY (EGD), COMBINED  3/6/2014    Procedure: COMBINED ESOPHAGOSCOPY, GASTROSCOPY, DUODENOSCOPY (EGD), BIOPSY SINGLE OR MULTIPLE;  COLONOSCOPY/ UPPER GI ENDOSCOPY/ COLON/ EGD/ Abdominal pain, epigastric/ [facility];  Surgeon: West Lomas MD;  Location:  OR      HYSTEROSCOPY, SURGICAL; W/ ENDOMETRIAL ABLATION, ANY METHOD  2010     HERNIORRHAPHY VENTRAL N/A 2018    Procedure: Open ventral hernia repair;  Surgeon: Alonso Bills MD;  Location: [facility] OR     HYSTERECTOMY, SUPRACERVICAL LAPAROSCOPIC  2010     LEEP TX, CERVICAL      LEEP TX Cervical     ORTHOPEDIC SURGERY      bluged disk      partial small bowel resection  2002    Ileo-colonic resection. Crohn's disease surgery for bowel obstruction. this was a section of small bowel and large bowel and the cecum., all removed and a reanastamosis done successfully     SALPINGO OOPHORECTOMY,R/L/TORI      Right ovary     SLING TRANSVAGINAL N/A 4/4/2022    Procedure: Pubovaginal sling with Altis;  Surgeon: Eleuterio Stone MD;  Location: [facility] OR     TUBAL LIGATION         Family History:    Family History   Problem Relation Age of Onset     Cancer Mother         cervical     Lipids Mother      Eye Disorder Father      Lipids Father      Alcohol/Drug Maternal Grandmother      Colon Cancer Paternal Grandmother      Diabetes Paternal Grandmother      Eye Disorder Paternal Grandmother      Diabetes Paternal Grandfather      Eye Disorder Paternal Grandfather      Inflammatory Bowel Disease Paternal Aunt         and two paternal uncles     Breast Cancer No family hx of        Social History:  Marital Status:   [2]  Social History     Tobacco Use     Smoking status: Some Days     Packs/day: 0.50     Types: Cigarettes     Smokeless tobacco: Never   Vaping Use     Vaping status: Never Used   Substance Use Topics     Alcohol use: Yes     Comment: very rarely     Drug use: No        Medications:    azaTHIOprine (IMURAN) 50 MG tablet  celecoxib (CELEBREX) 200 MG capsule  cyanocobalamin (VITAMIN B12) 1000 MCG/ML injection  fentaNYL (DURAGESIC) 12 mcg/hr patch 72 hr  fentaNYL (DURAGESIC) 25 mcg/hr patch 72 hr  gabapentin (NEURONTIN) 300 MG capsule  hydrOXYzine (ATARAX) 25 MG tablet  montelukast (SINGULAIR) 10 MG tablet  sertraline (ZOLOFT) 100 MG tablet  traZODone (DESYREL) 50 MG tablet  VENTOLIN  (90 Base) MCG/ACT inhaler          Review of Systems   HENT: Positive for facial swelling (pain and injury following trauma. ).    Eyes: Negative.    Respiratory: Negative.        Physical Exam   BP: (!) 152/82  Pulse: 81  Temp: 98.2  F (36.8  C)  Resp:  24  SpO2: 100 %      Physical Exam  Constitutional:       General: She is not in acute distress.     Appearance: Normal appearance. She is well-developed.   HENT:      Head: Normocephalic and atraumatic. No raccoon eyes or James's sign.      Jaw: No tenderness, swelling or pain on movement.      Right Ear: Tympanic membrane and ear canal normal. No hemotympanum.      Left Ear: Tympanic membrane and ear canal normal. No hemotympanum.      Nose: Signs of injury and nasal tenderness present. No nasal deformity.      Right Nostril: No septal hematoma.      Left Nostril: No septal hematoma.      Right Sinus: Frontal sinus tenderness present.      Left Sinus: Frontal sinus tenderness present.   Eyes:      General: No scleral icterus.     Conjunctiva/sclera: Conjunctivae normal.   Cardiovascular:      Rate and Rhythm: Normal rate.   Pulmonary:      Effort: Pulmonary effort is normal. No respiratory distress.   Abdominal:      General: Abdomen is flat.   Musculoskeletal:      Cervical back: Normal range of motion and neck supple.   Skin:     General: Skin is warm and dry.      Findings: No rash.   Neurological:      Mental Status: She is alert and oriented to person, place, and time.         ED Course                 Procedures              No results found for this or any previous visit (from the past 24 hour(s)).    Medications - No data to display    Assessments & Plan (with Medical Decision Making)   Patient presented to urgent care for concern of facial injury after being hit in the face with a softball.  There are no james signs or septal hematoma noted on exam today.  Patient is concerned for nasal fracture and x-ray was performed today revealing a left-sided nasal bone fracture.  Referral was placed for ENT.  Patient does have some mild tenderness over the frontal sinuses.  She has no pain with eye movements and I have low concern for other facial fracture at this time.  CT scan deferred at this time.  Can use  Tylenol ibuprofen as needed for discomfort.  She does have existing Percocet takes prescription as well which she may use as needed.  Continue to ice.  Recommended return for any new or worsening symptoms.  Patient was discharged in good condition.  She is agreeable to the above plan.  I have reviewed the nursing notes.    I have reviewed the findings, diagnosis, plan and need for follow up with the patient.        New Prescriptions    No medications on file       Final diagnoses:   Closed fracture of nasal bone, initial encounter       5/10/2023   [facility] EMERGENCY DEPT    Disclaimer:  This note consists of symbols derived from keyboarding, dictation and/or voice recognition software.  As a result, there may be errors in the script that have gone undetected.  Please consider this when interpreting information found in this chart.       Emmanuel Sherman, CARI CNP  05/10/23 2042

## 2023-05-17 ENCOUNTER — HOSPITAL ENCOUNTER (OUTPATIENT)
Dept: MRI IMAGING | Facility: CLINIC | Age: 43
Discharge: HOME | End: 2023-05-17
Attending: NEUROLOGICAL SURGERY | Admitting: NEUROLOGICAL SURGERY
Payer: COMMERCIAL

## 2023-05-17 ENCOUNTER — TRANSFERRED RECORDS (OUTPATIENT)
Dept: HEALTH INFORMATION MANAGEMENT | Facility: CLINIC | Age: 43
End: 2023-05-17

## 2023-05-17 DIAGNOSIS — R29.2 HYPERREFLEXIA: ICD-10-CM

## 2023-05-17 PROCEDURE — 72141 MRI NECK SPINE W/O DYE: CPT

## 2023-05-30 ENCOUNTER — MYC MEDICAL ADVICE (OUTPATIENT)
Dept: FAMILY MEDICINE | Facility: CLINIC | Age: 43
End: 2023-05-30
Payer: COMMERCIAL

## 2023-05-30 DIAGNOSIS — G47.00 INSOMNIA, UNSPECIFIED TYPE: ICD-10-CM

## 2023-05-30 DIAGNOSIS — F41.1 GAD (GENERALIZED ANXIETY DISORDER): ICD-10-CM

## 2023-05-31 RX ORDER — HYDROXYZINE HYDROCHLORIDE 25 MG/1
12.5-5 TABLET, FILM COATED ORAL 3 TIMES DAILY PRN
Qty: 120 TABLET | Refills: 5 | Status: SHIPPED | OUTPATIENT
Start: 2023-05-31 | End: 2023-12-29

## 2023-05-31 RX ORDER — HYDROXYZINE HYDROCHLORIDE 25 MG/1
12.5-5 TABLET, FILM COATED ORAL 3 TIMES DAILY PRN
Qty: 60 TABLET | Refills: 5 | Status: SHIPPED | OUTPATIENT
Start: 2023-05-31 | End: 2023-05-31

## 2023-06-01 ENCOUNTER — APPOINTMENT (OUTPATIENT)
Dept: GENERAL RADIOLOGY | Facility: CLINIC | Age: 43
End: 2023-06-01
Attending: EMERGENCY MEDICINE
Payer: OTHER MISCELLANEOUS

## 2023-06-01 ENCOUNTER — HOSPITAL ENCOUNTER (EMERGENCY)
Facility: CLINIC | Age: 43
Discharge: HOME | End: 2023-06-01
Attending: FAMILY MEDICINE | Admitting: FAMILY MEDICINE
Payer: OTHER MISCELLANEOUS

## 2023-06-01 VITALS
SYSTOLIC BLOOD PRESSURE: 155 MMHG | TEMPERATURE: 97.9 F | RESPIRATION RATE: 16 BRPM | BODY MASS INDEX: 32.01 KG/M2 | WEIGHT: 175 LBS | DIASTOLIC BLOOD PRESSURE: 92 MMHG | HEART RATE: 85 BPM | OXYGEN SATURATION: 97 %

## 2023-06-01 DIAGNOSIS — S42.401A CLOSED FRACTURE OF RIGHT ELBOW, INITIAL ENCOUNTER: ICD-10-CM

## 2023-06-01 DIAGNOSIS — S63.501A WRIST SPRAIN, RIGHT, INITIAL ENCOUNTER: ICD-10-CM
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
## 2023-06-01 PROCEDURE — 29125 APPL SHORT ARM SPLINT STATIC: CPT | Mod: RT | Performed by: EMERGENCY MEDICINE

## 2023-06-01 PROCEDURE — 99284 EMERGENCY DEPT VISIT MOD MDM: CPT | Mod: 25 | Performed by: EMERGENCY MEDICINE

## 2023-06-01 PROCEDURE — 73070 X-RAY EXAM OF ELBOW: CPT | Mod: RT

## 2023-06-01 PROCEDURE — 73110 X-RAY EXAM OF WRIST: CPT | Mod: RT

## 2023-06-01 PROCEDURE — 96374 THER/PROPH/DIAG INJ IV PUSH: CPT | Performed by: EMERGENCY MEDICINE

## 2023-06-01 PROCEDURE — 99284 EMERGENCY DEPT VISIT MOD MDM: CPT | Performed by: EMERGENCY MEDICINE

## 2023-06-01 PROCEDURE — 250N000011 HC RX IP 250 OP 636: Performed by: EMERGENCY MEDICINE

## 2023-06-01 RX ORDER — FENTANYL CITRATE 50 UG/ML
25 INJECTION, SOLUTION INTRAMUSCULAR; INTRAVENOUS EVERY 30 MIN PRN
Status: DISCONTINUED | OUTPATIENT
Start: 2023-06-01 | End: 2023-06-01 | Stop reason: HOSPADM

## 2023-06-01 RX ORDER — OXYCODONE HYDROCHLORIDE 5 MG/1
5 TABLET ORAL EVERY 6 HOURS PRN
Qty: 7 TABLET | Refills: 0 | Status: SHIPPED | OUTPATIENT
Start: 2023-06-01 | End: 2023-06-15 | Stop reason: ALTCHOICE

## 2023-06-01 RX ADMIN — FENTANYL CITRATE 25 MCG: 50 INJECTION, SOLUTION INTRAMUSCULAR; INTRAVENOUS at 13:42

## 2023-06-01 ASSESSMENT — ENCOUNTER SYMPTOMS
PSYCHIATRIC NEGATIVE: 1
HEMATOLOGIC/LYMPHATIC NEGATIVE: 1
RESPIRATORY NEGATIVE: 1
ALLERGIC/IMMUNOLOGIC NEGATIVE: 1
NEUROLOGICAL NEGATIVE: 1
EYES NEGATIVE: 1
CARDIOVASCULAR NEGATIVE: 1
ENDOCRINE NEGATIVE: 1
GASTROINTESTINAL NEGATIVE: 1

## 2023-06-01 ASSESSMENT — ACTIVITIES OF DAILY LIVING (ADL)
ADLS_ACUITY_SCORE: 35
ADLS_ACUITY_SCORE: 35

## 2023-06-01 NOTE — ED TRIAGE NOTES
Fall and tried to catch self, right wrist injury, C/O throbbing  pain in wrist, CMS intact to extremity, ring removed from ring finger     Triage Assessment     Row Name 06/01/23 1159       Triage Assessment (Adult)    Airway WDL WDL       Respiratory WDL    Respiratory WDL WDL       Skin Circulation/Temperature WDL    Skin Circulation/Temperature WDL WDL       Cognitive/Neuro/Behavioral WDL    Cognitive/Neuro/Behavioral WDL WDL

## 2023-06-01 NOTE — DISCHARGE INSTRUCTIONS
1) Your evaluation revealed that you sustained a fracture of your right elbow proximal radius fracture that is nondisplaced with a joint effusion.  X-ray of your wrist did not show any fracture but I suspect a sprain.  We discussed that CT imaging of the wrist may be required if you have persistent pain to exclude any carpal bone fracture such as a scaphoid bone fracture.  At this time we have agreed to hold on CT imaging with plan to have you follow-up in 1 to 2 weeks for repeat imaging if you have persistent pain.    2) For your elbow you are placed in a sling for comfort, and a wrist splint for support.  We have discussed supportive care measures including range of motion of the wrist and elbow to help with managing her pain and discomfort.  For home he should continue her pain regimen of fentanyl patches, oxycodone and gabapentin.  In the interim pending follow-up assessment with your primary care provider for repeat imaging and orthopedics if needed you given 7 tablets of oxycodone to use as needed.  I have also placed a sports medicine/orthopedic referral as needed for follow-up imaging and reevaluation additional care in the next 2 to 3 weeks.    3) we have discussed that if you develop progressive pain or discomfort or if there are new concerns you should present to the department to be reevaluated including but not limited to arm numbness or weakness increasing pain or swelling or any new concerns.

## 2023-06-01 NOTE — ED PROVIDER NOTES
"  History     Chief Complaint   Patient presents with     Wrist Injury     HPI  Ana Felix is a 42 year old female who who presents for evaluation with concern about a wrist injury after a fall.  Patient reports she tried to catch herself.    Patient's medical records show prior diagnosis of Crohn's disease, migraine headaches, obesity, irritable bowel syndrome, mild persistent asthma, and a history of generalized anxiety, insomnia and known history of osteoarthritis involving the left knee.    Patient's prescribed medications were reviewed.    On examination patient arrived by EMS from her job site.  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  Patient reports around 10:30 AM she was helping a student who was going to crisis when she was walking on the street and out of the area when she slipped on a mat and fell on an outstretched arm.  She is left-hand dominant and presents with right wrist pain radiating to the right elbow.  No shoulder pain no neck pain.  No back pain.  She does have a history of chronic back pain and confirmed that she is on Norco, gabapentin, and fentanyl patch 12.5 mcg every 72 hours.  She reports no new numbness in her arm since her fall.  Due to pain about the wrist and elbow she presents for further care.    Allergies:  Allergies   Allergen Reactions     Infliximab Hives     Sulfa Antibiotics Nausea     Pt states \"it doesn't work for me\"  Pt states \"it doesn't work for me\"  Other reaction(s): Diarrhea, nausea, Nausea, Intolerance  Pt states \"it doesn't work for me\"     Sulfacetamide Nausea     Amoxicillin Nausea and Vomiting     Amoxicillin-Pot Clavulanate Other (See Comments) and Nausea and Vomiting     Crohn's     Aspirin Nausea     Bupropion Hives and Nausea     Clavulanic Acid Nausea and Vomiting     Droperidol Other (See Comments)     Dystonic reaction     Ibuprofen Other (See Comments) and Nausea     Crohn's       Lyrica [Pregabalin] Nausea and Vomiting     " Grogginess, severe back pain  Grogginess, severe back pain     Vicodin [Hydrocodone-Acetaminophen] Nausea and Vomiting     Adhesive Tape Rash       Problem List:    Patient Active Problem List    Diagnosis Date Noted     Primary osteoarthritis of left knee 02/13/2023     Priority: Medium     DANIELLE (generalized anxiety disorder) 03/15/2022     Priority: Medium     Insomnia, unspecified type 03/15/2022     Priority: Medium     SKYE (stress urinary incontinence, female) 03/14/2022     Priority: Medium     Added automatically from request for surgery [id number]       Hidradenitis suppurativa 09/02/2021     Priority: Medium     Immunocompromised state (H) 07/09/2021     Priority: Medium     History of gestational diabetes mellitus (GDM) 08/15/2018     Priority: Medium     Amenorrhea 08/15/2018     Priority: Medium     Crohn's disease of both small and large intestine without complication (H) 07/31/2018     Priority: Medium     Pain medication agreement 08/18/2017     Priority: Medium     Overview:   [facility]       Controlled substance agreement signed 07/19/2017     Priority: Medium     Degenerative lumbar disc 03/07/2017     Priority: Medium     Labral tear of hip, degenerative 03/07/2017     Priority: Medium     Pain of right hip joint 03/07/2017     Priority: Medium     S/P LEEP of cervix 12/15/2015     Priority: Medium     S/p LEEP of cervix - date unknown  12/9/15: Pap - NIL, Neg HPV. Plan cotest in 1 year. If JAYME 2/3 on biopsy - PAP/HPV co-testing at 12, 24 months. If two negative results repeat co-testing in 3 years, if negative then routine screening.  3/7/18 Pap: NIL/neg HPV.             Hearing difficulty 10/09/2014     Priority: Medium     Irritable bowel syndrome (IBS) 04/15/2014     Priority: Medium     Tiffany raw vegetables       Lactose intolerance 04/15/2014     Priority: Medium     Abdominal pain, right upper quadrant 03/06/2014     Priority: Medium     Nausea with vomiting 01/31/2014     Priority:  Medium     Chronic low back pain 2012     Priority: Medium     Follows with pain clinic.       Mild persistent asthma, uncomplicated 2012     Priority: Medium     Discogenic pain 10/08/2012     Priority: Medium     Abdominal pain 2012     Priority: Medium     S/P oophorectomy 2012     Priority: Medium     History of cholecystectomy 2012     Priority: Medium     History of resection of small bowel 2012     Priority: Medium     Tobacco abuse 2012     Priority: Medium     Displacement of lumbar intervertebral disc without myelopathy 2012     Priority: Medium     Disorder of sacrum 2011     Priority: Medium     Problem list name updated by automated process. Provider to review       Back pain 2011     Priority: Medium     Obesity 2011     Priority: Medium     Migraine headache 2011     Priority: Medium     Patient given Migraine Education folder and Migraine Action Plan on 2011. Cammy Anderson RN    (Problem list name updated by automated process. Provider to review and confirm.)       CARDIOVASCULAR SCREENING; LDL GOAL LESS THAN 160 10/31/2010     Priority: Medium     Dysmenorrhea 10/05/2010     Priority: Medium     Female pelvic pain 2010     Priority: Medium     (Problem list name updated by automated process. Provider to review and confirm.)       Crohns disease 2009     Priority: Medium        Past Medical History:    Past Medical History:   Diagnosis Date     Back pain      Crohn's disease (H)      Exercise-induced asthma      Gestational diabetes      Herniation of intervertebral disc of lumbar region      Infertility      Ovarian cyst      Smoker      Status post cholecystectomy 2005       Past Surgical History:    Past Surgical History:   Procedure Laterality Date     APPENDECTOMY       C/SECTION, LOW TRANSVERSE      , Low Transverse     CHOLECYSTECTOMY, LAPOROSCOPIC  2005    Cholecystectomy,  Laparoscopic     COLONOSCOPY       ESOPHAGOSCOPY, GASTROSCOPY, DUODENOSCOPY (EGD), COMBINED  3/6/2014    Procedure: COMBINED ESOPHAGOSCOPY, GASTROSCOPY, DUODENOSCOPY (EGD), BIOPSY SINGLE OR MULTIPLE;  COLONOSCOPY/ UPPER GI ENDOSCOPY/ COLON/ EGD/ Abdominal pain, epigastric/ [facility];  Surgeon: West Lomas MD;  Location: [facility] OR     HC HYSTEROSCOPY, SURGICAL; W/ ENDOMETRIAL ABLATION, ANY METHOD  7/2010     HERNIORRHAPHY VENTRAL N/A 12/11/2018    Procedure: Open ventral hernia repair;  Surgeon: Alonso Bills MD;  Location: [facility] OR     HYSTERECTOMY, SUPRACERVICAL LAPAROSCOPIC  2010     LEEP TX, CERVICAL      LEEP TX Cervical     ORTHOPEDIC SURGERY      bluged disk     partial small bowel resection  2002    Ileo-colonic resection. Crohn's disease surgery for bowel obstruction. this was a section of small bowel and large bowel and the cecum., all removed and a reanastamosis done successfully     SALPINGO OOPHORECTOMY,R/L/TORI      Right ovary     SLING TRANSVAGINAL N/A 4/4/2022    Procedure: Pubovaginal sling with Altis;  Surgeon: Eleuterio Stone MD;  Location: [facility] OR     TUBAL LIGATION         Family History:    Family History   Problem Relation Age of Onset     Cancer Mother         cervical     Lipids Mother      Eye Disorder Father      Lipids Father      Alcohol/Drug Maternal Grandmother      Colon Cancer Paternal Grandmother      Diabetes Paternal Grandmother      Eye Disorder Paternal Grandmother      Diabetes Paternal Grandfather      Eye Disorder Paternal Grandfather      Inflammatory Bowel Disease Paternal Aunt         and two paternal uncles     Breast Cancer No family hx of        Social History:  Marital Status:   [2]  Social History     Tobacco Use     Smoking status: Some Days     Packs/day: 0.50     Types: Cigarettes     Smokeless tobacco: Never   Vaping Use     Vaping status: Never Used   Substance Use Topics     Alcohol use: Yes     Comment: very rarely     Drug use: No        Medications:     oxyCODONE (ROXICODONE) 5 MG tablet  azaTHIOprine (IMURAN) 50 MG tablet  celecoxib (CELEBREX) 200 MG capsule  cyanocobalamin (VITAMIN B12) 1000 MCG/ML injection  fentaNYL (DURAGESIC) 12 mcg/hr patch 72 hr  fentaNYL (DURAGESIC) 25 mcg/hr patch 72 hr  gabapentin (NEURONTIN) 300 MG capsule  hydrOXYzine (ATARAX) 25 MG tablet  montelukast (SINGULAIR) 10 MG tablet  sertraline (ZOLOFT) 100 MG tablet  traZODone (DESYREL) 50 MG tablet  VENTOLIN  (90 Base) MCG/ACT inhaler          Review of Systems   Constitutional:        Slipped off a mat at work, right wrist and elbow pain   HENT: Negative.    Eyes: Negative.    Respiratory: Negative.    Cardiovascular: Negative.    Gastrointestinal: Negative.    Endocrine: Negative.    Genitourinary: Negative.    Musculoskeletal:        Right wrist and elbow   Skin: Negative.    Allergic/Immunologic: Negative.    Neurological: Negative.    Hematological: Negative.    Psychiatric/Behavioral: Negative.    All other systems reviewed and are negative.      Physical Exam   BP: (!) 160/112  Pulse: 84  Temp: 97.9  F (36.6  C)  Resp: 16  Weight: 79.4 kg (175 lb)  SpO2: 99 %      Physical Exam  Constitutional:       General: She is not in acute distress.     Appearance: Normal appearance. She is not ill-appearing, toxic-appearing or diaphoretic.   HENT:      Head: Normocephalic and atraumatic.      Nose: Nose normal.      Mouth/Throat:      Mouth: Mucous membranes are moist.   Eyes:      Extraocular Movements: Extraocular movements intact.      Pupils: Pupils are equal, round, and reactive to light.   Cardiovascular:      Rate and Rhythm: Normal rate and regular rhythm.   Pulmonary:      Effort: Pulmonary effort is normal.   Musculoskeletal:         General: Swelling, tenderness and signs of injury present. No deformity.      Right wrist: Swelling, tenderness and bony tenderness present.        Arms:       Cervical back: Normal range of motion and neck supple.   Skin:     Capillary Refill:  Capillary refill takes less than 2 seconds.      Coloration: Skin is not jaundiced or pale.      Findings: No bruising, erythema, lesion or rash.   Neurological:      General: No focal deficit present.      Mental Status: She is alert and oriented to person, place, and time.      Cranial Nerves: No cranial nerve deficit.      Sensory: No sensory deficit.      Motor: No weakness.      Coordination: Coordination normal.      Gait: Gait normal.      Deep Tendon Reflexes: Reflexes normal.   Psychiatric:         Mood and Affect: Mood normal.         Behavior: Behavior normal.         Thought Content: Thought content normal.         Judgment: Judgment normal.         ED Course                 Procedures              Critical Care time:  none               ED medications:  Medications   fentaNYL (PF) (SUBLIMAZE) injection 25 mcg (25 mcg Intravenous $Given 6/1/23 1342)   0.9% sodium chloride BOLUS (has no administration in time range)       ED vitals:  Vitals:    06/01/23 1200 06/01/23 1231 06/01/23 1301 06/01/23 1331   BP: (!) 162/91 (!) 147/81 (!) 142/86 (!) 155/92   Pulse: 82 88 82 85   Resp:       Temp:       TempSrc:       SpO2: 99% 95% 97% 95%   Weight:         ED labs and imaging:  Results for orders placed or performed during the hospital encounter of 06/01/23   XR Wrist Right G/E 3 Views     Status: None    Narrative    XR WRIST RIGHT G/E 3 VIEWS   6/1/2023 2:10 PM     HISTORY: Fall onto outstretched hand after slip on mat at work. Wrist  pain.  COMPARISON: None.       Impression    IMPRESSION: Normal joint spaces and alignment. No fracture.    BOLIVAR AGUILAR MD         SYSTEM ID:  [id number]   Elbow XR, 2 views, right     Status: None    Narrative    XR ELBOW RIGHT 2 VIEWS   6/1/2023 2:10 PM     HISTORY: Fall onto outstretched hand after slip on mat at work.  Evaluate for acute bony process after blunt trauma  COMPARISON: 10/21/2019       Impression    IMPRESSION: There is an acute, nondisplaced fracture involving  the  proximal radius at the lateral radial head/neck junction. There is a  joint effusion.    BOLIVAR AGUILAR MD         SYSTEM ID:  [id number]         Assessments & Plan (with Medical Decision Making)   Assessment Summary and clinical Impression: 42-year-old female left hand who presented for evaluation after fall with right wrist injury.  Patient has multiple comorbidities including history of migraine headaches and back pain Crohn's disease.   patient has a history of osteoarthritis, and chronic low back pain degenerative disease.  She is currently on gabapentin, Norco, and fentanyl patches.  She reported that she had slipped on a mat onto an outstretched arm while helping a student. 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  Intake blood pressure was 162/91.  Her wrist was not grossly deformed but she had pain around the distal radius at the anatomical snuffbox.  She was able to oppose her thumb and she reported pain rating to the elbow but had no reduction in flexion extension of the elbow limitation in pronation and supination.  No shoulder pain.  X-ray imaging revealed concern for nondisplaced right proximal radial head fracture with  joint effusion.  There was no fracture appreciated on x-ray imaging of the wrist.  After reviewing care options patient agreed to forego advanced imaging of the wrist with low suspicion for scaphoid or carpal bone fracture with a wrist splint and sling for elbow.  Orthopedic surgery/sports medicine clinic follow-up in the next 2 to 3 weeks as medically needed or with primary care provider as desired.  Pain management plan was also reviewed.    ED Course and plan:  Reviewed the medical record.  Pain was managed with IV fentanyl given her history of chronic pain currently on fentanyl patch, gabapentin, and Norco.  X-ray imaging of the right wrist and  right elbow was reviewed and reviewed radiology report was reviewed.  X-ray did not show any acute fracture involving the wrist.  X-ray of the right elbow revealed concern for nondisplaced fracture involving the radial head and neck junction with a joint effusion with predominant pain in the right wrist I reviewed CT imaging of the wrist with the patient   Versus placing her in her wrist splint and shoulder immobilize with plan for supportive care measures and outpatient follow-up for urgent reevaluation and repeat imaging.  After reviewing and discussing options for care patient elected to forego CT imaging which I think is reasonable at this time.  She was placed in a thumb spica splint for her wrist sprain  and a sling for her nondisplaced closed proximal right radial head fracture with joint effusion. placed an orthopedic  referral to help with further follow-up imaging and definitive management care needs.  Also discussed that primary care management would be reasonable.  She requested additional pain management needs and was given 7 tablets of oxycodone.  She is currently on a fentanyl patch, oxycodone and gabapentin.  We discussed and reviewed worrisome symptoms including but not limited to intractable pain, numbness or new concerns.  Patient and spouse later arrived during ED course expressed comfort, understanding, and agreement with plan of care.    Disclaimer: This note consists of symbols derived from keyboarding, dictation and/or voice recognition software. As a result, there may be errors in the script that have gone undetected. Please consider this when interpreting information found in this chart.  I have reviewed the nursing notes.    I have reviewed the findings, diagnosis, plan and need for follow up with the patient.     Medical Decision Making  The patient's presentation was of moderate complexity (an acute complicated injury).    The patient's evaluation involved:  ordering and/or  review of 2 test(s) in this encounter (Diagnostic imaging )    The patient's management necessitated moderate risk (prescription drug management including medications given in the ED).        New Prescriptions    OXYCODONE (ROXICODONE) 5 MG TABLET    Take 1 tablet (5 mg) by mouth every 6 hours as needed for pain       Final diagnoses:   Closed fracture of right elbow, initial encounter - acute, nondisplaced fracture involving the proximal radius at the lateral radial head/neck junction. There is a joint effusion   Wrist sprain, right, initial encounter   Fall, initial encounter - Ground-level, after slip and mat with fall on outstretched arm       6/1/2023   [facility] EMERGENCY DEPT     Rohit Beckett MD  06/01/23 [id number]

## 2023-06-01 NOTE — LETTER
June 1, 2023      To Whom It May Concern:      Ana Felix was seen in our Emergency Department today, 06/01/23.  Please excuse her due to her injury and evaluation in the emergency department.  If her symptoms worsen she may need to represent to the department to reevaluated.  Pending follow-up assessment and imaging as medically required she may need to miss work.  This work note is valid until Adela 15, 2023.      Sincerely,          Travon Beckett MD

## 2023-06-02 ENCOUNTER — HEALTH MAINTENANCE LETTER (OUTPATIENT)
Age: 43
End: 2023-06-02

## 2023-06-06 ENCOUNTER — HOSPITAL ENCOUNTER (EMERGENCY)
Facility: CLINIC | Age: 43
Discharge: HOME | End: 2023-06-06
Attending: NURSE PRACTITIONER | Admitting: NURSE PRACTITIONER
Payer: COMMERCIAL

## 2023-06-06 VITALS
OXYGEN SATURATION: 98 % | DIASTOLIC BLOOD PRESSURE: 93 MMHG | WEIGHT: 175 LBS | TEMPERATURE: 98.9 F | HEIGHT: 62 IN | HEART RATE: 95 BPM | SYSTOLIC BLOOD PRESSURE: 146 MMHG | RESPIRATION RATE: 18 BRPM | BODY MASS INDEX: 32.2 KG/M2

## 2023-06-06 DIAGNOSIS — M25.521 ELBOW PAIN, RIGHT: ICD-10-CM

## 2023-06-06 DIAGNOSIS — S42.401D CLOSED FRACTURE OF RIGHT ELBOW WITH ROUTINE HEALING, SUBSEQUENT ENCOUNTER: ICD-10-CM

## 2023-06-06 PROCEDURE — 99284 EMERGENCY DEPT VISIT MOD MDM: CPT | Performed by: NURSE PRACTITIONER

## 2023-06-06 PROCEDURE — 96372 THER/PROPH/DIAG INJ SC/IM: CPT | Performed by: NURSE PRACTITIONER

## 2023-06-06 PROCEDURE — 250N000011 HC RX IP 250 OP 636: Performed by: NURSE PRACTITIONER

## 2023-06-06 RX ORDER — OXYCODONE HYDROCHLORIDE 5 MG/1
5 TABLET ORAL EVERY 6 HOURS PRN
Qty: 7 TABLET | Refills: 0 | Status: SHIPPED | OUTPATIENT
Start: 2023-06-06 | End: 2023-06-09

## 2023-06-06 RX ADMIN — HYDROMORPHONE HYDROCHLORIDE 1 MG: 1 INJECTION, SOLUTION INTRAMUSCULAR; INTRAVENOUS; SUBCUTANEOUS at 18:52

## 2023-06-06 ASSESSMENT — ACTIVITIES OF DAILY LIVING (ADL): ADLS_ACUITY_SCORE: 35

## 2023-06-06 NOTE — ED PROVIDER NOTES
"  History     Chief Complaint   Patient presents with     Arm Pain     HPI     Ana Felix is a 42 year old female who presents for right elbow pain. Patient was seen here on 6/1/2023 following a fall on outstretched arm. She complained of right elbow pain and right wrist pain at that time. She had xray of the right wrist and elbow. Right elbow xray revealed a nondisplaced fracture of the proximal radius head. Right wrist xray was negative for evidence of fracture. Patient was given IV Fentanyl for pain at time.  She was provided prescription for 7 tablets of Oxycodone 5 mg every 6 hours as needed for severe pain.  She was provided referral for follow-up with orthopedics.  Patient has failed to make an appointment with orthopedics.  Patient returns to the emergency department today complaining of persistent right elbow pain that she is not getting pain relief.   She has history of chronic low back pain and is prescribed Fentanyl 25 mcg patch every 72 hours, Gabapentin, and Percocet 7.5mg/325mg every 6 hours. (she follows with pain clinic).   She took her normal home medications for her chronic pain (Percocet 7.5mg/325mg and Gabapentin 600 mg) at 4pm today but pain is still severe in the right elbow.    PDMP Review       Value Time User    State PDMP site checked  Yes 6/6/2023  6:36 PM Crissy Grier APRN CNP            Allergies:  Allergies   Allergen Reactions     Infliximab Hives     Sulfa Antibiotics Nausea     Pt states \"it doesn't work for me\"  Pt states \"it doesn't work for me\"  Other reaction(s): Diarrhea, nausea, Nausea, Intolerance  Pt states \"it doesn't work for me\"     Sulfacetamide Nausea     Amoxicillin Nausea and Vomiting     Amoxicillin-Pot Clavulanate Other (See Comments) and Nausea and Vomiting     Crohn's     Aspirin Nausea     Bupropion Hives and Nausea     Clavulanic Acid Nausea and Vomiting     Droperidol Other (See Comments)     Dystonic reaction     Ibuprofen Other (See Comments) and " Nausea     Crohn's       Lyrica [Pregabalin] Nausea and Vomiting     Grogginess, severe back pain  Grogginess, severe back pain     Vicodin [Hydrocodone-Acetaminophen] Nausea and Vomiting     Adhesive Tape Rash       Problem List:    Patient Active Problem List    Diagnosis Date Noted     Primary osteoarthritis of left knee 02/13/2023     Priority: Medium     DANIELLE (generalized anxiety disorder) 03/15/2022     Priority: Medium     Insomnia, unspecified type 03/15/2022     Priority: Medium     SKYE (stress urinary incontinence, female) 03/14/2022     Priority: Medium     Added automatically from request for surgery [id number]       Hidradenitis suppurativa 09/02/2021     Priority: Medium     Immunocompromised state (H) 07/09/2021     Priority: Medium     History of gestational diabetes mellitus (GDM) 08/15/2018     Priority: Medium     Amenorrhea 08/15/2018     Priority: Medium     Crohn's disease of both small and large intestine without complication (H) 07/31/2018     Priority: Medium     Pain medication agreement 08/18/2017     Priority: Medium     Overview:   [facility]       Controlled substance agreement signed 07/19/2017     Priority: Medium     Degenerative lumbar disc 03/07/2017     Priority: Medium     Labral tear of hip, degenerative 03/07/2017     Priority: Medium     Pain of right hip joint 03/07/2017     Priority: Medium     S/P LEEP of cervix 12/15/2015     Priority: Medium     S/p LEEP of cervix - date unknown  12/9/15: Pap - NIL, Neg HPV. Plan cotest in 1 year. If JAYME 2/3 on biopsy - PAP/HPV co-testing at 12, 24 months. If two negative results repeat co-testing in 3 years, if negative then routine screening.  3/7/18 Pap: NIL/neg HPV.             Hearing difficulty 10/09/2014     Priority: Medium     Irritable bowel syndrome (IBS) 04/15/2014     Priority: Medium     Tiffany raw vegetables       Lactose intolerance 04/15/2014     Priority: Medium     Abdominal pain, right upper quadrant 03/06/2014      Priority: Medium     Nausea with vomiting 2014     Priority: Medium     Chronic low back pain 2012     Priority: Medium     Follows with pain clinic.       Mild persistent asthma, uncomplicated 2012     Priority: Medium     Discogenic pain 10/08/2012     Priority: Medium     Abdominal pain 2012     Priority: Medium     S/P oophorectomy 2012     Priority: Medium     History of cholecystectomy 2012     Priority: Medium     History of resection of small bowel 2012     Priority: Medium     Tobacco abuse 2012     Priority: Medium     Displacement of lumbar intervertebral disc without myelopathy 2012     Priority: Medium     Disorder of sacrum 2011     Priority: Medium     Problem list name updated by automated process. Provider to review       Back pain 2011     Priority: Medium     Obesity 2011     Priority: Medium     Migraine headache 2011     Priority: Medium     Patient given Migraine Education folder and Migraine Action Plan on 2011. Cammy Anderson RN    (Problem list name updated by automated process. Provider to review and confirm.)       CARDIOVASCULAR SCREENING; LDL GOAL LESS THAN 160 10/31/2010     Priority: Medium     Dysmenorrhea 10/05/2010     Priority: Medium     Female pelvic pain 2010     Priority: Medium     (Problem list name updated by automated process. Provider to review and confirm.)       Crohns disease 2009     Priority: Medium        Past Medical History:    Past Medical History:   Diagnosis Date     Back pain      Crohn's disease (H)      Exercise-induced asthma      Gestational diabetes      Herniation of intervertebral disc of lumbar region      Infertility      Ovarian cyst      Smoker      Status post cholecystectomy 2005       Past Surgical History:    Past Surgical History:   Procedure Laterality Date     APPENDECTOMY       C/SECTION, LOW TRANSVERSE      , Low Transverse      CHOLECYSTECTOMY, LAPOROSCOPIC  04/27/2005    Cholecystectomy, Laparoscopic     COLONOSCOPY       ESOPHAGOSCOPY, GASTROSCOPY, DUODENOSCOPY (EGD), COMBINED  3/6/2014    Procedure: COMBINED ESOPHAGOSCOPY, GASTROSCOPY, DUODENOSCOPY (EGD), BIOPSY SINGLE OR MULTIPLE;  COLONOSCOPY/ UPPER GI ENDOSCOPY/ COLON/ EGD/ Abdominal pain, epigastric/ [facility];  Surgeon: West Lomas MD;  Location: [facility] OR     HC HYSTEROSCOPY, SURGICAL; W/ ENDOMETRIAL ABLATION, ANY METHOD  7/2010     HERNIORRHAPHY VENTRAL N/A 12/11/2018    Procedure: Open ventral hernia repair;  Surgeon: Alonso Bills MD;  Location: [facility]     HYSTERECTOMY, SUPRACERVICAL LAPAROSCOPIC  2010     LEEP TX, CERVICAL      LEEP TX Cervical     ORTHOPEDIC SURGERY      bluged disk     partial small bowel resection  2002    Ileo-colonic resection. Crohn's disease surgery for bowel obstruction. this was a section of small bowel and large bowel and the cecum., all removed and a reanastamosis done successfully     SALPINGO OOPHORECTOMY,R/L/TORI      Right ovary     SLING TRANSVAGINAL N/A 4/4/2022    Procedure: Pubovaginal sling with Altis;  Surgeon: Eleuterio Stone MD;  Location: [facility] OR     TUBAL LIGATION         Family History:    Family History   Problem Relation Age of Onset     Cancer Mother         cervical     Lipids Mother      Eye Disorder Father      Lipids Father      Alcohol/Drug Maternal Grandmother      Colon Cancer Paternal Grandmother      Diabetes Paternal Grandmother      Eye Disorder Paternal Grandmother      Diabetes Paternal Grandfather      Eye Disorder Paternal Grandfather      Inflammatory Bowel Disease Paternal Aunt         and two paternal uncles     Breast Cancer No family hx of        Social History:  Marital Status:   [2]  Social History     Tobacco Use     Smoking status: Some Days     Packs/day: 0.50     Types: Cigarettes     Smokeless tobacco: Never   Vaping Use     Vaping status: Never Used   Substance Use Topics     Alcohol use: Yes  "    Comment: very rarely     Drug use: No        Medications:    oxyCODONE (ROXICODONE) 5 MG tablet  azaTHIOprine (IMURAN) 50 MG tablet  celecoxib (CELEBREX) 200 MG capsule  cyanocobalamin (VITAMIN B12) 1000 MCG/ML injection  fentaNYL (DURAGESIC) 12 mcg/hr patch 72 hr  fentaNYL (DURAGESIC) 25 mcg/hr patch 72 hr  gabapentin (NEURONTIN) 300 MG capsule  hydrOXYzine (ATARAX) 25 MG tablet  montelukast (SINGULAIR) 10 MG tablet  oxyCODONE (ROXICODONE) 5 MG tablet  sertraline (ZOLOFT) 100 MG tablet  traZODone (DESYREL) 50 MG tablet  VENTOLIN  (90 Base) MCG/ACT inhaler          Review of Systems  As mentioned above in the history present illness. All other systems were reviewed and are negative.    Physical Exam   BP: (!) 164/124  Pulse: 95  Temp: 98.9  F (37.2  C)  Resp: 18  Height: 157.5 cm (5' 2\")  Weight: 79.4 kg (175 lb)  SpO2: 98 %      Physical Exam  Constitutional:       General: She is in acute distress (tearful, appears uncomfortable).      Appearance: She is not ill-appearing.   HENT:      Head: Normocephalic and atraumatic.      Right Ear: External ear normal.      Left Ear: External ear normal.      Mouth/Throat:      Pharynx: Oropharynx is clear.   Eyes:      Conjunctiva/sclera: Conjunctivae normal.   Cardiovascular:      Rate and Rhythm: Normal rate.   Pulmonary:      Effort: Pulmonary effort is normal.   Musculoskeletal:      Right elbow: Swelling (minimal) present. No deformity. Decreased range of motion (due to acuity of pain).      Right wrist: Tenderness (diffuse) present. No swelling or deformity. Decreased range of motion (due to acuity of pain). Normal pulse.      Comments: I removed the sling and velcro wrist splint to evaluate patient right elbow and right wrist.   Right fingers warm and pink. Normal cap refill <3 seconds.  Splint and sling replaced after exam.     Neurological:      General: No focal deficit present.      Mental Status: She is alert and oriented to person, place, and time. "         ED Course                 Procedures              No results found for this or any previous visit (from the past 24 hour(s)).    Medications   HYDROmorphone (DILAUDID) injection 1 mg (1 mg Intramuscular $Given 6/6/23 5732)       Assessments & Plan (with Medical Decision Making)     42 year old female who presents for right elbow pain. Patient was seen here on 6/1/2023 following a fall on outstretched arm. She complained of right elbow pain and right wrist pain at that time. She had xray of the right wrist and elbow. Right elbow xray revealed a nondisplaced fracture of the proximal radius head. Right wrist xray was negative for evidence of fracture. Patient was given IV Fentanyl for pain at time.  She was provided prescription for 7 tablets of Oxycodone 5 mg every 6 hours as needed for severe pain.  She was provided referral for follow-up with orthopedics.  Patient has failed to make an appointment with orthopedics.  Patient returns to the emergency department today complaining of persistent right elbow pain that she is not getting pain relief.   She has history of chronic low back pain and is prescribed Fentanyl 25 mcg patch every 72 hours, Gabapentin, and Percocet 7.5mg/325mg every 6 hours. (she follows with pain clinic).   She took her normal home medications for her chronic pain (Percocet 7.5mg/325mg and Gabapentin 600 mg) at 4pm today but pain is still severe in the right elbow.    No concerning exam findings to warrant repeat imaging at this time. I will defer to orthopedics.  I had a long discussion with patient regarding opioid medication for acute pain vs her chronic pain and the need to use caution as not wanting to cause respiratory depression. I am certain that her opioid tolerance is playing a part in being able to manage her acute pain.  I agreed to provide her a one-time dose of IM Dilaudid and an additional 7 tablets of oxycodone 5 mg.  She was explained that the caveat for providing her with  additional pain medication is that she call tomorrow morning to set up an appointment with orthopedics for recheck.  Patient is agreeable with the plan.  Her blood pressure was quite elevated when she arrived at high attribute to her pain and being tearful.  Recheck of her blood pressure was better at time of discharge at 146/93.      Plan:  You were given IM Dilaudid 1 mg here today.  I have provided you with prescription for 7 tablets of Oxycodone 5 mg every 6 hours as needed for severe pain.  --you can use this for short term/acute break-thru pain, between your regular medications for your chronic pain.  (No refills of opioid medications from emergency department)    Make appointment with orthopedics. Referral was sent during your previous visit. Call [phone] to schedule.    Discharge Medication List as of 6/6/2023  7:49 PM      START taking these medications    Details   !! oxyCODONE (ROXICODONE) 5 MG tablet Take 1 tablet (5 mg) by mouth every 6 hours as needed for severe pain, Disp-7 tablet, R-0, E-Prescribe       !! - Potential duplicate medications found. Please discuss with provider.          Final diagnoses:   Elbow pain, right   Closed fracture of right elbow with routine healing, subsequent encounter       6/6/2023   [facility] EMERGENCY DEPT     Cherrie, CARI Arenas CNP  06/06/23 2041

## 2023-06-06 NOTE — ED TRIAGE NOTES
Pt presents after having an elbow fx last Thursday and not having being able to control the pain. Pt has velcro splint and sling on arm. Pt reports taking percocet and gabapentin prescribed by her pain doctor for chronic back pain that is not helping her arm pain. Pt has not made a f/u appt with ortho. Pt last took 1 percocet and 2 gabapentin at 1600 without relief of sx.      Triage Assessment     Row Name 06/06/23 4385       Triage Assessment (Adult)    Airway WDL WDL       Respiratory WDL    Respiratory WDL WDL       Skin Circulation/Temperature WDL    Skin Circulation/Temperature WDL WDL       Cardiac WDL    Cardiac WDL WDL       Peripheral/Neurovascular WDL    Peripheral Neurovascular WDL WDL       Cognitive/Neuro/Behavioral WDL    Cognitive/Neuro/Behavioral WDL WDL

## 2023-06-06 NOTE — DISCHARGE INSTRUCTIONS
You were given IM Dilaudid 1 mg here today.  I have provided you with prescription for 7 tablets of Oxycodone 5 mg every 6 hours as needed for severe pain.  --you can use this for short term/acute break-thru pain, between your regular medications for your chronic pain.  (No refills of opioid medications from emergency department)    Make appointment with orthopedics. Referral was sent during your previous visit. Call [phone] to schedule.

## 2023-06-07 ENCOUNTER — TELEPHONE (OUTPATIENT)
Dept: ORTHOPEDICS | Facility: CLINIC | Age: 43
End: 2023-06-07
Payer: COMMERCIAL

## 2023-06-07 NOTE — TELEPHONE ENCOUNTER
Spoke to spouse discussed patient is experiencing moderate-severe pain ~ one week post-injury.  Currently in wrist brace and sling, pain medication was provided by ER.      Patient scheduled for work in appointment with Dr Chapman on 6/8/23 for discussion of potentially splinting.    Macho Gomez ATC

## 2023-06-07 NOTE — TELEPHONE ENCOUNTER
[facility] Call Center    Phone Message    May a detailed message be left on voicemail: yes     Reason for Call: Other: [facility] Pa called to schedule an appointment for Ana due to having a Closed fracture of right elbow, initial encounter. She wants to see Dr. Chapman and would be willing to go to [place] if necessary. Please evaluate and discuss with Dr. Chapman. Please call Pa at [phone] to schedule.     Action Taken: Other: [facility]    Travel Screening: Not Applicable

## 2023-06-08 ENCOUNTER — OFFICE VISIT (OUTPATIENT)
Dept: ORTHOPEDICS | Facility: CLINIC | Age: 43
End: 2023-06-08
Payer: OTHER MISCELLANEOUS

## 2023-06-08 VITALS
SYSTOLIC BLOOD PRESSURE: 139 MMHG | BODY MASS INDEX: 32.2 KG/M2 | DIASTOLIC BLOOD PRESSURE: 89 MMHG | WEIGHT: 175 LBS | HEIGHT: 62 IN

## 2023-06-08 DIAGNOSIS — S52.124D CLOSED NONDISPLACED FRACTURE OF HEAD OF RIGHT RADIUS WITH ROUTINE HEALING, SUBSEQUENT ENCOUNTER: ICD-10-CM

## 2023-06-08 DIAGNOSIS — S63.501A WRIST SPRAIN, RIGHT, INITIAL ENCOUNTER: ICD-10-CM

## 2023-06-08 DIAGNOSIS — S59.901D INJURY OF RIGHT ELBOW, SUBSEQUENT ENCOUNTER: Primary | ICD-10-CM

## 2023-06-08 PROCEDURE — 99214 OFFICE O/P EST MOD 30 MIN: CPT | Mod: 57 | Performed by: FAMILY MEDICINE

## 2023-06-08 PROCEDURE — 24650 CLTX RDL HEAD/NCK FX WO MNPJ: CPT | Mod: RT | Performed by: FAMILY MEDICINE

## 2023-06-08 RX ORDER — OXYCODONE AND ACETAMINOPHEN 5; 325 MG/1; MG/1
1 TABLET ORAL EVERY 6 HOURS PRN
Qty: 12 TABLET | Refills: 0 | Status: SHIPPED | OUTPATIENT
Start: 2023-06-08 | End: 2023-06-11

## 2023-06-08 NOTE — LETTER
June 8, 2023      Ana was seen in my office today for a right elbow injury that occurred at work on 6/1/2023.  She has an elbow fracture.  We will treat her with a long arm splint and sling for the next one week until our next follow up visit.  She should be medically excused from work at this time.  She will remain either off or on light duty restrictions for likely the next 8 weeks.  Updated recommendations will be provided next week.      Justus Mendez, , CAQ  Sports Medicine Physician  [facility]

## 2023-06-08 NOTE — LETTER
2023         RE: Ana Felix  [address]  [place] [address]        Dear Colleague,    Thank you for referring your patient, Ana Felix, to the [facility] [place]. Please see a copy of my visit note below.    Ana Felix  :  1980  DOS: 2023  MRN: [id number]    Sports Medicine Clinic Visit    PCP: Radha Bermudez    Ana Felix is a 42 year old Right hand dominant female who is seen as an ER referral presenting with right elbow injury.    Injury: Patient describes injury as at work, helping move student from a behavior situation, slipped on mat, landing on right arm with FOOSH type injury that occurred on 23.  Pain located over right deep anterior elbow joint, radiating to right wrist.  Additional Features:  Positive: swelling and weakness.  Symptoms are better with Other medications: Oxycodone.  Symptoms are worse with: moving arm, direct pressure.  Other evaluation and/or treatments so far consists of: Ice, Ibuprofen, Other medications: Oxycodone, Rest and sling, thumb spica brace.  Recent imaging completed: X-rays completed 23.  Prior History of related problems: history of lateral epicondylitis several years ago.    Social History: currently employed as school paraprofessional    Review of Systems  Musculoskeletal: as above  Remainder of review of systems is negative including constitutional, CV, pulmonary, GI, Skin and Neurologic except as noted in HPI or medical history.    Past Medical History:   Diagnosis Date     Back pain      Crohn's disease (H)      Exercise-induced asthma      Gestational diabetes      Herniation of intervertebral disc of lumbar region      Infertility      Ovarian cyst      Smoker      Status post cholecystectomy 2005     Past Surgical History:   Procedure Laterality Date     APPENDECTOMY       C/SECTION, LOW TRANSVERSE      , Low Transverse     CHOLECYSTECTOMY, LAPOROSCOPIC  2005     "Cholecystectomy, Laparoscopic     COLONOSCOPY       ESOPHAGOSCOPY, GASTROSCOPY, DUODENOSCOPY (EGD), COMBINED  3/6/2014    Procedure: COMBINED ESOPHAGOSCOPY, GASTROSCOPY, DUODENOSCOPY (EGD), BIOPSY SINGLE OR MULTIPLE;  COLONOSCOPY/ UPPER GI ENDOSCOPY/ COLON/ EGD/ Abdominal pain, epigastric/ [facility];  Surgeon: West Lomas MD;  Location: [facility] OR     HC HYSTEROSCOPY, SURGICAL; W/ ENDOMETRIAL ABLATION, ANY METHOD  7/2010     HERNIORRHAPHY VENTRAL N/A 12/11/2018    Procedure: Open ventral hernia repair;  Surgeon: Alonso Blils MD;  Location: [facility]     HYSTERECTOMY, SUPRACERVICAL LAPAROSCOPIC  2010     LEEP TX, CERVICAL      LEEP TX Cervical     ORTHOPEDIC SURGERY      bluged disk     partial small bowel resection  2002    Ileo-colonic resection. Crohn's disease surgery for bowel obstruction. this was a section of small bowel and large bowel and the cecum., all removed and a reanastamosis done successfully     SALPINGO OOPHORECTOMY,R/L/TORI      Right ovary     SLING TRANSVAGINAL N/A 4/4/2022    Procedure: Pubovaginal sling with Altis;  Surgeon: Eleuterio Stone MD;  Location: [facility] OR     TUBAL LIGATION       Family History   Problem Relation Age of Onset     Cancer Mother         cervical     Lipids Mother      Eye Disorder Father      Lipids Father      Alcohol/Drug Maternal Grandmother      Colon Cancer Paternal Grandmother      Diabetes Paternal Grandmother      Eye Disorder Paternal Grandmother      Diabetes Paternal Grandfather      Eye Disorder Paternal Grandfather      Inflammatory Bowel Disease Paternal Aunt         and two paternal uncles     Breast Cancer No family hx of      Objective  /89   Ht 1.575 m (5' 2\")   Wt 79.4 kg (175 lb)   LMP 07/23/2010 (LMP Unknown)   BMI 32.01 kg/m        General: healthy, alert and in no distress      HEENT: no scleral icterus or conjunctival erythema     Skin: no suspicious lesions or rash. No jaundice.     CV: regular rhythm by palpation, 2+ distal pulses, no " pedal edema      Resp: normal respiratory effort without conversational dyspnea     Psych: normal mood and affect      Gait: nonantalgic, appropriate coordination and balance     Neuro: normal light touch sensory exam of the extremities. Motor strength as noted below     Right Wrist and Hand exam    Inspection:       No swelling, bruising or deformity right    Tender:       distal radius right very mild    Non Tender:       Remainder of the Wrist and Hand right    ROM:       Full and symmetric active and passive range of motion of the forearm, wrist and digits right, mild associated pain    Strength:       5/5 strength in the muscles of the hand, wrist and forearm right    Neurovascular:       2+ radial pulses bilaterally with brisk capillary refill and      normal sensation to light touch in the radial, median and ulnar nerve distributions    Right Elbow exam:    Inspection:       no ecchymosis       no edema or effusion    ROM:       Limited with flexion, extension, forearm supination and pronation.    Strength:       motor function intact    Tender:       radial head most focal       Generally over the joint lines and about the elbow       Moderate soreness in extensor/supinator mass    Non-Tender:       remainder of elbow area       lateral epicondyle       medial epicondyle       olecranon    Sensation:       intact throughout hand       intact throughout forearm     Skin:       well perfused       capillary refill less than 2 seconds        Radiology:  Results for orders placed or performed during the hospital encounter of 06/01/23   XR Wrist Right G/E 3 Views    Narrative    XR WRIST RIGHT G/E 3 VIEWS   6/1/2023 2:10 PM     HISTORY: Fall onto outstretched hand after slip on mat at work. Wrist  pain.  COMPARISON: None.       Impression    IMPRESSION: Normal joint spaces and alignment. No fracture.    BOLIVAR AGUILAR MD         SYSTEM ID:  [id number]   Elbow XR, 2 views, right    Narrative    XR ELBOW RIGHT 2  "VIEWS   6/1/2023 2:10 PM     HISTORY: Fall onto outstretched hand after slip on mat at work.  Evaluate for acute bony process after blunt trauma  COMPARISON: 10/21/2019       Impression    IMPRESSION: There is an acute, nondisplaced fracture involving the  proximal radius at the lateral radial head/neck junction. There is a  joint effusion.    BOLIVAR AGUILAR MD         SYSTEM ID:  [id number]         Assessment:  1. Injury of right elbow, subsequent encounter    2. Closed nondisplaced fracture of head of right radius with routine healing, subsequent encounter    3. Wrist sprain, right, initial encounter        Plan:  Discussed the assessment with the patient.  Follow up: 1 week for close clinical f/u for now  Acute FOOSH injury from a fall at work  Known nondisplaced radial head fracture, anticipate 8 week recovery overall  Pain mgmt has been difficult thus far  On pain contract through [facility] and takes opiate medication for chronic pain, reviewed how she may be somewhat desensitized to this class of medicine due to chronic use, making it more difficult to treat acute pain  Modest amount of breakthrough pain medication provided until appt next week, she acknowledged risks and will use as little as possible and continue to keep her pain clinic apprised of the extra medication she has rec'd for acute injury  Long arm posterior splint today, sling can be used as needed  XR images independently visualized and reviewed with patient today in clinic  Consider PT once improving  Hopeful for only one week of splint use, hopeful to transition to compression sleeve next week  Plan for repeat imaging next week  Letter provided for work:  \"Ana was seen in my office today for a right elbow injury that occurred at work on 6/1/2023.  She has an elbow fracture.  We will treat her with a long arm splint and sling for the next one week until our next follow up visit.  She should be medically excused from work at this time.  She will " "remain either off or on light duty restrictions for likely the next 8 weeks.  Updated recommendations will be provided next week.\"  Home handouts provided and supportive care reviewed  All questions were answered today  Contact us with additional questions or concerns  Signs and sx of concern reviewed      Justus Chapman DO, KILO  Sports Medicine Physician  [facility]            Disclaimer: This note consists of symbols derived from keyboarding, dictation and/or voice recognition software. As a result, there may be errors in the script that have gone undetected. Please consider this when interpreting information found in this chart.    Cast/splint application    Date/Time: 6/8/2023 4:08 PM    Performed by: Macho Gomez ATC  Authorized by: Justus Chapman DO    Consent:     Consent obtained:  Verbal    Consent given by:  Patient    Risks discussed:  Discoloration, numbness and pain    Alternatives discussed:  Alternative treatment  Pre-procedure details:     Sensation:  Normal  Procedure details:     Laterality:  Right    Location:  Elbow    Splint type:  Long arm and posterior slab    Supplies:  Fiberglass  Post-procedure details:     Pain:  Improved    Pain level:  3/10    Sensation:  Normal    Patient tolerance of procedure:  Tolerated well, no immediate complications    Patient provided with cast or splint care instructions: Yes    Comments:      A posterior elbow orthoglass splint was applied to patient.  Cast care instructions reviewed and given to patient & spouse.  Distal circulation intact post-cast/splint application.        Macho Gomez ATC        Again, thank you for allowing me to participate in the care of your patient.        Sincerely,        Justus Chapman DO    "

## 2023-06-08 NOTE — PROGRESS NOTES
Cast/splint application    Date/Time: 6/8/2023 4:08 PM    Performed by: Macho Gomez ATC  Authorized by: Justus Chapman DO    Consent:     Consent obtained:  Verbal    Consent given by:  Patient    Risks discussed:  Discoloration, numbness and pain    Alternatives discussed:  Alternative treatment  Pre-procedure details:     Sensation:  Normal  Procedure details:     Laterality:  Right    Location:  Elbow    Splint type:  Long arm and posterior slab    Supplies:  Fiberglass  Post-procedure details:     Pain:  Improved    Pain level:  3/10    Sensation:  Normal    Patient tolerance of procedure:  Tolerated well, no immediate complications    Patient provided with cast or splint care instructions: Yes    Comments:      A posterior elbow orthoglass splint was applied to patient.  Cast care instructions reviewed and given to patient & spouse.  Distal circulation intact post-cast/splint application.        Macho Gomez ATC

## 2023-06-09 ENCOUNTER — TELEPHONE (OUTPATIENT)
Dept: ORTHOPEDICS | Facility: CLINIC | Age: 43
End: 2023-06-09
Payer: COMMERCIAL

## 2023-06-09 NOTE — TELEPHONE ENCOUNTER
[facility] Call Center    Phone Message:    Call from:    Renetta Ryder  [phone]  Secure Line? YES  FAX: [phone]    Question is in regard to pt's WC. Is the pt supposed to remain OFF duty or have LIGHT duty restrictions?  What are the pt's restrictions?     Pt's job description is being faxed over from [email or web address]    Please CALL pt to ask permission to discuss or give out this information to Renetta. If permission is NOT needed, then please proceed to call Renetta with [facility] [employer] and give her the requested information. Thank you.    Reason for Call: Other: WC: Duty Restrictions     Action Taken: [place] [facility] ([facility])    Travel Screening: Not Applicable

## 2023-06-12 ENCOUNTER — OFFICE VISIT (OUTPATIENT)
Dept: FAMILY MEDICINE | Facility: CLINIC | Age: 43
End: 2023-06-12
Payer: COMMERCIAL

## 2023-06-12 VITALS
BODY MASS INDEX: 33.63 KG/M2 | RESPIRATION RATE: 20 BRPM | SYSTOLIC BLOOD PRESSURE: 100 MMHG | TEMPERATURE: 97.4 F | OXYGEN SATURATION: 99 % | HEART RATE: 80 BPM | WEIGHT: 189.8 LBS | HEIGHT: 63 IN | DIASTOLIC BLOOD PRESSURE: 66 MMHG

## 2023-06-12 DIAGNOSIS — G47.00 INSOMNIA, UNSPECIFIED TYPE: ICD-10-CM

## 2023-06-12 DIAGNOSIS — Z12.31 VISIT FOR SCREENING MAMMOGRAM: ICD-10-CM

## 2023-06-12 DIAGNOSIS — F41.1 GAD (GENERALIZED ANXIETY DISORDER): Primary | ICD-10-CM

## 2023-06-12 PROCEDURE — 90715 TDAP VACCINE 7 YRS/> IM: CPT | Performed by: PHYSICIAN ASSISTANT

## 2023-06-12 PROCEDURE — 90471 IMMUNIZATION ADMIN: CPT | Performed by: PHYSICIAN ASSISTANT

## 2023-06-12 PROCEDURE — 99213 OFFICE O/P EST LOW 20 MIN: CPT | Mod: 25 | Performed by: PHYSICIAN ASSISTANT

## 2023-06-12 RX ORDER — SERTRALINE HYDROCHLORIDE 100 MG/1
200 TABLET, FILM COATED ORAL DAILY
Qty: 180 TABLET | Refills: 3 | Status: SHIPPED | OUTPATIENT
Start: 2023-06-12 | End: 2023-12-29

## 2023-06-12 RX ORDER — ALBUTEROL SULFATE 90 UG/1
2 AEROSOL, METERED RESPIRATORY (INHALATION) EVERY 4 HOURS PRN
Qty: 18 G | Refills: 1 | Status: CANCELLED | OUTPATIENT
Start: 2023-06-12

## 2023-06-12 RX ORDER — TRAZODONE HYDROCHLORIDE 50 MG/1
50-150 TABLET, FILM COATED ORAL AT BEDTIME
Qty: 180 TABLET | Refills: 1 | Status: CANCELLED | OUTPATIENT
Start: 2023-06-12

## 2023-06-12 ASSESSMENT — ANXIETY QUESTIONNAIRES
7. FEELING AFRAID AS IF SOMETHING AWFUL MIGHT HAPPEN: NOT AT ALL
8. IF YOU CHECKED OFF ANY PROBLEMS, HOW DIFFICULT HAVE THESE MADE IT FOR YOU TO DO YOUR WORK, TAKE CARE OF THINGS AT HOME, OR GET ALONG WITH OTHER PEOPLE?: SOMEWHAT DIFFICULT
5. BEING SO RESTLESS THAT IT IS HARD TO SIT STILL: SEVERAL DAYS
4. TROUBLE RELAXING: NOT AT ALL
IF YOU CHECKED OFF ANY PROBLEMS ON THIS QUESTIONNAIRE, HOW DIFFICULT HAVE THESE PROBLEMS MADE IT FOR YOU TO DO YOUR WORK, TAKE CARE OF THINGS AT HOME, OR GET ALONG WITH OTHER PEOPLE: SOMEWHAT DIFFICULT
7. FEELING AFRAID AS IF SOMETHING AWFUL MIGHT HAPPEN: NOT AT ALL
GAD7 TOTAL SCORE: 4
6. BECOMING EASILY ANNOYED OR IRRITABLE: SEVERAL DAYS
GAD7 TOTAL SCORE: 4
3. WORRYING TOO MUCH ABOUT DIFFERENT THINGS: SEVERAL DAYS
GAD7 TOTAL SCORE: 4
2. NOT BEING ABLE TO STOP OR CONTROL WORRYING: NOT AT ALL
1. FEELING NERVOUS, ANXIOUS, OR ON EDGE: SEVERAL DAYS

## 2023-06-12 ASSESSMENT — ENCOUNTER SYMPTOMS: NERVOUS/ANXIOUS: 1

## 2023-06-12 ASSESSMENT — PATIENT HEALTH QUESTIONNAIRE - PHQ9
SUM OF ALL RESPONSES TO PHQ QUESTIONS 1-9: 2
SUM OF ALL RESPONSES TO PHQ QUESTIONS 1-9: 2
10. IF YOU CHECKED OFF ANY PROBLEMS, HOW DIFFICULT HAVE THESE PROBLEMS MADE IT FOR YOU TO DO YOUR WORK, TAKE CARE OF THINGS AT HOME, OR GET ALONG WITH OTHER PEOPLE: NOT DIFFICULT AT ALL

## 2023-06-12 NOTE — TELEPHONE ENCOUNTER
"Work note provided on 6/8/23 states:     \"Ana was seen in my office today for a right elbow injury that occurred at work on 6/1/2023.  She has an elbow fracture.  We will treat her with a long arm splint and sling for the next one week until our next follow up visit.  She should be medically excused from work at this time.  She will remain either off or on light duty restrictions for likely the next 8 weeks.  Updated recommendations will be provided next week.\"      Patient has follow up appointment on 6/15/23.     Dariela Santillan MSA, ATC  Certified Athletic Trainer  "

## 2023-06-12 NOTE — TELEPHONE ENCOUNTER
Patient was last seen 6/8/23 for fracture of head of right radius and placed in long arm posterior slab splint.    Spoke with patient's work comp and reiterated work ability discussed at last visit. Advised them that per Dr. Chapman's note patient should be off until 6/15 at which time work ability will be discussed and it will be determined if patient needs to remain off of work or can return to work with restrictions.     Work comp stated they have an extensive light duty program and can accommodate any restrictions. They would like clarification on why patient is not to work at this time and if they will be light duty after next visit- what the specific restrictions will be.     Dariela Santillan, ATC

## 2023-06-12 NOTE — NURSING NOTE
Prior to immunization administration, verified patients identity using patient s name and date of birth. Please see Immunization Activity for additional information.     Screening Questionnaire for Adult Immunization    Are you sick today?   No   Do you have allergies to medications, food, a vaccine component or latex?   No   Have you ever had a serious reaction after receiving a vaccination?   No   Do you have a long-term health problem with heart, lung, kidney, or metabolic disease (e.g., diabetes), asthma, a blood disorder, no spleen, complement component deficiency, a cochlear implant, or a spinal fluid leak?  Are you on long-term aspirin therapy?   No   Do you have cancer, leukemia, HIV/AIDS, or any other immune system problem?   No   Do you have a parent, brother, or sister with an immune system problem?   No   In the past 3 months, have you taken medications that affect  your immune system, such as prednisone, other steroids, or anticancer drugs; drugs for the treatment of rheumatoid arthritis, Crohn s disease, or psoriasis; or have you had radiation treatments?   No   Have you had a seizure, or a brain or other nervous system problem?   No   During the past year, have you received a transfusion of blood or blood    products, or been given immune (gamma) globulin or antiviral drug?   No   For women: Are you pregnant or is there a chance you could become       pregnant during the next month?   No   Have you received any vaccinations in the past 4 weeks?   No     Immunization questionnaire answers were all negative.        Per orders of Radha Bermudez PA-C , injection of tetanus given by Melina Sharp MA. Patient instructed to remain in clinic for 15 minutes afterwards, and to report any adverse reaction to me immediately.       Screening performed by Melina Sharp MA on 6/12/2023 at 7:51 AM.

## 2023-06-12 NOTE — PROGRESS NOTES
"  Assessment & Plan       ICD-10-CM    1. DANIELLE (generalized anxiety disorder)  F41.1 sertraline (ZOLOFT) 100 MG tablet      2. Insomnia, unspecified type  G47.00       3. Visit for screening mammogram  Z12.31 MA SCREENING DIGITAL BILAT - Future  (s+30)          1,2) The increase in Zoloft and addition of hydroxyzine has been working well. DANIELLE score much improved. Will continue her current med regimen, no changes.       Prescription drug management         BMI:   Estimated body mass index is 33.46 kg/m  as calculated from the following:    Height as of this encounter: 1.604 m (5' 3.15\").    Weight as of this encounter: 86.1 kg (189 lb 12.8 oz).     Return in about 2 months (around 8/12/2023) for your annual physical, fasting labs, with Radha, in person.     ANMOL Valadez [facility] VIN Perez is a 42 year old, presenting for the following health issues:  Anxiety        6/12/2023     7:18 AM   Additional Questions   Roomed by MP   Accompanied by NA         6/12/2023     7:18 AM   Patient Reported Additional Medications   Patient reports taking the following new medications None per patient     Anxiety       Anxiety Follow-Up    How are you doing with your anxiety since your last visit? Improved     Are you having other symptoms that might be associated with anxiety? No    Have you had a significant life event? OTHER: injury-work     Are you feeling depressed? No    Do you have any concerns with your use of alcohol or other drugs? No     Using hydroxyzine 4x daily including nighttime  Helpful with sleep as well - 1 works well  Doesn't feel drugged up sleepy in the morning  Takes Melatonin a couple hours before      Social History     Tobacco Use     Smoking status: Some Days     Packs/day: 0.50     Types: Cigarettes     Passive exposure: Never     Smokeless tobacco: Never   Vaping Use     Vaping status: Never Used   Substance Use Topics     Alcohol use: Yes     Comment: very " "rarely     Drug use: No         3/15/2022     6:50 AM 4/26/2023     4:16 PM 6/12/2023     7:13 AM   DANIELLE-7 SCORE   Total Score 2 (minimal anxiety) 16 (severe anxiety) 4 (minimal anxiety)   Total Score 2 16 4         3/15/2022     6:49 AM 4/26/2023     4:14 PM 6/12/2023     7:13 AM   PHQ   PHQ-9 Total Score 0 10 2   Q9: Thoughts of better off dead/self-harm past 2 weeks Not at all Not at all Not at all         Review of Systems   Psychiatric/Behavioral: The patient is nervous/anxious.       Constitutional, and psych systems are negative, except as otherwise noted.      Objective    /66   Pulse 80   Temp 97.4  F (36.3  C) (Temporal)   Resp 20   Ht 1.604 m (5' 3.15\")   Wt 86.1 kg (189 lb 12.8 oz)   LMP 07/23/2010 (LMP Unknown)   SpO2 99%   BMI 33.46 kg/m    Body mass index is 33.46 kg/m .  Physical Exam   GENERAL: healthy, alert and no distress  MS: no gross musculoskeletal defects noted, no edema  SKIN: no suspicious lesions or rashes  NEURO: Normal strength and tone, mentation intact and speech normal  PSYCH: mentation appears normal, affect normal/bright              Answers for HPI/ROS submitted by the patient on 6/12/2023  If you checked off any problems, how difficult have these problems made it for you to do your work, take care of things at home, or get along with other people?: Not difficult at all  PHQ9 TOTAL SCORE: 2  DANIELLE 7 TOTAL SCORE: 4      "

## 2023-06-13 NOTE — TELEPHONE ENCOUNTER
Reason for Call:  Form, our goal is to have forms completed with 7 days, however, some forms may require a visit or additional information.    Type of letter, form or note:  work comp     Who is the form from?: Insurance/Employer    Where did the form come from: form was faxed in    Phone number of person requesting form: [phone]  Can we leave a detailed message on this number:  YES    Desired completion date of form: ASAP      How will form be returned?:  fax to Renetta Soler, [phone]    Has the patient signed a consent form for release of information (may be included with form)? YES    Additional comments: Dr Chapman reviewed telephone message and form request.  Continue to recommend that patient be out of work until her follow up visit on 6/15/23 per prior letter.   Workability form was completed and signed.  Formed faxed to  as requested.    Macho Gomez ATC

## 2023-06-15 ENCOUNTER — OFFICE VISIT (OUTPATIENT)
Dept: ORTHOPEDICS | Facility: CLINIC | Age: 43
End: 2023-06-15
Payer: OTHER MISCELLANEOUS

## 2023-06-15 ENCOUNTER — ANCILLARY PROCEDURE (OUTPATIENT)
Dept: GENERAL RADIOLOGY | Facility: CLINIC | Age: 43
End: 2023-06-15
Attending: FAMILY MEDICINE
Payer: COMMERCIAL

## 2023-06-15 VITALS
BODY MASS INDEX: 33.32 KG/M2 | HEART RATE: 64 BPM | SYSTOLIC BLOOD PRESSURE: 100 MMHG | DIASTOLIC BLOOD PRESSURE: 66 MMHG | WEIGHT: 189 LBS

## 2023-06-15 DIAGNOSIS — S52.124D CLOSED NONDISPLACED FRACTURE OF HEAD OF RIGHT RADIUS WITH ROUTINE HEALING, SUBSEQUENT ENCOUNTER: Primary | ICD-10-CM

## 2023-06-15 DIAGNOSIS — S52.124D CLOSED NONDISPLACED FRACTURE OF HEAD OF RIGHT RADIUS WITH ROUTINE HEALING, SUBSEQUENT ENCOUNTER: ICD-10-CM

## 2023-06-15 PROCEDURE — 99213 OFFICE O/P EST LOW 20 MIN: CPT | Performed by: FAMILY MEDICINE

## 2023-06-15 PROCEDURE — 73080 X-RAY EXAM OF ELBOW: CPT | Mod: TC | Performed by: RADIOLOGY

## 2023-06-15 RX ORDER — OXYCODONE AND ACETAMINOPHEN 5; 325 MG/1; MG/1
1 TABLET ORAL EVERY 6 HOURS PRN
Qty: 12 TABLET | Refills: 0 | Status: SHIPPED | OUTPATIENT
Start: 2023-06-15 | End: 2023-06-18

## 2023-06-15 NOTE — PROGRESS NOTES
Ana Felix  :  1980  DOS: 6/15/2023  MRN: [id number]    Sports Medicine Clinic Visit    PCP: Radha Bermudez    Ana Felix is a 42 year old Right hand dominant female who is seen as an ER referral presenting with right elbow injury.    Injury: Patient describes injury as at work, helping move student from a behavior situation, slipped on mat, landing on right arm with FOOSH type injury that occurred on 23.  Pain located over right deep anterior elbow joint, radiating to right wrist.  Additional Features:  Positive: swelling and weakness.  Symptoms are better with Other medications: Oxycodone.  Symptoms are worse with: moving arm, direct pressure.  Other evaluation and/or treatments so far consists of: Ice, Ibuprofen, Other medications: Oxycodone, Rest and sling, thumb spica brace.  Recent imaging completed: X-rays completed 23.  Prior History of related problems: history of lateral epicondylitis several years ago.    Social History: currently employed as school paraprofessional     Interim History - Adela 15, 2023  Since last visit on 2023 patient states that she is doing a lot better from when she was seen last. She was taking percocet prn for pain, but she ran out of rx. Would like a new rx today if possible.       Review of Systems  Musculoskeletal: as above  Remainder of review of systems is negative including constitutional, CV, pulmonary, GI, Skin and Neurologic except as noted in HPI or medical history.    Past Medical History:   Diagnosis Date     Back pain      Crohn's disease (H)      Exercise-induced asthma      Gestational diabetes      Herniation of intervertebral disc of lumbar region      Infertility      Ovarian cyst      Smoker      Status post cholecystectomy 2005     Past Surgical History:   Procedure Laterality Date     APPENDECTOMY       C/SECTION, LOW TRANSVERSE      , Low Transverse     CHOLECYSTECTOMY, LAPOROSCOPIC  2005    Cholecystectomy,  Laparoscopic     COLONOSCOPY       ESOPHAGOSCOPY, GASTROSCOPY, DUODENOSCOPY (EGD), COMBINED  3/6/2014    Procedure: COMBINED ESOPHAGOSCOPY, GASTROSCOPY, DUODENOSCOPY (EGD), BIOPSY SINGLE OR MULTIPLE;  COLONOSCOPY/ UPPER GI ENDOSCOPY/ COLON/ EGD/ Abdominal pain, epigastric/ [facility];  Surgeon: West Lomas MD;  Location: [facility] OR     HC HYSTEROSCOPY, SURGICAL; W/ ENDOMETRIAL ABLATION, ANY METHOD  7/2010     HERNIORRHAPHY VENTRAL N/A 12/11/2018    Procedure: Open ventral hernia repair;  Surgeon: Alonso Bills MD;  Location: [facility]     HYSTERECTOMY, SUPRACERVICAL LAPAROSCOPIC  2010     LEEP TX, CERVICAL      LEEP TX Cervical     ORTHOPEDIC SURGERY      bluged disk     partial small bowel resection  2002    Ileo-colonic resection. Crohn's disease surgery for bowel obstruction. this was a section of small bowel and large bowel and the cecum., all removed and a reanastamosis done successfully     SALPINGO OOPHORECTOMY,R/L/TORI      Right ovary     SLING TRANSVAGINAL N/A 4/4/2022    Procedure: Pubovaginal sling with Altis;  Surgeon: Eleuterio Stone MD;  Location: [facility] OR     TUBAL LIGATION       Family History   Problem Relation Age of Onset     Cancer Mother         cervical     Lipids Mother      Eye Disorder Father      Lipids Father      Alcohol/Drug Maternal Grandmother      Colon Cancer Paternal Grandmother      Diabetes Paternal Grandmother      Eye Disorder Paternal Grandmother      Diabetes Paternal Grandfather      Eye Disorder Paternal Grandfather      Inflammatory Bowel Disease Paternal Aunt         and two paternal uncles     Breast Cancer No family hx of      Objective  /66   Pulse 64   Wt 85.7 kg (189 lb)   LMP 07/23/2010 (LMP Unknown)   BMI 33.32 kg/m        General: healthy, alert and in no distress      HEENT: no scleral icterus or conjunctival erythema     Skin: no suspicious lesions or rash. No jaundice.     CV: regular rhythm by palpation, 2+ distal pulses, no pedal edema      Resp:  normal respiratory effort without conversational dyspnea     Psych: normal mood and affect      Gait: nonantalgic, appropriate coordination and balance     Neuro: normal light touch sensory exam of the extremities. Motor strength as noted below     Right Wrist and Hand exam    Inspection:       No swelling, bruising or deformity right    Tender:       distal radius right very mild    Non Tender:       Remainder of the Wrist and Hand right    ROM:       Full and symmetric active and passive range of motion of the forearm, wrist and digits right, mild associated pain    Strength:       5/5 strength in the muscles of the hand, wrist and forearm right    Neurovascular:       2+ radial pulses bilaterally with brisk capillary refill and      normal sensation to light touch in the radial, median and ulnar nerve distributions    Right Elbow exam:    Inspection:       no ecchymosis       no edema or effusion    ROM:       Improving but still limited flexion, extension, forearm supination and pronation.    Strength:       motor function intact    Tender:       radial head most focal       Moderate soreness in extensor/supinator mass, biceps muscle belly    Non-Tender:       remainder of elbow area       lateral epicondyle       medial epicondyle       olecranon    Sensation:       intact throughout hand       intact throughout forearm     Skin:       well perfused       capillary refill less than 2 seconds        Radiology:  Recent Results (from the past 744 hour(s))   MR Cervical Spine w/o Contrast    Narrative    EXAM: MR CERVICAL SPINE W/O CONTRAST  LOCATION: [facility]  DATE/TIME: 5/17/2023 5:40 PM CDT    INDICATION: Hyperreflexia.  COMPARISON: None.  TECHNIQUE: MRI Cervical Spine without IV contrast.    FINDINGS:   ALIGNMENT: Straightening of the normal cervical lordosis.    BONES: There is no evidence of a marrow-replacing process. There is no evidence of abnormal bony edema. Vertebral body  heights are unremarkable.    DISCS: Mild multilevel spondylosis described in further detail below.    SPINAL CORD: No convincing signal abnormalities.    SPINAL CANAL: Moderate C5-C6 spinal canal stenosis.    SOFT TISSUES: Unremarkable. The cervical vascular flow voids appear intact.    POSTERIOR FOSSA: Limited images of the intracranial compartment demonstrate no acute abnormality.    SIGNIFICANT FINDINGS BY LEVEL:     Craniovertebral junction and C1-C2: Unremarkable.    C2-C3: Normal disc height. No herniation. Normal facets. No spinal canal or neural foraminal stenosis.     C3-C4: Normal disc height. No herniation. Bilateral uncovertebral spurring. Normal facets. No spinal canal or neural foraminal stenosis.     C4-C5: Normal disc height. No herniation. Mild uncovertebral spurring. Normal facets. No spinal canal or right neural foraminal stenosis. Mild left neural foraminal stenosis.    C5-C6: Mild intervertebral disc height loss. Shallow disc osteophyte complex and broad-based central protrusion. Bilateral uncovertebral spurring. No significant facet arthropathy. Mild to moderate spinal canal stenosis. Mild bilateral neural foraminal   stenosis.     C6-C7: Mild intervertebral disc height loss. Shallow disc osteophyte complex. Left-sided uncovertebral spurring. No significant facet arthropathy. Mild spinal canal stenosis. No significant right neural foraminal stenosis. Moderate left neural foraminal   stenosis.     C7-T1: Normal disc height. No herniation. Normal facets. No spinal canal or neural foraminal stenosis.      Impression    IMPRESSION:  1.  Unremarkable cervical cord.  2.  At C5-C6, there is mild to moderate spinal canal stenosis.  3.  At C6-C7, there is moderate left neural foraminal stenosis.  4.  Additional degenerative changes as above.     XR Wrist Right G/E 3 Views    Narrative    XR WRIST RIGHT G/E 3 VIEWS   6/1/2023 2:10 PM     HISTORY: Fall onto outstretched hand after slip on mat at work.  Wrist  pain.  COMPARISON: None.       Impression    IMPRESSION: Normal joint spaces and alignment. No fracture.    BOLIVAR AGUILAR MD         SYSTEM ID:  [id number]   Elbow XR, 2 views, right    Narrative    XR ELBOW RIGHT 2 VIEWS   6/1/2023 2:10 PM     HISTORY: Fall onto outstretched hand after slip on mat at work.  Evaluate for acute bony process after blunt trauma  COMPARISON: 10/21/2019       Impression    IMPRESSION: There is an acute, nondisplaced fracture involving the  proximal radius at the lateral radial head/neck junction. There is a  joint effusion.    BOLIVAR AGUILAR MD         SYSTEM ID:  [id number]   XR Elbow RT G/E 3 vw    Narrative    XR ELBOW RIGHT G/E 3 VIEWS   6/15/2023 2:00 PM     HISTORY: Closed nondisplaced fracture of head of right radius with  routine healing, subsequent encounter  COMPARISON: 6/1/2023       Impression    IMPRESSION: Nondisplaced radial head fracture is unchanged. Elbow  joint effusion has resolved.    CALLUM ARORA MD         SYSTEM ID:  [id number]       Assessment:  1. Closed nondisplaced fracture of head of right radius with routine healing, subsequent encounter        Plan:  Discussed the assessment with the patient.  Follow up: 2 week for close clinical f/u for now  Acute FOOSH injury from a fall at work  Known nondisplaced radial head fracture, anticipate 8 week recovery overall  On pain contract through [place] and takes opiate medication for chronic pain, reviewed how she may be somewhat desensitized to this class of medicine due to chronic use, making it more difficult to treat acute pain  Modest amount of breakthrough pain medication provided again today, requiring less overall, she acknowledged risks and will use as little as possible and continue to keep her pain clinic apprised of the extra medication she has rec'd for acute injury, will not plan to refill  Long arm posterior splint discontinued today, can use intermittently if needed for severe flares of pain but  "hopeful to avoid at this point , sling can be used as needed but should also be limited where possible, compression sleeve provided which she can wear as often as she would like if comfortable  XR images independently visualized and reviewed with patient today in clinic  No change in position of fracture and elbow effusion appears to be resolving  Okay to work on gentle range of motion as tolerated  Consider PT based on clinical progress  Letter provided for work and workability form completed as well:  \"Ana was seen in my office today for follow up from her right elbow fracture that occurred on 6/1/2023.  Her pain has improved some since last week, although she is still quite limited with function overall.  She should not use her right arm for work for the next 2 weeks until our next follow up visit.  She can use her left arm as tolerated.  No push/pull/lift/carry with the right arm.  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  Given the limited use of her right arm I do not recommend driving at this time given that she cannot safely use her right arm.  If she can arrange for transportation to work she can work as tolerated under the above restrictions, and as detailed on her workability form.  If she has severe flares of pain that require time away from work over the next 2 weeks those absences should be medically excused if needed.  Updated recommendations will be provided at her next visit in 2 weeks, sooner if needed.  She may be using a compression sleeve, splint or sling on her right arm for comfort, and all are allowed as needed.\"  Home handouts provided and supportive care reviewed  All questions were answered today  Contact us with additional questions or concerns  Signs and sx of concern reviewed      Justus Chapman DO, KILO  Sports Medicine Physician  [facility]            Disclaimer: This note consists of symbols derived from keyboarding, dictation " and/or voice recognition software. As a result, there may be errors in the script that have gone undetected. Please consider this when interpreting information found in this chart.

## 2023-06-15 NOTE — LETTER
Adela 15, 2023      Ana was seen in my office today for follow up from her right elbow fracture that occurred on 6/1/2023.  Her pain has improved some since last week, although she is still quite limited with function overall.  She should not use her right arm for work for the next 2 weeks until our next follow up visit.  She can use her left arm as tolerated.  No push/pull/lift/carry with the right arm.  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  Given the limited use of her right arm I do not recommend driving at this time given that she cannot safely use her right arm.  If she can arrange for transportation to work she can work as tolerated under the above restrictions, and as detailed on her workability form.  If she has severe flares of pain that require time away from work over the next 2 weeks those absences should be medically excused if needed.  Updated recommendations will be provided at her next visit in 2 weeks, sooner if needed.  She may be using a compression sleeve, splint or sling on her right arm for comfort, and all are allowed as needed.      Justus Mendez, , CAQ  Sports Medicine Physician  [facility]

## 2023-06-15 NOTE — LETTER
6/15/2023         RE: Ana Felix  [address]  [place] [address]        Dear Colleague,    Thank you for referring your patient, Ana Felix, to the [facility] [place]. Please see a copy of my visit note below.    Ana Felix  :  1980  DOS: 6/15/2023  MRN: [id number]    Sports Medicine Clinic Visit    PCP: Radha Bermudez    Ana Felix is a 42 year old Right hand dominant female who is seen as an ER referral presenting with right elbow injury.    Injury: Patient describes injury as at work, helping move student from a behavior situation, slipped on mat, landing on right arm with FOOSH type injury that occurred on 23.  Pain located over right deep anterior elbow joint, radiating to right wrist.  Additional Features:  Positive: swelling and weakness.  Symptoms are better with Other medications: Oxycodone.  Symptoms are worse with: moving arm, direct pressure.  Other evaluation and/or treatments so far consists of: Ice, Ibuprofen, Other medications: Oxycodone, Rest and sling, thumb spica brace.  Recent imaging completed: X-rays completed 23.  Prior History of related problems: history of lateral epicondylitis several years ago.    Social History: currently employed as school paraprofessional     Interim History - Adela 15, 2023  Since last visit on 2023 patient states that she is doing a lot better from when she was seen last. She was taking percocet prn for pain, but she ran out of rx. Would like a new rx today if possible.       Review of Systems  Musculoskeletal: as above  Remainder of review of systems is negative including constitutional, CV, pulmonary, GI, Skin and Neurologic except as noted in HPI or medical history.    Past Medical History:   Diagnosis Date     Back pain      Crohn's disease (H)      Exercise-induced asthma      Gestational diabetes      Herniation of intervertebral disc of lumbar region      Infertility      Ovarian cyst       Smoker      Status post cholecystectomy 2005     Past Surgical History:   Procedure Laterality Date     APPENDECTOMY       C/SECTION, LOW TRANSVERSE      , Low Transverse     CHOLECYSTECTOMY, LAPOROSCOPIC  2005    Cholecystectomy, Laparoscopic     COLONOSCOPY       ESOPHAGOSCOPY, GASTROSCOPY, DUODENOSCOPY (EGD), COMBINED  3/6/2014    Procedure: COMBINED ESOPHAGOSCOPY, GASTROSCOPY, DUODENOSCOPY (EGD), BIOPSY SINGLE OR MULTIPLE;  COLONOSCOPY/ UPPER GI ENDOSCOPY/ COLON/ EGD/ Abdominal pain, epigastric/ [facility];  Surgeon: West Lomas MD;  Location: [facility] OR     HC HYSTEROSCOPY, SURGICAL; W/ ENDOMETRIAL ABLATION, ANY METHOD  2010     HERNIORRHAPHY VENTRAL N/A 2018    Procedure: Open ventral hernia repair;  Surgeon: Alonso Bills MD;  Location: [facility]     HYSTERECTOMY, SUPRACERVICAL LAPAROSCOPIC       LEEP TX, CERVICAL      LEEP TX Cervical     ORTHOPEDIC SURGERY      bluged disk     partial small bowel resection      Ileo-colonic resection. Crohn's disease surgery for bowel obstruction. this was a section of small bowel and large bowel and the cecum., all removed and a reanastamosis done successfully     SALPINGO OOPHORECTOMY,R/L/TORI      Right ovary     SLING TRANSVAGINAL N/A 2022    Procedure: Pubovaginal sling with [place];  Surgeon: Eleuterio Stone MD;  Location: [facility] OR     TUBAL LIGATION       Family History   Problem Relation Age of Onset     Cancer Mother         cervical     Lipids Mother      Eye Disorder Father      Lipids Father      Alcohol/Drug Maternal Grandmother      Colon Cancer Paternal Grandmother      Diabetes Paternal Grandmother      Eye Disorder Paternal Grandmother      Diabetes Paternal Grandfather      Eye Disorder Paternal Grandfather      Inflammatory Bowel Disease Paternal Aunt         and two paternal uncles     Breast Cancer No family hx of      Objective  /66   Pulse 64   Wt 85.7 kg (189 lb)   LMP 2010 (LMP Unknown)   BMI  33.32 kg/m        General: healthy, alert and in no distress      HEENT: no scleral icterus or conjunctival erythema     Skin: no suspicious lesions or rash. No jaundice.     CV: regular rhythm by palpation, 2+ distal pulses, no pedal edema      Resp: normal respiratory effort without conversational dyspnea     Psych: normal mood and affect      Gait: nonantalgic, appropriate coordination and balance     Neuro: normal light touch sensory exam of the extremities. Motor strength as noted below     Right Wrist and Hand exam    Inspection:       No swelling, bruising or deformity right    Tender:       distal radius right very mild    Non Tender:       Remainder of the Wrist and Hand right    ROM:       Full and symmetric active and passive range of motion of the forearm, wrist and digits right, mild associated pain    Strength:       5/5 strength in the muscles of the hand, wrist and forearm right    Neurovascular:       2+ radial pulses bilaterally with brisk capillary refill and      normal sensation to light touch in the radial, median and ulnar nerve distributions    Right Elbow exam:    Inspection:       no ecchymosis       no edema or effusion    ROM:       Improving but still limited flexion, extension, forearm supination and pronation.    Strength:       motor function intact    Tender:       radial head most focal       Moderate soreness in extensor/supinator mass, biceps muscle belly    Non-Tender:       remainder of elbow area       lateral epicondyle       medial epicondyle       olecranon    Sensation:       intact throughout hand       intact throughout forearm     Skin:       well perfused       capillary refill less than 2 seconds        Radiology:  Recent Results (from the past 744 hour(s))   MR Cervical Spine w/o Contrast    Narrative    EXAM: MR CERVICAL SPINE W/O CONTRAST  LOCATION: [facility]  DATE/TIME: 5/17/2023 5:40 PM CDT    INDICATION: Hyperreflexia.  COMPARISON:  None.  TECHNIQUE: MRI Cervical Spine without IV contrast.    FINDINGS:   ALIGNMENT: Straightening of the normal cervical lordosis.    BONES: There is no evidence of a marrow-replacing process. There is no evidence of abnormal bony edema. Vertebral body heights are unremarkable.    DISCS: Mild multilevel spondylosis described in further detail below.    SPINAL CORD: No convincing signal abnormalities.    SPINAL CANAL: Moderate C5-C6 spinal canal stenosis.    SOFT TISSUES: Unremarkable. The cervical vascular flow voids appear intact.    POSTERIOR FOSSA: Limited images of the intracranial compartment demonstrate no acute abnormality.    SIGNIFICANT FINDINGS BY LEVEL:     Craniovertebral junction and C1-C2: Unremarkable.    C2-C3: Normal disc height. No herniation. Normal facets. No spinal canal or neural foraminal stenosis.     C3-C4: Normal disc height. No herniation. Bilateral uncovertebral spurring. Normal facets. No spinal canal or neural foraminal stenosis.     C4-C5: Normal disc height. No herniation. Mild uncovertebral spurring. Normal facets. No spinal canal or right neural foraminal stenosis. Mild left neural foraminal stenosis.    C5-C6: Mild intervertebral disc height loss. Shallow disc osteophyte complex and broad-based central protrusion. Bilateral uncovertebral spurring. No significant facet arthropathy. Mild to moderate spinal canal stenosis. Mild bilateral neural foraminal   stenosis.     C6-C7: Mild intervertebral disc height loss. Shallow disc osteophyte complex. Left-sided uncovertebral spurring. No significant facet arthropathy. Mild spinal canal stenosis. No significant right neural foraminal stenosis. Moderate left neural foraminal   stenosis.     C7-T1: Normal disc height. No herniation. Normal facets. No spinal canal or neural foraminal stenosis.      Impression    IMPRESSION:  1.  Unremarkable cervical cord.  2.  At C5-C6, there is mild to moderate spinal canal stenosis.  3.  At C6-C7, there is  moderate left neural foraminal stenosis.  4.  Additional degenerative changes as above.     XR Wrist Right G/E 3 Views    Narrative    XR WRIST RIGHT G/E 3 VIEWS   6/1/2023 2:10 PM     HISTORY: Fall onto outstretched hand after slip on mat at work. Wrist  pain.  COMPARISON: None.       Impression    IMPRESSION: Normal joint spaces and alignment. No fracture.    BOLIVAR AGUILAR MD         SYSTEM ID:  [id number]   Elbow XR, 2 views, right    Narrative    XR ELBOW RIGHT 2 VIEWS   6/1/2023 2:10 PM     HISTORY: Fall onto outstretched hand after slip on mat at work.  Evaluate for acute bony process after blunt trauma  COMPARISON: 10/21/2019       Impression    IMPRESSION: There is an acute, nondisplaced fracture involving the  proximal radius at the lateral radial head/neck junction. There is a  joint effusion.    BOLIVAR AGUILAR MD         SYSTEM ID:  [id number]   XR Elbow RT G/E 3 vw    Narrative    XR ELBOW RIGHT G/E 3 VIEWS   6/15/2023 2:00 PM     HISTORY: Closed nondisplaced fracture of head of right radius with  routine healing, subsequent encounter  COMPARISON: 6/1/2023       Impression    IMPRESSION: Nondisplaced radial head fracture is unchanged. Elbow  joint effusion has resolved.    CALLUM ARORA MD         SYSTEM ID:  [id number]       Assessment:  1. Closed nondisplaced fracture of head of right radius with routine healing, subsequent encounter        Plan:  Discussed the assessment with the patient.  Follow up: 2 week for close clinical f/u for now  Acute FOOSH injury from a fall at work  Known nondisplaced radial head fracture, anticipate 8 week recovery overall  On pain contract through [place] and takes opiate medication for chronic pain, reviewed how she may be somewhat desensitized to this class of medicine due to chronic use, making it more difficult to treat acute pain  Modest amount of breakthrough pain medication provided again today, requiring less overall, she acknowledged risks and will use as  "little as possible and continue to keep her pain clinic apprised of the extra medication she has rec'd for acute injury, will not plan to refill  Long arm posterior splint discontinued today, can use intermittently if needed for severe flares of pain but hopeful to avoid at this point , sling can be used as needed but should also be limited where possible, compression sleeve provided which she can wear as often as she would like if comfortable  XR images independently visualized and reviewed with patient today in clinic  No change in position of fracture and elbow effusion appears to be resolving  Okay to work on gentle range of motion as tolerated  Consider PT based on clinical progress  Letter provided for work and workability form completed as well:  \"Ana was seen in my office today for follow up from her right elbow fracture that occurred on 6/1/2023.  Her pain has improved some since last week, although she is still quite limited with function overall.  She should not use her right arm for work for the next 2 weeks until our next follow up visit.  She can use her left arm as tolerated.  No push/pull/lift/carry with the right arm.  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  Given the limited use of her right arm I do not recommend driving at this time given that she cannot safely use her right arm.  If she can arrange for transportation to work she can work as tolerated under the above restrictions, and as detailed on her workability form.  If she has severe flares of pain that require time away from work over the next 2 weeks those absences should be medically excused if needed.  Updated recommendations will be provided at her next visit in 2 weeks, sooner if needed.  She may be using a compression sleeve, splint or sling on her right arm for comfort, and all are allowed as needed.\"  Home handouts provided and supportive care reviewed  All questions were answered today  Contact us with " additional questions or concerns  Signs and sx of concern reviewed      Justus Chapman DO, KILO  Sports Medicine Physician  [facility]            Disclaimer: This note consists of symbols derived from keyboarding, dictation and/or voice recognition software. As a result, there may be errors in the script that have gone undetected. Please consider this when interpreting information found in this chart.      Again, thank you for allowing me to participate in the care of your patient.        Sincerely,        Justus Chapman DO

## 2023-06-22 ENCOUNTER — ANCILLARY ORDERS (OUTPATIENT)
Dept: FAMILY MEDICINE | Facility: CLINIC | Age: 43
End: 2023-06-22

## 2023-06-22 ENCOUNTER — ANCILLARY ORDERS (OUTPATIENT)
Dept: MAMMOGRAPHY | Facility: CLINIC | Age: 43
End: 2023-06-22

## 2023-06-22 DIAGNOSIS — R92.8 BI-RADS CATEGORY 3 MAMMOGRAM RESULT: ICD-10-CM

## 2023-06-29 ENCOUNTER — OFFICE VISIT (OUTPATIENT)
Dept: ORTHOPEDICS | Facility: CLINIC | Age: 43
End: 2023-06-29
Payer: OTHER MISCELLANEOUS

## 2023-06-29 VITALS
HEIGHT: 63 IN | SYSTOLIC BLOOD PRESSURE: 146 MMHG | BODY MASS INDEX: 33.49 KG/M2 | WEIGHT: 189 LBS | DIASTOLIC BLOOD PRESSURE: 94 MMHG

## 2023-06-29 DIAGNOSIS — S59.901D INJURY OF RIGHT ELBOW, SUBSEQUENT ENCOUNTER: ICD-10-CM

## 2023-06-29 DIAGNOSIS — S52.124D CLOSED NONDISPLACED FRACTURE OF HEAD OF RIGHT RADIUS WITH ROUTINE HEALING, SUBSEQUENT ENCOUNTER: Primary | ICD-10-CM

## 2023-06-29 DIAGNOSIS — S63.501D RIGHT WRIST SPRAIN, SUBSEQUENT ENCOUNTER: ICD-10-CM

## 2023-06-29 PROCEDURE — 99213 OFFICE O/P EST LOW 20 MIN: CPT | Performed by: FAMILY MEDICINE

## 2023-06-29 NOTE — LETTER
June 29, 2023        Ana was seen in my office today for follow up from her right elbow fracture that occurred on 6/1/2023.  Her pain continues to improve, although she is still somewhat limited with function overall.  She should continue to not use her right arm for work for the next 3 weeks for any lift/carry/push/pull activity at work until our next follow up visit.  She can use her left arm as tolerated. 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  If she has severe flares of pain that require time away from work over the next 3 weeks those absences should be medically excused if needed.  Updated recommendations will be provided at her next visit in 3 weeks, sooner if needed.  She may be using a compression sleeve, splint or sling on her right arm for comfort, and all are allowed as needed.        Justus Mendez DO, CAQ  Sports Medicine Physician  [facility]

## 2023-06-29 NOTE — PROGRESS NOTES
Ana Felix  :  1980  DOS: 23  MRN: [id number]    Sports Medicine Clinic Visit    PCP: Radha Bermudez    Ana Felix is a 42 year old Right hand dominant female who is seen as an ER referral presenting with right elbow injury.    Injury: Patient describes injury as at work, helping move student from a behavior situation, slipped on mat, landing on right arm with FOOSH type injury that occurred on 23.  Pain located over right deep anterior elbow joint, radiating to right wrist.  Additional Features:  Positive: swelling and weakness.  Symptoms are better with Other medications: Oxycodone.  Symptoms are worse with: moving arm, direct pressure.  Other evaluation and/or treatments so far consists of: Ice, Ibuprofen, Other medications: Oxycodone, Rest and sling, thumb spica brace.  Recent imaging completed: X-rays completed 23.  Prior History of related problems: history of lateral epicondylitis several years ago.    Social History: currently employed as school paraprofessional     Interim History - Adela 15, 2023  Since last visit on 2023 patient states that she is doing a lot better from when she was seen last. She was taking percocet prn for pain, but she ran out of rx. Would like a new rx today if possible.     Interim History 2023  Ana Felix is now 4 weeks out from injury.  Since last visit on 6/15/2023 patient has improving right elbow pain.  She been able to use right arm more, mild discomfort with bending elbow.  No taking using pain medication.  Here to review work restrictions.     Review of Systems  Musculoskeletal: as above  Remainder of review of systems is negative including constitutional, CV, pulmonary, GI, Skin and Neurologic except as noted in HPI or medical history.    Past Medical History:   Diagnosis Date     Back pain      Crohn's disease (H)      Exercise-induced asthma      Gestational diabetes      Herniation of intervertebral disc of lumbar region       "Infertility      Ovarian cyst      Smoker      Status post cholecystectomy 2005     Past Surgical History:   Procedure Laterality Date     APPENDECTOMY       C/SECTION, LOW TRANSVERSE      , Low Transverse     CHOLECYSTECTOMY, LAPOROSCOPIC  2005    Cholecystectomy, Laparoscopic     COLONOSCOPY       ESOPHAGOSCOPY, GASTROSCOPY, DUODENOSCOPY (EGD), COMBINED  3/6/2014    Procedure: COMBINED ESOPHAGOSCOPY, GASTROSCOPY, DUODENOSCOPY (EGD), BIOPSY SINGLE OR MULTIPLE;  COLONOSCOPY/ UPPER GI ENDOSCOPY/ COLON/ EGD/ Abdominal pain, epigastric/ PJH;  Surgeon: West oLmas MD;  Location:  OR      HYSTEROSCOPY, SURGICAL; W/ ENDOMETRIAL ABLATION, ANY METHOD  2010     HERNIORRHAPHY VENTRAL N/A 2018    Procedure: Open ventral hernia repair;  Surgeon: Alonso Bills MD;  Location: [facility] OR     HYSTERECTOMY, SUPRACERVICAL LAPAROSCOPIC       LEEP TX, CERVICAL      LEEP TX Cervical     ORTHOPEDIC SURGERY      bluged disk     partial small bowel resection      Ileo-colonic resection. Crohn's disease surgery for bowel obstruction. this was a section of small bowel and large bowel and the cecum., all removed and a reanastamosis done successfully     SALPINGO OOPHORECTOMY,R/L/TORI      Right ovary     SLING TRANSVAGINAL N/A 2022    Procedure: Pubovaginal sling with Altis;  Surgeon: Eleuterio Stone MD;  Location: [facility] OR     TUBAL LIGATION       Family History   Problem Relation Age of Onset     Cancer Mother         cervical     Lipids Mother      Eye Disorder Father      Lipids Father      Alcohol/Drug Maternal Grandmother      Colon Cancer Paternal Grandmother      Diabetes Paternal Grandmother      Eye Disorder Paternal Grandmother      Diabetes Paternal Grandfather      Eye Disorder Paternal Grandfather      Inflammatory Bowel Disease Paternal Aunt         and two paternal uncles     Breast Cancer No family hx of      Objective  BP (!) 146/94   Ht 1.604 m (5' 3.15\")   Wt 85.7 kg " (189 lb)   LMP 07/23/2010 (LMP Unknown)   BMI 33.32 kg/m        General: healthy, alert and in no distress      HEENT: no scleral icterus or conjunctival erythema     Skin: no suspicious lesions or rash. No jaundice.     CV: regular rhythm by palpation, 2+ distal pulses, no pedal edema      Resp: normal respiratory effort without conversational dyspnea     Psych: normal mood and affect      Gait: nonantalgic, appropriate coordination and balance     Neuro: normal light touch sensory exam of the extremities. Motor strength as noted below     Right Wrist and Hand exam    Inspection:       No swelling, bruising or deformity right    Tender:       distal radius right minimal today    Non Tender:       Remainder of the Wrist and Hand right    ROM:       Full and symmetric active and passive range of motion of the forearm, wrist and digits right, mild associated pain    Strength:       5/5 strength in the muscles of the hand, wrist and forearm right    Neurovascular:       2+ radial pulses bilaterally with brisk capillary refill and      normal sensation to light touch in the radial, median and ulnar nerve distributions    Right Elbow exam:    Inspection:       no ecchymosis       no edema or effusion    ROM:       Improving but still limited flexion, extension, forearm supination and pronation.    Strength:       motor function intact    Tender:       radial head most focal, but significantly improved       Very mild soreness in extensor/supinator mass, biceps muscle belly    Non-Tender:       remainder of elbow area       lateral epicondyle       medial epicondyle       olecranon    Sensation:       intact throughout hand       intact throughout forearm     Skin:       well perfused       capillary refill less than 2 seconds    Radiology:  Recent Results (from the past 744 hour(s))   MR Cervical Spine w/o Contrast    Narrative    EXAM: MR CERVICAL SPINE W/O CONTRAST  LOCATION: [facility]  DATE/TIME: 5/17/2023 5:40 PM CDT    INDICATION: Hyperreflexia.  COMPARISON: None.  TECHNIQUE: MRI Cervical Spine without IV contrast.    FINDINGS:   ALIGNMENT: Straightening of the normal cervical lordosis.    BONES: There is no evidence of a marrow-replacing process. There is no evidence of abnormal bony edema. Vertebral body heights are unremarkable.    DISCS: Mild multilevel spondylosis described in further detail below.    SPINAL CORD: No convincing signal abnormalities.    SPINAL CANAL: Moderate C5-C6 spinal canal stenosis.    SOFT TISSUES: Unremarkable. The cervical vascular flow voids appear intact.    POSTERIOR FOSSA: Limited images of the intracranial compartment demonstrate no acute abnormality.    SIGNIFICANT FINDINGS BY LEVEL:     Craniovertebral junction and C1-C2: Unremarkable.    C2-C3: Normal disc height. No herniation. Normal facets. No spinal canal or neural foraminal stenosis.     C3-C4: Normal disc height. No herniation. Bilateral uncovertebral spurring. Normal facets. No spinal canal or neural foraminal stenosis.     C4-C5: Normal disc height. No herniation. Mild uncovertebral spurring. Normal facets. No spinal canal or right neural foraminal stenosis. Mild left neural foraminal stenosis.    C5-C6: Mild intervertebral disc height loss. Shallow disc osteophyte complex and broad-based central protrusion. Bilateral uncovertebral spurring. No significant facet arthropathy. Mild to moderate spinal canal stenosis. Mild bilateral neural foraminal   stenosis.     C6-C7: Mild intervertebral disc height loss. Shallow disc osteophyte complex. Left-sided uncovertebral spurring. No significant facet arthropathy. Mild spinal canal stenosis. No significant right neural foraminal stenosis. Moderate left neural foraminal   stenosis.     C7-T1: Normal disc height. No herniation. Normal facets. No spinal canal or neural foraminal stenosis.      Impression    IMPRESSION:  1.  Unremarkable cervical cord.  2.   At C5-C6, there is mild to moderate spinal canal stenosis.  3.  At C6-C7, there is moderate left neural foraminal stenosis.  4.  Additional degenerative changes as above.     XR Wrist Right G/E 3 Views    Narrative    XR WRIST RIGHT G/E 3 VIEWS   6/1/2023 2:10 PM     HISTORY: Fall onto outstretched hand after slip on mat at work. Wrist  pain.  COMPARISON: None.       Impression    IMPRESSION: Normal joint spaces and alignment. No fracture.    BOLIVAR AGUILAR MD         SYSTEM ID:  [id number]   Elbow XR, 2 views, right    Narrative    XR ELBOW RIGHT 2 VIEWS   6/1/2023 2:10 PM     HISTORY: Fall onto outstretched hand after slip on mat at work.  Evaluate for acute bony process after blunt trauma  COMPARISON: 10/21/2019       Impression    IMPRESSION: There is an acute, nondisplaced fracture involving the  proximal radius at the lateral radial head/neck junction. There is a  joint effusion.    BOLIVAR AGUILAR MD         SYSTEM ID:  [id number]   XR Elbow RT G/E 3 vw    Narrative    XR ELBOW RIGHT G/E 3 VIEWS   6/15/2023 2:00 PM     HISTORY: Closed nondisplaced fracture of head of right radius with  routine healing, subsequent encounter  COMPARISON: 6/1/2023       Impression    IMPRESSION: Nondisplaced radial head fracture is unchanged. Elbow  joint effusion has resolved.    CALLUM ARORA MD         SYSTEM ID:  [id number]       Assessment:  1. Closed nondisplaced fracture of head of right radius with routine healing, subsequent encounter    2. Injury of right elbow, subsequent encounter    3. Right wrist sprain, subsequent encounter        Plan:  Discussed the assessment with the patient.  Follow up: 3 weeks for ongoing close clinical f/u   Acute FOOSH injury from a fall at work  Known nondisplaced radial head fracture, anticipate 8 week recovery overall, doing very well at this point 4 wks s/p injury  Compression sleeve provided which she can wear as often as she would like if comfortable  XR images independently  "visualized and reviewed with patient today in clinic  No change in position of fracture and elbow effusion appears to be resolving on imaging last visit  Okay to work on gentle range of motion as tolerated  Consider PT based on clinical progress, continue to defer for now given good clinical progress  Letter provided for work updated as well:  \"Ana was seen in my office today for follow up from her right elbow fracture that occurred on 6/1/2023.  Her pain continues to improve, although she is still somewhat limited with function overall.  She should continue to not use her right arm for work for the next 3 weeks for any lift/carry/push/pull activity at work until our next follow up visit.  She can use her left arm as tolerated. 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  If she has severe flares of pain that require time away from work over the next 3 weeks those absences should be medically excused if needed.  Updated recommendations will be provided at her next visit in 3 weeks, sooner if needed.  She may be using a compression sleeve, splint or sling on her right arm for comfort, and all are allowed as needed.\"  Home handouts provided and supportive care reviewed  All questions were answered today  Contact us with additional questions or concerns  Signs and sx of concern reviewed      Justus Chapman DO, KILO  Sports Medicine Physician  [facility]            Disclaimer: This note consists of symbols derived from keyboarding, dictation and/or voice recognition software. As a result, there may be errors in the script that have gone undetected. Please consider this when interpreting information found in this chart.  "

## 2023-06-29 NOTE — LETTER
2023         RE: Ana Felix  [address]  [place] [address]        Dear Colleague,    Thank you for referring your patient, Ana Felix, to the [facility] [place]. Please see a copy of my visit note below.    Ana Felix  :  1980  DOS: 23  MRN: [id number]    Sports Medicine Clinic Visit    PCP: Radha Bermudez    Ana Felix is a 42 year old Right hand dominant female who is seen as an ER referral presenting with right elbow injury.    Injury: Patient describes injury as at work, helping move student from a behavior situation, slipped on mat, landing on right arm with FOOSH type injury that occurred on 23.  Pain located over right deep anterior elbow joint, radiating to right wrist.  Additional Features:  Positive: swelling and weakness.  Symptoms are better with Other medications: Oxycodone.  Symptoms are worse with: moving arm, direct pressure.  Other evaluation and/or treatments so far consists of: Ice, Ibuprofen, Other medications: Oxycodone, Rest and sling, thumb spica brace.  Recent imaging completed: X-rays completed 23.  Prior History of related problems: history of lateral epicondylitis several years ago.    Social History: currently employed as school paraprofessional     Interim History - Adela 15, 2023  Since last visit on 2023 patient states that she is doing a lot better from when she was seen last. She was taking percocet prn for pain, but she ran out of rx. Would like a new rx today if possible.     Interim History 2023  Ana Felix is now 4 weeks out from injury.  Since last visit on 6/15/2023 patient has improving right elbow pain.  She been able to use right arm more, mild discomfort with bending elbow.  No taking using pain medication.  Here to review work restrictions.     Review of Systems  Musculoskeletal: as above  Remainder of review of systems is negative including constitutional, CV, pulmonary, GI, Skin and  Neurologic except as noted in HPI or medical history.    Past Medical History:   Diagnosis Date     Back pain      Crohn's disease (H)      Exercise-induced asthma      Gestational diabetes      Herniation of intervertebral disc of lumbar region      Infertility      Ovarian cyst      Smoker      Status post cholecystectomy 2005     Past Surgical History:   Procedure Laterality Date     APPENDECTOMY       C/SECTION, LOW TRANSVERSE      , Low Transverse     CHOLECYSTECTOMY, LAPOROSCOPIC  2005    Cholecystectomy, Laparoscopic     COLONOSCOPY       ESOPHAGOSCOPY, GASTROSCOPY, DUODENOSCOPY (EGD), COMBINED  3/6/2014    Procedure: COMBINED ESOPHAGOSCOPY, GASTROSCOPY, DUODENOSCOPY (EGD), BIOPSY SINGLE OR MULTIPLE;  COLONOSCOPY/ UPPER GI ENDOSCOPY/ COLON/ EGD/ Abdominal pain, epigastric/ PJH;  Surgeon: West Lomas MD;  Location: [facility] OR     HC HYSTEROSCOPY, SURGICAL; W/ ENDOMETRIAL ABLATION, ANY METHOD  2010     HERNIORRHAPHY VENTRAL N/A 2018    Procedure: Open ventral hernia repair;  Surgeon: Alonso Bills MD;  Location: [facility]     HYSTERECTOMY, SUPRACERVICAL LAPAROSCOPIC       LEEP TX, CERVICAL      LEEP TX Cervical     ORTHOPEDIC SURGERY      bluged disk     partial small bowel resection      Ileo-colonic resection. Crohn's disease surgery for bowel obstruction. this was a section of small bowel and large bowel and the cecum., all removed and a reanastamosis done successfully     SALPINGO OOPHORECTOMY,R/L/TORI      Right ovary     SLING TRANSVAGINAL N/A 2022    Procedure: Pubovaginal sling with Altis;  Surgeon: Eleuterio Stone MD;  Location: [facility]     TUBAL LIGATION       Family History   Problem Relation Age of Onset     Cancer Mother         cervical     Lipids Mother      Eye Disorder Father      Lipids Father      Alcohol/Drug Maternal Grandmother      Colon Cancer Paternal Grandmother      Diabetes Paternal Grandmother      Eye Disorder Paternal Grandmother       "Diabetes Paternal Grandfather      Eye Disorder Paternal Grandfather      Inflammatory Bowel Disease Paternal Aunt         and two paternal uncles     Breast Cancer No family hx of      Objective  BP (!) 146/94   Ht 1.604 m (5' 3.15\")   Wt 85.7 kg (189 lb)   LMP 07/23/2010 (LMP Unknown)   BMI 33.32 kg/m        General: healthy, alert and in no distress      HEENT: no scleral icterus or conjunctival erythema     Skin: no suspicious lesions or rash. No jaundice.     CV: regular rhythm by palpation, 2+ distal pulses, no pedal edema      Resp: normal respiratory effort without conversational dyspnea     Psych: normal mood and affect      Gait: nonantalgic, appropriate coordination and balance     Neuro: normal light touch sensory exam of the extremities. Motor strength as noted below     Right Wrist and Hand exam    Inspection:       No swelling, bruising or deformity right    Tender:       distal radius right minimal today    Non Tender:       Remainder of the Wrist and Hand right    ROM:       Full and symmetric active and passive range of motion of the forearm, wrist and digits right, mild associated pain    Strength:       5/5 strength in the muscles of the hand, wrist and forearm right    Neurovascular:       2+ radial pulses bilaterally with brisk capillary refill and      normal sensation to light touch in the radial, median and ulnar nerve distributions    Right Elbow exam:    Inspection:       no ecchymosis       no edema or effusion    ROM:       Improving but still limited flexion, extension, forearm supination and pronation.    Strength:       motor function intact    Tender:       radial head most focal, but significantly improved       Very mild soreness in extensor/supinator mass, biceps muscle belly    Non-Tender:       remainder of elbow area       lateral epicondyle       medial epicondyle       olecranon    Sensation:       intact throughout hand       intact throughout forearm     Skin:       " well perfused       capillary refill less than 2 seconds    Radiology:  Recent Results (from the past 744 hour(s))   MR Cervical Spine w/o Contrast    Narrative    EXAM: MR CERVICAL SPINE W/O CONTRAST  LOCATION: [facility]  DATE/TIME: 5/17/2023 5:40 PM CDT    INDICATION: Hyperreflexia.  COMPARISON: None.  TECHNIQUE: MRI Cervical Spine without IV contrast.    FINDINGS:   ALIGNMENT: Straightening of the normal cervical lordosis.    BONES: There is no evidence of a marrow-replacing process. There is no evidence of abnormal bony edema. Vertebral body heights are unremarkable.    DISCS: Mild multilevel spondylosis described in further detail below.    SPINAL CORD: No convincing signal abnormalities.    SPINAL CANAL: Moderate C5-C6 spinal canal stenosis.    SOFT TISSUES: Unremarkable. The cervical vascular flow voids appear intact.    POSTERIOR FOSSA: Limited images of the intracranial compartment demonstrate no acute abnormality.    SIGNIFICANT FINDINGS BY LEVEL:     Craniovertebral junction and C1-C2: Unremarkable.    C2-C3: Normal disc height. No herniation. Normal facets. No spinal canal or neural foraminal stenosis.     C3-C4: Normal disc height. No herniation. Bilateral uncovertebral spurring. Normal facets. No spinal canal or neural foraminal stenosis.     C4-C5: Normal disc height. No herniation. Mild uncovertebral spurring. Normal facets. No spinal canal or right neural foraminal stenosis. Mild left neural foraminal stenosis.    C5-C6: Mild intervertebral disc height loss. Shallow disc osteophyte complex and broad-based central protrusion. Bilateral uncovertebral spurring. No significant facet arthropathy. Mild to moderate spinal canal stenosis. Mild bilateral neural foraminal   stenosis.     C6-C7: Mild intervertebral disc height loss. Shallow disc osteophyte complex. Left-sided uncovertebral spurring. No significant facet arthropathy. Mild spinal canal stenosis. No significant right  neural foraminal stenosis. Moderate left neural foraminal   stenosis.     C7-T1: Normal disc height. No herniation. Normal facets. No spinal canal or neural foraminal stenosis.      Impression    IMPRESSION:  1.  Unremarkable cervical cord.  2.  At C5-C6, there is mild to moderate spinal canal stenosis.  3.  At C6-C7, there is moderate left neural foraminal stenosis.  4.  Additional degenerative changes as above.     XR Wrist Right G/E 3 Views    Narrative    XR WRIST RIGHT G/E 3 VIEWS   6/1/2023 2:10 PM     HISTORY: Fall onto outstretched hand after slip on mat at work. Wrist  pain.  COMPARISON: None.       Impression    IMPRESSION: Normal joint spaces and alignment. No fracture.    BOLIVAR AGUILAR MD         SYSTEM ID:  [id number]   Elbow XR, 2 views, right    Narrative    XR ELBOW RIGHT 2 VIEWS   6/1/2023 2:10 PM     HISTORY: Fall onto outstretched hand after slip on mat at work.  Evaluate for acute bony process after blunt trauma  COMPARISON: 10/21/2019       Impression    IMPRESSION: There is an acute, nondisplaced fracture involving the  proximal radius at the lateral radial head/neck junction. There is a  joint effusion.    BOLIVAR AGUILAR MD         SYSTEM ID:  [id number]   XR Elbow RT G/E 3 vw    Narrative    XR ELBOW RIGHT G/E 3 VIEWS   6/15/2023 2:00 PM     HISTORY: Closed nondisplaced fracture of head of right radius with  routine healing, subsequent encounter  COMPARISON: 6/1/2023       Impression    IMPRESSION: Nondisplaced radial head fracture is unchanged. Elbow  joint effusion has resolved.    CALLUM ARORA MD         SYSTEM ID:  [id number]       Assessment:  1. Closed nondisplaced fracture of head of right radius with routine healing, subsequent encounter    2. Injury of right elbow, subsequent encounter    3. Right wrist sprain, subsequent encounter        Plan:  Discussed the assessment with the patient.  Follow up: 3 weeks for ongoing close clinical f/u   Acute FOOSH injury from a fall at  "work  Known nondisplaced radial head fracture, anticipate 8 week recovery overall, doing very well at this point 4 wks s/p injury  Compression sleeve provided which she can wear as often as she would like if comfortable  XR images independently visualized and reviewed with patient today in clinic  No change in position of fracture and elbow effusion appears to be resolving on imaging last visit  Okay to work on gentle range of motion as tolerated  Consider PT based on clinical progress, continue to defer for now given good clinical progress  Letter provided for work updated as well:  \"Ana was seen in my office today for follow up from her right elbow fracture that occurred on 6/1/2023.  Her pain continues to improve, although she is still somewhat limited with function overall.  She should continue to not use her right arm for work for the next 3 weeks for any lift/carry/push/pull activity at work until our next follow up visit.  She can use her left arm as tolerated. 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  If she has severe flares of pain that require time away from work over the next 3 weeks those absences should be medically excused if needed.  Updated recommendations will be provided at her next visit in 3 weeks, sooner if needed.  She may be using a compression sleeve, splint or sling on her right arm for comfort, and all are allowed as needed.\"  Home handouts provided and supportive care reviewed  All questions were answered today  Contact us with additional questions or concerns  Signs and sx of concern reviewed      Justus Chapman DO, CAQ  Sports Medicine Physician  [facility]            Disclaimer: This note consists of symbols derived from keyboarding, dictation and/or voice recognition software. As a result, there may be errors in the script that have gone undetected. Please consider this when interpreting information found in this " chart.      Again, thank you for allowing me to participate in the care of your patient.        Sincerely,        Justus Chapman, DO

## 2023-06-30 ENCOUNTER — TELEPHONE (OUTPATIENT)
Dept: FAMILY MEDICINE | Facility: CLINIC | Age: 43
End: 2023-06-30
Payer: COMMERCIAL

## 2023-06-30 NOTE — LETTER
July 13, 2023      Ana Felix  [address] [place] [place] [address]      Your team at [facility] cares about your health. We have reviewed your chart and based on our findings; we are making the following recommendations to better manage your health.     You are in particular need of attention regarding the following:     Schedule Annual MAMMOGRAPHY. [facility] scheduling number is [phone] or schedule in [email or web address] (self referral).  Schedule a primary care office visit with your provider for a Pap Smear to screen for Cervical Cancer.  PREVENTATIVE VISIT: Physical  OTHER FOLLOW UP: Hepatitis B, Covid vaccine    If you have already completed these items, please contact the clinic via phone or   [email or web address] so your care team can review and update your records. Thank you for   choosing [facility] for your healthcare needs. For any questions,   concerns, or to schedule an appointment please contact our clinic.    Healthy Regards,      Your [facility] Care Team

## 2023-06-30 NOTE — TELEPHONE ENCOUNTER
Patient Quality Outreach    Patient is due for the following:   Breast Cancer Screening - Mammogram  Cervical Cancer Screening - PAP Needed  Physical Preventive Adult Physical      Topic Date Due     Hepatitis B Vaccine (2 of 3 - Hep B Twinrix 3-dose series) 08/31/2017     COVID-19 Vaccine (4 - Booster) 02/10/2022        Type of outreach:    Sent [employer] message.      Questions for provider review:    None           Isela Rivera CMA  Chart routed to Care Team.

## 2023-07-13 NOTE — TELEPHONE ENCOUNTER
Patient Quality Outreach    Patient is due for the follo    Type of outreach:    Sent letter.    Next Steps:  Reach out within 90 days via Phone and MyChart.    Max number of attempts reached: Yes. Will try again in 90 days if patient still on fail list.    Questions for provider review:    None           Isela Rivera CMA  Chart routed to Care Team.

## 2023-07-20 ENCOUNTER — ANCILLARY PROCEDURE (OUTPATIENT)
Dept: GENERAL RADIOLOGY | Facility: CLINIC | Age: 43
End: 2023-07-20
Attending: FAMILY MEDICINE
Payer: COMMERCIAL

## 2023-07-20 ENCOUNTER — OFFICE VISIT (OUTPATIENT)
Dept: ORTHOPEDICS | Facility: CLINIC | Age: 43
End: 2023-07-20
Payer: OTHER MISCELLANEOUS

## 2023-07-20 VITALS
HEIGHT: 63 IN | SYSTOLIC BLOOD PRESSURE: 130 MMHG | DIASTOLIC BLOOD PRESSURE: 90 MMHG | WEIGHT: 182 LBS | BODY MASS INDEX: 32.25 KG/M2

## 2023-07-20 DIAGNOSIS — S59.901D INJURY OF RIGHT ELBOW, SUBSEQUENT ENCOUNTER: ICD-10-CM

## 2023-07-20 DIAGNOSIS — S52.124D CLOSED NONDISPLACED FRACTURE OF HEAD OF RIGHT RADIUS WITH ROUTINE HEALING, SUBSEQUENT ENCOUNTER: Primary | ICD-10-CM

## 2023-07-20 DIAGNOSIS — S52.124D CLOSED NONDISPLACED FRACTURE OF HEAD OF RIGHT RADIUS WITH ROUTINE HEALING, SUBSEQUENT ENCOUNTER: ICD-10-CM

## 2023-07-20 PROCEDURE — 99207 PR FRACTURE CARE IN GLOBAL PERIOD: CPT | Performed by: FAMILY MEDICINE

## 2023-07-20 PROCEDURE — 99213 OFFICE O/P EST LOW 20 MIN: CPT | Mod: 24 | Performed by: FAMILY MEDICINE

## 2023-07-20 PROCEDURE — 73080 X-RAY EXAM OF ELBOW: CPT | Mod: TC | Performed by: RADIOLOGY

## 2023-07-20 NOTE — PROGRESS NOTES
Ana Felix  :  1980  DOS: 23  MRN: [id number]    Sports Medicine Clinic Visit    PCP: Radha Bermudez    Ana Felix is a 42 year old Right hand dominant female who is seen as an ER referral presenting with right elbow injury.    Injury: Patient describes injury as at work, helping move student from a behavior situation, slipped on mat, landing on right arm with FOOSH type injury that occurred on 23.  Pain located over right deep anterior elbow joint, radiating to right wrist.  Additional Features:  Positive: swelling and weakness.  Symptoms are better with Other medications: Oxycodone.  Symptoms are worse with: moving arm, direct pressure.  Other evaluation and/or treatments so far consists of: Ice, Ibuprofen, Other medications: Oxycodone, Rest and sling, thumb spica brace.  Recent imaging completed: X-rays completed 23.  Prior History of related problems: history of lateral epicondylitis several years ago.    Social History: currently employed as school paraprofessional      Interim History - Adela 15, 2023  Since last visit on 2023 patient states that she is doing a lot better from when she was seen last. She was taking percocet prn for pain, but she ran out of rx. Would like a new rx today if possible.     Interim History 2023  Ana Felix is now 4 weeks out from injury.  Since last visit on 6/15/2023 patient has improving right elbow pain.  She been able to use right arm more, mild discomfort with bending elbow.  No taking using pain medication.  Here to review work restrictions.     Interim History 2023  Ana Felix is now 7 weeks out from injury.  Since last visit on 2023 patient overall good improvement in right elbow pain.  Patient notes mild discomfort with pushing objects and lifting objects occasionally.  Patient was unable to return to light duty work due to other health issues.       Review of Systems  Musculoskeletal: as above  Remainder of review  of systems is negative including constitutional, CV, pulmonary, GI, Skin and Neurologic except as noted in HPI or medical history.    Past Medical History:   Diagnosis Date     Back pain      Crohn's disease (H)      Exercise-induced asthma      Gestational diabetes      Herniation of intervertebral disc of lumbar region      Infertility      Ovarian cyst      Smoker      Status post cholecystectomy 2005     Past Surgical History:   Procedure Laterality Date     APPENDECTOMY       C/SECTION, LOW TRANSVERSE      , Low Transverse     CHOLECYSTECTOMY, LAPOROSCOPIC  2005    Cholecystectomy, Laparoscopic     COLONOSCOPY       ESOPHAGOSCOPY, GASTROSCOPY, DUODENOSCOPY (EGD), COMBINED  3/6/2014    Procedure: COMBINED ESOPHAGOSCOPY, GASTROSCOPY, DUODENOSCOPY (EGD), BIOPSY SINGLE OR MULTIPLE;  COLONOSCOPY/ UPPER GI ENDOSCOPY/ COLON/ EGD/ Abdominal pain, epigastric/ [facility];  Surgeon: West Lomas MD;  Location:  OR      HYSTEROSCOPY, SURGICAL; W/ ENDOMETRIAL ABLATION, ANY METHOD  2010     HERNIORRHAPHY VENTRAL N/A 2018    Procedure: Open ventral hernia repair;  Surgeon: Alonso Bills MD;  Location: [facility] OR     HYSTERECTOMY, SUPRACERVICAL LAPAROSCOPIC       LEEP TX, CERVICAL      LEEP TX Cervical     ORTHOPEDIC SURGERY      bluged disk     partial small bowel resection      Ileo-colonic resection. Crohn's disease surgery for bowel obstruction. this was a section of small bowel and large bowel and the cecum., all removed and a reanastamosis done successfully     SALPINGO OOPHORECTOMY,R/L/TORI      Right ovary     SLING TRANSVAGINAL N/A 2022    Procedure: Pubovaginal sling with Altis;  Surgeon: Eleuterio Stone MD;  Location: [facility] OR     TUBAL LIGATION       Family History   Problem Relation Age of Onset     Cancer Mother         cervical     Lipids Mother      Eye Disorder Father      Lipids Father      Alcohol/Drug Maternal Grandmother      Colon Cancer Paternal Grandmother   "    Diabetes Paternal Grandmother      Eye Disorder Paternal Grandmother      Diabetes Paternal Grandfather      Eye Disorder Paternal Grandfather      Inflammatory Bowel Disease Paternal Aunt         and two paternal uncles     Breast Cancer No family hx of      Objective  BP (!) 130/90   Ht 1.604 m (5' 3.15\")   Wt 82.6 kg (182 lb)   LMP 07/23/2010 (LMP Unknown)   BMI 32.09 kg/m        General: healthy, alert and in no distress      HEENT: no scleral icterus or conjunctival erythema     Skin: no suspicious lesions or rash. No jaundice.     CV: regular rhythm by palpation, 2+ distal pulses, no pedal edema      Resp: normal respiratory effort without conversational dyspnea     Psych: normal mood and affect      Gait: nonantalgic, appropriate coordination and balance     Neuro: normal light touch sensory exam of the extremities. Motor strength as noted below     Right Wrist and Hand exam    Inspection:       No swelling, bruising or deformity right    Tender:       distal radius right resolved     Non Tender:       Remainder of the Wrist and Hand right    ROM:       Full and symmetric active and passive range of motion of the forearm, wrist and digits right, mild associated pain    Strength:       5/5 strength in the muscles of the hand, wrist and forearm right    Neurovascular:       2+ radial pulses bilaterally with brisk capillary refill and      normal sensation to light touch in the radial, median and ulnar nerve distributions    Right Elbow exam:    Inspection:       no ecchymosis       no edema or effusion    ROM:       Improving and near full flexion, extension, forearm supination and pronation.    Strength:       motor function intact    Tender:       radial head most focal, but significantly improved, very mild only       soreness in extensor/supinator mass, biceps muscle belly improved    Non-Tender:       remainder of elbow area       lateral epicondyle       medial epicondyle       " olecranon    Sensation:       intact throughout hand       intact throughout forearm     Skin:       well perfused       capillary refill less than 2 seconds    Radiology:  Recent Results (from the past 744 hour(s))   MR Cervical Spine w/o Contrast    Narrative    EXAM: MR CERVICAL SPINE W/O CONTRAST  LOCATION: [facility]  DATE/TIME: 5/17/2023 5:40 PM CDT    INDICATION: Hyperreflexia.  COMPARISON: None.  TECHNIQUE: MRI Cervical Spine without IV contrast.    FINDINGS:   ALIGNMENT: Straightening of the normal cervical lordosis.    BONES: There is no evidence of a marrow-replacing process. There is no evidence of abnormal bony edema. Vertebral body heights are unremarkable.    DISCS: Mild multilevel spondylosis described in further detail below.    SPINAL CORD: No convincing signal abnormalities.    SPINAL CANAL: Moderate C5-C6 spinal canal stenosis.    SOFT TISSUES: Unremarkable. The cervical vascular flow voids appear intact.    POSTERIOR FOSSA: Limited images of the intracranial compartment demonstrate no acute abnormality.    SIGNIFICANT FINDINGS BY LEVEL:     Craniovertebral junction and C1-C2: Unremarkable.    C2-C3: Normal disc height. No herniation. Normal facets. No spinal canal or neural foraminal stenosis.     C3-C4: Normal disc height. No herniation. Bilateral uncovertebral spurring. Normal facets. No spinal canal or neural foraminal stenosis.     C4-C5: Normal disc height. No herniation. Mild uncovertebral spurring. Normal facets. No spinal canal or right neural foraminal stenosis. Mild left neural foraminal stenosis.    C5-C6: Mild intervertebral disc height loss. Shallow disc osteophyte complex and broad-based central protrusion. Bilateral uncovertebral spurring. No significant facet arthropathy. Mild to moderate spinal canal stenosis. Mild bilateral neural foraminal   stenosis.     C6-C7: Mild intervertebral disc height loss. Shallow disc osteophyte complex. Left-sided  uncovertebral spurring. No significant facet arthropathy. Mild spinal canal stenosis. No significant right neural foraminal stenosis. Moderate left neural foraminal   stenosis.     C7-T1: Normal disc height. No herniation. Normal facets. No spinal canal or neural foraminal stenosis.      Impression    IMPRESSION:  1.  Unremarkable cervical cord.  2.  At C5-C6, there is mild to moderate spinal canal stenosis.  3.  At C6-C7, there is moderate left neural foraminal stenosis.  4.  Additional degenerative changes as above.     XR Wrist Right G/E 3 Views    Narrative    XR WRIST RIGHT G/E 3 VIEWS   6/1/2023 2:10 PM     HISTORY: Fall onto outstretched hand after slip on mat at work. Wrist  pain.  COMPARISON: None.       Impression    IMPRESSION: Normal joint spaces and alignment. No fracture.    BOLIVAR AGUILAR MD         SYSTEM ID:  [id number]   Elbow XR, 2 views, right    Narrative    XR ELBOW RIGHT 2 VIEWS   6/1/2023 2:10 PM     HISTORY: Fall onto outstretched hand after slip on mat at work.  Evaluate for acute bony process after blunt trauma  COMPARISON: 10/21/2019       Impression    IMPRESSION: There is an acute, nondisplaced fracture involving the  proximal radius at the lateral radial head/neck junction. There is a  joint effusion.    BOLIVAR AGUILAR MD         SYSTEM ID:  [id number]   XR Elbow RT G/E 3 vw    Narrative    XR ELBOW RIGHT G/E 3 VIEWS   6/15/2023 2:00 PM     HISTORY: Closed nondisplaced fracture of head of right radius with  routine healing, subsequent encounter  COMPARISON: 6/1/2023       Impression    IMPRESSION: Nondisplaced radial head fracture is unchanged. Elbow  joint effusion has resolved.    CALLUM ARORA MD         SYSTEM ID:  [id number]       Assessment:  1. Closed nondisplaced fracture of head of right radius with routine healing, subsequent encounter    2. Injury of right elbow, subsequent encounter          Plan:  Discussed the assessment with the patient.  Follow up: 6 weeks   Acute  FOOSH injury from a fall at work  Known nondisplaced radial head fracture, anticipate 8 week recovery overall relative to fracture pain, doing very well at this point 7 wks s/p injury  Compression sleeve provided which she can wear as often as she would like if comfortable  XR images independently visualized and reviewed with patient today in clinic  No change in position of fracture and elbow effusion appears to be resolving on imaging last visit  Okay to work on gentle range of motion as tolerated  Consider PT based on clinical progress, continue to defer for now given good clinical progress  Workability form updated   Oral Tylenol and topical Voltaren gel reviewed as safe OTC options, reviewed safe dosing strategies  Supportive care reviewed  All questions were answered today  Contact us with additional questions or concerns  Signs and sx of concern reviewed      Justus Chapman DO, KILO  Sports Medicine Physician  [facility]            Disclaimer: This note consists of symbols derived from keyboarding, dictation and/or voice recognition software. As a result, there may be errors in the script that have gone undetected. Please consider this when interpreting information found in this chart.

## 2023-07-20 NOTE — LETTER
2023         RE: Ana Felix  [address]  [place] [address]        Dear Colleague,    Thank you for referring your patient, Ana Felix, to the [facility] [place]. Please see a copy of my visit note below.    Ana Felix  :  1980  DOS: 23  MRN: [id number]    Sports Medicine Clinic Visit    PCP: Radha Bermudez    Ana Felix is a 42 year old Right hand dominant female who is seen as an ER referral presenting with right elbow injury.    Injury: Patient describes injury as at work, helping move student from a behavior situation, slipped on mat, landing on right arm with FOOSH type injury that occurred on 23.  Pain located over right deep anterior elbow joint, radiating to right wrist.  Additional Features:  Positive: swelling and weakness.  Symptoms are better with Other medications: Oxycodone.  Symptoms are worse with: moving arm, direct pressure.  Other evaluation and/or treatments so far consists of: Ice, Ibuprofen, Other medications: Oxycodone, Rest and sling, thumb spica brace.  Recent imaging completed: X-rays completed 23.  Prior History of related problems: history of lateral epicondylitis several years ago.    Social History: currently employed as school paraprofessional      Interim History - Adela 15, 2023  Since last visit on 2023 patient states that she is doing a lot better from when she was seen last. She was taking percocet prn for pain, but she ran out of rx. Would like a new rx today if possible.     Interim History 2023  Ana Felix is now 4 weeks out from injury.  Since last visit on 6/15/2023 patient has improving right elbow pain.  She been able to use right arm more, mild discomfort with bending elbow.  No taking using pain medication.  Here to review work restrictions.     Interim History 2023  Ana Felix is now 7 weeks out from injury.  Since last visit on 2023 patient overall good improvement in  right elbow pain.  Patient notes mild discomfort with pushing objects and lifting objects occasionally.  Patient was unable to return to light duty work due to other health issues.       Review of Systems  Musculoskeletal: as above  Remainder of review of systems is negative including constitutional, CV, pulmonary, GI, Skin and Neurologic except as noted in HPI or medical history.    Past Medical History:   Diagnosis Date     Back pain      Crohn's disease (H)      Exercise-induced asthma      Gestational diabetes      Herniation of intervertebral disc of lumbar region      Infertility      Ovarian cyst      Smoker      Status post cholecystectomy 2005     Past Surgical History:   Procedure Laterality Date     APPENDECTOMY       C/SECTION, LOW TRANSVERSE      , Low Transverse     CHOLECYSTECTOMY, LAPOROSCOPIC  2005    Cholecystectomy, Laparoscopic     COLONOSCOPY       ESOPHAGOSCOPY, GASTROSCOPY, DUODENOSCOPY (EGD), COMBINED  3/6/2014    Procedure: COMBINED ESOPHAGOSCOPY, GASTROSCOPY, DUODENOSCOPY (EGD), BIOPSY SINGLE OR MULTIPLE;  COLONOSCOPY/ UPPER GI ENDOSCOPY/ COLON/ EGD/ Abdominal pain, epigastric/ [facility];  Surgeon: West Lomas MD;  Location:  OR      HYSTEROSCOPY, SURGICAL; W/ ENDOMETRIAL ABLATION, ANY METHOD  2010     HERNIORRHAPHY VENTRAL N/A 2018    Procedure: Open ventral hernia repair;  Surgeon: Alonso Bills MD;  Location: [facility]     HYSTERECTOMY, SUPRACERVICAL LAPAROSCOPIC       LEEP TX, CERVICAL      LEEP TX Cervical     ORTHOPEDIC SURGERY      bluged disk     partial small bowel resection      Ileo-colonic resection. Crohn's disease surgery for bowel obstruction. this was a section of small bowel and large bowel and the cecum., all removed and a reanastamosis done successfully     SALPINGO OOPHORECTOMY,R/L/TORI      Right ovary     SLING TRANSVAGINAL N/A 2022    Procedure: Pubovaginal sling with Altis;  Surgeon: Eleuterio Stone MD;  Location:  "[facility] OR     TUBAL LIGATION       Family History   Problem Relation Age of Onset     Cancer Mother         cervical     Lipids Mother      Eye Disorder Father      Lipids Father      Alcohol/Drug Maternal Grandmother      Colon Cancer Paternal Grandmother      Diabetes Paternal Grandmother      Eye Disorder Paternal Grandmother      Diabetes Paternal Grandfather      Eye Disorder Paternal Grandfather      Inflammatory Bowel Disease Paternal Aunt         and two paternal uncles     Breast Cancer No family hx of      Objective  BP (!) 130/90   Ht 1.604 m (5' 3.15\")   Wt 82.6 kg (182 lb)   LMP 07/23/2010 (LMP Unknown)   BMI 32.09 kg/m        General: healthy, alert and in no distress      HEENT: no scleral icterus or conjunctival erythema     Skin: no suspicious lesions or rash. No jaundice.     CV: regular rhythm by palpation, 2+ distal pulses, no pedal edema      Resp: normal respiratory effort without conversational dyspnea     Psych: normal mood and affect      Gait: nonantalgic, appropriate coordination and balance     Neuro: normal light touch sensory exam of the extremities. Motor strength as noted below     Right Wrist and Hand exam    Inspection:       No swelling, bruising or deformity right    Tender:       distal radius right resolved     Non Tender:       Remainder of the Wrist and Hand right    ROM:       Full and symmetric active and passive range of motion of the forearm, wrist and digits right, mild associated pain    Strength:       5/5 strength in the muscles of the hand, wrist and forearm right    Neurovascular:       2+ radial pulses bilaterally with brisk capillary refill and      normal sensation to light touch in the radial, median and ulnar nerve distributions    Right Elbow exam:    Inspection:       no ecchymosis       no edema or effusion    ROM:       Improving and near full flexion, extension, forearm supination and pronation.    Strength:       motor function intact    Tender:       " radial head most focal, but significantly improved, very mild only       soreness in extensor/supinator mass, biceps muscle belly improved    Non-Tender:       remainder of elbow area       lateral epicondyle       medial epicondyle       olecranon    Sensation:       intact throughout hand       intact throughout forearm     Skin:       well perfused       capillary refill less than 2 seconds    Radiology:  Recent Results (from the past 744 hour(s))   MR Cervical Spine w/o Contrast    Narrative    EXAM: MR CERVICAL SPINE W/O CONTRAST  LOCATION: [facility]  DATE/TIME: 5/17/2023 5:40 PM CDT    INDICATION: Hyperreflexia.  COMPARISON: None.  TECHNIQUE: MRI Cervical Spine without IV contrast.    FINDINGS:   ALIGNMENT: Straightening of the normal cervical lordosis.    BONES: There is no evidence of a marrow-replacing process. There is no evidence of abnormal bony edema. Vertebral body heights are unremarkable.    DISCS: Mild multilevel spondylosis described in further detail below.    SPINAL CORD: No convincing signal abnormalities.    SPINAL CANAL: Moderate C5-C6 spinal canal stenosis.    SOFT TISSUES: Unremarkable. The cervical vascular flow voids appear intact.    POSTERIOR FOSSA: Limited images of the intracranial compartment demonstrate no acute abnormality.    SIGNIFICANT FINDINGS BY LEVEL:     Craniovertebral junction and C1-C2: Unremarkable.    C2-C3: Normal disc height. No herniation. Normal facets. No spinal canal or neural foraminal stenosis.     C3-C4: Normal disc height. No herniation. Bilateral uncovertebral spurring. Normal facets. No spinal canal or neural foraminal stenosis.     C4-C5: Normal disc height. No herniation. Mild uncovertebral spurring. Normal facets. No spinal canal or right neural foraminal stenosis. Mild left neural foraminal stenosis.    C5-C6: Mild intervertebral disc height loss. Shallow disc osteophyte complex and broad-based central protrusion. Bilateral  uncovertebral spurring. No significant facet arthropathy. Mild to moderate spinal canal stenosis. Mild bilateral neural foraminal   stenosis.     C6-C7: Mild intervertebral disc height loss. Shallow disc osteophyte complex. Left-sided uncovertebral spurring. No significant facet arthropathy. Mild spinal canal stenosis. No significant right neural foraminal stenosis. Moderate left neural foraminal   stenosis.     C7-T1: Normal disc height. No herniation. Normal facets. No spinal canal or neural foraminal stenosis.      Impression    IMPRESSION:  1.  Unremarkable cervical cord.  2.  At C5-C6, there is mild to moderate spinal canal stenosis.  3.  At C6-C7, there is moderate left neural foraminal stenosis.  4.  Additional degenerative changes as above.     XR Wrist Right G/E 3 Views    Narrative    XR WRIST RIGHT G/E 3 VIEWS   6/1/2023 2:10 PM     HISTORY: Fall onto outstretched hand after slip on mat at work. Wrist  pain.  COMPARISON: None.       Impression    IMPRESSION: Normal joint spaces and alignment. No fracture.    BOLIVAR AGUILAR MD         SYSTEM ID:  [id number]   Elbow XR, 2 views, right    Narrative    XR ELBOW RIGHT 2 VIEWS   6/1/2023 2:10 PM     HISTORY: Fall onto outstretched hand after slip on mat at work.  Evaluate for acute bony process after blunt trauma  COMPARISON: 10/21/2019       Impression    IMPRESSION: There is an acute, nondisplaced fracture involving the  proximal radius at the lateral radial head/neck junction. There is a  joint effusion.    BOLIVAR AGUILAR MD         SYSTEM ID:  [id number]   XR Elbow RT G/E 3 vw    Narrative    XR ELBOW RIGHT G/E 3 VIEWS   6/15/2023 2:00 PM     HISTORY: Closed nondisplaced fracture of head of right radius with  routine healing, subsequent encounter  COMPARISON: 6/1/2023       Impression    IMPRESSION: Nondisplaced radial head fracture is unchanged. Elbow  joint effusion has resolved.    CALLUM ARORA MD         SYSTEM ID:  [id number]       Assessment:  1.  Closed nondisplaced fracture of head of right radius with routine healing, subsequent encounter    2. Injury of right elbow, subsequent encounter          Plan:  Discussed the assessment with the patient.  Follow up: 6 weeks   Acute FOOSH injury from a fall at work  Known nondisplaced radial head fracture, anticipate 8 week recovery overall relative to fracture pain, doing very well at this point 7 wks s/p injury  Compression sleeve provided which she can wear as often as she would like if comfortable  XR images independently visualized and reviewed with patient today in clinic  No change in position of fracture and elbow effusion appears to be resolving on imaging last visit  Okay to work on gentle range of motion as tolerated  Consider PT based on clinical progress, continue to defer for now given good clinical progress  Workability form updated   Oral Tylenol and topical Voltaren gel reviewed as safe OTC options, reviewed safe dosing strategies  Supportive care reviewed  All questions were answered today  Contact us with additional questions or concerns  Signs and sx of concern reviewed      Justus Chapman DO, CAQ  Sports Medicine Physician  [facility]            Disclaimer: This note consists of symbols derived from keyboarding, dictation and/or voice recognition software. As a result, there may be errors in the script that have gone undetected. Please consider this when interpreting information found in this chart.      Again, thank you for allowing me to participate in the care of your patient.        Sincerely,        Justus Chapman DO

## 2023-08-14 ENCOUNTER — LAB (OUTPATIENT)
Dept: LAB | Facility: CLINIC | Age: 43
End: 2023-08-14
Payer: COMMERCIAL

## 2023-08-14 ENCOUNTER — HOSPITAL ENCOUNTER (OUTPATIENT)
Dept: MAMMOGRAPHY | Facility: CLINIC | Age: 43
Discharge: HOME | End: 2023-08-14
Attending: PHYSICIAN ASSISTANT | Admitting: PHYSICIAN ASSISTANT
Payer: COMMERCIAL

## 2023-08-14 DIAGNOSIS — K50.819 CROHN'S DISEASE OF SMALL AND LARGE INTESTINES WITH COMPLICATION (H): ICD-10-CM

## 2023-08-14 DIAGNOSIS — K50.819 CROHN'S DISEASE OF SMALL AND LARGE INTESTINES WITH COMPLICATION (H): Primary | ICD-10-CM

## 2023-08-14 DIAGNOSIS — Z12.31 VISIT FOR SCREENING MAMMOGRAM: ICD-10-CM

## 2023-08-14 LAB
ALBUMIN SERPL BCG-MCNC: 3.9 G/DL (ref 3.5–5.2)
ALP SERPL-CCNC: 55 U/L (ref 35–104)
ALT SERPL W P-5'-P-CCNC: 15 U/L (ref 0–50)
AST SERPL W P-5'-P-CCNC: 33 U/L (ref 0–45)
BASOPHILS # BLD AUTO: 0 10E3/UL (ref 0–0.2)
BASOPHILS NFR BLD AUTO: 0 %
BILIRUB DIRECT SERPL-MCNC: <0.2 MG/DL (ref 0–0.3)
BILIRUB SERPL-MCNC: 0.5 MG/DL
EOSINOPHIL # BLD AUTO: 0.1 10E3/UL (ref 0–0.7)
EOSINOPHIL NFR BLD AUTO: 1 %
ERYTHROCYTE [DISTWIDTH] IN BLOOD BY AUTOMATED COUNT: 16.3 % (ref 10–15)
HCT VFR BLD AUTO: 33 % (ref 35–47)
HGB BLD-MCNC: 10.8 G/DL (ref 11.7–15.7)
IMM GRANULOCYTES # BLD: 0 10E3/UL
IMM GRANULOCYTES NFR BLD: 0 %
LYMPHOCYTES # BLD AUTO: 2.5 10E3/UL (ref 0.8–5.3)
LYMPHOCYTES NFR BLD AUTO: 42 %
MCH RBC QN AUTO: 29.9 PG (ref 26.5–33)
MCHC RBC AUTO-ENTMCNC: 32.7 G/DL (ref 31.5–36.5)
MCV RBC AUTO: 91 FL (ref 78–100)
MONOCYTES # BLD AUTO: 0.4 10E3/UL (ref 0–1.3)
MONOCYTES NFR BLD AUTO: 6 %
NEUTROPHILS # BLD AUTO: 3 10E3/UL (ref 1.6–8.3)
NEUTROPHILS NFR BLD AUTO: 51 %
PLATELET # BLD AUTO: 214 10E3/UL (ref 150–450)
PROT SERPL-MCNC: 6.9 G/DL (ref 6.4–8.3)
RBC # BLD AUTO: 3.61 10E6/UL (ref 3.8–5.2)
WBC # BLD AUTO: 5.9 10E3/UL (ref 4–11)

## 2023-08-14 PROCEDURE — 77067 SCR MAMMO BI INCL CAD: CPT

## 2023-08-14 PROCEDURE — 36415 COLL VENOUS BLD VENIPUNCTURE: CPT

## 2023-08-14 PROCEDURE — 80076 HEPATIC FUNCTION PANEL: CPT

## 2023-08-14 PROCEDURE — 85025 COMPLETE CBC W/AUTO DIFF WBC: CPT

## 2023-08-17 ENCOUNTER — TRANSFERRED RECORDS (OUTPATIENT)
Dept: HEALTH INFORMATION MANAGEMENT | Facility: CLINIC | Age: 43
End: 2023-08-17
Payer: COMMERCIAL

## 2023-08-18 ENCOUNTER — THERAPY VISIT (OUTPATIENT)
Dept: PHYSICAL THERAPY | Facility: CLINIC | Age: 43
End: 2023-08-18
Attending: FAMILY MEDICINE
Payer: OTHER MISCELLANEOUS

## 2023-08-18 DIAGNOSIS — S52.124D CLOSED NONDISPLACED FRACTURE OF HEAD OF RIGHT RADIUS WITH ROUTINE HEALING, SUBSEQUENT ENCOUNTER: ICD-10-CM

## 2023-08-18 PROCEDURE — 97161 PT EVAL LOW COMPLEX 20 MIN: CPT | Mod: GP | Performed by: PHYSICAL MEDICINE & REHABILITATION

## 2023-08-18 PROCEDURE — 97110 THERAPEUTIC EXERCISES: CPT | Mod: GP | Performed by: PHYSICAL MEDICINE & REHABILITATION

## 2023-08-18 NOTE — PROGRESS NOTES
PHYSICAL THERAPY EVALUATION  Type of Visit: Evaluation    See electronic medical record for Abuse and Falls Screening details.    Subjective       Presenting condition or subjective complaint:   FOOSH type injury that occurred on 6/1/23. Has used cast and compression sleeve and now progressed into using it. Reports her right elbow pain flared up 2-3 weeks ago. Radiates down to wrist and thumb and can also go up to triceps.   Date of onset: 06/01/23    Relevant medical history:     Dates & types of surgery:      Prior diagnostic imaging/testing results:       Prior therapy history for the same diagnosis, illness or injury:        Prior Level of Function  Transfers: Independent  Ambulation: Independent  ADL: Independent    Employment:     paraprofessional at school  Hobbies/Interests:  gardening, crafting, rock hunting    Patient goals for therapy:  to return to work without restriction     Objective   PAIN: Pain Level at Rest: 2/10  Pain Level with Use: 8/10  Pain Location: elbow  Pain Quality: Aching, Dull, and Shooting  Pain Frequency: intermittent or daily  Pain is Worst: daytime  Pain is Exacerbated By: gripping, pulling/pushing, lifting/carrying  Pain is Relieved By: cold, heat, NSAIDs, otc medications, and rest  INTEGUMENTARY (edema, incisions): WNL  POSTURE: Standing Posture: Rounded shoulders, Forward head  ROM:  R elbow flexion: 120*;  R elbow extension: -5* with mod pain; R elbow supination: 75*; R elbow pronation 90*; R wrist AROM WFL  STRENGTH:   Pain: - none + mild ++ moderate +++ severe  Strength Scale: 0-5/5 Left Right   Elbow Flexion  3+   Elbow Extension  4-   Wrist Flexion  3+   Wrist Extension  4-   Supination  3+   Pronation  4   Ulnar Deviation  4-   Radial Deviation  3+    Strength (lbs) 52# 35#     Hand Dominance: Left  FLEXIBILITY: Decreased wrist flexors R, Decreased wrist extensors R  SPECIAL TESTS:    Left Right   Valgus 0  Positive   Valgus 30  Positive   Moving Valgus Stress  Positive    Varus Stress  Positive   Elbow Flexion Test  Positive   Tinel's Cubital Tunnel  Negative    Tinel's Carpal Tunnel  Negative      PALPATION:  TTP at radial head, proximal wrist extensor tendons, and distal triceps  JOINT MOBILITY:    Left Right   Proximal Radioulnar  Hypomobile, Pain   Distal Radioulnar  WNL   Humeroulnar  WNL   Intercarpal   WNL   Carpometacarpal  WNL   Phalangeal  WNL     CERVICAL SCREEN: WNL  THORACIC SCREEN: WFL bilateral rotation  SHOULDER SCREEN: AROM WFL    Assessment & Plan   CLINICAL IMPRESSIONS  Medical Diagnosis: Closed nondisplaced fracture of head of right radius with routine healing, subsequent encounter (N59.355D)  - Primary    Treatment Diagnosis: right elbow pain   Impression/Assessment: Patient is a 42 year old female with right elbow pain complaints.  The following significant findings have been identified: Pain, Decreased ROM/flexibility, Decreased joint mobility, Decreased strength, and Impaired muscle performance. These impairments interfere with their ability to perform self care tasks, work tasks, recreational activities, and driving  as compared to previous level of function.     Clinical Decision Making (Complexity):  Clinical Presentation: Stable/Uncomplicated  Clinical Presentation Rationale: based on medical and personal factors listed in PT evaluation  Clinical Decision Making (Complexity): Low complexity    PLAN OF CARE  Treatment Interventions:  Modalities: Biofeedback, E-stim, Ultrasound  Interventions: Manual Therapy, Neuromuscular Re-education, Therapeutic Activity, Therapeutic Exercise, Self-Care/Home Management    Long Term Goals     PT Goal 1  Goal Identifier: STG  Goal Description: Patient to be able to carry purse in R arm walking into clinic with <3/10 R elbow pain.  Target Date: 09/08/23  PT Goal 2  Goal Identifier: STG  Goal Description: Patient to be able to lift over 5 pounds from floor to countertop height using R UE with <2/10 R elbow pain.  Target  Date: 09/15/23  PT Goal 3  Goal Identifier: LTG  Goal Description: Patient to return to full work duties involving gripping, lifting, and carrying with <2/10 R elbow pain.  Target Date: 10/27/23  PT Goal 4  Goal Identifier: LTG  Goal Description: Patient to be IND with HEP to aid functional recovery and reduce future pain episode of similar nature.  Target Date: 10/27/23      Frequency of Treatment: 1x/week  Duration of Treatment: 10 weeks      Education Assessment:   Learner/Method: Patient;Listening;Reading;Demonstration;Pictures/Video    Risks and benefits of evaluation/treatment have been explained.   Patient/Family/caregiver agrees with Plan of Care.     Evaluation Time:     PT Eval, Low Complexity Minutes (43575): 15     Signing Clinician: Cristiano Delatorre PT

## 2023-08-23 ENCOUNTER — MYC REFILL (OUTPATIENT)
Dept: FAMILY MEDICINE | Facility: CLINIC | Age: 43
End: 2023-08-23
Payer: COMMERCIAL

## 2023-08-23 DIAGNOSIS — G47.00 INSOMNIA, UNSPECIFIED TYPE: ICD-10-CM

## 2023-08-23 DIAGNOSIS — J45.30 MILD PERSISTENT ASTHMA, UNCOMPLICATED: ICD-10-CM

## 2023-08-24 RX ORDER — MONTELUKAST SODIUM 10 MG/1
10 TABLET ORAL DAILY
Qty: 90 TABLET | Refills: 1 | Status: SHIPPED | OUTPATIENT
Start: 2023-08-24 | End: 2023-12-29

## 2023-08-24 RX ORDER — TRAZODONE HYDROCHLORIDE 50 MG/1
50-150 TABLET, FILM COATED ORAL AT BEDTIME
Qty: 60 TABLET | Refills: 3 | Status: SHIPPED | OUTPATIENT
Start: 2023-08-24 | End: 2023-11-14

## 2023-08-29 ENCOUNTER — HOSPITAL ENCOUNTER (EMERGENCY)
Facility: CLINIC | Age: 43
Discharge: HOME | End: 2023-08-29
Attending: PHYSICIAN ASSISTANT | Admitting: PHYSICIAN ASSISTANT
Payer: COMMERCIAL

## 2023-08-29 VITALS
DIASTOLIC BLOOD PRESSURE: 64 MMHG | RESPIRATION RATE: 16 BRPM | HEART RATE: 79 BPM | TEMPERATURE: 98.1 F | OXYGEN SATURATION: 100 % | SYSTOLIC BLOOD PRESSURE: 133 MMHG

## 2023-08-29 DIAGNOSIS — R21 RASH: ICD-10-CM

## 2023-08-29 PROCEDURE — 99214 OFFICE O/P EST MOD 30 MIN: CPT | Performed by: PHYSICIAN ASSISTANT

## 2023-08-29 PROCEDURE — G0463 HOSPITAL OUTPT CLINIC VISIT: HCPCS | Performed by: PHYSICIAN ASSISTANT

## 2023-08-29 RX ORDER — FLUCONAZOLE 150 MG/1
TABLET ORAL
Qty: 2 TABLET | Refills: 0 | Status: SHIPPED | OUTPATIENT
Start: 2023-08-29 | End: 2023-09-01

## 2023-08-29 RX ORDER — CEPHALEXIN 500 MG/1
500 CAPSULE ORAL 4 TIMES DAILY
Qty: 28 CAPSULE | Refills: 0 | Status: SHIPPED | OUTPATIENT
Start: 2023-08-29 | End: 2023-09-05

## 2023-08-29 RX ORDER — VALACYCLOVIR HYDROCHLORIDE 1 G/1
1000 TABLET, FILM COATED ORAL 3 TIMES DAILY
Qty: 21 TABLET | Refills: 0 | Status: SHIPPED | OUTPATIENT
Start: 2023-08-29 | End: 2023-12-29

## 2023-08-29 ASSESSMENT — ENCOUNTER SYMPTOMS
RESPIRATORY NEGATIVE: 1
CONSTITUTIONAL NEGATIVE: 1

## 2023-08-29 NOTE — PROGRESS NOTES
Ana Felix  :  1980  DOS: 23  MRN: [id number]    Sports Medicine Clinic Visit    PCP: Radha Bermudez    Ana Felix is a 42 year old Right hand dominant female who is seen as an ER referral presenting with right elbow injury.    Injury: Patient describes injury as at work, helping move student from a behavior situation, slipped on mat, landing on right arm with FOOSH type injury that occurred on 23.  Pain located over right deep anterior elbow joint, radiating to right wrist.  Additional Features:  Positive: swelling and weakness.  Symptoms are better with Other medications: Oxycodone.  Symptoms are worse with: moving arm, direct pressure.  Other evaluation and/or treatments so far consists of: Ice, Ibuprofen, Other medications: Oxycodone, Rest and sling, thumb spica brace.  Recent imaging completed: X-rays completed 23.  Prior History of related problems: history of lateral epicondylitis several years ago.    Social History: currently employed as school paraprofessional      Interim History - Adela 15, 2023  Since last visit on 2023 patient states that she is doing a lot better from when she was seen last. She was taking percocet prn for pain, but she ran out of rx. Would like a new rx today if possible.     Interim History 2023  Ana Felix is now 4 weeks out from injury.  Since last visit on 6/15/2023 patient has improving right elbow pain.  She been able to use right arm more, mild discomfort with bending elbow.  No taking using pain medication.  Here to review work restrictions.     Interim History 2023  Ana Felix is now 7 weeks out from injury.  Since last visit on 2023 patient overall good improvement in right elbow pain.  Patient notes mild discomfort with pushing objects and lifting objects occasionally.  Patient was unable to return to light duty work due to other health issues.     Interim History - 2023  Since last visit on 23,  patient is 13 weeks out from injury. Pt states that her elbow is feeling worse than the last time she was in. She has started PT and has had one session. She is having trouble getting into more appointments. She tries to do the exercises at home and thinks it is helping with her pain. Just here for recheck of her elbow. No interim injury.       Review of Systems  Musculoskeletal: as above  Remainder of review of systems is negative including constitutional, CV, pulmonary, GI, Skin and Neurologic except as noted in HPI or medical history.    Past Medical History:   Diagnosis Date    Back pain     Crohn's disease (H)     Exercise-induced asthma     Gestational diabetes     Herniation of intervertebral disc of lumbar region     Infertility     Ovarian cyst     Smoker     Status post cholecystectomy 2005     Past Surgical History:   Procedure Laterality Date    APPENDECTOMY      C/SECTION, LOW TRANSVERSE      , Low Transverse    CHOLECYSTECTOMY, LAPOROSCOPIC  2005    Cholecystectomy, Laparoscopic    COLONOSCOPY      ESOPHAGOSCOPY, GASTROSCOPY, DUODENOSCOPY (EGD), COMBINED  3/6/2014    Procedure: COMBINED ESOPHAGOSCOPY, GASTROSCOPY, DUODENOSCOPY (EGD), BIOPSY SINGLE OR MULTIPLE;  COLONOSCOPY/ UPPER GI ENDOSCOPY/ COLON/ EGD/ Abdominal pain, epigastric/ PJH;  Surgeon: West Lomas MD;  Location:  OR     HYSTEROSCOPY, SURGICAL; W/ ENDOMETRIAL ABLATION, ANY METHOD  2010    HERNIORRHAPHY VENTRAL N/A 2018    Procedure: Open ventral hernia repair;  Surgeon: Alonso Bills MD;  Location: [facility] OR    HYSTERECTOMY, SUPRACERVICAL LAPAROSCOPIC      LEEP TX, CERVICAL      LEEP TX Cervical    ORTHOPEDIC SURGERY      bluged disk    partial small bowel resection      Ileo-colonic resection. Crohn's disease surgery for bowel obstruction. this was a section of small bowel and large bowel and the cecum., all removed and a reanastamosis done successfully    SALPINGO OOPHORECTOMY,R/L/TORI       Right ovary    SLING TRANSVAGINAL N/A 4/4/2022    Procedure: Pubovaginal sling with Altis;  Surgeon: Eleuterio Stone MD;  Location: [facility] OR    TUBAL LIGATION       Family History   Problem Relation Age of Onset    Cancer Mother         cervical    Lipids Mother     Eye Disorder Father     Lipids Father     Alcohol/Drug Maternal Grandmother     Colon Cancer Paternal Grandmother     Diabetes Paternal Grandmother     Eye Disorder Paternal Grandmother     Diabetes Paternal Grandfather     Eye Disorder Paternal Grandfather     Inflammatory Bowel Disease Paternal Aunt         and two paternal uncles    Breast Cancer No family hx of      Objective  /80   Pulse 88   Wt 82.6 kg (182 lb)   LMP 07/23/2010 (LMP Unknown)   BMI 32.09 kg/m      General: healthy, alert and in no distress    HEENT: no scleral icterus or conjunctival erythema   Skin: no suspicious lesions or rash. No jaundice.   CV: regular rhythm by palpation, 2+ distal pulses, no pedal edema    Resp: normal respiratory effort without conversational dyspnea   Psych: normal mood and affect    Gait: nonantalgic, appropriate coordination and balance   Neuro: normal light touch sensory exam of the extremities. Motor strength as noted below     Right Wrist and Hand exam    Inspection:       No swelling, bruising or deformity right    Tender:       distal radius right minimal/very mild only     Non Tender:       Remainder of the Wrist and Hand right    ROM:       Full and symmetric active and passive range of motion of the forearm, wrist and digits right, mild associated pain    Strength:       5/5 strength in the muscles of the hand, wrist and forearm right    Neurovascular:       2+ radial pulses bilaterally with brisk capillary refill and      normal sensation to light touch in the radial, median and ulnar nerve distributions    Right Elbow exam:    Inspection:       no ecchymosis       no edema or effusion    ROM:       Improving and near full flexion,  extension, forearm supination and pronation, mild pain with terminal extension over posterior elbow    Strength:       motor function intact    Tender:       radial head most focal, but significantly improved, still very mild only       Mild, improving soreness in extensor/supinator mass, biceps muscle belly improved    Non-Tender:       remainder of elbow area       lateral epicondyle       medial epicondyle       olecranon    Sensation:       intact throughout hand       intact throughout forearm     Skin:       well perfused       capillary refill less than 2 seconds    Radiology:  Recent Results (from the past 744 hour(s))   MR Cervical Spine w/o Contrast    Narrative    EXAM: MR CERVICAL SPINE W/O CONTRAST  LOCATION: [facility]  DATE/TIME: 5/17/2023 5:40 PM CDT    INDICATION: Hyperreflexia.  COMPARISON: None.  TECHNIQUE: MRI Cervical Spine without IV contrast.    FINDINGS:   ALIGNMENT: Straightening of the normal cervical lordosis.    BONES: There is no evidence of a marrow-replacing process. There is no evidence of abnormal bony edema. Vertebral body heights are unremarkable.    DISCS: Mild multilevel spondylosis described in further detail below.    SPINAL CORD: No convincing signal abnormalities.    SPINAL CANAL: Moderate C5-C6 spinal canal stenosis.    SOFT TISSUES: Unremarkable. The cervical vascular flow voids appear intact.    POSTERIOR FOSSA: Limited images of the intracranial compartment demonstrate no acute abnormality.    SIGNIFICANT FINDINGS BY LEVEL:     Craniovertebral junction and C1-C2: Unremarkable.    C2-C3: Normal disc height. No herniation. Normal facets. No spinal canal or neural foraminal stenosis.     C3-C4: Normal disc height. No herniation. Bilateral uncovertebral spurring. Normal facets. No spinal canal or neural foraminal stenosis.     C4-C5: Normal disc height. No herniation. Mild uncovertebral spurring. Normal facets. No spinal canal or right neural  foraminal stenosis. Mild left neural foraminal stenosis.    C5-C6: Mild intervertebral disc height loss. Shallow disc osteophyte complex and broad-based central protrusion. Bilateral uncovertebral spurring. No significant facet arthropathy. Mild to moderate spinal canal stenosis. Mild bilateral neural foraminal   stenosis.     C6-C7: Mild intervertebral disc height loss. Shallow disc osteophyte complex. Left-sided uncovertebral spurring. No significant facet arthropathy. Mild spinal canal stenosis. No significant right neural foraminal stenosis. Moderate left neural foraminal   stenosis.     C7-T1: Normal disc height. No herniation. Normal facets. No spinal canal or neural foraminal stenosis.      Impression    IMPRESSION:  1.  Unremarkable cervical cord.  2.  At C5-C6, there is mild to moderate spinal canal stenosis.  3.  At C6-C7, there is moderate left neural foraminal stenosis.  4.  Additional degenerative changes as above.     XR Wrist Right G/E 3 Views    Narrative    XR WRIST RIGHT G/E 3 VIEWS   6/1/2023 2:10 PM     HISTORY: Fall onto outstretched hand after slip on mat at work. Wrist  pain.  COMPARISON: None.       Impression    IMPRESSION: Normal joint spaces and alignment. No fracture.    BOLIVAR AGUILAR MD         SYSTEM ID:  [id number]   Elbow XR, 2 views, right    Narrative    XR ELBOW RIGHT 2 VIEWS   6/1/2023 2:10 PM     HISTORY: Fall onto outstretched hand after slip on mat at work.  Evaluate for acute bony process after blunt trauma  COMPARISON: 10/21/2019       Impression    IMPRESSION: There is an acute, nondisplaced fracture involving the  proximal radius at the lateral radial head/neck junction. There is a  joint effusion.    BOLIVAR AGUILAR MD         SYSTEM ID:  [id number]   XR Elbow RT G/E 3 vw    Narrative    XR ELBOW RIGHT G/E 3 VIEWS   6/15/2023 2:00 PM     HISTORY: Closed nondisplaced fracture of head of right radius with  routine healing, subsequent encounter  COMPARISON: 6/1/2023        Impression    IMPRESSION: Nondisplaced radial head fracture is unchanged. Elbow  joint effusion has resolved.    CALLUM ARORA MD         SYSTEM ID:  [id number]       Assessment:  1. Closed nondisplaced fracture of head of right radius with routine healing, subsequent encounter    2. Injury of right elbow, subsequent encounter          Plan:  Discussed the assessment with the patient.  Follow up: 4 weeks   Acute FOOSH injury from a fall at work  Known nondisplaced radial head fracture, had been doing well clinically but having increased pain recently with increased activity  Compression sleeve provided which she can wear as often as she would like if comfortable  Prior XR images independently visualized and reviewed with patient today in clinic  Okay to continue to work on range of motion as tolerated and PT exercises as directed  Continue PT   Workability form updated, mildly decreased restrictions, will hope to continue to lessen over time   Oral Tylenol and topical Voltaren gel reviewed as safe OTC options, reviewed safe dosing strategies  Supportive care reviewed  All questions were answered today  Contact us with additional questions or concerns  Signs and sx of concern reviewed      Justus Chapman DO, CAJESSICA  Sports Medicine Physician  [facility]            Disclaimer: This note consists of symbols derived from keyboarding, dictation and/or voice recognition software. As a result, there may be errors in the script that have gone undetected. Please consider this when interpreting information found in this chart.

## 2023-08-29 NOTE — ED PROVIDER NOTES
"  History     Chief Complaint   Patient presents with    Rash     HPI  Ana Felix is a 42 year old female who presents to Urgent Care with complaints of painful and itchy rash to scalp for the past 3 weeks.  The rash blisters and drains.  It is persistent.  Patient has Crohn's disease and takes medications that make her immunocompromised.  Denies fevers or chills.  No known new exposures.      Allergies:  Allergies   Allergen Reactions    Infliximab Hives    Sulfa Antibiotics Nausea     Pt states \"it doesn't work for me\"  Pt states \"it doesn't work for me\"  Other reaction(s): Diarrhea, nausea, Nausea, Intolerance  Pt states \"it doesn't work for me\"    Sulfacetamide Nausea    Amoxicillin Nausea and Vomiting    Amoxicillin-Pot Clavulanate Other (See Comments) and Nausea and Vomiting     Crohn's    Aspirin Nausea    Bupropion Hives and Nausea    Clavulanic Acid Nausea and Vomiting    Droperidol Other (See Comments)     Dystonic reaction    Ibuprofen Other (See Comments) and Nausea     Crohn's      Lyrica [Pregabalin] Nausea and Vomiting     Grogginess, severe back pain  Grogginess, severe back pain    Vicodin [Hydrocodone-Acetaminophen] Nausea and Vomiting    Adhesive Tape Rash       Problem List:    Patient Active Problem List    Diagnosis Date Noted    Closed nondisplaced fracture of head of right radius with routine healing, subsequent encounter 08/18/2023     Priority: Medium    Primary osteoarthritis of left knee 02/13/2023     Priority: Medium    DANIELLE (generalized anxiety disorder) 03/15/2022     Priority: Medium    Insomnia, unspecified type 03/15/2022     Priority: Medium    SKYE (stress urinary incontinence, female) 03/14/2022     Priority: Medium     Added automatically from request for surgery [id number]      Hidradenitis suppurativa 09/02/2021     Priority: Medium    Immunocompromised state (H) 07/09/2021     Priority: Medium    History of gestational diabetes mellitus (GDM) 08/15/2018     Priority: Medium    " Amenorrhea 08/15/2018     Priority: Medium    Crohn's disease of both small and large intestine without complication (H) 07/31/2018     Priority: Medium    Pain medication agreement 08/18/2017     Priority: Medium     Overview:   [facility]      Controlled substance agreement signed 07/19/2017     Priority: Medium    Degenerative lumbar disc 03/07/2017     Priority: Medium    Labral tear of hip, degenerative 03/07/2017     Priority: Medium    Pain of right hip joint 03/07/2017     Priority: Medium    S/P LEEP of cervix 12/15/2015     Priority: Medium     S/p LEEP of cervix - date unknown  12/9/15: Pap - NIL, Neg HPV. Plan cotest in 1 year. If JAYME 2/3 on biopsy - PAP/HPV co-testing at 12, 24 months. If two negative results repeat co-testing in 3 years, if negative then routine screening.  3/7/18 Pap: NIL/neg HPV.            Hearing difficulty 10/09/2014     Priority: Medium    Irritable bowel syndrome (IBS) 04/15/2014     Priority: Medium     Tiffany raw vegetables      Lactose intolerance 04/15/2014     Priority: Medium    Abdominal pain, right upper quadrant 03/06/2014     Priority: Medium    Nausea with vomiting 01/31/2014     Priority: Medium    Chronic low back pain 11/05/2012     Priority: Medium     Follows with pain clinic.      Mild persistent asthma, uncomplicated 11/05/2012     Priority: Medium    Discogenic pain 10/08/2012     Priority: Medium    Abdominal pain 08/30/2012     Priority: Medium    S/P oophorectomy 07/20/2012     Priority: Medium    History of cholecystectomy 07/20/2012     Priority: Medium    History of resection of small bowel 07/20/2012     Priority: Medium    Tobacco abuse 05/25/2012     Priority: Medium    Displacement of lumbar intervertebral disc without myelopathy 01/06/2012     Priority: Medium    Disorder of sacrum 12/13/2011     Priority: Medium     Problem list name updated by automated process. Provider to review      Back pain 11/09/2011     Priority: Medium    Obesity  2011     Priority: Medium    Migraine headache 2011     Priority: Medium     Patient given Migraine Education folder and Migraine Action Plan on 2011. Cammy Anderson RN    (Problem list name updated by automated process. Provider to review and confirm.)      CARDIOVASCULAR SCREENING; LDL GOAL LESS THAN 160 10/31/2010     Priority: Medium    Dysmenorrhea 10/05/2010     Priority: Medium    Female pelvic pain 2010     Priority: Medium     (Problem list name updated by automated process. Provider to review and confirm.)      Crohns disease 2009     Priority: Medium        Past Medical History:    Past Medical History:   Diagnosis Date    Back pain     Crohn's disease (H)     Exercise-induced asthma     Gestational diabetes     Herniation of intervertebral disc of lumbar region     Infertility     Ovarian cyst     Smoker     Status post cholecystectomy 2005       Past Surgical History:    Past Surgical History:   Procedure Laterality Date    APPENDECTOMY      C/SECTION, LOW TRANSVERSE      , Low Transverse    CHOLECYSTECTOMY, LAPOROSCOPIC  2005    Cholecystectomy, Laparoscopic    COLONOSCOPY      ESOPHAGOSCOPY, GASTROSCOPY, DUODENOSCOPY (EGD), COMBINED  3/6/2014    Procedure: COMBINED ESOPHAGOSCOPY, GASTROSCOPY, DUODENOSCOPY (EGD), BIOPSY SINGLE OR MULTIPLE;  COLONOSCOPY/ UPPER GI ENDOSCOPY/ COLON/ EGD/ Abdominal pain, epigastric/ PJH;  Surgeon: West Lomas MD;  Location:  OR     HYSTEROSCOPY, SURGICAL; W/ ENDOMETRIAL ABLATION, ANY METHOD  2010    HERNIORRHAPHY VENTRAL N/A 2018    Procedure: Open ventral hernia repair;  Surgeon: Alonso Bills MD;  Location: [facility] OR    HYSTERECTOMY, SUPRACERVICAL LAPAROSCOPIC      LEEP TX, CERVICAL      LEEP TX Cervical    ORTHOPEDIC SURGERY      bluged disk    partial small bowel resection      Ileo-colonic resection. Crohn's disease surgery for bowel obstruction. this was a section of small bowel and  large bowel and the cecum., all removed and a reanastamosis done successfully    SALPINGO OOPHORECTOMY,R/L/TORI      Right ovary    SLING TRANSVAGINAL N/A 4/4/2022    Procedure: Pubovaginal sling with Altis;  Surgeon: Eleuterio Stone MD;  Location: [facility] OR    TUBAL LIGATION         Family History:    Family History   Problem Relation Age of Onset    Cancer Mother         cervical    Lipids Mother     Eye Disorder Father     Lipids Father     Alcohol/Drug Maternal Grandmother     Colon Cancer Paternal Grandmother     Diabetes Paternal Grandmother     Eye Disorder Paternal Grandmother     Diabetes Paternal Grandfather     Eye Disorder Paternal Grandfather     Inflammatory Bowel Disease Paternal Aunt         and two paternal uncles    Breast Cancer No family hx of        Social History:  Marital Status:   [2]  Social History     Tobacco Use    Smoking status: Some Days     Packs/day: 0.50     Types: Cigarettes     Passive exposure: Never    Smokeless tobacco: Never   Vaping Use    Vaping Use: Never used   Substance Use Topics    Alcohol use: Yes     Comment: very rarely    Drug use: No        Medications:    cephALEXin (KEFLEX) 500 MG capsule  fluconazole (DIFLUCAN) 150 MG tablet  valACYclovir (VALTREX) 1000 mg tablet  azaTHIOprine (IMURAN) 50 MG tablet  celecoxib (CELEBREX) 200 MG capsule  cyanocobalamin (VITAMIN B12) 1000 MCG/ML injection  fentaNYL (DURAGESIC) 12 mcg/hr patch 72 hr  fentaNYL (DURAGESIC) 25 mcg/hr patch 72 hr  gabapentin (NEURONTIN) 300 MG capsule  hydrOXYzine (ATARAX) 25 MG tablet  montelukast (SINGULAIR) 10 MG tablet  sertraline (ZOLOFT) 100 MG tablet  traZODone (DESYREL) 50 MG tablet  VENTOLIN  (90 Base) MCG/ACT inhaler          Review of Systems   Constitutional: Negative.    HENT: Negative.     Respiratory: Negative.     Skin:  Positive for rash.   All other systems reviewed and are negative.      Physical Exam   BP: 133/64  Pulse: 79  Temp: 98.1  F (36.7  C)  Resp: 16  SpO2: 100  %      Physical Exam  Constitutional:       General: She is not in acute distress.     Appearance: Normal appearance. She is not ill-appearing, toxic-appearing or diaphoretic.   HENT:      Head: Normocephalic and atraumatic.      Right Ear: External ear normal.      Left Ear: External ear normal.      Nose: Nose normal.      Mouth/Throat:      Mouth: Mucous membranes are moist.   Eyes:      Conjunctiva/sclera: Conjunctivae normal.   Pulmonary:      Effort: Pulmonary effort is normal.   Musculoskeletal:         General: Normal range of motion.      Cervical back: Neck supple.   Skin:     General: Skin is warm and dry.      Findings: Rash present.      Comments: Erythematous scabbed over papular rash along superior aspect of scalp.  Honey crusting present overlying the lesions.   Neurological:      General: No focal deficit present.      Mental Status: She is alert.         ED Course                 Procedures      No results found for this or any previous visit (from the past 24 hour(s)).    Medications - No data to display    Assessments & Plan (with Medical Decision Making)     Pt is a 42 year old female who presents to Urgent Care with complaints of painful and itchy rash to scalp for the past 3 weeks.  The rash blisters and drains.  It is persistent.  Patient has Crohn's disease and takes medications that make her immunocompromised.  Denies fevers or chills.  No known new exposures.    Pt is afebrile on arrival.  Exam as above.  Pt is noted to have an erythematous scabbed over papular rash along superior aspect of scalp on exam.  Honey crusting present overlying the lesions.  Concern for shingles versus folliculitis/impetigo versus other.  Rash has been present for ~3 weeks however patient is immunocompromised and continues to be symptomatic so will still start Valtrex and will also add on Keflex for concern over possible bacterial etiology.  Return precautions were reviewed.  Hand-outs were provided.    Patient  was sent with Valtrex and Keflex and was instructed to follow-up with PCP for continued care and management.  She is to return to the ED for persistent and/or worsening symptoms.  Patient expressed understanding of the diagnosis and plan and was discharged home in good condition.    I have reviewed the nursing notes.    I have reviewed the findings, diagnosis, plan and need for follow up with the patient.      Discharge Medication List as of 8/29/2023  5:56 PM        START taking these medications    Details   cephALEXin (KEFLEX) 500 MG capsule Take 1 capsule (500 mg) by mouth 4 times daily for 7 days, Disp-28 capsule, R-0, E-Prescribe      fluconazole (DIFLUCAN) 150 MG tablet Take one tablet now, and one tablet in three days, Disp-2 tablet, R-0, E-Prescribe      valACYclovir (VALTREX) 1000 mg tablet Take 1 tablet (1,000 mg) by mouth 3 times daily for 7 days, Disp-21 tablet, R-0, E-Prescribe             Final diagnoses:   Rash - shingles versus impetigo/folliculitis versus other       8/29/2023   [facility] EMERGENCY DEPT      Disclaimer:  This note consists of symbols derived from keyboarding, dictation and/or voice recognition software.  As a result, there may be errors in the script that have gone undetected.  Please consider this when interpreting information found in this chart.     Autumn Su PA-C  08/29/23 1957

## 2023-08-30 ENCOUNTER — OFFICE VISIT (OUTPATIENT)
Dept: ORTHOPEDICS | Facility: CLINIC | Age: 43
End: 2023-08-30
Payer: OTHER MISCELLANEOUS

## 2023-08-30 ENCOUNTER — MYC MEDICAL ADVICE (OUTPATIENT)
Dept: ORTHOPEDICS | Facility: CLINIC | Age: 43
End: 2023-08-30
Payer: COMMERCIAL

## 2023-08-30 VITALS
DIASTOLIC BLOOD PRESSURE: 80 MMHG | SYSTOLIC BLOOD PRESSURE: 116 MMHG | BODY MASS INDEX: 32.09 KG/M2 | WEIGHT: 182 LBS | HEART RATE: 88 BPM

## 2023-08-30 DIAGNOSIS — S52.124D CLOSED NONDISPLACED FRACTURE OF HEAD OF RIGHT RADIUS WITH ROUTINE HEALING, SUBSEQUENT ENCOUNTER: Primary | ICD-10-CM

## 2023-08-30 DIAGNOSIS — S59.901D INJURY OF RIGHT ELBOW, SUBSEQUENT ENCOUNTER: ICD-10-CM

## 2023-08-30 PROCEDURE — 99207 PR FRACTURE CARE IN GLOBAL PERIOD: CPT | Performed by: FAMILY MEDICINE

## 2023-08-30 NOTE — LETTER
2023         RE: Ana Felix  [address]  [place] [address]        Dear Colleague,    Thank you for referring your patient, Ana Felix, to the [facility]. Please see a copy of my visit note below.    Ana Felix  :  1980  DOS: 23  MRN: [id number]    Sports Medicine Clinic Visit    PCP: Radha Bermudez    Ana Felix is a 42 year old Right hand dominant female who is seen as an ER referral presenting with right elbow injury.    Injury: Patient describes injury as at work, helping move student from a behavior situation, slipped on mat, landing on right arm with FOOSH type injury that occurred on 23.  Pain located over right deep anterior elbow joint, radiating to right wrist.  Additional Features:  Positive: swelling and weakness.  Symptoms are better with Other medications: Oxycodone.  Symptoms are worse with: moving arm, direct pressure.  Other evaluation and/or treatments so far consists of: Ice, Ibuprofen, Other medications: Oxycodone, Rest and sling, thumb spica brace.  Recent imaging completed: X-rays completed 23.  Prior History of related problems: history of lateral epicondylitis several years ago.    Social History: currently employed as school paraprofessional      Interim History - Adela 15, 2023  Since last visit on 2023 patient states that she is doing a lot better from when she was seen last. She was taking percocet prn for pain, but she ran out of rx. Would like a new rx today if possible.     Interim History 2023  Ana Felix is now 4 weeks out from injury.  Since last visit on 6/15/2023 patient has improving right elbow pain.  She been able to use right arm more, mild discomfort with bending elbow.  No taking using pain medication.  Here to review work restrictions.     Interim History 2023  Ana Felix is now 7 weeks out from injury.  Since last visit on 2023 patient overall good improvement in  right elbow pain.  Patient notes mild discomfort with pushing objects and lifting objects occasionally.  Patient was unable to return to light duty work due to other health issues.     Interim History - 2023  Since last visit on 23, patient is 13 weeks out from injury. Pt states that her elbow is feeling worse than the last time she was in. She has started PT and has had one session. She is having trouble getting into more appointments. She tries to do the exercises at home and thinks it is helping with her pain. Just here for recheck of her elbow. No interim injury.       Review of Systems  Musculoskeletal: as above  Remainder of review of systems is negative including constitutional, CV, pulmonary, GI, Skin and Neurologic except as noted in HPI or medical history.    Past Medical History:   Diagnosis Date     Back pain      Crohn's disease (H)      Exercise-induced asthma      Gestational diabetes      Herniation of intervertebral disc of lumbar region      Infertility      Ovarian cyst      Smoker      Status post cholecystectomy 2005     Past Surgical History:   Procedure Laterality Date     APPENDECTOMY       C/SECTION, LOW TRANSVERSE      , Low Transverse     CHOLECYSTECTOMY, LAPOROSCOPIC  2005    Cholecystectomy, Laparoscopic     COLONOSCOPY       ESOPHAGOSCOPY, GASTROSCOPY, DUODENOSCOPY (EGD), COMBINED  3/6/2014    Procedure: COMBINED ESOPHAGOSCOPY, GASTROSCOPY, DUODENOSCOPY (EGD), BIOPSY SINGLE OR MULTIPLE;  COLONOSCOPY/ UPPER GI ENDOSCOPY/ COLON/ EGD/ Abdominal pain, epigastric/ [facility];  Surgeon: West Lomas MD;  Location:  OR      HYSTEROSCOPY, SURGICAL; W/ ENDOMETRIAL ABLATION, ANY METHOD  2010     HERNIORRHAPHY VENTRAL N/A 2018    Procedure: Open ventral hernia repair;  Surgeon: Alonso Bills MD;  Location: [facility] OR     HYSTERECTOMY, SUPRACERVICAL LAPAROSCOPIC       LEEP TX, CERVICAL      LEEP TX Cervical     ORTHOPEDIC SURGERY      bluged disk      partial small bowel resection  2002    Ileo-colonic resection. Crohn's disease surgery for bowel obstruction. this was a section of small bowel and large bowel and the cecum., all removed and a reanastamosis done successfully     SALPINGO OOPHORECTOMY,R/L/TORI      Right ovary     SLING TRANSVAGINAL N/A 4/4/2022    Procedure: Pubovaginal sling with Altis;  Surgeon: Eleuterio Stone MD;  Location: [facility] OR     TUBAL LIGATION       Family History   Problem Relation Age of Onset     Cancer Mother         cervical     Lipids Mother      Eye Disorder Father      Lipids Father      Alcohol/Drug Maternal Grandmother      Colon Cancer Paternal Grandmother      Diabetes Paternal Grandmother      Eye Disorder Paternal Grandmother      Diabetes Paternal Grandfather      Eye Disorder Paternal Grandfather      Inflammatory Bowel Disease Paternal Aunt         and two paternal uncles     Breast Cancer No family hx of      Objective  /80   Pulse 88   Wt 82.6 kg (182 lb)   LMP 07/23/2010 (LMP Unknown)   BMI 32.09 kg/m      General: healthy, alert and in no distress    HEENT: no scleral icterus or conjunctival erythema   Skin: no suspicious lesions or rash. No jaundice.   CV: regular rhythm by palpation, 2+ distal pulses, no pedal edema    Resp: normal respiratory effort without conversational dyspnea   Psych: normal mood and affect    Gait: nonantalgic, appropriate coordination and balance   Neuro: normal light touch sensory exam of the extremities. Motor strength as noted below     Right Wrist and Hand exam    Inspection:       No swelling, bruising or deformity right    Tender:       distal radius right minimal/very mild only     Non Tender:       Remainder of the Wrist and Hand right    ROM:       Full and symmetric active and passive range of motion of the forearm, wrist and digits right, mild associated pain    Strength:       5/5 strength in the muscles of the hand, wrist and forearm right    Neurovascular:        2+ radial pulses bilaterally with brisk capillary refill and      normal sensation to light touch in the radial, median and ulnar nerve distributions    Right Elbow exam:    Inspection:       no ecchymosis       no edema or effusion    ROM:       Improving and near full flexion, extension, forearm supination and pronation, mild pain with terminal extension over posterior elbow    Strength:       motor function intact    Tender:       radial head most focal, but significantly improved, still very mild only       Mild, improving soreness in extensor/supinator mass, biceps muscle belly improved    Non-Tender:       remainder of elbow area       lateral epicondyle       medial epicondyle       olecranon    Sensation:       intact throughout hand       intact throughout forearm     Skin:       well perfused       capillary refill less than 2 seconds    Radiology:  Recent Results (from the past 744 hour(s))   MR Cervical Spine w/o Contrast    Narrative    EXAM: MR CERVICAL SPINE W/O CONTRAST  LOCATION: [facility]  DATE/TIME: 5/17/2023 5:40 PM CDT    INDICATION: Hyperreflexia.  COMPARISON: None.  TECHNIQUE: MRI Cervical Spine without IV contrast.    FINDINGS:   ALIGNMENT: Straightening of the normal cervical lordosis.    BONES: There is no evidence of a marrow-replacing process. There is no evidence of abnormal bony edema. Vertebral body heights are unremarkable.    DISCS: Mild multilevel spondylosis described in further detail below.    SPINAL CORD: No convincing signal abnormalities.    SPINAL CANAL: Moderate C5-C6 spinal canal stenosis.    SOFT TISSUES: Unremarkable. The cervical vascular flow voids appear intact.    POSTERIOR FOSSA: Limited images of the intracranial compartment demonstrate no acute abnormality.    SIGNIFICANT FINDINGS BY LEVEL:     Craniovertebral junction and C1-C2: Unremarkable.    C2-C3: Normal disc height. No herniation. Normal facets. No spinal canal or neural  foraminal stenosis.     C3-C4: Normal disc height. No herniation. Bilateral uncovertebral spurring. Normal facets. No spinal canal or neural foraminal stenosis.     C4-C5: Normal disc height. No herniation. Mild uncovertebral spurring. Normal facets. No spinal canal or right neural foraminal stenosis. Mild left neural foraminal stenosis.    C5-C6: Mild intervertebral disc height loss. Shallow disc osteophyte complex and broad-based central protrusion. Bilateral uncovertebral spurring. No significant facet arthropathy. Mild to moderate spinal canal stenosis. Mild bilateral neural foraminal   stenosis.     C6-C7: Mild intervertebral disc height loss. Shallow disc osteophyte complex. Left-sided uncovertebral spurring. No significant facet arthropathy. Mild spinal canal stenosis. No significant right neural foraminal stenosis. Moderate left neural foraminal   stenosis.     C7-T1: Normal disc height. No herniation. Normal facets. No spinal canal or neural foraminal stenosis.      Impression    IMPRESSION:  1.  Unremarkable cervical cord.  2.  At C5-C6, there is mild to moderate spinal canal stenosis.  3.  At C6-C7, there is moderate left neural foraminal stenosis.  4.  Additional degenerative changes as above.     XR Wrist Right G/E 3 Views    Narrative    XR WRIST RIGHT G/E 3 VIEWS   6/1/2023 2:10 PM     HISTORY: Fall onto outstretched hand after slip on mat at work. Wrist  pain.  COMPARISON: None.       Impression    IMPRESSION: Normal joint spaces and alignment. No fracture.    BOLIVAR AGUILAR MD         SYSTEM ID:  [id number]   Elbow XR, 2 views, right    Narrative    XR ELBOW RIGHT 2 VIEWS   6/1/2023 2:10 PM     HISTORY: Fall onto outstretched hand after slip on mat at work.  Evaluate for acute bony process after blunt trauma  COMPARISON: 10/21/2019       Impression    IMPRESSION: There is an acute, nondisplaced fracture involving the  proximal radius at the lateral radial head/neck junction. There is a  joint  effusion.    BOLIVAR AGUILAR MD         SYSTEM ID:  [id number]   XR Elbow RT G/E 3 vw    Narrative    XR ELBOW RIGHT G/E 3 VIEWS   6/15/2023 2:00 PM     HISTORY: Closed nondisplaced fracture of head of right radius with  routine healing, subsequent encounter  COMPARISON: 6/1/2023       Impression    IMPRESSION: Nondisplaced radial head fracture is unchanged. Elbow  joint effusion has resolved.    CALLUM ARORA MD         SYSTEM ID:  [id number]       Assessment:  1. Closed nondisplaced fracture of head of right radius with routine healing, subsequent encounter    2. Injury of right elbow, subsequent encounter          Plan:  Discussed the assessment with the patient.  Follow up: 4 weeks   Acute FOOSH injury from a fall at work  Known nondisplaced radial head fracture, had been doing well clinically but having increased pain recently with increased activity  Compression sleeve provided which she can wear as often as she would like if comfortable  Prior XR images independently visualized and reviewed with patient today in clinic  Okay to continue to work on range of motion as tolerated and PT exercises as directed  Continue PT   Workability form updated, mildly decreased restrictions, will hope to continue to lessen over time   Oral Tylenol and topical Voltaren gel reviewed as safe OTC options, reviewed safe dosing strategies  Supportive care reviewed  All questions were answered today  Contact us with additional questions or concerns  Signs and sx of concern reviewed      Justus Chapman DO, KILO  Sports Medicine Physician  [facility]            Disclaimer: This note consists of symbols derived from keyboarding, dictation and/or voice recognition software. As a result, there may be errors in the script that have gone undetected. Please consider this when interpreting information found in this chart.      Again, thank you for allowing me to participate in the care of your patient.         Sincerely,        Justus Chapman, DO

## 2023-09-01 ENCOUNTER — THERAPY VISIT (OUTPATIENT)
Dept: PHYSICAL THERAPY | Facility: CLINIC | Age: 43
End: 2023-09-01
Attending: FAMILY MEDICINE
Payer: OTHER MISCELLANEOUS

## 2023-09-01 DIAGNOSIS — S52.124D CLOSED NONDISPLACED FRACTURE OF HEAD OF RIGHT RADIUS WITH ROUTINE HEALING, SUBSEQUENT ENCOUNTER: Primary | ICD-10-CM

## 2023-09-01 PROCEDURE — 97110 THERAPEUTIC EXERCISES: CPT | Mod: GP | Performed by: PHYSICAL MEDICINE & REHABILITATION

## 2023-09-07 ENCOUNTER — VIRTUAL VISIT (OUTPATIENT)
Dept: FAMILY MEDICINE | Facility: CLINIC | Age: 43
End: 2023-09-07
Payer: COMMERCIAL

## 2023-09-07 DIAGNOSIS — F41.1 GAD (GENERALIZED ANXIETY DISORDER): Primary | ICD-10-CM

## 2023-09-07 PROCEDURE — 99214 OFFICE O/P EST MOD 30 MIN: CPT | Mod: VID | Performed by: PHYSICIAN ASSISTANT

## 2023-09-07 RX ORDER — BUSPIRONE HYDROCHLORIDE 10 MG/1
10 TABLET ORAL 2 TIMES DAILY
Qty: 120 TABLET | Refills: 0 | Status: SHIPPED | OUTPATIENT
Start: 2023-09-07 | End: 2023-11-09

## 2023-09-07 ASSESSMENT — ANXIETY QUESTIONNAIRES
3. WORRYING TOO MUCH ABOUT DIFFERENT THINGS: NEARLY EVERY DAY
IF YOU CHECKED OFF ANY PROBLEMS ON THIS QUESTIONNAIRE, HOW DIFFICULT HAVE THESE PROBLEMS MADE IT FOR YOU TO DO YOUR WORK, TAKE CARE OF THINGS AT HOME, OR GET ALONG WITH OTHER PEOPLE: SOMEWHAT DIFFICULT
5. BEING SO RESTLESS THAT IT IS HARD TO SIT STILL: NEARLY EVERY DAY
2. NOT BEING ABLE TO STOP OR CONTROL WORRYING: NEARLY EVERY DAY
1. FEELING NERVOUS, ANXIOUS, OR ON EDGE: NEARLY EVERY DAY
7. FEELING AFRAID AS IF SOMETHING AWFUL MIGHT HAPPEN: SEVERAL DAYS
6. BECOMING EASILY ANNOYED OR IRRITABLE: NOT AT ALL
GAD7 TOTAL SCORE: 15
GAD7 TOTAL SCORE: 15

## 2023-09-07 ASSESSMENT — PATIENT HEALTH QUESTIONNAIRE - PHQ9
SUM OF ALL RESPONSES TO PHQ QUESTIONS 1-9: 10
5. POOR APPETITE OR OVEREATING: MORE THAN HALF THE DAYS

## 2023-09-07 NOTE — PROGRESS NOTES
"Ana is a 42 year old who is being evaluated via a billable video visit.      How would you like to obtain your AVS? MyChart  If the video visit is dropped, the invitation should be resent by: Text to cell phone: [phone]  Will anyone else be joining your video visit? No          Assessment & Plan       ICD-10-CM    1. DANIELLE (generalized anxiety disorder)  F41.1 busPIRone (BUSPAR) 10 MG tablet          Continue Zoloft, no changes, at max dose. Will add Buspar and titrate to 10mg BID. We'll reassess in 1 month at her upcoming physical.      Prescription drug management         BMI:   Estimated body mass index is 32.09 kg/m  as calculated from the following:    Height as of 7/20/23: 1.604 m (5' 3.15\").    Weight as of 8/30/23: 82.6 kg (182 lb).       Depression Screening Follow Up        9/7/2023     7:29 AM   PHQ   PHQ-9 Total Score 10   Q9: Thoughts of better off dead/self-harm past 2 weeks Not at all     Return in about 1 month (around 10/7/2023) for your annual physical, a med check, with Radha, in person.     Radha Bermudez PA-C  [facility] Perez is a 42 year old, presenting for the following health issues:  Anxiety        9/7/2023     7:26 AM   Additional Questions   Roomed by Mariposa LAZAR         9/7/2023     7:26 AM   Patient Reported Additional Medications   Patient reports taking the following new medications No new medications to add       HPI     Anxiety Follow-Up  How are you doing with your anxiety since your last visit? Worsened   Are you having other symptoms that might be associated with anxiety? Yes:  Panic attacks and sleeping  Have you had a significant life event? Yes   Are you feeling depressed? Yes:     Do you have any concerns with your use of alcohol or other drugs? No    No changes made at her follow up in June  Going on 2 years now of just her income supporting the family  Elbow injury took her out for the summer - dealing with a lot of pain  Wants " a spinal cord stimulator but insurance denied it, wants to get off her pain meds - appealed the denial  Had a panic attack in July when driving to the pharmacy  Car troubles  Worrying about things constantly   Hydroxyzine takes the edge off when anxiety is more severe  Trazodone is helping a lot with sleep  Will be starting therapy next week    Just started physical therapy for her elbow injury/fracture  Doing light duty at work      Social History     Tobacco Use    Smoking status: Some Days     Packs/day: 0.50     Types: Cigarettes     Passive exposure: Never    Smokeless tobacco: Never   Vaping Use    Vaping Use: Never used   Substance Use Topics    Alcohol use: Yes     Comment: very rarely    Drug use: No         4/26/2023     4:16 PM 6/12/2023     7:13 AM 9/7/2023     7:29 AM   DANIELLE-7 SCORE   Total Score 16 (severe anxiety) 4 (minimal anxiety)    Total Score 16 4 15         4/26/2023     4:14 PM 6/12/2023     7:13 AM 9/7/2023     7:29 AM   PHQ   PHQ-9 Total Score 10 2 10   Q9: Thoughts of better off dead/self-harm past 2 weeks Not at all Not at all Not at all         Review of Systems   Constitutional, and psych systems are negative, except as otherwise noted.      Objective           Vitals:  No vitals were obtained today due to virtual visit.    Physical Exam   GENERAL: Healthy, alert and no distress  EYES: Eyes grossly normal to inspection.  No discharge or erythema, or obvious scleral/conjunctival abnormalities.  RESP: No audible wheeze, cough, or visible cyanosis.  No visible retractions or increased work of breathing.    SKIN: Visible skin clear. No significant rash, abnormal pigmentation or lesions.  NEURO: Cranial nerves grossly intact.  Mentation and speech appropriate for age.  PSYCH: Mentation appears normal, affect normal/bright, judgement and insight intact, normal speech and appearance well-groomed.          Video-Visit Details    Type of service:  Video Visit     Originating Location (pt.  Location): Home    Distant Location (provider location):  Off-site  Platform used for Video Visit: Eev

## 2023-09-08 ENCOUNTER — THERAPY VISIT (OUTPATIENT)
Dept: PHYSICAL THERAPY | Facility: CLINIC | Age: 43
End: 2023-09-08
Attending: FAMILY MEDICINE
Payer: OTHER MISCELLANEOUS

## 2023-09-08 DIAGNOSIS — S52.124D CLOSED NONDISPLACED FRACTURE OF HEAD OF RIGHT RADIUS WITH ROUTINE HEALING, SUBSEQUENT ENCOUNTER: Primary | ICD-10-CM

## 2023-09-08 PROCEDURE — 97110 THERAPEUTIC EXERCISES: CPT | Mod: GP | Performed by: PHYSICAL MEDICINE & REHABILITATION

## 2023-09-13 ENCOUNTER — THERAPY VISIT (OUTPATIENT)
Dept: PHYSICAL THERAPY | Facility: CLINIC | Age: 43
End: 2023-09-13
Attending: FAMILY MEDICINE
Payer: OTHER MISCELLANEOUS

## 2023-09-13 DIAGNOSIS — S52.124D CLOSED NONDISPLACED FRACTURE OF HEAD OF RIGHT RADIUS WITH ROUTINE HEALING, SUBSEQUENT ENCOUNTER: Primary | ICD-10-CM

## 2023-09-13 PROCEDURE — 97110 THERAPEUTIC EXERCISES: CPT | Mod: GP | Performed by: PHYSICAL MEDICINE & REHABILITATION

## 2023-09-20 ENCOUNTER — THERAPY VISIT (OUTPATIENT)
Dept: PHYSICAL THERAPY | Facility: CLINIC | Age: 43
End: 2023-09-20
Attending: FAMILY MEDICINE
Payer: OTHER MISCELLANEOUS

## 2023-09-20 DIAGNOSIS — S52.124D CLOSED NONDISPLACED FRACTURE OF HEAD OF RIGHT RADIUS WITH ROUTINE HEALING, SUBSEQUENT ENCOUNTER: Primary | ICD-10-CM

## 2023-09-20 PROCEDURE — 97110 THERAPEUTIC EXERCISES: CPT | Mod: GP | Performed by: PHYSICAL MEDICINE & REHABILITATION

## 2023-09-26 NOTE — PROGRESS NOTES
Ana Felix  :  1980  DOS: 23  MRN: [id number]    Sports Medicine Clinic Visit    PCP: Radha Bermudez    Ana Felix is a 42 year old Right hand dominant female who is seen as an ER referral presenting with right elbow injury.    Injury: Patient describes injury as at work, helping move student from a behavior situation, slipped on mat, landing on right arm with FOOSH type injury that occurred on 23.  Pain located over right deep anterior elbow joint, radiating to right wrist.  Additional Features:  Positive: swelling and weakness.  Symptoms are better with Other medications: Oxycodone.  Symptoms are worse with: moving arm, direct pressure.  Other evaluation and/or treatments so far consists of: Ice, Ibuprofen, Other medications: Oxycodone, Rest and sling, thumb spica brace.  Recent imaging completed: X-rays completed 23.  Prior History of related problems: history of lateral epicondylitis several years ago.    Social History: currently employed as school paraprofessional      Interim History - Adela 15, 2023  Since last visit on 2023 patient states that she is doing a lot better from when she was seen last. She was taking percocet prn for pain, but she ran out of rx. Would like a new rx today if possible.     Interim History 2023  Ana Felix is now 4 weeks out from injury.  Since last visit on 6/15/2023 patient has improving right elbow pain.  She been able to use right arm more, mild discomfort with bending elbow.  No taking using pain medication.  Here to review work restrictions.     Interim History 2023  Ana Felix is now 7 weeks out from injury.  Since last visit on 2023 patient overall good improvement in right elbow pain.  Patient notes mild discomfort with pushing objects and lifting objects occasionally.  Patient was unable to return to light duty work due to other health issues.     Interim History - 2023  Since last visit on 23,  patient is 13 weeks out from injury. Pt states that her elbow is feeling worse than the last time she was in. She has started PT and has had one session. She is having trouble getting into more appointments. She tries to do the exercises at home and thinks it is helping with her pain. Just here for recheck of her elbow. No interim injury.       Interim History - 2023  Since last visit on 2023 patient states she is doing very well. No scheduled PT appointments. Last PT jeana was last week able to lift 25 pounds comfortably.  Higher weight more difficult overhead, but substantial progress.  No interim injury.  No issues with work reported.    Review of Systems  Musculoskeletal: as above  Remainder of review of systems is negative including constitutional, CV, pulmonary, GI, Skin and Neurologic except as noted in HPI or medical history.    Past Medical History:   Diagnosis Date    Back pain     Crohn's disease (H)     Exercise-induced asthma     Gestational diabetes     Herniation of intervertebral disc of lumbar region     Infertility     Ovarian cyst     Smoker     Status post cholecystectomy 2005     Past Surgical History:   Procedure Laterality Date    APPENDECTOMY      C/SECTION, LOW TRANSVERSE      , Low Transverse    CHOLECYSTECTOMY, LAPOROSCOPIC  2005    Cholecystectomy, Laparoscopic    COLONOSCOPY      ESOPHAGOSCOPY, GASTROSCOPY, DUODENOSCOPY (EGD), COMBINED  3/6/2014    Procedure: COMBINED ESOPHAGOSCOPY, GASTROSCOPY, DUODENOSCOPY (EGD), BIOPSY SINGLE OR MULTIPLE;  COLONOSCOPY/ UPPER GI ENDOSCOPY/ COLON/ EGD/ Abdominal pain, epigastric/ PJH;  Surgeon: West Lomas MD;  Location:  OR     HYSTEROSCOPY, SURGICAL; W/ ENDOMETRIAL ABLATION, ANY METHOD  2010    HERNIORRHAPHY VENTRAL N/A 2018    Procedure: Open ventral hernia repair;  Surgeon: Alonso Bills MD;  Location: [facility]    HYSTERECTOMY, SUPRACERVICAL LAPAROSCOPIC      LEEP TX, CERVICAL      LEEP  TX Cervical    ORTHOPEDIC SURGERY      bluged disk    partial small bowel resection  2002    Ileo-colonic resection. Crohn's disease surgery for bowel obstruction. this was a section of small bowel and large bowel and the cecum., all removed and a reanastamosis done successfully    SALPINGO OOPHORECTOMY,R/L/TORI      Right ovary    SLING TRANSVAGINAL N/A 4/4/2022    Procedure: Pubovaginal sling with Altis;  Surgeon: Eleuterio Stone MD;  Location: [facility]    TUBAL LIGATION       Family History   Problem Relation Age of Onset    Cancer Mother         cervical    Lipids Mother     Eye Disorder Father     Lipids Father     Alcohol/Drug Maternal Grandmother     Colon Cancer Paternal Grandmother     Diabetes Paternal Grandmother     Eye Disorder Paternal Grandmother     Diabetes Paternal Grandfather     Eye Disorder Paternal Grandfather     Inflammatory Bowel Disease Paternal Aunt         and two paternal uncles    Breast Cancer No family hx of      Objective  /84   Pulse 84   Wt 82.6 kg (182 lb)   LMP 07/23/2010 (LMP Unknown)   BMI 32.09 kg/m      General: healthy, alert and in no distress    HEENT: no scleral icterus or conjunctival erythema   Skin: no suspicious lesions or rash. No jaundice.   CV: regular rhythm by palpation, 2+ distal pulses, no pedal edema    Resp: normal respiratory effort without conversational dyspnea   Psych: normal mood and affect    Gait: nonantalgic, appropriate coordination and balance   Neuro: normal light touch sensory exam of the extremities. Motor strength as noted below     Right Wrist and Hand exam    Inspection:       No swelling, bruising or deformity right    Tender:       distal radius resolved    Non Tender:       Remainder of the Wrist and Hand right    ROM:       Full and symmetric active and passive range of motion of the forearm, wrist and digits right, mild associated pain    Strength:       5/5 strength in the muscles of the hand, wrist and forearm  right    Neurovascular:       2+ radial pulses bilaterally with brisk capillary refill and      normal sensation to light touch in the radial, median and ulnar nerve distributions    Right Elbow exam:    Inspection:       no ecchymosis       no edema or effusion    ROM:       Full flexion, extension, forearm supination and pronation, mild pain with terminal extension over posterior elbow, improving    Strength:       motor function intact    Tender:       radial head minimal       Resolved soreness in extensor/supinator mass, biceps muscle belly improved    Non-Tender:       remainder of elbow area       lateral epicondyle       medial epicondyle       olecranon    Sensation:       intact throughout hand       intact throughout forearm     Skin:       well perfused       capillary refill less than 2 seconds    Radiology:  Recent Results (from the past 744 hour(s))   MR Cervical Spine w/o Contrast    Narrative    EXAM: MR CERVICAL SPINE W/O CONTRAST  LOCATION: [facility]  DATE/TIME: 5/17/2023 5:40 PM CDT    INDICATION: Hyperreflexia.  COMPARISON: None.  TECHNIQUE: MRI Cervical Spine without IV contrast.    FINDINGS:   ALIGNMENT: Straightening of the normal cervical lordosis.    BONES: There is no evidence of a marrow-replacing process. There is no evidence of abnormal bony edema. Vertebral body heights are unremarkable.    DISCS: Mild multilevel spondylosis described in further detail below.    SPINAL CORD: No convincing signal abnormalities.    SPINAL CANAL: Moderate C5-C6 spinal canal stenosis.    SOFT TISSUES: Unremarkable. The cervical vascular flow voids appear intact.    POSTERIOR FOSSA: Limited images of the intracranial compartment demonstrate no acute abnormality.    SIGNIFICANT FINDINGS BY LEVEL:     Craniovertebral junction and C1-C2: Unremarkable.    C2-C3: Normal disc height. No herniation. Normal facets. No spinal canal or neural foraminal stenosis.     C3-C4: Normal disc  height. No herniation. Bilateral uncovertebral spurring. Normal facets. No spinal canal or neural foraminal stenosis.     C4-C5: Normal disc height. No herniation. Mild uncovertebral spurring. Normal facets. No spinal canal or right neural foraminal stenosis. Mild left neural foraminal stenosis.    C5-C6: Mild intervertebral disc height loss. Shallow disc osteophyte complex and broad-based central protrusion. Bilateral uncovertebral spurring. No significant facet arthropathy. Mild to moderate spinal canal stenosis. Mild bilateral neural foraminal   stenosis.     C6-C7: Mild intervertebral disc height loss. Shallow disc osteophyte complex. Left-sided uncovertebral spurring. No significant facet arthropathy. Mild spinal canal stenosis. No significant right neural foraminal stenosis. Moderate left neural foraminal   stenosis.     C7-T1: Normal disc height. No herniation. Normal facets. No spinal canal or neural foraminal stenosis.      Impression    IMPRESSION:  1.  Unremarkable cervical cord.  2.  At C5-C6, there is mild to moderate spinal canal stenosis.  3.  At C6-C7, there is moderate left neural foraminal stenosis.  4.  Additional degenerative changes as above.     XR Wrist Right G/E 3 Views    Narrative    XR WRIST RIGHT G/E 3 VIEWS   6/1/2023 2:10 PM     HISTORY: Fall onto outstretched hand after slip on mat at work. Wrist  pain.  COMPARISON: None.       Impression    IMPRESSION: Normal joint spaces and alignment. No fracture.    BOLIVAR AGUILAR MD         SYSTEM ID:  [id number]   Elbow XR, 2 views, right    Narrative    XR ELBOW RIGHT 2 VIEWS   6/1/2023 2:10 PM     HISTORY: Fall onto outstretched hand after slip on mat at work.  Evaluate for acute bony process after blunt trauma  COMPARISON: 10/21/2019       Impression    IMPRESSION: There is an acute, nondisplaced fracture involving the  proximal radius at the lateral radial head/neck junction. There is a  joint effusion.    BOLIVAR AGUILAR MD         SYSTEM  ID:  [id number]   XR Elbow RT G/E 3 vw    Narrative    XR ELBOW RIGHT G/E 3 VIEWS   6/15/2023 2:00 PM     HISTORY: Closed nondisplaced fracture of head of right radius with  routine healing, subsequent encounter  COMPARISON: 6/1/2023       Impression    IMPRESSION: Nondisplaced radial head fracture is unchanged. Elbow  joint effusion has resolved.    CALLUM ARORA MD         SYSTEM ID:  [id number]       Assessment:  1. Closed nondisplaced fracture of head of right radius with routine healing, subsequent encounter    2. Injury of right elbow, subsequent encounter    3. Right wrist sprain, subsequent encounter          Plan:  Discussed the assessment with the patient.  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 As a result, there may be errors in the script that have gone undetected. Please consider this when interpreting information found in this chart.

## 2023-09-27 ENCOUNTER — OFFICE VISIT (OUTPATIENT)
Dept: ORTHOPEDICS | Facility: CLINIC | Age: 43
End: 2023-09-27
Payer: OTHER MISCELLANEOUS

## 2023-09-27 VITALS
DIASTOLIC BLOOD PRESSURE: 84 MMHG | BODY MASS INDEX: 32.09 KG/M2 | SYSTOLIC BLOOD PRESSURE: 125 MMHG | HEART RATE: 84 BPM | WEIGHT: 182 LBS

## 2023-09-27 DIAGNOSIS — S63.501D RIGHT WRIST SPRAIN, SUBSEQUENT ENCOUNTER: ICD-10-CM

## 2023-09-27 DIAGNOSIS — S59.901D INJURY OF RIGHT ELBOW, SUBSEQUENT ENCOUNTER: ICD-10-CM

## 2023-09-27 DIAGNOSIS — S52.124D CLOSED NONDISPLACED FRACTURE OF HEAD OF RIGHT RADIUS WITH ROUTINE HEALING, SUBSEQUENT ENCOUNTER: Primary | ICD-10-CM

## 2023-09-27 PROCEDURE — 99213 OFFICE O/P EST LOW 20 MIN: CPT | Performed by: FAMILY MEDICINE

## 2023-09-27 NOTE — LETTER
2023         RE: Ana Felix  [address]  [place] [address]        Dear Colleague,    Thank you for referring your patient, Ana Felix, to the [facility]. Please see a copy of my visit note below.    Ana Felix  :  1980  DOS: 23  MRN: [id number]    Sports Medicine Clinic Visit    PCP: Radha Bermudez    Ana Felix is a 42 year old Right hand dominant female who is seen as an ER referral presenting with right elbow injury.    Injury: Patient describes injury as at work, helping move student from a behavior situation, slipped on mat, landing on right arm with FOOSH type injury that occurred on 23.  Pain located over right deep anterior elbow joint, radiating to right wrist.  Additional Features:  Positive: swelling and weakness.  Symptoms are better with Other medications: Oxycodone.  Symptoms are worse with: moving arm, direct pressure.  Other evaluation and/or treatments so far consists of: Ice, Ibuprofen, Other medications: Oxycodone, Rest and sling, thumb spica brace.  Recent imaging completed: X-rays completed 23.  Prior History of related problems: history of lateral epicondylitis several years ago.    Social History: currently employed as school paraprofessional      Interim History - Adela 15, 2023  Since last visit on 2023 patient states that she is doing a lot better from when she was seen last. She was taking percocet prn for pain, but she ran out of rx. Would like a new rx today if possible.     Interim History 2023  Ana Felix is now 4 weeks out from injury.  Since last visit on 6/15/2023 patient has improving right elbow pain.  She been able to use right arm more, mild discomfort with bending elbow.  No taking using pain medication.  Here to review work restrictions.     Interim History 2023  Ana Felix is now 7 weeks out from injury.  Since last visit on 2023 patient overall good improvement in  right elbow pain.  Patient notes mild discomfort with pushing objects and lifting objects occasionally.  Patient was unable to return to light duty work due to other health issues.     Interim History - 2023  Since last visit on 23, patient is 13 weeks out from injury. Pt states that her elbow is feeling worse than the last time she was in. She has started PT and has had one session. She is having trouble getting into more appointments. She tries to do the exercises at home and thinks it is helping with her pain. Just here for recheck of her elbow. No interim injury.       Interim History - 2023  Since last visit on 2023 patient states she is doing very well. No scheduled PT appointments. Last PT jeana was last week able to lift 25 pounds comfortably.  Higher weight more difficult overhead, but substantial progress.  No interim injury.  No issues with work reported.    Review of Systems  Musculoskeletal: as above  Remainder of review of systems is negative including constitutional, CV, pulmonary, GI, Skin and Neurologic except as noted in HPI or medical history.    Past Medical History:   Diagnosis Date     Back pain      Crohn's disease (H)      Exercise-induced asthma      Gestational diabetes      Herniation of intervertebral disc of lumbar region      Infertility      Ovarian cyst      Smoker      Status post cholecystectomy 2005     Past Surgical History:   Procedure Laterality Date     APPENDECTOMY       C/SECTION, LOW TRANSVERSE      , Low Transverse     CHOLECYSTECTOMY, LAPOROSCOPIC  2005    Cholecystectomy, Laparoscopic     COLONOSCOPY       ESOPHAGOSCOPY, GASTROSCOPY, DUODENOSCOPY (EGD), COMBINED  3/6/2014    Procedure: COMBINED ESOPHAGOSCOPY, GASTROSCOPY, DUODENOSCOPY (EGD), BIOPSY SINGLE OR MULTIPLE;  COLONOSCOPY/ UPPER GI ENDOSCOPY/ COLON/ EGD/ Abdominal pain, epigastric/ PJH;  Surgeon: West Lomas MD;  Location:  OR       HYSTEROSCOPY, SURGICAL; W/ ENDOMETRIAL ABLATION, ANY METHOD  7/2010     HERNIORRHAPHY VENTRAL N/A 12/11/2018    Procedure: Open ventral hernia repair;  Surgeon: Alonso Bills MD;  Location: [facility]     HYSTERECTOMY, SUPRACERVICAL LAPAROSCOPIC  2010     LEEP TX, CERVICAL      LEEP TX Cervical     ORTHOPEDIC SURGERY      bluged disk     partial small bowel resection  2002    Ileo-colonic resection. Crohn's disease surgery for bowel obstruction. this was a section of small bowel and large bowel and the cecum., all removed and a reanastamosis done successfully     SALPINGO OOPHORECTOMY,R/L/TORI      Right ovary     SLING TRANSVAGINAL N/A 4/4/2022    Procedure: Pubovaginal sling with Altis;  Surgeon: Eleuterio Stone MD;  Location: [facility]     TUBAL LIGATION       Family History   Problem Relation Age of Onset     Cancer Mother         cervical     Lipids Mother      Eye Disorder Father      Lipids Father      Alcohol/Drug Maternal Grandmother      Colon Cancer Paternal Grandmother      Diabetes Paternal Grandmother      Eye Disorder Paternal Grandmother      Diabetes Paternal Grandfather      Eye Disorder Paternal Grandfather      Inflammatory Bowel Disease Paternal Aunt         and two paternal uncles     Breast Cancer No family hx of      Objective  /84   Pulse 84   Wt 82.6 kg (182 lb)   LMP 07/23/2010 (LMP Unknown)   BMI 32.09 kg/m      General: healthy, alert and in no distress    HEENT: no scleral icterus or conjunctival erythema   Skin: no suspicious lesions or rash. No jaundice.   CV: regular rhythm by palpation, 2+ distal pulses, no pedal edema    Resp: normal respiratory effort without conversational dyspnea   Psych: normal mood and affect    Gait: nonantalgic, appropriate coordination and balance   Neuro: normal light touch sensory exam of the extremities. Motor strength as noted below     Right Wrist and Hand exam    Inspection:       No swelling, bruising or deformity right    Tender:       distal  radius resolved    Non Tender:       Remainder of the Wrist and Hand right    ROM:       Full and symmetric active and passive range of motion of the forearm, wrist and digits right, mild associated pain    Strength:       5/5 strength in the muscles of the hand, wrist and forearm right    Neurovascular:       2+ radial pulses bilaterally with brisk capillary refill and      normal sensation to light touch in the radial, median and ulnar nerve distributions    Right Elbow exam:    Inspection:       no ecchymosis       no edema or effusion    ROM:       Full flexion, extension, forearm supination and pronation, mild pain with terminal extension over posterior elbow, improving    Strength:       motor function intact    Tender:       radial head minimal       Resolved soreness in extensor/supinator mass, biceps muscle belly improved    Non-Tender:       remainder of elbow area       lateral epicondyle       medial epicondyle       olecranon    Sensation:       intact throughout hand       intact throughout forearm     Skin:       well perfused       capillary refill less than 2 seconds    Radiology:  Recent Results (from the past 744 hour(s))   MR Cervical Spine w/o Contrast    Narrative    EXAM: MR CERVICAL SPINE W/O CONTRAST  LOCATION: [facility]  DATE/TIME: 5/17/2023 5:40 PM CDT    INDICATION: Hyperreflexia.  COMPARISON: None.  TECHNIQUE: MRI Cervical Spine without IV contrast.    FINDINGS:   ALIGNMENT: Straightening of the normal cervical lordosis.    BONES: There is no evidence of a marrow-replacing process. There is no evidence of abnormal bony edema. Vertebral body heights are unremarkable.    DISCS: Mild multilevel spondylosis described in further detail below.    SPINAL CORD: No convincing signal abnormalities.    SPINAL CANAL: Moderate C5-C6 spinal canal stenosis.    SOFT TISSUES: Unremarkable. The cervical vascular flow voids appear intact.    POSTERIOR FOSSA: Limited images of  the intracranial compartment demonstrate no acute abnormality.    SIGNIFICANT FINDINGS BY LEVEL:     Craniovertebral junction and C1-C2: Unremarkable.    C2-C3: Normal disc height. No herniation. Normal facets. No spinal canal or neural foraminal stenosis.     C3-C4: Normal disc height. No herniation. Bilateral uncovertebral spurring. Normal facets. No spinal canal or neural foraminal stenosis.     C4-C5: Normal disc height. No herniation. Mild uncovertebral spurring. Normal facets. No spinal canal or right neural foraminal stenosis. Mild left neural foraminal stenosis.    C5-C6: Mild intervertebral disc height loss. Shallow disc osteophyte complex and broad-based central protrusion. Bilateral uncovertebral spurring. No significant facet arthropathy. Mild to moderate spinal canal stenosis. Mild bilateral neural foraminal   stenosis.     C6-C7: Mild intervertebral disc height loss. Shallow disc osteophyte complex. Left-sided uncovertebral spurring. No significant facet arthropathy. Mild spinal canal stenosis. No significant right neural foraminal stenosis. Moderate left neural foraminal   stenosis.     C7-T1: Normal disc height. No herniation. Normal facets. No spinal canal or neural foraminal stenosis.      Impression    IMPRESSION:  1.  Unremarkable cervical cord.  2.  At C5-C6, there is mild to moderate spinal canal stenosis.  3.  At C6-C7, there is moderate left neural foraminal stenosis.  4.  Additional degenerative changes as above.     XR Wrist Right G/E 3 Views    Narrative    XR WRIST RIGHT G/E 3 VIEWS   6/1/2023 2:10 PM     HISTORY: Fall onto outstretched hand after slip on mat at work. Wrist  pain.  COMPARISON: None.       Impression    IMPRESSION: Normal joint spaces and alignment. No fracture.    BOLIVAR AGUILAR MD         SYSTEM ID:  [id number]   Elbow XR, 2 views, right    Narrative    XR ELBOW RIGHT 2 VIEWS   6/1/2023 2:10 PM     HISTORY: Fall onto outstretched hand after slip on mat at work.  Evaluate  for acute bony process after blunt trauma  COMPARISON: 10/21/2019       Impression    IMPRESSION: There is an acute, nondisplaced fracture involving the  proximal radius at the lateral radial head/neck junction. There is a  joint effusion.    BOLIVAR AGUILAR MD         SYSTEM ID:  [id number]   XR Elbow RT G/E 3 vw    Narrative    XR ELBOW RIGHT G/E 3 VIEWS   6/15/2023 2:00 PM     HISTORY: Closed nondisplaced fracture of head of right radius with  routine healing, subsequent encounter  COMPARISON: 6/1/2023       Impression    IMPRESSION: Nondisplaced radial head fracture is unchanged. Elbow  joint effusion has resolved.    CALLUM ARORA MD         SYSTEM ID:  [id number]       Assessment:  1. Closed nondisplaced fracture of head of right radius with routine healing, subsequent encounter    2. Injury of right elbow, subsequent encounter    3. Right wrist sprain, subsequent encounter          Plan:  Discussed the assessment with the patient.  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 As a result, there may be errors in the script that  have gone undetected. Please consider this when interpreting information found in this chart.      Again, thank you for allowing me to participate in the care of your patient.        Sincerely,        Justus Chapman, DO

## 2023-09-27 NOTE — LETTER
September 27, 2023       Ana continues to be under my care for her right elbow fracture that occurred on 6/1/2023. She is doing well overall, progressing with therapy and increasing activity.  She can increase use of her right arm for work for lift/carry/push/pull activity at work as outlined on her workability form, with new restrictions starting 10/2/2023. She can continue use her left arm as tolerated. 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  Updated recommendations will be provided at her next visit in 4 weeks, sooner if needed.  She may still use a compression sleeve on her right arm for comfort, and is allowed as needed.  I am hopeful that she can return to full duty and hours by our next appointment.        Justus Mendez DO, KILO  Sports Medicine Physician  [facility]

## 2023-10-04 NOTE — TELEPHONE ENCOUNTER
Called patient to schedule MTM appointment - they were referred by their [employer] health plan for a medication review.     Referral outreach attempt #2 on October 4, 2023      Outcome: left voicemail for patient to call MTM scheduling line at [phone] if interested.    To Rueda, PharmD, [facility]  Medication Therapy Management Provider

## 2023-10-17 ENCOUNTER — OFFICE VISIT (OUTPATIENT)
Dept: FAMILY MEDICINE | Facility: CLINIC | Age: 43
End: 2023-10-17
Payer: COMMERCIAL

## 2023-10-17 VITALS
WEIGHT: 187 LBS | DIASTOLIC BLOOD PRESSURE: 78 MMHG | HEIGHT: 63 IN | BODY MASS INDEX: 33.13 KG/M2 | TEMPERATURE: 98.2 F | HEART RATE: 81 BPM | RESPIRATION RATE: 16 BRPM | OXYGEN SATURATION: 98 % | SYSTOLIC BLOOD PRESSURE: 110 MMHG

## 2023-10-17 DIAGNOSIS — B02.9 HERPES ZOSTER WITHOUT COMPLICATION: Primary | ICD-10-CM

## 2023-10-17 PROCEDURE — 99213 OFFICE O/P EST LOW 20 MIN: CPT | Performed by: PHYSICIAN ASSISTANT

## 2023-10-17 RX ORDER — TRIAMCINOLONE ACETONIDE 1 MG/G
OINTMENT TOPICAL 2 TIMES DAILY
Qty: 30 G | Refills: 1 | Status: SHIPPED | OUTPATIENT
Start: 2023-10-17

## 2023-10-17 RX ORDER — VALACYCLOVIR HYDROCHLORIDE 1 G/1
TABLET, FILM COATED ORAL
Qty: 32 TABLET | Refills: 0 | Status: SHIPPED | OUTPATIENT
Start: 2023-10-17 | End: 2023-12-29

## 2023-10-17 ASSESSMENT — ASTHMA QUESTIONNAIRES: ACT_TOTALSCORE: 24

## 2023-10-17 NOTE — COMMUNITY RESOURCES LIST (ENGLISH)
10/17/2023   [facility] [employer]  N/A  For questions about this resource list or additional care needs, please contact your primary care clinic or care manager.  Phone: [phone]   Email: N/A   Address: [address] [place] [address]   Hours: N/A        Financial Stability       Rent and mortgage payment assistance  1  [facility] Helping Hand Distance: 8.41 miles      In-Person, Phone/Virtual   [address] [place] [address]  Language: English  Hours: Mon - Sun Appt. Only  Fees: Free   Phone: (290) 706-7929 Email: terry@[employer] Website: http://www.[facility] Hospitalpinghand.org          Food and Nutrition       Food pantry  2  [facility] Distance: 4.86 miles      Delivery, Pickup   [address] [place] [address]  Language: English  Hours: Mon 9:00 AM - 5:00 PM , Tue 11:00 AM - 5:00 PM , Thu 9:00 AM - 5:00 PM  Fees: Free   Phone: (550) 275-1317 Email: mail@[employer].Frogdice Website: https://www.[employer].org/our-work/food-access-and-equity/     3  [facility] Distance: 7.66 miles      Delivery, Pickup   [address] [place] [address]  Language: English  Hours: Mon - Thu 8:00 AM - 3:00 PM  Fees: Free   Phone: (885) 833-8781 Website: http://www.[facility].org/     SNAP application assistance  4  [facility] - [place] Family Investment Program (MFIP) - [place] Family Investment Program (MFIP) - SNAP application assistance Distance: 8.06 miles      In-Person, Phone/Virtual   [address] [address]  Language: English  Hours: Mon - Fri 8:00 AM - 4:30 PM  Fees: Free   Phone: (128) 935-2305 Email: ramon@[facility].gov Website: https://www.[facility]./499/MFIP-Biennial-Service-Agreement-VCA-Plan     5  [facility] Community Action [place] ([facility]) - [place] Office Distance: 8.69 miles       In-Person, Phone/Virtual   [address] [place] [address]  Language: English  Hours: Mon - Fri 8:00 AM - 4:30 PM  Fees: Free   Phone: (219) 919-4064 Email: abbey@[employer] Website: https://www.[facility]ijingyichengMesaAlienVault/agency-information     Soup kitchen or free meals  6  [facility] - Family Table Meals - Family Table Meal Distance: 21.84 miles      Pickup   [address] [place] [address]  Language: English  Hours: Thu 5:00 PM - 6:30 PM  Fees: Free   Phone: (396) 592-8053 Email: Podcast Ready@[employer] Website: http://www.M[employer]cast[employer]erial [employer]arma     7  [facility] - Family Table Meal Distance: 22.19 miles      In-Person, Pickup   [address] [place] [address]  Language: English  Hours: Thu 5:30 PM - 6:30 PM  Fees: Free   Phone: (383) 805-4269 Email: office@[facility].[employer] Website: http://www.[facility].org          Important Numbers & Websites       Emergency Services   911  [facility] Services   311  Poison Control   (733) 486-6817  Suicide Prevention Lifeline   (500) 324-3087 (TALK)  Child Abuse Hotline   (928) 206-5115 (4-A-Child)  Sexual Assault Hotline   (901) 140-4383 (HOPE)  National Runaway Safeline   (662) 586-8393 (RUNAWAY)  All-Options Talkline   (414) 799-3790  Substance Abuse Referral   (822) 118-3913 (HELP)

## 2023-10-17 NOTE — PROGRESS NOTES
"  Assessment & Plan       ICD-10-CM    1. Herpes zoster without complication  B02.9 valACYclovir (VALTREX) 1000 mg tablet     triamcinolone (KENALOG) 0.1 % external ointment          Possible Zoster recurrence due to being immunocompromised. Will restart her Valtrex: 1g TID x7 days then 500mg TID x7 days. TCS BID x5-10 days. I also asked her to apply a triple antibiotic ointment or Vaseline before bed. Follow up if symptoms fail to improve or worsen.      Prescription drug management       MED REC REQUIRED  Post Medication Reconciliation Status: discharge medications reconciled and changed, per note/orders  BMI:   Estimated body mass index is 33.13 kg/m  as calculated from the following:    Height as of this encounter: 1.6 m (5' 3\").    Weight as of this encounter: 84.8 kg (187 lb).     Return in about 2 weeks (around 10/31/2023), or if symptoms worsen or fail to improve.     Radha Bermudez PA-C  [facility] VIN Perez is a 42 year old, presenting for the following health issues:  Hospital F/U        10/17/2023    10:50 AM   Additional Questions   [facility]    Hospital/Nursing Home/IP Rehab Facility: [facility]  Date of Admission: 8/29/23  Date of Discharge: 8/29/23  Reason(s) for Admission: Rash    Rash seemed to be drying up but then rash seemed to worsen when the meds were finished   Very painful, burns  Not real itchy    Was your hospitalization related to COVID-19? No   Problems taking medications regularly:  None  Medication changes since discharge: None  Problems adhering to non-medication therapy:  None    Summary of hospitalization:  [facility] discharge summary reviewed  Diagnostic Tests/Treatments reviewed.  Follow up needed: none  Other Healthcare Providers Involved in Patient s Care:         None  Update since discharge: fluctuating course.         Plan of care communicated " "with patient             Review of Systems   Constitutional, neuro, skin systems are negative, except as otherwise noted.      Objective    /78   Pulse 81   Temp 98.2  F (36.8  C)   Resp 16   Ht 1.6 m (5' 3\")   Wt 84.8 kg (187 lb)   LMP 07/23/2010 (LMP Unknown)   SpO2 98%   BMI 33.13 kg/m    Body mass index is 33.13 kg/m .  Physical Exam   GENERAL: healthy, alert and no distress  MS: no gross musculoskeletal defects noted, no edema  SKIN: numerous ulcerations on crown, no honey crusting or purulent drainage. No vesicles seen. Erythema surrounding each ulceration   NEURO: Normal strength and tone, mentation intact and speech normal  PSYCH: mentation appears normal, affect normal/bright    ----- Service Performed and Documented by Resident or Fellow ------      I was present with the student who participated in the service and in the documentation of the note. I have verified the history and personally performed the physical exam and medical decision making.           "

## 2023-10-17 NOTE — COMMUNITY RESOURCES LIST (ENGLISH)
10/17/2023   [facility] [employer]  N/A  For questions about this resource list or additional care needs, please contact your primary care clinic or care manager.  Phone: [phone]   Email: N/A   Address: [address] [place] [address]   Hours: N/A        Financial Stability       Rent and mortgage payment assistance  1  [facility] Helping Hand Distance: 8.41 miles      In-Person, Phone/Virtual   [address] [place] [address]  Language: English  Hours: Mon - Sun Appt. Only  Fees: Free   Phone: (750) 946-2605 Email: terry@[employer] Website: http://www.[facility] Centerpinghand.org          Food and Nutrition       Food pantry  2  [facility] Distance: 4.86 miles      Delivery, Pickup   [address] [place] [address]  Language: English  Hours: Mon 9:00 AM - 5:00 PM , Tue 11:00 AM - 5:00 PM , Thu 9:00 AM - 5:00 PM  Fees: Free   Phone: (724) 752-4661 Email: mail@[employer] Website: https://www.[employer].org/our-work/food-access-and-equity/     3  [facility] Distance: 7.66 miles      Delivery, Pickup   [address] [place] [address]  Language: English  Hours: Mon - Thu 8:00 AM - 3:00 PM  Fees: Free   Phone: (481) 582-9839 Website: http://www.[employer].[facility].org/     SNAP application assistance  4  [facility] - [place] Family Investment Program (MFIP) - [place] Family Investment Program (MFIP) - SNAP application assistance Distance: 8.06 miles      In-Person, Phone/Virtual   [address] [address]  Language: English  Hours: Mon - Fri 8:00 AM - 4:30 PM  Fees: Free   Phone: (999) 382-2096 Email: ramon@[facility].gov Website: https://www.[facility]./499/MFIP-Biennial-Service-Agreement-VCA-Plan     5  [facility] Community Action [place] ([facility]) - [place] Office Distance: 8.69 miles       In-Person, Phone/Virtual   [address] [place] [address]  Language: English  Hours: Mon - Fri 8:00 AM - 4:30 PM  Fees: Free   Phone: (634) 777-6294 Email: abbey@[employer] Website: https://www.[facility] CenterPlumChoiceAdolphusAll-Scrap/agency-information     Soup kitchen or free meals  6  [facility] - Family Table Meals - Family Table Meal Distance: 21.84 miles      Pickup   [address] [place] [address]  Language: English  Hours: Thu 5:00 PM - 6:30 PM  Fees: Free   Phone: (205) 416-4708 Email: Printechnologics@[employer] Website: http://www.Carmot TherapeuticsMicrolaunchers     7  [facility] - Family Table Meal Distance: 22.19 miles      In-Person, Pickup   [address] [place] [address]  Language: English  Hours: Thu 5:30 PM - 6:30 PM  Fees: Free   Phone: (245) 304-2853 Email: office@[employer]Spot Website: http://www.[employer] [employer].org          Important Numbers & Websites       Emergency Services   911  [facility] Services   311  Poison Control   (375) 673-5620  Suicide Prevention Lifeline   (457) 430-8480 (TALK)  Child Abuse Hotline   (473) 854-1371 (4-A-Child)  Sexual Assault Hotline   (705) 541-9995 (HOPE)  National Runaway Safeline   (386) 897-1700 (RUNAWAY)  All-Options Talkline   (328) 174-5539  Substance Abuse Referral   (191) 421-2422 (HELP)

## 2023-10-23 NOTE — PROGRESS NOTES
Ana Felix  :  1980  DOS: 10/25/23  MRN: [id number]    Sports Medicine Clinic Visit    PCP: Radha Bermudez    Ana Felix is a 42 year old Right hand dominant female who is seen as an ER referral presenting with right elbow injury.    Injury: Patient describes injury as at work, helping move student from a behavior situation, slipped on mat, landing on right arm with FOOSH type injury that occurred on 23.  Pain located over right deep anterior elbow joint, radiating to right wrist.  Additional Features:  Positive: swelling and weakness.  Symptoms are better with Other medications: Oxycodone.  Symptoms are worse with: moving arm, direct pressure.  Other evaluation and/or treatments so far consists of: Ice, Ibuprofen, Other medications: Oxycodone, Rest and sling, thumb spica brace.  Recent imaging completed: X-rays completed 23.  Prior History of related problems: history of lateral epicondylitis several years ago.    Social History: currently employed as school paraprofessional      Interim History - Adela 15, 2023  Since last visit on 2023 patient states that she is doing a lot better from when she was seen last. She was taking percocet prn for pain, but she ran out of rx. Would like a new rx today if possible.     Interim History 2023  Ana Felix is now 4 weeks out from injury.  Since last visit on 6/15/2023 patient has improving right elbow pain.  She been able to use right arm more, mild discomfort with bending elbow.  No taking using pain medication.  Here to review work restrictions.     Interim History 2023  Ana Felix is now 7 weeks out from injury.  Since last visit on 2023 patient overall good improvement in right elbow pain.  Patient notes mild discomfort with pushing objects and lifting objects occasionally.  Patient was unable to return to light duty work due to other health issues.     Interim History - 2023  Since last visit on 23,  patient is 13 weeks out from injury. Pt states that her elbow is feeling worse than the last time she was in. She has started PT and has had one session. She is having trouble getting into more appointments. She tries to do the exercises at home and thinks it is helping with her pain. Just here for recheck of her elbow. No interim injury.       Interim History - 2023  Since last visit on 2023 patient states she is doing very well. No scheduled PT appointments. Last PT jeana was last week able to lift 25 pounds comfortably.  Higher weight more difficult overhead, but substantial progress.  No interim injury.  No issues with work reported.    Interim History - 2023  Since last visit on 2023 patient sates that she is feeling better. Still complains of soeness at work especially when a child pulls on her arm. She would like to have the form filled out for no restrictions at work and a new letter. Would like to do one more week of 5 hour days.      Review of Systems  Musculoskeletal: as above  Remainder of review of systems is negative including constitutional, CV, pulmonary, GI, Skin and Neurologic except as noted in HPI or medical history.    Past Medical History:   Diagnosis Date    Back pain     Crohn's disease (H)     Exercise-induced asthma     Gestational diabetes     Herniation of intervertebral disc of lumbar region     Infertility     Ovarian cyst     Smoker     Status post cholecystectomy 2005     Past Surgical History:   Procedure Laterality Date    APPENDECTOMY      C/SECTION, LOW TRANSVERSE      , Low Transverse    CHOLECYSTECTOMY, LAPOROSCOPIC  2005    Cholecystectomy, Laparoscopic    COLONOSCOPY      ESOPHAGOSCOPY, GASTROSCOPY, DUODENOSCOPY (EGD), COMBINED  3/6/2014    Procedure: COMBINED ESOPHAGOSCOPY, GASTROSCOPY, DUODENOSCOPY (EGD), BIOPSY SINGLE OR MULTIPLE;  COLONOSCOPY/ UPPER GI ENDOSCOPY/ COLON/ EGD/ Abdominal pain, epigastric/ PJH;  Surgeon:  West Lomas MD;  Location: [facility] OR    HC HYSTEROSCOPY, SURGICAL; W/ ENDOMETRIAL ABLATION, ANY METHOD  7/2010    HERNIORRHAPHY VENTRAL N/A 12/11/2018    Procedure: Open ventral hernia repair;  Surgeon: Alonso Bills MD;  Location: [facility] OR    HYSTERECTOMY, SUPRACERVICAL LAPAROSCOPIC  2010    LEEP TX, CERVICAL      LEEP TX Cervical    ORTHOPEDIC SURGERY      bluged disk    partial small bowel resection  2002    Ileo-colonic resection. Crohn's disease surgery for bowel obstruction. this was a section of small bowel and large bowel and the cecum., all removed and a reanastamosis done successfully    SALPINGO OOPHORECTOMY,R/L/TORI      Right ovary    SLING TRANSVAGINAL N/A 4/4/2022    Procedure: Pubovaginal sling with Altis;  Surgeon: Eleuterio Stone MD;  Location: [facility] OR    TUBAL LIGATION       Family History   Problem Relation Age of Onset    Cancer Mother         cervical    Lipids Mother     Eye Disorder Father     Lipids Father     Alcohol/Drug Maternal Grandmother     Colon Cancer Paternal Grandmother     Diabetes Paternal Grandmother     Eye Disorder Paternal Grandmother     Diabetes Paternal Grandfather     Eye Disorder Paternal Grandfather     Inflammatory Bowel Disease Paternal Aunt         and two paternal uncles    Breast Cancer No family hx of      Objective  Pulse 81   Wt 84.8 kg (187 lb)   LMP 07/23/2010 (LMP Unknown)   SpO2 99%   BMI 33.13 kg/m      General: healthy, alert and in no distress    HEENT: no scleral icterus or conjunctival erythema   Skin: no suspicious lesions or rash. No jaundice.   CV: regular rhythm by palpation, 2+ distal pulses, no pedal edema    Resp: normal respiratory effort without conversational dyspnea   Psych: normal mood and affect    Gait: nonantalgic, appropriate coordination and balance   Neuro: normal light touch sensory exam of the extremities. Motor strength as noted below     Right Elbow exam:    Inspection:       no ecchymosis       no edema or  effusion    ROM:       Full flexion, extension, forearm supination and pronation, mild pain with terminal extension over posterior elbow, improving    Strength:       Motor function intact    Tender:       Radial head minimal       Resolved soreness in extensor/supinator mass, biceps muscle belly improved    Non-Tender:       remainder of elbow area       lateral epicondyle       medial epicondyle       olecranon    Sensation:       intact throughout hand       intact throughout forearm     Skin:       well perfused       capillary refill less than 2 seconds    Radiology:  Recent Results (from the past 744 hour(s))   MR Cervical Spine w/o Contrast    Narrative    EXAM: MR CERVICAL SPINE W/O CONTRAST  LOCATION: [facility]  DATE/TIME: 5/17/2023 5:40 PM CDT    INDICATION: Hyperreflexia.  COMPARISON: None.  TECHNIQUE: MRI Cervical Spine without IV contrast.    FINDINGS:   ALIGNMENT: Straightening of the normal cervical lordosis.    BONES: There is no evidence of a marrow-replacing process. There is no evidence of abnormal bony edema. Vertebral body heights are unremarkable.    DISCS: Mild multilevel spondylosis described in further detail below.    SPINAL CORD: No convincing signal abnormalities.    SPINAL CANAL: Moderate C5-C6 spinal canal stenosis.    SOFT TISSUES: Unremarkable. The cervical vascular flow voids appear intact.    POSTERIOR FOSSA: Limited images of the intracranial compartment demonstrate no acute abnormality.    SIGNIFICANT FINDINGS BY LEVEL:     Craniovertebral junction and C1-C2: Unremarkable.    C2-C3: Normal disc height. No herniation. Normal facets. No spinal canal or neural foraminal stenosis.     C3-C4: Normal disc height. No herniation. Bilateral uncovertebral spurring. Normal facets. No spinal canal or neural foraminal stenosis.     C4-C5: Normal disc height. No herniation. Mild uncovertebral spurring. Normal facets. No spinal canal or right neural foraminal  stenosis. Mild left neural foraminal stenosis.    C5-C6: Mild intervertebral disc height loss. Shallow disc osteophyte complex and broad-based central protrusion. Bilateral uncovertebral spurring. No significant facet arthropathy. Mild to moderate spinal canal stenosis. Mild bilateral neural foraminal   stenosis.     C6-C7: Mild intervertebral disc height loss. Shallow disc osteophyte complex. Left-sided uncovertebral spurring. No significant facet arthropathy. Mild spinal canal stenosis. No significant right neural foraminal stenosis. Moderate left neural foraminal   stenosis.     C7-T1: Normal disc height. No herniation. Normal facets. No spinal canal or neural foraminal stenosis.      Impression    IMPRESSION:  1.  Unremarkable cervical cord.  2.  At C5-C6, there is mild to moderate spinal canal stenosis.  3.  At C6-C7, there is moderate left neural foraminal stenosis.  4.  Additional degenerative changes as above.     XR Wrist Right G/E 3 Views    Narrative    XR WRIST RIGHT G/E 3 VIEWS   6/1/2023 2:10 PM     HISTORY: Fall onto outstretched hand after slip on mat at work. Wrist  pain.  COMPARISON: None.       Impression    IMPRESSION: Normal joint spaces and alignment. No fracture.    BOLIVAR AGUILAR MD         SYSTEM ID:  [id number]   Elbow XR, 2 views, right    Narrative    XR ELBOW RIGHT 2 VIEWS   6/1/2023 2:10 PM     HISTORY: Fall onto outstretched hand after slip on mat at work.  Evaluate for acute bony process after blunt trauma  COMPARISON: 10/21/2019       Impression    IMPRESSION: There is an acute, nondisplaced fracture involving the  proximal radius at the lateral radial head/neck junction. There is a  joint effusion.    BOLIVAR AGUILAR MD         SYSTEM ID:  [id number]   XR Elbow RT G/E 3 vw    Narrative    XR ELBOW RIGHT G/E 3 VIEWS   6/15/2023 2:00 PM     HISTORY: Closed nondisplaced fracture of head of right radius with  routine healing, subsequent encounter  COMPARISON: 6/1/2023       Impression     IMPRESSION: Nondisplaced radial head fracture is unchanged. Elbow  joint effusion has resolved.    CALLUM ARORA MD         SYSTEM ID:  [id number]       Assessment:  1. Closed nondisplaced fracture of head of right radius with routine healing, subsequent encounter    2. Injury of right elbow, subsequent encounter        Plan:  Discussed the assessment with the patient.  Follow up: 4 weeks   FOOSH injury from a fall at work, sx resolving  Healing nondisplaced radial head fracture, doing well overall today, mild intermittent soreness with reconditioning  Prior XR images independently visualized and reviewed with patient today in clinic  Continue to work on strengthening HEP exercises  Workability form updated, no longer requires functional restrictions, hours restriction that will continue through next week  Oral Tylenol and topical Voltaren gel reviewed as safe OTC options if needed, reviewed safe dosing strategies  Supportive care reviewed  All questions were answered today  Contact us with additional questions or concerns  Signs and sx of concern reviewed      Justus Chapman DO, CAQ  Sports Medicine Physician  [facility]            Disclaimer: This note consists of symbols derived from keyboarding, dictation and/or voice recognition software. As a result, there may be errors in the script that have gone undetected. Please consider this when interpreting information found in this chart.

## 2023-10-24 NOTE — TELEPHONE ENCOUNTER
MTM referral from: Patient's insurance     MTM referral outreach attempt #3 on 10/24/23       Outcome: KATHEM    Michelle Spence PharmD, BCACP  Medication Therapy Management Provider  Phone: [phone]  [email or web address]

## 2023-10-25 ENCOUNTER — OFFICE VISIT (OUTPATIENT)
Dept: ORTHOPEDICS | Facility: CLINIC | Age: 43
End: 2023-10-25
Payer: OTHER MISCELLANEOUS

## 2023-10-25 VITALS — HEART RATE: 81 BPM | WEIGHT: 187 LBS | OXYGEN SATURATION: 99 % | BODY MASS INDEX: 33.13 KG/M2

## 2023-10-25 DIAGNOSIS — S59.901D INJURY OF RIGHT ELBOW, SUBSEQUENT ENCOUNTER: ICD-10-CM

## 2023-10-25 DIAGNOSIS — S52.124D CLOSED NONDISPLACED FRACTURE OF HEAD OF RIGHT RADIUS WITH ROUTINE HEALING, SUBSEQUENT ENCOUNTER: Primary | ICD-10-CM

## 2023-10-25 PROCEDURE — 99213 OFFICE O/P EST LOW 20 MIN: CPT | Performed by: FAMILY MEDICINE

## 2023-10-25 NOTE — LETTER
10/25/2023         RE: Ana Felix  [address]  [place] [address]        Dear Colleague,    Thank you for referring your patient, Ana Felix, to the [facility]. Please see a copy of my visit note below.    Ana Felix  :  1980  DOS: 10/25/23  MRN: [id number]    Sports Medicine Clinic Visit    PCP: Radha Bermudez    Ana Felix is a 42 year old Right hand dominant female who is seen as an ER referral presenting with right elbow injury.    Injury: Patient describes injury as at work, helping move student from a behavior situation, slipped on mat, landing on right arm with FOOSH type injury that occurred on 23.  Pain located over right deep anterior elbow joint, radiating to right wrist.  Additional Features:  Positive: swelling and weakness.  Symptoms are better with Other medications: Oxycodone.  Symptoms are worse with: moving arm, direct pressure.  Other evaluation and/or treatments so far consists of: Ice, Ibuprofen, Other medications: Oxycodone, Rest and sling, thumb spica brace.  Recent imaging completed: X-rays completed 23.  Prior History of related problems: history of lateral epicondylitis several years ago.    Social History: currently employed as school paraprofessional      Interim History - Adela 15, 2023  Since last visit on 2023 patient states that she is doing a lot better from when she was seen last. She was taking percocet prn for pain, but she ran out of rx. Would like a new rx today if possible.     Interim History 2023  Ana Felix is now 4 weeks out from injury.  Since last visit on 6/15/2023 patient has improving right elbow pain.  She been able to use right arm more, mild discomfort with bending elbow.  No taking using pain medication.  Here to review work restrictions.     Interim History 2023  Ana Felix is now 7 weeks out from injury.  Since last visit on 2023 patient overall good improvement in  right elbow pain.  Patient notes mild discomfort with pushing objects and lifting objects occasionally.  Patient was unable to return to light duty work due to other health issues.     Interim History - 2023  Since last visit on 23, patient is 13 weeks out from injury. Pt states that her elbow is feeling worse than the last time she was in. She has started PT and has had one session. She is having trouble getting into more appointments. She tries to do the exercises at home and thinks it is helping with her pain. Just here for recheck of her elbow. No interim injury.       Interim History - 2023  Since last visit on 2023 patient states she is doing very well. No scheduled PT appointments. Last PT jeana was last week able to lift 25 pounds comfortably.  Higher weight more difficult overhead, but substantial progress.  No interim injury.  No issues with work reported.    Interim History - 2023  Since last visit on 2023 patient sates that she is feeling better. Still complains of soeness at work especially when a child pulls on her arm. She would like to have the form filled out for no restrictions at work and a new letter. Would like to do one more week of 5 hour days.      Review of Systems  Musculoskeletal: as above  Remainder of review of systems is negative including constitutional, CV, pulmonary, GI, Skin and Neurologic except as noted in HPI or medical history.    Past Medical History:   Diagnosis Date     Back pain      Crohn's disease (H)      Exercise-induced asthma      Gestational diabetes      Herniation of intervertebral disc of lumbar region      Infertility      Ovarian cyst      Smoker      Status post cholecystectomy 2005     Past Surgical History:   Procedure Laterality Date     APPENDECTOMY       C/SECTION, LOW TRANSVERSE      , Low Transverse     CHOLECYSTECTOMY, LAPOROSCOPIC  2005    Cholecystectomy, Laparoscopic      COLONOSCOPY       ESOPHAGOSCOPY, GASTROSCOPY, DUODENOSCOPY (EGD), COMBINED  3/6/2014    Procedure: COMBINED ESOPHAGOSCOPY, GASTROSCOPY, DUODENOSCOPY (EGD), BIOPSY SINGLE OR MULTIPLE;  COLONOSCOPY/ UPPER GI ENDOSCOPY/ COLON/ EGD/ Abdominal pain, epigastric/ PJH;  Surgeon: West Lomas MD;  Location: [facility] OR     HC HYSTEROSCOPY, SURGICAL; W/ ENDOMETRIAL ABLATION, ANY METHOD  7/2010     HERNIORRHAPHY VENTRAL N/A 12/11/2018    Procedure: Open ventral hernia repair;  Surgeon: Alonso Bills MD;  Location: [facility]     HYSTERECTOMY, SUPRACERVICAL LAPAROSCOPIC  2010     LEEP TX, CERVICAL      LEEP TX Cervical     ORTHOPEDIC SURGERY      bluged disk     partial small bowel resection  2002    Ileo-colonic resection. Crohn's disease surgery for bowel obstruction. this was a section of small bowel and large bowel and the cecum., all removed and a reanastamosis done successfully     SALPINGO OOPHORECTOMY,R/L/TORI      Right ovary     SLING TRANSVAGINAL N/A 4/4/2022    Procedure: Pubovaginal sling with Altis;  Surgeon: Eleuterio Stone MD;  Location: [facility] OR     TUBAL LIGATION       Family History   Problem Relation Age of Onset     Cancer Mother         cervical     Lipids Mother      Eye Disorder Father      Lipids Father      Alcohol/Drug Maternal Grandmother      Colon Cancer Paternal Grandmother      Diabetes Paternal Grandmother      Eye Disorder Paternal Grandmother      Diabetes Paternal Grandfather      Eye Disorder Paternal Grandfather      Inflammatory Bowel Disease Paternal Aunt         and two paternal uncles     Breast Cancer No family hx of      Objective  Pulse 81   Wt 84.8 kg (187 lb)   LMP 07/23/2010 (LMP Unknown)   SpO2 99%   BMI 33.13 kg/m      General: healthy, alert and in no distress    HEENT: no scleral icterus or conjunctival erythema   Skin: no suspicious lesions or rash. No jaundice.   CV: regular rhythm by palpation, 2+ distal pulses, no pedal edema    Resp: normal respiratory effort  without conversational dyspnea   Psych: normal mood and affect    Gait: nonantalgic, appropriate coordination and balance   Neuro: normal light touch sensory exam of the extremities. Motor strength as noted below     Right Elbow exam:    Inspection:       no ecchymosis       no edema or effusion    ROM:       Full flexion, extension, forearm supination and pronation, mild pain with terminal extension over posterior elbow, improving    Strength:       Motor function intact    Tender:       Radial head minimal       Resolved soreness in extensor/supinator mass, biceps muscle belly improved    Non-Tender:       remainder of elbow area       lateral epicondyle       medial epicondyle       olecranon    Sensation:       intact throughout hand       intact throughout forearm     Skin:       well perfused       capillary refill less than 2 seconds    Radiology:  Recent Results (from the past 744 hour(s))   MR Cervical Spine w/o Contrast    Narrative    EXAM: MR CERVICAL SPINE W/O CONTRAST  LOCATION: [facility]  DATE/TIME: 5/17/2023 5:40 PM CDT    INDICATION: Hyperreflexia.  COMPARISON: None.  TECHNIQUE: MRI Cervical Spine without IV contrast.    FINDINGS:   ALIGNMENT: Straightening of the normal cervical lordosis.    BONES: There is no evidence of a marrow-replacing process. There is no evidence of abnormal bony edema. Vertebral body heights are unremarkable.    DISCS: Mild multilevel spondylosis described in further detail below.    SPINAL CORD: No convincing signal abnormalities.    SPINAL CANAL: Moderate C5-C6 spinal canal stenosis.    SOFT TISSUES: Unremarkable. The cervical vascular flow voids appear intact.    POSTERIOR FOSSA: Limited images of the intracranial compartment demonstrate no acute abnormality.    SIGNIFICANT FINDINGS BY LEVEL:     Craniovertebral junction and C1-C2: Unremarkable.    C2-C3: Normal disc height. No herniation. Normal facets. No spinal canal or neural foraminal  stenosis.     C3-C4: Normal disc height. No herniation. Bilateral uncovertebral spurring. Normal facets. No spinal canal or neural foraminal stenosis.     C4-C5: Normal disc height. No herniation. Mild uncovertebral spurring. Normal facets. No spinal canal or right neural foraminal stenosis. Mild left neural foraminal stenosis.    C5-C6: Mild intervertebral disc height loss. Shallow disc osteophyte complex and broad-based central protrusion. Bilateral uncovertebral spurring. No significant facet arthropathy. Mild to moderate spinal canal stenosis. Mild bilateral neural foraminal   stenosis.     C6-C7: Mild intervertebral disc height loss. Shallow disc osteophyte complex. Left-sided uncovertebral spurring. No significant facet arthropathy. Mild spinal canal stenosis. No significant right neural foraminal stenosis. Moderate left neural foraminal   stenosis.     C7-T1: Normal disc height. No herniation. Normal facets. No spinal canal or neural foraminal stenosis.      Impression    IMPRESSION:  1.  Unremarkable cervical cord.  2.  At C5-C6, there is mild to moderate spinal canal stenosis.  3.  At C6-C7, there is moderate left neural foraminal stenosis.  4.  Additional degenerative changes as above.     XR Wrist Right G/E 3 Views    Narrative    XR WRIST RIGHT G/E 3 VIEWS   6/1/2023 2:10 PM     HISTORY: Fall onto outstretched hand after slip on mat at work. Wrist  pain.  COMPARISON: None.       Impression    IMPRESSION: Normal joint spaces and alignment. No fracture.    BOLIVAR AGUILAR MD         SYSTEM ID:  [id number]   Elbow XR, 2 views, right    Narrative    XR ELBOW RIGHT 2 VIEWS   6/1/2023 2:10 PM     HISTORY: Fall onto outstretched hand after slip on mat at work.  Evaluate for acute bony process after blunt trauma  COMPARISON: 10/21/2019       Impression    IMPRESSION: There is an acute, nondisplaced fracture involving the  proximal radius at the lateral radial head/neck junction. There is a  joint  effusion.    BOLIVAR AGUILAR MD         SYSTEM ID:  [id number]   XR Elbow RT G/E 3 vw    Narrative    XR ELBOW RIGHT G/E 3 VIEWS   6/15/2023 2:00 PM     HISTORY: Closed nondisplaced fracture of head of right radius with  routine healing, subsequent encounter  COMPARISON: 6/1/2023       Impression    IMPRESSION: Nondisplaced radial head fracture is unchanged. Elbow  joint effusion has resolved.    CALLUM ARORA MD         SYSTEM ID:  [id number]       Assessment:  1. Closed nondisplaced fracture of head of right radius with routine healing, subsequent encounter    2. Injury of right elbow, subsequent encounter        Plan:  Discussed the assessment with the patient.  Follow up: 4 weeks   FOOSH injury from a fall at work, sx resolving  Healing nondisplaced radial head fracture, doing well overall today, mild intermittent soreness with reconditioning  Prior XR images independently visualized and reviewed with patient today in clinic  Continue to work on strengthening HEP exercises  Workability form updated, no longer requires functional restrictions, hours restriction that will continue through next week  Oral Tylenol and topical Voltaren gel reviewed as safe OTC options if needed, reviewed safe dosing strategies  Supportive care reviewed  All questions were answered today  Contact us with additional questions or concerns  Signs and sx of concern reviewed      Justus Chapman DO, CAQ  Sports Medicine Physician  [facility]            Disclaimer: This note consists of symbols derived from keyboarding, dictation and/or voice recognition software. As a result, there may be errors in the script that have gone undetected. Please consider this when interpreting information found in this chart.      Again, thank you for allowing me to participate in the care of your patient.        Sincerely,        Justus Chapman DO

## 2023-10-25 NOTE — LETTER
October 25, 2023      Ana continues to be under my care for her right elbow fracture that occurred on 6/1/2023. She is doing well overall.  She no longer requires functional duty restrictions for work, but will continue to have an hour restriction of 5 hours per day for the next one week.  She can return to normal hours starting 11/6/2023.  Updated recommendations will be provided at her next visit in 4 weeks, sooner if needed.  She may still use a compression sleeve on her right arm for comfort, and is allowed as needed.  I am hopeful that we will be at [facility] at next visit.      Justus Mendez DO, KILO  Sports Medicine Physician  [facility]

## 2023-10-30 ENCOUNTER — TRANSFERRED RECORDS (OUTPATIENT)
Dept: HEALTH INFORMATION MANAGEMENT | Facility: CLINIC | Age: 43
End: 2023-10-30
Payer: COMMERCIAL

## 2023-10-30 LAB
ALT SERPL-CCNC: 10 IU/L (ref 0–32)
AST SERPL-CCNC: 25 IU/L (ref 0–40)

## 2023-10-31 ENCOUNTER — OFFICE VISIT (OUTPATIENT)
Dept: DERMATOLOGY | Facility: CLINIC | Age: 43
End: 2023-10-31
Payer: COMMERCIAL

## 2023-10-31 DIAGNOSIS — D18.01 CHERRY ANGIOMA: ICD-10-CM

## 2023-10-31 DIAGNOSIS — D23.9 DERMATOFIBROMA: ICD-10-CM

## 2023-10-31 DIAGNOSIS — L82.1 SEBORRHEIC KERATOSIS: ICD-10-CM

## 2023-10-31 DIAGNOSIS — L82.0 INFLAMED SEBORRHEIC KERATOSIS: ICD-10-CM

## 2023-10-31 DIAGNOSIS — L30.9 DERMATITIS: Primary | ICD-10-CM

## 2023-10-31 DIAGNOSIS — L81.4 LENTIGO: ICD-10-CM

## 2023-10-31 PROCEDURE — 99203 OFFICE O/P NEW LOW 30 MIN: CPT | Mod: 25 | Performed by: PHYSICIAN ASSISTANT

## 2023-10-31 PROCEDURE — 17110 DESTRUCTION B9 LES UP TO 14: CPT | Performed by: PHYSICIAN ASSISTANT

## 2023-10-31 RX ORDER — CLOBETASOL PROPIONATE 0.5 MG/ML
SOLUTION TOPICAL
Qty: 50 ML | Refills: 5 | Status: SHIPPED | OUTPATIENT
Start: 2023-10-31 | End: 2023-12-29

## 2023-10-31 ASSESSMENT — PAIN SCALES - GENERAL: PAINLEVEL: NO PAIN (0)

## 2023-10-31 NOTE — PROGRESS NOTES
Ana Felix is an extremely pleasant 42 year old year old female patient here today for skin check and history of shingles. She notes she developed shingles in August, but still flared on scalp. She notes it is itchy. She is finishing her second round of valtrex and using triamcinolone which seems to be helping.  Patient has no other skin complaints today.  Remainder of the HPI, Meds, PMH, Allergies, FH, and SH was reviewed in chart.    Pertinent Hx:  no personal history of skin cancer   Past Medical History:   Diagnosis Date    Back pain     Crohn's disease (H)     Exercise-induced asthma     Gestational diabetes     Herniation of intervertebral disc of lumbar region     Infertility     Ovarian cyst     Smoker     Status post cholecystectomy 2005       Past Surgical History:   Procedure Laterality Date    APPENDECTOMY      C/SECTION, LOW TRANSVERSE      , Low Transverse    CHOLECYSTECTOMY, LAPOROSCOPIC  2005    Cholecystectomy, Laparoscopic    COLONOSCOPY      ESOPHAGOSCOPY, GASTROSCOPY, DUODENOSCOPY (EGD), COMBINED  3/6/2014    Procedure: COMBINED ESOPHAGOSCOPY, GASTROSCOPY, DUODENOSCOPY (EGD), BIOPSY SINGLE OR MULTIPLE;  COLONOSCOPY/ UPPER GI ENDOSCOPY/ COLON/ EGD/ Abdominal pain, epigastric/ PJH;  Surgeon: West Lomas MD;  Location:  OR     HYSTEROSCOPY, SURGICAL; W/ ENDOMETRIAL ABLATION, ANY METHOD  2010    HERNIORRHAPHY VENTRAL N/A 2018    Procedure: Open ventral hernia repair;  Surgeon: Alonso Bills MD;  Location: [facility] OR    HYSTERECTOMY, SUPRACERVICAL LAPAROSCOPIC      LEEP TX, CERVICAL      LEEP TX Cervical    ORTHOPEDIC SURGERY      bluged disk    partial small bowel resection      Ileo-colonic resection. Crohn's disease surgery for bowel obstruction. this was a section of small bowel and large bowel and the cecum., all removed and a reanastamosis done successfully    SALPINGO OOPHORECTOMY,R/L/TORI      Right ovary    SLING TRANSVAGINAL N/A 2022     Procedure: Pubovaginal sling with Altis;  Surgeon: Eleuterio Stone MD;  Location: [facility] OR    TUBAL LIGATION          Family History   Problem Relation Age of Onset    Cancer Mother         cervical    Lipids Mother     Eye Disorder Father     Lipids Father     Alcohol/Drug Maternal Grandmother     Colon Cancer Paternal Grandmother     Diabetes Paternal Grandmother     Eye Disorder Paternal Grandmother     Diabetes Paternal Grandfather     Eye Disorder Paternal Grandfather     Inflammatory Bowel Disease Paternal Aunt         and two paternal uncles    Breast Cancer No family hx of        Social History     Socioeconomic History    Marital status:      Spouse name: Not on file    Number of children: Not on file    Years of education: Not on file    Highest education level: Not on file   Occupational History    Not on file   Tobacco Use    Smoking status: Some Days     Packs/day: .5     Types: Cigarettes     Passive exposure: Never    Smokeless tobacco: Never   Vaping Use    Vaping Use: Never used   Substance and Sexual Activity    Alcohol use: Yes     Comment: very rarely    Drug use: No    Sexual activity: Yes     Partners: Male     Birth control/protection: Female Surgical     Comment: Hyst   Other Topics Concern    Parent/sibling w/ CABG, MI or angioplasty before 65F 55M? Not Asked   Social History Narrative    Not on file     Social Determinants of Health     Financial Resource Strain: Low Risk  (10/17/2023)    Financial Resource Strain     Within the past 12 months, have you or your family members you live with been unable to get utilities (heat, electricity) when it was really needed?: No   Food Insecurity: High Risk (10/17/2023)    Food Insecurity     Within the past 12 months, did you worry that your food would run out before you got money to buy more?: Yes     Within the past 12 months, did the food you bought just not last and you didn t have money to get more?: No   Transportation Needs: Low Risk   (10/17/2023)    Transportation Needs     Within the past 12 months, has lack of transportation kept you from medical appointments, getting your medicines, non-medical meetings or appointments, work, or from getting things that you need?: No   Physical Activity: Not on file   Stress: Not on file   Social Connections: Not on file   Interpersonal Safety: Low Risk  (10/17/2023)    Interpersonal Safety     Do you feel physically and emotionally safe where you currently live?: Yes     Within the past 12 months, have you been hit, slapped, kicked or otherwise physically hurt by someone?: No     Within the past 12 months, have you been humiliated or emotionally abused in other ways by your partner or ex-partner?: No   Housing Stability: High Risk (10/17/2023)    Housing Stability     Do you have housing? : Yes     Are you worried about losing your housing?: Yes       Outpatient Encounter Medications as of 10/31/2023   Medication Sig Dispense Refill    clobetasol (TEMOVATE) 0.05 % external solution Apply twice daily as needed to scalp. 50 mL 5    azaTHIOprine (IMURAN) 50 MG tablet Take 50 mg by mouth daily       busPIRone (BUSPAR) 10 MG tablet Take 1 tablet (10 mg) by mouth 2 times daily - start with a half tab (5mg) twice daily x4 days 120 tablet 0    celecoxib (CELEBREX) 200 MG capsule Take 1 capsule (200 mg) by mouth daily 30 capsule 3    cyanocobalamin (VITAMIN B12) 1000 MCG/ML injection Inject 1 mL into the muscle every 30 days      fentaNYL (DURAGESIC) 12 mcg/hr patch 72 hr Place 1 patch onto the skin every 72 hours      fentaNYL (DURAGESIC) 25 mcg/hr patch 72 hr Place 1 patch onto the skin every 72 hours      gabapentin (NEURONTIN) 300 MG capsule Take 2 capsules (600 mg) by mouth 3 times daily 180 capsule 0    hydrOXYzine (ATARAX) 25 MG tablet Take 0.5-2 tablets (12.5-50 mg) by mouth 3 times daily as needed for anxiety - May use 50-100mg at bedtime for sleep. Max dose 400mg/day 120 tablet 5    montelukast (SINGULAIR)  10 MG tablet Take 1 tablet (10 mg) by mouth daily 90 tablet 1    sertraline (ZOLOFT) 100 MG tablet Take 2 tablets (200 mg) by mouth daily 180 tablet 3    traZODone (DESYREL) 50 MG tablet Take 1-3 tablets ( mg) by mouth At Bedtime 60 tablet 3    triamcinolone (KENALOG) 0.1 % external ointment Apply topically 2 times daily x5-10 days 30 g 1    valACYclovir (VALTREX) 1000 mg tablet Take 1 tablet (1,000 mg) by mouth 3 times daily for 7 days, THEN 0.5 tablets (500 mg) 3 times daily for 7 days. 32 tablet 0    valACYclovir (VALTREX) 1000 mg tablet Take 1 tablet (1,000 mg) by mouth 3 times daily for 7 days 21 tablet 0    VENTOLIN  (90 Base) MCG/ACT inhaler INHALE 2 PUFFS INTO THE LUNGS EVERY 4 HOURS AS NEEDED FOR SHORTNESS OF BREATH OR DIFFICULT BREATHING 18 g 1     No facility-administered encounter medications on file as of 10/31/2023.             O:   NAD, WDWN, Alert & Oriented, Mood & Affect wnl, Vitals stable   Here today alone   LMP 07/23/2010 (LMP Unknown)    General appearance normal   Vitals stable   Alert, oriented and in no acute distress      Scabs on scalp, mostly right side of scalp   Scaly papules on hands   Stuck on papules and brown macules on trunk and ext   Red papules on trunk  Firm papule with positive dimple sign on left ankle     The remainder of skin exam is normal      Eyes: Conjunctivae/lids:Normal     ENT: Lips normal    MSK:Normal    Cardiovascular: peripheral edema none    Pulm: Breathing Normal    Neuro/Psych: Orientation:Alert and Orientedx3 ; Mood/Affect:normal   A/P:  1. Inflamed seborrheic keratosis on mid upper back x 3, prurigo vs wart on hand x2   LN2:  Treated with LN2 for 5s for 1-2 cycles. Warned risks of blistering, pain, pigment change, scarring, and incomplete resolution.  Advised patient to return if lesions do not completely resolve.  Wound care sheet given.  2. Resolving shingles vs prurigo  Finish valtrex  Apply clobetasol solution twice daily as needed.   3.  Seborrheic keratosis, lentigo, angioma, dermatofibroma   It was a pleasure speaking to Ana Felix today.  BENIGN LESIONS DISCUSSED WITH PATIENT:  I discussed the specifics of tumor, prognosis, and genetics of benign lesions.  I explained that treatment of these lesions would be purely cosmetic and not medically neccessary.  I discussed with patient different removal options including excision, cautery and /or laser.      Nature and genetics of benign skin lesions dicussed with patient.  Signs and Symptoms of skin cancer discussed with patient.  ABCDEs of melanoma reviewed with patient.  Patient encouraged to perform monthly skin exams.  UV precautions reviewed with patient.  Risks of non-melanoma skin cancer discussed with patient   Return to clinic in one year or sooner if needed.

## 2023-10-31 NOTE — NURSING NOTE
Chief Complaint   Patient presents with    Skin Check     Spot on back        There were no vitals filed for this visit.  Wt Readings from Last 1 Encounters:   10/25/23 84.8 kg (187 lb)       Venecia Caraballo LPN .................10/31/2023

## 2023-10-31 NOTE — LETTER
10/31/2023         RE: Ana Felix  [address]  [place] [address]        Dear Colleague,    Thank you for referring your patient, Ana Felix, to the [facility]. Please see a copy of my visit note below.    Ana Felix is an extremely pleasant 42 year old year old female patient here today for skin check and history of shingles. She notes she developed shingles in August, but still flared on scalp. She notes it is itchy. She is finishing her second round of valtrex and using triamcinolone which seems to be helping.  Patient has no other skin complaints today.  Remainder of the HPI, Meds, PMH, Allergies, FH, and SH was reviewed in chart.    Pertinent Hx:  no personal history of skin cancer   Past Medical History:   Diagnosis Date     Back pain      Crohn's disease (H)      Exercise-induced asthma      Gestational diabetes      Herniation of intervertebral disc of lumbar region      Infertility      Ovarian cyst      Smoker      Status post cholecystectomy 2005       Past Surgical History:   Procedure Laterality Date     APPENDECTOMY       C/SECTION, LOW TRANSVERSE      , Low Transverse     CHOLECYSTECTOMY, LAPOROSCOPIC  2005    Cholecystectomy, Laparoscopic     COLONOSCOPY       ESOPHAGOSCOPY, GASTROSCOPY, DUODENOSCOPY (EGD), COMBINED  3/6/2014    Procedure: COMBINED ESOPHAGOSCOPY, GASTROSCOPY, DUODENOSCOPY (EGD), BIOPSY SINGLE OR MULTIPLE;  COLONOSCOPY/ UPPER GI ENDOSCOPY/ COLON/ EGD/ Abdominal pain, epigastric/ PJH;  Surgeon: West Lomas MD;  Location:  OR      HYSTEROSCOPY, SURGICAL; W/ ENDOMETRIAL ABLATION, ANY METHOD  2010     HERNIORRHAPHY VENTRAL N/A 2018    Procedure: Open ventral hernia repair;  Surgeon: Alonso Bills MD;  Location: [facility] OR     HYSTERECTOMY, SUPRACERVICAL LAPAROSCOPIC  2010     LEEP TX, CERVICAL      LEEP TX Cervical     ORTHOPEDIC SURGERY      bluged disk     partial small bowel resection      Ileo-colonic  resection. Crohn's disease surgery for bowel obstruction. this was a section of small bowel and large bowel and the cecum., all removed and a reanastamosis done successfully     SALPINGO OOPHORECTOMY,R/L/TORI      Right ovary     SLING TRANSVAGINAL N/A 4/4/2022    Procedure: Pubovaginal sling with Altis;  Surgeon: Eleuterio Stone MD;  Location: [facility] OR     TUBAL LIGATION          Family History   Problem Relation Age of Onset     Cancer Mother         cervical     Lipids Mother      Eye Disorder Father      Lipids Father      Alcohol/Drug Maternal Grandmother      Colon Cancer Paternal Grandmother      Diabetes Paternal Grandmother      Eye Disorder Paternal Grandmother      Diabetes Paternal Grandfather      Eye Disorder Paternal Grandfather      Inflammatory Bowel Disease Paternal Aunt         and two paternal uncles     Breast Cancer No family hx of        Social History     Socioeconomic History     Marital status:      Spouse name: Not on file     Number of children: Not on file     Years of education: Not on file     Highest education level: Not on file   Occupational History     Not on file   Tobacco Use     Smoking status: Some Days     Packs/day: .5     Types: Cigarettes     Passive exposure: Never     Smokeless tobacco: Never   Vaping Use     Vaping Use: Never used   Substance and Sexual Activity     Alcohol use: Yes     Comment: very rarely     Drug use: No     Sexual activity: Yes     Partners: Male     Birth control/protection: Female Surgical     Comment: Hyst   Other Topics Concern     Parent/sibling w/ CABG, MI or angioplasty before 65F 55M? Not Asked   Social History Narrative     Not on file     Social Determinants of Health     Financial Resource Strain: Low Risk  (10/17/2023)    Financial Resource Strain      Within the past 12 months, have you or your family members you live with been unable to get utilities (heat, electricity) when it was really needed?: No   Food Insecurity: High Risk  (10/17/2023)    Food Insecurity      Within the past 12 months, did you worry that your food would run out before you got money to buy more?: Yes      Within the past 12 months, did the food you bought just not last and you didn t have money to get more?: No   Transportation Needs: Low Risk  (10/17/2023)    Transportation Needs      Within the past 12 months, has lack of transportation kept you from medical appointments, getting your medicines, non-medical meetings or appointments, work, or from getting things that you need?: No   Physical Activity: Not on file   Stress: Not on file   Social Connections: Not on file   Interpersonal Safety: Low Risk  (10/17/2023)    Interpersonal Safety      Do you feel physically and emotionally safe where you currently live?: Yes      Within the past 12 months, have you been hit, slapped, kicked or otherwise physically hurt by someone?: No      Within the past 12 months, have you been humiliated or emotionally abused in other ways by your partner or ex-partner?: No   Housing Stability: High Risk (10/17/2023)    Housing Stability      Do you have housing? : Yes      Are you worried about losing your housing?: Yes       Outpatient Encounter Medications as of 10/31/2023   Medication Sig Dispense Refill     clobetasol (TEMOVATE) 0.05 % external solution Apply twice daily as needed to scalp. 50 mL 5     azaTHIOprine (IMURAN) 50 MG tablet Take 50 mg by mouth daily        busPIRone (BUSPAR) 10 MG tablet Take 1 tablet (10 mg) by mouth 2 times daily - start with a half tab (5mg) twice daily x4 days 120 tablet 0     celecoxib (CELEBREX) 200 MG capsule Take 1 capsule (200 mg) by mouth daily 30 capsule 3     cyanocobalamin (VITAMIN B12) 1000 MCG/ML injection Inject 1 mL into the muscle every 30 days       fentaNYL (DURAGESIC) 12 mcg/hr patch 72 hr Place 1 patch onto the skin every 72 hours       fentaNYL (DURAGESIC) 25 mcg/hr patch 72 hr Place 1 patch onto the skin every 72 hours        gabapentin (NEURONTIN) 300 MG capsule Take 2 capsules (600 mg) by mouth 3 times daily 180 capsule 0     hydrOXYzine (ATARAX) 25 MG tablet Take 0.5-2 tablets (12.5-50 mg) by mouth 3 times daily as needed for anxiety - May use 50-100mg at bedtime for sleep. Max dose 400mg/day 120 tablet 5     montelukast (SINGULAIR) 10 MG tablet Take 1 tablet (10 mg) by mouth daily 90 tablet 1     sertraline (ZOLOFT) 100 MG tablet Take 2 tablets (200 mg) by mouth daily 180 tablet 3     traZODone (DESYREL) 50 MG tablet Take 1-3 tablets ( mg) by mouth At Bedtime 60 tablet 3     triamcinolone (KENALOG) 0.1 % external ointment Apply topically 2 times daily x5-10 days 30 g 1     valACYclovir (VALTREX) 1000 mg tablet Take 1 tablet (1,000 mg) by mouth 3 times daily for 7 days, THEN 0.5 tablets (500 mg) 3 times daily for 7 days. 32 tablet 0     valACYclovir (VALTREX) 1000 mg tablet Take 1 tablet (1,000 mg) by mouth 3 times daily for 7 days 21 tablet 0     VENTOLIN  (90 Base) MCG/ACT inhaler INHALE 2 PUFFS INTO THE LUNGS EVERY 4 HOURS AS NEEDED FOR SHORTNESS OF BREATH OR DIFFICULT BREATHING 18 g 1     No facility-administered encounter medications on file as of 10/31/2023.             O:   NAD, WDWN, Alert & Oriented, Mood & Affect wnl, Vitals stable   Here today alone   LMP 07/23/2010 (LMP Unknown)    General appearance normal   Vitals stable   Alert, oriented and in no acute distress      Scabs on scalp, mostly right side of scalp   Scaly papules on hands   Stuck on papules and brown macules on trunk and ext   Red papules on trunk  Firm papule with positive dimple sign on left ankle     The remainder of skin exam is normal      Eyes: Conjunctivae/lids:Normal     ENT: Lips normal    MSK:Normal    Cardiovascular: peripheral edema none    Pulm: Breathing Normal    Neuro/Psych: Orientation:Alert and Orientedx3 ; Mood/Affect:normal   A/P:  1. Inflamed seborrheic keratosis on mid upper back x 3, prurigo vs wart on hand x2   LN2:   Treated with LN2 for 5s for 1-2 cycles. Warned risks of blistering, pain, pigment change, scarring, and incomplete resolution.  Advised patient to return if lesions do not completely resolve.  Wound care sheet given.  2. Resolving shingles vs prurigo  Finish valtrex  Apply clobetasol solution twice daily as needed.   3. Seborrheic keratosis, lentigo, angioma, dermatofibroma   It was a pleasure speaking to Ana Felix today.  BENIGN LESIONS DISCUSSED WITH PATIENT:  I discussed the specifics of tumor, prognosis, and genetics of benign lesions.  I explained that treatment of these lesions would be purely cosmetic and not medically neccessary.  I discussed with patient different removal options including excision, cautery and /or laser.      Nature and genetics of benign skin lesions dicussed with patient.  Signs and Symptoms of skin cancer discussed with patient.  ABCDEs of melanoma reviewed with patient.  Patient encouraged to perform monthly skin exams.  UV precautions reviewed with patient.  Risks of non-melanoma skin cancer discussed with patient   Return to clinic in one year or sooner if needed.       Again, thank you for allowing me to participate in the care of your patient.        Sincerely,        Shadia Wolfe PA-C

## 2023-11-09 ENCOUNTER — MYC REFILL (OUTPATIENT)
Dept: FAMILY MEDICINE | Facility: CLINIC | Age: 43
End: 2023-11-09
Payer: COMMERCIAL

## 2023-11-09 DIAGNOSIS — F41.1 GAD (GENERALIZED ANXIETY DISORDER): ICD-10-CM

## 2023-11-09 RX ORDER — BUSPIRONE HYDROCHLORIDE 10 MG/1
10 TABLET ORAL 2 TIMES DAILY
Qty: 180 TABLET | Refills: 0 | Status: SHIPPED | OUTPATIENT
Start: 2023-11-09 | End: 2023-12-29

## 2023-11-09 NOTE — TELEPHONE ENCOUNTER
My Chart message sent to patient to confirm current dosing.     Mariella Ramirez RN BSN  [facility]

## 2023-11-13 DIAGNOSIS — G47.00 INSOMNIA, UNSPECIFIED TYPE: ICD-10-CM

## 2023-11-14 RX ORDER — TRAZODONE HYDROCHLORIDE 50 MG/1
TABLET, FILM COATED ORAL
Qty: 60 TABLET | Refills: 3 | Status: SHIPPED | OUTPATIENT
Start: 2023-11-14 | End: 2023-12-29

## 2023-12-13 ENCOUNTER — OFFICE VISIT (OUTPATIENT)
Dept: ORTHOPEDICS | Facility: CLINIC | Age: 43
End: 2023-12-13
Payer: OTHER MISCELLANEOUS

## 2023-12-13 VITALS
SYSTOLIC BLOOD PRESSURE: 121 MMHG | DIASTOLIC BLOOD PRESSURE: 82 MMHG | WEIGHT: 187 LBS | HEART RATE: 67 BPM | BODY MASS INDEX: 33.13 KG/M2

## 2023-12-13 DIAGNOSIS — S59.901D INJURY OF RIGHT ELBOW, SUBSEQUENT ENCOUNTER: ICD-10-CM

## 2023-12-13 DIAGNOSIS — S63.501D RIGHT WRIST SPRAIN, SUBSEQUENT ENCOUNTER: ICD-10-CM

## 2023-12-13 DIAGNOSIS — S52.124D CLOSED NONDISPLACED FRACTURE OF HEAD OF RIGHT RADIUS WITH ROUTINE HEALING, SUBSEQUENT ENCOUNTER: Primary | ICD-10-CM

## 2023-12-13 PROCEDURE — 99213 OFFICE O/P EST LOW 20 MIN: CPT | Performed by: FAMILY MEDICINE

## 2023-12-13 NOTE — LETTER
2023         RE: Ana Felix  [address]  [place] [address]        Dear Colleague,    Thank you for referring your patient, Ana Felix, to the [facility]. Please see a copy of my visit note below.    Ana Felix  :  1980  DOS: 23  MRN: [id number]    Sports Medicine Clinic Visit    PCP: Radha Bermudez    Ana Felix is a 42 year old Right hand dominant female who is seen as an ER referral presenting with right elbow injury.    Injury: Patient describes injury as at work, helping move student from a behavior situation, slipped on mat, landing on right arm with FOOSH type injury that occurred on 23.  Pain located over right deep anterior elbow joint, radiating to right wrist.  Additional Features:  Positive: swelling and weakness.  Symptoms are better with Other medications: Oxycodone.  Symptoms are worse with: moving arm, direct pressure.  Other evaluation and/or treatments so far consists of: Ice, Ibuprofen, Other medications: Oxycodone, Rest and sling, thumb spica brace.  Recent imaging completed: X-rays completed 23.  Prior History of related problems: history of lateral epicondylitis several years ago.    Social History: currently employed as school paraprofessional      Interim History - Adela 15, 2023  Since last visit on 2023 patient states that she is doing a lot better from when she was seen last. She was taking percocet prn for pain, but she ran out of rx. Would like a new rx today if possible.     Interim History 2023  Ana Felix is now 4 weeks out from injury.  Since last visit on 6/15/2023 patient has improving right elbow pain.  She been able to use right arm more, mild discomfort with bending elbow.  No taking using pain medication.  Here to review work restrictions.     Interim History 2023  Ana Felix is now 7 weeks out from injury.  Since last visit on 2023 patient overall good improvement in  right elbow pain.  Patient notes mild discomfort with pushing objects and lifting objects occasionally.  Patient was unable to return to light duty work due to other health issues.     Interim History - August 30, 2023  Since last visit on 7/20/23, patient is 13 weeks out from injury. Pt states that her elbow is feeling worse than the last time she was in. She has started PT and has had one session. She is having trouble getting into more appointments. She tries to do the exercises at home and thinks it is helping with her pain. Just here for recheck of her elbow. No interim injury.       Interim History - September 27, 2023  Since last visit on 8/30/2023 patient states she is doing very well. No scheduled PT appointments. Last PT jeana was last week able to lift 25 pounds comfortably.  Higher weight more difficult overhead, but substantial progress.  No interim injury.  No issues with work reported.    Interim History - October 25, 2023  Since last visit on 9/27/2023 patient sates that she is feeling better. Still complains of soeness at work especially when a child pulls on her arm. She would like to have the form filled out for no restrictions at work and a new letter. Would like to do one more week of 5 hour days.      Interim History - December 13, 2023  Since last visit on 10/25/2023 patient states she is doing better. Here for last recheck of right elbow and needing to be cleared from restrictions for work. No significant concerns today. No interim injury.       Review of Systems  Musculoskeletal: as above  Remainder of review of systems is negative including constitutional, CV, pulmonary, GI, Skin and Neurologic except as noted in HPI or medical history.    Past Medical History:   Diagnosis Date     Back pain      Crohn's disease (H)      Exercise-induced asthma      Gestational diabetes      Herniation of intervertebral disc of lumbar region      Infertility      Ovarian cyst      Smoker      Status post  cholecystectomy 2005     Past Surgical History:   Procedure Laterality Date     APPENDECTOMY       C/SECTION, LOW TRANSVERSE      , Low Transverse     CHOLECYSTECTOMY, LAPOROSCOPIC  2005    Cholecystectomy, Laparoscopic     COLONOSCOPY       ESOPHAGOSCOPY, GASTROSCOPY, DUODENOSCOPY (EGD), COMBINED  3/6/2014    Procedure: COMBINED ESOPHAGOSCOPY, GASTROSCOPY, DUODENOSCOPY (EGD), BIOPSY SINGLE OR MULTIPLE;  COLONOSCOPY/ UPPER GI ENDOSCOPY/ COLON/ EGD/ Abdominal pain, epigastric/ PJH;  Surgeon: West Lomas MD;  Location: [facility] OR      HYSTEROSCOPY, SURGICAL; W/ ENDOMETRIAL ABLATION, ANY METHOD  2010     HERNIORRHAPHY VENTRAL N/A 2018    Procedure: Open ventral hernia repair;  Surgeon: Alonso Bills MD;  Location: [facility] OR     HYSTERECTOMY, SUPRACERVICAL LAPAROSCOPIC       LEEP TX, CERVICAL      LEEP TX Cervical     ORTHOPEDIC SURGERY      bluged disk     partial small bowel resection      Ileo-colonic resection. Crohn's disease surgery for bowel obstruction. this was a section of small bowel and large bowel and the cecum., all removed and a reanastamosis done successfully     SALPINGO OOPHORECTOMY,R/L/TORI      Right ovary     SLING TRANSVAGINAL N/A 2022    Procedure: Pubovaginal sling with Altis;  Surgeon: Eleuterio Stone MD;  Location: [facility] OR     TUBAL LIGATION       Family History   Problem Relation Age of Onset     Cancer Mother         cervical     Lipids Mother      Eye Disorder Father      Lipids Father      Alcohol/Drug Maternal Grandmother      Colon Cancer Paternal Grandmother      Diabetes Paternal Grandmother      Eye Disorder Paternal Grandmother      Diabetes Paternal Grandfather      Eye Disorder Paternal Grandfather      Inflammatory Bowel Disease Paternal Aunt         and two paternal uncles     Breast Cancer No family hx of      Objective  /82   Pulse 67   Wt 84.8 kg (187 lb)   LMP 2010 (LMP Unknown)   BMI 33.13 kg/m      General:  healthy, alert and in no distress    HEENT: no scleral icterus or conjunctival erythema   Skin: no suspicious lesions or rash. No jaundice.   CV: regular rhythm by palpation, 2+ distal pulses, no pedal edema    Resp: normal respiratory effort without conversational dyspnea   Psych: normal mood and affect    Gait: nonantalgic, appropriate coordination and balance   Neuro: normal light touch sensory exam of the extremities. Motor strength as noted below     Right Elbow exam:    Inspection:       no ecchymosis       no edema or effusion    ROM:       Full flexion, extension, forearm supination and pronation, no pain with terminal extension over posterior elbow    Strength:       Motor function intact    Tender:       Radial head minimal       Resolved soreness in extensor/supinator mass, biceps muscle belly improved    Non-Tender:       remainder of elbow area       lateral epicondyle       medial epicondyle       olecranon    Sensation:       intact throughout hand       intact throughout forearm     Skin:       well perfused       capillary refill less than 2 seconds    Radiology:  Recent Results (from the past 744 hour(s))   MR Cervical Spine w/o Contrast    Narrative    EXAM: MR CERVICAL SPINE W/O CONTRAST  LOCATION: [facility]  DATE/TIME: 5/17/2023 5:40 PM CDT    INDICATION: Hyperreflexia.  COMPARISON: None.  TECHNIQUE: MRI Cervical Spine without IV contrast.    FINDINGS:   ALIGNMENT: Straightening of the normal cervical lordosis.    BONES: There is no evidence of a marrow-replacing process. There is no evidence of abnormal bony edema. Vertebral body heights are unremarkable.    DISCS: Mild multilevel spondylosis described in further detail below.    SPINAL CORD: No convincing signal abnormalities.    SPINAL CANAL: Moderate C5-C6 spinal canal stenosis.    SOFT TISSUES: Unremarkable. The cervical vascular flow voids appear intact.    POSTERIOR FOSSA: Limited images of the intracranial  compartment demonstrate no acute abnormality.    SIGNIFICANT FINDINGS BY LEVEL:     Craniovertebral junction and C1-C2: Unremarkable.    C2-C3: Normal disc height. No herniation. Normal facets. No spinal canal or neural foraminal stenosis.     C3-C4: Normal disc height. No herniation. Bilateral uncovertebral spurring. Normal facets. No spinal canal or neural foraminal stenosis.     C4-C5: Normal disc height. No herniation. Mild uncovertebral spurring. Normal facets. No spinal canal or right neural foraminal stenosis. Mild left neural foraminal stenosis.    C5-C6: Mild intervertebral disc height loss. Shallow disc osteophyte complex and broad-based central protrusion. Bilateral uncovertebral spurring. No significant facet arthropathy. Mild to moderate spinal canal stenosis. Mild bilateral neural foraminal   stenosis.     C6-C7: Mild intervertebral disc height loss. Shallow disc osteophyte complex. Left-sided uncovertebral spurring. No significant facet arthropathy. Mild spinal canal stenosis. No significant right neural foraminal stenosis. Moderate left neural foraminal   stenosis.     C7-T1: Normal disc height. No herniation. Normal facets. No spinal canal or neural foraminal stenosis.      Impression    IMPRESSION:  1.  Unremarkable cervical cord.  2.  At C5-C6, there is mild to moderate spinal canal stenosis.  3.  At C6-C7, there is moderate left neural foraminal stenosis.  4.  Additional degenerative changes as above.     XR Wrist Right G/E 3 Views    Narrative    XR WRIST RIGHT G/E 3 VIEWS   6/1/2023 2:10 PM     HISTORY: Fall onto outstretched hand after slip on mat at work. Wrist  pain.  COMPARISON: None.       Impression    IMPRESSION: Normal joint spaces and alignment. No fracture.    BOLIVAR AGUILAR MD         SYSTEM ID:  [id number]   Elbow XR, 2 views, right    Narrative    XR ELBOW RIGHT 2 VIEWS   6/1/2023 2:10 PM     HISTORY: Fall onto outstretched hand after slip on mat at work.  Evaluate for acute bony  process after blunt trauma  COMPARISON: 10/21/2019       Impression    IMPRESSION: There is an acute, nondisplaced fracture involving the  proximal radius at the lateral radial head/neck junction. There is a  joint effusion.    BOLIVAR AGUILAR MD         SYSTEM ID:  [id number]   XR Elbow RT G/E 3 vw    Narrative    XR ELBOW RIGHT G/E 3 VIEWS   6/15/2023 2:00 PM     HISTORY: Closed nondisplaced fracture of head of right radius with  routine healing, subsequent encounter  COMPARISON: 6/1/2023       Impression    IMPRESSION: Nondisplaced radial head fracture is unchanged. Elbow  joint effusion has resolved.    CALLUM ARORA MD         SYSTEM ID:  [id number]       Assessment:  1. Closed nondisplaced fracture of head of right radius with routine healing, subsequent encounter    2. Injury of right elbow, subsequent encounter    3. Right wrist sprain, subsequent encounter        Plan:  Discussed the assessment with the patient.  Follow up: prn only   FOOSH injury from a fall at work  Healed nondisplaced radial head fracture, doing well overall today  Prior XR images independently visualized and reviewed with patient today in clinic  Continue to work on strengthening HEP exercises  Work letter updated, no longer duty or hour restrictions, at [employer]  Oral Tylenol and topical Voltaren gel reviewed as safe OTC options if needed, reviewed safe dosing strategies  Supportive care reviewed  All questions were answered today  Contact us with additional questions or concerns  Signs and sx of concern reviewed      Justus Chapman DO, KILO  Sports Medicine Physician  [facility]            Disclaimer: This note consists of symbols derived from keyboarding, dictation and/or voice recognition software. As a result, there may be errors in the script that have gone undetected. Please consider this when interpreting information found in this chart.      Again, thank you for allowing me to participate in the  care of your patient.        Sincerely,        Justus Chapman, DO

## 2023-12-13 NOTE — PROGRESS NOTES
Ana Felix  :  1980  DOS: 23  MRN: [id number]    Sports Medicine Clinic Visit    PCP: Radha Bermudez    Ana Felix is a 42 year old Right hand dominant female who is seen as an ER referral presenting with right elbow injury.    Injury: Patient describes injury as at work, helping move student from a behavior situation, slipped on mat, landing on right arm with FOOSH type injury that occurred on 23.  Pain located over right deep anterior elbow joint, radiating to right wrist.  Additional Features:  Positive: swelling and weakness.  Symptoms are better with Other medications: Oxycodone.  Symptoms are worse with: moving arm, direct pressure.  Other evaluation and/or treatments so far consists of: Ice, Ibuprofen, Other medications: Oxycodone, Rest and sling, thumb spica brace.  Recent imaging completed: X-rays completed 23.  Prior History of related problems: history of lateral epicondylitis several years ago.    Social History: currently employed as school paraprofessional      Interim History - Adela 15, 2023  Since last visit on 2023 patient states that she is doing a lot better from when she was seen last. She was taking percocet prn for pain, but she ran out of rx. Would like a new rx today if possible.     Interim History 2023  Ana Felix is now 4 weeks out from injury.  Since last visit on 6/15/2023 patient has improving right elbow pain.  She been able to use right arm more, mild discomfort with bending elbow.  No taking using pain medication.  Here to review work restrictions.     Interim History 2023  Ana Felix is now 7 weeks out from injury.  Since last visit on 2023 patient overall good improvement in right elbow pain.  Patient notes mild discomfort with pushing objects and lifting objects occasionally.  Patient was unable to return to light duty work due to other health issues.     Interim History - 2023  Since last visit on 23,  patient is 13 weeks out from injury. Pt states that her elbow is feeling worse than the last time she was in. She has started PT and has had one session. She is having trouble getting into more appointments. She tries to do the exercises at home and thinks it is helping with her pain. Just here for recheck of her elbow. No interim injury.       Interim History - 2023  Since last visit on 2023 patient states she is doing very well. No scheduled PT appointments. Last PT jeana was last week able to lift 25 pounds comfortably.  Higher weight more difficult overhead, but substantial progress.  No interim injury.  No issues with work reported.    Interim History - 2023  Since last visit on 2023 patient sates that she is feeling better. Still complains of soeness at work especially when a child pulls on her arm. She would like to have the form filled out for no restrictions at work and a new letter. Would like to do one more week of 5 hour days.      Interim History - 2023  Since last visit on 10/25/2023 patient states she is doing better. Here for last recheck of right elbow and needing to be cleared from restrictions for work. No significant concerns today. No interim injury.       Review of Systems  Musculoskeletal: as above  Remainder of review of systems is negative including constitutional, CV, pulmonary, GI, Skin and Neurologic except as noted in HPI or medical history.    Past Medical History:   Diagnosis Date    Back pain     Crohn's disease (H)     Exercise-induced asthma     Gestational diabetes     Herniation of intervertebral disc of lumbar region     Infertility     Ovarian cyst     Smoker     Status post cholecystectomy 2005     Past Surgical History:   Procedure Laterality Date    APPENDECTOMY      C/SECTION, LOW TRANSVERSE      , Low Transverse    CHOLECYSTECTOMY, LAPOROSCOPIC  2005    Cholecystectomy, Laparoscopic    COLONOSCOPY       ESOPHAGOSCOPY, GASTROSCOPY, DUODENOSCOPY (EGD), COMBINED  3/6/2014    Procedure: COMBINED ESOPHAGOSCOPY, GASTROSCOPY, DUODENOSCOPY (EGD), BIOPSY SINGLE OR MULTIPLE;  COLONOSCOPY/ UPPER GI ENDOSCOPY/ COLON/ EGD/ Abdominal pain, epigastric/ P[facility];  Surgeon: West Lomas MD;  Location: [facility] OR    HC HYSTEROSCOPY, SURGICAL; W/ ENDOMETRIAL ABLATION, ANY METHOD  7/2010    HERNIORRHAPHY VENTRAL N/A 12/11/2018    Procedure: Open ventral hernia repair;  Surgeon: Alonso Bills MD;  Location: [facility]    HYSTERECTOMY, SUPRACERVICAL LAPAROSCOPIC  2010    LEEP TX, CERVICAL      LEEP TX Cervical    ORTHOPEDIC SURGERY      bluged disk    partial small bowel resection  2002    Ileo-colonic resection. Crohn's disease surgery for bowel obstruction. this was a section of small bowel and large bowel and the cecum., all removed and a reanastamosis done successfully    SALPINGO OOPHORECTOMY,R/L/TORI      Right ovary    SLING TRANSVAGINAL N/A 4/4/2022    Procedure: Pubovaginal sling with Altis;  Surgeon: Eleuterio Stone MD;  Location: [facility] OR    TUBAL LIGATION       Family History   Problem Relation Age of Onset    Cancer Mother         cervical    Lipids Mother     Eye Disorder Father     Lipids Father     Alcohol/Drug Maternal Grandmother     Colon Cancer Paternal Grandmother     Diabetes Paternal Grandmother     Eye Disorder Paternal Grandmother     Diabetes Paternal Grandfather     Eye Disorder Paternal Grandfather     Inflammatory Bowel Disease Paternal Aunt         and two paternal uncles    Breast Cancer No family hx of      Objective  /82   Pulse 67   Wt 84.8 kg (187 lb)   LMP 07/23/2010 (LMP Unknown)   BMI 33.13 kg/m      General: healthy, alert and in no distress    HEENT: no scleral icterus or conjunctival erythema   Skin: no suspicious lesions or rash. No jaundice.   CV: regular rhythm by palpation, 2+ distal pulses, no pedal edema    Resp: normal respiratory effort without conversational dyspnea   Psych:  normal mood and affect    Gait: nonantalgic, appropriate coordination and balance   Neuro: normal light touch sensory exam of the extremities. Motor strength as noted below     Right Elbow exam:    Inspection:       no ecchymosis       no edema or effusion    ROM:       Full flexion, extension, forearm supination and pronation, no pain with terminal extension over posterior elbow    Strength:       Motor function intact    Tender:       Radial head minimal       Resolved soreness in extensor/supinator mass, biceps muscle belly improved    Non-Tender:       remainder of elbow area       lateral epicondyle       medial epicondyle       olecranon    Sensation:       intact throughout hand       intact throughout forearm     Skin:       well perfused       capillary refill less than 2 seconds    Radiology:  Recent Results (from the past 744 hour(s))   MR Cervical Spine w/o Contrast    Narrative    EXAM: MR CERVICAL SPINE W/O CONTRAST  LOCATION: [facility]  DATE/TIME: 5/17/2023 5:40 PM CDT    INDICATION: Hyperreflexia.  COMPARISON: None.  TECHNIQUE: MRI Cervical Spine without IV contrast.    FINDINGS:   ALIGNMENT: Straightening of the normal cervical lordosis.    BONES: There is no evidence of a marrow-replacing process. There is no evidence of abnormal bony edema. Vertebral body heights are unremarkable.    DISCS: Mild multilevel spondylosis described in further detail below.    SPINAL CORD: No convincing signal abnormalities.    SPINAL CANAL: Moderate C5-C6 spinal canal stenosis.    SOFT TISSUES: Unremarkable. The cervical vascular flow voids appear intact.    POSTERIOR FOSSA: Limited images of the intracranial compartment demonstrate no acute abnormality.    SIGNIFICANT FINDINGS BY LEVEL:     Craniovertebral junction and C1-C2: Unremarkable.    C2-C3: Normal disc height. No herniation. Normal facets. No spinal canal or neural foraminal stenosis.     C3-C4: Normal disc height. No herniation.  Bilateral uncovertebral spurring. Normal facets. No spinal canal or neural foraminal stenosis.     C4-C5: Normal disc height. No herniation. Mild uncovertebral spurring. Normal facets. No spinal canal or right neural foraminal stenosis. Mild left neural foraminal stenosis.    C5-C6: Mild intervertebral disc height loss. Shallow disc osteophyte complex and broad-based central protrusion. Bilateral uncovertebral spurring. No significant facet arthropathy. Mild to moderate spinal canal stenosis. Mild bilateral neural foraminal   stenosis.     C6-C7: Mild intervertebral disc height loss. Shallow disc osteophyte complex. Left-sided uncovertebral spurring. No significant facet arthropathy. Mild spinal canal stenosis. No significant right neural foraminal stenosis. Moderate left neural foraminal   stenosis.     C7-T1: Normal disc height. No herniation. Normal facets. No spinal canal or neural foraminal stenosis.      Impression    IMPRESSION:  1.  Unremarkable cervical cord.  2.  At C5-C6, there is mild to moderate spinal canal stenosis.  3.  At C6-C7, there is moderate left neural foraminal stenosis.  4.  Additional degenerative changes as above.     XR Wrist Right G/E 3 Views    Narrative    XR WRIST RIGHT G/E 3 VIEWS   6/1/2023 2:10 PM     HISTORY: Fall onto outstretched hand after slip on mat at work. Wrist  pain.  COMPARISON: None.       Impression    IMPRESSION: Normal joint spaces and alignment. No fracture.    BOLIVAR AGUILAR MD         SYSTEM ID:  [id number]   Elbow XR, 2 views, right    Narrative    XR ELBOW RIGHT 2 VIEWS   6/1/2023 2:10 PM     HISTORY: Fall onto outstretched hand after slip on mat at work.  Evaluate for acute bony process after blunt trauma  COMPARISON: 10/21/2019       Impression    IMPRESSION: There is an acute, nondisplaced fracture involving the  proximal radius at the lateral radial head/neck junction. There is a  joint effusion.    BOLIVAR AGUILAR MD         SYSTEM ID:  [id number]   XR Elbow  RT G/E 3 vw    Narrative    XR ELBOW RIGHT G/E 3 VIEWS   6/15/2023 2:00 PM     HISTORY: Closed nondisplaced fracture of head of right radius with  routine healing, subsequent encounter  COMPARISON: 6/1/2023       Impression    IMPRESSION: Nondisplaced radial head fracture is unchanged. Elbow  joint effusion has resolved.    CALLUM ARORA MD         SYSTEM ID:  [id number]       Assessment:  1. Closed nondisplaced fracture of head of right radius with routine healing, subsequent encounter    2. Injury of right elbow, subsequent encounter    3. Right wrist sprain, subsequent encounter        Plan:  Discussed the assessment with the patient.  Follow up: prn only   FOOSH injury from a fall at work  Healed nondisplaced radial head fracture, doing well overall today  Prior XR images independently visualized and reviewed with patient today in clinic  Continue to work on strengthening HEP exercises  Work letter updated, no longer duty or hour restrictions, at [facility]  Oral Tylenol and topical Voltaren gel reviewed as safe OTC options if needed, reviewed safe dosing strategies  Supportive care reviewed  All questions were answered today  Contact us with additional questions or concerns  Signs and sx of concern reviewed      Justus Chapman DO, KILO  Sports Medicine Physician  [facility]            Disclaimer: This note consists of symbols derived from keyboarding, dictation and/or voice recognition software. As a result, there may be errors in the script that have gone undetected. Please consider this when interpreting information found in this chart.

## 2023-12-13 NOTE — LETTER
December 13, 2023      Ana continues to be under my care for her right elbow fracture that occurred on 6/1/2023. She is doing well overall.  She no longer requires functional duty restrictions or hour restrictions for work. Updated recommendations will be provided only if needed.  She may still use a compression sleeve on her right arm and/or pad for comfort, and is allowed as needed.  She can be placed at [facility] without partial or permanent disability.        Justus Mednez DO, CAQ  Sports Medicine Physician  [facility]

## 2023-12-29 ENCOUNTER — OFFICE VISIT (OUTPATIENT)
Dept: FAMILY MEDICINE | Facility: CLINIC | Age: 43
End: 2023-12-29
Payer: COMMERCIAL

## 2023-12-29 VITALS
BODY MASS INDEX: 30.79 KG/M2 | HEIGHT: 63 IN | TEMPERATURE: 97.5 F | RESPIRATION RATE: 18 BRPM | HEART RATE: 102 BPM | DIASTOLIC BLOOD PRESSURE: 66 MMHG | SYSTOLIC BLOOD PRESSURE: 102 MMHG | WEIGHT: 173.8 LBS | OXYGEN SATURATION: 100 %

## 2023-12-29 DIAGNOSIS — J45.30 MILD PERSISTENT ASTHMA, UNCOMPLICATED: ICD-10-CM

## 2023-12-29 DIAGNOSIS — Z00.00 ENCOUNTER FOR ROUTINE ADULT HEALTH EXAMINATION WITHOUT ABNORMAL FINDINGS: Primary | ICD-10-CM

## 2023-12-29 DIAGNOSIS — Z12.4 CERVICAL CANCER SCREENING: ICD-10-CM

## 2023-12-29 DIAGNOSIS — D84.9 IMMUNOCOMPROMISED STATE (H): ICD-10-CM

## 2023-12-29 DIAGNOSIS — F41.1 GAD (GENERALIZED ANXIETY DISORDER): ICD-10-CM

## 2023-12-29 DIAGNOSIS — K50.819 CROHN'S DISEASE OF SMALL AND LARGE INTESTINES WITH COMPLICATION (H): ICD-10-CM

## 2023-12-29 DIAGNOSIS — G47.00 INSOMNIA, UNSPECIFIED TYPE: ICD-10-CM

## 2023-12-29 LAB
ALBUMIN SERPL BCG-MCNC: 4.4 G/DL (ref 3.5–5.2)
ALP SERPL-CCNC: 67 U/L (ref 40–150)
ALT SERPL W P-5'-P-CCNC: 20 U/L (ref 0–50)
AST SERPL W P-5'-P-CCNC: 41 U/L (ref 0–45)
BASOPHILS # BLD AUTO: 0 10E3/UL (ref 0–0.2)
BASOPHILS NFR BLD AUTO: 0 %
BILIRUB DIRECT SERPL-MCNC: <0.2 MG/DL (ref 0–0.3)
BILIRUB SERPL-MCNC: 0.6 MG/DL
CHOLEST SERPL-MCNC: 133 MG/DL
EOSINOPHIL # BLD AUTO: 0.1 10E3/UL (ref 0–0.7)
EOSINOPHIL NFR BLD AUTO: 1 %
ERYTHROCYTE [DISTWIDTH] IN BLOOD BY AUTOMATED COUNT: 15.7 % (ref 10–15)
FASTING STATUS PATIENT QL REPORTED: YES
HBA1C MFR BLD: 5.9 % (ref 0–5.6)
HCT VFR BLD AUTO: 44.2 % (ref 35–47)
HDLC SERPL-MCNC: 34 MG/DL
HGB BLD-MCNC: 14.4 G/DL (ref 11.7–15.7)
IMM GRANULOCYTES # BLD: 0 10E3/UL
IMM GRANULOCYTES NFR BLD: 0 %
LDLC SERPL CALC-MCNC: 53 MG/DL
LYMPHOCYTES # BLD AUTO: 2 10E3/UL (ref 0.8–5.3)
LYMPHOCYTES NFR BLD AUTO: 24 %
MCH RBC QN AUTO: 31.8 PG (ref 26.5–33)
MCHC RBC AUTO-ENTMCNC: 32.6 G/DL (ref 31.5–36.5)
MCV RBC AUTO: 98 FL (ref 78–100)
MONOCYTES # BLD AUTO: 0.5 10E3/UL (ref 0–1.3)
MONOCYTES NFR BLD AUTO: 6 %
NEUTROPHILS # BLD AUTO: 5.5 10E3/UL (ref 1.6–8.3)
NEUTROPHILS NFR BLD AUTO: 69 %
NONHDLC SERPL-MCNC: 99 MG/DL
PLATELET # BLD AUTO: 219 10E3/UL (ref 150–450)
PROT SERPL-MCNC: 8.4 G/DL (ref 6.4–8.3)
RBC # BLD AUTO: 4.53 10E6/UL (ref 3.8–5.2)
TRIGL SERPL-MCNC: 230 MG/DL
WBC # BLD AUTO: 8.1 10E3/UL (ref 4–11)

## 2023-12-29 PROCEDURE — 87624 HPV HI-RISK TYP POOLED RSLT: CPT | Performed by: PHYSICIAN ASSISTANT

## 2023-12-29 PROCEDURE — 36415 COLL VENOUS BLD VENIPUNCTURE: CPT | Performed by: PHYSICIAN ASSISTANT

## 2023-12-29 PROCEDURE — 83036 HEMOGLOBIN GLYCOSYLATED A1C: CPT | Performed by: PHYSICIAN ASSISTANT

## 2023-12-29 PROCEDURE — 80061 LIPID PANEL: CPT | Performed by: PHYSICIAN ASSISTANT

## 2023-12-29 PROCEDURE — 99396 PREV VISIT EST AGE 40-64: CPT | Mod: 25 | Performed by: PHYSICIAN ASSISTANT

## 2023-12-29 PROCEDURE — 90480 ADMN SARSCOV2 VAC 1/ONLY CMP: CPT | Performed by: PHYSICIAN ASSISTANT

## 2023-12-29 PROCEDURE — 91320 SARSCV2 VAC 30MCG TRS-SUC IM: CPT | Performed by: PHYSICIAN ASSISTANT

## 2023-12-29 PROCEDURE — 99214 OFFICE O/P EST MOD 30 MIN: CPT | Mod: 25 | Performed by: PHYSICIAN ASSISTANT

## 2023-12-29 PROCEDURE — 85025 COMPLETE CBC W/AUTO DIFF WBC: CPT | Performed by: PHYSICIAN ASSISTANT

## 2023-12-29 PROCEDURE — 90686 IIV4 VACC NO PRSV 0.5 ML IM: CPT | Performed by: PHYSICIAN ASSISTANT

## 2023-12-29 PROCEDURE — 80076 HEPATIC FUNCTION PANEL: CPT | Performed by: PHYSICIAN ASSISTANT

## 2023-12-29 PROCEDURE — G0145 SCR C/V CYTO,THINLAYER,RESCR: HCPCS | Performed by: PHYSICIAN ASSISTANT

## 2023-12-29 PROCEDURE — 90471 IMMUNIZATION ADMIN: CPT | Performed by: PHYSICIAN ASSISTANT

## 2023-12-29 RX ORDER — SERTRALINE HYDROCHLORIDE 100 MG/1
200 TABLET, FILM COATED ORAL DAILY
Qty: 180 TABLET | Refills: 3 | Status: SHIPPED | OUTPATIENT
Start: 2023-12-29

## 2023-12-29 RX ORDER — TRAZODONE HYDROCHLORIDE 150 MG/1
150 TABLET ORAL AT BEDTIME
Qty: 90 TABLET | Refills: 3 | Status: SHIPPED | OUTPATIENT
Start: 2023-12-29

## 2023-12-29 RX ORDER — MONTELUKAST SODIUM 10 MG/1
10 TABLET ORAL DAILY
Qty: 90 TABLET | Refills: 3 | Status: SHIPPED | OUTPATIENT
Start: 2023-12-29

## 2023-12-29 RX ORDER — BUSPIRONE HYDROCHLORIDE 10 MG/1
10 TABLET ORAL 2 TIMES DAILY
Qty: 180 TABLET | Refills: 3 | Status: SHIPPED | OUTPATIENT
Start: 2023-12-29

## 2023-12-29 RX ORDER — HYDROXYZINE HYDROCHLORIDE 25 MG/1
12.5-5 TABLET, FILM COATED ORAL 3 TIMES DAILY PRN
Qty: 120 TABLET | Refills: 5 | Status: SHIPPED | OUTPATIENT
Start: 2023-12-29

## 2023-12-29 RX ORDER — CLONIDINE HYDROCHLORIDE 0.1 MG/1
1 TABLET ORAL 2 TIMES DAILY
COMMUNITY
Start: 2023-09-26

## 2023-12-29 RX ORDER — OXYCODONE AND ACETAMINOPHEN 7.5; 325 MG/1; MG/1
TABLET ORAL
COMMUNITY

## 2023-12-29 RX ORDER — TIZANIDINE 2 MG/1
TABLET ORAL
COMMUNITY

## 2023-12-29 ASSESSMENT — ENCOUNTER SYMPTOMS
EYE PAIN: 0
SHORTNESS OF BREATH: 0
PARESTHESIAS: 0
HEARTBURN: 0
DIZZINESS: 0
WEAKNESS: 0
HEADACHES: 0
COUGH: 1
JOINT SWELLING: 0
BREAST MASS: 0
ABDOMINAL PAIN: 0
FREQUENCY: 0
ARTHRALGIAS: 1
NAUSEA: 0
HEMATOCHEZIA: 0
CONSTIPATION: 0
DYSURIA: 0
CHILLS: 0
NERVOUS/ANXIOUS: 1
SORE THROAT: 0
DIARRHEA: 0
MYALGIAS: 0
PALPITATIONS: 0
HEMATURIA: 0
FEVER: 0

## 2023-12-29 ASSESSMENT — ANXIETY QUESTIONNAIRES
5. BEING SO RESTLESS THAT IT IS HARD TO SIT STILL: NOT AT ALL
4. TROUBLE RELAXING: SEVERAL DAYS
7. FEELING AFRAID AS IF SOMETHING AWFUL MIGHT HAPPEN: SEVERAL DAYS
6. BECOMING EASILY ANNOYED OR IRRITABLE: NOT AT ALL
2. NOT BEING ABLE TO STOP OR CONTROL WORRYING: SEVERAL DAYS
GAD7 TOTAL SCORE: 5
1. FEELING NERVOUS, ANXIOUS, OR ON EDGE: SEVERAL DAYS
3. WORRYING TOO MUCH ABOUT DIFFERENT THINGS: SEVERAL DAYS
IF YOU CHECKED OFF ANY PROBLEMS ON THIS QUESTIONNAIRE, HOW DIFFICULT HAVE THESE PROBLEMS MADE IT FOR YOU TO DO YOUR WORK, TAKE CARE OF THINGS AT HOME, OR GET ALONG WITH OTHER PEOPLE: SOMEWHAT DIFFICULT
GAD7 TOTAL SCORE: 5

## 2023-12-29 ASSESSMENT — ASTHMA QUESTIONNAIRES: ACT_TOTALSCORE: 25

## 2023-12-29 ASSESSMENT — PAIN SCALES - GENERAL: PAINLEVEL: MODERATE PAIN (5)

## 2023-12-29 ASSESSMENT — PATIENT HEALTH QUESTIONNAIRE - PHQ9: SUM OF ALL RESPONSES TO PHQ QUESTIONS 1-9: 6

## 2023-12-29 NOTE — PROGRESS NOTES
SUBJECTIVE:   Ana is a 43 year old, presenting for the following:  Physical, Hair/Scalp Problem, and Results (mammogram)        12/29/2023     8:23 AM   Additional Questions   Roomed by Cyndi   Accompanied by obi         12/29/2023     8:23 AM   Patient Reported Additional Medications   Patient reports taking the following new medications none       Healthy Habits:     Getting at least 3 servings of Calcium per day:  NO    Bi-annual eye exam:  Yes    Dental care twice a year:  Yes    Sleep apnea or symptoms of sleep apnea:  None    Diet:  Other    Frequency of exercise:  2-3 days/week    Duration of exercise:  15-30 minutes    Taking medications regularly:  Yes    Medication side effects:  None    Additional concerns today:  Yes  History of Present Illness     Asthma:  She presents for follow up of asthma.  She has no cough, no wheezing, and no shortness of breath.  She is using a relief medication a few times a month. She does not have a controller medication. Patient is aware of the following triggers: none. The patient has not had a visit to the Emergency Room, Urgent Care or Hospital due to asthma since the last clinic visit. She has been to the Emergency Room or Urgent Care 0 times.She has had a Hospitalization 0 times.    Mental Health Follow-up:  Patient presents to follow-up on Depression & Anxiety.Patient's depression since last visit has been:  No change  The patient is not having other symptoms associated with depression.  Patient's anxiety since last visit has been:  No change  The patient is not having other symptoms associated with anxiety.  Any significant life events: financial concerns  Patient is feeling anxious or having panic attacks.  Patient has no concerns about alcohol or drug use.     Seeing a therapist now  Anxiety is under better control  Trazodone 150mg at bedtime  Asthma well controlled      The 10-year ASCVD risk score (Anisha LEHMAN, et al., 2019) is: 3%    Values used to calculate the  score:      Age: 43 years      Sex: Female      Is Non- : No      Diabetic: No      Tobacco smoker: Yes      Systolic Blood Pressure: 102 mmHg      Is BP treated: No      HDL Cholesterol: 36 mg/dL      Total Cholesterol: 182 mg/dL       Social History     Tobacco Use    Smoking status: Some Days     Packs/day: .5     Types: Cigarettes     Passive exposure: Never    Smokeless tobacco: Never   Substance Use Topics    Alcohol use: Yes     Comment: very rarely             12/29/2023     8:19 AM   Alcohol Use   Prescreen: >3 drinks/day or >7 drinks/week? No          No data to display              Reviewed orders with patient.  Reviewed health maintenance and updated orders accordingly - Yes  Lab work is in process  Labs reviewed in EPIC    Breast Cancer Screening:    FHS-7:       2/23/2022    12:00 PM 3/15/2022     6:55 AM 3/23/2022     6:57 AM 8/14/2023     5:17 PM 12/29/2023     8:28 AM   Breast CA Risk Assessment (FHS-7)   Did any of your first-degree relatives have breast or ovarian cancer? No No No No No   Did any of your relatives have bilateral breast cancer? No Unknown No No Yes   Did any man in your family have breast cancer? No No No No No   Did any woman in your family have breast and ovarian cancer? No Yes Yes Yes No   Did any woman in your family have breast cancer before age 50 y? No No No No No   Do you have 2 or more relatives with breast and/or ovarian cancer? No No No No No   Do you have 2 or more relatives with breast and/or bowel cancer? Yes Yes Yes Yes Yes       Mammogram Screening: Recommended annual mammography  Pertinent mammograms are reviewed under the imaging tab.    History of abnormal Pap smear: NO - age 30-65 PAP every 5 years with negative HPV co-testing recommended      Latest Ref Rng & Units 3/7/2018     3:55 PM 3/7/2018     3:54 PM 12/9/2015    10:16 AM   PAP / HPV   PAP (Historical)   NIL     HPV 16 DNA NEG^Negative Negative   Negative    HPV 18 DNA NEG^Negative  "Negative   Negative    Other HR HPV NEG^Negative Negative   Negative      Reviewed and updated as needed this visit by clinical staff   Tobacco  Allergies  Meds              Reviewed and updated as needed this visit by Provider                 Past Medical History:   Diagnosis Date    Back pain     Crohn's disease (H)     Exercise-induced asthma     Gestational diabetes     Herniation of intervertebral disc of lumbar region     Infertility     Ovarian cyst     Smoker     Status post cholecystectomy 04/27/2005        Review of Systems   Constitutional:  Negative for chills and fever.   HENT:  Positive for congestion and hearing loss. Negative for ear pain and sore throat.    Eyes:  Negative for pain and visual disturbance.   Respiratory:  Positive for cough. Negative for shortness of breath.    Cardiovascular:  Negative for chest pain, palpitations and peripheral edema.   Gastrointestinal:  Negative for abdominal pain, constipation, diarrhea, heartburn, hematochezia and nausea.   Breasts:  Positive for tenderness. Negative for breast mass and discharge.   Genitourinary:  Negative for dysuria, frequency, genital sores, hematuria, pelvic pain, urgency, vaginal bleeding and vaginal discharge.   Musculoskeletal:  Positive for arthralgias. Negative for joint swelling and myalgias.   Skin:  Negative for rash.   Neurological:  Negative for dizziness, weakness, headaches and paresthesias.   Psychiatric/Behavioral:  Negative for mood changes. The patient is nervous/anxious.         OBJECTIVE:   /66   Pulse 102   Temp 97.5  F (36.4  C) (Temporal)   Resp 18   Ht 1.6 m (5' 3\")   Wt 78.8 kg (173 lb 12.8 oz)   LMP 07/23/2010 (LMP Unknown)   SpO2 100%   BMI 30.79 kg/m    Physical Exam  GENERAL: healthy, alert and no distress  EYES: Eyes grossly normal to inspection  HENT: ear canals and TM's normal, nose and mouth without ulcers or lesions  NECK: no adenopathy, no asymmetry, masses, or scars and thyroid normal to " palpation  RESP: lungs clear to auscultation - no rales, rhonchi or wheezes  CV: regular rates and rhythm, normal S1 S2, no S3 or S4, and no murmur, click or rub  ABDOMEN: soft, nontender, no hepatosplenomegaly, no masses and bowel sounds normal   (female): normal female external genitalia, normal urethral meatus, vaginal mucosa pink, moist, well rugated, and normal cervix  MS: no gross musculoskeletal defects noted, no edema  SKIN: no suspicious lesions or rashes  NEURO: Normal strength and tone, mentation intact and speech normal  PSYCH: mentation appears normal, affect normal/bright    ASSESSMENT/PLAN:       ICD-10-CM    1. Encounter for routine adult health examination without abnormal findings  Z00.00 Lipid panel reflex to direct LDL Fasting     Hemoglobin A1c     Lipid panel reflex to direct LDL Fasting     Hemoglobin A1c      2. Cervical cancer screening  Z12.4 Pap Screen with HPV - recommended age 30 - 65 years      3. DANIELLE (generalized anxiety disorder)  F41.1 busPIRone (BUSPAR) 10 MG tablet     sertraline (ZOLOFT) 100 MG tablet     hydrOXYzine HCl (ATARAX) 25 MG tablet      4. Insomnia, unspecified type  G47.00 traZODone (DESYREL) 150 MG tablet     hydrOXYzine HCl (ATARAX) 25 MG tablet      5. Mild persistent asthma, uncomplicated  J45.30 montelukast (SINGULAIR) 10 MG tablet      6. Crohn's disease of small and large intestines with complication (H)  K50.819 CBC with Platelets & Differential     Hepatic function panel      7. Immunocompromised state (H24)  D84.9           1,2) Screenings discussed    3) Stable. DANIELLE looks good. Buspar has helped. Meds renewed, no changes today.     4) Med renewed, no changes    5) Stable, well controlled. ACT at goal. Med renewed, no changes    6,7) Follows GI      COUNSELING:  Reviewed preventive health counseling, as reflected in patient instructions      BMI:   Estimated body mass index is 30.79 kg/m  as calculated from the following:    Height as of this encounter: 1.6  "m (5' 3\").    Weight as of this encounter: 78.8 kg (173 lb 12.8 oz).       She reports that she has been smoking cigarettes. She has been smoking an average of 0.5 packs per day. She has never been exposed to tobacco smoke. She has never used smokeless tobacco.          Radha Bermudez PA-C  [facility]  "

## 2023-12-31 ENCOUNTER — TRANSFERRED RECORDS (OUTPATIENT)
Dept: HEALTH INFORMATION MANAGEMENT | Facility: CLINIC | Age: 43
End: 2023-12-31
Payer: COMMERCIAL

## 2024-01-03 PROBLEM — R73.03 PREDIABETES: Status: ACTIVE | Noted: 2024-01-03

## 2024-01-03 LAB
BKR LAB AP GYN ADEQUACY: NORMAL
BKR LAB AP GYN INTERPRETATION: NORMAL
BKR LAB AP HPV REFLEX: NORMAL
BKR LAB AP PREVIOUS ABNORMAL: NORMAL
PATH REPORT.COMMENTS IMP SPEC: NORMAL
PATH REPORT.COMMENTS IMP SPEC: NORMAL
PATH REPORT.RELEVANT HX SPEC: NORMAL

## 2024-01-04 ENCOUNTER — TRANSFERRED RECORDS (OUTPATIENT)
Dept: HEALTH INFORMATION MANAGEMENT | Facility: CLINIC | Age: 44
End: 2024-01-04
Payer: COMMERCIAL

## 2024-01-04 NOTE — COMMUNITY RESOURCES LIST (ENGLISH)
01/04/2024   [facility]  N/A  For questions about this resource list or additional care needs, please contact your primary care clinic or care manager.  Phone: [phone]   Email: N/A   Address: [address] [place] [address]   Hours: N/A        Financial Stability       Rent and mortgage payment assistance  1  [employer] Distance: 8.41 miles      In-Person, Phone/Virtual   [address] [address]  Language: English  Hours: Mon - Sun Appt. Only  Fees: Free   Phone: (948) 538-4086 Email: terry@"[employer]" Website: http://www.[employer]     2  [facility] ([facility] Office - Emergency Housing Assistance - Rent and mortgage payment assistance Distance: 8.69 miles      In-Person   [address] [place] [address]  Language: English  Hours: Mon - Fri 8:00 AM - 4:30 PM  Fees: Free   Phone: (976) 345-7956 Email: abbey@[employer] Website: https://www.Collect/agency-information     Utility payment assistance  3  [employer] Distance: 8.41 miles      In-Person, Phone/Virtual   [address] [address]  Language: English  Hours: Mon - Sun Appt. Only  Fees: Free   Phone: (747) 667-4147 Email: [employer]@"[employer]" Website: http://www.[employer]     4  [facility] ([facility]) [facility] Office - Energy Assistance Program Distance: 8.69 miles      Phone/Virtual   [address] [place] [address]  Language: English  Hours: Mon - Fri 8:00 AM - 4:30 PM  Fees: Free   Phone: (253) 792-2851 Email: abbey@[employer] Website: https://www.Collect/agency-information          Important Numbers & Websites       Emergency Services   911  [facility] Services   311  Poison Control   (130) 705-3734  Suicide Prevention Lifeline   (675) 461-5046 (TALK)  Child Abuse Hotline   (625) 740-2708 (4-A-Child)  Sexual Assault Hotline    (936) 843-2829 (HOPE)  National Runaway Safeline   (304) 420-1813 (RUNAWAY)  All-Options Talkline   (219) 882-5128  Substance Abuse Referral   (595) 974-2472 (HELP)

## 2024-01-05 LAB
HUMAN PAPILLOMA VIRUS 16 DNA: NEGATIVE
HUMAN PAPILLOMA VIRUS 18 DNA: NEGATIVE
HUMAN PAPILLOMA VIRUS FINAL DIAGNOSIS: NORMAL
HUMAN PAPILLOMA VIRUS OTHER HR: NEGATIVE

## 2024-02-02 ENCOUNTER — OFFICE VISIT (OUTPATIENT)
Dept: PHYSICAL MEDICINE AND REHAB | Facility: CLINIC | Age: 44
End: 2024-02-02
Payer: COMMERCIAL

## 2024-02-02 VITALS
HEIGHT: 63 IN | OXYGEN SATURATION: 96 % | DIASTOLIC BLOOD PRESSURE: 75 MMHG | WEIGHT: 179 LBS | BODY MASS INDEX: 31.71 KG/M2 | HEART RATE: 74 BPM | SYSTOLIC BLOOD PRESSURE: 132 MMHG

## 2024-02-02 DIAGNOSIS — M54.2 NECK PAIN: ICD-10-CM

## 2024-02-02 DIAGNOSIS — M54.12 CERVICAL RADICULOPATHY: Primary | ICD-10-CM

## 2024-02-02 PROCEDURE — 99204 OFFICE O/P NEW MOD 45 MIN: CPT | Performed by: NURSE PRACTITIONER

## 2024-02-02 RX ORDER — IBUPROFEN 800 MG/1
800 TABLET, FILM COATED ORAL EVERY 8 HOURS PRN
Qty: 60 TABLET | Refills: 0 | Status: SHIPPED | OUTPATIENT
Start: 2024-02-02 | End: 2024-02-24

## 2024-02-02 ASSESSMENT — PAIN SCALES - GENERAL: PAINLEVEL: SEVERE PAIN (6)

## 2024-02-02 NOTE — PROGRESS NOTES
ASSESSMENT: Ana Felix is a 43 year old female presents for consultation at the request of PCP Radha Bermudez, who presents today for new patient evaluation of :     -neck pain     Patient has positive meier and hyperreflexia on exam, sensation decreased in the left hand. Strength is otherwise full. Left occipital tenderness and pain arching over the ear is consistent with occipital neuralgia - I did offer a left occipital nerve block for pain control, but would first prefer new cervical MRI however to rule out cord compression given her hand symptoms. We did review her last cervical MRI which was done on a routine basis for her SCS placement authorization 6mos prior to her symptoms starting (May 2023). At the time, she had mild disc osteophyte complex at C5-6 with mild to moderate spinal canal stenosis and mild bilateral neural foraminal stenosis. She had mild disc osteophyte complex at C6-7 which caused moderate left neural foraminal stenosis, no significant neural foraminal stenosis on the right. Will review MRI for any worsening cervical stenosis leading to her abnormal reflexes and dropping of objects    In the meantime, I  referred patient to PT. I prescribed ibuprofen 800mg TID PRN. She has a pain med agreement. Current prescriber of tizanidine could consider increased dose to 4mg TID PRN given trap tenderness. She will contact her pain team to discuss. We will have Ana back in person to review her MRI and decide plan.          No data to display                     There are no diagnoses linked to this encounter.     PLAN:  Reviewed spine anatomy and disease process. Discussed diagnosis and treatment options with the patient today. A shared decision making model was used. The patient's values and choices were respected. The following represents what was discussed and decided upon by the provider and the patient.     -DIAGNOSTIC TESTS:  Images were personally reviewed and interpreted and explained  to patient today using spine model.   --I ordered a cervical MRI today   -consider luciano UE EMG if results negative     -PHYSICAL THERAPY:    -I placed a PT referral today  Discussed the importance of core strengthening, ROM, stretching exercises with the patient and how each of these entities is important in decreasing pain.  Explained to the patient that the purpose of physical therapy is to teach the patient a home exercise program.  These exercises need to be performed every day in order to decrease pain and prevent future occurrences of pain.    -MEDICATIONS:    -I sent ibuprofen 800mg tid prn to her pharmacy today  -current tizanidine prescriber could consider increasing dose to 4mg however defer to pain team provider  -would not change her chronic medications  Discussed multiple medication options today with patient. Discussed risks, side effects, and proper use of medications. Patient verbalized understanding.    -INTERVENTIONS:    -we discussed risks/benefits of a left occipital nerve block today . She would like to proceed. We will just check MRI results prior to ordering this so no surprises  Discussed risks and benefits of injections with patient today. Patient would be a good candidate in the future for either epidural steroid injections or medial branch blocks if indicated based on symptoms and supported by imaging results.    -PATIENT EDUCATION: Total time of 40 minutes, on the day of service, spent with the patient, reviewing the chart, placing orders, and documenting.   -Today we also discussed the issues related to the pros and cons of the current treatment plan.    -FOLLOW-UP:   review cervical MRI in person     Advised patient to call the [facility] if symptoms worsen or if they develop red flag symptoms such as numbness, weakness, severe pain uncontrolled by current pain med regimen, or any new or worsening problems controlling bladder and bowel function.    ______________________________________________________________________    SUBJECTIVE:   Ana Felix  is a 43 year old female  hx crohns disease, migraines, IBS, anxiety, insomnia, acid reflux, mild persistent asthma, controlled substance agreement who presents today for new patient evaluation of neck pain     Conrad neck pain into the shoulders since Dec 2023. No injury. Felt like she just woke up wrong one morning. Since then the pain has been getting worse and worse. Her pain starts at the base of her skull, feels annoying almost like a headache, and it radiates upward into the left posterior scalp over her ear. The left side is much worse than the right. It feels like a shooting pain into the shoulders as well, and that is typically brought on by turning her head to the left. Some days she can turn her neck quite a bit, but other days she is unable to turn it at all. She has been under extra stress lately with her 's disability approval process, and wonders if this could be exacerbating a musculoskeletal component. Pain today 6/10, at worst 9/10, at best 3/10.    Over the last few weeks, she has experienced new occasional zingers in her hands, but this has progressed into hand motor difficulties and things falling out of both hands. This happens 1-2 x daily. The middle three fingers are the ones that have zingers and numbness, but all of the fingers on both hands equally feel weak.    She denies any loss of bowel or bladder control. Her crohns symptoms are well-controlled, and have not changed recently.    She has tried icy hot, heating pads, ice, lidocaine patches. Lidocaine patches feel helpful for the skin component, but not deeper pain.  She is in the process of tapering off of her other chronic pain medications because they are not helping. She has not tried any new medications or had any other changes made in light of her neck symptoms.    She also deals with chronic  "low back pain and has been treated by [facility] for this for 10yrs with the medications below. She is awaiting a SCS placement. Has been dealing with the approval process.  She is at the third appeal level for her insurance to cover a SCS. She has not seen any other providers for her neck pain so far. She has a controlled substance agreement.    She did have a cervical MRI scan in may 2023, which was done routinely for her spinal cord stimulator approval. She was not having any symptoms at that time. She saw Dr Malcolm neurosurgeon at the time for SCS approval process.    She is currently on:  Fentanyl patch  Percocet  gabapentin  Tizanidine  Hydroxyzine  And this has not helped her neck pain so far.    -Treatment to Date:     -Medications:  Fentanyl patch  Percocet  Gabapentin  Tizanidine  Hydroxyzine    Past medications utilized for her back pain-  Meloxicam (nausea diarrhea)  Cymbalta (side effects)  Flexeril (not helpful)  Lyrica (side effects)  Ibuprofen   Lidocaine patches   Medrol dose pack    Current Outpatient Medications   Medication    fentaNYL (DURAGESIC) 25 mcg/hr patch 72 hr    gabapentin (NEURONTIN) 300 MG capsule    naloxone (NARCAN) 4 MG/0.1ML nasal spray    oxyCODONE-acetaminophen (PERCOCET) 7.5-325 MG per tablet    tiZANidine (ZANAFLEX) 2 MG tablet    traZODone (DESYREL) 150 MG tablet    azaTHIOprine (IMURAN) 50 MG tablet    busPIRone (BUSPAR) 10 MG tablet    cloNIDine (CATAPRES) 0.1 MG tablet    cyanocobalamin (VITAMIN B12) 1000 MCG/ML injection    hydrOXYzine HCl (ATARAX) 25 MG tablet    montelukast (SINGULAIR) 10 MG tablet    sertraline (ZOLOFT) 100 MG tablet    triamcinolone (KENALOG) 0.1 % external ointment    VENTOLIN  (90 Base) MCG/ACT inhaler     No current facility-administered medications for this visit.       Allergies   Allergen Reactions    Infliximab Hives    Sulfa Antibiotics Nausea     Pt states \"it doesn't work for me\"  Pt states \"it doesn't work for me\"  Other " "reaction(s): Diarrhea, nausea, Nausea, Intolerance  Pt states \"it doesn't work for me\"    Sulfacetamide Nausea    Amoxicillin Nausea and Vomiting    Amoxicillin-Pot Clavulanate Other (See Comments) and Nausea and Vomiting     Crohn's    Aspirin Nausea    Bupropion Hives and Nausea    Clavulanic Acid Nausea and Vomiting    Droperidol Other (See Comments)     Dystonic reaction    Ibuprofen Other (See Comments) and Nausea     Crohn's      Lyrica [Pregabalin] Nausea and Vomiting     Grogginess, severe back pain  Grogginess, severe back pain    Vicodin [Hydrocodone-Acetaminophen] Nausea and Vomiting    Adhesive Tape Rash       Past Medical History:   Diagnosis Date    Back pain     Crohn's disease (H)     Exercise-induced asthma     Gestational diabetes     Herniation of intervertebral disc of lumbar region     Infertility     Ovarian cyst     Smoker     Status post cholecystectomy 04/27/2005        Patient Active Problem List   Diagnosis    Crohns disease    Female pelvic pain    CARDIOVASCULAR SCREENING; LDL GOAL LESS THAN 160    Migraine headache    Back pain    Obesity    Disorder of sacrum    Displacement of lumbar intervertebral disc without myelopathy    Tobacco abuse    S/P oophorectomy    History of cholecystectomy    History of resection of small bowel    Abdominal pain    Discogenic pain    Chronic low back pain    Mild persistent asthma, uncomplicated    Nausea with vomiting    Abdominal pain, right upper quadrant    Irritable bowel syndrome (IBS)    Lactose intolerance    Hearing difficulty    S/P LEEP of cervix    Controlled substance agreement signed    Degenerative lumbar disc    Dysmenorrhea    Labral tear of hip, degenerative    Pain medication agreement    Pain of right hip joint    Crohn's disease of both small and large intestine without complication (H)    History of gestational diabetes mellitus (GDM)    Amenorrhea    Immunocompromised state (H24)    Hidradenitis suppurativa    SKYE (stress urinary " incontinence, female)    DANIELLE (generalized anxiety disorder)    Insomnia, unspecified type    Primary osteoarthritis of left knee    Closed nondisplaced fracture of head of right radius with routine healing, subsequent encounter    Prediabetes       Past Surgical History:   Procedure Laterality Date    APPENDECTOMY      C/SECTION, LOW TRANSVERSE      , Low Transverse    CHOLECYSTECTOMY, LAPOROSCOPIC  2005    Cholecystectomy, Laparoscopic    COLONOSCOPY      ESOPHAGOSCOPY, GASTROSCOPY, DUODENOSCOPY (EGD), COMBINED  3/6/2014    Procedure: COMBINED ESOPHAGOSCOPY, GASTROSCOPY, DUODENOSCOPY (EGD), BIOPSY SINGLE OR MULTIPLE;  COLONOSCOPY/ UPPER GI ENDOSCOPY/ COLON/ EGD/ Abdominal pain, epigastric/ [facility];  Surgeon: West Lomas MD;  Location: [facility] OR    HC HYSTEROSCOPY, SURGICAL; W/ ENDOMETRIAL ABLATION, ANY METHOD  2010    HERNIORRHAPHY VENTRAL N/A 2018    Procedure: Open ventral hernia repair;  Surgeon: Alonso Bills MD;  Location: [facility]    HYSTERECTOMY, SUPRACERVICAL LAPAROSCOPIC      LEEP TX, CERVICAL      LEEP TX Cervical    ORTHOPEDIC SURGERY      bluged disk    partial small bowel resection      Ileo-colonic resection. Crohn's disease surgery for bowel obstruction. this was a section of small bowel and large bowel and the cecum., all removed and a reanastamosis done successfully    SALPINGO OOPHORECTOMY,R/L/TORI      Right ovary    SLING TRANSVAGINAL N/A 2022    Procedure: Pubovaginal sling with Altis;  Surgeon: Eleuterio Stone MD;  Location: [facility] OR    TUBAL LIGATION         Family History   Problem Relation Age of Onset    Hyperlipidemia Mother     Cancer Mother         cervical    Lipids Mother     Hypertension Father     Eye Disorder Father     Lipids Father     Alcohol/Drug Maternal Grandmother     Colon Cancer Paternal Grandmother     Diabetes Paternal Grandmother     Eye Disorder Paternal Grandmother     Diabetes Paternal Grandfather     Eye Disorder Paternal  "Grandfather     Inflammatory Bowel Disease Paternal Aunt         and two paternal uncles       Reviewed past medical, surgical, and family history with patient found on new patient intake packet located in EMR Media tab.     SOCIAL HX: smoker. No alcohol use, no rec drug use, no heavy drinking    Oswestry (JOSÉ ANTONIO) Questionnaire:         No data to display                Neck Disability Index:       No data to display                   PHQ-2 Score:       2/2/2024     8:08 AM 6/29/2023     9:31 AM   PHQ-2 ( 1999 [employer])   Q1: Little interest or pleasure in doing things 0 2   Q2: Feeling down, depressed or hopeless 0 0   PHQ-2 Score 0 2          ROS: positive for joint pain, anxiety, depression.  Specifically negative for bowel/bladder dysfunction, balance changes, headache, dizziness, foot drop, fevers, chills, appetite changes, nausea/vomiting, unexplained weight loss. Otherwise 13 systems reviewed are negative. Please see the patient's intake questionnaire from today for details.    OBJECTIVE:  /75 (BP Location: Right arm, Patient Position: Sitting)   Pulse 74   Ht 5' 2.5\" (1.588 m)   Wt 179 lb (81.2 kg)   LMP 07/23/2010 (LMP Unknown)   SpO2 96%   BMI 32.22 kg/m      PHYSICAL EXAMINATION:  --CONSTITUTIONAL: Vital signs as above. No acute distress. The patient is well nourished and well groomed.  --PSYCHIATRIC: The patient is awake, alert, oriented to person, place, and time, and answering questions appropriately with clear speech. Appropriate mood and affect   --HEENT: Sclera are non-injected. Extraocular muscles are intact. Moist oral mucosa.  --SKIN: Skin over the face, bilateral upper extremities, and posterior torso is clean, dry, intact without rashes.  --RESPIRATORY: Normal rhythm and effort. No abnormal accessory muscle breathing patterns noted.     --GROSS MOTOR: Easily arises from a seated position. Toe walking, heel walking, and tandem gait are normal.      --UPPER EXTREMITY MOTOR " TESTING:  Shoulder abduction: right 5/5, left 5/5  Triceps: right 5/5 left 5/5  Biceps:right 5/5  left 5/5,   Hand :  right 5/5  left 5/5,   Intrinsics: right 5/5  left 5/5,   Extensors: right 5/5  left 5/5,     --LOWER EXTREMITY MOTOR TESTING  Hip flexion: right 5/5  left 5/5,   Quads:right 5/5  left 5/5,   Hamstrings: right 5/5  left 5/5,   Dorsiflexion: right 5/5  left 5/5,   Plantarflexion: right 5/5  left 5/5,   EHL: right 5/5  left 5/5,     REFLEXES: brisk/4 symmetric triceps, biceps, brachioradialis bilaterally. brisk/4 symmetric patellar, achilles reflex bilaterally.  Negative Clonus, Babinski  positive Jimenes's bilaterally.      SENSATION: Decreased sensation to light touch in the left hand. Otherwise sensation of upper and lower extremities is intact, equal.  --VASCULAR: Warm upper and lower limbs bilaterally.     --CERVICAL SPINE: Inspection reveals no evidence of deformity or swelling. Range of motion is significantly limited in L>R rotation and R>L head tilt due to pain. Mildly reduced head flexion. Full extension. No point tenderness to palpation of cervical spine. Bilateral trap tenderness. Mild tenderness luciano scaps. No paraspinal musculature. Positive tenderness to palpation of left occipital area  negative tenderness to palpation of right occipital area.    --SHOULDERS: no obvious reduction in shoulder range of motion. No tenderness to palpation or swelling noted of AC joints.       RESULTS:   Prior medical records from [facility] were reviewed today.         IMAGING:  Spine imaging was personally reviewed and interpreted today. The images were shown to the patient and the findings were explained using a spine model.    EXAM: MR CERVICAL SPINE W/O CONTRAST  LOCATION: [facility]  DATE/TIME: 5/17/2023 5:40 PM CDT     INDICATION: Hyperreflexia.  COMPARISON: None.  TECHNIQUE: MRI Cervical Spine without IV contrast.     FINDINGS:   ALIGNMENT:  Straightening of the normal cervical lordosis.     BONES: There is no evidence of a marrow-replacing process. There is no evidence of abnormal bony edema. Vertebral body heights are unremarkable.     DISCS: Mild multilevel spondylosis described in further detail below.     SPINAL CORD: No convincing signal abnormalities.     SPINAL CANAL: Moderate C5-C6 spinal canal stenosis.     SOFT TISSUES: Unremarkable. The cervical vascular flow voids appear intact.     POSTERIOR FOSSA: Limited images of the intracranial compartment demonstrate no acute abnormality.     SIGNIFICANT FINDINGS BY LEVEL:      Craniovertebral junction and C1-C2: Unremarkable.     C2-C3: Normal disc height. No herniation. Normal facets. No spinal canal or neural foraminal stenosis.      C3-C4: Normal disc height. No herniation. Bilateral uncovertebral spurring. Normal facets. No spinal canal or neural foraminal stenosis.      C4-C5: Normal disc height. No herniation. Mild uncovertebral spurring. Normal facets. No spinal canal or right neural foraminal stenosis. Mild left neural foraminal stenosis.     C5-C6: Mild intervertebral disc height loss. Shallow disc osteophyte complex and broad-based central protrusion. Bilateral uncovertebral spurring. No significant facet arthropathy. Mild to moderate spinal canal stenosis. Mild bilateral neural foraminal   stenosis.      C6-C7: Mild intervertebral disc height loss. Shallow disc osteophyte complex. Left-sided uncovertebral spurring. No significant facet arthropathy. Mild spinal canal stenosis. No significant right neural foraminal stenosis. Moderate left neural foraminal   stenosis.      C7-T1: Normal disc height. No herniation. Normal facets. No spinal canal or neural foraminal stenosis.                                                                      IMPRESSION:  1.  Unremarkable cervical cord.  2.  At C5-C6, there is mild to moderate spinal canal stenosis.  3.  At C6-C7, there is moderate left  neural foraminal stenosis.  4.  Additional degenerative changes as above.         Daria Hickman FNP-C  [facility]  O. [phone]

## 2024-02-02 NOTE — PATIENT INSTRUCTIONS
~You have been referred for Physical Therapy to [facility]. They will call you to schedule an appointment.      Scheduling phone number is [phone] for [facility], or [place] location.  If you have not heard from the scheduling office within 2 business days, please call [phone] for ALL other locations.    Discussed the importance of core strengthening, ROM, stretching exercises and how each of these entities is important in decreasing pain and improving long term spine health.  The purpose of physical therapy is to teach you an individualized home exercise program.  These exercises need to be performed every day in order to decrease pain and prevent future occurrences of pain.             Imaging (Xray, CT, or MRI) has been ordered today.   Radiology will call you to schedule. Please call below if you do not hear from them in the next couple of days.     [facility] Radiology Scheduling:  Please call [phone] to schedule your image(s) (select option #1).    There are 3 different locations:    [address] [facility] - [place]  [address] [facility]   [place] [id number] [place] [address]         ~Please call our [facility] Nurse Navigation line [phone] with any questions or concerns about your treatment plan, if symptoms worsen and you would like to be seen urgently, or if you have any new or worsening numbness, weakness, or problems controlling bladder and bowel function.  ~You are also welcome to contact Daria Hickman via [email or web address], but please be aware that responses to [email or web address] message may take 2-3 days due to the high volume of patients seen in clinic.

## 2024-02-02 NOTE — LETTER
2/2/2024         RE: Ana Felix  [address]  [place] [address]        Dear Colleague,    Thank you for referring your patient, Ana Felix, to the [facility]. Please see a copy of my visit note below.    ASSESSMENT: Ana Felix is a 43 year old female presents for consultation at the request of PCP Radha Bermudez, who presents today for new patient evaluation of :     -neck pain     Patient has positive meier and hyperreflexia on exam, sensation decreased in the left hand. Strength is otherwise full. Left occipital tenderness and pain arching over the ear is consistent with occipital neuralgia - I did offer a left occipital nerve block for pain control, but would first prefer new cervical MRI however to rule out cord compression given her hand symptoms. We did review her last cervical MRI which was done on a routine basis for her SCS placement authorization 6mos prior to her symptoms starting (May 2023). At the time, she had mild disc osteophyte complex at C5-6 with mild to moderate spinal canal stenosis and mild bilateral neural foraminal stenosis. She had mild disc osteophyte complex at C6-7 which caused moderate left neural foraminal stenosis, no significant neural foraminal stenosis on the right. Will review MRI for any worsening cervical stenosis leading to her abnormal reflexes and dropping of objects    In the meantime, I  referred patient to PT. I prescribed ibuprofen 800mg TID PRN. She has a pain med agreement. Current prescriber of tizanidine could consider increased dose to 4mg TID PRN given trap tenderness. She will contact her pain team to discuss. We will have Ana back in person to review her MRI and decide plan.          No data to display                     There are no diagnoses linked to this encounter.     PLAN:  Reviewed spine anatomy and disease process. Discussed diagnosis and treatment options with the patient today. A shared decision making  model was used. The patient's values and choices were respected. The following represents what was discussed and decided upon by the provider and the patient.     -DIAGNOSTIC TESTS:  Images were personally reviewed and interpreted and explained to patient today using spine model.   --I ordered a cervical MRI today   -consider luciano UE EMG if results negative     -PHYSICAL THERAPY:    -I placed a PT referral today  Discussed the importance of core strengthening, ROM, stretching exercises with the patient and how each of these entities is important in decreasing pain.  Explained to the patient that the purpose of physical therapy is to teach the patient a home exercise program.  These exercises need to be performed every day in order to decrease pain and prevent future occurrences of pain.    -MEDICATIONS:    -I sent ibuprofen 800mg tid prn to her pharmacy today  -current tizanidine prescriber could consider increasing dose to 4mg however defer to pain team provider  -would not change her chronic medications  Discussed multiple medication options today with patient. Discussed risks, side effects, and proper use of medications. Patient verbalized understanding.    -INTERVENTIONS:    -we discussed risks/benefits of a left occipital nerve block today . She would like to proceed. We will just check MRI results prior to ordering this so no surprises  Discussed risks and benefits of injections with patient today. Patient would be a good candidate in the future for either epidural steroid injections or medial branch blocks if indicated based on symptoms and supported by imaging results.    -PATIENT EDUCATION: Total time of 40 minutes, on the day of service, spent with the patient, reviewing the chart, placing orders, and documenting.   -Today we also discussed the issues related to the pros and cons of the current treatment plan.    -FOLLOW-UP:   review cervical MRI in person     Advised patient to call the [facility] if  symptoms worsen or if they develop red flag symptoms such as numbness, weakness, severe pain uncontrolled by current pain med regimen, or any new or worsening problems controlling bladder and bowel function.   ______________________________________________________________________    SUBJECTIVE:   Ana Felix  is a 43 year old female  hx crohns disease, migraines, IBS, anxiety, insomnia, acid reflux, mild persistent asthma, controlled substance agreement who presents today for new patient evaluation of neck pain     Conrad neck pain into the shoulders since Dec 2023. No injury. Felt like she just woke up wrong one morning. Since then the pain has been getting worse and worse. Her pain starts at the base of her skull, feels annoying almost like a headache, and it radiates upward into the left posterior scalp over her ear. The left side is much worse than the right. It feels like a shooting pain into the shoulders as well, and that is typically brought on by turning her head to the left. Some days she can turn her neck quite a bit, but other days she is unable to turn it at all. She has been under extra stress lately with her 's disability approval process, and wonders if this could be exacerbating a musculoskeletal component. Pain today 6/10, at worst 9/10, at best 3/10.    Over the last few weeks, she has experienced new occasional zingers in her hands, but this has progressed into hand motor difficulties and things falling out of both hands. This happens 1-2 x daily. The middle three fingers are the ones that have zingers and numbness, but all of the fingers on both hands equally feel weak.    She denies any loss of bowel or bladder control. Her crohns symptoms are well-controlled, and have not changed recently.    She has tried icy hot, heating pads, ice, lidocaine patches. Lidocaine patches feel helpful for the skin component, but not deeper pain.  She is in the process of  tapering off of her other chronic pain medications because they are not helping. She has not tried any new medications or had any other changes made in light of her neck symptoms.    She also deals with chronic low back pain and has been treated by [facility] for this for 10yrs with the medications below. She is awaiting a SCS placement. Has been dealing with the approval process.  She is at the third appeal level for her insurance to cover a SCS. She has not seen any other providers for her neck pain so far. She has a controlled substance agreement.    She did have a cervical MRI scan in may 2023, which was done routinely for her spinal cord stimulator approval. She was not having any symptoms at that time. She saw Dr Malcolm neurosurgeon at the time for SCS approval process.    She is currently on:  Fentanyl patch  Percocet  gabapentin  Tizanidine  Hydroxyzine  And this has not helped her neck pain so far.    -Treatment to Date:     -Medications:  Fentanyl patch  Percocet  Gabapentin  Tizanidine  Hydroxyzine    Past medications utilized for her back pain-  Meloxicam (nausea diarrhea)  Cymbalta (side effects)  Flexeril (not helpful)  Lyrica (side effects)  Ibuprofen   Lidocaine patches   Medrol dose pack    Current Outpatient Medications   Medication     fentaNYL (DURAGESIC) 25 mcg/hr patch 72 hr     gabapentin (NEURONTIN) 300 MG capsule     naloxone (NARCAN) 4 MG/0.1ML nasal spray     oxyCODONE-acetaminophen (PERCOCET) 7.5-325 MG per tablet     tiZANidine (ZANAFLEX) 2 MG tablet     traZODone (DESYREL) 150 MG tablet     azaTHIOprine (IMURAN) 50 MG tablet     busPIRone (BUSPAR) 10 MG tablet     cloNIDine (CATAPRES) 0.1 MG tablet     cyanocobalamin (VITAMIN B12) 1000 MCG/ML injection     hydrOXYzine HCl (ATARAX) 25 MG tablet     montelukast (SINGULAIR) 10 MG tablet     sertraline (ZOLOFT) 100 MG tablet     triamcinolone (KENALOG) 0.1 % external ointment     VENTOLIN  (90 Base) MCG/ACT inhaler     No  "current facility-administered medications for this visit.       Allergies   Allergen Reactions     Infliximab Hives     Sulfa Antibiotics Nausea     Pt states \"it doesn't work for me\"  Pt states \"it doesn't work for me\"  Other reaction(s): Diarrhea, nausea, Nausea, Intolerance  Pt states \"it doesn't work for me\"     Sulfacetamide Nausea     Amoxicillin Nausea and Vomiting     Amoxicillin-Pot Clavulanate Other (See Comments) and Nausea and Vomiting     Crohn's     Aspirin Nausea     Bupropion Hives and Nausea     Clavulanic Acid Nausea and Vomiting     Droperidol Other (See Comments)     Dystonic reaction     Ibuprofen Other (See Comments) and Nausea     Crohn's       Lyrica [Pregabalin] Nausea and Vomiting     Grogginess, severe back pain  Grogginess, severe back pain     Vicodin [Hydrocodone-Acetaminophen] Nausea and Vomiting     Adhesive Tape Rash       Past Medical History:   Diagnosis Date     Back pain      Crohn's disease (H)      Exercise-induced asthma      Gestational diabetes      Herniation of intervertebral disc of lumbar region      Infertility      Ovarian cyst      Smoker      Status post cholecystectomy 04/27/2005        Patient Active Problem List   Diagnosis     Crohns disease     Female pelvic pain     CARDIOVASCULAR SCREENING; LDL GOAL LESS THAN 160     Migraine headache     Back pain     Obesity     Disorder of sacrum     Displacement of lumbar intervertebral disc without myelopathy     Tobacco abuse     S/P oophorectomy     History of cholecystectomy     History of resection of small bowel     Abdominal pain     Discogenic pain     Chronic low back pain     Mild persistent asthma, uncomplicated     Nausea with vomiting     Abdominal pain, right upper quadrant     Irritable bowel syndrome (IBS)     Lactose intolerance     Hearing difficulty     S/P LEEP of cervix     Controlled substance agreement signed     Degenerative lumbar disc     Dysmenorrhea     Labral tear of hip, degenerative     Pain " medication agreement     Pain of right hip joint     Crohn's disease of both small and large intestine without complication (H)     History of gestational diabetes mellitus (GDM)     Amenorrhea     Immunocompromised state (H24)     Hidradenitis suppurativa     SKYE (stress urinary incontinence, female)     DANIELLE (generalized anxiety disorder)     Insomnia, unspecified type     Primary osteoarthritis of left knee     Closed nondisplaced fracture of head of right radius with routine healing, subsequent encounter     Prediabetes       Past Surgical History:   Procedure Laterality Date     APPENDECTOMY       C/SECTION, LOW TRANSVERSE      , Low Transverse     CHOLECYSTECTOMY, LAPOROSCOPIC  2005    Cholecystectomy, Laparoscopic     COLONOSCOPY       ESOPHAGOSCOPY, GASTROSCOPY, DUODENOSCOPY (EGD), COMBINED  3/6/2014    Procedure: COMBINED ESOPHAGOSCOPY, GASTROSCOPY, DUODENOSCOPY (EGD), BIOPSY SINGLE OR MULTIPLE;  COLONOSCOPY/ UPPER GI ENDOSCOPY/ COLON/ EGD/ Abdominal pain, epigastric/ [facility];  Surgeon: West Lomas MD;  Location:  OR      HYSTEROSCOPY, SURGICAL; W/ ENDOMETRIAL ABLATION, ANY METHOD  2010     HERNIORRHAPHY VENTRAL N/A 2018    Procedure: Open ventral hernia repair;  Surgeon: Alonso Bills MD;  Location: [facility] OR     HYSTERECTOMY, SUPRACERVICAL LAPAROSCOPIC       LEEP TX, CERVICAL      LEEP TX Cervical     ORTHOPEDIC SURGERY      bluged disk     partial small bowel resection      Ileo-colonic resection. Crohn's disease surgery for bowel obstruction. this was a section of small bowel and large bowel and the cecum., all removed and a reanastamosis done successfully     SALPINGO OOPHORECTOMY,R/L/TORI      Right ovary     SLING TRANSVAGINAL N/A 2022    Procedure: Pubovaginal sling with Altis;  Surgeon: Eleuterio Stone MD;  Location: [facility] OR     TUBAL LIGATION         Family History   Problem Relation Age of Onset     Hyperlipidemia Mother      Cancer Mother          "cervical     Lipids Mother      Hypertension Father      Eye Disorder Father      Lipids Father      Alcohol/Drug Maternal Grandmother      Colon Cancer Paternal Grandmother      Diabetes Paternal Grandmother      Eye Disorder Paternal Grandmother      Diabetes Paternal Grandfather      Eye Disorder Paternal Grandfather      Inflammatory Bowel Disease Paternal Aunt         and two paternal uncles       Reviewed past medical, surgical, and family history with patient found on new patient intake packet located in EMR Media tab.     SOCIAL HX: smoker. No alcohol use, no rec drug use, no heavy drinking    Oswestry (JOSÉ ANTONIO) Questionnaire:         No data to display                Neck Disability Index:       No data to display                   PHQ-2 Score:       2/2/2024     8:08 AM 6/29/2023     9:31 AM   PHQ-2 ( 1999 [employer])   Q1: Little interest or pleasure in doing things 0 2   Q2: Feeling down, depressed or hopeless 0 0   PHQ-2 Score 0 2          ROS: positive for joint pain, anxiety, depression.  Specifically negative for bowel/bladder dysfunction, balance changes, headache, dizziness, foot drop, fevers, chills, appetite changes, nausea/vomiting, unexplained weight loss. Otherwise 13 systems reviewed are negative. Please see the patient's intake questionnaire from today for details.    OBJECTIVE:  /75 (BP Location: Right arm, Patient Position: Sitting)   Pulse 74   Ht 5' 2.5\" (1.588 m)   Wt 179 lb (81.2 kg)   LMP 07/23/2010 (LMP Unknown)   SpO2 96%   BMI 32.22 kg/m      PHYSICAL EXAMINATION:  --CONSTITUTIONAL: Vital signs as above. No acute distress. The patient is well nourished and well groomed.  --PSYCHIATRIC: The patient is awake, alert, oriented to person, place, and time, and answering questions appropriately with clear speech. Appropriate mood and affect   --HEENT: Sclera are non-injected. Extraocular muscles are intact. Moist oral mucosa.  --SKIN: Skin over the face, bilateral upper extremities, " and posterior torso is clean, dry, intact without rashes.  --RESPIRATORY: Normal rhythm and effort. No abnormal accessory muscle breathing patterns noted.     --GROSS MOTOR: Easily arises from a seated position. Toe walking, heel walking, and tandem gait are normal.      --UPPER EXTREMITY MOTOR TESTING:  Shoulder abduction: right 5/5, left 5/5  Triceps: right 5/5 left 5/5  Biceps:right 5/5  left 5/5,   Hand :  right 5/5  left 5/5,   Intrinsics: right 5/5  left 5/5,   Extensors: right 5/5  left 5/5,     --LOWER EXTREMITY MOTOR TESTING  Hip flexion: right 5/5  left 5/5,   Quads:right 5/5  left 5/5,   Hamstrings: right 5/5  left 5/5,   Dorsiflexion: right 5/5  left 5/5,   Plantarflexion: right 5/5  left 5/5,   EHL: right 5/5  left 5/5,     REFLEXES: brisk/4 symmetric triceps, biceps, brachioradialis bilaterally. brisk/4 symmetric patellar, achilles reflex bilaterally.  Negative Clonus, Babinski  positive Jimenes's bilaterally.      SENSATION: Decreased sensation to light touch in the left hand. Otherwise sensation of upper and lower extremities is intact, equal.  --VASCULAR: Warm upper and lower limbs bilaterally.     --CERVICAL SPINE: Inspection reveals no evidence of deformity or swelling. Range of motion is significantly limited in L>R rotation and R>L head tilt due to pain. Mildly reduced head flexion. Full extension. No point tenderness to palpation of cervical spine. Bilateral trap tenderness. Mild tenderness luciano scaps. No paraspinal musculature. Positive tenderness to palpation of left occipital area  negative tenderness to palpation of right occipital area.    --SHOULDERS: no obvious reduction in shoulder range of motion. No tenderness to palpation or swelling noted of AC joints.       RESULTS:   Prior medical records from [facility]where were reviewed today.         IMAGING:  Spine imaging was personally reviewed and interpreted today. The images were shown to the patient and the  findings were explained using a spine model.    EXAM: MR CERVICAL SPINE W/O CONTRAST  LOCATION: [facility]  DATE/TIME: 5/17/2023 5:40 PM CDT     INDICATION: Hyperreflexia.  COMPARISON: None.  TECHNIQUE: MRI Cervical Spine without IV contrast.     FINDINGS:   ALIGNMENT: Straightening of the normal cervical lordosis.     BONES: There is no evidence of a marrow-replacing process. There is no evidence of abnormal bony edema. Vertebral body heights are unremarkable.     DISCS: Mild multilevel spondylosis described in further detail below.     SPINAL CORD: No convincing signal abnormalities.     SPINAL CANAL: Moderate C5-C6 spinal canal stenosis.     SOFT TISSUES: Unremarkable. The cervical vascular flow voids appear intact.     POSTERIOR FOSSA: Limited images of the intracranial compartment demonstrate no acute abnormality.     SIGNIFICANT FINDINGS BY LEVEL:      Craniovertebral junction and C1-C2: Unremarkable.     C2-C3: Normal disc height. No herniation. Normal facets. No spinal canal or neural foraminal stenosis.      C3-C4: Normal disc height. No herniation. Bilateral uncovertebral spurring. Normal facets. No spinal canal or neural foraminal stenosis.      C4-C5: Normal disc height. No herniation. Mild uncovertebral spurring. Normal facets. No spinal canal or right neural foraminal stenosis. Mild left neural foraminal stenosis.     C5-C6: Mild intervertebral disc height loss. Shallow disc osteophyte complex and broad-based central protrusion. Bilateral uncovertebral spurring. No significant facet arthropathy. Mild to moderate spinal canal stenosis. Mild bilateral neural foraminal   stenosis.      C6-C7: Mild intervertebral disc height loss. Shallow disc osteophyte complex. Left-sided uncovertebral spurring. No significant facet arthropathy. Mild spinal canal stenosis. No significant right neural foraminal stenosis. Moderate left neural foraminal   stenosis.      C7-T1: Normal disc height.  No herniation. Normal facets. No spinal canal or neural foraminal stenosis.                                                                      IMPRESSION:  1.  Unremarkable cervical cord.  2.  At C5-C6, there is mild to moderate spinal canal stenosis.  3.  At C6-C7, there is moderate left neural foraminal stenosis.  4.  Additional degenerative changes as above.         Daria LOPEZ-C  [facility]  O. [phone]      Again, thank you for allowing me to participate in the care of your patient.        Sincerely,        CARI Burgos CNP

## 2024-02-10 ENCOUNTER — HOSPITAL ENCOUNTER (OUTPATIENT)
Dept: MRI IMAGING | Facility: CLINIC | Age: 44
Discharge: HOME | End: 2024-02-10
Attending: NURSE PRACTITIONER | Admitting: NURSE PRACTITIONER
Payer: COMMERCIAL

## 2024-02-10 DIAGNOSIS — M54.12 CERVICAL RADICULOPATHY: ICD-10-CM

## 2024-02-10 PROCEDURE — 72141 MRI NECK SPINE W/O DYE: CPT

## 2024-02-12 ENCOUNTER — TELEPHONE (OUTPATIENT)
Dept: PHYSICAL MEDICINE AND REHAB | Facility: CLINIC | Age: 44
End: 2024-02-12
Payer: COMMERCIAL

## 2024-02-12 DIAGNOSIS — M54.81 OCCIPITAL NEURALGIA OF LEFT SIDE: Primary | ICD-10-CM

## 2024-02-12 DIAGNOSIS — R20.0 NUMBNESS AND TINGLING IN BOTH HANDS: ICD-10-CM

## 2024-02-12 DIAGNOSIS — R20.2 NUMBNESS AND TINGLING IN BOTH HANDS: ICD-10-CM

## 2024-02-12 NOTE — TELEPHONE ENCOUNTER
Phone call to patient to review results and provider's recommendations. Results given and explained. Discussed the recommended EMG for both arms to find out source/cause for the hand numbness/weakness she is experiencing. Stated understanding.     She does report the scalp pain is the most persistent/constant and bothersome at this time. She understands she will be called to set up appointments for both the EMG and ONB injection once orders are in. Encouraged pt to call nurse navigation line if questions or concerns arise.

## 2024-02-12 NOTE — TELEPHONE ENCOUNTER
----- Message from CARI Magallanes CNP sent at 2/12/2024  9:31 AM CST -----  Please let Ana know that her cervical MRI showed moderate to severe narrowing around the nerves on the left at C6-7 and C7-T1. This could cause pain/numbness into the left arm and hand. I would like to get EMG luciano upper extremities to confirm where the numbness/weakness in both hands she feels is coming from. If having any left arm pain, would recommend C7-T1 IL YUMIKO. If neck pain into the left scalp above the ear is worse, I would instead recommend a left occipital nerve block.

## 2024-02-15 NOTE — PROGRESS NOTES
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     01/18/23 0800   Appointment Info   Signing clinician's name / credentials Macho Bah, PT, DPT   Visits Used 3   Progress Note/Certification   Progress Note Due Date 02/14/23   Objective Measures   Objective Measures Objective Measure 1;Objective Measure 2   Objective Measure 1   Objective Measure L knee AROM   Details 0-1-128 degrees, mild medial effusion   Objective Measure 2   Objective Measure L knee strength   Details x10 step downs each direction from 3\" step before fatigue   Treatment Interventions (PT)   Interventions Therapeutic Procedure/Exercise   Therapeutic Procedure/Exercise   Therapeutic Procedures: strength, endurance, ROM, flexibillity minutes (60963) 26   Skilled Intervention ROM, strength   Patient Response/Progress Good form, fatigued with step down but no increase in pain. Improved pain free ROM   Education   Learner/Method Patient   Plan   Homework PTRX   Home program see PTRX   Updates to plan of care progressed resistance, SLR   Plan for next session Wall sit, squat   Medicare Claim Information   Medical Diagnosis L knee pain   PT Diagnosis L knee pain   Start of Care Date 01/03/23   Onset date of current episode/exacerbation 10/06/22   Ortho Goal 1   Goal Identifier 1   Goal Description Patient will be able to ascend & descend 1 flight of stairs reciprocally with 2/10 knee pain at worst   Target Date 01/31/23   Ortho Goal 2   Goal Identifier 2   Goal Description Patient will be able to walk 1 mile with 3/10 L knee pain with normal gait pattern   Target Date 02/28/23   Ortho Goal 3   Goal Identifier 3   Goal Description Patient will demonstrate understanding and compliance of HEP and perform independently   Target Date 02/28/23   Session Number   Additional Session Number SOC 1/3/23   Authorization status WC       "

## 2024-02-19 ENCOUNTER — THERAPY VISIT (OUTPATIENT)
Dept: PHYSICAL THERAPY | Facility: CLINIC | Age: 44
End: 2024-02-19
Attending: NURSE PRACTITIONER
Payer: COMMERCIAL

## 2024-02-19 DIAGNOSIS — M54.12 CERVICAL RADICULOPATHY: ICD-10-CM

## 2024-02-19 PROCEDURE — 97530 THERAPEUTIC ACTIVITIES: CPT | Mod: GP

## 2024-02-19 PROCEDURE — 97140 MANUAL THERAPY 1/> REGIONS: CPT | Mod: GP

## 2024-02-19 PROCEDURE — 97161 PT EVAL LOW COMPLEX 20 MIN: CPT | Mod: GP

## 2024-02-19 PROCEDURE — 97110 THERAPEUTIC EXERCISES: CPT | Mod: GP

## 2024-02-19 NOTE — PROGRESS NOTES
PHYSICAL THERAPY EVALUATION  Type of Visit: Evaluation    See electronic medical record for Abuse and Falls Screening details.  KEY PT FINDINGS:  1) Positive meier's test on RUE  2) Myotomal weakness noted C4-C7 on LUE, hyperreflexia 3+ bilateral  3) Positive distraction test    Therapist Assessment: Ana Felix is a 43 year old female patient presenting to Physical Therapy with chronic bilateral neck pain. Patient demonstrates pain, weakness, neural tension, headaches, abnormal reflexes, and bilateral symptoms. Signs and symptoms are consistent with cervical radiculopathy with secondary considerations for abnormal central findings. These impairments limit their ability to turn head while driving or sleep without pain. Skilled PT services are necessary in order to reduce impairments and improve independent function.    Patient was referred by Daria Hickman CNP on 2/2/24.    Precautions/restrictions/MD instructions include:     cervical radiculopathy. hands weakness, dropping objects. work on strenthening, gentle traction etc     Subjective History    Patient is a 43 year old female presenting to outpatient physical therapy with chronic bilateral neck pain that started in December of 2023. She has a history of low back pain where she is currently trying to obtain a spinal cord stimulator. She mentions that the past few months have been very stressful as well where she thought that's what was causing her neck pain. She mentions that driving turning her head would cause severe pain. She most recently has been experiencing motor control deficits and  weakness. Had MRI last week and is supposed to be getting EMG coming up. She is scheduled for injections coming up, has been mostly concerned with pain at the base of her skull that wraps up around her ear which is mainly on the left side. She does note bilateral neck pain (worse on the left). She has bilateral pain down her arms where she notes motor loss and tingly  "(strength issues on RUE and tingling on LUE).    Pain: Pain Level at Rest: 1/10  Pain Level with Use: 4/10  Pain Location: cervical spine  Pain Quality: Aching, Pressure, and Shooting  Pain Frequency: intermittent  Pain is Worst: none  Pain is Exacerbated By: sleeping or lying in a \"funky\" position  Pain is Relieved By: heat, otc medications, rest, and icy hot  Pain Progression: Worsened    Red Flags review findings: Denies any new change in nausea, vomiting, loss of balance. No double vision, no dizziness.    Prior Treatment Includes: None    Medications: Trazodone, tizanidine, ibuprofen, fentanyl patch, percocet, gabapentin    Imaging: Cervical MRI 2/10/24: IMPRESSION:  1.  No significant change in multilevel cervical spondylosis.  2.  No high-grade spinal canal stenosis.  3.  Unchanged moderate to severe neural foraminal stenosis on the left at C7-T1.  4.  Unchanged moderate neural foraminal stenosis on the left at C6-C7.    Lifestyle & General Medical History:   - Occupation: [employer]   - Experience with physical activity: Exploring, hiking, gardening   - Notable medical history: Chronic low back pain, pre-diabetes, crohn's disease, hx of smoking    Current Functional Status: Independent  Previous Functional Status:  fully functional prior to pain onset/injury.  Outcome measure: JOSÉ ANTONIO and HENRY    General health as reported by patient: good.    Additional considerations: None    Patient Goals for Physical Therapy: Strengthening for neck, \"turn my head without stabbing pain\"      Subjective       Presenting condition or subjective complaint:  neck, shoulder pain, constant and shooting  Date of onset: 12/01/23 12/2023  Relevant medical history:   Arthritis, asthma, bladder or bowel problems, depression, hearing problems, migraines or headaches, overweight, pain at night or rest, smoking  Dates & types of surgery:  see medical records    Prior diagnostic imaging/testing results:       Prior therapy history " for the same diagnosis, illness or injury:           Objective   CERVICAL SPINE EVALUATION  INTEGUMENTARY (edema, incisions): WNL  POSTURE: Sitting Posture: Rounded shoulders, Forward head  ROM:  Cervical FLX: slow and mod loss, EXT min loss (feels good when rotates head in extension, L ROT: fearful and severe loss, R ROT, min loss, SB R and L equal min loss  Repeated Movements: NT  MYOTOMES:    Left Right   C1-2 (Neck Flexion)     C3 (Neck Side Bend)      C4 (Shrug) 4 5   C5 (Deltoid) 4 5   C6 (Biceps) 4- 5   C7 (Triceps) 4 5   C8 (Thumb Ext) 4+ 5   T1 (Intrinsics) 4+ 5     DTR S:    Left Right   C5 (Biceps) 3 3   C6 (Brachioradialis) 3 3   C7 (Triceps) 3 3   L4 (Quad)     S1 (Achilles)       CORD SIGNS: Babinski negative L, Jimenes negative L, Babinski negative R, Jimenes positive R, negative clonus bilateral  DERMATOMES:    Left Right   C1     C2     C3     C4 Hypo (light touch)    C5     C6     C7  Hypo (light touch)   C8     T1  Hypo (light touch)     NEURAL TENSION:  Spurlings positive on L, negative on R; Distraction test positive  FLEXIBILITY: Decreased upper trap L, Decreased levator L, Decreased upper trap R, Decreased levator R   SPECIAL TESTS:  NT  PALPATION:  TTP to SP and TP C4-C7 L>R, hypertonicity in bilateral UT  SPINAL SEGMENTAL CONCLUSIONS:  sensitive to joint mobility assessment on L side, unable to obtain normal end feel with empty end feel    Assessment & Plan   CLINICAL IMPRESSIONS  Medical Diagnosis: Cervical Radiculopathy    Treatment Diagnosis: Neck pain w/radiating pain   Impression/Assessment: Patient is a 43 year old female with chronic neck pain complaints.  The following significant findings have been identified: Pain, Decreased ROM/flexibility, Decreased joint mobility, Decreased strength, Decreased proprioception, Impaired sensation, Impaired muscle performance, Decreased activity tolerance, Impaired posture, and Dizziness. These impairments interfere with their ability to perform  self care tasks, work tasks, recreational activities, household chores, driving , household mobility, and community mobility as compared to previous level of function.     Clinical Decision Making (Complexity):  Clinical Presentation: Evolving/Changing  Clinical Presentation Rationale: based on medical and personal factors listed in PT evaluation  Clinical Decision Making (Complexity): Low complexity    PLAN OF CARE  Treatment Interventions:  Modalities: Biofeedback, Contrast Bath, Cryotherapy, Dry Needling, Fluidotherapy, E-stim, Hot Pack, Mechanical Traction  Interventions: Manual Therapy, Neuromuscular Re-education, Therapeutic Activity, Therapeutic Exercise, Self-Care/Home Management    Long Term Goals     PT Goal 1  Goal Identifier: STG1  Goal Description: Patient will be able to turn head to the left at least 60 degrees without any pain  Rationale: to maximize safety and independence with performance of ADLs and functional tasks;to maximize safety and independence within the home;to maximize safety and independence within the community;to maximize safety and independence with transportation;to maximize safety and independence with self cares  Target Date: 05/13/24  PT Goal 3  Goal Identifier: LTG2  Goal Description: In 8 weeks, patient will report at least a 11.75 point improvement on the NDI outcome measure  Rationale: to maximize safety and independence with performance of ADLs and functional tasks;to maximize safety and independence within the home;to maximize safety and independence within the community;to maximize safety and independence with transportation;to maximize safety and independence with self cares  Target Date: 05/13/24      Frequency of Treatment: 1x/2wks  Duration of Treatment: 12 weeks    Recommended Referrals to Other Professionals:  None indicated  Education Assessment:   Learner/Method: Patient    Risks and benefits of evaluation/treatment have been explained.   Patient/Family/caregiver  agrees with Plan of Care.     Evaluation Time:     PT Mel Low Complexity Minutes (03754): 20     Signing Clinician: Miguel Torres PT

## 2024-02-24 ENCOUNTER — MYC REFILL (OUTPATIENT)
Dept: PHYSICAL MEDICINE AND REHAB | Facility: CLINIC | Age: 44
End: 2024-02-24
Payer: COMMERCIAL

## 2024-02-24 DIAGNOSIS — M54.2 NECK PAIN: ICD-10-CM

## 2024-02-24 DIAGNOSIS — M54.12 CERVICAL RADICULOPATHY: ICD-10-CM

## 2024-02-26 ENCOUNTER — TELEPHONE (OUTPATIENT)
Dept: PHYSICAL MEDICINE AND REHAB | Facility: CLINIC | Age: 44
End: 2024-02-26

## 2024-02-26 RX ORDER — IBUPROFEN 800 MG/1
800 TABLET, FILM COATED ORAL EVERY 8 HOURS PRN
Qty: 60 TABLET | Refills: 0 | Status: SHIPPED | OUTPATIENT
Start: 2024-02-26

## 2024-02-26 NOTE — TELEPHONE ENCOUNTER
----- Message from Racheal Turner RN sent at 2/26/2024  7:55 AM CST -----  Please call patient to schedule her for the ordered EMG and ONBs US guided.     Thanks!    Tre

## 2024-02-26 NOTE — TELEPHONE ENCOUNTER
Date: 2/26/2024  (Provide the date when patient was called)  Provider: EMG & INJECTION   (with whom  should patient noreen with)  Reason for out-going call: needs to noreen EMG and INJ  (Reason patient was contacted)    Detailed message: Ldvm for patient to c/b and noreen her EMG with Dr. Oh and her Injection.

## 2024-02-27 ENCOUNTER — TELEPHONE (OUTPATIENT)
Dept: PHARMACY | Facility: OTHER | Age: 44
End: 2024-02-27
Payer: COMMERCIAL

## 2024-02-27 NOTE — TELEPHONE ENCOUNTER
[facility] Recruitment: [facility]     Referral outreach attempt #1 on February 27, 2024      Outcome: left voicemail    Hyun Yost - [facility]    [phone]

## 2024-03-06 ENCOUNTER — TELEPHONE (OUTPATIENT)
Dept: PHARMACY | Facility: OTHER | Age: 44
End: 2024-03-06
Payer: COMMERCIAL

## 2024-03-06 NOTE — TELEPHONE ENCOUNTER
[facility] Recruitment: [facility]     Referral outreach attempt #1 on March 6, 2024      Outcome: left voicemail    Gigi Mesa [facility]TP[facility]CIOUS

## 2024-03-29 ENCOUNTER — TELEPHONE (OUTPATIENT)
Dept: PHARMACY | Facility: OTHER | Age: 44
End: 2024-03-29
Payer: COMMERCIAL

## 2024-03-29 NOTE — TELEPHONE ENCOUNTER
[facility] Recruitment: [facility]     Referral outreach attempt #3 on March 29, 2024      Outcome: left voicemail- Call back number [phone]    LYDIA Hopper

## 2024-04-01 ENCOUNTER — MYC MEDICAL ADVICE (OUTPATIENT)
Dept: FAMILY MEDICINE | Facility: CLINIC | Age: 44
End: 2024-04-01
Payer: COMMERCIAL

## 2024-04-04 ENCOUNTER — VIRTUAL VISIT (OUTPATIENT)
Dept: FAMILY MEDICINE | Facility: CLINIC | Age: 44
End: 2024-04-04
Payer: COMMERCIAL

## 2024-04-04 DIAGNOSIS — S01.00XD OPEN WOUND OF SCALP, UNSPECIFIED OPEN WOUND TYPE, SUBSEQUENT ENCOUNTER: Primary | ICD-10-CM

## 2024-04-04 PROCEDURE — 99213 OFFICE O/P EST LOW 20 MIN: CPT | Mod: 95 | Performed by: PHYSICIAN ASSISTANT

## 2024-04-04 RX ORDER — MUPIROCIN 20 MG/G
OINTMENT TOPICAL 3 TIMES DAILY
Qty: 30 G | Refills: 1 | Status: SHIPPED | OUTPATIENT
Start: 2024-04-04

## 2024-04-04 NOTE — PROGRESS NOTES
"Ana is a 43 year old who is being evaluated via a billable video visit.    How would you like to obtain your AVS? MyChart  If the video visit is dropped, the invitation should be resent by: Text to cell phone: [phone]  Will anyone else be joining your video visit? No      Assessment & Plan       ICD-10-CM    1. Open wound of scalp, unspecified open wound type, subsequent encounter  S01.00XD mupirocin (BACTROBAN) 2 % external ointment          She has tried topical steroids including clobetasol solution. Will trial Bactroban ointment 2-3x daily x7-10 days. If no improvements will reach out to dermatology again.      Prescription drug management    BMI  Estimated body mass index is 32.22 kg/m  as calculated from the following:    Height as of 2/2/24: 1.588 m (5' 2.5\").    Weight as of 2/2/24: 81.2 kg (179 lb).     Return in about 2 weeks (around 4/18/2024), or if symptoms worsen or fail to improve.        Subjective   Ana is a 43 year old, presenting for the following health issues:  Shingles (Follow up- not healing properly. Hair is falling off , can't comb the hair)        4/4/2024     9:07 AM   Additional Questions   Roomed by Cyndi over the phone   Accompanied by obi         4/4/2024     9:07 AM   Patient Reported Additional Medications   Patient reports taking the following new medications none     History of Present Illness       Reason for visit:  Shingle  Symptom onset:  More than a month  Symptoms include:  Itchy, not healing properly, taking shower makes it burn  Symptom intensity:  Moderate  Symptom progression:  Improving    She eats 2-3 servings of fruits and vegetables daily.She consumes 1 sweetened beverage(s) daily.She exercises with enough effort to increase her heart rate 30 to 60 minutes per day.  She exercises with enough effort to increase her heart rate 7 days per week.   She is taking medications regularly.       Scalp has gotten better but still has open sores/scabs  We've tried " triamcinolone ointment - helps somewhat  Doesn't crust up like impetigo   Saw derm in January - was prescribed clobetasol solution - no different         Review of Systems  Constitutional, skin systems are negative, except as otherwise noted.      Objective           Vitals:  No vitals were obtained today due to virtual visit.    Physical Exam   GENERAL: alert and no distress  EYES: Eyes grossly normal to inspection.  No discharge or erythema, or obvious scleral/conjunctival abnormalities.  RESP: No audible wheeze, cough, or visible cyanosis.    SKIN: Visible skin clear. No significant rash, abnormal pigmentation or lesions.  NEURO: Cranial nerves grossly intact.  Mentation and speech appropriate for age.  PSYCH: Appropriate affect, tone, and pace of words        Video-Visit Details    Type of service:  Video Visit   Originating Location (pt. Location): Home    Distant Location (provider location):  Off-site  Platform used for Video Visit: Eve  Signed Electronically by: Radha Bermudez PA-C

## 2024-04-11 ENCOUNTER — E-VISIT (OUTPATIENT)
Dept: FAMILY MEDICINE | Facility: CLINIC | Age: 44
End: 2024-04-11
Payer: COMMERCIAL

## 2024-04-11 DIAGNOSIS — H92.09 OTALGIA, UNSPECIFIED LATERALITY: Primary | ICD-10-CM

## 2024-04-11 PROCEDURE — 99421 OL DIG E/M SVC 5-10 MIN: CPT | Performed by: PHYSICIAN ASSISTANT

## 2024-04-11 NOTE — LETTER
[facility] [place]  [address]  [place] [address]  Phone: [phone]    April 12, 2024        Ana BAKER Charlenedickson  [address]  [place] [address]          To whom it may concern:    RE: Ana OLIVIA Felix    Ana reports missing work on 4/10/24 due to ear pain. Please excuse her absence.     Please contact me for questions or concerns.      Sincerely,    Radha Bermudez PA-C

## 2024-04-12 NOTE — TELEPHONE ENCOUNTER
Provider E-Visit time total (minutes): 4      I Have been dealing with an ear infection. The last 5 days. I have been treating it with over-the-counter Meds, it's gotten, it's much improved, but I stayed home from work yesterday Wednesday. April 10th and my work is requiring doctors notes for any absences. So if there would be a way for you to write a note.Excusing me from work yesterday.Because I had an ear infection.Please let me know asap.

## 2024-04-18 ENCOUNTER — TELEPHONE (OUTPATIENT)
Dept: PHARMACY | Facility: OTHER | Age: 44
End: 2024-04-18
Payer: COMMERCIAL

## 2024-04-18 NOTE — TELEPHONE ENCOUNTER
[facility] Recruitment: [facility]     Referral outreach attempt #4 on April 18, 2024      Outcome: patient opted out    See Tin  [facility]   [phone]

## 2024-04-25 ENCOUNTER — HOSPITAL ENCOUNTER (EMERGENCY)
Facility: CLINIC | Age: 44
Discharge: HOME | End: 2024-04-25
Attending: EMERGENCY MEDICINE | Admitting: EMERGENCY MEDICINE
Payer: COMMERCIAL

## 2024-04-25 ENCOUNTER — APPOINTMENT (OUTPATIENT)
Dept: CT IMAGING | Facility: CLINIC | Age: 44
End: 2024-04-25
Attending: EMERGENCY MEDICINE
Payer: COMMERCIAL

## 2024-04-25 VITALS
OXYGEN SATURATION: 99 % | WEIGHT: 180 LBS | HEART RATE: 76 BPM | DIASTOLIC BLOOD PRESSURE: 82 MMHG | SYSTOLIC BLOOD PRESSURE: 148 MMHG | BODY MASS INDEX: 33.13 KG/M2 | RESPIRATION RATE: 18 BRPM | HEIGHT: 62 IN | TEMPERATURE: 98.3 F

## 2024-04-25 DIAGNOSIS — R10.9 ABDOMINAL PAIN OF UNKNOWN ETIOLOGY: ICD-10-CM

## 2024-04-25 LAB
ALBUMIN SERPL BCG-MCNC: 4.2 G/DL (ref 3.5–5.2)
ALBUMIN UR-MCNC: 30 MG/DL
ALP SERPL-CCNC: 75 U/L (ref 40–150)
ALT SERPL W P-5'-P-CCNC: 19 U/L (ref 0–50)
ANION GAP SERPL CALCULATED.3IONS-SCNC: 12 MMOL/L (ref 7–15)
APPEARANCE UR: CLEAR
AST SERPL W P-5'-P-CCNC: 41 U/L (ref 0–45)
BASOPHILS # BLD AUTO: 0 10E3/UL (ref 0–0.2)
BASOPHILS NFR BLD AUTO: 1 %
BILIRUB SERPL-MCNC: 0.9 MG/DL
BILIRUB UR QL STRIP: NEGATIVE
BUN SERPL-MCNC: 10.5 MG/DL (ref 6–20)
CALCIUM SERPL-MCNC: 9.3 MG/DL (ref 8.6–10)
CHLORIDE SERPL-SCNC: 106 MMOL/L (ref 98–107)
COLOR UR AUTO: YELLOW
CREAT SERPL-MCNC: 0.78 MG/DL (ref 0.51–0.95)
DEPRECATED HCO3 PLAS-SCNC: 21 MMOL/L (ref 22–29)
EGFRCR SERPLBLD CKD-EPI 2021: >90 ML/MIN/1.73M2
EOSINOPHIL # BLD AUTO: 0 10E3/UL (ref 0–0.7)
EOSINOPHIL NFR BLD AUTO: 1 %
ERYTHROCYTE [DISTWIDTH] IN BLOOD BY AUTOMATED COUNT: 14.6 % (ref 10–15)
GLUCOSE SERPL-MCNC: 135 MG/DL (ref 70–99)
GLUCOSE UR STRIP-MCNC: NEGATIVE MG/DL
HCT VFR BLD AUTO: 39.5 % (ref 35–47)
HGB BLD-MCNC: 13.5 G/DL (ref 11.7–15.7)
HGB UR QL STRIP: NEGATIVE
HOLD SPECIMEN: NORMAL
HOLD SPECIMEN: NORMAL
HYALINE CASTS: 3 /LPF
IMM GRANULOCYTES # BLD: 0 10E3/UL
IMM GRANULOCYTES NFR BLD: 0 %
KETONES UR STRIP-MCNC: NEGATIVE MG/DL
LEUKOCYTE ESTERASE UR QL STRIP: NEGATIVE
LIPASE SERPL-CCNC: 30 U/L (ref 13–60)
LYMPHOCYTES # BLD AUTO: 1.4 10E3/UL (ref 0.8–5.3)
LYMPHOCYTES NFR BLD AUTO: 27 %
MCH RBC QN AUTO: 33.6 PG (ref 26.5–33)
MCHC RBC AUTO-ENTMCNC: 34.2 G/DL (ref 31.5–36.5)
MCV RBC AUTO: 98 FL (ref 78–100)
MONOCYTES # BLD AUTO: 0.3 10E3/UL (ref 0–1.3)
MONOCYTES NFR BLD AUTO: 5 %
MUCOUS THREADS #/AREA URNS LPF: PRESENT /LPF
NEUTROPHILS # BLD AUTO: 3.5 10E3/UL (ref 1.6–8.3)
NEUTROPHILS NFR BLD AUTO: 66 %
NITRATE UR QL: NEGATIVE
NRBC # BLD AUTO: 0 10E3/UL
NRBC BLD AUTO-RTO: 0 /100
PH UR STRIP: 6 [PH] (ref 5–7)
PLATELET # BLD AUTO: 174 10E3/UL (ref 150–450)
POTASSIUM SERPL-SCNC: 3.9 MMOL/L (ref 3.4–5.3)
PROT SERPL-MCNC: 7.9 G/DL (ref 6.4–8.3)
RBC # BLD AUTO: 4.02 10E6/UL (ref 3.8–5.2)
RBC URINE: <1 /HPF
SODIUM SERPL-SCNC: 139 MMOL/L (ref 135–145)
SP GR UR STRIP: 1.02 (ref 1–1.03)
SQUAMOUS EPITHELIAL: 1 /HPF
UROBILINOGEN UR STRIP-MCNC: NORMAL MG/DL
WBC # BLD AUTO: 5.3 10E3/UL (ref 4–11)
WBC URINE: 1 /HPF

## 2024-04-25 PROCEDURE — 81001 URINALYSIS AUTO W/SCOPE: CPT | Performed by: EMERGENCY MEDICINE

## 2024-04-25 PROCEDURE — 96374 THER/PROPH/DIAG INJ IV PUSH: CPT | Mod: 59 | Performed by: EMERGENCY MEDICINE

## 2024-04-25 PROCEDURE — 74177 CT ABD & PELVIS W/CONTRAST: CPT

## 2024-04-25 PROCEDURE — 250N000009 HC RX 250: Performed by: EMERGENCY MEDICINE

## 2024-04-25 PROCEDURE — 99284 EMERGENCY DEPT VISIT MOD MDM: CPT | Performed by: EMERGENCY MEDICINE

## 2024-04-25 PROCEDURE — 83690 ASSAY OF LIPASE: CPT | Performed by: EMERGENCY MEDICINE

## 2024-04-25 PROCEDURE — 99285 EMERGENCY DEPT VISIT HI MDM: CPT | Mod: 25 | Performed by: EMERGENCY MEDICINE

## 2024-04-25 PROCEDURE — 36415 COLL VENOUS BLD VENIPUNCTURE: CPT | Performed by: EMERGENCY MEDICINE

## 2024-04-25 PROCEDURE — 80053 COMPREHEN METABOLIC PANEL: CPT | Performed by: EMERGENCY MEDICINE

## 2024-04-25 PROCEDURE — 250N000011 HC RX IP 250 OP 636: Performed by: EMERGENCY MEDICINE

## 2024-04-25 PROCEDURE — 85048 AUTOMATED LEUKOCYTE COUNT: CPT | Performed by: EMERGENCY MEDICINE

## 2024-04-25 RX ORDER — HYDROMORPHONE HYDROCHLORIDE 1 MG/ML
0.5 INJECTION, SOLUTION INTRAMUSCULAR; INTRAVENOUS; SUBCUTANEOUS ONCE
Status: COMPLETED | OUTPATIENT
Start: 2024-04-25 | End: 2024-04-25

## 2024-04-25 RX ORDER — IOPAMIDOL 755 MG/ML
89 INJECTION, SOLUTION INTRAVASCULAR ONCE
Status: COMPLETED | OUTPATIENT
Start: 2024-04-25 | End: 2024-04-25

## 2024-04-25 RX ADMIN — HYDROMORPHONE HYDROCHLORIDE 0.5 MG: 1 INJECTION, SOLUTION INTRAMUSCULAR; INTRAVENOUS; SUBCUTANEOUS at 14:44

## 2024-04-25 RX ADMIN — IOPAMIDOL 89 ML: 755 INJECTION, SOLUTION INTRAVENOUS at 15:04

## 2024-04-25 RX ADMIN — SODIUM CHLORIDE 62 ML: 9 INJECTION, SOLUTION INTRAVENOUS at 15:04

## 2024-04-25 ASSESSMENT — COLUMBIA-SUICIDE SEVERITY RATING SCALE - C-SSRS
1. IN THE PAST MONTH, HAVE YOU WISHED YOU WERE DEAD OR WISHED YOU COULD GO TO SLEEP AND NOT WAKE UP?: NO
2. HAVE YOU ACTUALLY HAD ANY THOUGHTS OF KILLING YOURSELF IN THE PAST MONTH?: NO
6. HAVE YOU EVER DONE ANYTHING, STARTED TO DO ANYTHING, OR PREPARED TO DO ANYTHING TO END YOUR LIFE?: NO

## 2024-04-25 ASSESSMENT — ACTIVITIES OF DAILY LIVING (ADL)
ADLS_ACUITY_SCORE: 34
ADLS_ACUITY_SCORE: 36

## 2024-04-25 NOTE — ED PROVIDER NOTES
"  History     Chief Complaint   Patient presents with    Abdominal Pain     HPI  Ana Felix is a 43 year old female past medical history significant for Crohn's disease chronic low back pain history of hysterectomy and ventral hernia repair along with partial small bowel resection who presents emergency department complaining of pelvic pain radiating into both lower quadrants.  Patient states symptoms began on Monday and have progressed.  Dull aching pain which is worsened.  This is worsened with movement denies any trauma pain occasionally is stabbing in nature she denies any headache or visual changes is not any fevers or chills denies any chest pain shortness of breath has not had any bowel or bladder dysfunction.  She denies any new back pain has chronic back pain denies any urinary symptoms or blood in her stool has had normal bowel movements denies any leg swelling calf pain.    Allergies:  Allergies   Allergen Reactions    Infliximab Hives    Sulfa Antibiotics Nausea     Pt states \"it doesn't work for me\"  Pt states \"it doesn't work for me\"  Other reaction(s): Diarrhea, nausea, Nausea, Intolerance  Pt states \"it doesn't work for me\"    Sulfacetamide Nausea    Amoxicillin Nausea and Vomiting    Amoxicillin-Pot Clavulanate Other (See Comments) and Nausea and Vomiting     Crohn's    Aspirin Nausea    Bupropion Hives and Nausea    Clavulanic Acid Nausea and Vomiting    Droperidol Other (See Comments)     Dystonic reaction    Ibuprofen Other (See Comments) and Nausea     Crohn's      Lyrica [Pregabalin] Nausea and Vomiting     Grogginess, severe back pain  Grogginess, severe back pain    Vicodin [Hydrocodone-Acetaminophen] Nausea and Vomiting    Adhesive Tape Rash       Problem List:    Patient Active Problem List    Diagnosis Date Noted    Prediabetes 01/03/2024     Priority: Medium    Closed nondisplaced fracture of head of right radius with routine healing, subsequent encounter 08/18/2023     Priority: Medium "    Primary osteoarthritis of left knee 02/13/2023     Priority: Medium    DANIELLE (generalized anxiety disorder) 03/15/2022     Priority: Medium    Insomnia, unspecified type 03/15/2022     Priority: Medium    SKYE (stress urinary incontinence, female) 03/14/2022     Priority: Medium     Added automatically from request for surgery [id number]      Hidradenitis suppurativa 09/02/2021     Priority: Medium    Immunocompromised state (H24) 07/09/2021     Priority: Medium    History of gestational diabetes mellitus (GDM) 08/15/2018     Priority: Medium    Amenorrhea 08/15/2018     Priority: Medium    Crohn's disease of both small and large intestine without complication (H) 07/31/2018     Priority: Medium    Pain medication agreement 08/18/2017     Priority: Medium     Overview:   [facility]      Controlled substance agreement signed 07/19/2017     Priority: Medium    Degenerative lumbar disc 03/07/2017     Priority: Medium    Labral tear of hip, degenerative 03/07/2017     Priority: Medium    Pain of right hip joint 03/07/2017     Priority: Medium    S/P LEEP of cervix 12/15/2015     Priority: Medium     S/p LEEP of cervix - date unknown  12/9/15: Pap - NIL, Neg HPV. Plan cotest in 1 year.   3/7/18 Pap: NIL/neg HPV.  12/29/23 NIL pap, neg HPV. Plan: cotest in 3 years            Hearing difficulty 10/09/2014     Priority: Medium    Irritable bowel syndrome (IBS) 04/15/2014     Priority: Medium     Tiffany raw vegetables      Lactose intolerance 04/15/2014     Priority: Medium    Abdominal pain, right upper quadrant 03/06/2014     Priority: Medium    Nausea with vomiting 01/31/2014     Priority: Medium    Chronic low back pain 11/05/2012     Priority: Medium     Follows with pain clinic.      Mild persistent asthma, uncomplicated 11/05/2012     Priority: Medium    Discogenic pain 10/08/2012     Priority: Medium    Abdominal pain 08/30/2012     Priority: Medium    S/P oophorectomy 07/20/2012     Priority: Medium    History of  cholecystectomy 2012     Priority: Medium    History of resection of small bowel 2012     Priority: Medium    Tobacco abuse 2012     Priority: Medium    Displacement of lumbar intervertebral disc without myelopathy 2012     Priority: Medium    Disorder of sacrum 2011     Priority: Medium     Problem list name updated by automated process. Provider to review      Back pain 2011     Priority: Medium    Obesity 2011     Priority: Medium    Migraine headache 2011     Priority: Medium     Patient given Migraine Education folder and Migraine Action Plan on 2011. Cammy Anderson RN    (Problem list name updated by automated process. Provider to review and confirm.)      CARDIOVASCULAR SCREENING; LDL GOAL LESS THAN 160 10/31/2010     Priority: Medium    Dysmenorrhea 10/05/2010     Priority: Medium    Female pelvic pain 2010     Priority: Medium     (Problem list name updated by automated process. Provider to review and confirm.)      Crohns disease 2009     Priority: Medium        Past Medical History:    Past Medical History:   Diagnosis Date    Back pain     Crohn's disease (H)     Exercise-induced asthma     Gestational diabetes     Herniation of intervertebral disc of lumbar region     Infertility     Ovarian cyst     Smoker     Status post cholecystectomy 2005       Past Surgical History:    Past Surgical History:   Procedure Laterality Date    APPENDECTOMY      C/SECTION, LOW TRANSVERSE      , Low Transverse    CHOLECYSTECTOMY, LAPOROSCOPIC  2005    Cholecystectomy, Laparoscopic    COLONOSCOPY      ESOPHAGOSCOPY, GASTROSCOPY, DUODENOSCOPY (EGD), COMBINED  3/6/2014    Procedure: COMBINED ESOPHAGOSCOPY, GASTROSCOPY, DUODENOSCOPY (EGD), BIOPSY SINGLE OR MULTIPLE;  COLONOSCOPY/ UPPER GI ENDOSCOPY/ COLON/ EGD/ Abdominal pain, epigastric/ [facility];  Surgeon: West Lomas MD;  Location:  OR     HYSTEROSCOPY, SURGICAL; W/  ENDOMETRIAL ABLATION, ANY METHOD  7/2010    HERNIORRHAPHY VENTRAL N/A 12/11/2018    Procedure: Open ventral hernia repair;  Surgeon: Alonso Bills MD;  Location: [facility] OR    HYSTERECTOMY, SUPRACERVICAL LAPAROSCOPIC  2010    LEEP TX, CERVICAL      LEEP TX Cervical    ORTHOPEDIC SURGERY      bluged disk    partial small bowel resection  2002    Ileo-colonic resection. Crohn's disease surgery for bowel obstruction. this was a section of small bowel and large bowel and the cecum., all removed and a reanastamosis done successfully    SALPINGO OOPHORECTOMY,R/L/TORI      Right ovary    SLING TRANSVAGINAL N/A 4/4/2022    Procedure: Pubovaginal sling with Altis;  Surgeon: Eleuterio Stone MD;  Location: [facility] OR    TUBAL LIGATION         Family History:    Family History   Problem Relation Age of Onset    Hyperlipidemia Mother     Cancer Mother         cervical    Lipids Mother     Hypertension Father     Eye Disorder Father     Lipids Father     Alcohol/Drug Maternal Grandmother     Colon Cancer Paternal Grandmother     Diabetes Paternal Grandmother     Eye Disorder Paternal Grandmother     Diabetes Paternal Grandfather     Eye Disorder Paternal Grandfather     Inflammatory Bowel Disease Paternal Aunt         and two paternal uncles       Social History:  Marital Status:   [2]  Social History     Tobacco Use    Smoking status: Some Days     Current packs/day: 0.50     Types: Cigarettes     Passive exposure: Never    Smokeless tobacco: Never   Vaping Use    Vaping status: Never Used   Substance Use Topics    Alcohol use: Yes     Comment: very rarely    Drug use: No        Medications:    azaTHIOprine (IMURAN) 50 MG tablet  busPIRone (BUSPAR) 10 MG tablet  cloNIDine (CATAPRES) 0.1 MG tablet  cyanocobalamin (VITAMIN B12) 1000 MCG/ML injection  fentaNYL (DURAGESIC) 25 mcg/hr patch 72 hr  gabapentin (NEURONTIN) 300 MG capsule  hydrOXYzine HCl (ATARAX) 25 MG tablet  ibuprofen (ADVIL/MOTRIN) 800 MG tablet  montelukast  "(SINGULAIR) 10 MG tablet  mupirocin (BACTROBAN) 2 % external ointment  naloxone (NARCAN) 4 MG/0.1ML nasal spray  oxyCODONE-acetaminophen (PERCOCET) 7.5-325 MG per tablet  sertraline (ZOLOFT) 100 MG tablet  tiZANidine (ZANAFLEX) 2 MG tablet  traZODone (DESYREL) 150 MG tablet  triamcinolone (KENALOG) 0.1 % external ointment  VENTOLIN  (90 Base) MCG/ACT inhaler          Review of Systems  As per HPI.  Physical Exam   BP: 133/85  Pulse: 89  Temp: 98.3  F (36.8  C)  Resp: 18  Height: 157.5 cm (5' 2\")  Weight: 81.6 kg (180 lb)  SpO2: 99 %      Physical Exam  Vitals and nursing note reviewed.   Constitutional:       General: She is not in acute distress.     Appearance: She is well-developed. She is not ill-appearing, toxic-appearing or diaphoretic.   HENT:      Head: Normocephalic and atraumatic.      Nose: Nose normal.      Mouth/Throat:      Mouth: Mucous membranes are moist.      Pharynx: Oropharynx is clear.   Eyes:      Conjunctiva/sclera: Conjunctivae normal.   Cardiovascular:      Rate and Rhythm: Normal rate and regular rhythm.      Pulses: Normal pulses.      Heart sounds: Normal heart sounds. No murmur heard.  Pulmonary:      Effort: Pulmonary effort is normal.      Breath sounds: Normal breath sounds. No stridor. No wheezing or rhonchi.   Abdominal:      Comments: Soft, nondistended, tender to palpation of the umbilicus and lower abdomen.  No guarding or rebound is present bowel sounds are positive.  There is no flank pain no obvious masses or hernia palpated.  Bowel sounds are positive.   Musculoskeletal:         General: No swelling or tenderness. Normal range of motion.      Cervical back: Normal range of motion and neck supple.      Right lower leg: No edema.      Left lower leg: No edema.   Skin:     General: Skin is warm and dry.      Findings: No rash.   Neurological:      General: No focal deficit present.      Mental Status: She is alert and oriented to person, place, and time.      Sensory: No " sensory deficit.      Motor: No weakness.      Coordination: Coordination normal.   Psychiatric:         Mood and Affect: Mood normal.         ED Course        Procedures              Critical Care time:  none                   Labs Ordered and Resulted from Time of ED Arrival to Time of ED Departure   ROUTINE UA WITH MICROSCOPIC REFLEX TO CULTURE - Abnormal       Result Value    Color Urine Yellow      Appearance Urine Clear      Glucose Urine Negative      Bilirubin Urine Negative      Ketones Urine Negative      Specific Gravity Urine 1.018      Blood Urine Negative      pH Urine 6.0      Protein Albumin Urine 30 (*)     Urobilinogen Urine Normal      Nitrite Urine Negative      Leukocyte Esterase Urine Negative      Mucus Urine Present (*)     RBC Urine <1      WBC Urine 1      Squamous Epithelials Urine 1      Hyaline Casts Urine 3 (*)    COMPREHENSIVE METABOLIC PANEL - Abnormal    Sodium 139      Potassium 3.9      Carbon Dioxide (CO2) 21 (*)     Anion Gap 12      Urea Nitrogen 10.5      Creatinine 0.78      GFR Estimate >90      Calcium 9.3      Chloride 106      Glucose 135 (*)     Alkaline Phosphatase 75      AST 41      ALT 19      Protein Total 7.9      Albumin 4.2      Bilirubin Total 0.9     CBC WITH PLATELETS AND DIFFERENTIAL - Abnormal    WBC Count 5.3      RBC Count 4.02      Hemoglobin 13.5      Hematocrit 39.5      MCV 98      MCH 33.6 (*)     MCHC 34.2      RDW 14.6      Platelet Count 174      % Neutrophils 66      % Lymphocytes 27      % Monocytes 5      % Eosinophils 1      % Basophils 1      % Immature Granulocytes 0      NRBCs per 100 WBC 0      Absolute Neutrophils 3.5      Absolute Lymphocytes 1.4      Absolute Monocytes 0.3      Absolute Eosinophils 0.0      Absolute Basophils 0.0      Absolute Immature Granulocytes 0.0      Absolute NRBCs 0.0     LIPASE - Normal    Lipase 30        Medications   HYDROmorphone (PF) (DILAUDID) injection 0.5 mg (0.5 mg Intravenous $Given 4/25/24 4695)    iopamidol (ISOVUE-370) solution 89 mL (89 mLs Intravenous $Given 4/25/24 1504)   sodium chloride 0.9 % bag 500mL for CT scan flush use (62 mLs Intravenous $Given 4/25/24 1504)       Assessments & Plan (with Medical Decision Making) records reviewed including past medical history medications and allergies.  Office visit with pain and palliative care on 4/9/2024 was reviewed.  Outpatient visit with pain and palliative care on 3/4/2024 was reviewed including injection.  Labs were obtained.  I independently reviewed and interpreted labs.  CBC was unremarkable.  Comprehensive metabolic panel without significant abnormality.  Urinalysis with 30 protein.  Lipase was 30.  Patient was given a dose of Dilaudid for pain.  Due to patient's presentation the CT scan abdomen pelvis was obtained.  Patient felt comfortable having this done.  I dependently reviewed the CT scan and agree with radiologist findings of no obvious acute findings.  Postoperative changes are noted.  Hepatic steatosis is present.  Findings discussed with patient was feeling better after the pain medication.  She is frustrated that nothing is found on exam and does not know why she is having abdominal pain.  I have advised her to continue her current medications and follow-up with her primary for recheck.  She should also follow-up with her gastroenterologist for further evaluation and care.  Patient feels comfortable with this plan at this time.     I have reviewed the nursing notes.    I have reviewed the findings, diagnosis, plan and need for follow up with the patient.             Discharge Medication List as of 4/25/2024  4:31 PM          Final diagnoses:   Abdominal pain of unknown etiology       4/25/2024   [facility] EMERGENCY DEPT       Monty Payne MD  04/28/24 2982

## 2024-04-25 NOTE — DISCHARGE INSTRUCTIONS
Return if symptoms worsen or new symptoms develop.  Follow-up with primary care physician next available.  Drink plenty of fluids.  Take Tylenol for pain try heat and lidocaine patch see if this improves symptoms.  Follow-up with your gastroenterologist tomorrow possible further studies.  If pain worsens change fevers vomiting blood in stool decreased urine output or other symptoms present please return for further evaluation and care.

## 2024-04-25 NOTE — ED TRIAGE NOTES
Pt c/o pain around the umbilicus and radiates left and right.  No n/v/d.  No fevers.  Pt has had multiple surgeries on abd.     Triage Assessment (Adult)       Row Name 04/25/24 1143          Triage Assessment    Airway WDL WDL        Respiratory WDL    Respiratory WDL WDL        Cognitive/Neuro/Behavioral WDL    Cognitive/Neuro/Behavioral WDL WDL

## 2024-04-25 NOTE — Clinical Note
Ana Felix was seen and treated in our emergency department on 4/25/2024.  She may return to work on 04/26/2024.       If you have any questions or concerns, please don't hesitate to call.      Monty Payne MD

## 2024-04-29 ENCOUNTER — TELEPHONE (OUTPATIENT)
Dept: FAMILY MEDICINE | Facility: CLINIC | Age: 44
End: 2024-04-29

## 2024-04-29 ENCOUNTER — HOSPITAL ENCOUNTER (EMERGENCY)
Facility: CLINIC | Age: 44
Discharge: HOME | End: 2024-04-29
Attending: EMERGENCY MEDICINE | Admitting: EMERGENCY MEDICINE
Payer: COMMERCIAL

## 2024-04-29 VITALS
DIASTOLIC BLOOD PRESSURE: 58 MMHG | HEART RATE: 66 BPM | SYSTOLIC BLOOD PRESSURE: 112 MMHG | RESPIRATION RATE: 22 BRPM | TEMPERATURE: 97.8 F | OXYGEN SATURATION: 99 % | HEIGHT: 62 IN | WEIGHT: 175 LBS | BODY MASS INDEX: 32.2 KG/M2

## 2024-04-29 DIAGNOSIS — K50.919 CROHN'S DISEASE WITH COMPLICATION, UNSPECIFIED GASTROINTESTINAL TRACT LOCATION (H): ICD-10-CM

## 2024-04-29 DIAGNOSIS — R10.84 ABDOMINAL PAIN, GENERALIZED: ICD-10-CM

## 2024-04-29 LAB
ALBUMIN SERPL BCG-MCNC: 4 G/DL (ref 3.5–5.2)
ALBUMIN UR-MCNC: NEGATIVE MG/DL
ALP SERPL-CCNC: 71 U/L (ref 40–150)
ALT SERPL W P-5'-P-CCNC: 17 U/L (ref 0–50)
ANION GAP SERPL CALCULATED.3IONS-SCNC: 8 MMOL/L (ref 7–15)
APPEARANCE UR: CLEAR
AST SERPL W P-5'-P-CCNC: 36 U/L (ref 0–45)
BACTERIA #/AREA URNS HPF: ABNORMAL /HPF
BASOPHILS # BLD AUTO: 0.1 10E3/UL (ref 0–0.2)
BASOPHILS NFR BLD AUTO: 1 %
BILIRUB SERPL-MCNC: 0.7 MG/DL
BILIRUB UR QL STRIP: NEGATIVE
BUN SERPL-MCNC: 9.7 MG/DL (ref 6–20)
CALCIUM SERPL-MCNC: 8.9 MG/DL (ref 8.6–10)
CHLORIDE SERPL-SCNC: 104 MMOL/L (ref 98–107)
COLOR UR AUTO: YELLOW
CREAT SERPL-MCNC: 0.83 MG/DL (ref 0.51–0.95)
CRP SERPL-MCNC: 5.23 MG/L
DEPRECATED HCO3 PLAS-SCNC: 23 MMOL/L (ref 22–29)
EGFRCR SERPLBLD CKD-EPI 2021: 88 ML/MIN/1.73M2
EOSINOPHIL # BLD AUTO: 0.1 10E3/UL (ref 0–0.7)
EOSINOPHIL NFR BLD AUTO: 1 %
ERYTHROCYTE [DISTWIDTH] IN BLOOD BY AUTOMATED COUNT: 14.7 % (ref 10–15)
ERYTHROCYTE [SEDIMENTATION RATE] IN BLOOD BY WESTERGREN METHOD: 11 MM/HR (ref 0–20)
GLUCOSE SERPL-MCNC: 110 MG/DL (ref 70–99)
GLUCOSE UR STRIP-MCNC: NEGATIVE MG/DL
HCT VFR BLD AUTO: 39.3 % (ref 35–47)
HGB BLD-MCNC: 14 G/DL (ref 11.7–15.7)
HGB UR QL STRIP: NEGATIVE
HYALINE CASTS: 11 /LPF
IMM GRANULOCYTES # BLD: 0 10E3/UL
IMM GRANULOCYTES NFR BLD: 0 %
KETONES UR STRIP-MCNC: NEGATIVE MG/DL
LEUKOCYTE ESTERASE UR QL STRIP: NEGATIVE
LIPASE SERPL-CCNC: 35 U/L (ref 13–60)
LYMPHOCYTES # BLD AUTO: 2 10E3/UL (ref 0.8–5.3)
LYMPHOCYTES NFR BLD AUTO: 29 %
MCH RBC QN AUTO: 34.8 PG (ref 26.5–33)
MCHC RBC AUTO-ENTMCNC: 35.6 G/DL (ref 31.5–36.5)
MCV RBC AUTO: 98 FL (ref 78–100)
MONOCYTES # BLD AUTO: 0.5 10E3/UL (ref 0–1.3)
MONOCYTES NFR BLD AUTO: 7 %
MUCOUS THREADS #/AREA URNS LPF: PRESENT /LPF
NEUTROPHILS # BLD AUTO: 4.1 10E3/UL (ref 1.6–8.3)
NEUTROPHILS NFR BLD AUTO: 61 %
NITRATE UR QL: NEGATIVE
NRBC # BLD AUTO: 0 10E3/UL
NRBC BLD AUTO-RTO: 0 /100
PH UR STRIP: 5 [PH] (ref 5–7)
PLATELET # BLD AUTO: 190 10E3/UL (ref 150–450)
POTASSIUM SERPL-SCNC: 4.1 MMOL/L (ref 3.4–5.3)
PROT SERPL-MCNC: 7.5 G/DL (ref 6.4–8.3)
RBC # BLD AUTO: 4.02 10E6/UL (ref 3.8–5.2)
RBC URINE: 1 /HPF
SODIUM SERPL-SCNC: 135 MMOL/L (ref 135–145)
SP GR UR STRIP: 1.01 (ref 1–1.03)
SQUAMOUS EPITHELIAL: 3 /HPF
UROBILINOGEN UR STRIP-MCNC: NORMAL MG/DL
WBC # BLD AUTO: 6.7 10E3/UL (ref 4–11)
WBC URINE: 2 /HPF

## 2024-04-29 PROCEDURE — 36415 COLL VENOUS BLD VENIPUNCTURE: CPT | Performed by: EMERGENCY MEDICINE

## 2024-04-29 PROCEDURE — 99284 EMERGENCY DEPT VISIT MOD MDM: CPT | Performed by: EMERGENCY MEDICINE

## 2024-04-29 PROCEDURE — 96372 THER/PROPH/DIAG INJ SC/IM: CPT | Performed by: EMERGENCY MEDICINE

## 2024-04-29 PROCEDURE — 85652 RBC SED RATE AUTOMATED: CPT | Performed by: EMERGENCY MEDICINE

## 2024-04-29 PROCEDURE — 250N000013 HC RX MED GY IP 250 OP 250 PS 637: Performed by: EMERGENCY MEDICINE

## 2024-04-29 PROCEDURE — 250N000011 HC RX IP 250 OP 636: Performed by: EMERGENCY MEDICINE

## 2024-04-29 PROCEDURE — 81001 URINALYSIS AUTO W/SCOPE: CPT | Performed by: EMERGENCY MEDICINE

## 2024-04-29 PROCEDURE — 85025 COMPLETE CBC W/AUTO DIFF WBC: CPT | Performed by: EMERGENCY MEDICINE

## 2024-04-29 PROCEDURE — 86140 C-REACTIVE PROTEIN: CPT | Performed by: EMERGENCY MEDICINE

## 2024-04-29 PROCEDURE — 83690 ASSAY OF LIPASE: CPT | Performed by: EMERGENCY MEDICINE

## 2024-04-29 PROCEDURE — 80053 COMPREHEN METABOLIC PANEL: CPT | Performed by: EMERGENCY MEDICINE

## 2024-04-29 PROCEDURE — 99284 EMERGENCY DEPT VISIT MOD MDM: CPT

## 2024-04-29 RX ORDER — PREDNISONE 20 MG/1
TABLET ORAL
Qty: 10 TABLET | Refills: 0 | Status: SHIPPED | OUTPATIENT
Start: 2024-04-29 | End: 2024-05-07

## 2024-04-29 RX ORDER — OXYCODONE AND ACETAMINOPHEN 5; 325 MG/1; MG/1
2 TABLET ORAL ONCE
Status: COMPLETED | OUTPATIENT
Start: 2024-04-29 | End: 2024-04-29

## 2024-04-29 RX ADMIN — HYDROMORPHONE HYDROCHLORIDE 2 MG: 1 INJECTION, SOLUTION INTRAMUSCULAR; INTRAVENOUS; SUBCUTANEOUS at 14:47

## 2024-04-29 RX ADMIN — OXYCODONE HYDROCHLORIDE AND ACETAMINOPHEN 2 TABLET: 5; 325 TABLET ORAL at 14:47

## 2024-04-29 ASSESSMENT — ACTIVITIES OF DAILY LIVING (ADL)
ADLS_ACUITY_SCORE: 34
ADLS_ACUITY_SCORE: 36
ADLS_ACUITY_SCORE: 36

## 2024-04-29 ASSESSMENT — COLUMBIA-SUICIDE SEVERITY RATING SCALE - C-SSRS
6. HAVE YOU EVER DONE ANYTHING, STARTED TO DO ANYTHING, OR PREPARED TO DO ANYTHING TO END YOUR LIFE?: NO
2. HAVE YOU ACTUALLY HAD ANY THOUGHTS OF KILLING YOURSELF IN THE PAST MONTH?: NO
1. IN THE PAST MONTH, HAVE YOU WISHED YOU WERE DEAD OR WISHED YOU COULD GO TO SLEEP AND NOT WAKE UP?: NO

## 2024-04-29 NOTE — ED TRIAGE NOTES
Pt states abd pain that started last week.  Was seen here on Thursday:  CT and labs.  Was to f/unit(s) with GI and apt was to be this AM, but they told her to return to ED because this isn't her chrones disease related.  Pt is tearful.  Pt is taking no medications for the pain, pt is already on Percocet for her back for 11 yrs, and it doesn't help her abd.  Last BM was this AM, normal for her.  Pt voiding WNL, no issues.

## 2024-04-29 NOTE — TELEPHONE ENCOUNTER
See 4/25/24 ER notes and plan:      Assessments & Plan (with Medical Decision Making) records reviewed including past medical history medications and allergies.  Office visit with pain and palliative care on 4/9/2024 was reviewed.  Outpatient visit with pain and palliative care on 3/4/2024 was reviewed including injection.  Labs were obtained.  I independently reviewed and interpreted labs.  CBC was unremarkable.  Comprehensive metabolic panel without significant abnormality.  Urinalysis with 30 protein.  Lipase was 30.  Patient was given a dose of Dilaudid for pain.  Due to patient's presentation the CT scan abdomen pelvis was obtained.  Patient felt comfortable having this done.  I dependently reviewed the CT scan and agree with radiologist findings of no obvious acute findings.  Postoperative changes are noted.  Hepatic steatosis is present.  Findings discussed with patient was feeling better after the pain medication.  She is frustrated that nothing is found on exam and does not know why she is having abdominal pain.  I have advised her to continue her current medications and follow-up with her primary for recheck.  She should also follow-up with her gastroenterologist for further evaluation and care.  Patient feels comfortable with this plan at this time.       Routed to RN team for follow up with patient.  Isela AREVALO RN  [facility] Triage

## 2024-04-29 NOTE — DISCHARGE INSTRUCTIONS
Follow-up with your primary care provider.  Will see if steroids provide benefit.    Uncertain exact cause of your pains.  Laboratory workup looks okay.    Return to be seen if new or worsening symptoms develop.

## 2024-04-29 NOTE — TELEPHONE ENCOUNTER
Attempted to call patient at home/mobile number, no answer, left message on voicemail; patient was instructed to return call to [facility] at [phone].    Isela AREVALO RN  [facility] Triage

## 2024-04-29 NOTE — ED PROVIDER NOTES
"ED Provider Note  [facility]      History     Chief Complaint   Patient presents with    Abdominal Pain     HPI  Ana Felix is a 43 year old female who has medical history significant for chronic abdominal pain, with history of Crohn's disease, with recent ED visit 4 days ago.  I reviewed that ED visit, which had negative laboratory workup, and negative CT imaging.  Patient was told to follow-up with her GI provider.  She called her GI provider, who stated that this was not likely GI/Crohn's related, and therefore instructed to follow-up with her primary care provider, or return to the emergency department if pain persisted.    Therefore, patient presents to the emergency department today, stating that she has had diffuse abdominal pains, present over the past 1 week.  No diarrhea, or other bowel movement changes.  Denies any urinary symptoms.  There has been no nausea, or vomiting.  Has been eating and drinking some.  No fever, or chills.  No respiratory symptoms.  Multiple prior abdominal surgeries before in the past.  No recent surgeries.  No other ill contacts.  Pain has worsened somewhat with movement.        Independent Historian:        Review of External Notes:  I reviewed 2022 upper endoscopy and lower endoscopy results.  No findings of active Crohn's flare at that time.  No other acute findings.    I reviewed pain and palliative care office visit from [facility] from April 9.  Patient seen primarily for low back, right-sided pain.  Has had prior injection performed in November, 2023 with 80% relief.    Allergies:  Allergies   Allergen Reactions    Infliximab Hives    Sulfa Antibiotics Nausea     Pt states \"it doesn't work for me\"  Pt states \"it doesn't work for me\"  Other reaction(s): Diarrhea, nausea, Nausea, Intolerance  Pt states \"it doesn't work for me\"    Sulfacetamide Nausea    Amoxicillin Nausea and Vomiting    Amoxicillin-Pot Clavulanate Other (See Comments) and " Nausea and Vomiting     Crohn's    Aspirin Nausea    Bupropion Hives and Nausea    Clavulanic Acid Nausea and Vomiting    Droperidol Other (See Comments)     Dystonic reaction    Ibuprofen Other (See Comments) and Nausea     Crohn's      Lyrica [Pregabalin] Nausea and Vomiting     Grogginess, severe back pain  Grogginess, severe back pain    Vicodin [Hydrocodone-Acetaminophen] Nausea and Vomiting    Adhesive Tape Rash       Problem List:    Patient Active Problem List    Diagnosis Date Noted    Prediabetes 01/03/2024     Priority: Medium    Closed nondisplaced fracture of head of right radius with routine healing, subsequent encounter 08/18/2023     Priority: Medium    Primary osteoarthritis of left knee 02/13/2023     Priority: Medium    DANIELLE (generalized anxiety disorder) 03/15/2022     Priority: Medium    Insomnia, unspecified type 03/15/2022     Priority: Medium    SKYE (stress urinary incontinence, female) 03/14/2022     Priority: Medium     Added automatically from request for surgery [id number]      Hidradenitis suppurativa 09/02/2021     Priority: Medium    Immunocompromised state (H24) 07/09/2021     Priority: Medium    History of gestational diabetes mellitus (GDM) 08/15/2018     Priority: Medium    Amenorrhea 08/15/2018     Priority: Medium    Crohn's disease of both small and large intestine without complication (H) 07/31/2018     Priority: Medium    Pain medication agreement 08/18/2017     Priority: Medium     Overview:   [facility]      Controlled substance agreement signed 07/19/2017     Priority: Medium    Degenerative lumbar disc 03/07/2017     Priority: Medium    Labral tear of hip, degenerative 03/07/2017     Priority: Medium    Pain of right hip joint 03/07/2017     Priority: Medium    S/P LEEP of cervix 12/15/2015     Priority: Medium     S/p LEEP of cervix - date unknown  12/9/15: Pap - NIL, Neg HPV. Plan cotest in 1 year.   3/7/18 Pap: NIL/neg HPV.  12/29/23 NIL pap, neg HPV. Plan: cotest  in 3 years            Hearing difficulty 10/09/2014     Priority: Medium    Irritable bowel syndrome (IBS) 04/15/2014     Priority: Medium     Tiffany raw vegetables      Lactose intolerance 04/15/2014     Priority: Medium    Abdominal pain, right upper quadrant 03/06/2014     Priority: Medium    Nausea with vomiting 01/31/2014     Priority: Medium    Chronic low back pain 11/05/2012     Priority: Medium     Follows with pain clinic.      Mild persistent asthma, uncomplicated 11/05/2012     Priority: Medium    Discogenic pain 10/08/2012     Priority: Medium    Abdominal pain 08/30/2012     Priority: Medium    S/P oophorectomy 07/20/2012     Priority: Medium    History of cholecystectomy 07/20/2012     Priority: Medium    History of resection of small bowel 07/20/2012     Priority: Medium    Tobacco abuse 05/25/2012     Priority: Medium    Displacement of lumbar intervertebral disc without myelopathy 01/06/2012     Priority: Medium    Disorder of sacrum 12/13/2011     Priority: Medium     Problem list name updated by automated process. Provider to review      Back pain 11/09/2011     Priority: Medium    Obesity 11/09/2011     Priority: Medium    Migraine headache 09/14/2011     Priority: Medium     Patient given Migraine Education folder and Migraine Action Plan on September 14, 2011. Cammy Anderson RN    (Problem list name updated by automated process. Provider to review and confirm.)      CARDIOVASCULAR SCREENING; LDL GOAL LESS THAN 160 10/31/2010     Priority: Medium    Dysmenorrhea 10/05/2010     Priority: Medium    Female pelvic pain 09/27/2010     Priority: Medium     (Problem list name updated by automated process. Provider to review and confirm.)      Crohns disease 11/20/2009     Priority: Medium        Past Medical History:    Past Medical History:   Diagnosis Date    Back pain     Crohn's disease (H)     Exercise-induced asthma     Gestational diabetes     Herniation of intervertebral disc of lumbar region      Infertility     Ovarian cyst     Smoker     Status post cholecystectomy 2005       Past Surgical History:    Past Surgical History:   Procedure Laterality Date    APPENDECTOMY      C/SECTION, LOW TRANSVERSE      , Low Transverse    CHOLECYSTECTOMY, LAPOROSCOPIC  2005    Cholecystectomy, Laparoscopic    COLONOSCOPY      ESOPHAGOSCOPY, GASTROSCOPY, DUODENOSCOPY (EGD), COMBINED  3/6/2014    Procedure: COMBINED ESOPHAGOSCOPY, GASTROSCOPY, DUODENOSCOPY (EGD), BIOPSY SINGLE OR MULTIPLE;  COLONOSCOPY/ UPPER GI ENDOSCOPY/ COLON/ EGD/ Abdominal pain, epigastric/ [facility];  Surgeon: West Lomas MD;  Location: [facility] OR     HYSTEROSCOPY, SURGICAL; W/ ENDOMETRIAL ABLATION, ANY METHOD  2010    HERNIORRHAPHY VENTRAL N/A 2018    Procedure: Open ventral hernia repair;  Surgeon: Alonso Bills MD;  Location: [facility] OR    HYSTERECTOMY, SUPRACERVICAL LAPAROSCOPIC      LEEP TX, CERVICAL      LEEP TX Cervical    ORTHOPEDIC SURGERY      bluged disk    partial small bowel resection      Ileo-colonic resection. Crohn's disease surgery for bowel obstruction. this was a section of small bowel and large bowel and the cecum., all removed and a reanastamosis done successfully    SALPINGO OOPHORECTOMY,R/L/TORI      Right ovary    SLING TRANSVAGINAL N/A 2022    Procedure: Pubovaginal sling with Altis;  Surgeon: Eleuterio Stone MD;  Location: [facility] OR    TUBAL LIGATION         Family History:    Family History   Problem Relation Age of Onset    Hyperlipidemia Mother     Cancer Mother         cervical    Lipids Mother     Hypertension Father     Eye Disorder Father     Lipids Father     Alcohol/Drug Maternal Grandmother     Colon Cancer Paternal Grandmother     Diabetes Paternal Grandmother     Eye Disorder Paternal Grandmother     Diabetes Paternal Grandfather     Eye Disorder Paternal Grandfather     Inflammatory Bowel Disease Paternal Aunt         and two paternal uncles       Social History:  Marital  "Status:   [2]  Social History     Tobacco Use    Smoking status: Some Days     Current packs/day: 0.50     Types: Cigarettes     Passive exposure: Never    Smokeless tobacco: Never   Vaping Use    Vaping status: Never Used   Substance Use Topics    Alcohol use: Yes     Comment: very rarely    Drug use: No        Medications:    predniSONE (DELTASONE) 20 MG tablet  azaTHIOprine (IMURAN) 50 MG tablet  busPIRone (BUSPAR) 10 MG tablet  cloNIDine (CATAPRES) 0.1 MG tablet  cyanocobalamin (VITAMIN B12) 1000 MCG/ML injection  fentaNYL (DURAGESIC) 25 mcg/hr patch 72 hr  gabapentin (NEURONTIN) 300 MG capsule  hydrOXYzine HCl (ATARAX) 25 MG tablet  ibuprofen (ADVIL/MOTRIN) 800 MG tablet  montelukast (SINGULAIR) 10 MG tablet  mupirocin (BACTROBAN) 2 % external ointment  naloxone (NARCAN) 4 MG/0.1ML nasal spray  oxyCODONE-acetaminophen (PERCOCET) 7.5-325 MG per tablet  sertraline (ZOLOFT) 100 MG tablet  tiZANidine (ZANAFLEX) 2 MG tablet  traZODone (DESYREL) 150 MG tablet  triamcinolone (KENALOG) 0.1 % external ointment  VENTOLIN  (90 Base) MCG/ACT inhaler          Review of Systems  A medically appropriate review of systems was performed with pertinent positives and negatives noted in the HPI, and all other systems negative.    Physical Exam   Patient Vitals for the past 24 hrs:   BP Temp Temp src Pulse Resp SpO2 Height Weight   04/29/24 1639 112/58 -- -- 66 -- 99 % -- --   04/29/24 1438 102/67 -- -- -- -- 98 % -- --   04/29/24 1342 101/66 97.8  F (36.6  C) Oral 64 22 98 % 1.575 m (5' 2\") 79.4 kg (175 lb)          Physical Exam  General: alert and in acute distress on arrival.  Laying on her side  Head: atraumatic, normocephalic  Lungs:  nonlabored  CV:  extremities warm and perfused  Abd: nondistended.  Mild diffuse tenderness  Skin: no rashes, no diaphoresis and skin color normal  Neuro: Patient awake, alert, speech is fluent,   Psychiatric: affect/mood normal,        ED Course                 Procedures          "                  Results for orders placed or performed during the hospital encounter of 04/29/24 (from the past 24 hour(s))   CBC with platelets differential    Narrative    The following orders were created for panel order CBC with platelets differential.  Procedure                               Abnormality         Status                     ---------                               -----------         ------                     CBC with platelets and d...[[id number]]  Abnormal            Final result                 Please view results for these tests on the individual orders.   Lipase   Result Value Ref Range    Lipase 35 13 - 60 U/L   CRP Inflammation   Result Value Ref Range    CRP Inflammation 5.23 (H) <5.00 mg/L   Erythrocyte sedimentation rate auto   Result Value Ref Range    Erythrocyte Sedimentation Rate 11 0 - 20 mm/hr   CBC with platelets and differential   Result Value Ref Range    WBC Count 6.7 4.0 - 11.0 10e3/uL    RBC Count 4.02 3.80 - 5.20 10e6/uL    Hemoglobin 14.0 11.7 - 15.7 g/dL    Hematocrit 39.3 35.0 - 47.0 %    MCV 98 78 - 100 fL    MCH 34.8 (H) 26.5 - 33.0 pg    MCHC 35.6 31.5 - 36.5 g/dL    RDW 14.7 10.0 - 15.0 %    Platelet Count 190 150 - 450 10e3/uL    % Neutrophils 61 %    % Lymphocytes 29 %    % Monocytes 7 %    % Eosinophils 1 %    % Basophils 1 %    % Immature Granulocytes 0 %    NRBCs per 100 WBC 0 <1 /100    Absolute Neutrophils 4.1 1.6 - 8.3 10e3/uL    Absolute Lymphocytes 2.0 0.8 - 5.3 10e3/uL    Absolute Monocytes 0.5 0.0 - 1.3 10e3/uL    Absolute Eosinophils 0.1 0.0 - 0.7 10e3/uL    Absolute Basophils 0.1 0.0 - 0.2 10e3/uL    Absolute Immature Granulocytes 0.0 <=0.4 10e3/uL    Absolute NRBCs 0.0 10e3/uL   UA with Microscopic reflex to Culture    Specimen: Urine, Clean Catch   Result Value Ref Range    Color Urine Yellow Colorless, Straw, Light Yellow, Yellow    Appearance Urine Clear Clear    Glucose Urine Negative Negative mg/dL    Bilirubin Urine Negative Negative    Ketones  Urine Negative Negative mg/dL    Specific Gravity Urine 1.013 1.003 - 1.035    Blood Urine Negative Negative    pH Urine 5.0 5.0 - 7.0    Protein Albumin Urine Negative Negative mg/dL    Urobilinogen Urine Normal Normal, 2.0 mg/dL    Nitrite Urine Negative Negative    Leukocyte Esterase Urine Negative Negative    Bacteria Urine Few (A) None Seen /HPF    Mucus Urine Present (A) None Seen /LPF    RBC Urine 1 <=2 /HPF    WBC Urine 2 <=5 /HPF    Squamous Epithelials Urine 3 (H) <=1 /HPF    Hyaline Casts Urine 11 (H) <=2 /LPF    Narrative    Urine Culture not indicated   Comprehensive metabolic panel   Result Value Ref Range    Sodium 135 135 - 145 mmol/L    Potassium 4.1 3.4 - 5.3 mmol/L    Carbon Dioxide (CO2) 23 22 - 29 mmol/L    Anion Gap 8 7 - 15 mmol/L    Urea Nitrogen 9.7 6.0 - 20.0 mg/dL    Creatinine 0.83 0.51 - 0.95 mg/dL    GFR Estimate 88 >60 mL/min/1.73m2    Calcium 8.9 8.6 - 10.0 mg/dL    Chloride 104 98 - 107 mmol/L    Glucose 110 (H) 70 - 99 mg/dL    Alkaline Phosphatase 71 40 - 150 U/L    AST 36 0 - 45 U/L    ALT 17 0 - 50 U/L    Protein Total 7.5 6.4 - 8.3 g/dL    Albumin 4.0 3.5 - 5.2 g/dL    Bilirubin Total 0.7 <=1.2 mg/dL       MEDICATIONS GIVEN IN THE EMERGENCY DEPARTMENT:  Medications   HYDROmorphone (DILAUDID) injection 2 mg (2 mg Intramuscular $Given 4/29/24 1447)   oxyCODONE-acetaminophen (PERCOCET) 5-325 MG per tablet 2 tablet (2 tablets Oral $Given 4/29/24 1447)           Independent Interpretation (X-rays, CTs, rhythm strip):  N/a    Consultations/Discussion of Management or Tests:         Social Determinants of Health affecting care:         Assessments & Plan (with Medical Decision Making)  43 year old female who presents to the Emergency Department for evaluation of abdominal pain.  Patient was seen in the emergency department 4 days ago with similar types of pain, with negative imaging, and laboratory workup at that time.    Patient now with ongoing, severe worsening of her abdominal  pain.  Has had history of Crohn's disease previously.  She has normal bowel movements as of recent, with no vomiting.  I feel this is less likely obstruction.    Patient with laboratory workup showing CMP which is normal.  Urinalysis showing no signs of infection.Sed rate is normal.  CRP is slightly elevated, nonspecific.  CBC is normal with normal white blood cell count at 6.7.    At this point, uncertain exact cause of patient's symptoms.  Does state that she has been doing some heavy lifting, and this may be contributing to patient's current abdominal pains.  Regardless, has been eating and drinking okay.  No vomiting.  No diarrhea.  Therefore I do not feel that this represents obstruction.  Patient does feel somewhat improved after IM and oral medications given.    Patient with labs which were unremarkable.  This potentially represents musculoskeletal cause.  However, cannot entirely rule out other potential cause for patient's abdominal pain.  However, given the normal laboratory workup, with symptoms similar to her prior visit when she did have CT imaging, I feel it is reasonable for discharge home, follow-up in clinic as needed, and return or be seen if new or worsening symptoms develop.        I have reviewed the nursing notes.    I have reviewed the findings, diagnosis, plan and need for follow up with the patient.       Critical Care time:  none      NEW PRESCRIPTIONS STARTED AT TODAY'S ER VISIT  Discharge Medication List as of 4/29/2024  4:23 PM        START taking these medications    Details   predniSONE (DELTASONE) 20 MG tablet Take two tablets (= 40mg) each day for 5 (five) days, Disp-10 tablet, R-0, E-Prescribe             Final diagnoses:   Abdominal pain, generalized   Crohn's disease with complication, unspecified gastrointestinal tract location (H)       4/29/2024   [facility] EMERGENCY DEPT       Zi, Monty Gupta MD  04/29/24 [id number]

## 2024-05-01 ENCOUNTER — MYC MEDICAL ADVICE (OUTPATIENT)
Dept: FAMILY MEDICINE | Facility: CLINIC | Age: 44
End: 2024-05-01
Payer: COMMERCIAL

## 2024-05-01 NOTE — TELEPHONE ENCOUNTER
"Pt has ER follow up 5/2/24 at location not close to pcp. Rn asked pt what RN can do in the moment to assist pt further.      Pt expressed emotional distress (started crying) of the pain she is in, not being heard by ED providers, and concerns to being seen at different location from pcp. Pt states she is constant pain and does not want to go ER. Pt continued to verbalize concerns/anxiety of being seen by different provider 5/2/24. RN scheduled pt for follow with [facility] provider to decrease anxiety of not being near pcp's clinic and team. RN advised pt to call clinic (24/7 nurse line) the team can further assist on next steps if her pain/symptoms worsen. Pt states \"I feel more comfortable\" knowing that she will be seen in a familiar environment.     Jazmín Christensen, RN on 5/1/2024 at 5:22 PM    "

## 2024-05-02 ENCOUNTER — OFFICE VISIT (OUTPATIENT)
Dept: FAMILY MEDICINE | Facility: CLINIC | Age: 44
End: 2024-05-02
Payer: COMMERCIAL

## 2024-05-02 VITALS
BODY MASS INDEX: 34.12 KG/M2 | SYSTOLIC BLOOD PRESSURE: 114 MMHG | HEART RATE: 106 BPM | OXYGEN SATURATION: 99 % | RESPIRATION RATE: 16 BRPM | HEIGHT: 62 IN | WEIGHT: 185.4 LBS | DIASTOLIC BLOOD PRESSURE: 80 MMHG | TEMPERATURE: 98 F

## 2024-05-02 DIAGNOSIS — R73.03 PREDIABETES: ICD-10-CM

## 2024-05-02 DIAGNOSIS — R10.84 ABDOMINAL PAIN, GENERALIZED: Primary | ICD-10-CM

## 2024-05-02 LAB
BASOPHILS # BLD AUTO: 0 10E3/UL (ref 0–0.2)
BASOPHILS NFR BLD AUTO: 0 %
EOSINOPHIL # BLD AUTO: 0 10E3/UL (ref 0–0.7)
EOSINOPHIL NFR BLD AUTO: 0 %
ERYTHROCYTE [DISTWIDTH] IN BLOOD BY AUTOMATED COUNT: 14.6 % (ref 10–15)
HBA1C MFR BLD: 5.7 % (ref 0–5.6)
HCT VFR BLD AUTO: 44.1 % (ref 35–47)
HGB BLD-MCNC: 15 G/DL (ref 11.7–15.7)
IMM GRANULOCYTES # BLD: 0 10E3/UL
IMM GRANULOCYTES NFR BLD: 0 %
LYMPHOCYTES # BLD AUTO: 1 10E3/UL (ref 0.8–5.3)
LYMPHOCYTES NFR BLD AUTO: 9 %
MCH RBC QN AUTO: 33.8 PG (ref 26.5–33)
MCHC RBC AUTO-ENTMCNC: 34 G/DL (ref 31.5–36.5)
MCV RBC AUTO: 99 FL (ref 78–100)
MONOCYTES # BLD AUTO: 0.2 10E3/UL (ref 0–1.3)
MONOCYTES NFR BLD AUTO: 2 %
NEUTROPHILS # BLD AUTO: 9.5 10E3/UL (ref 1.6–8.3)
NEUTROPHILS NFR BLD AUTO: 88 %
PLATELET # BLD AUTO: 206 10E3/UL (ref 150–450)
RBC # BLD AUTO: 4.44 10E6/UL (ref 3.8–5.2)
WBC # BLD AUTO: 10.8 10E3/UL (ref 4–11)

## 2024-05-02 PROCEDURE — 83036 HEMOGLOBIN GLYCOSYLATED A1C: CPT | Performed by: PHYSICIAN ASSISTANT

## 2024-05-02 PROCEDURE — 80053 COMPREHEN METABOLIC PANEL: CPT | Performed by: PHYSICIAN ASSISTANT

## 2024-05-02 PROCEDURE — G2211 COMPLEX E/M VISIT ADD ON: HCPCS | Performed by: PHYSICIAN ASSISTANT

## 2024-05-02 PROCEDURE — 99215 OFFICE O/P EST HI 40 MIN: CPT | Performed by: PHYSICIAN ASSISTANT

## 2024-05-02 PROCEDURE — 85025 COMPLETE CBC W/AUTO DIFF WBC: CPT | Performed by: PHYSICIAN ASSISTANT

## 2024-05-02 PROCEDURE — 36415 COLL VENOUS BLD VENIPUNCTURE: CPT | Performed by: PHYSICIAN ASSISTANT

## 2024-05-02 ASSESSMENT — PAIN SCALES - GENERAL: PAINLEVEL: WORST PAIN (10)

## 2024-05-02 NOTE — RESULT ENCOUNTER NOTE
Isaak Perez,       Your recent test results are attached, if you have any questions or concerns please feel free to contact me via e-mail or call [phone].  A1c shows blood sugars have improved.  Your white blood cell count is normal but has increased from previous, your neutrophils have gone up as well.  This can be seen in infection.  If symptoms worsen at all or you get a fever you need to go back to the ER.  Otherwise I would recheck your white blood cell count with your surgeon at that appointment.       It was a pleasure to see you at your recent office visit.      Sincerely,  Kaitlin Regalado PA-C

## 2024-05-02 NOTE — PROGRESS NOTES
"  Assessment & Plan     Abdominal pain, generalized  Awaiting labs, has been to the ER twice recently with thorough workup and sent back to clinic, referred to general surgery stat  To ER with worsening symptoms or fever  Cause is unknown at this point and imaging was negative  No vomiting or constipation, do not suspect obstruction  Autoimmune, infectious, neoplastic, referred pain, muscular all in differential      - Adult General Surg Referral; Future  - CBC with platelets and differential; Future  - Comprehensive metabolic panel (BMP + Alb, Alk Phos, ALT, AST, Total. Bili, TP); Future  - Comprehensive metabolic panel (BMP + Alb, Alk Phos, ALT, AST, Total. Bili, TP)  - CBC with platelets and differential    Prediabetes    - Hemoglobin A1c; Future  - Hemoglobin A1c    41 min spent on patient today including chart review, history, history,  exam, and explaining treatment plan and follow-up.         Chris Perez is a 43 year old, presenting for the following health issues:  ER F/U    History of Present Illness       Reason for visit:  Er follow up  severe abdominal pain    She eats 2-3 servings of fruits and vegetables daily.She consumes 3 sweetened beverage(s) daily.She exercises with enough effort to increase her heart rate 30 to 60 minutes per day.  She exercises with enough effort to increase her heart rate 4 days per week.   She is taking medications regularly.         ED/UC Followup:     Facility:  [facility] Emergency Dept   Date of visit: 4/29/24  Reason for visit: abdominal pain  Current Status: worsening symptoms    Copied from er complex abdominal pain patient with normal ct:      \"Assessments & Plan (with Medical Decision Making)  43 year old female who presents to the Emergency Department for evaluation of abdominal pain.  Patient was seen in the emergency department 4 days ago with similar types of pain, with negative imaging, and laboratory workup at that time.     Patient now with " "ongoing, severe worsening of her abdominal pain.  Has had history of Crohn's disease previously.  She has normal bowel movements as of recent, with no vomiting.  I feel this is less likely obstruction.     Patient with laboratory workup showing CMP which is normal.  Urinalysis showing no signs of infection.Sed rate is normal.  CRP is slightly elevated, nonspecific.  CBC is normal with normal white blood cell count at 6.7.     At this point, uncertain exact cause of patient's symptoms.  Does state that she has been doing some heavy lifting, and this may be contributing to patient's current abdominal pains.  Regardless, has been eating and drinking okay.  No vomiting.  No diarrhea.  Therefore I do not feel that this represents obstruction.  Patient does feel somewhat improved after IM and oral medications given.     Patient with labs which were unremarkable.  This potentially represents musculoskeletal cause.  However, cannot entirely rule out other potential cause for patient's abdominal pain.  However, given the normal laboratory workup, with symptoms similar to her prior visit when she did have CT imaging, I feel it is reasonable for discharge home, follow-up in clinic as needed, and return or be seen if new or worsening symptoms develop. \"      ----4-29-24 carlyle lemus  No constipation or vomiting. Some nausea. Going to the bathroom normally. Eating does not make this worse. Appetite has been less. Drinking fluids. Urinating is normal. No fevers.   Labs and ct unremarkable see note above. Pain meds given in er. [place]- last [place] of oxycodone-tylenol 100 quantity filled on 4-21-24. Also on gabpentin, fentyl. Goes to [facility] for this. Is trying to wean off of them.  This is for her back not abdominal pain.    Last chron's episode was age 22 and she had surgery for this.  History of abdominal and pelvic pain.    Has not been as active due to pain. Pain does get worse with movements. Rolling over in bed, walking " "for example.     Pain started 1.5 weeks ago. No diarrhea.  No fevers.    Surgical history abdomeN: cholecystectomy 2005  Appendectomy in 2006.  R ovary removed, hysteroscopy, partial small bowel reception when she was [age]-Ilzn-colonic resection. Crohn's disease surgery for bowel obstruction. this was a section of small bowel and large bowel and the cecum., all removed and a reanastamosis done successfully .  Hysterectomy in 2020. Still has left ovary.  History of c section.       Imaging reviewed:  Ct in er:  IMPRESSION:   1.  No acute findings in the abdomen and pelvis.  2.  Hepatic steatosis.     BARBARA WITT MD          LLQ paiin last year us with following results: IMPRESSION:    1.  Left ovarian simple cysts measuring up to 1.4 cm. No evidence of left ovarian torsion.     2.  Status post hysterectomy and right oophorectomy.      Objective    /80   Pulse 106   Temp 98  F (36.7  C) (Temporal)   Resp 16   Ht 1.575 m (5' 2\")   Wt 84.1 kg (185 lb 6.4 oz)   LMP 07/23/2010 (LMP Unknown)   SpO2 99%   BMI 33.91 kg/m    Body mass index is 33.91 kg/m .  Physical Exam   GENERAL: alert and appears uncomfortable, crying at times.  NECK: no adenopathy, no asymmetry, masses, or scars  RESP: lungs clear to auscultation - no rales, rhonchi or wheezes  CV: regular rate and rhythm, normal S1 S2, no S3 or S4, no murmur, click or rub, no peripheral edema  ABDOMEN: bowel sounds normal and no hernia, tender RUQ, epigastric and RLQ mostly. Some guarding no rigidity. Unable to feel liver due to obesity.  MS: no gross musculoskeletal defects noted, no edema  NEURO: Normal strength and tone, mentation intact and speech normal  PSYCH: mentation appears normal, affect normal/bright          Results for orders placed or performed in visit on 05/02/24   Hemoglobin A1c     Status: Abnormal   Result Value Ref Range    Hemoglobin A1C 5.7 (H) 0.0 - 5.6 %   CBC with platelets and differential     Status: Abnormal   Result Value " Ref Range    WBC Count 10.8 4.0 - 11.0 10e3/uL    RBC Count 4.44 3.80 - 5.20 10e6/uL    Hemoglobin 15.0 11.7 - 15.7 g/dL    Hematocrit 44.1 35.0 - 47.0 %    MCV 99 78 - 100 fL    MCH 33.8 (H) 26.5 - 33.0 pg    MCHC 34.0 31.5 - 36.5 g/dL    RDW 14.6 10.0 - 15.0 %    Platelet Count 206 150 - 450 10e3/uL    % Neutrophils 88 %    % Lymphocytes 9 %    % Monocytes 2 %    % Eosinophils 0 %    % Basophils 0 %    % Immature Granulocytes 0 %    Absolute Neutrophils 9.5 (H) 1.6 - 8.3 10e3/uL    Absolute Lymphocytes 1.0 0.8 - 5.3 10e3/uL    Absolute Monocytes 0.2 0.0 - 1.3 10e3/uL    Absolute Eosinophils 0.0 0.0 - 0.7 10e3/uL    Absolute Basophils 0.0 0.0 - 0.2 10e3/uL    Absolute Immature Granulocytes 0.0 <=0.4 10e3/uL   CBC with platelets and differential     Status: Abnormal    Narrative    The following orders were created for panel order CBC with platelets and differential.  Procedure                               Abnormality         Status                     ---------                               -----------         ------                     CBC with platelets and d...[[id number]]  Abnormal            Final result                 Please view results for these tests on the individual orders.         Signed Electronically by: Kaitlin Regalado PA-C

## 2024-05-04 LAB
ALBUMIN SERPL BCG-MCNC: 4.7 G/DL (ref 3.5–5.2)
ALP SERPL-CCNC: 71 U/L (ref 40–150)
ALT SERPL W P-5'-P-CCNC: 29 U/L (ref 0–50)
ANION GAP SERPL CALCULATED.3IONS-SCNC: 11 MMOL/L (ref 7–15)
AST SERPL W P-5'-P-CCNC: 48 U/L (ref 0–45)
BILIRUB SERPL-MCNC: 1 MG/DL
BUN SERPL-MCNC: 9.3 MG/DL (ref 6–20)
CALCIUM SERPL-MCNC: 9.4 MG/DL (ref 8.6–10)
CHLORIDE SERPL-SCNC: 100 MMOL/L (ref 98–107)
CREAT SERPL-MCNC: 0.89 MG/DL (ref 0.51–0.95)
DEPRECATED HCO3 PLAS-SCNC: 22 MMOL/L (ref 22–29)
EGFRCR SERPLBLD CKD-EPI 2021: 81 ML/MIN/1.73M2
GLUCOSE SERPL-MCNC: 150 MG/DL (ref 70–99)
POTASSIUM SERPL-SCNC: 4.3 MMOL/L (ref 3.4–5.3)
PROT SERPL-MCNC: 8.5 G/DL (ref 6.4–8.3)
SODIUM SERPL-SCNC: 133 MMOL/L (ref 135–145)

## 2024-05-06 ENCOUNTER — OFFICE VISIT (OUTPATIENT)
Dept: SURGERY | Facility: CLINIC | Age: 44
End: 2024-05-06
Attending: PHYSICIAN ASSISTANT
Payer: COMMERCIAL

## 2024-05-06 VITALS
WEIGHT: 185 LBS | HEART RATE: 74 BPM | SYSTOLIC BLOOD PRESSURE: 103 MMHG | TEMPERATURE: 97.3 F | BODY MASS INDEX: 34.04 KG/M2 | OXYGEN SATURATION: 99 % | DIASTOLIC BLOOD PRESSURE: 71 MMHG | HEIGHT: 62 IN

## 2024-05-06 DIAGNOSIS — R10.84 ABDOMINAL PAIN, GENERALIZED: Primary | ICD-10-CM

## 2024-05-06 DIAGNOSIS — K50.80 CROHN'S DISEASE OF BOTH SMALL AND LARGE INTESTINE WITHOUT COMPLICATION (H): ICD-10-CM

## 2024-05-06 DIAGNOSIS — F41.1 GAD (GENERALIZED ANXIETY DISORDER): ICD-10-CM

## 2024-05-06 DIAGNOSIS — M54.50 CHRONIC LOW BACK PAIN, UNSPECIFIED BACK PAIN LATERALITY, UNSPECIFIED WHETHER SCIATICA PRESENT: ICD-10-CM

## 2024-05-06 DIAGNOSIS — G89.29 CHRONIC LOW BACK PAIN, UNSPECIFIED BACK PAIN LATERALITY, UNSPECIFIED WHETHER SCIATICA PRESENT: ICD-10-CM

## 2024-05-06 PROCEDURE — 99214 OFFICE O/P EST MOD 30 MIN: CPT | Performed by: STUDENT IN AN ORGANIZED HEALTH CARE EDUCATION/TRAINING PROGRAM

## 2024-05-06 NOTE — NURSING NOTE
No chief complaint on file.      There were no vitals filed for this visit.  Wt Readings from Last 1 Encounters:   05/02/24 84.1 kg (185 lb 6.4 oz)     Uzma Do MA

## 2024-05-06 NOTE — PROGRESS NOTES
Surgical Consultation/History and Physical  [facility] General Surgery    Ana is seen in consultation for unexplained abdominal pain, at the request of Radha Bermudez PA-C.    Chief Complaint:  unexplained abdominal pain    History of Present Illness: Ana Felix is a 43 year old female presents with several weeks of worsening abdominal pain.  She does have a notable history of Crohn's disease status post ileocecal resection, cholecystectomy, hysterectomy, ventral hernia repair, and chronic back pain.  She states that for the past 2 weeks, she has noticed diffuse abdominal pain which is worse with movement.  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  She has had some nausea due to pain, but no emesis.  Has been having multiple bowel movements.  No fevers or chills, no dyspnea or chest pain.  No rashes she has noticed.  She has not noticed any focus swelling at her umbilicus where she previously had a hernia.  She states she feels very defeated and frustrated at not getting any answers to her symptoms and and has been feeling dismissed by prior providers.  She states she takes multiple pain medications for her chronic back pain and has been seeing a pain specialist for this particularly, but has not seen anyone with pain management team for her abdominal pain.  She states she takes Percocet, and some gabapentin but she has been trying to cut down on her gabapentin use.  She has been told that her symptoms are not related to a Crohn's flare due to her normal CRP and ESR, as well as lack of response to steroids which was administered in the emergency department.      Patient Active Problem List   Diagnosis    Crohns disease    Female pelvic pain    CARDIOVASCULAR SCREENING; LDL GOAL LESS THAN 160    Migraine headache    Back pain    Obesity    Disorder of sacrum    Displacement of lumbar intervertebral disc without myelopathy    Tobacco abuse    S/P  oophorectomy    History of cholecystectomy    History of resection of small bowel    Abdominal pain    Discogenic pain    Chronic low back pain    Mild persistent asthma, uncomplicated    Nausea with vomiting    Abdominal pain, right upper quadrant    Irritable bowel syndrome (IBS)    Lactose intolerance    Hearing difficulty    S/P LEEP of cervix    Controlled substance agreement signed    Degenerative lumbar disc    Dysmenorrhea    Labral tear of hip, degenerative    Pain medication agreement    Pain of right hip joint    Crohn's disease of both small and large intestine without complication (H)    History of gestational diabetes mellitus (GDM)    Amenorrhea    Immunocompromised state (H24)    Hidradenitis suppurativa    SKYE (stress urinary incontinence, female)    DANIELLE (generalized anxiety disorder)    Insomnia, unspecified type    Primary osteoarthritis of left knee    Closed nondisplaced fracture of head of right radius with routine healing, subsequent encounter    Prediabetes       Past Medical History:   Diagnosis Date    Back pain     Crohn's disease (H)     Exercise-induced asthma     Gestational diabetes     Herniation of intervertebral disc of lumbar region     Infertility     Ovarian cyst     Smoker     Status post cholecystectomy 2005       Past Surgical History:   Procedure Laterality Date    APPENDECTOMY      C/SECTION, LOW TRANSVERSE      , Low Transverse    CHOLECYSTECTOMY, LAPOROSCOPIC  2005    Cholecystectomy, Laparoscopic    COLONOSCOPY      ESOPHAGOSCOPY, GASTROSCOPY, DUODENOSCOPY (EGD), COMBINED  3/6/2014    Procedure: COMBINED ESOPHAGOSCOPY, GASTROSCOPY, DUODENOSCOPY (EGD), BIOPSY SINGLE OR MULTIPLE;  COLONOSCOPY/ UPPER GI ENDOSCOPY/ COLON/ EGD/ Abdominal pain, epigastric/ [facility];  Surgeon: West Lomas MD;  Location:  OR     HYSTEROSCOPY, SURGICAL; W/ ENDOMETRIAL ABLATION, ANY METHOD  2010    HERNIORRHAPHY VENTRAL N/A 2018    Procedure: Open ventral  hernia repair;  Surgeon: Alonso Bills MD;  Location: [facility] OR    HYSTERECTOMY, SUPRACERVICAL LAPAROSCOPIC  2010    LEEP TX, CERVICAL      LEEP TX Cervical    ORTHOPEDIC SURGERY      bluged disk    partial small bowel resection  2002    Ileo-colonic resection. Crohn's disease surgery for bowel obstruction. this was a section of small bowel and large bowel and the cecum., all removed and a reanastamosis done successfully    SALPINGO OOPHORECTOMY,R/L/TORI      Right ovary    SLING TRANSVAGINAL N/A 4/4/2022    Procedure: Pubovaginal sling with Altis;  Surgeon: Eleuterio Stone MD;  Location: [facility] OR    TUBAL LIGATION         Family History   Problem Relation Age of Onset    Hyperlipidemia Mother     Cancer Mother         cervical    Lipids Mother     Hypertension Father     Eye Disorder Father     Lipids Father     Alcohol/Drug Maternal Grandmother     Colon Cancer Paternal Grandmother     Diabetes Paternal Grandmother     Eye Disorder Paternal Grandmother     Diabetes Paternal Grandfather     Eye Disorder Paternal Grandfather     Inflammatory Bowel Disease Paternal Aunt         and two paternal uncles       Social History     Tobacco Use    Smoking status: Some Days     Current packs/day: 0.50     Types: Cigarettes     Passive exposure: Never    Smokeless tobacco: Never    Tobacco comments:     5 Cigarettes in the past two week   Substance Use Topics    Alcohol use: Yes     Comment: very rarely        History   Drug Use No       Current Outpatient Medications   Medication Sig Dispense Refill    azaTHIOprine (IMURAN) 50 MG tablet Take 50 mg by mouth daily       busPIRone (BUSPAR) 10 MG tablet Take 1 tablet (10 mg) by mouth 2 times daily 180 tablet 3    cloNIDine (CATAPRES) 0.1 MG tablet Take 1 tablet by mouth 2 times daily      cyanocobalamin (VITAMIN B12) 1000 MCG/ML injection Inject 1 mL into the muscle every 30 days      fentaNYL (DURAGESIC) 25 mcg/hr patch 72 hr Place 1 patch onto the skin every 72 hours       "gabapentin (NEURONTIN) 300 MG capsule Take 2 capsules (600 mg) by mouth 3 times daily 180 capsule 0    hydrOXYzine HCl (ATARAX) 25 MG tablet Take 0.5-2 tablets (12.5-50 mg) by mouth 3 times daily as needed for anxiety - May use 50-100mg at bedtime for sleep. Max dose 400mg/day 120 tablet 5    ibuprofen (ADVIL/MOTRIN) 800 MG tablet Take 1 tablet (800 mg) by mouth every 8 hours as needed for moderate pain 60 tablet 0    montelukast (SINGULAIR) 10 MG tablet Take 1 tablet (10 mg) by mouth daily 90 tablet 3    mupirocin (BACTROBAN) 2 % external ointment Apply topically 3 times daily x7-10 days 30 g 1    naloxone (NARCAN) 4 MG/0.1ML nasal spray 1 spray (4 mg) intranasally into 1 nostril. Administer into alternating nostrils. May repeat every 2 to 3 minutes.      oxyCODONE-acetaminophen (PERCOCET) 7.5-325 MG per tablet TAKE 1 TABLET BY MOUTH EVERY 6 HOURS AS NEEDED FOR PAIN. USE DATES 12/26/23 TO 01/24/24      predniSONE (DELTASONE) 20 MG tablet Take two tablets (= 40mg) each day for 5 (five) days 10 tablet 0    sertraline (ZOLOFT) 100 MG tablet Take 2 tablets (200 mg) by mouth daily 180 tablet 3    tiZANidine (ZANAFLEX) 2 MG tablet TAKE 1 TO 2 TABLETS BY MOUTH THREE TIMES DAILY AS NEEDED. DO NOT TAKE WITH OTHER MUSCLE RELAXATION      traZODone (DESYREL) 150 MG tablet Take 1 tablet (150 mg) by mouth at bedtime 90 tablet 3    triamcinolone (KENALOG) 0.1 % external ointment Apply topically 2 times daily x5-10 days 30 g 1    VENTOLIN  (90 Base) MCG/ACT inhaler INHALE 2 PUFFS INTO THE LUNGS EVERY 4 HOURS AS NEEDED FOR SHORTNESS OF BREATH OR DIFFICULT BREATHING 18 g 1       Allergies   Allergen Reactions    Infliximab Hives    Sulfa Antibiotics Nausea     Pt states \"it doesn't work for me\"  Pt states \"it doesn't work for me\"  Other reaction(s): Diarrhea, nausea, Nausea, Intolerance  Pt states \"it doesn't work for me\"    Sulfacetamide Nausea    Amoxicillin Nausea and Vomiting    Amoxicillin-Pot Clavulanate Other (See " "Comments) and Nausea and Vomiting     Crohn's    Aspirin Nausea    Bupropion Hives and Nausea    Clavulanic Acid Nausea and Vomiting    Droperidol Other (See Comments)     Dystonic reaction    Ibuprofen Other (See Comments) and Nausea     Crohn's      Lyrica [Pregabalin] Nausea and Vomiting     Grogginess, severe back pain  Grogginess, severe back pain    Vicodin [Hydrocodone-Acetaminophen] Nausea and Vomiting    Adhesive Tape Rash       Review of Systems:   10 point ROS negative other than what was listed in HPI    Physical Exam:  /71   Pulse 74   Temp 97.3  F (36.3  C) (Tympanic)   Ht 1.575 m (5' 2.01\")   Wt 83.9 kg (185 lb)   LMP 07/23/2010 (LMP Unknown)   SpO2 99%   BMI 33.83 kg/m      Constitutional-tearful, well nourished, non-toxic  Eyes: Anicteric, no injection.  PERRL  ENT:  Normocephalic, atraumatic, Nose midline, moist mucus membranes  Neck - supple, no LAD  Respiratory- Nonlabored breathing on room air  Cardiovascular - Extremities warm and well perfused.  Abdomen - Soft, diffusely tender without guarding or generalized peritonitis. No appreciable ventral hernia recurrence.   Neuro - No focal neuro deficits, Alert and oriented x 3  Psych: Flat affect, tearful  Musculoskeletal: Normal gait, symmetric strength.  FROM upper and lower extremities.  Skin: Warm, Dry    CBC RESULTS:   Recent Labs   Lab Test 05/02/24  1607   WBC 10.8   RBC 4.44   HGB 15.0   HCT 44.1   MCV 99   MCH 33.8*   MCHC 34.0   RDW 14.6        Last Comprehensive Metabolic Panel:  Lab Results   Component Value Date     (L) 05/02/2024    POTASSIUM 4.3 05/02/2024    CHLORIDE 100 05/02/2024    CO2 22 05/02/2024    ANIONGAP 11 05/02/2024     (H) 05/02/2024    BUN 9.3 05/02/2024    CR 0.89 05/02/2024    GFRESTIMATED 81 05/02/2024    MAYCOL 9.4 05/02/2024       Liver Function Studies -   Recent Labs   Lab Test 05/02/24  1607   PROTTOTAL 8.5*   ALBUMIN 4.7   BILITOTAL 1.0   ALKPHOS 71   AST 48*   ALT 29     CT " Abdomen/pelvis 4/25/24  CT ABDOMEN PELVIS W CONTRAST 4/25/2024 3:17 PM     CLINICAL HISTORY: umbilical pain ; h/o small bowel resection.     TECHNIQUE: CT scan of the abdomen and pelvis was performed following  injection of IV contrast. Multiplanar reformats were obtained. Dose  reduction techniques were used.  CONTRAST: 89 mL Isovue-[id number]     COMPARISON: 3/15/2023     FINDINGS:   LOWER CHEST: Normal.     HEPATOBILIARY: Hepatic steatosis. Cholecystectomy. Stable prominence  of the intrahepatic and extra hepatic bile ducts, likely secondary to  postcholecystectomy reservoir effect.     PANCREAS: Normal.     SPLEEN: Normal.     ADRENAL GLANDS: Normal.     KIDNEYS/BLADDER: Normal.     BOWEL: Ileocecectomy with ileoascending stenosis. No small bowel or  colonic obstruction or changes.     PELVIC ORGANS: Hysterectomy.     ADDITIONAL FINDINGS: No free fluid or fluid collections. No free air.  Mild nonspecific fat stranding around the inferior mesenteric artery  without associated lymphadenopathy or abnormalities in the artery. No  umbilical hernia, mass or fluid collection.     MUSCULOSKELETAL: Unremarkable.                                                                      IMPRESSION:   1.  No acute findings in the abdomen and pelvis.  2.  Hepatic steatosis.    Assessment:  1. Ana Felix is a 43 year old female with complex past medical and surgical history, including Crohn's disease status post ileal cecal resection and primary anastomosis, cholecystectomy, hysterectomy, ventral hernia repair, who presents to surgery clinic for unexplained generalized abdominal pain.  Workup thus far with cross-sectional imaging And blood work has been ultimately unrevealing with no clear surgical target at this time.  Discussed with the patient that, even though this is unlikely to be due to a Crohn's flare (which is what she told me her gastroenterologist told her) I recommend obtaining an upper endoscopy and colonoscopy as it has  been 2 years since her last endoscopic evaluation and her symptoms otherwise have no explanation.  Will obtain blood and urine evaluation for porphyria as this may be a cause of otherwise unexplained abdominal pain.  I will also place a referral to a chronic pain specialist to evaluate for possible fibromyalgia as her symptoms appear to be at least partially musculoskeletal in nature as they are worse with movement.      I provided reassurance to the patient that our team will continue to help support her look for a diagnosis to the best of our abilities and provided reassurance that she will not be dismissed. Patient expressed understanding and is amenable to the proposed plan.     Plan:   1. Plan for diagnostic upper endoscopy and colonoscopy to further evaluate for etiology  of her abdominal pain  2. Obtain blood and urine studies for porphyrin and porphobillinogen  3. Referral to chronic pain specialist for evaluation for fibromyalgia        Robert Fiore MD on 5/6/2024 at 8:42 AM

## 2024-05-06 NOTE — LETTER
5/6/2024         RE: Ana Felix  [address]  [place] [address]        Dear Colleague,    Thank you for referring your patient, Ana Felix, to the [facility]. Please see a copy of my visit note below.    Surgical Consultation/History and Physical  [facility]    Ana is seen in consultation for unexplained abdominal pain, at the request of Radha Bermudez PA-C.    Chief Complaint:  unexplained abdominal pain    History of Present Illness: Ana Felix is a 43 year old female presents with several weeks of worsening abdominal pain.  She does have a notable history of Crohn's disease status post ileocecal resection, cholecystectomy, hysterectomy, ventral hernia repair, and chronic back pain.  She states that for the past 2 weeks, she has noticed diffuse abdominal pain which is worse with movement.  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  She has had some nausea due to pain, but no emesis.  Has been having multiple bowel movements.  No fevers or chills, no dyspnea or chest pain.  No rashes she has noticed.  She has not noticed any focus swelling at her umbilicus where she previously had a hernia.  She states she feels very defeated and frustrated at not getting any answers to her symptoms and and has been feeling dismissed by prior providers.  She states she takes multiple pain medications for her chronic back pain and has been seeing a pain specialist for this particularly, but has not seen anyone with pain management team for her abdominal pain.  She states she takes Percocet, and some gabapentin but she has been trying to cut down on her gabapentin use.  She has been told that her symptoms are not related to a Crohn's flare due to her normal CRP and ESR, as well as lack of response to steroids which was administered in the emergency department.      Patient Active Problem List   Diagnosis     Crohns disease      Female pelvic pain     CARDIOVASCULAR SCREENING; LDL GOAL LESS THAN 160     Migraine headache     Back pain     Obesity     Disorder of sacrum     Displacement of lumbar intervertebral disc without myelopathy     Tobacco abuse     S/P oophorectomy     History of cholecystectomy     History of resection of small bowel     Abdominal pain     Discogenic pain     Chronic low back pain     Mild persistent asthma, uncomplicated     Nausea with vomiting     Abdominal pain, right upper quadrant     Irritable bowel syndrome (IBS)     Lactose intolerance     Hearing difficulty     S/P LEEP of cervix     Controlled substance agreement signed     Degenerative lumbar disc     Dysmenorrhea     Labral tear of hip, degenerative     Pain medication agreement     Pain of right hip joint     Crohn's disease of both small and large intestine without complication (H)     History of gestational diabetes mellitus (GDM)     Amenorrhea     Immunocompromised state (H24)     Hidradenitis suppurativa     SKYE (stress urinary incontinence, female)     DANIELLE (generalized anxiety disorder)     Insomnia, unspecified type     Primary osteoarthritis of left knee     Closed nondisplaced fracture of head of right radius with routine healing, subsequent encounter     Prediabetes       Past Medical History:   Diagnosis Date     Back pain      Crohn's disease (H)      Exercise-induced asthma      Gestational diabetes      Herniation of intervertebral disc of lumbar region      Infertility      Ovarian cyst      Smoker      Status post cholecystectomy 2005       Past Surgical History:   Procedure Laterality Date     APPENDECTOMY       C/SECTION, LOW TRANSVERSE      , Low Transverse     CHOLECYSTECTOMY, LAPOROSCOPIC  2005    Cholecystectomy, Laparoscopic     COLONOSCOPY       ESOPHAGOSCOPY, GASTROSCOPY, DUODENOSCOPY (EGD), COMBINED  3/6/2014    Procedure: COMBINED ESOPHAGOSCOPY, GASTROSCOPY, DUODENOSCOPY (EGD), BIOPSY SINGLE OR  MULTIPLE;  COLONOSCOPY/ UPPER GI ENDOSCOPY/ COLON/ EGD/ Abdominal pain, epigastric/ [facility];  Surgeon: West Lomas MD;  Location: [facility] OR     HC HYSTEROSCOPY, SURGICAL; W/ ENDOMETRIAL ABLATION, ANY METHOD  7/2010     HERNIORRHAPHY VENTRAL N/A 12/11/2018    Procedure: Open ventral hernia repair;  Surgeon: Alonso Bills MD;  Location: [facility] OR     HYSTERECTOMY, SUPRACERVICAL LAPAROSCOPIC  2010     LEEP TX, CERVICAL      LEEP TX Cervical     ORTHOPEDIC SURGERY      bluged disk     partial small bowel resection  2002    Ileo-colonic resection. Crohn's disease surgery for bowel obstruction. this was a section of small bowel and large bowel and the cecum., all removed and a reanastamosis done successfully     SALPINGO OOPHORECTOMY,R/L/TORI      Right ovary     SLING TRANSVAGINAL N/A 4/4/2022    Procedure: Pubovaginal sling with Altis;  Surgeon: Eleuterio Stone MD;  Location: [facility] OR     TUBAL LIGATION         Family History   Problem Relation Age of Onset     Hyperlipidemia Mother      Cancer Mother         cervical     Lipids Mother      Hypertension Father      Eye Disorder Father      Lipids Father      Alcohol/Drug Maternal Grandmother      Colon Cancer Paternal Grandmother      Diabetes Paternal Grandmother      Eye Disorder Paternal Grandmother      Diabetes Paternal Grandfather      Eye Disorder Paternal Grandfather      Inflammatory Bowel Disease Paternal Aunt         and two paternal uncles       Social History     Tobacco Use     Smoking status: Some Days     Current packs/day: 0.50     Types: Cigarettes     Passive exposure: Never     Smokeless tobacco: Never     Tobacco comments:     5 Cigarettes in the past two week   Substance Use Topics     Alcohol use: Yes     Comment: very rarely        History   Drug Use No       Current Outpatient Medications   Medication Sig Dispense Refill     azaTHIOprine (IMURAN) 50 MG tablet Take 50 mg by mouth daily        busPIRone (BUSPAR) 10 MG tablet Take 1 tablet (10  mg) by mouth 2 times daily 180 tablet 3     cloNIDine (CATAPRES) 0.1 MG tablet Take 1 tablet by mouth 2 times daily       cyanocobalamin (VITAMIN B12) 1000 MCG/ML injection Inject 1 mL into the muscle every 30 days       fentaNYL (DURAGESIC) 25 mcg/hr patch 72 hr Place 1 patch onto the skin every 72 hours       gabapentin (NEURONTIN) 300 MG capsule Take 2 capsules (600 mg) by mouth 3 times daily 180 capsule 0     hydrOXYzine HCl (ATARAX) 25 MG tablet Take 0.5-2 tablets (12.5-50 mg) by mouth 3 times daily as needed for anxiety - May use 50-100mg at bedtime for sleep. Max dose 400mg/day 120 tablet 5     ibuprofen (ADVIL/MOTRIN) 800 MG tablet Take 1 tablet (800 mg) by mouth every 8 hours as needed for moderate pain 60 tablet 0     montelukast (SINGULAIR) 10 MG tablet Take 1 tablet (10 mg) by mouth daily 90 tablet 3     mupirocin (BACTROBAN) 2 % external ointment Apply topically 3 times daily x7-10 days 30 g 1     naloxone (NARCAN) 4 MG/0.1ML nasal spray 1 spray (4 mg) intranasally into 1 nostril. Administer into alternating nostrils. May repeat every 2 to 3 minutes.       oxyCODONE-acetaminophen (PERCOCET) 7.5-325 MG per tablet TAKE 1 TABLET BY MOUTH EVERY 6 HOURS AS NEEDED FOR PAIN. USE DATES 12/26/23 TO 01/24/24       predniSONE (DELTASONE) 20 MG tablet Take two tablets (= 40mg) each day for 5 (five) days 10 tablet 0     sertraline (ZOLOFT) 100 MG tablet Take 2 tablets (200 mg) by mouth daily 180 tablet 3     tiZANidine (ZANAFLEX) 2 MG tablet TAKE 1 TO 2 TABLETS BY MOUTH THREE TIMES DAILY AS NEEDED. DO NOT TAKE WITH OTHER MUSCLE RELAXATION       traZODone (DESYREL) 150 MG tablet Take 1 tablet (150 mg) by mouth at bedtime 90 tablet 3     triamcinolone (KENALOG) 0.1 % external ointment Apply topically 2 times daily x5-10 days 30 g 1     VENTOLIN  (90 Base) MCG/ACT inhaler INHALE 2 PUFFS INTO THE LUNGS EVERY 4 HOURS AS NEEDED FOR SHORTNESS OF BREATH OR DIFFICULT BREATHING 18 g 1       Allergies   Allergen  "Reactions     Infliximab Hives     Sulfa Antibiotics Nausea     Pt states \"it doesn't work for me\"  Pt states \"it doesn't work for me\"  Other reaction(s): Diarrhea, nausea, Nausea, Intolerance  Pt states \"it doesn't work for me\"     Sulfacetamide Nausea     Amoxicillin Nausea and Vomiting     Amoxicillin-Pot Clavulanate Other (See Comments) and Nausea and Vomiting     Crohn's     Aspirin Nausea     Bupropion Hives and Nausea     Clavulanic Acid Nausea and Vomiting     Droperidol Other (See Comments)     Dystonic reaction     Ibuprofen Other (See Comments) and Nausea     Crohn's       Lyrica [Pregabalin] Nausea and Vomiting     Grogginess, severe back pain  Grogginess, severe back pain     Vicodin [Hydrocodone-Acetaminophen] Nausea and Vomiting     Adhesive Tape Rash       Review of Systems:   10 point ROS negative other than what was listed in HPI    Physical Exam:  /71   Pulse 74   Temp 97.3  F (36.3  C) (Tympanic)   Ht 1.575 m (5' 2.01\")   Wt 83.9 kg (185 lb)   LMP 07/23/2010 (LMP Unknown)   SpO2 99%   BMI 33.83 kg/m      Constitutional-tearful, well nourished, non-toxic  Eyes: Anicteric, no injection.  PERRL  ENT:  Normocephalic, atraumatic, Nose midline, moist mucus membranes  Neck - supple, no LAD  Respiratory- Nonlabored breathing on room air  Cardiovascular - Extremities warm and well perfused.  Abdomen - Soft, diffusely tender without guarding or generalized peritonitis. No appreciable ventral hernia recurrence.   Neuro - No focal neuro deficits, Alert and oriented x 3  Psych: Flat affect, tearful  Musculoskeletal: Normal gait, symmetric strength.  FROM upper and lower extremities.  Skin: Warm, Dry    CBC RESULTS:   Recent Labs   Lab Test 05/02/24  1607   WBC 10.8   RBC 4.44   HGB 15.0   HCT 44.1   MCV 99   MCH 33.8*   MCHC 34.0   RDW 14.6        Last Comprehensive Metabolic Panel:  Lab Results   Component Value Date     (L) 05/02/2024    POTASSIUM 4.3 05/02/2024    CHLORIDE 100 " 05/02/2024    CO2 22 05/02/2024    ANIONGAP 11 05/02/2024     (H) 05/02/2024    BUN 9.3 05/02/2024    CR 0.89 05/02/2024    GFRESTIMATED 81 05/02/2024    MAYCOL 9.4 05/02/2024       Liver Function Studies -   Recent Labs   Lab Test 05/02/24  1607   PROTTOTAL 8.5*   ALBUMIN 4.7   BILITOTAL 1.0   ALKPHOS 71   AST 48*   ALT 29     CT Abdomen/pelvis 4/25/24  CT ABDOMEN PELVIS W CONTRAST 4/25/2024 3:17 PM     CLINICAL HISTORY: umbilical pain ; h/o small bowel resection.     TECHNIQUE: CT scan of the abdomen and pelvis was performed following  injection of IV contrast. Multiplanar reformats were obtained. Dose  reduction techniques were used.  CONTRAST: 89 mL [id number]     COMPARISON: 3/15/2023     FINDINGS:   LOWER CHEST: Normal.     HEPATOBILIARY: Hepatic steatosis. Cholecystectomy. Stable prominence  of the intrahepatic and extra hepatic bile ducts, likely secondary to  postcholecystectomy reservoir effect.     PANCREAS: Normal.     SPLEEN: Normal.     ADRENAL GLANDS: Normal.     KIDNEYS/BLADDER: Normal.     BOWEL: Ileocecectomy with ileoascending stenosis. No small bowel or  colonic obstruction or changes.     PELVIC ORGANS: Hysterectomy.     ADDITIONAL FINDINGS: No free fluid or fluid collections. No free air.  Mild nonspecific fat stranding around the inferior mesenteric artery  without associated lymphadenopathy or abnormalities in the artery. No  umbilical hernia, mass or fluid collection.     MUSCULOSKELETAL: Unremarkable.                                                                      IMPRESSION:   1.  No acute findings in the abdomen and pelvis.  2.  Hepatic steatosis.    Assessment:  1. Ana Felix is a 43 year old female with complex past medical and surgical history, including Crohn's disease status post ileal cecal resection and primary anastomosis, cholecystectomy, hysterectomy, ventral hernia repair, who presents to surgery clinic for unexplained generalized abdominal pain.  Workup thus far with  cross-sectional imaging And blood work has been ultimately unrevealing with no clear surgical target at this time.  Discussed with the patient that, even though this is unlikely to be due to a Crohn's flare (which is what she told me her gastroenterologist told her) I recommend obtaining an upper endoscopy and colonoscopy as it has been 2 years since her last endoscopic evaluation and her symptoms otherwise have no explanation.  Will obtain blood and urine evaluation for porphyria as this may be a cause of otherwise unexplained abdominal pain.  I will also place a referral to a chronic pain specialist to evaluate for possible fibromyalgia as her symptoms appear to be at least partially musculoskeletal in nature as they are worse with movement.      I provided reassurance to the patient that our team will continue to help support her look for a diagnosis to the best of our abilities and provided reassurance that she will not be dismissed. Patient expressed understanding and is amenable to the proposed plan.     Plan:   1. Plan for diagnostic upper endoscopy and colonoscopy to further evaluate for etiology  of her abdominal pain  2. Obtain blood and urine studies for porphyrin and porphobillinogen  3. Referral to chronic pain specialist for evaluation for fibromyalgia        Robert Fiore MD on 5/6/2024 at 8:42 AM      Again, thank you for allowing me to participate in the care of your patient.        Sincerely,        Robert Fiore MD

## 2024-05-06 NOTE — H&P (VIEW-ONLY)
Surgical Consultation/History and Physical  [facility] General Surgery    Ana is seen in consultation for unexplained abdominal pain, at the request of Radha Bermudez PA-C.    Chief Complaint:  unexplained abdominal pain    History of Present Illness: Ana Felix is a 43 year old female presents with several weeks of worsening abdominal pain.  She does have a notable history of Crohn's disease status post ileocecal resection, cholecystectomy, hysterectomy, ventral hernia repair, and chronic back pain.  She states that for the past 2 weeks, she has noticed diffuse abdominal pain which is worse with movement.  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  She has had some nausea due to pain, but no emesis.  Has been having multiple bowel movements.  No fevers or chills, no dyspnea or chest pain.  No rashes she has noticed.  She has not noticed any focus swelling at her umbilicus where she previously had a hernia.  She states she feels very defeated and frustrated at not getting any answers to her symptoms and and has been feeling dismissed by prior providers.  She states she takes multiple pain medications for her chronic back pain and has been seeing a pain specialist for this particularly, but has not seen anyone with pain management team for her abdominal pain.  She states she takes Percocet, and some gabapentin but she has been trying to cut down on her gabapentin use.  She has been told that her symptoms are not related to a Crohn's flare due to her normal CRP and ESR, as well as lack of response to steroids which was administered in the emergency department.      Patient Active Problem List   Diagnosis    Crohns disease    Female pelvic pain    CARDIOVASCULAR SCREENING; LDL GOAL LESS THAN 160    Migraine headache    Back pain    Obesity    Disorder of sacrum    Displacement of lumbar intervertebral disc without myelopathy    Tobacco abuse    S/P  oophorectomy    History of cholecystectomy    History of resection of small bowel    Abdominal pain    Discogenic pain    Chronic low back pain    Mild persistent asthma, uncomplicated    Nausea with vomiting    Abdominal pain, right upper quadrant    Irritable bowel syndrome (IBS)    Lactose intolerance    Hearing difficulty    S/P LEEP of cervix    Controlled substance agreement signed    Degenerative lumbar disc    Dysmenorrhea    Labral tear of hip, degenerative    Pain medication agreement    Pain of right hip joint    Crohn's disease of both small and large intestine without complication (H)    History of gestational diabetes mellitus (GDM)    Amenorrhea    Immunocompromised state (H24)    Hidradenitis suppurativa    SKYE (stress urinary incontinence, female)    DANIELLE (generalized anxiety disorder)    Insomnia, unspecified type    Primary osteoarthritis of left knee    Closed nondisplaced fracture of head of right radius with routine healing, subsequent encounter    Prediabetes       Past Medical History:   Diagnosis Date    Back pain     Crohn's disease (H)     Exercise-induced asthma     Gestational diabetes     Herniation of intervertebral disc of lumbar region     Infertility     Ovarian cyst     Smoker     Status post cholecystectomy 2005       Past Surgical History:   Procedure Laterality Date    APPENDECTOMY      C/SECTION, LOW TRANSVERSE      , Low Transverse    CHOLECYSTECTOMY, LAPOROSCOPIC  2005    Cholecystectomy, Laparoscopic    COLONOSCOPY      ESOPHAGOSCOPY, GASTROSCOPY, DUODENOSCOPY (EGD), COMBINED  3/6/2014    Procedure: COMBINED ESOPHAGOSCOPY, GASTROSCOPY, DUODENOSCOPY (EGD), BIOPSY SINGLE OR MULTIPLE;  COLONOSCOPY/ UPPER GI ENDOSCOPY/ COLON/ EGD/ Abdominal pain, epigastric/ [facility];  Surgeon: West Lomas MD;  Location:  OR     HYSTEROSCOPY, SURGICAL; W/ ENDOMETRIAL ABLATION, ANY METHOD  2010    HERNIORRHAPHY VENTRAL N/A 2018    Procedure: Open ventral  hernia repair;  Surgeon: Alonso Bills MD;  Location: [facility]    HYSTERECTOMY, SUPRACERVICAL LAPAROSCOPIC  2010    LEEP TX, CERVICAL      LEEP TX Cervical    ORTHOPEDIC SURGERY      bluged disk    partial small bowel resection  2002    Ileo-colonic resection. Crohn's disease surgery for bowel obstruction. this was a section of small bowel and large bowel and the cecum., all removed and a reanastamosis done successfully    SALPINGO OOPHORECTOMY,R/L/TORI      Right ovary    SLING TRANSVAGINAL N/A 4/4/2022    Procedure: Pubovaginal sling with Altis;  Surgeon: Eleuterio Stone MD;  Location: [facility] OR    TUBAL LIGATION         Family History   Problem Relation Age of Onset    Hyperlipidemia Mother     Cancer Mother         cervical    Lipids Mother     Hypertension Father     Eye Disorder Father     Lipids Father     Alcohol/Drug Maternal Grandmother     Colon Cancer Paternal Grandmother     Diabetes Paternal Grandmother     Eye Disorder Paternal Grandmother     Diabetes Paternal Grandfather     Eye Disorder Paternal Grandfather     Inflammatory Bowel Disease Paternal Aunt         and two paternal uncles       Social History     Tobacco Use    Smoking status: Some Days     Current packs/day: 0.50     Types: Cigarettes     Passive exposure: Never    Smokeless tobacco: Never    Tobacco comments:     5 Cigarettes in the past two week   Substance Use Topics    Alcohol use: Yes     Comment: very rarely        History   Drug Use No       Current Outpatient Medications   Medication Sig Dispense Refill    azaTHIOprine (IMURAN) 50 MG tablet Take 50 mg by mouth daily       busPIRone (BUSPAR) 10 MG tablet Take 1 tablet (10 mg) by mouth 2 times daily 180 tablet 3    cloNIDine (CATAPRES) 0.1 MG tablet Take 1 tablet by mouth 2 times daily      cyanocobalamin (VITAMIN B12) 1000 MCG/ML injection Inject 1 mL into the muscle every 30 days      fentaNYL (DURAGESIC) 25 mcg/hr patch 72 hr Place 1 patch onto the skin every 72 hours       "gabapentin (NEURONTIN) 300 MG capsule Take 2 capsules (600 mg) by mouth 3 times daily 180 capsule 0    hydrOXYzine HCl (ATARAX) 25 MG tablet Take 0.5-2 tablets (12.5-50 mg) by mouth 3 times daily as needed for anxiety - May use 50-100mg at bedtime for sleep. Max dose 400mg/day 120 tablet 5    ibuprofen (ADVIL/MOTRIN) 800 MG tablet Take 1 tablet (800 mg) by mouth every 8 hours as needed for moderate pain 60 tablet 0    montelukast (SINGULAIR) 10 MG tablet Take 1 tablet (10 mg) by mouth daily 90 tablet 3    mupirocin (BACTROBAN) 2 % external ointment Apply topically 3 times daily x7-10 days 30 g 1    naloxone (NARCAN) 4 MG/0.1ML nasal spray 1 spray (4 mg) intranasally into 1 nostril. Administer into alternating nostrils. May repeat every 2 to 3 minutes.      oxyCODONE-acetaminophen (PERCOCET) 7.5-325 MG per tablet TAKE 1 TABLET BY MOUTH EVERY 6 HOURS AS NEEDED FOR PAIN. USE DATES 12/26/23 TO 01/24/24      predniSONE (DELTASONE) 20 MG tablet Take two tablets (= 40mg) each day for 5 (five) days 10 tablet 0    sertraline (ZOLOFT) 100 MG tablet Take 2 tablets (200 mg) by mouth daily 180 tablet 3    tiZANidine (ZANAFLEX) 2 MG tablet TAKE 1 TO 2 TABLETS BY MOUTH THREE TIMES DAILY AS NEEDED. DO NOT TAKE WITH OTHER MUSCLE RELAXATION      traZODone (DESYREL) 150 MG tablet Take 1 tablet (150 mg) by mouth at bedtime 90 tablet 3    triamcinolone (KENALOG) 0.1 % external ointment Apply topically 2 times daily x5-10 days 30 g 1    VENTOLIN  (90 Base) MCG/ACT inhaler INHALE 2 PUFFS INTO THE LUNGS EVERY 4 HOURS AS NEEDED FOR SHORTNESS OF BREATH OR DIFFICULT BREATHING 18 g 1       Allergies   Allergen Reactions    Infliximab Hives    Sulfa Antibiotics Nausea     Pt states \"it doesn't work for me\"  Pt states \"it doesn't work for me\"  Other reaction(s): Diarrhea, nausea, Nausea, Intolerance  Pt states \"it doesn't work for me\"    Sulfacetamide Nausea    Amoxicillin Nausea and Vomiting    Amoxicillin-Pot Clavulanate Other (See " "Comments) and Nausea and Vomiting     Crohn's    Aspirin Nausea    Bupropion Hives and Nausea    Clavulanic Acid Nausea and Vomiting    Droperidol Other (See Comments)     Dystonic reaction    Ibuprofen Other (See Comments) and Nausea     Crohn's      Lyrica [Pregabalin] Nausea and Vomiting     Grogginess, severe back pain  Grogginess, severe back pain    Vicodin [Hydrocodone-Acetaminophen] Nausea and Vomiting    Adhesive Tape Rash       Review of Systems:   10 point ROS negative other than what was listed in HPI    Physical Exam:  /71   Pulse 74   Temp 97.3  F (36.3  C) (Tympanic)   Ht 1.575 m (5' 2.01\")   Wt 83.9 kg (185 lb)   LMP 07/23/2010 (LMP Unknown)   SpO2 99%   BMI 33.83 kg/m      Constitutional-tearful, well nourished, non-toxic  Eyes: Anicteric, no injection.  PERRL  ENT:  Normocephalic, atraumatic, Nose midline, moist mucus membranes  Neck - supple, no LAD  Respiratory- Nonlabored breathing on room air  Cardiovascular - Extremities warm and well perfused.  Abdomen - Soft, diffusely tender without guarding or generalized peritonitis. No appreciable ventral hernia recurrence.   Neuro - No focal neuro deficits, Alert and oriented x 3  Psych: Flat affect, tearful  Musculoskeletal: Normal gait, symmetric strength.  FROM upper and lower extremities.  Skin: Warm, Dry    CBC RESULTS:   Recent Labs   Lab Test 05/02/24  1607   WBC 10.8   RBC 4.44   HGB 15.0   HCT 44.1   MCV 99   MCH 33.8*   MCHC 34.0   RDW 14.6        Last Comprehensive Metabolic Panel:  Lab Results   Component Value Date     (L) 05/02/2024    POTASSIUM 4.3 05/02/2024    CHLORIDE 100 05/02/2024    CO2 22 05/02/2024    ANIONGAP 11 05/02/2024     (H) 05/02/2024    BUN 9.3 05/02/2024    CR 0.89 05/02/2024    GFRESTIMATED 81 05/02/2024    MAYCOL 9.4 05/02/2024       Liver Function Studies -   Recent Labs   Lab Test 05/02/24  1607   PROTTOTAL 8.5*   ALBUMIN 4.7   BILITOTAL 1.0   ALKPHOS 71   AST 48*   ALT 29     CT " Abdomen/pelvis 4/25/24  CT ABDOMEN PELVIS W CONTRAST 4/25/2024 3:17 PM     CLINICAL HISTORY: umbilical pain ; h/o small bowel resection.     TECHNIQUE: CT scan of the abdomen and pelvis was performed following  injection of IV contrast. Multiplanar reformats were obtained. Dose  reduction techniques were used.  CONTRAST: 89 mL Isovue-370     COMPARISON: 3/15/2023     FINDINGS:   LOWER CHEST: Normal.     HEPATOBILIARY: Hepatic steatosis. Cholecystectomy. Stable prominence  of the intrahepatic and extra hepatic bile ducts, likely secondary to  postcholecystectomy reservoir effect.     PANCREAS: Normal.     SPLEEN: Normal.     ADRENAL GLANDS: Normal.     KIDNEYS/BLADDER: Normal.     BOWEL: Ileocecectomy with ileoascending stenosis. No small bowel or  colonic obstruction or changes.     PELVIC ORGANS: Hysterectomy.     ADDITIONAL FINDINGS: No free fluid or fluid collections. No free air.  Mild nonspecific fat stranding around the inferior mesenteric artery  without associated lymphadenopathy or abnormalities in the artery. No  umbilical hernia, mass or fluid collection.     MUSCULOSKELETAL: Unremarkable.                                                                      IMPRESSION:   1.  No acute findings in the abdomen and pelvis.  2.  Hepatic steatosis.    Assessment:  1. Ana Felix is a 43 year old female with complex past medical and surgical history, including Crohn's disease status post ileal cecal resection and primary anastomosis, cholecystectomy, hysterectomy, ventral hernia repair, who presents to surgery clinic for unexplained generalized abdominal pain.  Workup thus far with cross-sectional imaging And blood work has been ultimately unrevealing with no clear surgical target at this time.  Discussed with the patient that, even though this is unlikely to be due to a Crohn's flare (which is what she told me her gastroenterologist told her) I recommend obtaining an upper endoscopy and colonoscopy as it has  been 2 years since her last endoscopic evaluation and her symptoms otherwise have no explanation.  Will obtain blood and urine evaluation for porphyria as this may be a cause of otherwise unexplained abdominal pain.  I will also place a referral to a chronic pain specialist to evaluate for possible fibromyalgia as her symptoms appear to be at least partially musculoskeletal in nature as they are worse with movement.      I provided reassurance to the patient that our team will continue to help support her look for a diagnosis to the best of our abilities and provided reassurance that she will not be dismissed. Patient expressed understanding and is amenable to the proposed plan.     Plan:   1. Plan for diagnostic upper endoscopy and colonoscopy to further evaluate for etiology  of her abdominal pain  2. Obtain blood and urine studies for porphyrin and porphobillinogen  3. Referral to chronic pain specialist for evaluation for fibromyalgia        Robert Fiore MD on 5/6/2024 at 8:42 AM

## 2024-05-06 NOTE — PATIENT INSTRUCTIONS
Plan:   1. Plan for diagnostic upper endoscopy and colonoscopy to further evaluate for etiology  of her abdominal pain  2. Obtain blood and urine studies for porphyrin and porphobillinogen  3. Referral to chronic pain specialist for evaluation for fibromyalgia

## 2024-05-06 NOTE — RESULT ENCOUNTER NOTE
Isaak Perez,       Your recent test results are attached, if you have any questions or concerns please feel free to contact me via e-mail or call [phone].  Sodium Level is slightly low.  One Liver enzyme is just barely elevated I do not think this is significant.  I would recheck labs in about 1 month with your primary care provider.        It was a pleasure to see you at your recent office visit.      Sincerely,  Kaitlin Regalado PA-C

## 2024-05-07 ENCOUNTER — ANESTHESIA EVENT (OUTPATIENT)
Dept: GASTROENTEROLOGY | Facility: CLINIC | Age: 44
End: 2024-05-07
Payer: COMMERCIAL

## 2024-05-07 ASSESSMENT — LIFESTYLE VARIABLES: TOBACCO_USE: 1

## 2024-05-07 NOTE — ANESTHESIA PREPROCEDURE EVALUATION
"Anesthesia Pre-Procedure Evaluation    Patient: Ana Felix   MRN: [id number] : 1980        Procedure : Procedure(s):  Colonoscopy  Esophagoscopy, gastroscopy, duodenoscopy (EGD), combined          Past Medical History:   Diagnosis Date    Back pain     Crohn's disease (H)     Exercise-induced asthma     Gestational diabetes     Herniation of intervertebral disc of lumbar region     Infertility     Ovarian cyst     Smoker     Status post cholecystectomy 2005      Past Surgical History:   Procedure Laterality Date    APPENDECTOMY      C/SECTION, LOW TRANSVERSE      , Low Transverse    CHOLECYSTECTOMY, LAPOROSCOPIC  2005    Cholecystectomy, Laparoscopic    COLONOSCOPY      ESOPHAGOSCOPY, GASTROSCOPY, DUODENOSCOPY (EGD), COMBINED  3/6/2014    Procedure: COMBINED ESOPHAGOSCOPY, GASTROSCOPY, DUODENOSCOPY (EGD), BIOPSY SINGLE OR MULTIPLE;  COLONOSCOPY/ UPPER GI ENDOSCOPY/ COLON/ EGD/ Abdominal pain, epigastric/ [facility];  Surgeon: West Lomas MD;  Location: [facility] OR     HYSTEROSCOPY, SURGICAL; W/ ENDOMETRIAL ABLATION, ANY METHOD  2010    HERNIORRHAPHY VENTRAL N/A 2018    Procedure: Open ventral hernia repair;  Surgeon: Alonso Bills MD;  Location: [facility] OR    HYSTERECTOMY, SUPRACERVICAL LAPAROSCOPIC      LEEP TX, CERVICAL      LEEP TX Cervical    ORTHOPEDIC SURGERY      bluged disk    partial small bowel resection      Ileo-colonic resection. Crohn's disease surgery for bowel obstruction. this was a section of small bowel and large bowel and the cecum., all removed and a reanastamosis done successfully    SALPINGO OOPHORECTOMY,R/L/TORI      Right ovary    SLING TRANSVAGINAL N/A 2022    Procedure: Pubovaginal sling with Altis;  Surgeon: Eleuterio Stone MD;  Location: [facility] OR    TUBAL LIGATION        Allergies   Allergen Reactions    Infliximab Hives    Sulfa Antibiotics Nausea     Pt states \"it doesn't work for me\"  Pt states \"it doesn't work for me\"  Other " "reaction(s): Diarrhea, nausea, Nausea, Intolerance  Pt states \"it doesn't work for me\"    Sulfacetamide Nausea    Amoxicillin Nausea and Vomiting    Amoxicillin-Pot Clavulanate Other (See Comments) and Nausea and Vomiting     Crohn's    Aspirin Nausea    Bupropion Hives and Nausea    Clavulanic Acid Nausea and Vomiting    Droperidol Other (See Comments)     Dystonic reaction    Ibuprofen Other (See Comments) and Nausea     Crohn's      Lyrica [Pregabalin] Nausea and Vomiting     Grogginess, severe back pain  Grogginess, severe back pain    Vicodin [Hydrocodone-Acetaminophen] Nausea and Vomiting    Adhesive Tape Rash      Social History     Tobacco Use    Smoking status: Some Days     Current packs/day: 0.50     Types: Cigarettes     Passive exposure: Never    Smokeless tobacco: Never    Tobacco comments:     5 Cigarettes in the past two week   Substance Use Topics    Alcohol use: Yes     Comment: very rarely      Wt Readings from Last 1 Encounters:   05/06/24 83.9 kg (185 lb)        Anesthesia Evaluation   Pt has had prior anesthetic. Type: General and MAC.        ROS/MED HX  ENT/Pulmonary:     (+)                tobacco use, Current use,     asthma (exercise induced)                  Neurologic:     (+)      migraines,                          Cardiovascular:       METS/Exercise Tolerance:     Hematologic:       Musculoskeletal: Comment: Degenerative lumbar disc  Discogenic pain  Disorder of sacrum  Displacement of lumbar intervertebral disc without myelopathy          GI/Hepatic: Comment: Crohn's disease    S/p cholecystectomy & appendectomy    S/p resection of small bowel    (+)        bowel prep,            Renal/Genitourinary:       Endo:     (+)               Obesity,       Psychiatric/Substance Use:     (+) psychiatric history anxiety and other (comment)  H/O chronic opiod use .     Infectious Disease:       Malignancy:       Other:      (+)  , H/O Chronic Pain,         Physical Exam    Airway      "   Mallampati: I   TM distance: > 3 FB   Neck ROM: full   Mouth opening: > 3 cm    Respiratory Devices and Support         Dental       (+) Minor Abnormalities - some fillings, tiny chips      Cardiovascular   cardiovascular exam normal          Pulmonary                   OUTSIDE LABS:  CBC:   Lab Results   Component Value Date    WBC 10.8 05/02/2024    WBC 6.7 04/29/2024    HGB 15.0 05/02/2024    HGB 14.0 04/29/2024    HCT 44.1 05/02/2024    HCT 39.3 04/29/2024     05/02/2024     04/29/2024     BMP:   Lab Results   Component Value Date     (L) 05/02/2024     04/29/2024    POTASSIUM 4.3 05/02/2024    POTASSIUM 4.1 04/29/2024    CHLORIDE 100 05/02/2024    CHLORIDE 104 04/29/2024    CO2 22 05/02/2024    CO2 23 04/29/2024    BUN 9.3 05/02/2024    BUN 9.7 04/29/2024    CR 0.89 05/02/2024    CR 0.83 04/29/2024     (H) 05/02/2024     (H) 04/29/2024     COAGS:   Lab Results   Component Value Date    PTT 31 03/17/2010    INR 0.99 03/17/2010     POC:   Lab Results   Component Value Date    HCG Negative 01/29/2014     HEPATIC:   Lab Results   Component Value Date    ALBUMIN 4.7 05/02/2024    PROTTOTAL 8.5 (H) 05/02/2024    ALT 29 05/02/2024    AST 48 (H) 05/02/2024    GGT 59 (H) 03/07/2014    ALKPHOS 71 05/02/2024    BILITOTAL 1.0 05/02/2024    BILIDIRECT 0.17 10/19/2011     OTHER:   Lab Results   Component Value Date    LACT 0.8 01/26/2017    A1C 5.7 (H) 05/02/2024    MAYCOL 9.4 05/02/2024    LIPASE 35 04/29/2024    AMYLASE 58 03/17/2010    TSH 2.87 08/15/2018    T4 1.02 08/15/2018    CRP 13.2 (H) 01/31/2014    SED 11 04/29/2024       Anesthesia Plan    ASA Status:  3       Anesthesia Type: General.              Consents    Anesthesia Plan(s) and associated risks, benefits, and realistic alternatives discussed. Questions answered and patient/representative(s) expressed understanding.     - Discussed: Risks, Benefits and Alternatives for BOTH SEDATION and the PROCEDURE were discussed     -  "Discussed with:  Patient            Postoperative Care    Pain management: Multi-modal analgesia.   PONV prophylaxis: Background Propofol Infusion     Comments:               CARI Cervantes CRNA    I have reviewed the pertinent notes and labs in the chart from the past 30 days and (re)examined the patient.  Any updates or changes from those notes are reflected in this note.     # Hyponatremia: Lowest Na = 133 mmol/L in last 30 days, will monitor as appropriate          # Obesity: Estimated body mass index is 33.83 kg/m  as calculated from the following:    Height as of 5/6/24: 1.575 m (5' 2.01\").    Weight as of 5/6/24: 83.9 kg (185 lb).      "

## 2024-05-09 ENCOUNTER — HOSPITAL ENCOUNTER (OUTPATIENT)
Facility: CLINIC | Age: 44
Discharge: HOME | End: 2024-05-09
Attending: STUDENT IN AN ORGANIZED HEALTH CARE EDUCATION/TRAINING PROGRAM | Admitting: STUDENT IN AN ORGANIZED HEALTH CARE EDUCATION/TRAINING PROGRAM
Payer: COMMERCIAL

## 2024-05-09 ENCOUNTER — ANESTHESIA (OUTPATIENT)
Dept: GASTROENTEROLOGY | Facility: CLINIC | Age: 44
End: 2024-05-09
Payer: COMMERCIAL

## 2024-05-09 VITALS
HEART RATE: 81 BPM | TEMPERATURE: 97.9 F | SYSTOLIC BLOOD PRESSURE: 129 MMHG | DIASTOLIC BLOOD PRESSURE: 89 MMHG | OXYGEN SATURATION: 100 % | RESPIRATION RATE: 16 BRPM

## 2024-05-09 LAB
COLONOSCOPY: NORMAL
UPPER GI ENDOSCOPY: NORMAL

## 2024-05-09 PROCEDURE — 45378 DIAGNOSTIC COLONOSCOPY: CPT | Performed by: STUDENT IN AN ORGANIZED HEALTH CARE EDUCATION/TRAINING PROGRAM

## 2024-05-09 PROCEDURE — 88305 TISSUE EXAM BY PATHOLOGIST: CPT | Mod: 26 | Performed by: PATHOLOGY

## 2024-05-09 PROCEDURE — 258N000003 HC RX IP 258 OP 636: Performed by: STUDENT IN AN ORGANIZED HEALTH CARE EDUCATION/TRAINING PROGRAM

## 2024-05-09 PROCEDURE — 370N000017 HC ANESTHESIA TECHNICAL FEE, PER MIN: Performed by: STUDENT IN AN ORGANIZED HEALTH CARE EDUCATION/TRAINING PROGRAM

## 2024-05-09 PROCEDURE — 43239 EGD BIOPSY SINGLE/MULTIPLE: CPT | Performed by: STUDENT IN AN ORGANIZED HEALTH CARE EDUCATION/TRAINING PROGRAM

## 2024-05-09 PROCEDURE — 250N000011 HC RX IP 250 OP 636: Performed by: NURSE ANESTHETIST, CERTIFIED REGISTERED

## 2024-05-09 PROCEDURE — 250N000009 HC RX 250: Performed by: NURSE ANESTHETIST, CERTIFIED REGISTERED

## 2024-05-09 PROCEDURE — 88305 TISSUE EXAM BY PATHOLOGIST: CPT | Mod: TC | Performed by: STUDENT IN AN ORGANIZED HEALTH CARE EDUCATION/TRAINING PROGRAM

## 2024-05-09 RX ORDER — NALOXONE HYDROCHLORIDE 0.4 MG/ML
0.2 INJECTION, SOLUTION INTRAMUSCULAR; INTRAVENOUS; SUBCUTANEOUS
Status: DISCONTINUED | OUTPATIENT
Start: 2024-05-09 | End: 2024-05-09 | Stop reason: HOSPADM

## 2024-05-09 RX ORDER — FLUMAZENIL 0.1 MG/ML
0.2 INJECTION, SOLUTION INTRAVENOUS
Status: DISCONTINUED | OUTPATIENT
Start: 2024-05-09 | End: 2024-05-09 | Stop reason: HOSPADM

## 2024-05-09 RX ORDER — GLYCOPYRROLATE 0.2 MG/ML
INJECTION, SOLUTION INTRAMUSCULAR; INTRAVENOUS PRN
Status: DISCONTINUED | OUTPATIENT
Start: 2024-05-09 | End: 2024-05-09

## 2024-05-09 RX ORDER — PROPOFOL 10 MG/ML
INJECTION, EMULSION INTRAVENOUS PRN
Status: DISCONTINUED | OUTPATIENT
Start: 2024-05-09 | End: 2024-05-09

## 2024-05-09 RX ORDER — LIDOCAINE 40 MG/G
CREAM TOPICAL
Status: DISCONTINUED | OUTPATIENT
Start: 2024-05-09 | End: 2024-05-09 | Stop reason: HOSPADM

## 2024-05-09 RX ORDER — NALOXONE HYDROCHLORIDE 0.4 MG/ML
0.4 INJECTION, SOLUTION INTRAMUSCULAR; INTRAVENOUS; SUBCUTANEOUS
Status: DISCONTINUED | OUTPATIENT
Start: 2024-05-09 | End: 2024-05-09 | Stop reason: HOSPADM

## 2024-05-09 RX ORDER — KETAMINE HYDROCHLORIDE 10 MG/ML
INJECTION INTRAMUSCULAR; INTRAVENOUS PRN
Status: DISCONTINUED | OUTPATIENT
Start: 2024-05-09 | End: 2024-05-09

## 2024-05-09 RX ORDER — SODIUM CHLORIDE, SODIUM LACTATE, POTASSIUM CHLORIDE, CALCIUM CHLORIDE 600; 310; 30; 20 MG/100ML; MG/100ML; MG/100ML; MG/100ML
INJECTION, SOLUTION INTRAVENOUS CONTINUOUS
Status: DISCONTINUED | OUTPATIENT
Start: 2024-05-09 | End: 2024-05-09 | Stop reason: HOSPADM

## 2024-05-09 RX ADMIN — PROPOFOL 100 MG: 10 INJECTION, EMULSION INTRAVENOUS at 14:13

## 2024-05-09 RX ADMIN — PROPOFOL 50 MG: 10 INJECTION, EMULSION INTRAVENOUS at 14:11

## 2024-05-09 RX ADMIN — KETAMINE HYDROCHLORIDE 50 MG: 10 INJECTION INTRAMUSCULAR; INTRAVENOUS at 14:11

## 2024-05-09 RX ADMIN — PROPOFOL 100 MG: 10 INJECTION, EMULSION INTRAVENOUS at 14:15

## 2024-05-09 RX ADMIN — PROPOFOL 100 MG: 10 INJECTION, EMULSION INTRAVENOUS at 14:35

## 2024-05-09 RX ADMIN — PROPOFOL 100 MG: 10 INJECTION, EMULSION INTRAVENOUS at 14:25

## 2024-05-09 RX ADMIN — PROPOFOL 50 MG: 10 INJECTION, EMULSION INTRAVENOUS at 14:36

## 2024-05-09 RX ADMIN — SODIUM CHLORIDE, POTASSIUM CHLORIDE, SODIUM LACTATE AND CALCIUM CHLORIDE: 600; 310; 30; 20 INJECTION, SOLUTION INTRAVENOUS at 12:59

## 2024-05-09 RX ADMIN — PROPOFOL 100 MG: 10 INJECTION, EMULSION INTRAVENOUS at 14:20

## 2024-05-09 RX ADMIN — PROPOFOL 50 MG: 10 INJECTION, EMULSION INTRAVENOUS at 14:12

## 2024-05-09 RX ADMIN — GLYCOPYRROLATE 0.2 MG: 0.2 INJECTION, SOLUTION INTRAMUSCULAR; INTRAVENOUS at 14:09

## 2024-05-09 RX ADMIN — PROPOFOL 100 MG: 10 INJECTION, EMULSION INTRAVENOUS at 14:30

## 2024-05-09 ASSESSMENT — ACTIVITIES OF DAILY LIVING (ADL)
ADLS_ACUITY_SCORE: 36

## 2024-05-09 NOTE — LETTER
Ana Felix  [address]  [place] [address]  May 13, 2024    Dear Ana,   This letter is to inform you of the results of your pathology report on your upper endoscopy (EGD).    Your pathology report was:  Showed findings consistent with a mildly inflamed stomach. If you continue to have symptoms please follow up with your primary care doctor or with myself.  In addition, this letter is written to inform you of the results of your recent colonoscopy.  Your examination showed no abnormalities.       Given these findings, as well as your personal history of Crohn's disease, I recommend that you undergo a repeat colonoscopy in 3 year(s) for surveillance. We will enter you into a recall system so you receive a reminder closer to the time that you are due for repeat examination. Your physician also recommends that you adhere to a high fiber diet indefinitely to promote colon health.     Please remember that this recommendation is made with the understanding that you are not experiencing persistent changes in bowel function, bleeding per rectum, and/or significant abdominal pain. If you experience these symptoms, please contact your primary care provider for a further evaluation.     If you have any questions or concerns about the results of your colonoscopy or the appropriate follow-up, please contact my assistant at [phone]    Sincerely,      Robert Fiore MD   [facility]  ___                        Resulted Orders   Surgical Pathology Exam   Result Value Ref Range    Case Report       Surgical Pathology Report                         Case: [id number]                                  Authorizing Provider:  Robert Fiore MD       Collected:           05/09/2024 02:22 PM          Ordering Location:     [facility]   Received:            05/09/2024 02:49 PM                                 [place]                                                                      Pathologist:            Lloyd Alvarez MD                                                                           Specimen:    Stomach, Antrum                                                                            Final Diagnosis       Stomach, antrum, biopsy:  - Reactive gastropathy; no prominent inflammation.  - Negative for dysplasia and malignancy.  - Negative for Helicobacter pylori on H&E examination.       Clinical Information       Procedure:  Colonoscopy  ESOPHAGOGASTRODUODENOSCOPY, WITH BIOPSY  Pre-op Diagnosis: Generalized abdominal pain [R10.84]  Post-op Diagnosis: R10.84 - Generalized abdominal pain [ICD-10-CM]      Gross Description       A(1). Stomach, Antrum, :  The specimen is received in formalin, labeled with the patient's name, medical record number and other identifying information and designated  stomach, antrum . It consists of 2 tan soft tissue fragments, 0.1 and 0.3.  Entirely submitted in 1 cassette. (TORSTEN Valdez) 5/10/2024 8:10 AM       Microscopic Description       A microscopic examination is performed.       Performing Labs       The technical component of this testing was completed at [facility].    Stain controls for all stains resulted within this report have been reviewed and show appropriate reactivity.       Case Images

## 2024-05-09 NOTE — ANESTHESIA POSTPROCEDURE EVALUATION
Patient: Ana Felix    Procedure: Procedure(s):  Colonoscopy  ESOPHAGOGASTRODUODENOSCOPY, WITH BIOPSY       Anesthesia Type:  General    Note:  Disposition: Outpatient   Postop Pain Control: Uneventful            Sign Out: Well controlled pain   PONV: No   Neuro/Psych: Uneventful            Sign Out: Acceptable/Baseline neuro status   Airway/Respiratory: Uneventful            Sign Out: Acceptable/Baseline resp. status   CV/Hemodynamics: Uneventful            Sign Out: Acceptable CV status; No obvious hypovolemia; No obvious fluid overload   Other NRE: NONE   DID A NON-ROUTINE EVENT OCCUR? No           Last vitals:  Vitals:    05/09/24 1231   BP: (!) (P) 164/84   Pulse: (P) 71   Resp: (P) 18   Temp: (P) 36.6  C (97.9  F)   SpO2: (P) 99%       Electronically Signed By: CARI Vargas CRNA  May 9, 2024  2:57 PM

## 2024-05-09 NOTE — ANESTHESIA CARE TRANSFER NOTE
Patient: Ana Felix    Procedure: Procedure(s):  Colonoscopy  ESOPHAGOGASTRODUODENOSCOPY, WITH BIOPSY       Diagnosis: Generalized abdominal pain [R10.84]  Diagnosis Additional Information: No value filed.    Anesthesia Type:   General     Note:    Oropharynx: oropharynx clear of all foreign objects and spontaneously breathing  Level of Consciousness: drowsy  Oxygen Supplementation: room air    Independent Airway: airway patency satisfactory and stable  Dentition: dentition unchanged  Vital Signs Stable: post-procedure vital signs reviewed and stable  Report to RN Given: handoff report given  Patient transferred to: Phase II    Handoff Report: Identifed the Patient, Identified the Reponsible Provider, Reviewed the pertinent medical history, Discussed the surgical course, Reviewed Intra-OP anesthesia mangement and issues during anesthesia, Set expectations for post-procedure period and Allowed opportunity for questions and acknowledgement of understanding    Vitals:  Vitals Value Taken Time   BP     Temp     Pulse     Resp     SpO2         Electronically Signed By: CARI Vargas CRNA  May 9, 2024  2:57 PM

## 2024-05-09 NOTE — INTERVAL H&P NOTE
"I have reviewed the surgical (or preoperative) H&P that is linked to this encounter, and examined the patient. There are no significant changes    Clinical Conditions Present on Arrival:  Clinically Significant Risk Factors Present on Admission         # Hyponatremia: Lowest Na = 133 mmol/L in last 30 days, will monitor as appropriate          # Obesity: Estimated body mass index is 33.84 kg/m  (pended) as calculated from the following:    Height as of this encounter: (P) 1.575 m (5' 2\").    Weight as of this encounter: (P) 83.9 kg (185 lb).       "

## 2024-05-13 ENCOUNTER — OFFICE VISIT (OUTPATIENT)
Dept: FAMILY MEDICINE | Facility: CLINIC | Age: 44
End: 2024-05-13
Payer: COMMERCIAL

## 2024-05-13 VITALS
RESPIRATION RATE: 18 BRPM | SYSTOLIC BLOOD PRESSURE: 118 MMHG | HEIGHT: 63 IN | DIASTOLIC BLOOD PRESSURE: 72 MMHG | OXYGEN SATURATION: 100 % | HEART RATE: 83 BPM | BODY MASS INDEX: 32.53 KG/M2 | WEIGHT: 183.6 LBS | TEMPERATURE: 97.5 F

## 2024-05-13 DIAGNOSIS — R10.33 PERIUMBILICAL ABDOMINAL PAIN: Primary | ICD-10-CM

## 2024-05-13 PROCEDURE — 99213 OFFICE O/P EST LOW 20 MIN: CPT | Performed by: PHYSICIAN ASSISTANT

## 2024-05-13 ASSESSMENT — PAIN SCALES - GENERAL: PAINLEVEL: NO PAIN (0)

## 2024-05-13 NOTE — PATIENT INSTRUCTIONS
Darvin Perez,    Thank you for allowing [facility] to manage your care.    I am unsure of the cause of your symptoms, but your exam is reassuring.     If you develop worsening/changing symptoms at any time, please be seen in clinic/urgent care or call 911/go to the emergency department for evaluation.    If you have any questions or concerns, please feel free to call us at [phone]    Sincerely,    Henry Gonzalez PA-C    Did you know?      You can schedule a video visit for follow-up appointments as well as future appointments for certain conditions.  Please see the below link.     [email or web address]    If you have not already done so,  I encourage you to sign up for Vidient[facility] ([email or web address]  This will allow you to review your results, securely communicate with a provider, and schedule virtual visits as well.

## 2024-05-13 NOTE — PROGRESS NOTES
"  Assessment & Plan   Problem List Items Addressed This Visit          Nervous and Auditory    Abdominal pain - Primary      Improving greatly and will continue to monitor symptoms and follow up prn. Will await specific recs from GI. Ok to return to work.     Complete history and physical exam as below. Afebrile with normal vital signs.    DDx and Dx discussed with and explained to the pt to their satisfaction.  All questions were answered at this time. Pt expressed understanding of and agreement with this dx, tx, and plan. No further workup warranted and standard medication warnings given. I have given the patient a list of pertinent indications for re-evaluation. Will go to the Emergency Department if symptoms worsen or new concerning symptoms arise. Patient left in no apparent distress.      25 minutes spent by me on the date of the encounter doing chart review, history and exam, documentation and further activities per the note   MED REC REQUIREDPost Medication Reconciliation Status: discharge medications reconciled, continue medications without change  BMI  Estimated body mass index is 32.69 kg/m  as calculated from the following:    Height as of this encounter: 1.596 m (5' 2.84\").    Weight as of this encounter: 83.3 kg (183 lb 9.6 oz).     See Patient Instructions      Chris Perez is a 43 year old, presenting for the following health issues:  ER F/U        5/13/2024     2:04 PM   Additional Questions   Roomed by Jericho Gonzalez CMA   Accompanied by N/A         5/13/2024   Forms   Any forms needing to be completed Yes         5/13/2024     2:04 PM   Patient Reported Additional Medications   Patient reports taking the following new medications No new medications     HPI   ED/UC Followup:    Facility:  [facility]  Date of visit: 04/29/2024  Reason for visit: Abdominal Pain  Current Status: Getting better.    She had an EGD and colonoscopy on Thursday. Continues to have umbilical to right lateral abdominal " "pain, but this is mild compared to intense abdominal pain she had 2 weeks ago. Began feeling significantly better 2 days ago. EGD tissue biopsy returned with gastropathy but no malignancy or H pylori.  Lifts heavy student for work starting ~ 4 weeks ago and pain started ~2 weeks ago. Pain with lifting. Hoping to get back to work in one week as she continue to have mild pain.      Review of Systems  Constitutional, HEENT, cardiovascular, pulmonary, gi and gu systems are negative, except as otherwise noted.      Objective    /72   Pulse 83   Temp 97.5  F (36.4  C) (Temporal)   Resp 18   Ht 1.596 m (5' 2.84\")   Wt 83.3 kg (183 lb 9.6 oz)   LMP 07/23/2010 (LMP Unknown)   SpO2 100%   BMI 32.69 kg/m    Body mass index is 32.69 kg/m .  Physical Exam  Vitals and nursing note reviewed.   Constitutional:       General: She is not in acute distress.     Appearance: She is not ill-appearing or diaphoretic.   HENT:      Head: Normocephalic and atraumatic.      Mouth/Throat:      Mouth: Mucous membranes are moist.   Eyes:      Conjunctiva/sclera: Conjunctivae normal.   Cardiovascular:      Rate and Rhythm: Normal rate and regular rhythm.      Heart sounds: Normal heart sounds. No murmur heard.     No friction rub. No gallop.   Pulmonary:      Effort: Pulmonary effort is normal. No respiratory distress.      Breath sounds: Normal breath sounds. No stridor. No wheezing, rhonchi or rales.   Abdominal:      General: Bowel sounds are normal. There is no distension.      Palpations: Abdomen is soft. There is no mass.      Tenderness: There is no abdominal tenderness. There is no right CVA tenderness, left CVA tenderness, guarding or rebound.      Hernia: No hernia is present.   Skin:     General: Skin is warm and dry.   Neurological:      General: No focal deficit present.      Mental Status: She is alert. Mental status is at baseline.   Psychiatric:         Mood and Affect: Mood normal.         Behavior: Behavior normal. "        Signed Electronically by: TORSTEN Quinn

## 2024-05-14 ENCOUNTER — OFFICE VISIT (OUTPATIENT)
Dept: AUDIOLOGY | Facility: CLINIC | Age: 44
End: 2024-05-14
Payer: COMMERCIAL

## 2024-05-14 DIAGNOSIS — H90.5 SENSORINEURAL HEARING LOSS OF RIGHT EAR: Primary | ICD-10-CM

## 2024-05-14 DIAGNOSIS — H90.72 MIXED HEARING LOSS OF LEFT EAR: ICD-10-CM

## 2024-05-14 PROCEDURE — 92550 TYMPANOMETRY & REFLEX THRESH: CPT | Performed by: AUDIOLOGIST

## 2024-05-14 PROCEDURE — 92557 COMPREHENSIVE HEARING TEST: CPT | Performed by: AUDIOLOGIST

## 2024-05-15 NOTE — PROGRESS NOTES
AUDIOLOGY REPORT    SUBJECTIVE:  Ana Felix is a 43 year old female who was seen in the Audiology Clinic at the [facility] for audiologic evaluation. The patient has been seen at [facility] on 8/21/2001 and results indicated a mild high frequency hearing loss bilaterally. The patient reports a decrease in her hearing. She notes a family history of hearing loss with her father and siblings. She notes chronic ear infections with 2 sets of PE tubes. The patient denies  bilateral tinnitus, bilateral otalgia, bilateral drainage, and history of noise exposure.  The patient notes difficulty with communication in a variety of listening situations.  They were accompanied today by their self.    OBJECTIVE:  Abuse Screening:  Do you feel unsafe at home or work/school? No  Do you feel threatened by someone? No  Does anyone try to keep you from having contact with others, or doing things outside of your home? No  Physical signs of abuse present? No     Fall Risk Screen:  1. Have you fallen two or more times in the past year? No  2. Have you fallen and had an injury in the past year? No      Otoscopic exam indicates ears are clear of cerumen bilaterally     Pure Tone Thresholds assessed using conventional audiometry with good  reliability from 250-8000 Hz bilaterally using insert earphones and circumaural headphones     RIGHT:  normal sloping to mild, moderate, and severe sensorineural hearing loss    LEFT:    mild sloping to moderate and severe mixed hearing loss    Tympanogram:    RIGHT: restricted eardrum mobility (Type As)    LEFT:   restricted eardrum mobility (Type As)     Reflexes (reported by stimulus ear):  RIGHT: Ipsilateral is absent at frequencies tested  RIGHT: Contralateral is absent at frequencies tested  LEFT:   Ipsilateral is absent at frequencies tested  LEFT:   Contralateral is absent at frequencies tested      Speech Reception Threshold:    RIGHT: 20 dB HL    LEFT:   30 dB  HL  Word Recognition Score:     RIGHT: 76% at 60 dB HL using NU-6 recorded word list.    LEFT:   80% at 70 dB HL using NU-6 recorded word list.      ASSESSMENT:     ICD-10-CM    1. Sensorineural hearing loss of right ear  H90.5       2. Mixed hearing loss of left ear  H90.72           Today s results were discussed with the patient in detail. A copy of the audiogram and hearing aid medical clearance form was given to the patient.     PLAN:  Patient was counseled regarding hearing loss and impact on communication.  Patient is a good candidate for amplification at this time. Handout on good communication strategies, and hearing aid use was given to patient. It is recommended that the patient be seen by an ENT for medical evaluation of her ears and hearing loss. She is also advised to check with her insurance on possible hearing aid benefits or discounts.  Please call this clinic with questions regarding these results or recommendations.        Sherly MERRILL, #[id number]

## 2024-05-29 ENCOUNTER — OFFICE VISIT (OUTPATIENT)
Dept: FAMILY MEDICINE | Facility: CLINIC | Age: 44
End: 2024-05-29
Payer: COMMERCIAL

## 2024-05-29 VITALS
HEART RATE: 71 BPM | OXYGEN SATURATION: 100 % | BODY MASS INDEX: 33.84 KG/M2 | RESPIRATION RATE: 16 BRPM | TEMPERATURE: 97.1 F | WEIGHT: 191 LBS | SYSTOLIC BLOOD PRESSURE: 106 MMHG | HEIGHT: 63 IN | DIASTOLIC BLOOD PRESSURE: 68 MMHG

## 2024-05-29 DIAGNOSIS — R10.9 ABDOMINAL WALL PAIN: Primary | ICD-10-CM

## 2024-05-29 DIAGNOSIS — K50.819 CROHN'S DISEASE OF SMALL AND LARGE INTESTINES WITH COMPLICATION (H): ICD-10-CM

## 2024-05-29 DIAGNOSIS — K31.9 GASTROPATHY: ICD-10-CM

## 2024-05-29 DIAGNOSIS — M51.369 DEGENERATIVE LUMBAR DISC: ICD-10-CM

## 2024-05-29 PROCEDURE — 99214 OFFICE O/P EST MOD 30 MIN: CPT | Performed by: PHYSICIAN ASSISTANT

## 2024-05-29 RX ORDER — OMEPRAZOLE 40 MG/1
40 CAPSULE, DELAYED RELEASE ORAL DAILY
Qty: 30 CAPSULE | Refills: 0 | Status: SHIPPED | OUTPATIENT
Start: 2024-05-29 | End: 2024-09-17

## 2024-05-29 ASSESSMENT — PAIN SCALES - GENERAL: PAINLEVEL: MILD PAIN (3)

## 2024-05-29 NOTE — LETTER
[facility] [place]  [address]  [place] [address]  Phone: [phone]    May 29, 2024        Ana Felix  [address]  [place] [address]          To whom it may concern:    RE: Ana Felix    Patient was seen and treated today at our clinic and missed work 5/28/24 - 5/29/24 due to medical reasons. She may return to work 5/30/2024 with the following restrictions:    No lifting over 20 lbs.     Restrictions effective through June 13th, 2024.      Please contact me for questions or concerns.      Sincerely,      Arlen Toro PA-C

## 2024-05-29 NOTE — LETTER
[facility] [place]  [address]  [place] [address]  Phone: [phone]    May 29, 2024        Ana Felix  [address]  [place] [address]          To whom it may concern:    RE: nAa Felix    Patient was seen and treated today at our clinic and missed work. She may return to work 5/3024 with the following restrictions:    No lifting over 20 lbs.     Restrictions effective through June 13th, 2024.      Please contact me for questions or concerns.      Sincerely,      Arlen Toro PA-C

## 2024-05-29 NOTE — PROGRESS NOTES
"  Assessment & Plan     (R10.9) Abdominal wall pain  (primary encounter diagnosis)  Comment: This pain is most consistent with an abdominal muscle pain/core weakness +/- acid/gastritis.  I suggest physical therapy and lifting restriction for work.    Unlikely GI given description of symptoms and she is s/p cholecystectomy.  She is to f/up with [facility] if symptoms do not improve as expected with physical therapy and time.   Plan: Physical Therapy  Referral            (K31.9) Gastropathy  Comment: reviewed EGD and gastropathy noted.  Will start omeprazole for now.   Plan: omeprazole (PRILOSEC) 40 MG DR capsule            (K50.819) Crohn's disease of small and large intestines with complication (H)  Comment: managed by GI, no current symptoms to suggest flare up  Plan:     (M51.36) Degenerative lumbar disc  Comment: has h/o back disease.  Will start physical therapy.   Plan:             BMI  Estimated body mass index is 34.01 kg/m  as calculated from the following:    Height as of this encounter: 1.596 m (5' 2.84\").    Weight as of this encounter: 86.6 kg (191 lb).         F/up with PCP in 1-2 months for a recheck if symptoms do not improve.     Chris Perez is a 43 year old, presenting for the following health issues:  Abdominal Pain        5/29/2024     7:27 AM   Additional Questions   Roomed by Marry   Accompanied by Self         5/29/2024     7:27 AM   Patient Reported Additional Medications   Patient reports taking the following new medications na     Patient with Crohn's arrived to follow-up with ongoing Abdominal Pains.     History of Present Illness       Reason for visit:  Abdominal pain-most went [facility] but coming back    She eats 2-3 servings of fruits and vegetables daily.She consumes 3 sweetened beverage(s) daily.She exercises with enough effort to increase her heart rate 30 to 60 minutes per day.  She exercises with enough effort to increase her heart rate 4 days per week.   She is taking " medications regularly.     Pain History:  When did you first notice your pain?    Have you seen anyone else for your pain? Yes   How has your pain affected your ability to work? 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 Abdominal  Onset/Duration: End of April 2024  Description:   Character: Sharp/stabbing   Location: right upper quadrant/periumbilical  Radiation: Shooting out to under right-sided Rib Cage   Intensity: moderate to severe  Progression of Symptoms:  Returning   Accompanying Signs & Symptoms:  Fever/Chills: No  Gas/Bloating: No  Nausea: Not nausea but per pt 'odd gag reflux'   Vomitting: No  Diarrhea: No  Constipation: YES  Dysuria or Hematuria: No  History:   Trauma: No  Previous similar pain: YES  Previous tests done: CT, Colonoscopy, and Endoscopy  Precipitating factors:   Does the pain change with:     Food: No    Bowel Movement: No    Urination: No   Other factors:  Movement, bending over  Therapies tried and outcome: None        Pain did improve after the colonoscopy and EGD.  She took a week off of work after the scopes and symptoms were improving.   She was out gardening and sitting at a dance competition for many hours and feels the pain has returned.  Last week worked 2 days (missed yesterday and today due to pain).  She hesitates to go back to work as pain worsens when she does any heavy lifting or excessive body twists.    Pain level is back to where it was before (at rest she feels discomfort but no significant pain).  If she tries to bend down or stretch up/around she feels pain in her belly button and shoot to the back.     She was trying to plant some flowers yesterday, however had difficulty bending over due to pain.  Painful to the touch. Laying in gama she will also have pain when moving from side to side.     Bowels are normal, she has mild constipation.    S/p cholecystectomy and partial  "small bowel resection (history of Crohn's, diagnosed at age 19).   Has been seen by [facility] for Crohn's and on Imuran for maintenance.  This pain feels different than her Crohn's (in the past Crohn's pain was related to food intake and defecating, this pain feels more related to position changes).     On supplements: IB guard and IBS gummies.      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   When she started with a child that is heavier, she noticed this pain increase.        Patient's last menstrual period was 07/23/2010 (lmp unknown).            Review of Systems  Constitutional, neuro, ENT, endocrine, pulmonary, cardiac, gastrointestinal, genitourinary, musculoskeletal, integument and psychiatric systems are negative, except as otherwise noted.      Objective    /68 (BP Location: Right arm, Patient Position: Chair, Cuff Size: Adult Large)   Pulse 71   Temp 97.1  F (36.2  C) (Tympanic)   Resp 16   Ht 1.596 m (5' 2.84\")   Wt 86.6 kg (191 lb)   LMP 07/23/2010 (LMP Unknown)   SpO2 100%   BMI 34.01 kg/m    Body mass index is 34.01 kg/m .  Physical Exam   GENERAL: alert and no distress  NECK: no adenopathy, no asymmetry, masses, or scars  RESP: lungs clear to auscultation - no rales, rhonchi or wheezes  CV: regular rate and rhythm, normal S1 S2, no S3 or S4, no murmur, click or rub, no peripheral edema  ABDOMEN: soft, nontender, no hepatosplenomegaly, no masses and bowel sounds normal  MS: no gross musculoskeletal defects noted, no edema  Musculoskeletal:  Normal gait.  Head and neck normal to inspection and palpation, painful back internal and external rotation, lateral bend and forward flexion.   Full ROM and 5/5 motor strength bilateral upper and lower extremities.  No kykphosis, lordosis and full ROM of spine.             Signed Electronically by: Arlen Toro PA-C    "

## 2024-05-29 NOTE — PATIENT INSTRUCTIONS
I suggest you start omeprazole 40 mg daily x 30 days.     Symptoms may be secondary to weak core muscles.

## 2024-07-11 ENCOUNTER — VIRTUAL VISIT (OUTPATIENT)
Dept: FAMILY MEDICINE | Facility: CLINIC | Age: 44
End: 2024-07-11

## 2024-07-11 DIAGNOSIS — R10.33 PERIUMBILICAL ABDOMINAL PAIN: Primary | ICD-10-CM

## 2024-07-11 DIAGNOSIS — K58.0 IRRITABLE BOWEL SYNDROME WITH DIARRHEA: ICD-10-CM

## 2024-07-11 DIAGNOSIS — K50.80 CROHN'S DISEASE OF BOTH SMALL AND LARGE INTESTINE WITHOUT COMPLICATION (H): ICD-10-CM

## 2024-07-11 PROCEDURE — 99442 PR PHYSICIAN TELEPHONE EVALUATION 11-20 MIN: CPT | Mod: 95 | Performed by: PHYSICIAN ASSISTANT

## 2024-07-11 ASSESSMENT — ASTHMA QUESTIONNAIRES
QUESTION_2 LAST FOUR WEEKS HOW OFTEN HAVE YOU HAD SHORTNESS OF BREATH: NOT AT ALL
QUESTION_5 LAST FOUR WEEKS HOW WOULD YOU RATE YOUR ASTHMA CONTROL: COMPLETELY CONTROLLED
ACT_TOTALSCORE: 25
QUESTION_4 LAST FOUR WEEKS HOW OFTEN HAVE YOU USED YOUR RESCUE INHALER OR NEBULIZER MEDICATION (SUCH AS ALBUTEROL): NOT AT ALL
QUESTION_3 LAST FOUR WEEKS HOW OFTEN DID YOUR ASTHMA SYMPTOMS (WHEEZING, COUGHING, SHORTNESS OF BREATH, CHEST TIGHTNESS OR PAIN) WAKE YOU UP AT NIGHT OR EARLIER THAN USUAL IN THE MORNING: NOT AT ALL
QUESTION_1 LAST FOUR WEEKS HOW MUCH OF THE TIME DID YOUR ASTHMA KEEP YOU FROM GETTING AS MUCH DONE AT WORK, SCHOOL OR AT HOME: NONE OF THE TIME
ACT_TOTALSCORE: 25

## 2024-07-11 NOTE — PROGRESS NOTES
"Ana is a 43 year old who is being evaluated via a billable video visit.    What phone number would you like to be contacted at?   How would you like to obtain your [facility]S? [facility]      Assessment & Plan       ICD-10-CM    1. Periumbilical abdominal pain  R10.33       2. Crohn's disease of both small and large intestine without complication (H)  K50.80       3. Irritable bowel syndrome with diarrhea  K58.0           1-3) Her forms were reviewed and completed. She will return to work as of tomorrow without restrictions.       BMI  Estimated body mass index is 34.01 kg/m  as calculated from the following:    Height as of 5/29/24: 1.596 m (5' 2.84\").    Weight as of 5/29/24: 86.6 kg (191 lb).     Return in about 5 months (around 12/11/2024) for your annual physical, a med check, repeat labs, with Radha, in person.      Subjective   Ana is a 43 year old, presenting for the following health issues:  Forms        7/11/2024     8:32 AM   Additional Questions   Roomed by Luba GANDHI LPN   Accompanied by Self     HPI     Pt states she needs pcp to fill out forms that she sent to cp via [employer].   She states they are for unemployment.  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      Objective           Vitals:  No vitals were obtained today due to virtual visit.    Physical Exam   GENERAL: " alert and no distress  RESP: No audible wheeze, cough  PSYCH: Appropriate affect, tone, and pace of words        Video-Visit Details    Type of service:  changed to video due to time, 14 mins    Signed Electronically by: Radha Bermudez PA-C

## 2024-07-15 ENCOUNTER — PATIENT OUTREACH (OUTPATIENT)
Dept: CARE COORDINATION | Facility: CLINIC | Age: 44
End: 2024-07-15

## 2024-08-21 ENCOUNTER — OFFICE VISIT (OUTPATIENT)
Dept: FAMILY MEDICINE | Facility: CLINIC | Age: 44
End: 2024-08-21
Payer: COMMERCIAL

## 2024-08-21 VITALS
HEART RATE: 69 BPM | HEIGHT: 63 IN | TEMPERATURE: 97 F | DIASTOLIC BLOOD PRESSURE: 64 MMHG | BODY MASS INDEX: 34.59 KG/M2 | OXYGEN SATURATION: 100 % | WEIGHT: 195.2 LBS | SYSTOLIC BLOOD PRESSURE: 104 MMHG | RESPIRATION RATE: 18 BRPM

## 2024-08-21 DIAGNOSIS — L29.9 SCALP PRURITUS: Primary | ICD-10-CM

## 2024-08-21 PROCEDURE — 99213 OFFICE O/P EST LOW 20 MIN: CPT | Performed by: PHYSICIAN ASSISTANT

## 2024-08-21 RX ORDER — KETOCONAZOLE 20 MG/ML
SHAMPOO TOPICAL
Qty: 120 ML | Refills: 3 | Status: SHIPPED | OUTPATIENT
Start: 2024-08-21

## 2024-08-21 ASSESSMENT — PAIN SCALES - GENERAL: PAINLEVEL: NO PAIN (0)

## 2024-08-21 NOTE — PROGRESS NOTES
Assessment & Plan   Problem List Items Addressed This Visit    None  Visit Diagnoses       Scalp pruritus    -  Primary    Relevant Medications    ketoconazole (NIZORAL) 2 % external shampoo    Other Relevant Orders    Adult Dermatology  Referral           This is a 43-year-old female with a past medical history of Crohn's on azathioprine who presents for evaluation of a chronic itchy scalp with patches of hair loss and sores over the last year.  She was initially treated with Valtrex for shingles as a cause, though her symptoms have persisted and involved bilateral areas of the scalp.  She was also treated with clobetasol solution at some point and said that this was not helpful but did cause hair loss.  Recently, she has been using witch hazel solution which seems to help with the itching, but the sores have persisted.  This could be seborrheic dermatitis versus tinea capitis versus other eczematous process.  Given she has not had improvement with steroids, I will try ketoconazole shampoo and continued use of the which adina.  Low suspicion for concurrent bacterial infection such as cellulitis, abscess, necrotizing fasciitis or others.  Follow-up with me as needed while she waits for dermatology evaluation if this is not improving. May need biopsy. Referral placed.    Complete history and physical exam as below. Afebrile with normal vital signs.    DDx discussed with and explained to the pt to their satisfaction.  All questions were answered at this time. Pt expressed understanding of and agreement with this dx, tx, and plan. No further workup warranted and standard medication warnings given. I have given the patient a list of pertinent indications for re-evaluation. Will go to the UC/Emergency Department if symptoms worsen or new concerning symptoms arise. Patient left in no apparent distress.     BMI  Estimated body mass index is 34.75 kg/m  as calculated from the following:    Height as of this  "encounter: 1.596 m (5' 2.84\").    Weight as of this encounter: 88.5 kg (195 lb 3.2 oz).     See Patient Instructions      Chrsi Perez is a 43 year old, presenting for the following health issues:  Hair/Scalp Problem        8/21/2024     7:54 AM   Additional Questions   Roomed by Jericho Gonzalez CMA   Accompanied by N/A         8/21/2024     7:54 AM   Patient Reported Additional Medications   Patient reports taking the following new medications No new medications     History of Present Illness       Reason for visit:  Sores/rash    She eats 2-3 servings of fruits and vegetables daily.She consumes 2 sweetened beverage(s) daily.She exercises with enough effort to increase her heart rate 30 to 60 minutes per day.  She exercises with enough effort to increase her heart rate 5 days per week.   She is taking medications regularly.     Patient is coming in today with scalp sores, they were originally being treated as shingles.  Have been around for a year.  They are very itchy but not painful.  She also has been having some pressure headaches.  Witch hazel has been helping the itchiness recently but not the sores. Bilateral sides of crown of scalp. Saw derm ~10 months ago and was given clobetasol which did not help. No fevers, pain, drainage, lymphadenopathy, vision changes or other symptoms.    Review of Systems  Constitutional, HEENT, cardiovascular, pulmonary, derm systems are negative, except as otherwise noted.      Objective    /64   Pulse 69   Temp 97  F (36.1  C) (Temporal)   Resp 18   Ht 1.596 m (5' 2.84\")   Wt 88.5 kg (195 lb 3.2 oz)   LMP 07/23/2010 (LMP Unknown)   SpO2 100%   BMI 34.75 kg/m    Body mass index is 34.75 kg/m .  Physical Exam  Vitals and nursing note reviewed.   Constitutional:       General: She is not in acute distress.     Appearance: Normal appearance. She is not diaphoretic.   HENT:      Head: Normocephalic and atraumatic.      Comments: Scatter areas of alopecia to the " frontal scalp and vertex with a few erythematous crusts in the area. No warmth, drainage, fluctuance, or other overlying signs of trauma or infection.      Nose: Nose normal.      Mouth/Throat:      Mouth: Mucous membranes are moist.      Pharynx: Oropharynx is clear.   Eyes:      Conjunctiva/sclera: Conjunctivae normal.   Pulmonary:      Effort: Pulmonary effort is normal. No respiratory distress.   Musculoskeletal:      Cervical back: Neck supple. No rigidity.   Lymphadenopathy:      Cervical: No cervical adenopathy.   Skin:     General: Skin is dry.      Coloration: Skin is not jaundiced or pale.   Neurological:      General: No focal deficit present.      Mental Status: She is alert. Mental status is at baseline.   Psychiatric:         Mood and Affect: Mood normal.         Behavior: Behavior normal.              Signed Electronically by: TORSTEN Quinn

## 2024-08-21 NOTE — PATIENT INSTRUCTIONS
Darvin Perez,    Thank you for allowing [facility] to manage your care.    I am unsure of the cause of your symptoms, but this could be seborrheic dermatitis vs tinea capitis, which are caused by fungal infections. Try the shampoo 2-3 times per week.    If you develop worsening/changing symptoms at any time, please be seen in clinic/urgent care.    I sent your prescriptions to your pharmacy.    I made a referral to dermatology if you are not improving. They will be calling in approximately 1 week to set up your appointment.  If you do not hear from them, please call the specialty number on your after visit summary.     If you have any questions or concerns, please feel free to call us at [phone]    Sincerely,    Henry Gonzalez PA-C    Did you know?      You can schedule a video visit for follow-up appointments as well as future appointments for certain conditions.  Please see the below link.     [email or web address]    If you have not already done so,  I encourage you to sign up for Mychart ([email or web address]).  This will allow you to review your results, securely communicate with a provider, and schedule virtual visits as well.

## 2024-09-17 ENCOUNTER — MYC REFILL (OUTPATIENT)
Dept: FAMILY MEDICINE | Facility: CLINIC | Age: 44
End: 2024-09-17
Payer: COMMERCIAL

## 2024-09-17 DIAGNOSIS — K31.9 GASTROPATHY: ICD-10-CM

## 2024-09-18 ENCOUNTER — APPOINTMENT (OUTPATIENT)
Dept: GENERAL RADIOLOGY | Facility: CLINIC | Age: 44
End: 2024-09-18
Attending: NURSE PRACTITIONER

## 2024-09-18 ENCOUNTER — HOSPITAL ENCOUNTER (EMERGENCY)
Facility: CLINIC | Age: 44
Discharge: HOME | End: 2024-09-18
Attending: FAMILY MEDICINE | Admitting: FAMILY MEDICINE

## 2024-09-18 VITALS
HEIGHT: 62 IN | WEIGHT: 180 LBS | RESPIRATION RATE: 18 BRPM | DIASTOLIC BLOOD PRESSURE: 100 MMHG | OXYGEN SATURATION: 99 % | TEMPERATURE: 98.2 F | HEART RATE: 69 BPM | SYSTOLIC BLOOD PRESSURE: 197 MMHG | BODY MASS INDEX: 33.13 KG/M2

## 2024-09-18 DIAGNOSIS — R07.89 RIGHT-SIDED CHEST WALL PAIN: ICD-10-CM

## 2024-09-18 DIAGNOSIS — S40.021A CONTUSION OF RIGHT UPPER ARM, INITIAL ENCOUNTER: ICD-10-CM

## 2024-09-18 DIAGNOSIS — S20.211A RIB CONTUSION, RIGHT, INITIAL ENCOUNTER: ICD-10-CM

## 2024-09-18 PROCEDURE — 99283 EMERGENCY DEPT VISIT LOW MDM: CPT | Mod: 25 | Performed by: FAMILY MEDICINE

## 2024-09-18 PROCEDURE — 71101 X-RAY EXAM UNILAT RIBS/CHEST: CPT | Mod: RT

## 2024-09-18 PROCEDURE — 99283 EMERGENCY DEPT VISIT LOW MDM: CPT | Performed by: FAMILY MEDICINE

## 2024-09-18 RX ORDER — OMEPRAZOLE 40 MG/1
40 CAPSULE, DELAYED RELEASE ORAL DAILY
Qty: 90 CAPSULE | Refills: 3 | Status: SHIPPED | OUTPATIENT
Start: 2024-09-18

## 2024-09-18 RX ORDER — HYDROMORPHONE HYDROCHLORIDE 4 MG/1
4 TABLET ORAL EVERY 6 HOURS PRN
Qty: 6 TABLET | Refills: 0 | Status: SHIPPED | OUTPATIENT
Start: 2024-09-18 | End: 2024-10-03

## 2024-09-18 ASSESSMENT — ACTIVITIES OF DAILY LIVING (ADL)
ADLS_ACUITY_SCORE: 36
ADLS_ACUITY_SCORE: 36

## 2024-09-18 NOTE — ED TRIAGE NOTES
Pt was at work yesterday afternoon and pushed self into locked door with pain in right shoulder and right rib area with pain.      Triage Assessment (Adult)       Row Name 09/18/24 1943          Triage Assessment    Airway WDL WDL        Respiratory WDL    Respiratory WDL WDL        Peripheral/Neurovascular WDL    Peripheral Neurovascular WDL WDL

## 2024-09-19 NOTE — DISCHARGE INSTRUCTIONS
ICD-10-CM    1. Right-sided chest wall pain  R07.89       2. Rib contusion, right, initial encounter  S20.211A     may use dilaudid for severe pain. use ibuipropfen scheduled 600 m,g orally every 6 hours. take tylenol 1000 mg every 6 hours.  use lidopcaine 4% patch.  m,aintain deep breathiong. follow-up clinic and return for fever, signs  infection      3. Contusion of right upper arm, initial encounter  S40.021A

## 2024-09-19 NOTE — ED PROVIDER NOTES
"  History     Chief Complaint   Patient presents with    Rib Pain     HPI  Ana Felix is a 43 year old female who with past medical history of Crohn's disease, tobacco use, morbid obesity, gestational diabetes, chronic pain, herniated lumbar disc presents emergency room following a work-related injury that occurred yesterday at 3 PM-patient reports she was attempting to open a door that was stuck.  She jammed her right shoulder and right chest wall into the door handle to get it to open with immediate and subsequent pain.  Patient states her right arm is experiencing bruising and swelling.  She reports her right sided chest is painful to touch and she notices swelling in addition.  She states that she is taken 3 of her narcotic pain medication-percocet 7.5-325- today without any relief of symptoms.    Denies any other concerns today.  This is a work-related injury.    Allergies:  Allergies   Allergen Reactions    Infliximab Hives    Sulfa Antibiotics Nausea     Pt states \"it doesn't work for me\"  Pt states \"it doesn't work for me\"  Other reaction(s): Diarrhea, nausea, Nausea, Intolerance  Pt states \"it doesn't work for me\"    Sulfacetamide Nausea    Amoxicillin Nausea and Vomiting    Amoxicillin-Pot Clavulanate Other (See Comments) and Nausea and Vomiting     Crohn's    Aspirin Nausea    Bupropion Hives and Nausea    Clavulanic Acid Nausea and Vomiting    Droperidol Other (See Comments)     Dystonic reaction    Ibuprofen Other (See Comments) and Nausea     Crohn's      Lyrica [Pregabalin] Nausea and Vomiting     Grogginess, severe back pain  Grogginess, severe back pain    Vicodin [Hydrocodone-Acetaminophen] Nausea and Vomiting    Adhesive Tape Rash       Problem List:    Patient Active Problem List    Diagnosis Date Noted    Prediabetes 01/03/2024     Priority: Medium    Closed nondisplaced fracture of head of right radius with routine healing, subsequent encounter 08/18/2023     Priority: Medium    Primary " osteoarthritis of left knee 02/13/2023     Priority: Medium    DANIELLE (generalized anxiety disorder) 03/15/2022     Priority: Medium    Insomnia, unspecified type 03/15/2022     Priority: Medium    SKYE (stress urinary incontinence, female) 03/14/2022     Priority: Medium     Added automatically from request for surgery [id number]      Hidradenitis suppurativa 09/02/2021     Priority: Medium    Immunocompromised state (H24) 07/09/2021     Priority: Medium    History of gestational diabetes mellitus (GDM) 08/15/2018     Priority: Medium    Amenorrhea 08/15/2018     Priority: Medium    Crohn's disease of both small and large intestine without complication (H) 07/31/2018     Priority: Medium    Pain medication agreement 08/18/2017     Priority: Medium     Overview:   [facility]      Controlled substance agreement signed 07/19/2017     Priority: Medium    Degenerative lumbar disc 03/07/2017     Priority: Medium    Labral tear of hip, degenerative 03/07/2017     Priority: Medium    Pain of right hip joint 03/07/2017     Priority: Medium    S/P LEEP of cervix 12/15/2015     Priority: Medium     S/p LEEP of cervix - date unknown  12/9/15: Pap - NIL, Neg HPV. Plan cotest in 1 year.   3/7/18 Pap: NIL/neg HPV.  12/29/23 NIL pap, neg HPV. Plan: cotest in 3 years            Hearing difficulty 10/09/2014     Priority: Medium    Irritable bowel syndrome (IBS) 04/15/2014     Priority: Medium     Tiffany raw vegetables      Lactose intolerance 04/15/2014     Priority: Medium    Abdominal pain, right upper quadrant 03/06/2014     Priority: Medium    Nausea with vomiting 01/31/2014     Priority: Medium    Chronic low back pain 11/05/2012     Priority: Medium     Follows with pain clinic.      Mild persistent asthma, uncomplicated 11/05/2012     Priority: Medium    Discogenic pain 10/08/2012     Priority: Medium    Abdominal pain 08/30/2012     Priority: Medium    S/P oophorectomy 07/20/2012     Priority: Medium    History of  cholecystectomy 2012     Priority: Medium    History of resection of small bowel 2012     Priority: Medium    Tobacco abuse 2012     Priority: Medium    Displacement of lumbar intervertebral disc without myelopathy 2012     Priority: Medium    Disorder of sacrum 2011     Priority: Medium     Problem list name updated by automated process. Provider to review      Back pain 2011     Priority: Medium    Obesity 2011     Priority: Medium    Migraine headache 2011     Priority: Medium     Patient given Migraine Education folder and Migraine Action Plan on 2011. Cammy Anderson RN    (Problem list name updated by automated process. Provider to review and confirm.)      CARDIOVASCULAR SCREENING; LDL GOAL LESS THAN 160 10/31/2010     Priority: Medium    Dysmenorrhea 10/05/2010     Priority: Medium    Female pelvic pain 2010     Priority: Medium     (Problem list name updated by automated process. Provider to review and confirm.)      Crohns disease 2009     Priority: Medium        Past Medical History:    Past Medical History:   Diagnosis Date    Back pain     Crohn's disease (H)     Exercise-induced asthma     Gestational diabetes     Herniation of intervertebral disc of lumbar region     Infertility     Ovarian cyst     Smoker     Status post cholecystectomy 2005       Past Surgical History:    Past Surgical History:   Procedure Laterality Date    APPENDECTOMY      C/SECTION, LOW TRANSVERSE      , Low Transverse    CHOLECYSTECTOMY, LAPOROSCOPIC  2005    Cholecystectomy, Laparoscopic    COLONOSCOPY      COLONOSCOPY N/A 2024    Procedure: Colonoscopy;  Surgeon: Robert Fiore MD;  Location: [facility]    ESOPHAGOSCOPY, GASTROSCOPY, DUODENOSCOPY (EGD), COMBINED  3/6/2014    Procedure: COMBINED ESOPHAGOSCOPY, GASTROSCOPY, DUODENOSCOPY (EGD), BIOPSY SINGLE OR MULTIPLE;  COLONOSCOPY/ UPPER GI ENDOSCOPY/ COLON/ EGD/ Abdominal pain,  epigastric/ [facility];  Surgeon: West Lomas MD;  Location:  OR    ESOPHAGOSCOPY, GASTROSCOPY, DUODENOSCOPY (EGD), COMBINED N/A 5/9/2024    Procedure: ESOPHAGOGASTRODUODENOSCOPY, WITH BIOPSY;  Surgeon: Robert Fiore MD;  Location: [facility]    HC HYSTEROSCOPY, SURGICAL; W/ ENDOMETRIAL ABLATION, ANY METHOD  7/2010    HERNIORRHAPHY VENTRAL N/A 12/11/2018    Procedure: Open ventral hernia repair;  Surgeon: Alonso Bills MD;  Location: [facility]    HYSTERECTOMY, SUPRACERVICAL LAPAROSCOPIC  2010    LEEP TX, CERVICAL      LEEP TX Cervical    ORTHOPEDIC SURGERY      bluged disk    partial small bowel resection  2002    Ileo-colonic resection. Crohn's disease surgery for bowel obstruction. this was a section of small bowel and large bowel and the cecum., all removed and a reanastamosis done successfully    SALPINGO OOPHORECTOMY,R/L/TORI      Right ovary    SLING TRANSVAGINAL N/A 4/4/2022    Procedure: Pubovaginal sling with Altis;  Surgeon: Eleuterio Stone MD;  Location: [facility]    TUBAL LIGATION         Family History:    Family History   Problem Relation Age of Onset    Hyperlipidemia Mother     Cancer Mother         cervical    Lipids Mother     Hypertension Father     Eye Disorder Father     Lipids Father     Alcohol/Drug Maternal Grandmother     Colon Cancer Paternal Grandmother     Diabetes Paternal Grandmother     Eye Disorder Paternal Grandmother     Diabetes Paternal Grandfather     Eye Disorder Paternal Grandfather     Inflammatory Bowel Disease Paternal Aunt         and two paternal uncles       Social History:  Marital Status:   [2]  Social History     Tobacco Use    Smoking status: Some Days     Current packs/day: 0.50     Types: Cigarettes     Passive exposure: Never    Smokeless tobacco: Never    Tobacco comments:     5 Cigarettes in the past two week   Vaping Use    Vaping status: Never Used   Substance Use Topics    Alcohol use: Yes     Comment: very rarely    Drug use: No        Medications:   "  azaTHIOprine (IMURAN) 50 MG tablet  busPIRone (BUSPAR) 10 MG tablet  cloNIDine (CATAPRES) 0.1 MG tablet  cyanocobalamin (VITAMIN B12) 1000 MCG/ML injection  gabapentin (NEURONTIN) 300 MG capsule  hydrOXYzine HCl (ATARAX) 25 MG tablet  ibuprofen (ADVIL/MOTRIN) 800 MG tablet  ketoconazole (NIZORAL) 2 % external shampoo  montelukast (SINGULAIR) 10 MG tablet  mupirocin (BACTROBAN) 2 % external ointment  naloxone (NARCAN) 4 MG/0.1ML nasal spray  omeprazole (PRILOSEC) 40 MG DR capsule  oxyCODONE-acetaminophen (PERCOCET) 7.5-325 MG per tablet  sertraline (ZOLOFT) 100 MG tablet  tiZANidine (ZANAFLEX) 2 MG tablet  traZODone (DESYREL) 150 MG tablet  triamcinolone (KENALOG) 0.1 % external ointment  VENTOLIN  (90 Base) MCG/ACT inhaler          Review of Systems  As mentioned above in the history present illness. All other systems were reviewed and are negative.    Physical Exam   BP: (!) 170/104  Pulse: 79  Temp: 98.2  F (36.8  C)  Resp: 18  Height: 157.5 cm (5' 2\")  Weight: 81.6 kg (180 lb)  SpO2: 99 %      Physical Exam  Vitals and nursing note reviewed.   Constitutional:       General: She is in acute distress.      Appearance: She is well-developed. She is obese. She is not ill-appearing, toxic-appearing or diaphoretic.   HENT:      Head: Normocephalic and atraumatic.      Right Ear: External ear normal.      Left Ear: External ear normal.   Eyes:      General:         Right eye: No discharge.         Left eye: No discharge.      Conjunctiva/sclera: Conjunctivae normal.   Cardiovascular:      Rate and Rhythm: Normal rate and regular rhythm.      Heart sounds: Normal heart sounds. No murmur heard.     No friction rub.   Pulmonary:      Effort: Pulmonary effort is normal. No respiratory distress.      Breath sounds: Normal breath sounds. No stridor. No wheezing or rales.   Chest:      Chest wall: Swelling and tenderness (noted ribs 10-12) present.       Abdominal:      General: Bowel sounds are normal. There is no " distension.      Palpations: Abdomen is soft. There is no mass.      Tenderness: There is no abdominal tenderness. There is no guarding or rebound.   Musculoskeletal:        Arms:    Skin:     General: Skin is warm and dry.      Coloration: Skin is not pale.      Findings: No erythema or rash.   Neurological:      Mental Status: She is alert.   Psychiatric:         Mood and Affect: Mood normal.         Behavior: Behavior normal.         ED Course        Procedures    No results found for this or any previous visit (from the past 24 hour(s)).    Medications - No data to display    Assessments & Plan (with Medical Decision Making)     I have reviewed the nursing notes.    I have reviewed the findings, diagnosis, plan and need for follow up with the patient.  43-year-old female presents to the emergency department with a right upper arm injury and a right sided chest wall injury that occurred when she threw her arm and chest into a door attempting to open it as it was stuck.  She reports this event occurred yesterday at 3 PM.  She reports persistent pain with deep inspiration and pain in the right upper arm.  She has taken 3 Percocet today without relief.  She reports normal range of motion of her right arm.  This is a work-related injury.  On exam patient has a contusion on her right upper lateral arm and she has some mild soft tissue swelling without crepitus or deformity of her right anterior lower rib cage region.  Bowel sounds are normal abdomen is soft and nontender without rebound or guarding or masses and no concern for acute abdomen...  Differential diagnosis rib contusion versus rib fracture and lesser suspicion for hemopneumothorax.  Workup initiated.  Patient will need workability form.  Care signed out to Justus Rich MD 2002    New Prescriptions    No medications on file       Final diagnoses:   Right-sided chest wall pain   Rib contusion, right, initial encounter   Contusion of right upper arm, initial  encounter       9/18/2024   [facility] EMERGENCY DEPT       Radha Covarrubias, CARI CNP  09/18/24 2002

## 2024-09-19 NOTE — ED PROVIDER NOTES
Seen by Radha Covarrubias.  Please see her note.           Imaging Interpretation:      I independently viewed the cxr images and found no ptx, no fracture     I reevaluated the patient after imaging had been returned.  The lateral chest wall on the right side is tender but there is no deformity.  Lungs are clear to auscultation no respiratory distress.  The patient is on opioids chronically and I discussed this with her.  She uses for chronic back pain at higher dose.  She has a chest contusion will require deep inspiration.  Told her that she will need to follow-up in clinic to continue medications but I will use short-term Dilaudid orally for pain in this region.  I have written a note for work restrictions completed her forms that she brought with her for workability.  Precautions are given for return.  At this point no indications for advanced imaging in this region.      Her blood pressure is elevated and this is likely related to pain.  She is given precautions.  This is asymptomatic hypertension.  Take pain meds on arrival home.  Monitor BP follow-up in clinic.  Recheck in clinic.        ICD-10-CM    1. Right-sided chest wall pain  R07.89       2. Rib contusion, right, initial encounter  S20.211A     may use dilaudid for severe pain. use ibuipropfen scheduled 600 m,g orally every 6 hours. take tylenol 1000 mg every 6 hours.  use lidopcaine 4% patch.  m,aintain deep breathiong. follow-up clinic and return for fever, signs  infection      3. Contusion of right upper arm, initial encounter  S40.021A              Justus Rich MD  09/19/24 0801

## 2024-09-26 ENCOUNTER — OFFICE VISIT (OUTPATIENT)
Dept: FAMILY MEDICINE | Facility: CLINIC | Age: 44
End: 2024-09-26

## 2024-09-26 VITALS
WEIGHT: 191.8 LBS | DIASTOLIC BLOOD PRESSURE: 84 MMHG | OXYGEN SATURATION: 98 % | RESPIRATION RATE: 20 BRPM | HEART RATE: 84 BPM | SYSTOLIC BLOOD PRESSURE: 126 MMHG | TEMPERATURE: 97.8 F | HEIGHT: 62 IN | BODY MASS INDEX: 35.3 KG/M2

## 2024-09-26 DIAGNOSIS — R07.81 RIB PAIN ON RIGHT SIDE: ICD-10-CM

## 2024-09-26 DIAGNOSIS — Z02.89 ENCOUNTER FOR COMPLETION OF FORM WITH PATIENT: ICD-10-CM

## 2024-09-26 DIAGNOSIS — S40.021D CONTUSION OF RIGHT UPPER ARM, SUBSEQUENT ENCOUNTER: Primary | ICD-10-CM

## 2024-09-26 PROCEDURE — 99214 OFFICE O/P EST MOD 30 MIN: CPT | Performed by: PHYSICIAN ASSISTANT

## 2024-09-26 ASSESSMENT — ENCOUNTER SYMPTOMS
ARTHRALGIAS: 1
COUGH: 1
WHEEZING: 0
RHINORRHEA: 1
CHILLS: 0
FEVER: 0
SHORTNESS OF BREATH: 0
DIAPHORESIS: 0

## 2024-09-26 ASSESSMENT — PAIN SCALES - GENERAL: PAINLEVEL: EXTREME PAIN (8)

## 2024-09-26 NOTE — PROGRESS NOTES
Assessment & Plan     Contusion of right upper arm, subsequent encounter  Rib pain on right side  Encounter for completion of form with patient  Patient is a 43-year-old female who presents to clinic for emergency department follow-up/recheck of right arm pain/contusion and right rib pain.  Symptoms started 9/17/2024 after attempting to open stuck door by using force with right side of body.  Patient evaluated in emergency department 9/18/2024, right rib x-ray completed and negative for fracture, and patient diagnosed with right chest wall pain, right rib contusion, and contusion of right upper arm.  Patient given work restrictions and prescribed small amount of Dilaudid.  Patient is chronically on Percocet for pain management.  Today patient notes right arm pain/contusion is improving, but right rib pain has worsened.  Vital signs normal.  Physical exam with resolving superficial contusion of right arm.  Right rib evaluation without signs of trauma, but patient has significant tenderness to palpation, even at superficial level to area of concern.     Discussed that patient can continue chronic pain medications and in addition can use ibuprofen and heat/ice for pain management.  No further narcotic pain management indicated for injury.  Based on evaluation, MSK pain should continue to improve.  Work restrictions updated allowing for additional activities.  Patient scheduled for repeat evaluation in 1 week at which time I expect to lift work restrictions completely.  Recommended sooner follow-up for new or worsening symptoms.     2 workability forms completed.    MED REC REQUIRED  Post Medication Reconciliation Status:  Discharge medications reconciled and changed, see notes/orders      35 minutes spent by me on the date of the encounter doing chart review, history and exam, documentation and further activities per the note    See Patient Instructions    Chris Perez is a 43 year old, presenting for the  following health issues:  ER F/U and Musculoskeletal Problem        9/26/2024    10:05 AM   Additional Questions   Roomed by Blanca DIETZ MA   Accompanied by No one         9/26/2024    10:05 AM   Patient Reported Additional Medications   Patient reports taking the following new medications None     Musculoskeletal Problem  Associated symptoms include arthralgias, congestion and coughing. Pertinent negatives include no chest pain, chills, diaphoresis or fever.      Patient notes that arm pain and bruising is resolving. Right rib pain is worsening which patient attributes to cough from recent URI. Patient is using Lidocaine patches, but notes she cannot use these for long, because of skin sensitivity. Patient follows with [facility] for back pain. She took the prescribed Dilaudid and is now out of acute pain medication. Patient is using chronic percocet 3/d, ice and heat, as well as Ibuprofen. Patient was at work for 1.5 hours and was having pain with cutting items. Patient works at school helping with special ED.    ED/UC Followup:    Facility:  [facility]  Date of visit: 09/18/2024  Reason for visit: Rib Pain  Current Status: Worse    Per chart review, patient evaluated in emergency department for right sided chest wall pain, right rib contusion, and contusion of right upper arm.  Injury occurred the day prior when patient attempted to open door that was stuck.  Patient jammed shoulder and chest wall into door handle to open door causing pain.  Patient to continue chronic opioids and short-term oral Dilaudid prescribed.  Work restrictions provided and workability form completed.    XR Right ribs 9/18/24:  IMPRESSION: No acute pneumonic consolidation, pleural effusion, pulmonary edema, or pneumothorax. Heart size and mediastinal contours are normal with normal pulmonary vascularity. No acute displaced right-sided rib fracture is identified. Cholecystectomy   clips in the right upper quadrant. Scattered mild  "degenerative change in the visualized spine.    Review of Systems   Constitutional:  Negative for chills, diaphoresis and fever.   HENT:  Positive for congestion and rhinorrhea.    Respiratory:  Positive for cough. Negative for shortness of breath and wheezing.    Cardiovascular:  Negative for chest pain.   Musculoskeletal:  Positive for arthralgias.           Objective    /84   Pulse 84   Temp 97.8  F (36.6  C) (Temporal)   Resp 20   Ht 1.575 m (5' 2\")   Wt 87 kg (191 lb 12.8 oz)   LMP 07/23/2010 (Exact Date)   SpO2 98%   Breastfeeding No   BMI 35.08 kg/m    Body mass index is 35.08 kg/m .  Physical Exam  Vitals and nursing note reviewed.   Constitutional:       General: She is not in acute distress.     Appearance: Normal appearance.   HENT:      Head: Normocephalic and atraumatic.      Mouth/Throat:      Mouth: Mucous membranes are moist.      Pharynx: Oropharynx is clear.   Eyes:      Extraocular Movements: Extraocular movements intact.      Pupils: Pupils are equal, round, and reactive to light.   Cardiovascular:      Rate and Rhythm: Normal rate and regular rhythm.      Heart sounds: Normal heart sounds.   Pulmonary:      Effort: Pulmonary effort is normal.      Breath sounds: Normal breath sounds.   Musculoskeletal:         General: Normal range of motion.      Cervical back: Normal range of motion.      Comments: Right arm: Normal ROM. Tenderness to palpation at lateral aspect of right humerus.  Approximately 9 cm x 5 cm area of resolving ecchymosis without induration, erythema, firmness, swelling    Right ribs: No tenderness to palpation of right ribs with lateral compression of chest wall.  Tenderness to palpation of right anterior ribs without deformity, swelling, ecchymosis, swelling, erythema.  Tenderness present even with superficial palpation of skin.   Skin:     General: Skin is warm and dry.   Neurological:      General: No focal deficit present.      Mental Status: She is alert. "   Psychiatric:         Mood and Affect: Mood normal.         Behavior: Behavior normal.                    Signed Electronically by: Aruna Francis PA-C

## 2024-09-26 NOTE — PATIENT INSTRUCTIONS
Your arm and ribs are healing as expected.  Going forward you can continue your regularly prescribed medications, ice/heat, and ibuprofen for symptom management.  Your work restrictions have been advanced.  You are scheduled for a follow-up visit in 1 week for reevaluation.  Follow-up sooner for new/worsening symptoms.

## 2024-10-03 ENCOUNTER — OFFICE VISIT (OUTPATIENT)
Dept: FAMILY MEDICINE | Facility: CLINIC | Age: 44
End: 2024-10-03
Payer: COMMERCIAL

## 2024-10-03 VITALS
HEART RATE: 87 BPM | DIASTOLIC BLOOD PRESSURE: 86 MMHG | WEIGHT: 190.2 LBS | SYSTOLIC BLOOD PRESSURE: 124 MMHG | TEMPERATURE: 97.6 F | HEIGHT: 62 IN | OXYGEN SATURATION: 97 % | RESPIRATION RATE: 20 BRPM | BODY MASS INDEX: 35 KG/M2

## 2024-10-03 DIAGNOSIS — S40.021D CONTUSION OF RIGHT UPPER ARM, SUBSEQUENT ENCOUNTER: Primary | ICD-10-CM

## 2024-10-03 DIAGNOSIS — R07.81 RIB PAIN ON RIGHT SIDE: ICD-10-CM

## 2024-10-03 PROCEDURE — 99213 OFFICE O/P EST LOW 20 MIN: CPT | Performed by: PHYSICIAN ASSISTANT

## 2024-10-03 ASSESSMENT — ENCOUNTER SYMPTOMS
ARTHRALGIAS: 0
WOUND: 0
FEVER: 0
WEAKNESS: 0
MYALGIAS: 0

## 2024-10-03 ASSESSMENT — PAIN SCALES - GENERAL: PAINLEVEL: NO PAIN (1)

## 2024-10-03 NOTE — PROGRESS NOTES
Assessment & Plan     Contusion of right upper arm, subsequent encounter  Rib pain on right side  Patient is a 43-year-old female who presents to clinic for reevaluation of right rib pain, right arm pain, and update of work restrictions.  Patient notes that pain has significant improved/resolved and she feels able to return to normal activities.  Vital signs normal.  Physical exam without acute abnormalities.  Work restriction paperwork updated and patient may return without restrictions at this time.        See Patient Instructions    Chris Perez is a 43 year old, presenting for the following health issues:  Follow Up Work Comp    Patient stated that she is feeling much better, She has been stretching and resting quiet a bit.        10/3/2024     8:40 AM   Additional Questions   Roomed by Blanca DIETZ MA   Accompanied by No one         10/3/2024     8:40 AM   Patient Reported Additional Medications   Patient reports taking the following new medications None     History of Present Illness       Reason for visit:  Work comp follow up Ribs    She eats 4 or more servings of fruits and vegetables daily.She consumes 3 sweetened beverage(s) daily.She exercises with enough effort to increase her heart rate 60 or more minutes per day.  She exercises with enough effort to increase her heart rate 4 days per week.   She is taking medications regularly.       Patient notes that all symptoms have improved/resolved.  Bruising at lateral aspect of right arm is almost completely gone and she has normal function of arm.  Right rib pain is almost completely gone.  Patient has been gardening and feels this has been helping pain resolved.  Patient feels she can do her normal day-to-day activities.    Per chart review, patient last evaluated in clinic 9/26/2024 for follow-up of right sided chest wall pain, rib contusion, and contusion of right upper arm.  Symptoms started 9/17/2024 after attempting to open stuck door by using force  "with right side of body.  X-rays negative for fracture.  Patient given work restrictions in ED.  Work restrictions were updated to light duty at this visit.  Management with heat/ice and ibuprofen recommended.  No further narcotics recommended.  Patient scheduled for follow-up in 1 week to reevaluate work restrictions.    Review of Systems   Constitutional:  Negative for fever.   Musculoskeletal:  Negative for arthralgias and myalgias.   Skin:  Negative for wound.   Neurological:  Negative for weakness.           Objective    /86   Pulse 87   Temp 97.6  F (36.4  C) (Temporal)   Resp 20   Ht 1.575 m (5' 2\")   Wt 86.3 kg (190 lb 3.2 oz)   LMP 07/23/2010 (Exact Date)   SpO2 97%   Breastfeeding No   BMI 34.79 kg/m    Body mass index is 34.79 kg/m .  Physical Exam  Vitals and nursing note reviewed.   Constitutional:       General: She is not in acute distress.     Appearance: Normal appearance.   HENT:      Head: Normocephalic and atraumatic.   Eyes:      Extraocular Movements: Extraocular movements intact.      Pupils: Pupils are equal, round, and reactive to light.   Cardiovascular:      Rate and Rhythm: Normal rate.   Pulmonary:      Effort: Pulmonary effort is normal.   Musculoskeletal:         General: Normal range of motion.      Cervical back: Normal range of motion.      Comments: Right ribs: No tenderness to palpation.  No erythema, ecchymosis, swelling, deformity, crepitus.  Right upper arm: Resolving/minimal ecchymosis at lateral aspect without underlying firmness or tenderness to palpation.  Normal range of motion of arm.   Skin:     General: Skin is warm and dry.   Neurological:      General: No focal deficit present.      Mental Status: She is alert.   Psychiatric:         Mood and Affect: Mood normal.         Behavior: Behavior normal.                Signed Electronically by: Aruna Francis PA-C    "

## 2024-10-03 NOTE — PATIENT INSTRUCTIONS
Your work note has been updated and you can go ahead and return to work without restrictions today.  Reach out with questions/concerns.

## 2024-10-08 ENCOUNTER — HOSPITAL ENCOUNTER (OUTPATIENT)
Dept: MAMMOGRAPHY | Facility: CLINIC | Age: 44
Discharge: HOME | End: 2024-10-08
Attending: PHYSICIAN ASSISTANT | Admitting: PHYSICIAN ASSISTANT
Payer: COMMERCIAL

## 2024-10-08 DIAGNOSIS — Z12.31 VISIT FOR SCREENING MAMMOGRAM: ICD-10-CM

## 2024-10-08 PROCEDURE — 77063 BREAST TOMOSYNTHESIS BI: CPT

## 2024-10-11 ENCOUNTER — TRANSFERRED RECORDS (OUTPATIENT)
Dept: HEALTH INFORMATION MANAGEMENT | Facility: CLINIC | Age: 44
End: 2024-10-11
Payer: COMMERCIAL

## 2024-10-11 LAB
CREATININE (EXTERNAL): 0.63 MG/DL (ref 0.57–1)
GFR ESTIMATED (EXTERNAL): 111 ML/MIN/1.73
GLUCOSE (EXTERNAL): 91 MG/DL (ref 70–99)
POTASSIUM (EXTERNAL): 3.2 MMOL/L (ref 3.5–5.2)

## 2024-10-17 ENCOUNTER — TRANSFERRED RECORDS (OUTPATIENT)
Dept: HEALTH INFORMATION MANAGEMENT | Facility: CLINIC | Age: 44
End: 2024-10-17
Payer: COMMERCIAL

## 2024-10-18 ENCOUNTER — OFFICE VISIT (OUTPATIENT)
Dept: DERMATOLOGY | Facility: CLINIC | Age: 44
End: 2024-10-18
Payer: COMMERCIAL

## 2024-10-18 DIAGNOSIS — L28.1 PRURIGO NODULARIS: Primary | ICD-10-CM

## 2024-10-18 PROCEDURE — 99213 OFFICE O/P EST LOW 20 MIN: CPT | Mod: 25 | Performed by: PHYSICIAN ASSISTANT

## 2024-10-18 PROCEDURE — 11900 INJECT SKIN LESIONS </W 7: CPT | Mod: 59 | Performed by: PHYSICIAN ASSISTANT

## 2024-10-18 PROCEDURE — 17110 DESTRUCTION B9 LES UP TO 14: CPT | Performed by: PHYSICIAN ASSISTANT

## 2024-10-18 RX ORDER — FLUOCINOLONE ACETONIDE 0.11 MG/ML
OIL TOPICAL
Qty: 118.28 ML | Refills: 4 | Status: SHIPPED | OUTPATIENT
Start: 2024-10-18

## 2024-10-18 ASSESSMENT — PAIN SCALES - GENERAL: PAINLEVEL: NO PAIN (0)

## 2024-10-18 NOTE — PROGRESS NOTES
Ana Felix is a pleasant 43 year old year old female patient here today for scaly bumps on right side of scalp. She notes this started last years after shingles. She note can be itchy and painful. Clobetasol solution drying.  Patient has no other skin complaints today.  Remainder of the HPI, Meds, PMH, Allergies, FH, and SH was reviewed in chart.   Past Medical History:   Diagnosis Date    Back pain     Crohn's disease (H)     Exercise-induced asthma     Gestational diabetes     Herniation of intervertebral disc of lumbar region     Infertility     Ovarian cyst     Smoker     Status post cholecystectomy 2005       Past Surgical History:   Procedure Laterality Date    APPENDECTOMY      C/SECTION, LOW TRANSVERSE      , Low Transverse    CHOLECYSTECTOMY, LAPOROSCOPIC  2005    Cholecystectomy, Laparoscopic    COLONOSCOPY      COLONOSCOPY N/A 2024    Procedure: Colonoscopy;  Surgeon: Robert Fiore MD;  Location: [facility]    ESOPHAGOSCOPY, GASTROSCOPY, DUODENOSCOPY (EGD), COMBINED  3/6/2014    Procedure: COMBINED ESOPHAGOSCOPY, GASTROSCOPY, DUODENOSCOPY (EGD), BIOPSY SINGLE OR MULTIPLE;  COLONOSCOPY/ UPPER GI ENDOSCOPY/ COLON/ EGD/ Abdominal pain, epigastric/ [facility];  Surgeon: West Lomas MD;  Location:  OR    ESOPHAGOSCOPY, GASTROSCOPY, DUODENOSCOPY (EGD), COMBINED N/A 2024    Procedure: ESOPHAGOGASTRODUODENOSCOPY, WITH BIOPSY;  Surgeon: Robert Fiore MD;  Location: [facility]    HC HYSTEROSCOPY, SURGICAL; W/ ENDOMETRIAL ABLATION, ANY METHOD  2010    HERNIORRHAPHY VENTRAL N/A 2018    Procedure: Open ventral hernia repair;  Surgeon: Alonso Bills MD;  Location: [facility]    HYSTERECTOMY, SUPRACERVICAL LAPAROSCOPIC      LEEP TX, CERVICAL      LEEP TX Cervical    ORTHOPEDIC SURGERY      bluged disk    partial small bowel resection      Ileo-colonic resection. Crohn's disease surgery for bowel obstruction. this was a section of small bowel and large bowel and the cecum.,  all removed and a reanastamosis done successfully    SALPINGO OOPHORECTOMY,R/L/TORI      Right ovary    SLING TRANSVAGINAL N/A 4/4/2022    Procedure: Pubovaginal sling with Altis;  Surgeon: Eleuterio Stone MD;  Location: [facility]    TUBAL LIGATION          Family History   Problem Relation Age of Onset    Hyperlipidemia Mother     Cancer Mother         cervical    Lipids Mother     Hypertension Father     Eye Disorder Father     Lipids Father     Alcohol/Drug Maternal Grandmother     Colon Cancer Paternal Grandmother     Diabetes Paternal Grandmother     Eye Disorder Paternal Grandmother     Diabetes Paternal Grandfather     Eye Disorder Paternal Grandfather     Inflammatory Bowel Disease Paternal Aunt         and two paternal uncles       Social History     Socioeconomic History    Marital status:      Spouse name: Not on file    Number of children: Not on file    Years of education: Not on file    Highest education level: Not on file   Occupational History    Not on file   Tobacco Use    Smoking status: Some Days     Current packs/day: 0.50     Types: Cigarettes     Passive exposure: Never    Smokeless tobacco: Never    Tobacco comments:     5 Cigarettes in the past two week   Vaping Use    Vaping status: Never Used   Substance and Sexual Activity    Alcohol use: Yes     Comment: very rarely    Drug use: No    Sexual activity: Yes     Partners: Male     Birth control/protection: Female Surgical     Comment: Hyst   Other Topics Concern    Parent/sibling w/ CABG, MI or angioplasty before 65F 55M? Not Asked   Social History Narrative    Not on file     Social Determinants of Health     Financial Resource Strain: High Risk (12/29/2023)    Financial Resource Strain     Within the past 12 months, have you or your family members you live with been unable to get utilities (heat, electricity) when it was really needed?: Yes   Food Insecurity: Low Risk  (12/29/2023)    Food Insecurity     Within the past 12 months,  did you worry that your food would run out before you got money to buy more?: No     Within the past 12 months, did the food you bought just not last and you didn t have money to get more?: No   Recent Concern: Food Insecurity - High Risk (10/17/2023)    Food Insecurity     Within the past 12 months, did you worry that your food would run out before you got money to buy more?: Yes     Within the past 12 months, did the food you bought just not last and you didn t have money to get more?: No   Transportation Needs: Low Risk  (12/29/2023)    Transportation Needs     Within the past 12 months, has lack of transportation kept you from medical appointments, getting your medicines, non-medical meetings or appointments, work, or from getting things that you need?: No   Physical Activity: Not on file   Stress: Not on file   Social Connections: Unknown (1/1/2022)    Received from [facility], [facility]    Social Connections     Frequency of Communication with Friends and Family: Not on file   Interpersonal Safety: Low Risk  (8/21/2024)    Interpersonal Safety     Do you feel physically and emotionally safe where you currently live?: Yes     Within the past 12 months, have you been hit, slapped, kicked or otherwise physically hurt by someone?: No     Within the past 12 months, have you been humiliated or emotionally abused in other ways by your partner or ex-partner?: No   Housing Stability: High Risk (12/29/2023)    Housing Stability     Do you have housing? : Yes     Are you worried about losing your housing?: Yes       Outpatient Encounter Medications as of 10/18/2024   Medication Sig Dispense Refill    Fluocinolone Acetonide Scalp 0.01 % OIL oil Apply sparingly twice daily as needed to scalp 118.28 mL 4    azaTHIOprine (IMURAN) 50 MG tablet Take 50 mg by mouth daily       busPIRone (BUSPAR) 10 MG tablet Take 1 tablet (10 mg) by mouth 2 times daily 180  tablet 3    cloNIDine (CATAPRES) 0.1 MG tablet Take 1 tablet by mouth 2 times daily.      cyanocobalamin (VITAMIN B12) 1000 MCG/ML injection Inject 1 mL into the muscle every 30 days      gabapentin (NEURONTIN) 300 MG capsule Take 2 capsules (600 mg) by mouth 3 times daily 180 capsule 0    hydrOXYzine HCl (ATARAX) 25 MG tablet Take 0.5-2 tablets (12.5-50 mg) by mouth 3 times daily as needed for anxiety - May use 50-100mg at bedtime for sleep. Max dose 400mg/day 120 tablet 5    ibuprofen (ADVIL/MOTRIN) 800 MG tablet Take 1 tablet (800 mg) by mouth every 8 hours as needed for moderate pain 60 tablet 0    ketoconazole (NIZORAL) 2 % external shampoo Apply topically 2-3 times a week as needed for itching or irritation. 120 mL 3    montelukast (SINGULAIR) 10 MG tablet Take 1 tablet (10 mg) by mouth daily 90 tablet 3    mupirocin (BACTROBAN) 2 % external ointment Apply topically 3 times daily x7-10 days 30 g 1    naloxone (NARCAN) 4 MG/0.1ML nasal spray 1 spray (4 mg) intranasally into 1 nostril. Administer into alternating nostrils. May repeat every 2 to 3 minutes.      omeprazole (PRILOSEC) 40 MG DR capsule Take 1 capsule (40 mg) by mouth daily. 90 capsule 3    oxyCODONE-acetaminophen (PERCOCET) 7.5-325 MG per tablet TAKE 1 TABLET BY MOUTH EVERY 6 HOURS AS NEEDED FOR PAIN. USE DATES 12/26/23 TO 01/24/24      sertraline (ZOLOFT) 100 MG tablet Take 2 tablets (200 mg) by mouth daily 180 tablet 3    tiZANidine (ZANAFLEX) 2 MG tablet TAKE 1 TO 2 TABLETS BY MOUTH THREE TIMES DAILY AS NEEDED. DO NOT TAKE WITH OTHER MUSCLE RELAXATION      traZODone (DESYREL) 150 MG tablet Take 1 tablet (150 mg) by mouth at bedtime 90 tablet 3    triamcinolone (KENALOG) 0.1 % external ointment Apply topically 2 times daily x5-10 days 30 g 1    VENTOLIN  (90 Base) MCG/ACT inhaler INHALE 2 PUFFS INTO THE LUNGS EVERY 4 HOURS AS NEEDED FOR SHORTNESS OF BREATH OR DIFFICULT BREATHING 18 g 1     Facility-Administered Encounter Medications as of  10/18/2024   Medication Dose Route Frequency Provider Last Rate Last Admin    [COMPLETED] triamcinolone acetonide (KENALOG-10) injection 10 mg  10 mg Intra-Lesional Once Shadia Will PA-C   10 mg at 10/18/24 1003             O:   NAD, WDWN, Alert & Oriented, Mood & Affect wnl, Vitals stable   Here today alone   LMP 07/23/2010 (Exact Date)    General appearance normal   Vitals stable   Alert, oriented and in no acute distress      Pink scaly excoriated papules on right side of scalp      Eyes: Conjunctivae/lids:Normal     ENT: Lipsnormal    MSK:Normal    Pulm: Breathing Normal    Neuro/Psych: Orientation:Alert and Orientedx3 ; Mood/Affect:normal     A/P:  Prurigo on right side of scalp  Discussed could be secondary to postherpetic neuropathy.   Will try intralesional on right parietal scalp and cryo to occipital scalp  Prurigo on 5 on right parietal scalp   IL TAC: PGACAC discussed.  Risks including but not limited to injection site reaction, bruising, no resolution.  All questions answered and entertained to patient s satisfaction.  Informed consent obtained.  IL TAC in concentration of 10mg/ml was injected ID to prurigo.  Total injected was  0.5 ml.  Patient tolerated without complications and given wound care instructions, including not to move product around.  Return in 4 weeks for follow-up and possible additional IL TAC.    Prurigo x 2 on occipital scalp  LN2:  Treated with LN2 for 5s for 1-2 cycles. Warned risks of blistering, pain, pigment change, scarring, and incomplete resolution.  Advised patient to return if lesions do not completely resolve.  Wound care sheet given.      Clinic Administered Medication Documentation        Patient was given 0.5 ml of Kenalog 10. Prior to medication administration, verified patient's identity using patient s name and date of birth. Please see MAR and medication order for additional information. Patient instructed to remain in clinic for 15 minutes and report  any adverse reaction to staff immediately.    Vial/Syringe: Multi dose vial

## 2024-10-18 NOTE — NURSING NOTE
Chief Complaint   Patient presents with    Derm Problem     Follow up on scalp, still getting sores        There were no vitals filed for this visit.  Wt Readings from Last 1 Encounters:   10/03/24 86.3 kg (190 lb 3.2 oz)       Venecia Caraballo LPN .................10/18/2024

## 2024-10-18 NOTE — LETTER
10/18/2024      Ana Felix  [address] [facility] [place]  [place] [address]      Dear Colleague,    Thank you for referring your patient, Ana Felix, to the [facility]. Please see a copy of my visit note below.    Ana Felix is a pleasant 43 year old year old female patient here today for scaly bumps on right side of scalp. She notes this started last years after shingles. She note can be itchy and painful. Clobetasol solution drying.  Patient has no other skin complaints today.  Remainder of the HPI, Meds, PMH, Allergies, FH, and SH was reviewed in chart.   Past Medical History:   Diagnosis Date     Back pain      Crohn's disease (H)      Exercise-induced asthma      Gestational diabetes      Herniation of intervertebral disc of lumbar region      Infertility      Ovarian cyst      Smoker      Status post cholecystectomy 2005       Past Surgical History:   Procedure Laterality Date     APPENDECTOMY       C/SECTION, LOW TRANSVERSE      , Low Transverse     CHOLECYSTECTOMY, LAPOROSCOPIC  2005    Cholecystectomy, Laparoscopic     COLONOSCOPY       COLONOSCOPY N/A 2024    Procedure: Colonoscopy;  Surgeon: Robert Fiore MD;  Location: [facility]     ESOPHAGOSCOPY, GASTROSCOPY, DUODENOSCOPY (EGD), COMBINED  3/6/2014    Procedure: COMBINED ESOPHAGOSCOPY, GASTROSCOPY, DUODENOSCOPY (EGD), BIOPSY SINGLE OR MULTIPLE;  COLONOSCOPY/ UPPER GI ENDOSCOPY/ COLON/ EGD/ Abdominal pain, epigastric/ [facility];  Surgeon: West Lomas MD;  Location:  OR     ESOPHAGOSCOPY, GASTROSCOPY, DUODENOSCOPY (EGD), COMBINED N/A 2024    Procedure: ESOPHAGOGASTRODUODENOSCOPY, WITH BIOPSY;  Surgeon: Robert Fiore MD;  Location: [facility]     HC HYSTEROSCOPY, SURGICAL; W/ ENDOMETRIAL ABLATION, ANY METHOD  2010     HERNIORRHAPHY VENTRAL N/A 2018    Procedure: Open ventral hernia repair;  Surgeon: Alonso Bills MD;  Location: [facility]     HYSTERECTOMY, SUPRACERVICAL LAPAROSCOPIC       LEEP TX,  CERVICAL      LEEP TX Cervical     ORTHOPEDIC SURGERY      bluged disk     partial small bowel resection  2002    Ileo-colonic resection. Crohn's disease surgery for bowel obstruction. this was a section of small bowel and large bowel and the cecum., all removed and a reanastamosis done successfully     SALPINGO OOPHORECTOMY,R/L/TORI      Right ovary     SLING TRANSVAGINAL N/A 4/4/2022    Procedure: Pubovaginal sling with Altis;  Surgeon: Eleuterio Stone MD;  Location: [facility]     TUBAL LIGATION          Family History   Problem Relation Age of Onset     Hyperlipidemia Mother      Cancer Mother         cervical     Lipids Mother      Hypertension Father      Eye Disorder Father      Lipids Father      Alcohol/Drug Maternal Grandmother      Colon Cancer Paternal Grandmother      Diabetes Paternal Grandmother      Eye Disorder Paternal Grandmother      Diabetes Paternal Grandfather      Eye Disorder Paternal Grandfather      Inflammatory Bowel Disease Paternal Aunt         and two paternal uncles       Social History     Socioeconomic History     Marital status:      Spouse name: Not on file     Number of children: Not on file     Years of education: Not on file     Highest education level: Not on file   Occupational History     Not on file   Tobacco Use     Smoking status: Some Days     Current packs/day: 0.50     Types: Cigarettes     Passive exposure: Never     Smokeless tobacco: Never     Tobacco comments:     5 Cigarettes in the past two week   Vaping Use     Vaping status: Never Used   Substance and Sexual Activity     Alcohol use: Yes     Comment: very rarely     Drug use: No     Sexual activity: Yes     Partners: Male     Birth control/protection: Female Surgical     Comment: Hyst   Other Topics Concern     Parent/sibling w/ CABG, MI or angioplasty before 65F 55M? Not Asked   Social History Narrative     Not on file     Social Determinants of Health     Financial Resource Strain: High Risk  (12/29/2023)    Financial Resource Strain      Within the past 12 months, have you or your family members you live with been unable to get utilities (heat, electricity) when it was really needed?: Yes   Food Insecurity: Low Risk  (12/29/2023)    Food Insecurity      Within the past 12 months, did you worry that your food would run out before you got money to buy more?: No      Within the past 12 months, did the food you bought just not last and you didn t have money to get more?: No   Recent Concern: Food Insecurity - High Risk (10/17/2023)    Food Insecurity      Within the past 12 months, did you worry that your food would run out before you got money to buy more?: Yes      Within the past 12 months, did the food you bought just not last and you didn t have money to get more?: No   Transportation Needs: Low Risk  (12/29/2023)    Transportation Needs      Within the past 12 months, has lack of transportation kept you from medical appointments, getting your medicines, non-medical meetings or appointments, work, or from getting things that you need?: No   Physical Activity: Not on file   Stress: Not on file   Social Connections: Unknown (1/1/2022)    Received from [facility], [facility]    Social Connections      Frequency of Communication with Friends and Family: Not on file   Interpersonal Safety: Low Risk  (8/21/2024)    Interpersonal Safety      Do you feel physically and emotionally safe where you currently live?: Yes      Within the past 12 months, have you been hit, slapped, kicked or otherwise physically hurt by someone?: No      Within the past 12 months, have you been humiliated or emotionally abused in other ways by your partner or ex-partner?: No   Housing Stability: High Risk (12/29/2023)    Housing Stability      Do you have housing? : Yes      Are you worried about losing your housing?: Yes       Outpatient Encounter Medications as of  10/18/2024   Medication Sig Dispense Refill     Fluocinolone Acetonide Scalp 0.01 % OIL oil Apply sparingly twice daily as needed to scalp 118.28 mL 4     azaTHIOprine (IMURAN) 50 MG tablet Take 50 mg by mouth daily        busPIRone (BUSPAR) 10 MG tablet Take 1 tablet (10 mg) by mouth 2 times daily 180 tablet 3     cloNIDine (CATAPRES) 0.1 MG tablet Take 1 tablet by mouth 2 times daily.       cyanocobalamin (VITAMIN B12) 1000 MCG/ML injection Inject 1 mL into the muscle every 30 days       gabapentin (NEURONTIN) 300 MG capsule Take 2 capsules (600 mg) by mouth 3 times daily 180 capsule 0     hydrOXYzine HCl (ATARAX) 25 MG tablet Take 0.5-2 tablets (12.5-50 mg) by mouth 3 times daily as needed for anxiety - May use 50-100mg at bedtime for sleep. Max dose 400mg/day 120 tablet 5     ibuprofen (ADVIL/MOTRIN) 800 MG tablet Take 1 tablet (800 mg) by mouth every 8 hours as needed for moderate pain 60 tablet 0     ketoconazole (NIZORAL) 2 % external shampoo Apply topically 2-3 times a week as needed for itching or irritation. 120 mL 3     montelukast (SINGULAIR) 10 MG tablet Take 1 tablet (10 mg) by mouth daily 90 tablet 3     mupirocin (BACTROBAN) 2 % external ointment Apply topically 3 times daily x7-10 days 30 g 1     naloxone (NARCAN) 4 MG/0.1ML nasal spray 1 spray (4 mg) intranasally into 1 nostril. Administer into alternating nostrils. May repeat every 2 to 3 minutes.       omeprazole (PRILOSEC) 40 MG DR capsule Take 1 capsule (40 mg) by mouth daily. 90 capsule 3     oxyCODONE-acetaminophen (PERCOCET) 7.5-325 MG per tablet TAKE 1 TABLET BY MOUTH EVERY 6 HOURS AS NEEDED FOR PAIN. USE DATES 12/26/23 TO 01/24/24       sertraline (ZOLOFT) 100 MG tablet Take 2 tablets (200 mg) by mouth daily 180 tablet 3     tiZANidine (ZANAFLEX) 2 MG tablet TAKE 1 TO 2 TABLETS BY MOUTH THREE TIMES DAILY AS NEEDED. DO NOT TAKE WITH OTHER MUSCLE RELAXATION       traZODone (DESYREL) 150 MG tablet Take 1 tablet (150 mg) by mouth at bedtime 90  tablet 3     triamcinolone (KENALOG) 0.1 % external ointment Apply topically 2 times daily x5-10 days 30 g 1     VENTOLIN  (90 Base) MCG/ACT inhaler INHALE 2 PUFFS INTO THE LUNGS EVERY 4 HOURS AS NEEDED FOR SHORTNESS OF BREATH OR DIFFICULT BREATHING 18 g 1     Facility-Administered Encounter Medications as of 10/18/2024   Medication Dose Route Frequency Provider Last Rate Last Admin     [COMPLETED] triamcinolone acetonide (KENALOG-10) injection 10 mg  10 mg Intra-Lesional Once Shadia Will PA-C   10 mg at 10/18/24 1003             O:   NAD, WDWN, Alert & Oriented, Mood & Affect wnl, Vitals stable   Here today alone   LMP 07/23/2010 (Exact Date)    General appearance normal   Vitals stable   Alert, oriented and in no acute distress      Pink scaly excoriated papules on right side of scalp      Eyes: Conjunctivae/lids:Normal     ENT: Lipsnormal    MSK:Normal    Pulm: Breathing Normal    Neuro/Psych: Orientation:Alert and Orientedx3 ; Mood/Affect:normal     A/P:  Prurigo on right side of scalp  Discussed could be secondary to postherpetic neuropathy.   Will try intralesional on right parietal scalp and cryo to occipital scalp  Prurigo on 5 on right parietal scalp   IL TAC: PGACAC discussed.  Risks including but not limited to injection site reaction, bruising, no resolution.  All questions answered and entertained to patient s satisfaction.  Informed consent obtained.  IL TAC in concentration of 10mg/ml was injected ID to prurigo.  Total injected was  0.5 ml.  Patient tolerated without complications and given wound care instructions, including not to move product around.  Return in 4 weeks for follow-up and possible additional IL TAC.    Prurigo x 2 on occipital scalp  LN2:  Treated with LN2 for 5s for 1-2 cycles. Warned risks of blistering, pain, pigment change, scarring, and incomplete resolution.  Advised patient to return if lesions do not completely resolve.  Wound care sheet given.      Clinic  Administered Medication Documentation        Patient was given 0.5 ml of Kenalog 10. Prior to medication administration, verified patient's identity using patient s name and date of birth. Please see MAR and medication order for additional information. Patient instructed to remain in clinic for 15 minutes and report any adverse reaction to staff immediately.    Vial/Syringe: Multi dose vial      Again, thank you for allowing me to participate in the care of your patient.        Sincerely,        Shadia Wolfe PA-C

## 2024-10-22 ENCOUNTER — TRANSFERRED RECORDS (OUTPATIENT)
Dept: HEALTH INFORMATION MANAGEMENT | Facility: CLINIC | Age: 44
End: 2024-10-22
Payer: COMMERCIAL

## 2024-10-23 ENCOUNTER — HOSPITAL ENCOUNTER (OUTPATIENT)
Dept: CT IMAGING | Facility: HOSPITAL | Age: 44
Discharge: HOME | End: 2024-10-23
Attending: PHYSICIAN ASSISTANT | Admitting: PHYSICIAN ASSISTANT
Payer: COMMERCIAL

## 2024-10-23 DIAGNOSIS — R19.7 DIARRHEA, UNSPECIFIED TYPE: ICD-10-CM

## 2024-10-23 DIAGNOSIS — K50.00 CROHN'S DISEASE OF ILEUM WITHOUT COMPLICATION (H): ICD-10-CM

## 2024-10-23 DIAGNOSIS — R10.84 ABDOMINAL PAIN, GENERALIZED: ICD-10-CM

## 2024-10-23 DIAGNOSIS — R15.2 FECAL URGENCY: ICD-10-CM

## 2024-10-23 PROCEDURE — 74177 CT ABD & PELVIS W/CONTRAST: CPT

## 2024-10-23 PROCEDURE — 250N000009 HC RX 250

## 2024-10-23 PROCEDURE — 250N000011 HC RX IP 250 OP 636

## 2024-10-23 RX ORDER — IOPAMIDOL 755 MG/ML
90 INJECTION, SOLUTION INTRAVASCULAR ONCE
Status: COMPLETED | OUTPATIENT
Start: 2024-10-23 | End: 2024-10-23

## 2024-10-23 RX ADMIN — IOPAMIDOL 90 ML: 755 INJECTION, SOLUTION INTRAVENOUS at 18:00

## 2024-10-23 RX ADMIN — SODIUM CHLORIDE 90 ML: 9 INJECTION, SOLUTION INTRAVENOUS at 18:04

## 2024-10-30 ENCOUNTER — TRANSFERRED RECORDS (OUTPATIENT)
Dept: HEALTH INFORMATION MANAGEMENT | Facility: CLINIC | Age: 44
End: 2024-10-30
Payer: COMMERCIAL

## 2024-11-02 DIAGNOSIS — G47.00 INSOMNIA, UNSPECIFIED TYPE: ICD-10-CM

## 2024-11-04 RX ORDER — TRAZODONE HYDROCHLORIDE 150 MG/1
150 TABLET ORAL AT BEDTIME
Qty: 90 TABLET | Refills: 0 | Status: SHIPPED | OUTPATIENT
Start: 2024-11-04

## 2024-11-13 ENCOUNTER — TRANSFERRED RECORDS (OUTPATIENT)
Dept: HEALTH INFORMATION MANAGEMENT | Facility: CLINIC | Age: 44
End: 2024-11-13
Payer: COMMERCIAL

## 2024-12-02 NOTE — PROGRESS NOTES
Ana Felix is a 43 year old female  Chief Complaint: Hearing loss, mild to severe with mild conductive component in the lower frequency range. Hx of recurrent ear infections in childhood, several sets of tubes  History of Present Illness  Location: ears   Quality: hearing loss  Severity: mild to severe  Duration: long standing    Past Medical History -   Patient Active Problem List   Diagnosis    Crohns disease    Female pelvic pain    CARDIOVASCULAR SCREENING; LDL GOAL LESS THAN 160    Migraine headache    Back pain    Obesity    Disorder of sacrum    Displacement of lumbar intervertebral disc without myelopathy    Tobacco abuse    S/P oophorectomy    History of cholecystectomy    History of resection of small bowel    Abdominal pain    Dorsalgia, unspecified    Chronic low back pain    Mild persistent asthma, uncomplicated    Nausea with vomiting    Abdominal pain, right upper quadrant    Irritable bowel syndrome (IBS)    Lactose intolerance    Hearing difficulty    S/P LEEP of cervix    Controlled substance agreement signed    Degenerative lumbar disc    Dysmenorrhea    Labral tear of hip, degenerative    Pain medication agreement    Pain of right hip joint    Crohn's disease of both small and large intestine without complication (H)    History of gestational diabetes mellitus (GDM)    Amenorrhea    Immunocompromised state (H)    Hidradenitis suppurativa    SKYE (stress urinary incontinence, female)    DANIELLE (generalized anxiety disorder)    Insomnia, unspecified type    Primary osteoarthritis of left knee    Closed nondisplaced fracture of head of right radius with routine healing, subsequent encounter    Prediabetes       Current Medications -   Current Outpatient Medications:     azaTHIOprine (IMURAN) 50 MG tablet, Take 50 mg by mouth daily , Disp: , Rfl:     busPIRone (BUSPAR) 10 MG tablet, Take 1 tablet (10 mg) by mouth 2 times daily, Disp: 180 tablet, Rfl: 3    cloNIDine (CATAPRES) 0.1 MG tablet, Take 1  tablet by mouth 2 times daily., Disp: , Rfl:     cyanocobalamin (VITAMIN B12) 1000 MCG/ML injection, Inject 1 mL into the muscle every 30 days, Disp: , Rfl:     Fluocinolone Acetonide Scalp 0.01 % OIL oil, Apply sparingly twice daily as needed to scalp, Disp: 118.28 mL, Rfl: 4    gabapentin (NEURONTIN) 300 MG capsule, Take 2 capsules (600 mg) by mouth 3 times daily, Disp: 180 capsule, Rfl: 0    hydrOXYzine HCl (ATARAX) 25 MG tablet, Take 0.5-2 tablets (12.5-50 mg) by mouth 3 times daily as needed for anxiety - May use 50-100mg at bedtime for sleep. Max dose 400mg/day, Disp: 120 tablet, Rfl: 5    ibuprofen (ADVIL/MOTRIN) 800 MG tablet, Take 1 tablet (800 mg) by mouth every 8 hours as needed for moderate pain, Disp: 60 tablet, Rfl: 0    ketoconazole (NIZORAL) 2 % external shampoo, Apply topically 2-3 times a week as needed for itching or irritation., Disp: 120 mL, Rfl: 3    montelukast (SINGULAIR) 10 MG tablet, Take 1 tablet (10 mg) by mouth daily, Disp: 90 tablet, Rfl: 3    mupirocin (BACTROBAN) 2 % external ointment, Apply topically 3 times daily x7-10 days, Disp: 30 g, Rfl: 1    naloxone (NARCAN) 4 MG/0.1ML nasal spray, 1 spray (4 mg) intranasally into 1 nostril. Administer into alternating nostrils. May repeat every 2 to 3 minutes., Disp: , Rfl:     omeprazole (PRILOSEC) 40 MG DR capsule, Take 1 capsule (40 mg) by mouth daily., Disp: 90 capsule, Rfl: 3    oxyCODONE-acetaminophen (PERCOCET) 7.5-325 MG per tablet, TAKE 1 TABLET BY MOUTH EVERY 6 HOURS AS NEEDED FOR PAIN. USE DATES 12/26/23 TO 01/24/24, Disp: , Rfl:     sertraline (ZOLOFT) 100 MG tablet, Take 2 tablets (200 mg) by mouth daily, Disp: 180 tablet, Rfl: 3    tiZANidine (ZANAFLEX) 2 MG tablet, TAKE 1 TO 2 TABLETS BY MOUTH THREE TIMES DAILY AS NEEDED. DO NOT TAKE WITH OTHER MUSCLE RELAXATION, Disp: , Rfl:     traZODone (DESYREL) 150 MG tablet, TAKE 1 TABLET BY MOUTH ONCE DAILY AT BEDTIME, Disp: 90 tablet, Rfl: 0    triamcinolone (KENALOG) 0.1 % external  "ointment, Apply topically 2 times daily x5-10 days, Disp: 30 g, Rfl: 1    VENTOLIN  (90 Base) MCG/ACT inhaler, INHALE 2 PUFFS INTO THE LUNGS EVERY 4 HOURS AS NEEDED FOR SHORTNESS OF BREATH OR DIFFICULT BREATHING, Disp: 18 g, Rfl: 1    Allergies -   Allergies   Allergen Reactions    Infliximab Hives    Sulfa Antibiotics Nausea     Pt states \"it doesn't work for me\"  Pt states \"it doesn't work for me\"  Other reaction(s): Diarrhea, nausea, Nausea, Intolerance  Pt states \"it doesn't work for me\"    Sulfacetamide Nausea    Amoxicillin Nausea and Vomiting    Amoxicillin-Pot Clavulanate Other (See Comments) and Nausea and Vomiting     Crohn's    Aspirin Nausea    Bupropion Hives and Nausea    Clavulanic Acid Nausea and Vomiting    Droperidol Other (See Comments)     Dystonic reaction    Ibuprofen Other (See Comments) and Nausea     Crohn's      Lyrica [Pregabalin] Nausea and Vomiting     Grogginess, severe back pain  Grogginess, severe back pain    Vicodin [Hydrocodone-Acetaminophen] Nausea and Vomiting    Adhesive Tape Rash       Social History -   Social History     Socioeconomic History    Marital status:    Tobacco Use    Smoking status: Some Days     Current packs/day: 0.50     Types: Cigarettes     Passive exposure: Never    Smokeless tobacco: Never    Tobacco comments:     5 Cigarettes in the past two week   Vaping Use    Vaping status: Never Used   Substance and Sexual Activity    Alcohol use: Yes     Comment: very rarely    Drug use: No    Sexual activity: Yes     Partners: Male     Birth control/protection: Female Surgical     Comment: [facility]     Social Drivers of Health     Financial Resource Strain: High Risk (12/29/2023)    Financial Resource Strain     Within the past 12 months, have you or your family members you live with been unable to get utilities (heat, electricity) when it was really needed?: Yes   Food Insecurity: Low Risk  (12/29/2023)    Food Insecurity     Within the past 12 months, did " you worry that your food would run out before you got money to buy more?: No     Within the past 12 months, did the food you bought just not last and you didn t have money to get more?: No   Recent Concern: Food Insecurity - High Risk (10/17/2023)    Food Insecurity     Within the past 12 months, did you worry that your food would run out before you got money to buy more?: Yes     Within the past 12 months, did the food you bought just not last and you didn t have money to get more?: No   Transportation Needs: Low Risk  (12/29/2023)    Transportation Needs     Within the past 12 months, has lack of transportation kept you from medical appointments, getting your medicines, non-medical meetings or appointments, work, or from getting things that you need?: No    Received from [facility], [facility]    Social Connections   Interpersonal Safety: Low Risk  (8/21/2024)    Interpersonal Safety     Do you feel physically and emotionally safe where you currently live?: Yes     Within the past 12 months, have you been hit, slapped, kicked or otherwise physically hurt by someone?: No     Within the past 12 months, have you been humiliated or emotionally abused in other ways by your partner or ex-partner?: No   Housing Stability: High Risk (12/29/2023)    Housing Stability     Do you have housing? : Yes     Are you worried about losing your housing?: Yes       Family History -   Family History   Problem Relation Age of Onset    Hyperlipidemia Mother     Cancer Mother         cervical    Lipids Mother     Hypertension Father     Eye Disorder Father     Lipids Father     Alcohol/Drug Maternal Grandmother     Colon Cancer Paternal Grandmother     Diabetes Paternal Grandmother     Eye Disorder Paternal Grandmother     Diabetes Paternal Grandfather     Eye Disorder Paternal Grandfather     Inflammatory Bowel Disease Paternal Aunt         and two paternal uncles        Review of Systems:   !.  Weight Loss: No   2. Difficulty Breathing: No   3. Difficulty Swallowing: No   4. Pain: No    Physical Exam  B/P: Data Unavailable, T: Data Unavailable, P: Data Unavailable, R: Data Unavailable  Vitals: LMP 07/23/2010 (Exact Date)   BMI= There is no height or weight on file to calculate BMI.    General  Appearance - Normal  Head/Face/Scalp:    Skin - Normal    Facial Palpation - Normal    Facial Strength - Normal  Ears:    Pinna - Normal    Canal - Normal   Tympanic membrane - crarred bilaterally  Nose:    External - Normal    Septum - Normal    Turbinates - Normal    Middle meatus - Normal  Oral Cavity:    Lips - Normal    Floor of Mouth - Normal    Gingiva - Normal    Tongue - Normal    Buccal - Normal    Palate - Normal  Nasopharynx:    Oropharynx:    Tonsils - Normal    Tongue base - Normal    Soft palate - Normal    Posterior pharyngeal wall - Normal  Hypopharynx:  Larynx:    Epiglottis -     Aryepiglottic folds -     Arytenoids -     False vocal cords -     True vocal cords -  Neck Masses - No  Neck lymphatics - no lymphadenopathy  Thyroid - Normal  Salivary glands - Normal    Audiogram 5/24   RIGHT:  normal sloping to mild, moderate, and severe sensorineural hearing loss    LEFT:    mild sloping to moderate and severe mixed hearing loss     Tympanogram:    RIGHT: restricted eardrum mobility (Type As)    LEFT:   restricted eardrum mobility (Type As)      Reflexes (reported by stimulus ear):  RIGHT: Ipsilateral is absent at frequencies tested  RIGHT: Contralateral is absent at frequencies tested  LEFT:   Ipsilateral is absent at frequencies tested  LEFT:   Contralateral is absent at frequencies tested        Speech Reception Threshold:    RIGHT: 20 dB HL    LEFT:   30 dB HL  Word Recognition Score:     RIGHT: 76% at 60 dB HL using NU-6 recorded word list.    LEFT:   80% at 70 dB HL using NU-6 recorded word list.     Radiology - not applicable   Reports:   View films:  Procedures -  not applicable  Patient Education:     A/P - Ana Felix is a 43 year old female  Medical Decision Making 1. Bilateral mixed ori loss. Cleared for amplification

## 2024-12-04 ENCOUNTER — OFFICE VISIT (OUTPATIENT)
Dept: AUDIOLOGY | Facility: CLINIC | Age: 44
End: 2024-12-04
Payer: COMMERCIAL

## 2024-12-04 ENCOUNTER — TRANSFERRED RECORDS (OUTPATIENT)
Dept: HEALTH INFORMATION MANAGEMENT | Facility: CLINIC | Age: 44
End: 2024-12-04

## 2024-12-04 ENCOUNTER — OFFICE VISIT (OUTPATIENT)
Dept: OTOLARYNGOLOGY | Facility: CLINIC | Age: 44
End: 2024-12-04
Payer: COMMERCIAL

## 2024-12-04 DIAGNOSIS — H90.A32 MIXED CONDUCTIVE AND SENSORINEURAL HEARING LOSS OF LEFT EAR WITH RESTRICTED HEARING OF RIGHT EAR: ICD-10-CM

## 2024-12-04 DIAGNOSIS — H90.A21 SENSORINEURAL HEARING LOSS (SNHL) OF RIGHT EAR WITH RESTRICTED HEARING OF LEFT EAR: Primary | ICD-10-CM

## 2024-12-04 DIAGNOSIS — H90.6 MIXED CONDUCTIVE AND SENSORINEURAL HEARING LOSS OF BOTH EARS: ICD-10-CM

## 2024-12-04 DIAGNOSIS — H90.6 MIXED CONDUCTIVE AND SENSORINEURAL HEARING LOSS OF BOTH EARS: Primary | ICD-10-CM

## 2024-12-04 PROCEDURE — 99203 OFFICE O/P NEW LOW 30 MIN: CPT | Performed by: OTOLARYNGOLOGY

## 2024-12-04 PROCEDURE — 92550 TYMPANOMETRY & REFLEX THRESH: CPT | Performed by: AUDIOLOGIST

## 2024-12-04 PROCEDURE — 92557 COMPREHENSIVE HEARING TEST: CPT | Performed by: AUDIOLOGIST

## 2024-12-04 NOTE — LETTER
12/4/2024      Ana Felix  [address]  [place] [address]      Dear Colleague,    Thank you for referring your patient, Ana Felix, to the [facility]. Please see a copy of my visit note below.    Ana Felix is a 43 year old female  Chief Complaint: Hearing loss, mild to severe with mild conductive component in the lower frequency range. Hx of recurrent ear infections in childhood, several sets of tubes  History of Present Illness  Location: ears   Quality: hearing loss  Severity: mild to severe  Duration: long standing    Past Medical History -   Patient Active Problem List   Diagnosis     Crohns disease     Female pelvic pain     CARDIOVASCULAR SCREENING; LDL GOAL LESS THAN 160     Migraine headache     Back pain     Obesity     Disorder of sacrum     Displacement of lumbar intervertebral disc without myelopathy     Tobacco abuse     S/P oophorectomy     History of cholecystectomy     History of resection of small bowel     Abdominal pain     Dorsalgia, unspecified     Chronic low back pain     Mild persistent asthma, uncomplicated     Nausea with vomiting     Abdominal pain, right upper quadrant     Irritable bowel syndrome (IBS)     Lactose intolerance     Hearing difficulty     S/P LEEP of cervix     Controlled substance agreement signed     Degenerative lumbar disc     Dysmenorrhea     Labral tear of hip, degenerative     Pain medication agreement     Pain of right hip joint     Crohn's disease of both small and large intestine without complication (H)     History of gestational diabetes mellitus (GDM)     Amenorrhea     Immunocompromised state (H)     Hidradenitis suppurativa     SKYE (stress urinary incontinence, female)     DANIELLE (generalized anxiety disorder)     Insomnia, unspecified type     Primary osteoarthritis of left knee     Closed nondisplaced fracture of head of right radius with routine healing, subsequent encounter     Prediabetes       Current Medications -   Current  Outpatient Medications:      azaTHIOprine (IMURAN) 50 MG tablet, Take 50 mg by mouth daily , Disp: , Rfl:      busPIRone (BUSPAR) 10 MG tablet, Take 1 tablet (10 mg) by mouth 2 times daily, Disp: 180 tablet, Rfl: 3     cloNIDine (CATAPRES) 0.1 MG tablet, Take 1 tablet by mouth 2 times daily., Disp: , Rfl:      cyanocobalamin (VITAMIN B12) 1000 MCG/ML injection, Inject 1 mL into the muscle every 30 days, Disp: , Rfl:      Fluocinolone Acetonide Scalp 0.01 % OIL oil, Apply sparingly twice daily as needed to scalp, Disp: 118.28 mL, Rfl: 4     gabapentin (NEURONTIN) 300 MG capsule, Take 2 capsules (600 mg) by mouth 3 times daily, Disp: 180 capsule, Rfl: 0     hydrOXYzine HCl (ATARAX) 25 MG tablet, Take 0.5-2 tablets (12.5-50 mg) by mouth 3 times daily as needed for anxiety - May use 50-100mg at bedtime for sleep. Max dose 400mg/day, Disp: 120 tablet, Rfl: 5     ibuprofen (ADVIL/MOTRIN) 800 MG tablet, Take 1 tablet (800 mg) by mouth every 8 hours as needed for moderate pain, Disp: 60 tablet, Rfl: 0     ketoconazole (NIZORAL) 2 % external shampoo, Apply topically 2-3 times a week as needed for itching or irritation., Disp: 120 mL, Rfl: 3     montelukast (SINGULAIR) 10 MG tablet, Take 1 tablet (10 mg) by mouth daily, Disp: 90 tablet, Rfl: 3     mupirocin (BACTROBAN) 2 % external ointment, Apply topically 3 times daily x7-10 days, Disp: 30 g, Rfl: 1     naloxone (NARCAN) 4 MG/0.1ML nasal spray, 1 spray (4 mg) intranasally into 1 nostril. Administer into alternating nostrils. May repeat every 2 to 3 minutes., Disp: , Rfl:      omeprazole (PRILOSEC) 40 MG DR capsule, Take 1 capsule (40 mg) by mouth daily., Disp: 90 capsule, Rfl: 3     oxyCODONE-acetaminophen (PERCOCET) 7.5-325 MG per tablet, TAKE 1 TABLET BY MOUTH EVERY 6 HOURS AS NEEDED FOR PAIN. USE DATES 12/26/23 TO 01/24/24, Disp: , Rfl:      sertraline (ZOLOFT) 100 MG tablet, Take 2 tablets (200 mg) by mouth daily, Disp: 180 tablet, Rfl: 3     tiZANidine (ZANAFLEX) 2 MG  "tablet, TAKE 1 TO 2 TABLETS BY MOUTH THREE TIMES DAILY AS NEEDED. DO NOT TAKE WITH OTHER MUSCLE RELAXATION, Disp: , Rfl:      traZODone (DESYREL) 150 MG tablet, TAKE 1 TABLET BY MOUTH ONCE DAILY AT BEDTIME, Disp: 90 tablet, Rfl: 0     triamcinolone (KENALOG) 0.1 % external ointment, Apply topically 2 times daily x5-10 days, Disp: 30 g, Rfl: 1     VENTOLIN  (90 Base) MCG/ACT inhaler, INHALE 2 PUFFS INTO THE LUNGS EVERY 4 HOURS AS NEEDED FOR SHORTNESS OF BREATH OR DIFFICULT BREATHING, Disp: 18 g, Rfl: 1    Allergies -   Allergies   Allergen Reactions     Infliximab Hives     Sulfa Antibiotics Nausea     Pt states \"it doesn't work for me\"  Pt states \"it doesn't work for me\"  Other reaction(s): Diarrhea, nausea, Nausea, Intolerance  Pt states \"it doesn't work for me\"     Sulfacetamide Nausea     Amoxicillin Nausea and Vomiting     Amoxicillin-Pot Clavulanate Other (See Comments) and Nausea and Vomiting     Crohn's     Aspirin Nausea     Bupropion Hives and Nausea     Clavulanic Acid Nausea and Vomiting     Droperidol Other (See Comments)     Dystonic reaction     Ibuprofen Other (See Comments) and Nausea     Crohn's       Lyrica [Pregabalin] Nausea and Vomiting     Grogginess, severe back pain  Grogginess, severe back pain     Vicodin [Hydrocodone-Acetaminophen] Nausea and Vomiting     Adhesive Tape Rash       Social History -   Social History     Socioeconomic History     Marital status:    Tobacco Use     Smoking status: Some Days     Current packs/day: 0.50     Types: Cigarettes     Passive exposure: Never     Smokeless tobacco: Never     Tobacco comments:     5 Cigarettes in the past two week   Vaping Use     Vaping status: Never Used   Substance and Sexual Activity     Alcohol use: Yes     Comment: very rarely     Drug use: No     Sexual activity: Yes     Partners: Male     Birth control/protection: Female Surgical     Comment: [facility]     Social Drivers of Health     Financial Resource Strain: High Risk " (12/29/2023)    Financial Resource Strain      Within the past 12 months, have you or your family members you live with been unable to get utilities (heat, electricity) when it was really needed?: Yes   Food Insecurity: Low Risk  (12/29/2023)    Food Insecurity      Within the past 12 months, did you worry that your food would run out before you got money to buy more?: No      Within the past 12 months, did the food you bought just not last and you didn t have money to get more?: No   Recent Concern: Food Insecurity - High Risk (10/17/2023)    Food Insecurity      Within the past 12 months, did you worry that your food would run out before you got money to buy more?: Yes      Within the past 12 months, did the food you bought just not last and you didn t have money to get more?: No   Transportation Needs: Low Risk  (12/29/2023)    Transportation Needs      Within the past 12 months, has lack of transportation kept you from medical appointments, getting your medicines, non-medical meetings or appointments, work, or from getting things that you need?: No    Received from [facility]    Social Connections   Interpersonal Safety: Low Risk  (8/21/2024)    Interpersonal Safety      Do you feel physically and emotionally safe where you currently live?: Yes      Within the past 12 months, have you been hit, slapped, kicked or otherwise physically hurt by someone?: No      Within the past 12 months, have you been humiliated or emotionally abused in other ways by your partner or ex-partner?: No   Housing Stability: High Risk (12/29/2023)    Housing Stability      Do you have housing? : Yes      Are you worried about losing your housing?: Yes       Family History -   Family History   Problem Relation Age of Onset     Hyperlipidemia Mother      Cancer Mother         cervical     Lipids Mother      Hypertension Father      Eye Disorder Father       Lipids Father      Alcohol/Drug Maternal Grandmother      Colon Cancer Paternal Grandmother      Diabetes Paternal Grandmother      Eye Disorder Paternal Grandmother      Diabetes Paternal Grandfather      Eye Disorder Paternal Grandfather      Inflammatory Bowel Disease Paternal Aunt         and two paternal uncles       Review of Systems:   !.  Weight Loss: No   2. Difficulty Breathing: No   3. Difficulty Swallowing: No   4. Pain: No    Physical Exam  B/P: Data Unavailable, T: Data Unavailable, P: Data Unavailable, R: Data Unavailable  Vitals: LMP 07/23/2010 (Exact Date)   BMI= There is no height or weight on file to calculate BMI.    General  Appearance - Normal  Head/Face/Scalp:    Skin - Normal    Facial Palpation - Normal    Facial Strength - Normal  Ears:    Pinna - Normal    Canal - Normal   Tympanic membrane - crarred bilaterally  Nose:    External - Normal    Septum - Normal    Turbinates - Normal    Middle meatus - Normal  Oral Cavity:    Lips - Normal    Floor of Mouth - Normal    Gingiva - Normal    Tongue - Normal    Buccal - Normal    Palate - Normal  Nasopharynx:    Oropharynx:    Tonsils - Normal    Tongue base - Normal    Soft palate - Normal    Posterior pharyngeal wall - Normal  Hypopharynx:  Larynx:    Epiglottis -     Aryepiglottic folds -     Arytenoids -     False vocal cords -     True vocal cords -  Neck Masses - No  Neck lymphatics - no lymphadenopathy  Thyroid - Normal  Salivary glands - Normal    Audiogram 5/24   RIGHT:  normal sloping to mild, moderate, and severe sensorineural hearing loss    LEFT:    mild sloping to moderate and severe mixed hearing loss     Tympanogram:    RIGHT: restricted eardrum mobility (Type As)    LEFT:   restricted eardrum mobility (Type As)      Reflexes (reported by stimulus ear):  RIGHT: Ipsilateral is absent at frequencies tested  RIGHT: Contralateral is absent at frequencies tested  LEFT:   Ipsilateral is absent at frequencies tested  LEFT:    Contralateral is absent at frequencies tested        Speech Reception Threshold:    RIGHT: 20 dB HL    LEFT:   30 dB HL  Word Recognition Score:     RIGHT: 76% at 60 dB HL using NU-6 recorded word list.    LEFT:   80% at 70 dB HL using NU-6 recorded word list.     Radiology - not applicable   Reports:   View films:  Procedures - not applicable  Patient Education:     A/P - Ana Felix is a 43 year old female  Medical Decision Making 1. Bilateral mixed ori loss. Cleared for amplification      Again, thank you for allowing me to participate in the care of your patient.        Sincerely,        Vinod Purdy MD

## 2024-12-04 NOTE — PROGRESS NOTES
AUDIOLOGY REPORT:    Patient was referred to [facility] from ENT by Dr. Purdy for a hearing examination. Patient reports a long history of middle ear issues with multiple sets of PE tubes and she reports difficulty hearing. Patient was unaccompanied to today's visit.     Testing:    Otoscopy:   Otoscopic exam indicates ears are clear of cerumen bilaterally     Tympanograms:    RIGHT: restricted eardrum mobility [Type As]     LEFT:   restricted eardrum mobility [Type As]    Reflexes (reported by stimulus ear): 1000 Hz  RIGHT: Ipsilateral is absent at frequencies tested  RIGHT: Contralateral is absent at frequencies tested  LEFT:   Ipsilateral is absent at frequencies tested  LEFT:   Contralateral is absent at frequencies tested    Thresholds:   Pure Tone Thresholds assessed using standard techniques audiometry with good  reliability from 250-8000 Hz bilaterally using insert earphones and circumaural headphones     RIGHT:  normal hearing sensitivity through 500 Hz then a mild to moderately severe sensorineural hearing loss    LEFT:    mild and moderate-severe mixed hearing loss   NOTE: Change in transducers did not merit a change in thresholds.     Speech Reception Threshold:    RIGHT: 40 dB HL    LEFT:   35 dB HL    Word Recognition Score:     RIGHT: 100% at 75 dB HL using NU-6 recorded word list.    LEFT:   92% at 75 dB HL using NU-6 recorded word list.    Discussed results with the patient.     Patient was returned to ENT for follow up.     Mac Person CCC-A  Licensed Audiologist  12/4/2024

## 2024-12-06 ENCOUNTER — HOSPITAL ENCOUNTER (EMERGENCY)
Facility: CLINIC | Age: 44
Discharge: HOME | End: 2024-12-06
Attending: EMERGENCY MEDICINE | Admitting: EMERGENCY MEDICINE
Payer: COMMERCIAL

## 2024-12-06 ENCOUNTER — APPOINTMENT (OUTPATIENT)
Dept: CT IMAGING | Facility: CLINIC | Age: 44
End: 2024-12-06
Attending: EMERGENCY MEDICINE
Payer: COMMERCIAL

## 2024-12-06 VITALS
TEMPERATURE: 98.2 F | HEART RATE: 85 BPM | RESPIRATION RATE: 16 BRPM | WEIGHT: 190 LBS | HEIGHT: 62 IN | DIASTOLIC BLOOD PRESSURE: 82 MMHG | SYSTOLIC BLOOD PRESSURE: 162 MMHG | OXYGEN SATURATION: 98 % | BODY MASS INDEX: 34.96 KG/M2

## 2024-12-06 DIAGNOSIS — R10.11 RUQ ABDOMINAL PAIN: ICD-10-CM

## 2024-12-06 LAB
ALBUMIN SERPL BCG-MCNC: 4.2 G/DL (ref 3.5–5.2)
ALP SERPL-CCNC: 58 U/L (ref 40–150)
ALT SERPL W P-5'-P-CCNC: 18 U/L (ref 0–50)
ANION GAP SERPL CALCULATED.3IONS-SCNC: 11 MMOL/L (ref 7–15)
AST SERPL W P-5'-P-CCNC: 41 U/L (ref 0–45)
BASOPHILS # BLD AUTO: 0 10E3/UL (ref 0–0.2)
BASOPHILS NFR BLD AUTO: 1 %
BILIRUB SERPL-MCNC: 0.9 MG/DL
BUN SERPL-MCNC: 12.3 MG/DL (ref 6–20)
CALCIUM SERPL-MCNC: 9.3 MG/DL (ref 8.8–10.4)
CHLORIDE SERPL-SCNC: 100 MMOL/L (ref 98–107)
CREAT SERPL-MCNC: 0.87 MG/DL (ref 0.51–0.95)
EGFRCR SERPLBLD CKD-EPI 2021: 83 ML/MIN/1.73M2
EOSINOPHIL # BLD AUTO: 0 10E3/UL (ref 0–0.7)
EOSINOPHIL NFR BLD AUTO: 1 %
ERYTHROCYTE [DISTWIDTH] IN BLOOD BY AUTOMATED COUNT: 13.7 % (ref 10–15)
GLUCOSE SERPL-MCNC: 95 MG/DL (ref 70–99)
HCO3 SERPL-SCNC: 24 MMOL/L (ref 22–29)
HCT VFR BLD AUTO: 38.1 % (ref 35–47)
HGB BLD-MCNC: 12.9 G/DL (ref 11.7–15.7)
HOLD SPECIMEN: NORMAL
HOLD SPECIMEN: NORMAL
IMM GRANULOCYTES # BLD: 0 10E3/UL
IMM GRANULOCYTES NFR BLD: 0 %
LIPASE SERPL-CCNC: 36 U/L (ref 13–60)
LYMPHOCYTES # BLD AUTO: 1.9 10E3/UL (ref 0.8–5.3)
LYMPHOCYTES NFR BLD AUTO: 31 %
MCH RBC QN AUTO: 35.1 PG (ref 26.5–33)
MCHC RBC AUTO-ENTMCNC: 33.9 G/DL (ref 31.5–36.5)
MCV RBC AUTO: 104 FL (ref 78–100)
MONOCYTES # BLD AUTO: 0.5 10E3/UL (ref 0–1.3)
MONOCYTES NFR BLD AUTO: 8 %
NEUTROPHILS # BLD AUTO: 3.7 10E3/UL (ref 1.6–8.3)
NEUTROPHILS NFR BLD AUTO: 60 %
NRBC # BLD AUTO: 0 10E3/UL
NRBC BLD AUTO-RTO: 0 /100
PLATELET # BLD AUTO: 186 10E3/UL (ref 150–450)
POTASSIUM SERPL-SCNC: 4.1 MMOL/L (ref 3.4–5.3)
PROT SERPL-MCNC: 7.8 G/DL (ref 6.4–8.3)
RBC # BLD AUTO: 3.67 10E6/UL (ref 3.8–5.2)
SODIUM SERPL-SCNC: 135 MMOL/L (ref 135–145)
WBC # BLD AUTO: 6.2 10E3/UL (ref 4–11)

## 2024-12-06 PROCEDURE — 36415 COLL VENOUS BLD VENIPUNCTURE: CPT | Performed by: EMERGENCY MEDICINE

## 2024-12-06 PROCEDURE — 85004 AUTOMATED DIFF WBC COUNT: CPT | Performed by: EMERGENCY MEDICINE

## 2024-12-06 PROCEDURE — 99284 EMERGENCY DEPT VISIT MOD MDM: CPT | Performed by: EMERGENCY MEDICINE

## 2024-12-06 PROCEDURE — 74177 CT ABD & PELVIS W/CONTRAST: CPT

## 2024-12-06 PROCEDURE — 250N000011 HC RX IP 250 OP 636: Performed by: EMERGENCY MEDICINE

## 2024-12-06 PROCEDURE — 82947 ASSAY GLUCOSE BLOOD QUANT: CPT | Performed by: EMERGENCY MEDICINE

## 2024-12-06 PROCEDURE — 96374 THER/PROPH/DIAG INJ IV PUSH: CPT | Mod: 59 | Performed by: EMERGENCY MEDICINE

## 2024-12-06 PROCEDURE — 82040 ASSAY OF SERUM ALBUMIN: CPT | Performed by: EMERGENCY MEDICINE

## 2024-12-06 PROCEDURE — 99285 EMERGENCY DEPT VISIT HI MDM: CPT | Mod: 25 | Performed by: EMERGENCY MEDICINE

## 2024-12-06 PROCEDURE — 96375 TX/PRO/DX INJ NEW DRUG ADDON: CPT | Performed by: EMERGENCY MEDICINE

## 2024-12-06 PROCEDURE — 83690 ASSAY OF LIPASE: CPT | Performed by: EMERGENCY MEDICINE

## 2024-12-06 PROCEDURE — 250N000009 HC RX 250: Performed by: EMERGENCY MEDICINE

## 2024-12-06 RX ORDER — MORPHINE SULFATE 4 MG/ML
4 INJECTION, SOLUTION INTRAMUSCULAR; INTRAVENOUS ONCE
Status: COMPLETED | OUTPATIENT
Start: 2024-12-06 | End: 2024-12-06

## 2024-12-06 RX ORDER — KETOROLAC TROMETHAMINE 15 MG/ML
15 INJECTION, SOLUTION INTRAMUSCULAR; INTRAVENOUS ONCE
Status: COMPLETED | OUTPATIENT
Start: 2024-12-06 | End: 2024-12-06

## 2024-12-06 RX ORDER — IOPAMIDOL 755 MG/ML
93 INJECTION, SOLUTION INTRAVASCULAR ONCE
Status: COMPLETED | OUTPATIENT
Start: 2024-12-06 | End: 2024-12-06

## 2024-12-06 RX ADMIN — KETOROLAC TROMETHAMINE 15 MG: 15 INJECTION, SOLUTION INTRAMUSCULAR; INTRAVENOUS at 16:59

## 2024-12-06 RX ADMIN — IOPAMIDOL 93 ML: 755 INJECTION, SOLUTION INTRAVENOUS at 17:16

## 2024-12-06 RX ADMIN — MORPHINE SULFATE 4 MG: 4 INJECTION, SOLUTION INTRAMUSCULAR; INTRAVENOUS at 17:59

## 2024-12-06 RX ADMIN — SODIUM CHLORIDE 63 ML: 9 INJECTION, SOLUTION INTRAVENOUS at 17:16

## 2024-12-06 ASSESSMENT — COLUMBIA-SUICIDE SEVERITY RATING SCALE - C-SSRS
2. HAVE YOU ACTUALLY HAD ANY THOUGHTS OF KILLING YOURSELF IN THE PAST MONTH?: NO
6. HAVE YOU EVER DONE ANYTHING, STARTED TO DO ANYTHING, OR PREPARED TO DO ANYTHING TO END YOUR LIFE?: NO
1. IN THE PAST MONTH, HAVE YOU WISHED YOU WERE DEAD OR WISHED YOU COULD GO TO SLEEP AND NOT WAKE UP?: NO

## 2024-12-06 ASSESSMENT — ACTIVITIES OF DAILY LIVING (ADL)
ADLS_ACUITY_SCORE: 42
ADLS_ACUITY_SCORE: 42

## 2024-12-06 NOTE — ED PROVIDER NOTES
"ED Provider Note  [facility]      History     Chief Complaint   Patient presents with    Abdominal Pain     HPI  Ana Felix is a 44 year old female who presents to the emergency department with approximately 3-day history of abdominal distention, in addition to 1 day history of intermittent right upper quadrant abdominal pains.  Patient states that the pain has progressed throughout the day today, becoming more constant and severe.  Pain is worsened with movement.  When she gets sharp, stabbing waves of pain she states that she has waves of nausea.  There has been no vomiting.  Denies any fever.  No left-sided pains.  Does have history of Crohn's disease.  Bowel movements have not changed.  Denies any urinary symptoms.  Does have history of multiple prior abdominal surgeries including cholecystectomy, and appendectomy.  No recent surgical procedures.        Independent Historian:      Review of External Notes:  Reviewed colonoscopy results from outside clinic from November 13.  Negative colonoscopy.      Allergies:  Allergies   Allergen Reactions    Infliximab Hives    Sulfa Antibiotics Nausea     Pt states \"it doesn't work for me\"  Pt states \"it doesn't work for me\"  Other reaction(s): Diarrhea, nausea, Nausea, Intolerance  Pt states \"it doesn't work for me\"    Sulfacetamide Nausea    Amoxicillin Nausea and Vomiting    Amoxicillin-Pot Clavulanate Other (See Comments) and Nausea and Vomiting     Crohn's    Aspirin Nausea    Bupropion Hives and Nausea    Clavulanic Acid Nausea and Vomiting    Droperidol Other (See Comments)     Dystonic reaction    Ibuprofen Other (See Comments) and Nausea     Crohn's      Lyrica [Pregabalin] Nausea and Vomiting     Grogginess, severe back pain  Grogginess, severe back pain    Vicodin [Hydrocodone-Acetaminophen] Nausea and Vomiting    Adhesive Tape Rash       Problem List:    Patient Active Problem List    Diagnosis Date Noted    Prediabetes 01/03/2024     " Priority: Medium    Closed nondisplaced fracture of head of right radius with routine healing, subsequent encounter 08/18/2023     Priority: Medium    Primary osteoarthritis of left knee 02/13/2023     Priority: Medium    DANIELLE (generalized anxiety disorder) 03/15/2022     Priority: Medium    Insomnia, unspecified type 03/15/2022     Priority: Medium    SKYE (stress urinary incontinence, female) 03/14/2022     Priority: Medium     Added automatically from request for surgery [id number]      Hidradenitis suppurativa 09/02/2021     Priority: Medium    Immunocompromised state (H) 07/09/2021     Priority: Medium    History of gestational diabetes mellitus (GDM) 08/15/2018     Priority: Medium    Amenorrhea 08/15/2018     Priority: Medium    Crohn's disease of both small and large intestine without complication (H) 07/31/2018     Priority: Medium    Pain medication agreement 08/18/2017     Priority: Medium     Overview:   [facility]      Controlled substance agreement signed 07/19/2017     Priority: Medium    Degenerative lumbar disc 03/07/2017     Priority: Medium    Labral tear of hip, degenerative 03/07/2017     Priority: Medium    Pain of right hip joint 03/07/2017     Priority: Medium    S/P LEEP of cervix 12/15/2015     Priority: Medium     S/p LEEP of cervix - date unknown  12/9/15: Pap - NIL, Neg HPV. Plan cotest in 1 year.   3/7/18 Pap: NIL/neg HPV.  12/29/23 NIL pap, neg HPV. Plan: cotest in 3 years            Hearing difficulty 10/09/2014     Priority: Medium    Irritable bowel syndrome (IBS) 04/15/2014     Priority: Medium     Tiffany raw vegetables      Lactose intolerance 04/15/2014     Priority: Medium    Abdominal pain, right upper quadrant 03/06/2014     Priority: Medium    Nausea with vomiting 01/31/2014     Priority: Medium    Chronic low back pain 11/05/2012     Priority: Medium     Follows with pain clinic.      Mild persistent asthma, uncomplicated 11/05/2012     Priority: Medium    Dorsalgia,  unspecified 10/08/2012     Priority: Medium    Abdominal pain 2012     Priority: Medium    S/P oophorectomy 2012     Priority: Medium    History of cholecystectomy 2012     Priority: Medium    History of resection of small bowel 2012     Priority: Medium    Tobacco abuse 2012     Priority: Medium    Displacement of lumbar intervertebral disc without myelopathy 2012     Priority: Medium    Disorder of sacrum 2011     Priority: Medium     Problem list name updated by automated process. Provider to review      Back pain 2011     Priority: Medium    Obesity 2011     Priority: Medium    Migraine headache 2011     Priority: Medium     Patient given Migraine Education folder and Migraine Action Plan on 2011. Cammy Anderson RN    (Problem list name updated by automated process. Provider to review and confirm.)      CARDIOVASCULAR SCREENING; LDL GOAL LESS THAN 160 10/31/2010     Priority: Medium    Dysmenorrhea 10/05/2010     Priority: Medium    Female pelvic pain 2010     Priority: Medium     (Problem list name updated by automated process. Provider to review and confirm.)      Crohns disease 2009     Priority: Medium        Past Medical History:    Past Medical History:   Diagnosis Date    Back pain     Crohn's disease (H)     Exercise-induced asthma     Gestational diabetes     Herniation of intervertebral disc of lumbar region     Infertility     Ovarian cyst     Smoker     Status post cholecystectomy 2005       Past Surgical History:    Past Surgical History:   Procedure Laterality Date    APPENDECTOMY      C/SECTION, LOW TRANSVERSE      , Low Transverse    CHOLECYSTECTOMY, LAPOROSCOPIC  2005    Cholecystectomy, Laparoscopic    COLONOSCOPY      COLONOSCOPY N/A 2024    Procedure: Colonoscopy;  Surgeon: Robert Fiore MD;  Location: [facility]    ESOPHAGOSCOPY, GASTROSCOPY, DUODENOSCOPY (EGD), COMBINED  3/6/2014     Procedure: COMBINED ESOPHAGOSCOPY, GASTROSCOPY, DUODENOSCOPY (EGD), BIOPSY SINGLE OR MULTIPLE;  COLONOSCOPY/ UPPER GI ENDOSCOPY/ COLON/ EGD/ Abdominal pain, epigastric/ [facility];  Surgeon: West Lomas MD;  Location:  OR    ESOPHAGOSCOPY, GASTROSCOPY, DUODENOSCOPY (EGD), COMBINED N/A 5/9/2024    Procedure: ESOPHAGOGASTRODUODENOSCOPY, WITH BIOPSY;  Surgeon: Robert Fiore MD;  Location: [facility] HYSTEROSCOPY, SURGICAL; W/ ENDOMETRIAL ABLATION, ANY METHOD  7/2010    HERNIORRHAPHY VENTRAL N/A 12/11/2018    Procedure: Open ventral hernia repair;  Surgeon: Alonso Bills MD;  Location: [facility]    HYSTERECTOMY, SUPRACERVICAL LAPAROSCOPIC  2010    LEEP TX, CERVICAL      LEEP TX Cervical    ORTHOPEDIC SURGERY      bluged disk    partial small bowel resection  2002    Ileo-colonic resection. Crohn's disease surgery for bowel obstruction. this was a section of small bowel and large bowel and the cecum., all removed and a reanastamosis done successfully    SALPINGO OOPHORECTOMY,R/L/TORI      Right ovary    SLING TRANSVAGINAL N/A 4/4/2022    Procedure: Pubovaginal sling with Altis;  Surgeon: Eleuterio Stone MD;  Location: [facility]    TUBAL LIGATION         Family History:    Family History   Problem Relation Age of Onset    Hyperlipidemia Mother     Cancer Mother         cervical    Lipids Mother     Hypertension Father     Eye Disorder Father     Lipids Father     Alcohol/Drug Maternal Grandmother     Colon Cancer Paternal Grandmother     Diabetes Paternal Grandmother     Eye Disorder Paternal Grandmother     Diabetes Paternal Grandfather     Eye Disorder Paternal Grandfather     Inflammatory Bowel Disease Paternal Aunt         and two paternal uncles       Social History:  Marital Status:   [2]  Social History     Tobacco Use    Smoking status: Some Days     Current packs/day: 0.50     Types: Cigarettes     Passive exposure: Never    Smokeless tobacco: Never    Tobacco comments:     5 Cigarettes in the  "past two week   Vaping Use    Vaping status: Never Used   Substance Use Topics    Alcohol use: Yes     Comment: very rarely    Drug use: No        Medications:    azaTHIOprine (IMURAN) 50 MG tablet  busPIRone (BUSPAR) 10 MG tablet  cloNIDine (CATAPRES) 0.1 MG tablet  cyanocobalamin (VITAMIN B12) 1000 MCG/ML injection  Fluocinolone Acetonide Scalp 0.01 % OIL oil  gabapentin (NEURONTIN) 300 MG capsule  hydrOXYzine HCl (ATARAX) 25 MG tablet  ibuprofen (ADVIL/MOTRIN) 800 MG tablet  ketoconazole (NIZORAL) 2 % external shampoo  montelukast (SINGULAIR) 10 MG tablet  mupirocin (BACTROBAN) 2 % external ointment  naloxone (NARCAN) 4 MG/0.1ML nasal spray  omeprazole (PRILOSEC) 40 MG DR capsule  oxyCODONE-acetaminophen (PERCOCET) 7.5-325 MG per tablet  sertraline (ZOLOFT) 100 MG tablet  tiZANidine (ZANAFLEX) 2 MG tablet  traZODone (DESYREL) 150 MG tablet  triamcinolone (KENALOG) 0.1 % external ointment  VENTOLIN  (90 Base) MCG/ACT inhaler          Review of Systems  A medically appropriate review of systems was performed with pertinent positives and negatives noted in the HPI, and all other systems negative.    Physical Exam   Patient Vitals for the past 24 hrs:   BP Temp Temp src Pulse Resp SpO2 Height Weight   12/06/24 1830 (!) 162/82 -- -- 85 -- -- -- --   12/06/24 1810 (!) 141/78 -- -- 81 -- -- -- --   12/06/24 1657 (!) 161/88 -- -- -- -- 98 % -- --   12/06/24 1642 (!) 169/95 -- -- 79 -- -- -- --   12/06/24 1549 (!) 174/102 98.2  F (36.8  C) Oral 88 16 99 % 1.575 m (5' 2\") 86.2 kg (190 lb)          Physical Exam  General: alert and in  acute distress on arrival  Head: atraumatic, normocephalic  Lungs:  nonlabored  CV:  extremities warm and perfused  Abd: nondistended.  Right upper quadrant reproducible tenderness to palpation  Skin: no rashes, no diaphoresis and skin color normal  Neuro: Patient awake, alert, speech is fluent,   Psychiatric: affect/mood normal,        ED Course                 Procedures          "                 Results for orders placed or performed during the hospital encounter of 12/06/24 (from the past 24 hours)   Comprehensive metabolic panel   Result Value Ref Range    Sodium 135 135 - 145 mmol/L    Potassium 4.1 3.4 - 5.3 mmol/L    Carbon Dioxide (CO2) 24 22 - 29 mmol/L    Anion Gap 11 7 - 15 mmol/L    Urea Nitrogen 12.3 6.0 - 20.0 mg/dL    Creatinine 0.87 0.51 - 0.95 mg/dL    GFR Estimate 83 >60 mL/min/1.73m2    Calcium 9.3 8.8 - 10.4 mg/dL    Chloride 100 98 - 107 mmol/L    Glucose 95 70 - 99 mg/dL    Alkaline Phosphatase 58 40 - 150 U/L    AST 41 0 - 45 U/L    ALT 18 0 - 50 U/L    Protein Total 7.8 6.4 - 8.3 g/dL    Albumin 4.2 3.5 - 5.2 g/dL    Bilirubin Total 0.9 <=1.2 mg/dL   CBC with platelets differential    Narrative    The following orders were created for panel order CBC with platelets differential.  Procedure                               Abnormality         Status                     ---------                               -----------         ------                     CBC with platelets and d...[[id number]]  Abnormal            Final result                 Please view results for these tests on the individual orders.   Fate Draw    Narrative    The following orders were created for panel order Fate Draw.  Procedure                               Abnormality         Status                     ---------                               -----------         ------                     Extra Blue Top Tube[[id number]]                              Final result               Extra Red Top Tube[[id number]]                               Final result                 Please view results for these tests on the individual orders.   CBC with platelets and differential   Result Value Ref Range    WBC Count 6.2 4.0 - 11.0 10e3/uL    RBC Count 3.67 (L) 3.80 - 5.20 10e6/uL    Hemoglobin 12.9 11.7 - 15.7 g/dL    Hematocrit 38.1 35.0 - 47.0 %     (H) 78 - 100 fL    MCH 35.1 (H) 26.5 - 33.0 pg    MCHC  33.9 31.5 - 36.5 g/dL    RDW 13.7 10.0 - 15.0 %    Platelet Count 186 150 - 450 10e3/uL    % Neutrophils 60 %    % Lymphocytes 31 %    % Monocytes 8 %    % Eosinophils 1 %    % Basophils 1 %    % Immature Granulocytes 0 %    NRBCs per 100 WBC 0 <1 /100    Absolute Neutrophils 3.7 1.6 - 8.3 10e3/uL    Absolute Lymphocytes 1.9 0.8 - 5.3 10e3/uL    Absolute Monocytes 0.5 0.0 - 1.3 10e3/uL    Absolute Eosinophils 0.0 0.0 - 0.7 10e3/uL    Absolute Basophils 0.0 0.0 - 0.2 10e3/uL    Absolute Immature Granulocytes 0.0 <=0.4 10e3/uL    Absolute NRBCs 0.0 10e3/uL   Extra Blue Top Tube   Result Value Ref Range    Hold Specimen JIC    Extra Red Top Tube   Result Value Ref Range    Hold Specimen JIC    CT Abdomen Pelvis w Contrast    Narrative    EXAM: CT ABDOMEN PELVIS W CONTRAST  LOCATION: [facility]  DATE: 12/6/2024    INDICATION: RUQ abd pain.   COMPARISON: CT abdomen pelvis 4/25/2024  TECHNIQUE: CT scan of the abdomen and pelvis was performed following injection of IV contrast. Multiplanar reformats were obtained. Dose reduction techniques were used.  CONTRAST: 93 mL Isovue 370    FINDINGS:   LOWER CHEST: Normal.    HEPATOBILIARY: Normal liver contours. No worrisome liver lesions. Cholecystectomy.    PANCREAS: Normal.    SPLEEN: Normal.    ADRENAL GLANDS: Normal.    KIDNEYS/BLADDER: The kidneys enhance symmetrically. No urolithiasis or collecting system dilation. Urinary bladder is unremarkable.    BOWEL: Postsurgical changes from ileocecectomy. The colon is minimally distended with possible wall thickening of the proximal, transverse, descending, and sigmoid colon. No pneumatosis, pneumoperitoneum, or organized intra-abdominal fluid collection.    LYMPH NODES: No lymphadenopathy.    VASCULATURE: The abdominal aorta is nonaneurysmal.    PELVIC ORGANS: Hysterectomy. Small physiologic left ovarian cyst.    MUSCULOSKELETAL: No acute osseous abnormality.      Impression    IMPRESSION:   1.   Scattered areas of mild colonic wall thickening versus pseudothickening from underdistention. No significant surrounding pericolonic stranding or inflammatory changes.  2.  Otherwise no acute findings in the abdomen or pelvis.   Lipase   Result Value Ref Range    Lipase 36 13 - 60 U/L       MEDICATIONS GIVEN IN THE EMERGENCY DEPARTMENT:  Medications   ketorolac (TORADOL) injection 15 mg (15 mg Intravenous $Given 12/6/24 1659)   iopamidol (ISOVUE-370) solution 93 mL (93 mLs Intravenous $Given 12/6/24 1716)   sodium chloride 0.9 % bag 500mL for CT scan flush use (63 mLs Intravenous $Given 12/6/24 1716)   morphine (PF) injection 4 mg (4 mg Intravenous $Given 12/6/24 1759)           Independent Interpretation (X-rays, CTs, rhythm strip):  CT images personally reviewed.  No obstruction, or other acute findings.  Radiology reports scattered areas of mild colonic wall thickening, nonspecific.    Consultations/Discussion of Management or Tests:         Social Determinants of Health affecting care:         Assessments & Plan (with Medical Decision Making)  44 year old female who presents to the Emergency Department for evaluation of right sided abdominal pain.  This has caused increased amounts of severe, stabbing pains.  Patient arrives uncomfortable, with acute exacerbation of her somewhat chronic abdominal pains.  Also does have history of Crohn's disease.    Differential broad, however includes obstruction, biliary disease, or other potential more severe condition.    Patient with laboratory workup which is reassuring.  Normal CBC, CMP, lipase.  Patient without any significant UTI type symptoms.  Her CT scan is reassuring.  Patient has had GI follow-up before in the past, and did have recent colonoscopy as well that showed no acute findings.    Given the negative laboratory and imaging workup today I had discussion with the patient with regards to those findings, and no obvious cause of her symptoms is identified,  however patient is reassured given the negative workup.  Patient is frustrated that no one can find the cause of her ongoing pains.  Patient encouraged to continue follow-up with her primary care providers, and return if new or worsening symptoms develop.       I have reviewed the nursing notes.    I have reviewed the findings, diagnosis, plan and need for follow up with the patient.         Medical Decision Making  The patient's presentation was of moderate complexity (an acute complicated injury).    The patient's evaluation involved:  review of external note(s) from 1 sources (see separate area of note for details)  ordering and/or review of 3+ test(s) in this encounter (see separate area of note for details)  independent interpretation of testing performed by another health professional (see separate area of note for details)    The patient's management necessitated high risk (a parenteral controlled substance).        NEW PRESCRIPTIONS STARTED AT TODAY'S ER VISIT  Discharge Medication List as of 12/6/2024  6:50 PM          Final diagnoses:   RUQ abdominal pain       12/6/2024   [facility] EMERGENCY DEPT       Monty Pinon MD  12/06/24 1948

## 2024-12-06 NOTE — ED TRIAGE NOTES
Pt here with RUQ abdominal pain and bloating. The bloating started earlier this week and the abdominal pain started today.      Triage Assessment (Adult)       Row Name 12/06/24 8064          Triage Assessment    Airway WDL WDL        Respiratory WDL    Respiratory WDL WDL        Skin Circulation/Temperature WDL    Skin Circulation/Temperature WDL WDL        Cardiac WDL    Cardiac WDL WDL        Peripheral/Neurovascular WDL    Peripheral Neurovascular WDL WDL        Cognitive/Neuro/Behavioral WDL    Cognitive/Neuro/Behavioral WDL WDL

## 2024-12-07 NOTE — DISCHARGE INSTRUCTIONS
Follow-up with primary care providers.  Uncertain exact ultimate cause of your symptoms.  However, laboratory workup, and CT scan was reassuring.    Be seen if new or severe worsening symptoms develop.

## 2024-12-09 ENCOUNTER — OFFICE VISIT (OUTPATIENT)
Dept: FAMILY MEDICINE | Facility: CLINIC | Age: 44
End: 2024-12-09
Attending: PHYSICIAN ASSISTANT
Payer: COMMERCIAL

## 2024-12-09 VITALS
WEIGHT: 196.2 LBS | TEMPERATURE: 98.4 F | OXYGEN SATURATION: 98 % | HEIGHT: 63 IN | SYSTOLIC BLOOD PRESSURE: 146 MMHG | BODY MASS INDEX: 34.76 KG/M2 | DIASTOLIC BLOOD PRESSURE: 82 MMHG | HEART RATE: 98 BPM | RESPIRATION RATE: 20 BRPM

## 2024-12-09 DIAGNOSIS — Z98.890 S/P HERNIA REPAIR: ICD-10-CM

## 2024-12-09 DIAGNOSIS — F41.1 GAD (GENERALIZED ANXIETY DISORDER): ICD-10-CM

## 2024-12-09 DIAGNOSIS — G47.00 INSOMNIA, UNSPECIFIED TYPE: ICD-10-CM

## 2024-12-09 DIAGNOSIS — R10.84 ABDOMINAL PAIN, GENERALIZED: ICD-10-CM

## 2024-12-09 DIAGNOSIS — Z87.19 S/P HERNIA REPAIR: ICD-10-CM

## 2024-12-09 DIAGNOSIS — Z00.00 ENCOUNTER FOR ROUTINE ADULT HEALTH EXAMINATION WITHOUT ABNORMAL FINDINGS: Primary | ICD-10-CM

## 2024-12-09 DIAGNOSIS — D84.9 IMMUNOCOMPROMISED STATE (H): ICD-10-CM

## 2024-12-09 PROCEDURE — 90656 IIV3 VACC NO PRSV 0.5 ML IM: CPT | Performed by: PHYSICIAN ASSISTANT

## 2024-12-09 PROCEDURE — 90471 IMMUNIZATION ADMIN: CPT | Performed by: PHYSICIAN ASSISTANT

## 2024-12-09 PROCEDURE — 99214 OFFICE O/P EST MOD 30 MIN: CPT | Mod: 25 | Performed by: PHYSICIAN ASSISTANT

## 2024-12-09 PROCEDURE — 99396 PREV VISIT EST AGE 40-64: CPT | Mod: 25 | Performed by: PHYSICIAN ASSISTANT

## 2024-12-09 RX ORDER — NORTRIPTYLINE HYDROCHLORIDE 25 MG/1
25 CAPSULE ORAL AT BEDTIME
Qty: 60 CAPSULE | Refills: 0 | Status: SHIPPED | OUTPATIENT
Start: 2024-12-09

## 2024-12-09 RX ORDER — SERTRALINE HYDROCHLORIDE 100 MG/1
200 TABLET, FILM COATED ORAL DAILY
Qty: 180 TABLET | Refills: 3 | Status: SHIPPED | OUTPATIENT
Start: 2024-12-09

## 2024-12-09 RX ORDER — BUSPIRONE HYDROCHLORIDE 10 MG/1
10 TABLET ORAL 2 TIMES DAILY
Qty: 180 TABLET | Refills: 3 | Status: SHIPPED | OUTPATIENT
Start: 2024-12-09

## 2024-12-09 RX ORDER — HYDROXYZINE HYDROCHLORIDE 25 MG/1
12.5-5 TABLET, FILM COATED ORAL 3 TIMES DAILY PRN
Qty: 120 TABLET | Refills: 5 | Status: SHIPPED | OUTPATIENT
Start: 2024-12-09

## 2024-12-09 SDOH — HEALTH STABILITY: PHYSICAL HEALTH: ON AVERAGE, HOW MANY DAYS PER WEEK DO YOU ENGAGE IN MODERATE TO STRENUOUS EXERCISE (LIKE A BRISK WALK)?: 1 DAY

## 2024-12-09 SDOH — HEALTH STABILITY: PHYSICAL HEALTH: ON AVERAGE, HOW MANY MINUTES DO YOU ENGAGE IN EXERCISE AT THIS LEVEL?: 20 MIN

## 2024-12-09 ASSESSMENT — PATIENT HEALTH QUESTIONNAIRE - PHQ9
5. POOR APPETITE OR OVEREATING: MORE THAN HALF THE DAYS
SUM OF ALL RESPONSES TO PHQ QUESTIONS 1-9: 4

## 2024-12-09 ASSESSMENT — ANXIETY QUESTIONNAIRES
2. NOT BEING ABLE TO STOP OR CONTROL WORRYING: MORE THAN HALF THE DAYS
1. FEELING NERVOUS, ANXIOUS, OR ON EDGE: NOT AT ALL
GAD7 TOTAL SCORE: 9
7. FEELING AFRAID AS IF SOMETHING AWFUL MIGHT HAPPEN: SEVERAL DAYS
IF YOU CHECKED OFF ANY PROBLEMS ON THIS QUESTIONNAIRE, HOW DIFFICULT HAVE THESE PROBLEMS MADE IT FOR YOU TO DO YOUR WORK, TAKE CARE OF THINGS AT HOME, OR GET ALONG WITH OTHER PEOPLE: VERY DIFFICULT
5. BEING SO RESTLESS THAT IT IS HARD TO SIT STILL: NEARLY EVERY DAY
3. WORRYING TOO MUCH ABOUT DIFFERENT THINGS: SEVERAL DAYS
GAD7 TOTAL SCORE: 9
6. BECOMING EASILY ANNOYED OR IRRITABLE: NOT AT ALL

## 2024-12-09 ASSESSMENT — SOCIAL DETERMINANTS OF HEALTH (SDOH): HOW OFTEN DO YOU GET TOGETHER WITH FRIENDS OR RELATIVES?: ONCE A WEEK

## 2024-12-09 NOTE — PROGRESS NOTES
"Preventive Care Visit  [facility] VIN Bermudez PA-C, Family Medicine  Dec 9, 2024      Assessment & Plan       ICD-10-CM    1. Encounter for routine adult health examination without abnormal findings  Z00.00       2. Insomnia, unspecified type  G47.00 hydrOXYzine HCl (ATARAX) 25 MG tablet      3. DANIELLE (generalized anxiety disorder)  F41.1 hydrOXYzine HCl (ATARAX) 25 MG tablet     busPIRone (BUSPAR) 10 MG tablet     sertraline (ZOLOFT) 100 MG tablet      4. Abdominal pain, generalized  R10.84 Adult Gen Surg  Referral     nortriptyline (PAMELOR) 25 MG capsule      5. S/P hernia repair  Z98.890     Z87.19       6. Immunocompromised state (H)  D84.9           1) Screenings/preventative measures discussed    2,3) Stable. Meds renewed, no changes    4,5) She will message her GI specialist about the recent CT scan she had and the results. Will refer her to Gen Surg to see what they say about possible adhesions causing pain. In the meantime will trial nortriptyline 25mg for pain.     6) Follow GI for IBD      Nicotine/Tobacco Cessation  She reports that she has been smoking cigarettes. She has never been exposed to tobacco smoke. She has never used smokeless tobacco.    BMI  Estimated body mass index is 34.46 kg/m  as calculated from the following:    Height as of this encounter: 1.607 m (5' 3.27\").    Weight as of this encounter: 89 kg (196 lb 3.2 oz).     Counseling  Appropriate preventive services were addressed with this patient via screening, questionnaire, or discussion as appropriate for fall prevention, nutrition, physical activity, Tobacco-use cessation, social engagement, weight loss and cognition.  Checklist reviewing preventive services available has been given to the patient.  Reviewed patient's diet, addressing concerns and/or questions.   She is at risk for lack of exercise and has been provided with information to increase physical activity for the benefit of her " well-being.   She is at risk for psychosocial distress and has been provided with information to reduce risk.     Return in about 3 weeks (around 12/30/2024) for abdominal pain follow up, a virtual visit (telephone or video).       Chris Perez is a 44 year old, presenting for the following:  Physical, Abdominal Pain, and Hair/Scalp Problem        12/9/2024     3:46 PM   Additional Questions   Roomed by Cyndi   Accompanied by obi         12/9/2024     3:46 PM   Patient Reported Additional Medications   Patient reports taking the following new medications none          Hair/Scalp Problem    History of Present Illness       Mental Health Follow-up:  Patient presents to follow-up on Anxiety.    Patient's anxiety since last visit has been:  Medium  The patient is having other symptoms associated with anxiety. (push everybody away)  Any significant life events: other (social anxiety)  Patient is feeling anxious or having panic attacks.  Patient has no concerns about alcohol or drug use.    Reason for visit:  Abdominal pain  Symptom onset:  1-3 days ago  Symptoms include:  Swollen and bloated  Symptom intensity:  Moderate  Symptom progression:  Worsening  Had these symptoms before:  No  What makes it worse:  Moving - stretching or bending  What makes it better:  Nothing     Last GI appt 12/4  Now at [facility]  Constant discomfort, but changes in severity  Seems to be worse with movement, but can be at rest  [facility] did a bunch of testing, r/o H pylori  Dx with fatty liver  No diarrhea or gassy/bloating      Health Care Directive  Patient does not have a Health Care Directive: Discussed advance care planning with patient; information given to patient to review.      12/9/2024   General Health   How would you rate your overall physical health? (!) POOR   Feel stress (tense, anxious, or unable to sleep) To some extent      (!) STRESS CONCERN      12/9/2024   Nutrition   Three or more servings of calcium each  day? Yes   Diet: Other   If other, please elaborate: i have crohns   How many servings of fruit and vegetables per day? (!) 0-1   How many sweetened beverages each day? (!) 3            12/9/2024   Exercise   Days per week of moderate/strenous exercise 1 day   Average minutes spent exercising at this level 20 min      (!) EXERCISE CONCERN      12/9/2024   Social Factors   Frequency of gathering with friends or relatives Once a week   Worry food won't last until get money to buy more No   Food not last or not have enough money for food? No   Do you have housing? (Housing is defined as stable permanent housing and does not include staying ouside in a car, in a tent, in an abandoned building, in an overnight shelter, or couch-surfing.) Yes   Are you worried about losing your housing? No   Lack of transportation? No   Unable to get utilities (heat,electricity)? No            12/9/2024   Dental   Dentist two times every year? Yes            12/9/2024   TB Screening   Were you born outside of the [place]? No                12/9/2024   Substance Use   Alcohol more than 3/day or more than 7/wk No   Do you use any other substances recreationally? (!) CANNABIS PRODUCTS        Social History     Tobacco Use    Smoking status: Some Days     Current packs/day: 0.50     Types: Cigarettes     Passive exposure: Never    Smokeless tobacco: Never    Tobacco comments:     5 Cigarettes in the past two week   Vaping Use    Vaping status: Never Used   Substance Use Topics    Alcohol use: Yes     Comment: very rarely    Drug use: No           10/8/2024   LAST FHS-7 RESULTS   1st degree relative breast or ovarian cancer No   Any relative bilateral breast cancer No   Any male have breast cancer No   Any ONE woman have BOTH breast AND ovarian cancer No   Any woman with breast cancer before 50yrs No   2 or more relatives with breast AND/OR bowel cancer No           Mammogram Screening - Mammogram every 1-2 years updated in Health Maintenance based  "on mutual decision making        12/9/2024   STI Screening   New sexual partner(s) since last STI/HIV test? No        History of abnormal Pap smear: No - age 30- 64 PAP with HPV every 5 years recommended        Latest Ref Rng & Units 12/29/2023     8:50 AM 3/7/2018     3:55 PM 3/7/2018     3:54 PM   PAP / HPV   PAP  Negative for Intraepithelial Lesion or Malignancy (NILM)      PAP (Historical)    NIL    HPV 16 DNA Negative Negative  Negative     HPV 18 DNA Negative Negative  Negative     Other HR HPV Negative Negative  Negative       ASCVD Risk   The 10-year ASCVD risk score (Anisha LEHMAN, et al., 2019) is: 4%    Values used to calculate the score:      Age: 44 years      Sex: Female      Is Non- : No      Diabetic: No      Tobacco smoker: Yes      Systolic Blood Pressure: 146 mmHg      Is BP treated: No      HDL Cholesterol: 34 mg/dL      Total Cholesterol: 133 mg/dL       Reviewed and updated as needed this visit by Provider                      Review of Systems  Constitutional, neuro, ENT, endocrine, pulmonary, cardiac, gastrointestinal, genitourinary, musculoskeletal, integument and psychiatric systems are negative, except as otherwise noted.     Objective    Exam  BP (!) 146/82   Pulse 98   Temp 98.4  F (36.9  C) (Temporal)   Resp 20   Ht 1.607 m (5' 3.27\")   Wt 89 kg (196 lb 3.2 oz)   LMP 07/23/2010 (Exact Date)   SpO2 98%   BMI 34.46 kg/m     Estimated body mass index is 34.46 kg/m  as calculated from the following:    Height as of this encounter: 1.607 m (5' 3.27\").    Weight as of this encounter: 89 kg (196 lb 3.2 oz).    Physical Exam  GENERAL: alert and no distress  EYES: Eyes grossly normal to inspection  HENT: ear canals and TM's normal, nose and mouth without ulcers or lesions  NECK: no adenopathy, no asymmetry, masses, or scars  RESP: lungs clear to auscultation - no rales, rhonchi or wheezes  CV: regular rates and rhythm, normal S1 S2, no S3 or S4, and no " murmur, click or rub  ABDOMEN: tenderness diffuse, but worse over RUQ/RLQ and bowel sounds normal  MS: no gross musculoskeletal defects noted, no edema  SKIN: no suspicious lesions or rashes  NEURO: Normal strength and tone, mentation intact and speech normal  PSYCH: mentation appears normal, affect normal/bright        Signed Electronically by: Radha Bermudez PA-C

## 2024-12-12 ENCOUNTER — OFFICE VISIT (OUTPATIENT)
Dept: SURGERY | Facility: CLINIC | Age: 44
End: 2024-12-12
Attending: PHYSICIAN ASSISTANT
Payer: COMMERCIAL

## 2024-12-12 VITALS
HEIGHT: 63 IN | SYSTOLIC BLOOD PRESSURE: 155 MMHG | HEART RATE: 89 BPM | DIASTOLIC BLOOD PRESSURE: 100 MMHG | WEIGHT: 196 LBS | OXYGEN SATURATION: 99 % | TEMPERATURE: 98 F | BODY MASS INDEX: 34.73 KG/M2

## 2024-12-12 DIAGNOSIS — R10.84 ABDOMINAL PAIN, GENERALIZED: ICD-10-CM

## 2024-12-12 LAB
ALBUMIN SERPL BCG-MCNC: 4.4 G/DL (ref 3.5–5.2)
ALP SERPL-CCNC: 62 U/L (ref 40–150)
ALT SERPL W P-5'-P-CCNC: 20 U/L (ref 0–50)
ANION GAP SERPL CALCULATED.3IONS-SCNC: 11 MMOL/L (ref 7–15)
AST SERPL W P-5'-P-CCNC: 40 U/L (ref 0–45)
BASOPHILS # BLD AUTO: 0 10E3/UL (ref 0–0.2)
BASOPHILS NFR BLD AUTO: 0 %
BILIRUB SERPL-MCNC: 1.1 MG/DL
BUN SERPL-MCNC: 11.9 MG/DL (ref 6–20)
CALCIUM SERPL-MCNC: 9.4 MG/DL (ref 8.8–10.4)
CHLORIDE SERPL-SCNC: 99 MMOL/L (ref 98–107)
CREAT SERPL-MCNC: 0.9 MG/DL (ref 0.51–0.95)
CRP SERPL-MCNC: 4.95 MG/L
EGFRCR SERPLBLD CKD-EPI 2021: 79 ML/MIN/1.73M2
EOSINOPHIL # BLD AUTO: 0.1 10E3/UL (ref 0–0.7)
EOSINOPHIL NFR BLD AUTO: 1 %
ERYTHROCYTE [DISTWIDTH] IN BLOOD BY AUTOMATED COUNT: 13.3 % (ref 10–15)
GLUCOSE SERPL-MCNC: 95 MG/DL (ref 70–99)
HCO3 SERPL-SCNC: 26 MMOL/L (ref 22–29)
HCT VFR BLD AUTO: 39.3 % (ref 35–47)
HGB BLD-MCNC: 13.2 G/DL (ref 11.7–15.7)
IMM GRANULOCYTES # BLD: 0 10E3/UL
IMM GRANULOCYTES NFR BLD: 0 %
LYMPHOCYTES # BLD AUTO: 1.9 10E3/UL (ref 0.8–5.3)
LYMPHOCYTES NFR BLD AUTO: 32 %
MCH RBC QN AUTO: 34 PG (ref 26.5–33)
MCHC RBC AUTO-ENTMCNC: 33.6 G/DL (ref 31.5–36.5)
MCV RBC AUTO: 101 FL (ref 78–100)
MONOCYTES # BLD AUTO: 0.5 10E3/UL (ref 0–1.3)
MONOCYTES NFR BLD AUTO: 8 %
NEUTROPHILS # BLD AUTO: 3.5 10E3/UL (ref 1.6–8.3)
NEUTROPHILS NFR BLD AUTO: 59 %
PLATELET # BLD AUTO: 148 10E3/UL (ref 150–450)
POTASSIUM SERPL-SCNC: 4.2 MMOL/L (ref 3.4–5.3)
PROT SERPL-MCNC: 8.1 G/DL (ref 6.4–8.3)
RBC # BLD AUTO: 3.88 10E6/UL (ref 3.8–5.2)
SODIUM SERPL-SCNC: 136 MMOL/L (ref 135–145)
WBC # BLD AUTO: 6 10E3/UL (ref 4–11)

## 2024-12-12 ASSESSMENT — PAIN SCALES - GENERAL: PAINLEVEL_OUTOF10: SEVERE PAIN (7)

## 2024-12-12 NOTE — PROGRESS NOTES
Surgical Consultation/History and Physical  [facility]    Ana is seen in consultation for abdominal pain, at the request of Radha Bermudez PA-C.    Chief Complaint:  Abdominal pain    History of Present Illness: Ana Felix is a 44 year old female presents with Abdominal pain.  ***    Patient Active Problem List   Diagnosis    Crohns disease    Female pelvic pain    CARDIOVASCULAR SCREENING; LDL GOAL LESS THAN 160    Migraine headache    Back pain    Obesity    Disorder of sacrum    Displacement of lumbar intervertebral disc without myelopathy    Tobacco abuse    S/P oophorectomy    History of cholecystectomy    History of resection of small bowel    Abdominal pain    Dorsalgia, unspecified    Chronic low back pain    Mild persistent asthma, uncomplicated    Nausea with vomiting    Abdominal pain, right upper quadrant    Irritable bowel syndrome (IBS)    Lactose intolerance    Hearing difficulty    S/P LEEP of cervix    Controlled substance agreement signed    Degenerative lumbar disc    Dysmenorrhea    Labral tear of hip, degenerative    Pain medication agreement    Pain of right hip joint    Crohn's disease of both small and large intestine without complication (H)    History of gestational diabetes mellitus (GDM)    Amenorrhea    Immunocompromised state (H)    Hidradenitis suppurativa    SKYE (stress urinary incontinence, female)    DANIELLE (generalized anxiety disorder)    Insomnia, unspecified type    Primary osteoarthritis of left knee    Closed nondisplaced fracture of head of right radius with routine healing, subsequent encounter    Prediabetes     Past Medical History:   Diagnosis Date    Back pain     Crohn's disease (H)     Exercise-induced asthma     Gestational diabetes     Herniation of intervertebral disc of lumbar region     Infertility     Ovarian cyst     Smoker     Status post cholecystectomy 04/27/2005     Past Surgical History:   Procedure Laterality Date    APPENDECTOMY       C/SECTION, LOW TRANSVERSE      , Low Transverse    CHOLECYSTECTOMY, LAPOROSCOPIC  2005    Cholecystectomy, Laparoscopic    COLONOSCOPY      COLONOSCOPY N/A 2024    Procedure: Colonoscopy;  Surgeon: Robert Fiore MD;  Location: [facility]    ESOPHAGOSCOPY, GASTROSCOPY, DUODENOSCOPY (EGD), COMBINED  3/6/2014    Procedure: COMBINED ESOPHAGOSCOPY, GASTROSCOPY, DUODENOSCOPY (EGD), BIOPSY SINGLE OR MULTIPLE;  COLONOSCOPY/ UPPER GI ENDOSCOPY/ COLON/ EGD/ Abdominal pain, epigastric/ [facility];  Surgeon: West Lomas MD;  Location:  OR    ESOPHAGOSCOPY, GASTROSCOPY, DUODENOSCOPY (EGD), COMBINED N/A 2024    Procedure: ESOPHAGOGASTRODUODENOSCOPY, WITH BIOPSY;  Surgeon: Robert Fiore MD;  Location: [facility]    HC HYSTEROSCOPY, SURGICAL; W/ ENDOMETRIAL ABLATION, ANY METHOD  2010    HERNIORRHAPHY VENTRAL N/A 2018    Procedure: Open ventral hernia repair;  Surgeon: Alonso Bills MD;  Location: [facility]    HYSTERECTOMY, SUPRACERVICAL LAPAROSCOPIC      LEEP TX, CERVICAL      LEEP TX Cervical    ORTHOPEDIC SURGERY      bluged disk    partial small bowel resection      Ileo-colonic resection. Crohn's disease surgery for bowel obstruction. this was a section of small bowel and large bowel and the cecum., all removed and a reanastamosis done successfully    SALPINGO OOPHORECTOMY,R/L/TORI      Right ovary    SLING TRANSVAGINAL N/A 2022    Procedure: Pubovaginal sling with Altis;  Surgeon: Eleuterio Stone MD;  Location: [facility]    TUBAL LIGATION       Family History   Problem Relation Age of Onset    Hyperlipidemia Mother     Cancer Mother         cervical    Lipids Mother     Hypertension Father     Eye Disorder Father     Lipids Father     Alcohol/Drug Maternal Grandmother     Colon Cancer Paternal Grandmother     Diabetes Paternal Grandmother     Eye Disorder Paternal Grandmother     Diabetes Paternal Grandfather     Eye Disorder Paternal Grandfather     Inflammatory Bowel Disease  Paternal Aunt         and two paternal uncles     Social History     Tobacco Use    Smoking status: Some Days     Current packs/day: 0.50     Types: Cigarettes     Passive exposure: Never    Smokeless tobacco: Never    Tobacco comments:     5 Cigarettes in the past two week   Substance Use Topics    Alcohol use: Yes     Comment: very rarely      History   Drug Use No     Current Outpatient Medications   Medication Sig Dispense Refill    azaTHIOprine (IMURAN) 50 MG tablet Take 50 mg by mouth daily       busPIRone (BUSPAR) 10 MG tablet Take 1 tablet (10 mg) by mouth 2 times daily. 180 tablet 3    cloNIDine (CATAPRES) 0.1 MG tablet Take 1 tablet by mouth 2 times daily.      cyanocobalamin (VITAMIN B12) 1000 MCG/ML injection Inject 1 mL into the muscle every 30 days      Fluocinolone Acetonide Scalp 0.01 % OIL oil Apply sparingly twice daily as needed to scalp 118.28 mL 4    gabapentin (NEURONTIN) 300 MG capsule Take 2 capsules (600 mg) by mouth 3 times daily 180 capsule 0    hydrOXYzine HCl (ATARAX) 25 MG tablet Take 0.5-2 tablets (12.5-50 mg) by mouth 3 times daily as needed for anxiety. - May use 50-100mg at bedtime for sleep. Max dose 400mg/day 120 tablet 5    ibuprofen (ADVIL/MOTRIN) 800 MG tablet Take 1 tablet (800 mg) by mouth every 8 hours as needed for moderate pain 60 tablet 0    ketoconazole (NIZORAL) 2 % external shampoo Apply topically 2-3 times a week as needed for itching or irritation. 120 mL 3    montelukast (SINGULAIR) 10 MG tablet Take 1 tablet (10 mg) by mouth daily 90 tablet 3    mupirocin (BACTROBAN) 2 % external ointment Apply topically 3 times daily x7-10 days 30 g 1    naloxone (NARCAN) 4 MG/0.1ML nasal spray 1 spray (4 mg) intranasally into 1 nostril. Administer into alternating nostrils. May repeat every 2 to 3 minutes.      nortriptyline (PAMELOR) 25 MG capsule Take 1 capsule (25 mg) by mouth at bedtime. - may increase to 50mg nightly after 1 week if needed 60 capsule 0    omeprazole  "(PRILOSEC) 40 MG DR capsule Take 1 capsule (40 mg) by mouth daily. 90 capsule 3    oxyCODONE-acetaminophen (PERCOCET) 7.5-325 MG per tablet TAKE 1 TABLET BY MOUTH EVERY 6 HOURS AS NEEDED FOR PAIN. USE DATES 12/26/23 TO 01/24/24      sertraline (ZOLOFT) 100 MG tablet Take 2 tablets (200 mg) by mouth daily. 180 tablet 3    tiZANidine (ZANAFLEX) 2 MG tablet TAKE 1 TO 2 TABLETS BY MOUTH THREE TIMES DAILY AS NEEDED. DO NOT TAKE WITH OTHER MUSCLE RELAXATION      traZODone (DESYREL) 150 MG tablet TAKE 1 TABLET BY MOUTH ONCE DAILY AT BEDTIME 90 tablet 0    triamcinolone (KENALOG) 0.1 % external ointment Apply topically 2 times daily x5-10 days 30 g 1    VENTOLIN  (90 Base) MCG/ACT inhaler INHALE 2 PUFFS INTO THE LUNGS EVERY 4 HOURS AS NEEDED FOR SHORTNESS OF BREATH OR DIFFICULT BREATHING 18 g 1     Allergies   Allergen Reactions    Infliximab Hives    Sulfa Antibiotics Nausea     Pt states \"it doesn't work for me\"  Pt states \"it doesn't work for me\"  Other reaction(s): Diarrhea, nausea, Nausea, Intolerance  Pt states \"it doesn't work for me\"    Sulfacetamide Nausea    Amoxicillin Nausea and Vomiting    Amoxicillin-Pot Clavulanate Other (See Comments) and Nausea and Vomiting     Crohn's    Aspirin Nausea    Bupropion Hives and Nausea    Clavulanic Acid Nausea and Vomiting    Droperidol Other (See Comments)     Dystonic reaction    Ibuprofen Other (See Comments) and Nausea     Crohn's      Lyrica [Pregabalin] Nausea and Vomiting     Grogginess, severe back pain  Grogginess, severe back pain    Vicodin [Hydrocodone-Acetaminophen] Nausea and Vomiting    Adhesive Tape Rash     Review of Systems:   Constitutional - Denies fevers, weight loss, malaise, lethargy  Neuro - Denies tremors or seizures  Pulmon - Denies SOB, dyspnea, hemoptysis, chronic cough or use of an inhaler  CV - Denies CP, SOB, lower extremity edema, difficulty w/ stairs, has never used NTG  GI - Denies hematemesis, BRBPR, melena, chronic diarrhea or " "epigastric pain   - Denies hematuria, difficulty voiding, h/o STDs  Hematology - Denies blood clotting disorders, chronic anemias  Dermatology - No melanomas or skin cancers  Rheumatology - No h/o RA  Pysch - Denies depression, bipolar d/o or schizophrenia    Physical Exam:  BP (!) 155/100   Pulse 89   Temp 98  F (36.7  C) (Tympanic)   Ht 1.6 m (5' 3\")   Wt 88.9 kg (196 lb)   LMP 07/23/2010 (Exact Date)   SpO2 99%   BMI 34.72 kg/m      Constitutional- No acute distress, well nourished, non-toxic  Eyes: Anicteric, no injection.  PERRL  ENT:  Normocephalic, atraumatic, Nose midline, moist mucus membranes  Neck - supple, no LAD, Thyroid smooth, symmetric, Carotids without bruits  Respiratory- Clear to auscultation bilaterally, good inspiratory effort  Cardiovascular - Heart RRR, no lift's, thrills, murmurs, rubs, or gallop.  No peripheral edema.  No clubbing.  Abdomen - Soft, non-tender, +BS, no hepatosplenomegaly, no palpable masses  Groins - 2+ pulses bilaterally and no LAD, no masses  Rectal - good tone, no masses, guaiac negative  Neuro - No focal neuro deficits, Alert and oriented x 3  Psych: Appropriate mood and affect  Musculoskeletal: Normal gait, symmetric strength.  FROM upper and lower extremities.  Skin: Warm, Dry    Lab Results   Component Value Date    WBC 6.2 12/06/2024    WBC 9.1 07/05/2021     Lab Results   Component Value Date    RBC 3.67 12/06/2024    RBC 3.88 07/05/2021     Lab Results   Component Value Date    HGB 12.9 12/06/2024    HGB 13.6 07/05/2021     Lab Results   Component Value Date    HCT 38.1 12/06/2024    HCT 41.3 07/05/2021     Lab Results   Component Value Date     12/06/2024     07/05/2021     Lab Results   Component Value Date    MCH 35.1 12/06/2024    MCH 35.1 07/05/2021     Lab Results   Component Value Date    MCHC 33.9 12/06/2024    MCHC 32.9 07/05/2021     Lab Results   Component Value Date    RDW 13.7 12/06/2024    RDW 12.2 07/05/2021     Lab Results "   Component Value Date     12/06/2024     07/05/2021     Last Comprehensive Metabolic Panel:  Sodium   Date Value Ref Range Status   12/06/2024 135 135 - 145 mmol/L Final   10/20/2020 138 133 - 144 mmol/L Final     Potassium   Date Value Ref Range Status   12/06/2024 4.1 3.4 - 5.3 mmol/L Final     Comment:     Specimen slightly hemolyzed. The reported potassium value may be falsely elevated. Analysis of a non-hemolyzed specimen (i.e. re-draw) may result in a lower potassium value.   10/20/2020 3.9 3.4 - 5.3 mmol/L Final     Chloride   Date Value Ref Range Status   12/06/2024 100 98 - 107 mmol/L Final   10/20/2020 110 (H) 94 - 109 mmol/L Final     Carbon Dioxide   Date Value Ref Range Status   10/20/2020 23 20 - 32 mmol/L Final     Carbon Dioxide (CO2)   Date Value Ref Range Status   12/06/2024 24 22 - 29 mmol/L Final     Anion Gap   Date Value Ref Range Status   12/06/2024 11 7 - 15 mmol/L Final   10/20/2020 5 3 - 14 mmol/L Final     Glucose   Date Value Ref Range Status   12/06/2024 95 70 - 99 mg/dL Final   03/22/2021 118 (H) 70 - 99 mg/dL Final     Comment:     Fasting specimen     Urea Nitrogen   Date Value Ref Range Status   12/06/2024 12.3 6.0 - 20.0 mg/dL Final   10/20/2020 11 7 - 30 mg/dL Final     Creatinine   Date Value Ref Range Status   12/06/2024 0.87 0.51 - 0.95 mg/dL Final   10/20/2020 0.84 0.52 - 1.04 mg/dL Final     GFR Estimate   Date Value Ref Range Status   12/06/2024 83 >60 mL/min/1.73m2 Final     Comment:     eGFR calculated using 2021 CKD-EPI equation.   10/20/2020 85 >60 mL/min/[1.73_m2] Final     Comment:     Non  GFR Calc  Starting 12/18/2018, serum creatinine based estimated GFR (eGFR) will be   calculated using the Chronic Kidney Disease Epidemiology Collaboration   (CKD-EPI) equation.       Calcium   Date Value Ref Range Status   12/06/2024 9.3 8.8 - 10.4 mg/dL Final     Comment:     Reference intervals for this test were updated on 7/16/2024 to reflect our  healthy population more accurately. There may be differences in the flagging of prior results with similar values performed with this method. Those prior results can be interpreted in the context of the updated reference intervals.   10/20/2020 9.6 8.5 - 10.1 mg/dL Final     Bilirubin Total   Date Value Ref Range Status   12/06/2024 0.9 <=1.2 mg/dL Final   07/05/2021 0.5 0.2 - 1.3 mg/dL Final     Alkaline Phosphatase   Date Value Ref Range Status   12/06/2024 58 40 - 150 U/L Final   07/05/2021 49 40 - 150 U/L Final     ALT   Date Value Ref Range Status   12/06/2024 18 0 - 50 U/L Final   07/05/2021 20 0 - 50 U/L Final     AST   Date Value Ref Range Status   12/06/2024 41 0 - 45 U/L Final     Comment:     Specimen is hemolyzed which can falsely elevate AST. Analysis of a non-hemolyzed specimen may result in a lower value.   07/05/2021 20 0 - 45 U/L Final     INDICATION: RUQ abd pain.   COMPARISON: CT abdomen pelvis 4/25/2024  TECHNIQUE: CT scan of the abdomen and pelvis was performed following injection of IV contrast. Multiplanar reformats were obtained. Dose reduction techniques were used.  CONTRAST: 93 mL Isovue 370     FINDINGS:   LOWER CHEST: Normal.     HEPATOBILIARY: Normal liver contours. No worrisome liver lesions. Cholecystectomy.     PANCREAS: Normal.     SPLEEN: Normal.     ADRENAL GLANDS: Normal.     KIDNEYS/BLADDER: The kidneys enhance symmetrically. No urolithiasis or collecting system dilation. Urinary bladder is unremarkable.     BOWEL: Postsurgical changes from ileocecectomy. The colon is minimally distended with possible wall thickening of the proximal, transverse, descending, and sigmoid colon. No pneumatosis, pneumoperitoneum, or organized intra-abdominal fluid collection.     LYMPH NODES: No lymphadenopathy.     VASCULATURE: The abdominal aorta is nonaneurysmal.     PELVIC ORGANS: Hysterectomy. Small physiologic left ovarian cyst.     MUSCULOSKELETAL: No acute osseous abnormality.                                                                       IMPRESSION:   1.  Scattered areas of mild colonic wall thickening versus pseudothickening from underdistention. No significant surrounding pericolonic stranding or inflammatory changes.  2.  Otherwise no acute findings in the abdomen or pelvis.    Assessment:  1.     Plan:   1.       Mac Lewis DO on 12/12/2024 at 3:02 PM

## 2024-12-13 ENCOUNTER — TRANSFERRED RECORDS (OUTPATIENT)
Dept: HEALTH INFORMATION MANAGEMENT | Facility: CLINIC | Age: 44
End: 2024-12-13
Payer: COMMERCIAL

## 2024-12-19 ENCOUNTER — ANCILLARY PROCEDURE (OUTPATIENT)
Dept: GENERAL RADIOLOGY | Facility: CLINIC | Age: 44
End: 2024-12-19
Payer: COMMERCIAL

## 2024-12-19 DIAGNOSIS — K50.10 CROHN'S DISEASE OF LARGE INTESTINE WITHOUT COMPLICATIONS (H): ICD-10-CM

## 2024-12-19 DIAGNOSIS — R19.7 DIARRHEA, UNSPECIFIED: ICD-10-CM

## 2024-12-19 DIAGNOSIS — R15.2 FECAL URGENCY: ICD-10-CM

## 2024-12-19 DIAGNOSIS — R12 HEARTBURN: ICD-10-CM

## 2024-12-19 DIAGNOSIS — R10.13 EPIGASTRIC PAIN: ICD-10-CM

## 2025-01-02 ENCOUNTER — VIRTUAL VISIT (OUTPATIENT)
Dept: FAMILY MEDICINE | Facility: CLINIC | Age: 45
End: 2025-01-02
Payer: COMMERCIAL

## 2025-01-02 DIAGNOSIS — K58.0 IRRITABLE BOWEL SYNDROME WITH DIARRHEA: ICD-10-CM

## 2025-01-02 DIAGNOSIS — R10.84 ABDOMINAL PAIN, GENERALIZED: Primary | ICD-10-CM

## 2025-01-02 RX ORDER — NORTRIPTYLINE HYDROCHLORIDE 50 MG/1
50 CAPSULE ORAL AT BEDTIME
Qty: 30 CAPSULE | Refills: 0 | Status: SHIPPED | OUTPATIENT
Start: 2025-01-02

## 2025-01-02 RX ORDER — HYOSCYAMINE SULFATE 0.125 MG
1 TABLET,DISINTEGRATING ORAL
COMMUNITY
Start: 2024-12-13

## 2025-01-02 NOTE — PROGRESS NOTES
"Ana is a 44 year old who is being evaluated via a billable video visit.    How would you like to obtain your AVS? [employer]  If the video visit is dropped, the invitation should be resent by: Text to cell phone: [phone]  Will anyone else be joining your video visit? No      Assessment & Plan       ICD-10-CM    1. Abdominal pain, generalized  R10.84 nortriptyline (PAMELOR) 50 MG capsule      2. Irritable bowel syndrome with diarrhea  K58.0 nortriptyline (PAMELOR) 50 MG capsule          1,2) She increased to 50mg ~1 week ago. Will give this dose another 2 weeks before adjusting it or stopping it. So far, she has noted ~25% improvement in abdominal pain. She will do the colon cleanse that her GI specialist recommended. And she has yet to follow up with Gen Surg.      BMI  Estimated body mass index is 34.72 kg/m  as calculated from the following:    Height as of 12/12/24: 1.6 m (5' 3\").    Weight as of 12/12/24: 88.9 kg (196 lb).     Return in about 2 weeks (around 1/16/2025) for an update via [employer].      Subjective   Ana is a 44 year old, presenting for the following health issues:  No chief complaint on file.    History of Present Illness       Reason for visit:  Follow up    She eats 2-3 servings of fruits and vegetables daily.She consumes 2 sweetened beverage(s) daily.   She is taking medications regularly.       Talked to her GI specialist - told her she was constipated   They recommended a colon cleanse, hasn't done this yet  Has liver scan next week  Hasn't followed up with Gen Surg  Notriptyline has helped, 25%  Has been on 50mg x1 week  No s/e    Patient had a back injection this morning, drowsy from that      Review of Systems  Constitutional, gi and gu systems are negative, except as otherwise noted.      Objective           Vitals:  No vitals were obtained today due to virtual visit.    Physical Exam   GENERAL: alert and no distress  EYES: Eyes grossly normal to inspection.  No discharge or erythema, " or obvious scleral/conjunctival abnormalities.  RESP: No audible wheeze, cough, or visible cyanosis.    SKIN: Visible skin clear. No significant rash, abnormal pigmentation or lesions.  NEURO: Cranial nerves grossly intact.  Mentation and speech appropriate for age.  PSYCH: Appropriate affect, tone, and pace of words        Video-Visit Details    Type of service:  Video Visit   Originating Location (pt. Location): Home    Distant Location (provider location):  Off-site  Platform used for Video Visit: [facility]  Signed Electronically by: Radha Bermudez PA-C

## 2025-01-07 DIAGNOSIS — L29.9 SCALP PRURITUS: ICD-10-CM

## 2025-01-07 RX ORDER — KETOCONAZOLE 20 MG/ML
SHAMPOO, SUSPENSION TOPICAL
Qty: 120 ML | Refills: 3 | Status: SHIPPED | OUTPATIENT
Start: 2025-01-07

## 2025-01-14 ENCOUNTER — TRANSFERRED RECORDS (OUTPATIENT)
Dept: HEALTH INFORMATION MANAGEMENT | Facility: CLINIC | Age: 45
End: 2025-01-14
Payer: COMMERCIAL

## 2025-01-14 DIAGNOSIS — G47.00 INSOMNIA, UNSPECIFIED TYPE: ICD-10-CM

## 2025-01-14 RX ORDER — TRAZODONE HYDROCHLORIDE 150 MG/1
150 TABLET ORAL AT BEDTIME
Qty: 90 TABLET | Refills: 0 | Status: SHIPPED | OUTPATIENT
Start: 2025-01-14

## 2025-01-16 ENCOUNTER — TRANSFERRED RECORDS (OUTPATIENT)
Dept: HEALTH INFORMATION MANAGEMENT | Facility: CLINIC | Age: 45
End: 2025-01-16
Payer: COMMERCIAL

## 2025-01-25 ENCOUNTER — HOSPITAL ENCOUNTER (OUTPATIENT)
Dept: MRI IMAGING | Facility: CLINIC | Age: 45
Discharge: HOME | End: 2025-01-25
Attending: INTERNAL MEDICINE | Admitting: INTERNAL MEDICINE
Payer: COMMERCIAL

## 2025-01-25 DIAGNOSIS — R74.8 ELEVATED LIVER ENZYMES: ICD-10-CM

## 2025-01-25 DIAGNOSIS — R10.11 RIGHT UPPER QUADRANT ABDOMINAL PAIN: ICD-10-CM

## 2025-01-25 PROCEDURE — 255N000002 HC RX 255 OP 636: Performed by: INTERNAL MEDICINE

## 2025-01-25 PROCEDURE — 258N000003 HC RX IP 258 OP 636: Performed by: INTERNAL MEDICINE

## 2025-01-25 PROCEDURE — A9585 GADOBUTROL INJECTION: HCPCS | Performed by: INTERNAL MEDICINE

## 2025-01-25 PROCEDURE — 74183 MRI ABD W/O CNTR FLWD CNTR: CPT

## 2025-01-25 RX ORDER — GADOBUTROL 604.72 MG/ML
8.5 INJECTION INTRAVENOUS ONCE
Status: COMPLETED | OUTPATIENT
Start: 2025-01-25 | End: 2025-01-25

## 2025-01-25 RX ADMIN — SODIUM CHLORIDE 50 ML: 9 INJECTION, SOLUTION INTRAVENOUS at 10:25

## 2025-01-25 RX ADMIN — GADOBUTROL 8.5 ML: 604.72 INJECTION INTRAVENOUS at 09:52

## 2025-01-27 ENCOUNTER — OFFICE VISIT (OUTPATIENT)
Dept: FAMILY MEDICINE | Facility: CLINIC | Age: 45
End: 2025-01-27
Payer: COMMERCIAL

## 2025-01-27 VITALS
SYSTOLIC BLOOD PRESSURE: 136 MMHG | HEART RATE: 97 BPM | HEIGHT: 64 IN | RESPIRATION RATE: 20 BRPM | WEIGHT: 197.5 LBS | OXYGEN SATURATION: 100 % | TEMPERATURE: 97.8 F | DIASTOLIC BLOOD PRESSURE: 88 MMHG | BODY MASS INDEX: 33.72 KG/M2

## 2025-01-27 DIAGNOSIS — R93.2 ABNORMAL FINDING ON IMAGING OF LIVER: ICD-10-CM

## 2025-01-27 DIAGNOSIS — Z72.0 TOBACCO USE: ICD-10-CM

## 2025-01-27 DIAGNOSIS — K50.90 CROHN'S DISEASE WITHOUT COMPLICATION, UNSPECIFIED GASTROINTESTINAL TRACT LOCATION (H): ICD-10-CM

## 2025-01-27 DIAGNOSIS — Z01.818 PRE-OP EVALUATION: Primary | ICD-10-CM

## 2025-01-27 DIAGNOSIS — K76.0 HEPATIC STEATOSIS: ICD-10-CM

## 2025-01-27 DIAGNOSIS — R03.0 ELEVATED BLOOD PRESSURE READING WITHOUT DIAGNOSIS OF HYPERTENSION: ICD-10-CM

## 2025-01-27 PROCEDURE — 99214 OFFICE O/P EST MOD 30 MIN: CPT | Performed by: PHYSICIAN ASSISTANT

## 2025-01-27 ASSESSMENT — ASTHMA QUESTIONNAIRES
ACT_TOTALSCORE: 25
QUESTION_2 LAST FOUR WEEKS HOW OFTEN HAVE YOU HAD SHORTNESS OF BREATH: NOT AT ALL
ACT_TOTALSCORE: 25
QUESTION_1 LAST FOUR WEEKS HOW MUCH OF THE TIME DID YOUR ASTHMA KEEP YOU FROM GETTING AS MUCH DONE AT WORK, SCHOOL OR AT HOME: NONE OF THE TIME
QUESTION_3 LAST FOUR WEEKS HOW OFTEN DID YOUR ASTHMA SYMPTOMS (WHEEZING, COUGHING, SHORTNESS OF BREATH, CHEST TIGHTNESS OR PAIN) WAKE YOU UP AT NIGHT OR EARLIER THAN USUAL IN THE MORNING: NOT AT ALL
QUESTION_4 LAST FOUR WEEKS HOW OFTEN HAVE YOU USED YOUR RESCUE INHALER OR NEBULIZER MEDICATION (SUCH AS ALBUTEROL): NOT AT ALL
QUESTION_5 LAST FOUR WEEKS HOW WOULD YOU RATE YOUR ASTHMA CONTROL: COMPLETELY CONTROLLED

## 2025-01-27 ASSESSMENT — PAIN SCALES - GENERAL: PAINLEVEL_OUTOF10: MODERATE PAIN (6)

## 2025-01-27 NOTE — PROGRESS NOTES
Preoperative Evaluation  [facility] [place]  [address]  [place] [address]  Phone: [phone]  Primary Provider: Radha Bermudez PA-C  Pre-op Performing Provider: Radha Bermudez PA-C  Jan 27, 2025 1/27/2025   Surgical Information   What procedure is being done? liver biopsy   Facility or Hospital where procedure/surgery will be performed: [facility]   Who is doing the procedure / surgery? [facility], liver specialist    Date of surgery / procedure: 01/29/2025   Time of surgery / procedure: 10 am   Where do you plan to recover after surgery? at home with family     Fax number for surgical facility: [phone]    Assessment & Plan     The proposed surgical procedure is considered INTERMEDIATE risk.      ICD-10-CM    1. Pre-op evaluation  Z01.818       2. Hepatic steatosis  K76.0       3. Abnormal finding on imaging of liver  R93.2       4. Crohn's disease without complication, unspecified gastrointestinal tract location (H)  K50.90       5. Tobacco use  Z72.0       6. Elevated blood pressure reading without diagnosis of hypertension  R03.0 24 Hour Blood Pressure Monitor - Adult          Patient has had labs done in the last month, no need to repeat any diagnostics today.   Due to some elevated blood pressure readings in the clinic setting will obtain an at home 24-hour blood pressure monitor after her procedure.         - No identified additional risk factors other than previously addressed    Antiplatelet or Anticoagulation Medication Instructions   - Patient is on no antiplatelet or anticoagulation medications.    Additional Medication Instructions  Take all scheduled medications on the day of surgery    Recommendation  Approval given to proceed with proposed procedure, without further diagnostic evaluation.    Chris Perez is a 44 year old, presenting for the following:  Pre-Op Exam          1/27/2025     9:36 AM   Additional Questions    Roomed by Mila Meyer CMA   Accompanied by None     HPI related to upcoming procedure: Abnormal finding on liver imaging        1/27/2025   Pre-Op Questionnaire   Have you ever had a heart attack or stroke? No   Have you ever had surgery on your heart or blood vessels, such as a stent placement, a coronary artery bypass, or surgery on an artery in your head, neck, heart, or legs? No   Do you have chest pain with activity? No   Do you have a history of heart failure? No   Do you currently have a cold, bronchitis or symptoms of other infection? No   Do you have a cough, shortness of breath, or wheezing? No   Do you or anyone in your family have previous history of blood clots? No   Do you or does anyone in your family have a serious bleeding problem such as prolonged bleeding following surgeries or cuts? No   Have you ever had problems with anemia or been told to take iron pills? No   Have you had any abnormal blood loss such as black, tarry or bloody stools, or abnormal vaginal bleeding? No   Have you ever had a blood transfusion? No   Are you willing to have a blood transfusion if it is medically needed before, during, or after your surgery? Yes   Have you or any of your relatives ever had problems with anesthesia? No   Do you have sleep apnea, excessive snoring or daytime drowsiness? No   Do you have any artifical heart valves or other implanted medical devices like a pacemaker, defibrillator, or continuous glucose monitor? No   Do you have artificial joints? No   Are you allergic to latex? No     Health Care Directive  Patient does not have a Health Care Directive: Discussed advance care planning with patient; information given to patient to review.    Preoperative Review of    reviewed - controlled substances reflected in medication list.    Status of Chronic Conditions:  See problem list for active medical problems.  Problems all longstanding and stable, except as noted/documented.  See ROS for  pertinent symptoms related to these conditions.    Patient Active Problem List    Diagnosis Date Noted    Prediabetes 01/03/2024     Priority: Medium    Closed nondisplaced fracture of head of right radius with routine healing, subsequent encounter 08/18/2023     Priority: Medium    Primary osteoarthritis of left knee 02/13/2023     Priority: Medium    DANIELLE (generalized anxiety disorder) 03/15/2022     Priority: Medium    Insomnia, unspecified type 03/15/2022     Priority: Medium    SKYE (stress urinary incontinence, female) 03/14/2022     Priority: Medium     Added automatically from request for surgery [id number]      Hidradenitis suppurativa 09/02/2021     Priority: Medium    Immunocompromised state 07/09/2021     Priority: Medium    History of gestational diabetes mellitus (GDM) 08/15/2018     Priority: Medium    Amenorrhea 08/15/2018     Priority: Medium    Crohn's disease of both small and large intestine without complication (H) 07/31/2018     Priority: Medium    Pain medication agreement 08/18/2017     Priority: Medium     Overview:   [facility]      Controlled substance agreement signed 07/19/2017     Priority: Medium    Degenerative lumbar disc 03/07/2017     Priority: Medium    Labral tear of hip, degenerative 03/07/2017     Priority: Medium    Pain of right hip joint 03/07/2017     Priority: Medium    S/P LEEP of cervix 12/15/2015     Priority: Medium     S/p LEEP of cervix - date unknown  12/9/15: Pap - NIL, Neg HPV. Plan cotest in 1 year.   3/7/18 Pap: NIL/neg HPV.  12/29/23 NIL pap, neg HPV. Plan: cotest in 3 years            Hearing difficulty 10/09/2014     Priority: Medium    Irritable bowel syndrome (IBS) 04/15/2014     Priority: Medium     Tiffany raw vegetables      Lactose intolerance 04/15/2014     Priority: Medium    Abdominal pain, right upper quadrant 03/06/2014     Priority: Medium    Nausea with vomiting 01/31/2014     Priority: Medium    Chronic low back pain 11/05/2012     Priority:  Medium     Follows with pain clinic.      Mild persistent asthma, uncomplicated 2012     Priority: Medium    Dorsalgia, unspecified 10/08/2012     Priority: Medium    Abdominal pain 2012     Priority: Medium    S/P oophorectomy 2012     Priority: Medium    History of cholecystectomy 2012     Priority: Medium    History of resection of small bowel 2012     Priority: Medium    Tobacco abuse 2012     Priority: Medium    Displacement of lumbar intervertebral disc without myelopathy 2012     Priority: Medium    Disorder of sacrum 2011     Priority: Medium     Problem list name updated by automated process. Provider to review      Back pain 2011     Priority: Medium    Obesity 2011     Priority: Medium    Migraine headache 2011     Priority: Medium     Patient given Migraine Education folder and Migraine Action Plan on 2011. Cmamy Anderson RN    (Problem list name updated by automated process. Provider to review and confirm.)      CARDIOVASCULAR SCREENING; LDL GOAL LESS THAN 160 10/31/2010     Priority: Medium    Dysmenorrhea 10/05/2010     Priority: Medium    Female pelvic pain 2010     Priority: Medium     (Problem list name updated by automated process. Provider to review and confirm.)      Crohns disease 2009     Priority: Medium      Past Medical History:   Diagnosis Date    Back pain     Crohn's disease (H)     Exercise-induced asthma     Gestational diabetes     Herniation of intervertebral disc of lumbar region     Infertility     Ovarian cyst     Smoker     Status post cholecystectomy 2005     Past Surgical History:   Procedure Laterality Date    APPENDECTOMY      C/SECTION, LOW TRANSVERSE      , Low Transverse    CHOLECYSTECTOMY, LAPOROSCOPIC  2005    Cholecystectomy, Laparoscopic    COLONOSCOPY      COLONOSCOPY N/A 2024    Procedure: Colonoscopy;  Surgeon: Robert Fiore MD;  Location: [facility]     ESOPHAGOSCOPY, GASTROSCOPY, DUODENOSCOPY (EGD), COMBINED  3/6/2014    Procedure: COMBINED ESOPHAGOSCOPY, GASTROSCOPY, DUODENOSCOPY (EGD), BIOPSY SINGLE OR MULTIPLE;  COLONOSCOPY/ UPPER GI ENDOSCOPY/ COLON/ EGD/ Abdominal pain, epigastric/ [facility];  Surgeon: West Lomas MD;  Location:  OR    ESOPHAGOSCOPY, GASTROSCOPY, DUODENOSCOPY (EGD), COMBINED N/A 5/9/2024    Procedure: ESOPHAGOGASTRODUODENOSCOPY, WITH BIOPSY;  Surgeon: Robert Fiore MD;  Location: [facility]    HC HYSTEROSCOPY, SURGICAL; W/ ENDOMETRIAL ABLATION, ANY METHOD  7/2010    HERNIORRHAPHY VENTRAL N/A 12/11/2018    Procedure: Open ventral hernia repair;  Surgeon: Alonso Bills MD;  Location: [facility]    HYSTERECTOMY, SUPRACERVICAL LAPAROSCOPIC  2010    LEEP TX, CERVICAL      LEEP TX Cervical    ORTHOPEDIC SURGERY      bluged disk    partial small bowel resection  2002    Ileo-colonic resection. Crohn's disease surgery for bowel obstruction. this was a section of small bowel and large bowel and the cecum., all removed and a reanastamosis done successfully    SALPINGO OOPHORECTOMY,R/L/TORI      Right ovary    SLING TRANSVAGINAL N/A 4/4/2022    Procedure: Pubovaginal sling with Altis;  Surgeon: Eleuterio Stone MD;  Location: [facility] OR    TUBAL LIGATION       Current Outpatient Medications   Medication Sig Dispense Refill    azaTHIOprine (IMURAN) 50 MG tablet Take 50 mg by mouth daily       busPIRone (BUSPAR) 10 MG tablet Take 1 tablet (10 mg) by mouth 2 times daily. 180 tablet 3    cloNIDine (CATAPRES) 0.1 MG tablet Take 1 tablet by mouth 2 times daily.      cyanocobalamin (VITAMIN B12) 1000 MCG/ML injection Inject 1 mL into the muscle every 30 days      Fluocinolone Acetonide Scalp 0.01 % OIL oil Apply sparingly twice daily as needed to scalp 118.28 mL 4    gabapentin (NEURONTIN) 300 MG capsule Take 2 capsules (600 mg) by mouth 3 times daily 180 capsule 0    hydrOXYzine HCl (ATARAX) 25 MG tablet Take 0.5-2 tablets (12.5-50 mg) by mouth 3 times daily  "as needed for anxiety. - May use 50-100mg at bedtime for sleep. Max dose 400mg/day 120 tablet 5    hyoscyamine 0.125 MG TBDP Take 1 tablet by mouth 2 times daily.      ibuprofen (ADVIL/MOTRIN) 800 MG tablet Take 1 tablet (800 mg) by mouth every 8 hours as needed for moderate pain 60 tablet 0    ketoconazole (NIZORAL) 2 % external shampoo Apply topically 2-3 times a week as needed for itching or irritation. 120 mL 3    montelukast (SINGULAIR) 10 MG tablet Take 1 tablet (10 mg) by mouth daily 90 tablet 3    mupirocin (BACTROBAN) 2 % external ointment Apply topically 3 times daily x7-10 days 30 g 1    naloxone (NARCAN) 4 MG/0.1ML nasal spray 1 spray (4 mg) intranasally into 1 nostril. Administer into alternating nostrils. May repeat every 2 to 3 minutes.      nortriptyline (PAMELOR) 50 MG capsule Take 1 capsule (50 mg) by mouth at bedtime. - dose increase 12/26/24 30 capsule 0    omeprazole (PRILOSEC) 40 MG DR capsule Take 1 capsule (40 mg) by mouth daily. 90 capsule 3    ondansetron (ZOFRAN ODT) 4 MG ODT tab Take 1-2 tablets (4-8 mg) by mouth every 8 hours as needed for nausea. 20 tablet 1    oxyCODONE-acetaminophen (PERCOCET) 7.5-325 MG per tablet TAKE 1 TABLET BY MOUTH EVERY 6 HOURS AS NEEDED FOR PAIN. USE DATES 12/26/23 TO 01/24/24      sertraline (ZOLOFT) 100 MG tablet Take 2 tablets (200 mg) by mouth daily. 180 tablet 3    tiZANidine (ZANAFLEX) 2 MG tablet TAKE 1 TO 2 TABLETS BY MOUTH THREE TIMES DAILY AS NEEDED. DO NOT TAKE WITH OTHER MUSCLE RELAXATION      traZODone (DESYREL) 150 MG tablet TAKE 1 TABLET BY MOUTH ONCE DAILY AT BEDTIME 90 tablet 0    triamcinolone (KENALOG) 0.1 % external ointment Apply topically 2 times daily x5-10 days 30 g 1    VENTOLIN  (90 Base) MCG/ACT inhaler INHALE 2 PUFFS INTO THE LUNGS EVERY 4 HOURS AS NEEDED FOR SHORTNESS OF BREATH OR DIFFICULT BREATHING 18 g 1       Allergies   Allergen Reactions    Infliximab Hives    Sulfa Antibiotics Nausea     Pt states \"it doesn't work for " "me\"  Pt states \"it doesn't work for me\"  Other reaction(s): Diarrhea, nausea, Nausea, Intolerance  Pt states \"it doesn't work for me\"    Sulfacetamide Nausea    Amoxicillin Nausea and Vomiting    Amoxicillin-Pot Clavulanate Other (See Comments) and Nausea and Vomiting     Crohn's    Aspirin Nausea    Bupropion Hives and Nausea    Clavulanic Acid Nausea and Vomiting    Droperidol Other (See Comments)     Dystonic reaction    Ibuprofen Other (See Comments) and Nausea     Crohn's      Lyrica [Pregabalin] Nausea and Vomiting     Grogginess, severe back pain  Grogginess, severe back pain    Vicodin [Hydrocodone-Acetaminophen] Nausea and Vomiting    Adhesive Tape Rash        Social History     Tobacco Use    Smoking status: Some Days     Types: Cigarettes     Passive exposure: Current    Smokeless tobacco: Never    Tobacco comments:     5 Cigarettes in the past two week   Substance Use Topics    Alcohol use: Yes     Comment: very rarely     History   Drug Use No             Review of Systems  Constitutional, neuro, ENT, endocrine, pulmonary, cardiac, gastrointestinal, genitourinary, musculoskeletal, integument and psychiatric systems are negative, except as otherwise noted.    Objective    /88   Pulse 97   Temp 97.8  F (36.6  C) (Oral)   Resp 20   Ht 1.626 m (5' 4\")   Wt 89.6 kg (197 lb 8 oz)   LMP 07/23/2010 (Exact Date)   SpO2 100%   BMI 33.90 kg/m     Estimated body mass index is 33.9 kg/m  as calculated from the following:    Height as of this encounter: 1.626 m (5' 4\").    Weight as of this encounter: 89.6 kg (197 lb 8 oz).  Physical Exam  GENERAL: alert and no distress  EYES: Eyes grossly normal to inspection  HENT: ear canals and TM's normal, nose and mouth without ulcers or lesions  NECK: no adenopathy, no asymmetry, masses, or scars  RESP: lungs clear to auscultation - no rales, rhonchi or wheezes  CV: regular rates and rhythm, normal S1 S2, no S3 or S4, and no murmur, click or rub  ABDOMEN: soft, " nontender, without hepatosplenomegaly or masses  MS: no gross musculoskeletal defects noted, no edema  SKIN: no suspicious lesions or rashes  NEURO: Normal strength and tone, mentation intact and speech normal  PSYCH: mentation appears normal, affect normal/bright    Recent Labs   Lab Test 12/12/24  1538 12/06/24  1641 05/02/24  1607   HGB 13.2 12.9 15.0   * 186 206    135 133*   POTASSIUM 4.2 4.1 4.3   CR 0.90 0.87 0.89   A1C  --   --  5.7*        Diagnostics  No labs were ordered during this visit.   No EKG required, no history of coronary heart disease, significant arrhythmia, peripheral arterial disease or other structural heart disease.    Revised Cardiac Risk Index (RCRI)  The patient has the following serious cardiovascular risks for perioperative complications:   - No serious cardiac risks = 0 points     RCRI Interpretation: 0 points: Class I (very low risk - 0.4% complication rate)         Signed Electronically by: Radha Bermudez PA-C  A copy of this evaluation report is provided to the requesting physician.

## 2025-03-12 ENCOUNTER — VIRTUAL VISIT (OUTPATIENT)
Dept: FAMILY MEDICINE | Facility: CLINIC | Age: 45
End: 2025-03-12
Payer: COMMERCIAL

## 2025-03-12 DIAGNOSIS — K74.60 CIRRHOSIS, NON-ALCOHOLIC (H): ICD-10-CM

## 2025-03-12 DIAGNOSIS — J06.9 VIRAL URI WITH COUGH: Primary | ICD-10-CM

## 2025-03-12 PROCEDURE — 98005 SYNCH AUDIO-VIDEO EST LOW 20: CPT | Performed by: PHYSICIAN ASSISTANT

## 2025-03-12 NOTE — PROGRESS NOTES
"Ana is a 44 year old who is being evaluated via a billable video visit.    How would you like to obtain your AVS? [employer]  If the video visit is dropped, the invitation should be resent by: Text to cell phone: [phone]  Will anyone else be joining your video visit? No      Assessment & Plan       ICD-10-CM    1. Viral URI with cough  J06.9       2. Cirrhosis, non-alcoholic (H)  K74.60           1) Her cough is improving with time. Likely related to a virus and/or postinfectious bronchospasm. Follow up if symptoms fail to improve or worsen.    2) Following GI. Reports her liver biopsy showed cirrhosis.       BMI  Estimated body mass index is 33.9 kg/m  as calculated from the following:    Height as of 1/27/25: 1.626 m (5' 4\").    Weight as of 1/27/25: 89.6 kg (197 lb 8 oz).     Return in about 2 weeks (around 3/26/2025), or if symptoms worsen or fail to improve.      Subjective   Ana is a 44 year old, presenting for the following health issues:  Cough        3/12/2025    12:29 PM   Additional Questions   Roomed by Patient echecked in via mychart   Accompanied by na         3/12/2025    12:29 PM   Patient Reported Additional Medications   Patient reports taking the following new medications na     History of Present Illness       Reason for visit:  Follow up cough  Symptom onset:  3-4 weeks ago  Symptom intensity:  Severe  Symptom progression:  Improving  Had these symptoms before:  Yes  Has tried/received treatment for these symptoms:  No  What makes it better:  Time   She is taking medications regularly.        Is improving   HR dept needs a note for 2/24  Stayed home bc she was coughing so bad, throughout the day        Review of Systems  Constitutional, HEENT, cardiovascular, pulmonary, gi and gu systems are negative, except as otherwise noted.      Objective           Vitals:  No vitals were obtained today due to virtual visit.    Physical Exam   GENERAL: alert and no distress  EYES: Eyes grossly normal to " Medications: inspection.  No discharge or erythema, or obvious scleral/conjunctival abnormalities.  RESP: No audible wheeze, cough, or visible cyanosis.    SKIN: Visible skin clear. No significant rash, abnormal pigmentation or lesions.  NEURO: Cranial nerves grossly intact.  Mentation and speech appropriate for age.  PSYCH: Appropriate affect, tone, and pace of words        Video-Visit Details    Type of service:  Video Visit   Originating Location (pt. Location): Home    Distant Location (provider location):  On-site  Platform used for Video Visit: [facility]  Signed Electronically by: Radha Bermudez PA-C

## 2025-03-12 NOTE — LETTER
[facility] [place]  [address]  [place] [address]  Phone: [phone]    March 12, 2025        Ana Felix  [address]  [place] [address]          To whom it may concern:    RE: Ana Felix    Patient was seen and evaluated today by our clinic. Due to a cough she missed work on Februrary 24, 2025. Her symptoms are improving.     Please contact me for questions or concerns.      Sincerely,    Radha Bermudez PA-C

## 2025-03-16 DIAGNOSIS — G47.00 INSOMNIA, UNSPECIFIED TYPE: ICD-10-CM

## 2025-03-16 RX ORDER — TRAZODONE HYDROCHLORIDE 150 MG/1
150 TABLET ORAL AT BEDTIME
Qty: 90 TABLET | Refills: 1 | Status: SHIPPED | OUTPATIENT
Start: 2025-03-16

## 2025-03-29 DIAGNOSIS — J45.30 MILD PERSISTENT ASTHMA, UNCOMPLICATED: ICD-10-CM

## 2025-03-30 RX ORDER — MONTELUKAST SODIUM 10 MG/1
1 TABLET ORAL DAILY
Qty: 90 TABLET | Refills: 0 | Status: SHIPPED | OUTPATIENT
Start: 2025-03-30

## 2025-07-07 DIAGNOSIS — J45.30 MILD PERSISTENT ASTHMA, UNCOMPLICATED: ICD-10-CM

## 2025-07-07 RX ORDER — MONTELUKAST SODIUM 10 MG/1
1 TABLET ORAL DAILY
Qty: 90 TABLET | Refills: 0 | Status: SHIPPED | OUTPATIENT
Start: 2025-07-07

## 2025-08-27 ENCOUNTER — HOSPITAL ENCOUNTER (OUTPATIENT)
Dept: ULTRASOUND IMAGING | Facility: CLINIC | Age: 45
Discharge: HOME | End: 2025-08-27
Attending: INTERNAL MEDICINE
Payer: COMMERCIAL

## 2025-08-27 DIAGNOSIS — K75.81 NASH (NONALCOHOLIC STEATOHEPATITIS): ICD-10-CM

## 2025-08-27 PROCEDURE — 76705 ECHO EXAM OF ABDOMEN: CPT

## 2025-08-29 ENCOUNTER — TRANSFERRED RECORDS (OUTPATIENT)
Dept: INFUSION THERAPY | Facility: CLINIC | Age: 45
End: 2025-08-29
Payer: COMMERCIAL

## (undated) DEVICE — ESU PENCIL SMOKE EVAC W/ROCKER SWITCH 0703-047-000

## (undated) DEVICE — BLADE KNIFE SURG 10 371110

## (undated) DEVICE — DECANTER VIAL 2006S

## (undated) DEVICE — RETR ELASTIC STAYS 3311-1G

## (undated) DEVICE — SUCTION TIP YANKAUER STR K87

## (undated) DEVICE — SU VICRYL 2-0 UR-5 27" J375H

## (undated) DEVICE — ADHESIVE SWIFTSET 0.8ML OCTYL SS6

## (undated) DEVICE — PREP SKIN SCRUB TRAY 4461A

## (undated) DEVICE — STOCKING SLEEVE COMPRESSION CALF MED

## (undated) DEVICE — TUBING IRRIG TUR Y TYPE 96" LF 6543-01

## (undated) DEVICE — SOL NACL 0.9% INJ 100ML BAG 2B1307

## (undated) DEVICE — SPONGE PACK VAGINAL 2"X9

## (undated) DEVICE — DECANTER TRANSFER DEVICE 2008S

## (undated) DEVICE — SOL WATER IRRIG 1000ML BOTTLE 07139-09

## (undated) DEVICE — SU PROLENE 0 CT-1 30" 8424H

## (undated) DEVICE — SYR 10ML LL W/O NDL

## (undated) DEVICE — SU VICRYL 4-0 FS-2 27" J422-H

## (undated) DEVICE — PAD PERI W/WINGS 1580A

## (undated) DEVICE — NDL 22GA 1.5"

## (undated) DEVICE — GLOVE PROTEXIS W/NEU-THERA 8.0  2D73TE80

## (undated) DEVICE — SYR BULB IRRIG DOVER 60 ML LATEX FREE 67000

## (undated) DEVICE — PACK LAPAROTOMY CUSTOM LAKES

## (undated) DEVICE — GLOVE PROTEXIS W/NEU-THERA 7.5  2D73TE75

## (undated) DEVICE — RETR RING LONE STAR 14.1X14.1CM 3307G

## (undated) DEVICE — NDL 19GA 1.5"

## (undated) DEVICE — SUCTION TIP YANKAUER W/O VENT K86

## (undated) DEVICE — TUBING SUCTION 12"X1/4" N612

## (undated) DEVICE — DRAPE GYN/UROLOGY FLUID POUCH TUR 29455

## (undated) DEVICE — SU ETHIBOND 0 CT-1 30" X424H

## (undated) DEVICE — SU VICRYL 3-0 SH 27" UND J416H

## (undated) DEVICE — CATH FOLEY 18FR 5ML SILICONE LUBRI-SIL 175818

## (undated) DEVICE — SU VICRYL 3-0 TIE 54" UND J285G

## (undated) DEVICE — PACK MINOR SBA15MIFSE

## (undated) DEVICE — SOL NACL 0.9% IRRIG 1000ML BOTTLE 07138-09

## (undated) DEVICE — DRAPE VAGI BAG 18X9" 1072

## (undated) DEVICE — BLADE KNIFE SURG 11 371111

## (undated) RX ORDER — DEXAMETHASONE SODIUM PHOSPHATE 4 MG/ML
INJECTION, SOLUTION INTRA-ARTICULAR; INTRALESIONAL; INTRAMUSCULAR; INTRAVENOUS; SOFT TISSUE
Status: DISPENSED
Start: 2022-04-04

## (undated) RX ORDER — ONDANSETRON 2 MG/ML
INJECTION INTRAMUSCULAR; INTRAVENOUS
Status: DISPENSED
Start: 2022-04-04

## (undated) RX ORDER — LIDOCAINE HYDROCHLORIDE 10 MG/ML
INJECTION, SOLUTION EPIDURAL; INFILTRATION; INTRACAUDAL; PERINEURAL
Status: DISPENSED
Start: 2018-12-11

## (undated) RX ORDER — FENTANYL CITRATE 50 UG/ML
INJECTION, SOLUTION INTRAMUSCULAR; INTRAVENOUS
Status: DISPENSED
Start: 2022-04-04

## (undated) RX ORDER — ONDANSETRON 2 MG/ML
INJECTION INTRAMUSCULAR; INTRAVENOUS
Status: DISPENSED
Start: 2018-12-11

## (undated) RX ORDER — ACETAMINOPHEN 325 MG/1
TABLET ORAL
Status: DISPENSED
Start: 2022-04-04

## (undated) RX ORDER — PROPOFOL 10 MG/ML
INJECTION, EMULSION INTRAVENOUS
Status: DISPENSED
Start: 2024-05-09

## (undated) RX ORDER — PROPOFOL 10 MG/ML
INJECTION, EMULSION INTRAVENOUS
Status: DISPENSED
Start: 2018-12-11

## (undated) RX ORDER — KETOROLAC TROMETHAMINE 30 MG/ML
INJECTION, SOLUTION INTRAMUSCULAR; INTRAVENOUS
Status: DISPENSED
Start: 2018-12-11

## (undated) RX ORDER — GLYCOPYRROLATE 0.2 MG/ML
INJECTION, SOLUTION INTRAMUSCULAR; INTRAVENOUS
Status: DISPENSED
Start: 2024-05-09

## (undated) RX ORDER — OXYCODONE AND ACETAMINOPHEN 5; 325 MG/1; MG/1
TABLET ORAL
Status: DISPENSED
Start: 2018-12-11

## (undated) RX ORDER — FENTANYL CITRATE 50 UG/ML
INJECTION, SOLUTION INTRAMUSCULAR; INTRAVENOUS
Status: DISPENSED
Start: 2018-12-11

## (undated) RX ORDER — DEXAMETHASONE SODIUM PHOSPHATE 4 MG/ML
INJECTION, SOLUTION INTRA-ARTICULAR; INTRALESIONAL; INTRAMUSCULAR; INTRAVENOUS; SOFT TISSUE
Status: DISPENSED
Start: 2018-12-11

## (undated) RX ORDER — CEFAZOLIN SODIUM 2 G/100ML
INJECTION, SOLUTION INTRAVENOUS
Status: DISPENSED
Start: 2018-12-11

## (undated) RX ORDER — ACETAMINOPHEN 325 MG/1
TABLET ORAL
Status: DISPENSED
Start: 2018-12-11

## (undated) RX ORDER — CEFAZOLIN SODIUM 1 G/3ML
INJECTION, POWDER, FOR SOLUTION INTRAMUSCULAR; INTRAVENOUS
Status: DISPENSED
Start: 2022-04-04

## (undated) RX ORDER — OXYCODONE HYDROCHLORIDE 5 MG/1
TABLET ORAL
Status: DISPENSED
Start: 2022-04-04

## (undated) RX ORDER — POLYMYXIN B SULFATE 500000 [IU]/1
INJECTION, POWDER, LYOPHILIZED, FOR SOLUTION INTRAMUSCULAR; INTRATHECAL; INTRAVENOUS; OPHTHALMIC
Status: DISPENSED
Start: 2022-04-04

## (undated) RX ORDER — LIDOCAINE HYDROCHLORIDE 10 MG/ML
INJECTION, SOLUTION EPIDURAL; INFILTRATION; INTRACAUDAL; PERINEURAL
Status: DISPENSED
Start: 2024-05-09